# Patient Record
Sex: FEMALE | Race: WHITE | NOT HISPANIC OR LATINO | Employment: OTHER | URBAN - METROPOLITAN AREA
[De-identification: names, ages, dates, MRNs, and addresses within clinical notes are randomized per-mention and may not be internally consistent; named-entity substitution may affect disease eponyms.]

---

## 2017-01-23 ENCOUNTER — OFFICE VISIT (OUTPATIENT)
Dept: INTERNAL MEDICINE | Facility: CLINIC | Age: 82
End: 2017-01-23
Payer: MEDICARE

## 2017-01-23 VITALS
WEIGHT: 142.19 LBS | HEART RATE: 74 BPM | SYSTOLIC BLOOD PRESSURE: 104 MMHG | RESPIRATION RATE: 20 BRPM | HEIGHT: 64 IN | OXYGEN SATURATION: 98 % | DIASTOLIC BLOOD PRESSURE: 62 MMHG | BODY MASS INDEX: 24.28 KG/M2 | TEMPERATURE: 98 F

## 2017-01-23 DIAGNOSIS — R68.89 FLU-LIKE SYMPTOMS: Primary | ICD-10-CM

## 2017-01-23 LAB
CTP QC/QA: YES
FLUAV AG NPH QL: NEGATIVE
FLUBV AG NPH QL: NEGATIVE

## 2017-01-23 PROCEDURE — 87804 INFLUENZA ASSAY W/OPTIC: CPT | Mod: ,,, | Performed by: INTERNAL MEDICINE

## 2017-01-23 PROCEDURE — 99213 OFFICE O/P EST LOW 20 MIN: CPT | Mod: S$GLB,,, | Performed by: INTERNAL MEDICINE

## 2017-01-23 NOTE — PROGRESS NOTES
"Subjective:      Patient ID: Mary Ellen Miller is a 86 y.o. female.    Chief Complaint: Generalized Body Aches; Cough; and Weakness    HPI: 5 days ago developed a runny nose, mild cough. Now aches, but not to the point of not  Being able to move. No paroxysms of coughing. No HA,dizziness,CP,SOB,wheezing.  No GI/ symptoms.  Did not go on her bus trip.    Review of Systems   Constitutional: Positive for activity change, appetite change and fatigue. Negative for chills, diaphoresis, fever and unexpected weight change.   HENT: Positive for congestion and rhinorrhea. Negative for ear pain, sinus pressure, sore throat and tinnitus.    Eyes: Negative.    Respiratory: Positive for cough. Negative for choking, chest tightness, shortness of breath and wheezing.    Cardiovascular: Negative for chest pain and palpitations.   Endocrine: Negative for cold intolerance and heat intolerance.   Genitourinary: Negative.    Musculoskeletal: Positive for myalgias.   Neurological: Negative for dizziness, light-headedness and headaches.   Psychiatric/Behavioral: Negative.        Objective:     Visit Vitals    /62 (BP Location: Right arm, Patient Position: Sitting, BP Method: Manual)    Pulse 74    Temp 98 °F (36.7 °C) (Oral)    Resp 20    Ht 5' 4" (1.626 m)    Wt 64.5 kg (142 lb 3.2 oz)    SpO2 98%    BMI 24.41 kg/m2       Physical Exam   Constitutional: She appears well-developed and well-nourished.   HENT:   Head: Normocephalic and atraumatic.   Right Ear: External ear normal.   Left Ear: External ear normal.   Nose: Nose normal.   Mouth/Throat: Oropharynx is clear and moist.   Eyes: Conjunctivae are normal. Pupils are equal, round, and reactive to light.   Neck: Normal range of motion.   Cardiovascular: Normal rate, regular rhythm and normal heart sounds.    Pulmonary/Chest: Effort normal and breath sounds normal. No respiratory distress. She has no wheezes. She has no rales. She exhibits no tenderness. "   Abdominal: Soft. Bowel sounds are normal.   Musculoskeletal: Normal range of motion. She exhibits no edema or tenderness.   Lymphadenopathy:     She has no cervical adenopathy.   Neurological: She is alert.   Skin: Skin is warm and dry.   Psychiatric: She has a normal mood and affect. Her behavior is normal.   Nursing note and vitals reviewed.      Assessment:     1. Flu-like symptoms      Plan:     Flu-like symptoms  -     POCT Influenza A/B    This was negative.  Treat symptomatically:fluids,rest,OTC cough suppressants.  If fever,chills,HA,dizziness,GI symptoms come in or go to the ER.

## 2017-01-23 NOTE — MR AVS SNAPSHOT
[facility]  [address],  [address]  [place] [address]  Phone: [phone]  Fax: [phone]                  Mary Ellen Miller   2017 1:20 PM   Office Visit    Description:  Female : 1930   Provider:  Ania Barton MD   Department:  [facility]           Reason for Visit     Generalized Body Aches     Cough     Weakness                To Do List           Future Appointments        Provider Department Dept Phone    2/15/2017 10:00 AM Ania Barton MD [facility] [phone]      Goals (5 Years of Data)     None      [facility]     [facility] -  Assistance  Registered nurses in the [facility] provide clinical advisement, health education, appointment booking, and other advisory services.  Call for this free service at [phone].             Medications                Verify that the below list of medications is an accurate representation of the medications you are currently taking.  If none reported, the list may be blank. If incorrect, please contact your healthcare provider. Carry this list with you in case of emergency.           Current Medications     ascorbic acid (VITAMIN C) 500 MG tablet Take 500 mg by mouth once daily.    atenolol (TENORMIN) 25 MG tablet TAKE 1 TABLET BY ORAL ROUTE 1 TIME PER DAY    biotin 5 mg Tab Take 500 mg by mouth once daily.     calcium carbonate (OS-ZOILA) 600 mg (1,500 mg) Tab Take 600 mg by mouth 2 (two) times daily with meals.    escitalopram oxalate (LEXAPRO) 10 MG tablet TAKE 1 TABLET (10 MG) BY ORAL ROUTE ONCE DAILY    hydrALAZINE (APRESOLINE) 25 MG tablet TAKE 1 TABLET (25 MG) BY ORAL ROUTE 2 TIMES PER DAY WITH FOOD    hydrochlorothiazide (HYDRODIURIL) 25 MG tablet Take 1 tablet (25 mg total) by mouth once daily.    multivitamin (THERAGRAN) tablet Take 1 tablet by mouth once daily.    risedronate (ACTONEL) 150 MG Tab 1  "TABLET 1 TIME PER MONTH    UNABLE TO FIND Take 1 tablet by mouth 2 (two) times daily. medication name: HA Joint    vitamin D 1000 units Tab Take 2,000 mg by mouth once daily.    vitamin E 1000 UNIT capsule Take 1,000 Units by mouth once daily.           Clinical Reference Information           Vital Signs - Last Recorded  Most recent update: 1/23/2017  1:30 PM by Rosemary Gaitan LPN    BP Pulse Temp Resp Ht Wt    104/62 (BP Location: Right arm, Patient Position: Sitting, BP Method: Manual) 74 98 °F (36.7 °C) (Oral) 20 5' 4" (1.626 m) 64.5 kg (142 lb 3.2 oz)    SpO2 BMI             98% 24.41 kg/m2         Blood Pressure          Most Recent Value    BP  104/62      Allergies as of 1/23/2017     Percodan [Oxycodone Hcl-oxycodone-asa]    Penicillins    Sulfa (Sulfonamide Antibiotics)      Immunizations Administered on Date of Encounter - 1/23/2017     None      [facility] Sign-Up     Activating your [facility] account is as easy as 1-2-3!     1) Visit [email or web address], select Sign Up Now, enter this activation code and your date of birth, then select Next.  [email or web address]  Expires: 3/9/2017  2:00 PM      2) Create a username and password to use when you visit [facility] in the future and select a security question in case you lose your password and select Next.    3) Enter your e-mail address and click Sign Up!    Additional Information  If you have questions, please e-mail [email or web address] or call [phone] to talk to our [facility] staff. Remember, [facility] is NOT to be used for urgent needs. For medical emergencies, dial 911.         "

## 2017-02-15 ENCOUNTER — OFFICE VISIT (OUTPATIENT)
Dept: INTERNAL MEDICINE | Facility: CLINIC | Age: 82
End: 2017-02-15
Payer: MEDICARE

## 2017-02-15 VITALS
RESPIRATION RATE: 16 BRPM | HEART RATE: 63 BPM | SYSTOLIC BLOOD PRESSURE: 128 MMHG | BODY MASS INDEX: 24.92 KG/M2 | DIASTOLIC BLOOD PRESSURE: 68 MMHG | OXYGEN SATURATION: 97 % | TEMPERATURE: 98 F | HEIGHT: 64 IN | WEIGHT: 145.94 LBS

## 2017-02-15 DIAGNOSIS — M89.9 DISORDER OF BONE: ICD-10-CM

## 2017-02-15 DIAGNOSIS — E87.6 HYPOKALEMIA: ICD-10-CM

## 2017-02-15 DIAGNOSIS — M19.071 PRIMARY LOCALIZED OSTEOARTHROSIS, ANKLE AND FOOT, RIGHT: Primary | ICD-10-CM

## 2017-02-15 PROCEDURE — 90670 PCV13 VACCINE IM: CPT | Mod: S$GLB,,, | Performed by: INTERNAL MEDICINE

## 2017-02-15 PROCEDURE — G0009 ADMIN PNEUMOCOCCAL VACCINE: HCPCS | Mod: S$GLB,,, | Performed by: INTERNAL MEDICINE

## 2017-02-15 PROCEDURE — 99214 OFFICE O/P EST MOD 30 MIN: CPT | Mod: 25,S$GLB,, | Performed by: INTERNAL MEDICINE

## 2017-02-15 RX ORDER — POTASSIUM CHLORIDE 750 MG/1
10 TABLET, EXTENDED RELEASE ORAL DAILY
Qty: 10 TABLET | Refills: 1 | Status: SHIPPED | OUTPATIENT
Start: 2017-02-15 | End: 2017-08-08

## 2017-02-15 NOTE — PROGRESS NOTES
Subjective:      Patient ID: Mary Ellen Miller is a 87 y.o. female.    Chief Complaint: Results (pt in for lab results) and Hypertension    HPI: After her viral syndrome, it did take 2 weeks to recover.  However, she is now back to where she started.  Getting around, out and about.  Happier.  /15/17 0759 COLORU Yellow - Final result      02/15/17 0759 APPEARANCEUA Clear - Final result     02/15/17 0759 SPECGRAV 1.020 - Final result     02/15/17 0759 PHUR 7.0 - Final result     02/15/17 0759 KETONESU Negative - Final result     02/15/17 0759 OCCULTUA Negative - Final result     02/15/17 0759 PROTEINUA Negative - Final result     02/15/17 0759 NITRITE Negative - Final result     02/15/17 0759 LEUKOCYTESUR Trace Abnormal Final result     02/15/17 0759 WBC 4.64 - Final result     02/15/17 0759 RBC 4.40 - Final result     02/15/17 0759 HGB 13.4 - Final result     02/15/17 0759 HCT 40.2 - Final result     02/15/17 0759 MCH 30.5 - Final result     02/15/17 0759 RDW 13.9 - Final result     02/15/17 0759  - Final result     02/15/17 0759 MPV 10.6 - Final result     03/17/16 0851 SEGS 84.4 High Final result     02/15/17 0759 GLU 81 - Final result     02/15/17 0759 BUN 17 - Final result     02/15/17 0759 CREATININE 0.72 - Final result     02/15/17 0759 CALCIUM 9.2 - Final result     02/15/17 0759  - Final result     02/15/17 0759 K 3.3 Low Final result     02/15/17 0759  - Final result     02/15/17 0759 PROT 7.5 - Final result     02/15/17 0759 ALBUMIN 4.3 - Final result     02/15/17 0759 BILITOT 1.2 High Final result     02/15/17 0759 AST 29 - Final result     02/15/17 0759 ALKPHOS 43 - Final result     02/15/17 0759 CO2 31 High Final result     02/15/17 0759 ALT 22 - Final result     02/15/17 0759 ANIONGAP 11 - Final result     02/15/17 0759 EGFRNONAA >60.0 - Final result     02/15/17 0759 ESTGFRAFRICA >60.0 - Final result     02/15/17 0759 MCV 91 - Final        Review of Systems   Constitutional:  "Negative.    HENT: Negative.    Eyes: Negative.    Respiratory: Negative.    Cardiovascular: Negative.    Gastrointestinal: Negative.    Endocrine: Negative.    Genitourinary: Negative.    Musculoskeletal:        Gary   Skin: Negative.    Allergic/Immunologic: Negative.    Neurological: Negative.    Hematological: Negative.    Psychiatric/Behavioral: Negative.        Objective:     Visit Vitals    /68 (BP Location: Right arm, Patient Position: Sitting, BP Method: Manual)    Pulse 63    Temp 97.7 °F (36.5 °C) (Oral)    Resp 16    Ht 5' 4" (1.626 m)    Wt 66.2 kg (145 lb 15.1 oz)    SpO2 97%    BMI 25.05 kg/m2       Physical Exam   Constitutional: She is oriented to person, place, and time. She appears well-developed and well-nourished. No distress.   HENT:   Head: Normocephalic and atraumatic.   Mouth/Throat: Oropharynx is clear and moist.   Eyes: Conjunctivae are normal. Pupils are equal, round, and reactive to light.   Neck: Normal range of motion. Neck supple.   Cardiovascular: Normal rate, regular rhythm and normal heart sounds.    Pulmonary/Chest: Effort normal and breath sounds normal.   Abdominal: Soft. Bowel sounds are normal.   Musculoskeletal: Normal range of motion.   Neurological: She is alert and oriented to person, place, and time.   Skin: Skin is warm and dry. She is not diaphoretic.   Psychiatric: She has a normal mood and affect. Her behavior is normal. Judgment and thought content normal.   Nursing note and vitals reviewed.      Assessment:     1. Primary localized osteoarthrosis, ankle and foot, right    2. Hypokalemia    3. Disorder of bone      Gary, so obviously has osteoporosis.  Plan:     Primary localized osteoarthrosis, ankle and foot, right  -     DXA Bone Density Spine And Hip_Axial Skeleton; Future; Expected date: 2/15/17    Hypokalemia  -     potassium chloride (KLOR-CON) 10 MEQ TbSR; Take 1 tablet (10 mEq total) by mouth once daily.  Dispense: 10 tablet; Refill: " 1    Disorder of bone   -     DXA Bone Density Spine And Hip_Axial Skeleton; Future; Expected date: 2/15/17    Other orders  -     Pneumococcal Conjugate Vaccine (13 Valent) (IM)

## 2017-02-15 NOTE — MR AVS SNAPSHOT
[facility]  [address],  [address]  [place] [address]  Phone: [phone]  Fax: [phone]                  Mary Ellen Miller   2/15/2017 10:00 AM   Office Visit    Description:  Female : 1930   Provider:  Ania Barton MD   Department:  [facility]           Reason for Visit     Results     Hypertension           Diagnoses this Visit        Comments    Primary localized osteoarthrosis, ankle and foot, right    -  Primary     Hypokalemia         Disorder of bone                To Do List           Goals (5 Years of Data)     None       These Medications        Disp Refills Start End    potassium chloride (KLOR-CON) 10 MEQ TbSR 10 tablet 1 2/15/2017     Take 1 tablet (10 mEq total) by mouth once daily. - Oral    Pharmacy: [facility]/pharmacy #[id number] - [place], [place] - [address] AT [facility] #: [phone]         [facility]     [facility] Care Line -  Assistance  Registered nurses in the [facility] provide clinical advisement, health education, appointment booking, and other advisory services.  Call for this free service at [phone].             Medications           START taking these NEW medications        Refills    potassium chloride (KLOR-CON) 10 MEQ TbSR 1    Sig: Take 1 tablet (10 mEq total) by mouth once daily.    Class: Normal    Route: Oral           Verify that the below list of medications is an accurate representation of the medications you are currently taking.  If none reported, the list may be blank. If incorrect, please contact your healthcare provider. Carry this list with you in case of emergency.           Current Medications     ascorbic acid (VITAMIN C) 500 MG tablet Take 500 mg by mouth once daily.    atenolol (TENORMIN) 25 MG tablet TAKE 1 TABLET BY ORAL ROUTE 1 TIME PER DAY    biotin 5 mg Tab Take 500 mg by mouth once daily.     calcium carbonate  "(OS-ZOILA) 600 mg (1,500 mg) Tab Take 600 mg by mouth 2 (two) times daily with meals.    escitalopram oxalate (LEXAPRO) 10 MG tablet TAKE 1 TABLET (10 MG) BY ORAL ROUTE ONCE DAILY    hydrALAZINE (APRESOLINE) 25 MG tablet TAKE 1 TABLET (25 MG) BY ORAL ROUTE 2 TIMES PER DAY WITH FOOD    hydrochlorothiazide (HYDRODIURIL) 25 MG tablet Take 1 tablet (25 mg total) by mouth once daily.    multivitamin (THERAGRAN) tablet Take 1 tablet by mouth once daily.    risedronate (ACTONEL) 150 MG Tab 1 TABLET 1 TIME PER MONTH    vitamin D 1000 units Tab Take 2,000 mg by mouth once daily.    vitamin E 1000 UNIT capsule Take 1,000 Units by mouth once daily.    potassium chloride (KLOR-CON) 10 MEQ TbSR Take 1 tablet (10 mEq total) by mouth once daily.    UNABLE TO FIND Take 1 tablet by mouth 2 (two) times daily. medication name: HA Joint           Clinical Reference Information           Your Vitals Were     BP Pulse Temp Resp Height Weight    128/68 (BP Location: Right arm, Patient Position: Sitting, BP Method: Manual) 63 97.7 °F (36.5 °C) (Oral) 16 5' 4" (1.626 m) 66.2 kg (145 lb 15.1 oz)    SpO2 BMI             97% 25.05 kg/m2         Blood Pressure          Most Recent Value    BP  128/68      Allergies as of 2/15/2017     Percodan [Oxycodone Hcl-oxycodone-asa]    Penicillins    Sulfa (Sulfonamide Antibiotics)      Immunizations Administered on Date of Encounter - 2/15/2017     Name Date Dose VIS Date Route    Pneumococcal Conjugate - 13 Valent  Incomplete 0.5 mL 11/5/2015 Intramuscular      Orders Placed During Today's Visit      Normal Orders This Visit    Pneumococcal Conjugate Vaccine (13 Valent) (IM)     Future Labs/Procedures Expected by Expires    DXA Bone Density Spine And Hip_Axial Skeleton  2/15/2017 2/15/2018      [facility] Sign-Up     Activating your [facility] account is as easy as 1-2-3!     1) Visit [email or web address], select Sign Up Now, enter this activation code and your date of birth, then select " Next.  [email or web address]  Expires: 3/9/2017  2:00 PM      2) Create a username and password to use when you visit [facility] in the future and select a security question in case you lose your password and select Next.    3) Enter your e-mail address and click Sign Up!    Additional Information  If you have questions, please e-mail [email or web address] or call [phone] to talk to our [facility] staff. Remember, [facility] is NOT to be used for urgent needs. For medical emergencies, dial 911.         Language Assistance Services     ATTENTION: Language assistance services are available, free of charge. Please call [phone].      ATENCIÓN: Si gabriel rowley, tiene a stevens disposición servicios gratuitos de asistencia lingüística. Llame al [phone].     [facility] Ý: N?u b?n nói Ti?ng Vi?t, có các d?ch v? h? tr? ngôn ng? mi?n phí dành cho b?n. G?i s? [phone].         [facility] complies with applicable Federal civil rights laws and does not discriminate on the basis of race, color, national origin, age, disability, or sex.

## 2017-03-08 RX ORDER — HYDROCHLOROTHIAZIDE 25 MG/1
TABLET ORAL
Qty: 90 TABLET | Refills: 1 | Status: SHIPPED | OUTPATIENT
Start: 2017-03-08 | End: 2017-09-02 | Stop reason: SDUPTHER

## 2017-03-08 RX ORDER — ATENOLOL 25 MG/1
TABLET ORAL
Qty: 90 TABLET | Refills: 1 | Status: SHIPPED | OUTPATIENT
Start: 2017-03-08 | End: 2017-05-04 | Stop reason: SDUPTHER

## 2017-03-08 RX ORDER — ESCITALOPRAM OXALATE 10 MG/1
TABLET ORAL
Qty: 90 TABLET | Refills: 1 | Status: SHIPPED | OUTPATIENT
Start: 2017-03-08 | End: 2017-09-02 | Stop reason: SDUPTHER

## 2017-03-08 RX ORDER — HYDRALAZINE HYDROCHLORIDE 25 MG/1
TABLET, FILM COATED ORAL
Qty: 180 TABLET | Refills: 1 | Status: SHIPPED | OUTPATIENT
Start: 2017-03-08 | End: 2017-09-02 | Stop reason: SDUPTHER

## 2017-05-02 ENCOUNTER — TELEPHONE (OUTPATIENT)
Dept: INTERNAL MEDICINE | Facility: CLINIC | Age: 82
End: 2017-05-02

## 2017-05-02 NOTE — TELEPHONE ENCOUNTER
----- Message from Nisha Rahman sent at 5/2/2017 12:02 PM CDT -----  Contact: patient   Patient scheduled her appointment for 5/23 but would like to get labs done an check her potassium before she comes in , but no orders are out there. She will be leaving Sunday to go out of town . Please call her before then.

## 2017-05-03 DIAGNOSIS — E87.6 HYPOKALEMIA: Primary | ICD-10-CM

## 2017-05-04 ENCOUNTER — TELEPHONE (OUTPATIENT)
Dept: INTERNAL MEDICINE | Facility: CLINIC | Age: 82
End: 2017-05-04

## 2017-05-04 ENCOUNTER — OFFICE VISIT (OUTPATIENT)
Dept: INTERNAL MEDICINE | Facility: CLINIC | Age: 82
End: 2017-05-04
Payer: MEDICARE

## 2017-05-04 VITALS
TEMPERATURE: 98 F | WEIGHT: 144.19 LBS | DIASTOLIC BLOOD PRESSURE: 70 MMHG | HEIGHT: 64 IN | SYSTOLIC BLOOD PRESSURE: 120 MMHG | OXYGEN SATURATION: 97 % | HEART RATE: 72 BPM | RESPIRATION RATE: 16 BRPM | BODY MASS INDEX: 24.62 KG/M2

## 2017-05-04 DIAGNOSIS — R45.89 ANXIETY ABOUT HEALTH: ICD-10-CM

## 2017-05-04 DIAGNOSIS — I10 ESSENTIAL HYPERTENSION: Primary | ICD-10-CM

## 2017-05-04 DIAGNOSIS — R00.0 TACHYCARDIA: ICD-10-CM

## 2017-05-04 PROCEDURE — 99214 OFFICE O/P EST MOD 30 MIN: CPT | Mod: S$GLB,,, | Performed by: INTERNAL MEDICINE

## 2017-05-04 RX ORDER — NAPROXEN SODIUM 220 MG/1
81 TABLET, FILM COATED ORAL DAILY
Start: 2017-05-04 | End: 2017-11-17

## 2017-05-04 RX ORDER — ATENOLOL 25 MG/1
25 TABLET ORAL 2 TIMES DAILY
Qty: 180 TABLET | Refills: 1 | Status: SHIPPED | OUTPATIENT
Start: 2017-05-04 | End: 2017-11-12 | Stop reason: SDUPTHER

## 2017-05-04 NOTE — MR AVS SNAPSHOT
[facility]  [address],  [address]  [place] [address]  Phone: [phone]  Fax: [phone]                  Mary Ellen Miller   2017 8:00 AM   Office Visit    Description:  Female : 1930   Provider:  Ania Barton MD   Department:  [facility]           Reason for Visit     Hypertension           Diagnoses this Visit        Comments    Essential hypertension    -  Primary     Tachycardia         Anxiety about health                To Do List           Future Appointments        Provider Department Dept Phone    2017 10:00 AM Ania Barton MD [facility] [phone]    8/3/2017 2:00 PM Ania Barton MD [facility] [phone]      Goals (5 Years of Data)     None       These Medications        Disp Refills Start End    atenolol (TENORMIN) 25 MG tablet 180 tablet 1 2017     Take 1 tablet (25 mg total) by mouth 2 (two) times daily. - Oral    Pharmacy: [facility]/pharmacy #[id number] - [place] - [address] AT [facility] Ph #: [phone]       aspirin 81 MG Chew   2017     Take 1 tablet (81 mg total) by mouth once daily. - Oral    Pharmacy: [facility]/pharmacy #[id number] - [place] - [address] AT [facility] Ph #: [phone]         [facility]     [facility] On Call Nurse Care Line -  Assistance  Unless otherwise directed by your provider, please contact [facility], our nurse care line that is available for  assistance.     Registered nurses in the [facility] provide: appointment scheduling, clinical advisement, health education, and other advisory services.  Call: [phone] (toll free)               Medications           START taking these NEW medications        Refills    aspirin 81 MG Chew     Sig: Take 1 tablet (81 mg total) by mouth once daily.    Class: No Print    Route: Oral      CHANGE how you are  taking these medications     Start Taking Instead of    atenolol (TENORMIN) 25 MG tablet atenolol (TENORMIN) 25 MG tablet    Dosage:  Take 1 tablet (25 mg total) by mouth 2 (two) times daily. Dosage:  TAKE 1 TABLET BY ORAL ROUTE 1 TIME PER DAY    Reason for Change:  Reorder       STOP taking these medications     methylPREDNISolone (MEDROL DOSEPACK) 4 mg tablet Take 4 mg by mouth. use as directed           Verify that the below list of medications is an accurate representation of the medications you are currently taking.  If none reported, the list may be blank. If incorrect, please contact your healthcare provider. Carry this list with you in case of emergency.           Current Medications     ascorbic acid (VITAMIN C) 500 MG tablet Take 500 mg by mouth once daily.    atenolol (TENORMIN) 25 MG tablet Take 1 tablet (25 mg total) by mouth 2 (two) times daily.    biotin 5 mg Tab Take 500 mg by mouth once daily.     calcium carbonate (OS-ZOILA) 600 mg (1,500 mg) Tab Take 600 mg by mouth 2 (two) times daily with meals.    escitalopram oxalate (LEXAPRO) 10 MG tablet TAKE 1 TABLET (10 MG) BY ORAL ROUTE ONCE DAILY    hydrALAZINE (APRESOLINE) 25 MG tablet TAKE 1 TABLET (25 MG) BY ORAL ROUTE 2 TIMES PER DAY WITH FOOD    hydrochlorothiazide (HYDRODIURIL) 25 MG tablet TAKE 1 TABLET (25 MG TOTAL) BY MOUTH ONCE DAILY.    multivitamin (THERAGRAN) tablet Take 1 tablet by mouth once daily.    potassium chloride (KLOR-CON) 10 MEQ TbSR Take 1 tablet (10 mEq total) by mouth once daily.    risedronate (ACTONEL) 150 MG Tab 1 TABLET 1 TIME PER MONTH    vitamin D 1000 units Tab Take 2,000 mg by mouth once daily.    vitamin E 1000 UNIT capsule Take 1,000 Units by mouth once daily.    aspirin 81 MG Chew Take 1 tablet (81 mg total) by mouth once daily.    UNABLE TO FIND Take 1 tablet by mouth 2 (two) times daily. medication name: HA Joint           Clinical Reference Information           Your Vitals Were     BP Pulse Temp Resp Height Weight  "   120/70 (BP Location: Left arm, Patient Position: Sitting, BP Method: Manual) 72 98.3 °F (36.8 °C) (Oral) 16 5' 4" (1.626 m) 65.4 kg (144 lb 2.9 oz)    SpO2 BMI             97% 24.75 kg/m2         Blood Pressure          Most Recent Value    BP  120/70      Allergies as of 5/4/2017     Percodan [Oxycodone Hcl-oxycodone-asa]    Penicillins    Sulfa (Sulfonamide Antibiotics)      Immunizations Administered on Date of Encounter - 5/4/2017     None      [facility] Sign-Up     Activating your [facility] account is as easy as 1-2-3!     1) Visit [email or web address], select Sign Up Now, enter this activation code and your date of birth, then select Next.  [email or web address]  Expires: 5/15/2017  8:00 PM      2) Create a username and password to use when you visit [facility] in the future and select a security question in case you lose your password and select Next.    3) Enter your e-mail address and click Sign Up!    Additional Information  If you have questions, please e-mail [email or web address] or call [phone] to talk to our [facility] staff. Remember, [facility] is NOT to be used for urgent needs. For medical emergencies, dial 911.         Language Assistance Services     ATTENTION: Language assistance services are available, free of charge. Please call [phone].      ATENCIÓN: Si habla español, tiene a stevens disposición servicios gratuitos de asistencia lingüística. Llame al [phone].     CHÚ Ý: N?u b?n nói Ti?ng Vi?t, có các d?ch v? h? tr? ngôn ng? mi?n phí dành cho b?n. G?i s? [phone].         [facility] complies with applicable Federal civil rights laws and does not discriminate on the basis of race, color, national origin, age, disability, or sex.        "

## 2017-05-04 NOTE — TELEPHONE ENCOUNTER
----- Message from Nisha Rahman sent at 5/4/2017  8:54 AM CDT -----  Contact: Patient   Patient came in today for appt but forgot to get results on her bone density and would like a call back. Call her cell [phone]

## 2017-05-04 NOTE — PROGRESS NOTES
"Subjective:      Patient ID: Mary Ellen Miller is a 87 y.o. female.    Chief Complaint: Hypertension (pt c/o fast pulse and high reading on her blood pressure )    HPI: 87y/oWF, who lives alone, and has been going to many funerals recently, has had  Several episodes of tachycardia. Sporadic, self limiting and without any exacerbating circumstances.  Has taken her BP when this happens and its been: 147-155/.  Has not had any SOB,GARVIN,PND,CP,headaches or lightheadedness.  She has been seen by Dr. Last recently... No findings.      Review of Systems   Constitutional: Negative.    HENT: Negative.    Eyes: Negative.    Respiratory: Negative for cough, chest tightness, shortness of breath and wheezing.    Cardiovascular: Positive for palpitations. Negative for chest pain and leg swelling.   Gastrointestinal: Negative.    Endocrine: Negative.    Genitourinary: Negative.    Musculoskeletal: Negative.    Skin: Negative.    Allergic/Immunologic: Negative.    Neurological: Negative.    Hematological: Negative.    Psychiatric/Behavioral: Negative.        Objective:   /70 (BP Location: Left arm, Patient Position: Sitting, BP Method: Manual)  Pulse 72  Temp 98.3 °F (36.8 °C) (Oral)   Resp 16  Ht 5' 4" (1.626 m)  Wt 65.4 kg (144 lb 2.9 oz)  SpO2 97%  BMI 24.75 kg/m2    Physical Exam   Constitutional: She is oriented to person, place, and time. She appears well-developed and well-nourished.   HENT:   Head: Normocephalic and atraumatic.   Right Ear: External ear normal.   Left Ear: External ear normal.   Nose: Nose normal.   Mouth/Throat: Oropharynx is clear and moist.   Eyes: Conjunctivae and EOM are normal. Pupils are equal, round, and reactive to light.   Neck: Normal range of motion. Neck supple. No JVD present.   Cardiovascular: Normal rate and regular rhythm.    Murmur heard.   Systolic murmur is present with a grade of 2/6   Pulmonary/Chest: Effort normal and breath sounds normal.   Abdominal: Soft. " Bowel sounds are normal.   Musculoskeletal: Normal range of motion.   Neurological: She is alert and oriented to person, place, and time.   Skin: Skin is warm and dry.   Psychiatric: She has a normal mood and affect. Her behavior is normal.   Nursing note and vitals reviewed.      Assessment:     1. Essential hypertension    2. Tachycardia    3. Anxiety about health    Will ask for Dr. Last' notes too.  Plan:     Essential hypertension  -     atenolol (TENORMIN) 25 MG tablet; Take 1 tablet (25 mg total) by mouth 2 (two) times daily.  Dispense: 180 tablet; Refill: 1  -     aspirin 81 MG Chew; Take 1 tablet (81 mg total) by mouth once daily.    Tachycardia  -     atenolol (TENORMIN) 25 MG tablet; Take 1 tablet (25 mg total) by mouth 2 (two) times daily.  Dispense: 180 tablet; Refill: 1    Anxiety about health

## 2017-05-12 RX ORDER — RISEDRONATE SODIUM 150 MG/1
TABLET, FILM COATED ORAL
Qty: 6 TABLET | Refills: 0 | Status: SHIPPED | OUTPATIENT
Start: 2017-05-12 | End: 2017-07-05 | Stop reason: SDUPTHER

## 2017-07-05 RX ORDER — RISEDRONATE SODIUM 150 MG/1
TABLET, FILM COATED ORAL
Qty: 6 TABLET | Refills: 0 | Status: SHIPPED | OUTPATIENT
Start: 2017-07-05 | End: 2018-02-04 | Stop reason: SDUPTHER

## 2017-08-01 ENCOUNTER — OFFICE VISIT (OUTPATIENT)
Dept: FAMILY MEDICINE | Facility: CLINIC | Age: 82
End: 2017-08-01
Payer: MEDICARE

## 2017-08-01 VITALS
WEIGHT: 138.44 LBS | TEMPERATURE: 99 F | RESPIRATION RATE: 16 BRPM | DIASTOLIC BLOOD PRESSURE: 70 MMHG | HEIGHT: 63 IN | SYSTOLIC BLOOD PRESSURE: 124 MMHG | BODY MASS INDEX: 24.53 KG/M2 | HEART RATE: 81 BPM | OXYGEN SATURATION: 95 %

## 2017-08-01 DIAGNOSIS — J01.90 ACUTE RHINOSINUSITIS: ICD-10-CM

## 2017-08-01 DIAGNOSIS — R05.9 COUGH: ICD-10-CM

## 2017-08-01 DIAGNOSIS — R19.7 DIARRHEA, UNSPECIFIED TYPE: ICD-10-CM

## 2017-08-01 DIAGNOSIS — J18.9 ATYPICAL PNEUMONIA: Primary | ICD-10-CM

## 2017-08-01 DIAGNOSIS — H65.93 MIDDLE EAR EFFUSION, BILATERAL: ICD-10-CM

## 2017-08-01 PROCEDURE — 1126F AMNT PAIN NOTED NONE PRSNT: CPT | Mod: ,,, | Performed by: NURSE PRACTITIONER

## 2017-08-01 PROCEDURE — 99215 OFFICE O/P EST HI 40 MIN: CPT | Mod: PBBFAC,PO | Performed by: NURSE PRACTITIONER

## 2017-08-01 PROCEDURE — 99999 PR PBB SHADOW E&M-EST. PATIENT-LVL V: CPT | Mod: PBBFAC,,, | Performed by: NURSE PRACTITIONER

## 2017-08-01 PROCEDURE — 1159F MED LIST DOCD IN RCRD: CPT | Mod: ,,, | Performed by: NURSE PRACTITIONER

## 2017-08-01 PROCEDURE — 99214 OFFICE O/P EST MOD 30 MIN: CPT | Mod: S$PBB,,, | Performed by: NURSE PRACTITIONER

## 2017-08-01 RX ORDER — BENZONATATE 200 MG/1
200 CAPSULE ORAL 3 TIMES DAILY PRN
Qty: 30 CAPSULE | Refills: 0 | Status: SHIPPED | OUTPATIENT
Start: 2017-08-01 | End: 2017-08-11

## 2017-08-01 RX ORDER — AZITHROMYCIN 250 MG/1
TABLET, FILM COATED ORAL
Qty: 6 TABLET | Refills: 0 | Status: SHIPPED | OUTPATIENT
Start: 2017-08-01 | End: 2017-08-06

## 2017-08-01 RX ORDER — IPRATROPIUM BROMIDE 21 UG/1
2 SPRAY, METERED NASAL 2 TIMES DAILY
Qty: 30 ML | Refills: 0 | Status: SHIPPED | OUTPATIENT
Start: 2017-08-01 | End: 2017-08-08

## 2017-08-01 NOTE — PROGRESS NOTES
Subjective:       Patient ID: Mary Ellen Miller is a 87 y.o. female.    Chief Complaint: Cough; Chest Congestion; Nasal Congestion; Fatigue; and Diarrhea    Cough   This is a new problem. Episode onset: 5 days ago. The problem has been gradually improving. The problem occurs every few hours. The cough is productive of sputum (yellow). Associated symptoms include rhinorrhea. Pertinent negatives include no chest pain, ear congestion, fever, headaches, nasal congestion, postnasal drip, sore throat, shortness of breath, weight loss or wheezing. Nothing aggravates the symptoms. Treatments tried: claritin and coricidin. The treatment provided mild relief. There is no history of asthma, bronchitis, COPD, environmental allergies or pneumonia.   Diarrhea    This is a new problem. Episode onset: 5 days ago. The problem has been gradually improving. The stool consistency is described as watery. The patient states that diarrhea does not awaken her from sleep. Associated symptoms include coughing and a URI. Pertinent negatives include no abdominal pain, bloating, fever, headaches, increased  flatus, vomiting or weight loss. Nothing aggravates the symptoms. There are no known risk factors. She has tried nothing for the symptoms. There is no history of a recent abdominal surgery.     Review of Systems   Constitutional: Negative for fever and weight loss.   HENT: Positive for rhinorrhea and sinus pressure. Negative for congestion, postnasal drip, sneezing and sore throat.    Eyes: Positive for discharge.        Watery eyes   Respiratory: Positive for cough. Negative for shortness of breath and wheezing.    Cardiovascular: Negative for chest pain.   Gastrointestinal: Positive for diarrhea. Negative for abdominal pain, bloating, blood in stool, flatus, nausea and vomiting.   Endocrine: Negative.    Genitourinary: Negative.    Allergic/Immunologic: Negative for environmental allergies.   Neurological: Negative for headaches.        Objective:      Physical Exam   Constitutional: She is oriented to person, place, and time. She appears well-developed and well-nourished.   HENT:   Head: Normocephalic and atraumatic.   Right Ear: A middle ear effusion is present.   Left Ear: A middle ear effusion is present.   Nose: Mucosal edema present. Right sinus exhibits maxillary sinus tenderness. Right sinus exhibits no frontal sinus tenderness. Left sinus exhibits maxillary sinus tenderness. Left sinus exhibits no frontal sinus tenderness.   Mouth/Throat: Oropharynx is clear and moist and mucous membranes are normal.   Bilateral nasal turbinates inflamed and swollen.    Eyes: EOM are normal. Pupils are equal, round, and reactive to light.   Neck: Normal range of motion. Neck supple.   Cardiovascular: Normal rate, regular rhythm and normal heart sounds.    Pulmonary/Chest: Effort normal and breath sounds normal. No respiratory distress.   Musculoskeletal: Normal range of motion.   Lymphadenopathy:     She has no cervical adenopathy.   Neurological: She is alert and oriented to person, place, and time. Coordination normal.   Skin: Skin is warm and dry.   Good skin turgor.   Psychiatric: She has a normal mood and affect. Her behavior is normal. Judgment and thought content normal.   Vitals reviewed.      Assessment:       1. Atypical pneumonia    2. Acute rhinosinusitis    3. Cough    4. Diarrhea, unspecified type    5. Middle ear effusion, bilateral        Plan:       Atypical pneumonia  -     azithromycin (Z-THU) 250 MG tablet; Take 2 tablets by mouth on day 1; Take 1 tablet by mouth on days 2-5  Dispense: 6 tablet; Refill: 0    Acute rhinosinusitis  -     benzonatate (TESSALON) 200 MG capsule; Take 1 capsule (200 mg total) by mouth 3 (three) times daily as needed for Cough.  Dispense: 30 capsule; Refill: 0  -     ipratropium (ATROVENT) 0.03 % nasal spray; 2 sprays by Nasal route 2 (two) times daily.  Dispense: 30 mL; Refill: 0    Cough  -     benzonatate  (TESSALON) 200 MG capsule; Take 1 capsule (200 mg total) by mouth 3 (three) times daily as needed for Cough.  Dispense: 30 capsule; Refill: 0  -     X-Ray Chest PA And Lateral; Future    Diarrhea, unspecified type    Middle ear effusion, bilateral    Other orders  1. Rest + increase fluid intake.  2. Warm facial compresses and steam inhalation may be helpful.  3. May take pepto-bismol or imodium for diarrhea if needed.   4. Go to ER for shortness of breath, increased heart rate, fever, etc.       Follow-up in 1 week if symptoms worsen or fail to improve.     Shaina Thomas, NP

## 2017-08-01 NOTE — PATIENT INSTRUCTIONS
1. Rest + increase fluid intake.  2. Warm facial compresses and steam inhalation may be helpful.  3. May take pepto-bismol or imodium for diarrhea if needed.   4. Go to ER for shortness of breath, increased heart rate, fever, etc.   5. Follow-up in 1 week if symptoms worsen or fail to improve.

## 2017-08-08 ENCOUNTER — OFFICE VISIT (OUTPATIENT)
Dept: INTERNAL MEDICINE | Facility: CLINIC | Age: 82
End: 2017-08-08
Payer: MEDICARE

## 2017-08-08 VITALS
BODY MASS INDEX: 24.98 KG/M2 | HEART RATE: 82 BPM | DIASTOLIC BLOOD PRESSURE: 70 MMHG | HEIGHT: 63 IN | WEIGHT: 141 LBS | TEMPERATURE: 98 F | SYSTOLIC BLOOD PRESSURE: 128 MMHG | OXYGEN SATURATION: 97 % | RESPIRATION RATE: 16 BRPM

## 2017-08-08 DIAGNOSIS — J06.9 UPPER RESPIRATORY TRACT INFECTION, UNSPECIFIED TYPE: Primary | ICD-10-CM

## 2017-08-08 DIAGNOSIS — E87.6 HYPOKALEMIA: ICD-10-CM

## 2017-08-08 PROCEDURE — 99214 OFFICE O/P EST MOD 30 MIN: CPT | Mod: S$GLB,,, | Performed by: INTERNAL MEDICINE

## 2017-08-08 PROCEDURE — 3008F BODY MASS INDEX DOCD: CPT | Mod: S$GLB,,, | Performed by: INTERNAL MEDICINE

## 2017-08-08 PROCEDURE — 1126F AMNT PAIN NOTED NONE PRSNT: CPT | Mod: S$GLB,,, | Performed by: INTERNAL MEDICINE

## 2017-08-08 PROCEDURE — 1159F MED LIST DOCD IN RCRD: CPT | Mod: S$GLB,,, | Performed by: INTERNAL MEDICINE

## 2017-08-08 RX ORDER — POTASSIUM CHLORIDE 1.5 G/1.58G
20 POWDER, FOR SOLUTION ORAL DAILY
Qty: 30 PACKET | Refills: 3 | Status: SHIPPED | OUTPATIENT
Start: 2017-08-08 | End: 2017-11-17 | Stop reason: ALTCHOICE

## 2017-08-08 NOTE — PROGRESS NOTES
"Subjective:      Patient ID: Mary Ellen Miller is a 87 y.o. female.    Chief Complaint: Results (lab results); Medication Refill; and Nasal Congestion    HPI: 87y/oWF, had a URI in the past.  Had diarrhea, loss of appetite.  Still not back to normal.  States the potassium does not get digested.      Recent Labs  Lab Result Units 08/01/17  0940   Glucose mg/dL 90   BUN, Bld mg/dL 22*   Creatinine mg/dL 0.87   Calcium mg/dL 9.2   Sodium mmol/L 141       Recent Labs  Lab Result Units 08/01/17  0940   Potassium mmol/L 3.3*   Chloride mmol/L 103       Recent Labs  Lab Result Units 08/01/17  0940   CO2 mmol/L 25   Anion Gap mmol/L 13   eGFR if non African American mL/min/1.73 m^2 >60.0             Review of Systems   Constitutional: Negative.    HENT: Negative.    Eyes: Negative.    Respiratory: Negative.    Cardiovascular: Negative.    Gastrointestinal: Negative.    Endocrine: Negative.    Genitourinary: Negative.    Musculoskeletal: Negative.    Skin: Negative.    Allergic/Immunologic: Negative.    Neurological: Negative.    Hematological: Negative.    Psychiatric/Behavioral: Negative.        Objective:   /70 (BP Location: Right arm, Patient Position: Sitting, BP Method: Manual)   Pulse 82   Temp 98.3 °F (36.8 °C) (Oral)   Resp 16   Ht 5' 3" (1.6 m)   Wt 64 kg (140 lb 15.8 oz)   SpO2 97%   BMI 24.97 kg/m²     Physical Exam   Constitutional: She is oriented to person, place, and time. She appears well-developed and well-nourished.   HENT:   Head: Normocephalic and atraumatic.   Right Ear: External ear normal.   Left Ear: External ear normal.   Nose: Nose normal.   Mouth/Throat: Oropharynx is clear and moist.   Eyes: Conjunctivae are normal. Pupils are equal, round, and reactive to light.   Neck: Normal range of motion.   Cardiovascular: Normal rate, regular rhythm and normal heart sounds.    Pulmonary/Chest: Effort normal and breath sounds normal.   Abdominal: Soft. Bowel sounds are normal. "   Neurological: She is alert and oriented to person, place, and time.   Skin: Skin is warm and dry.   Psychiatric: She has a normal mood and affect. Her behavior is normal.   Nursing note and vitals reviewed.      Assessment:     1. Upper respiratory tract infection, unspecified type    2. Hypokalemia      Plan:     Upper respiratory tract infection, unspecified type    Hypokalemia  -     potassium chloride (KLOR-CON) 20 mEq Pack; Take 20 mEq by mouth once daily.  Dispense: 30 packet; Refill: 3

## 2017-09-05 RX ORDER — HYDROCHLOROTHIAZIDE 25 MG/1
TABLET ORAL
Qty: 90 TABLET | Refills: 1 | Status: SHIPPED | OUTPATIENT
Start: 2017-09-05 | End: 2018-03-03 | Stop reason: SDUPTHER

## 2017-09-05 RX ORDER — HYDRALAZINE HYDROCHLORIDE 25 MG/1
TABLET, FILM COATED ORAL
Qty: 180 TABLET | Refills: 1 | Status: SHIPPED | OUTPATIENT
Start: 2017-09-05 | End: 2018-03-03 | Stop reason: SDUPTHER

## 2017-09-05 RX ORDER — ESCITALOPRAM OXALATE 10 MG/1
TABLET ORAL
Qty: 90 TABLET | Refills: 1 | Status: SHIPPED | OUTPATIENT
Start: 2017-09-05 | End: 2018-03-03 | Stop reason: SDUPTHER

## 2017-09-21 ENCOUNTER — TELEPHONE (OUTPATIENT)
Dept: INTERNAL MEDICINE | Facility: CLINIC | Age: 82
End: 2017-09-21

## 2017-09-21 DIAGNOSIS — Z12.31 ENCOUNTER FOR SCREENING MAMMOGRAM FOR BREAST CANCER: Primary | ICD-10-CM

## 2017-09-21 NOTE — TELEPHONE ENCOUNTER
----- Message from Nisha Rahman sent at 9/21/2017 10:55 AM CDT -----  Contact: patient  Patient would like orders for a mammogram and a call to schedule. Call [phone]

## 2017-11-01 ENCOUNTER — TELEPHONE (OUTPATIENT)
Dept: INTERNAL MEDICINE | Facility: CLINIC | Age: 82
End: 2017-11-01

## 2017-11-01 DIAGNOSIS — M19.071 PRIMARY LOCALIZED OSTEOARTHROSIS OF RIGHT ANKLE AND FOOT: ICD-10-CM

## 2017-11-01 DIAGNOSIS — I10 ESSENTIAL HYPERTENSION: Primary | ICD-10-CM

## 2017-11-01 DIAGNOSIS — Z13.6 SCREENING FOR CARDIOVASCULAR CONDITION: ICD-10-CM

## 2017-11-01 NOTE — TELEPHONE ENCOUNTER
----- Message from Nisha Rahman sent at 11/1/2017  9:43 AM CDT -----  Contact: patient   Patient would like lab orders before her next appointment.

## 2017-11-12 DIAGNOSIS — I10 ESSENTIAL HYPERTENSION: ICD-10-CM

## 2017-11-12 DIAGNOSIS — R00.0 TACHYCARDIA: ICD-10-CM

## 2017-11-13 RX ORDER — ATENOLOL 25 MG/1
25 TABLET ORAL 2 TIMES DAILY
Qty: 180 TABLET | Refills: 1 | Status: SHIPPED | OUTPATIENT
Start: 2017-11-13 | End: 2018-05-18 | Stop reason: SDUPTHER

## 2017-11-17 ENCOUNTER — OFFICE VISIT (OUTPATIENT)
Dept: INTERNAL MEDICINE | Facility: CLINIC | Age: 82
End: 2017-11-17
Payer: MEDICARE

## 2017-11-17 VITALS
HEART RATE: 66 BPM | BODY MASS INDEX: 24.45 KG/M2 | TEMPERATURE: 98 F | DIASTOLIC BLOOD PRESSURE: 60 MMHG | HEIGHT: 63 IN | RESPIRATION RATE: 16 BRPM | SYSTOLIC BLOOD PRESSURE: 110 MMHG | WEIGHT: 138 LBS | OXYGEN SATURATION: 98 %

## 2017-11-17 DIAGNOSIS — M54.30 SCIATICA, UNSPECIFIED LATERALITY: ICD-10-CM

## 2017-11-17 DIAGNOSIS — I48.20 CHRONIC ATRIAL FIBRILLATION: ICD-10-CM

## 2017-11-17 DIAGNOSIS — E87.6 HYPOKALEMIA: Primary | ICD-10-CM

## 2017-11-17 PROCEDURE — 99213 OFFICE O/P EST LOW 20 MIN: CPT | Mod: S$GLB,,, | Performed by: INTERNAL MEDICINE

## 2017-11-17 RX ORDER — POTASSIUM CHLORIDE 750 MG/1
10 TABLET, EXTENDED RELEASE ORAL ONCE
Qty: 1 TABLET | Refills: 0
Start: 2017-11-17 | End: 2017-11-17

## 2017-11-17 RX ORDER — APIXABAN 5 MG/1
5 TABLET, FILM COATED ORAL 2 TIMES DAILY
Refills: 11 | COMMUNITY
Start: 2017-11-06 | End: 2020-01-29

## 2017-11-17 NOTE — PROGRESS NOTES
Subjective:      Patient ID: Mary Ellen Miller is a 87 y.o. female.    Chief Complaint: Follow-up (3 month follow up) and Results (lab results)    HPI: 87 y.o. White female , saw Dr. Last several weeks ago, and was diagnosed as having  Ch. At fib. Started on Eliquist. No complications.  3 weeks ago, she was pulling on a bag of mulch and developed sciatica. She went to a chiropractor  And had several sessions with him. This has resolved now.    11/16/17 0838 HDL 42 - Final result   11/16/17 0838 CHOL 165 - Final result   11/16/17 0838 TRIG 78 - Final result   11/16/17 0838 LDLCALC 107.4 - Final result   11/16/17 0838 CHOLHDL 25.5 - Final result   11/16/17 0838 NONHDLCHOL 123 - Final result   11/16/17 0838 TOTALCHOLEST 3.9 - Final result   11/16/17 0837 COLORU Yellow - Final result   11/16/17 0837 APPEARANCEUA Clear - Final result   11/16/17 0837 SPECGRAV 1.015 - Final result   11/16/17 0837 PHUR 6.0 - Final result   11/16/17 0837 KETONESU Negative - Final result   11/16/17 0837 OCCULTUA Negative - Final result   11/16/17 0837 NITRITE Negative - Final result   11/16/17 0837 UROBILINOGEN Negative - Final result   01/23/17 1642 RAPFLUA Negative - Final result   01/23/17 1642 RAPFLUB Negative - Final result   11/16/17 0837 LEUKOCYTESUR 1+ Abnormal Final result   11/16/17 0838 WBC 3.94 - Final result   11/16/17 0838 RBC 4.44 - Final result   11/16/17 0838 HGB 13.2 - Final result   11/16/17 0838 HCT 40.3 - Final result   11/16/17 0838 MCH 29.7 - Final result   11/16/17 0838 RDW 13.7 - Final result   11/16/17 0838  - Final result   11/16/17 0838 MPV 10.3 - Final result   03/17/16 0851 SEGS 84.4 High Final result   11/16/17 0838 GLU 90 - Final result   11/16/17 0838 BUN 20 High Final result   11/16/17 0838 CREATININE 0.85 - Final result   11/16/17 0838 CALCIUM 8.9 - Final result   11/16/17 0838  - Final result   11/16/17 0838 K 3.8 - Final result   11/16/17 0838  - Final result   11/16/17 0838 PROT  "7.0 - Final result   11/16/17 0838 ALBUMIN 4.2 - Final result   11/16/17 0838 BILITOT 1.7 High Final result   11/16/17 0838 AST 29 - Final result   11/16/17 0838 ALKPHOS 35 Low Final result   11/16/17 0838 CO2 27 - Final result   11/16/17 0838 ALT 30 - Final result   11/16/17 0838 ANIONGAP 9 - Final result   11/16/17 0838 EGFRNONAA >60.0 - Final result   11/16/17 0838 ESTGFRAFRICA >60.0 - Final result   11/16/17 0838             Review of Systems   Constitutional: Positive for activity change.   HENT: Negative.    Eyes: Negative.    Respiratory: Negative for cough, chest tightness, shortness of breath and wheezing.    Cardiovascular: Negative for chest pain and palpitations.   Gastrointestinal: Negative.    Endocrine: Negative.    Genitourinary: Negative.    Musculoskeletal: Positive for back pain and gait problem.   Skin: Negative.    Allergic/Immunologic: Negative.    Hematological: Negative.    Psychiatric/Behavioral: Negative.        Objective:   /60 (BP Location: Left arm, Patient Position: Sitting, BP Method: Medium (Manual))   Pulse 66   Temp 97.8 °F (36.6 °C) (Oral)   Resp 16   Ht 5' 3" (1.6 m)   Wt 62.6 kg (138 lb)   SpO2 98%   BMI 24.45 kg/m²     Physical Exam   Constitutional: She is oriented to person, place, and time. She appears well-developed and well-nourished.   HENT:   Head: Normocephalic and atraumatic.   Right Ear: External ear normal.   Left Ear: External ear normal.   Nose: Nose normal.   Mouth/Throat: Oropharynx is clear and moist.   Eyes: Conjunctivae and EOM are normal. Pupils are equal, round, and reactive to light.   Neck: Normal range of motion. Neck supple.   Cardiovascular: Normal rate, regular rhythm and normal heart sounds.    Pulmonary/Chest: Effort normal and breath sounds normal.   Abdominal: Soft. Bowel sounds are normal.   Musculoskeletal: Normal range of motion.   Neurological: She is alert and oriented to person, place, and time.   Skin: Skin is warm and dry. "   Psychiatric: She has a normal mood and affect. Her behavior is normal.   Nursing note and vitals reviewed.      Assessment:     1. Hypokalemia    2. Chronic atrial fibrillation    3. Sciatica, unspecified laterality    No mulching.  Plan:     Hypokalemia  -     potassium chloride (KLOR-CON) 10 MEQ TbSR; Take 1 tablet (10 mEq total) by mouth once.  Dispense: 1 tablet; Refill: 0    Chronic atrial fibrillation  -     Basic metabolic panel; Future; Expected date: 01/01/2018    Sciatica, unspecified laterality

## 2018-01-04 ENCOUNTER — OFFICE VISIT (OUTPATIENT)
Dept: INTERNAL MEDICINE | Facility: CLINIC | Age: 83
End: 2018-01-04
Payer: MEDICARE

## 2018-01-04 VITALS
OXYGEN SATURATION: 98 % | BODY MASS INDEX: 24.8 KG/M2 | HEART RATE: 76 BPM | DIASTOLIC BLOOD PRESSURE: 68 MMHG | SYSTOLIC BLOOD PRESSURE: 124 MMHG | WEIGHT: 140 LBS | HEIGHT: 63 IN

## 2018-01-04 DIAGNOSIS — M25.552 HIP JOINT PAINFUL ON MOVEMENT, LEFT: Primary | ICD-10-CM

## 2018-01-04 DIAGNOSIS — M94.0 COSTOCHONDRITIS, ACUTE: ICD-10-CM

## 2018-01-04 DIAGNOSIS — E87.6 HYPOKALEMIA DUE TO LOSS OF POTASSIUM: ICD-10-CM

## 2018-01-04 PROCEDURE — 99213 OFFICE O/P EST LOW 20 MIN: CPT | Mod: S$PBB,,, | Performed by: INTERNAL MEDICINE

## 2018-01-04 PROCEDURE — 99999 PR PBB SHADOW E&M-EST. PATIENT-LVL III: CPT | Mod: PBBFAC,,, | Performed by: INTERNAL MEDICINE

## 2018-01-04 PROCEDURE — 99213 OFFICE O/P EST LOW 20 MIN: CPT | Mod: PBBFAC,PN | Performed by: INTERNAL MEDICINE

## 2018-01-04 RX ORDER — POTASSIUM CHLORIDE 750 MG/1
10 TABLET, EXTENDED RELEASE ORAL DAILY
Refills: 3 | COMMUNITY
Start: 2017-12-14 | End: 2018-06-12 | Stop reason: SDUPTHER

## 2018-01-04 NOTE — PROGRESS NOTES
"Subjective:      Patient ID: Mary Ellen Miller is a 87 y.o. female.    Chief Complaint: Follow-up    HPI: 87 y.o. White female , has:  -back pain, radiating into left hip ( buttock)  -now has left 10-12 ribs that hurt, from pulling clothes out of the washing machine  -has pain in right ankle when she walks at least a block ( [employer]).  She wants to rank leaves,dig in her garden, etc...       Review of Systems   Constitutional: Positive for activity change.   HENT: Negative.    Respiratory: Negative for shortness of breath and wheezing.    Cardiovascular: Negative for chest pain and palpitations.   Gastrointestinal: Negative.    Endocrine: Negative.    Genitourinary: Negative for flank pain.   Musculoskeletal: Positive for arthralgias, back pain and gait problem.   Allergic/Immunologic: Negative.    Neurological: Negative for weakness.   Psychiatric/Behavioral: Negative for agitation.       Objective:   /68   Pulse 76   Ht 5' 3" (1.6 m)   Wt 63.5 kg (140 lb)   SpO2 98%   BMI 24.80 kg/m²     Physical Exam   Constitutional: She is oriented to person, place, and time. She appears well-developed and well-nourished.   HENT:   Head: Normocephalic and atraumatic.   Mouth/Throat: Oropharynx is clear and moist.   Eyes: Conjunctivae are normal.   Neck: Normal range of motion.   Cardiovascular: Normal rate, regular rhythm and normal heart sounds.    Pulmonary/Chest: Effort normal and breath sounds normal.   Abdominal: Soft. Bowel sounds are normal.   Musculoskeletal: She exhibits deformity. She exhibits no edema or tenderness.        Arms:  Severe kyphosis  Tender to palpation.   Neurological: She is alert and oriented to person, place, and time.   Skin: Skin is warm and dry.   Nursing note and vitals reviewed.      Assessment:     1. Hip joint painful on movement, left    2. Costochondritis, acute    3. Hypokalemia due to loss of potassium      Plan:     Hip joint painful on movement, left  -     Ambulatory " Referral to Physical/Occupational Therapy    Costochondritis, acute    Hypokalemia due to loss of potassium  -     Magnesium; Future; Expected date: 01/04/2018    Stop doing physical,jerky movements.  Needs a handicapped sticker, and a handiperson.

## 2018-01-05 ENCOUNTER — TELEPHONE (OUTPATIENT)
Dept: INTERNAL MEDICINE | Facility: CLINIC | Age: 83
End: 2018-01-05

## 2018-01-05 DIAGNOSIS — M25.552 ACUTE PAIN OF LEFT HIP: Primary | ICD-10-CM

## 2018-01-05 NOTE — TELEPHONE ENCOUNTER
----- Message from Tish Springer sent at 1/5/2018 10:19 AM CST -----  Contact: Self/ [phone]  Patient called in to speak with you about her physical therapy.    Please call and advise.

## 2018-01-05 NOTE — TELEPHONE ENCOUNTER
Patient called and stated that she wants to go to [facility]. Please review orders and sign

## 2018-01-08 ENCOUNTER — TELEPHONE (OUTPATIENT)
Dept: INTERNAL MEDICINE | Facility: CLINIC | Age: 83
End: 2018-01-08

## 2018-01-08 NOTE — TELEPHONE ENCOUNTER
Spoke with patient and no one has called her back from [facility]. I explained to patient that I will call them and see if they received the order I faxed to them.

## 2018-01-08 NOTE — TELEPHONE ENCOUNTER
----- Message from Ranjana Mcintosh sent at 1/8/2018  2:51 PM CST -----  Contact: [phone] self  Patient would like to speak with the nurse about the location for the PT referral. Please call and advise.

## 2018-01-08 NOTE — TELEPHONE ENCOUNTER
Spoke to [facility] and they did receive her PT orders. Called patient and informed her that they do have her orders now. Patient verbalized understanding

## 2018-02-05 RX ORDER — RISEDRONATE SODIUM 150 MG/1
TABLET, FILM COATED ORAL
Qty: 6 TABLET | Refills: 0 | Status: SHIPPED | OUTPATIENT
Start: 2018-02-05 | End: 2018-09-25 | Stop reason: SDUPTHER

## 2018-03-05 RX ORDER — HYDRALAZINE HYDROCHLORIDE 25 MG/1
TABLET, FILM COATED ORAL
Qty: 180 TABLET | Refills: 1 | Status: SHIPPED | OUTPATIENT
Start: 2018-03-05 | End: 2018-08-31 | Stop reason: SDUPTHER

## 2018-03-05 RX ORDER — HYDROCHLOROTHIAZIDE 25 MG/1
TABLET ORAL
Qty: 90 TABLET | Refills: 1 | Status: SHIPPED | OUTPATIENT
Start: 2018-03-05 | End: 2018-07-26 | Stop reason: ALTCHOICE

## 2018-03-05 RX ORDER — ESCITALOPRAM OXALATE 10 MG/1
TABLET ORAL
Qty: 90 TABLET | Refills: 1 | Status: SHIPPED | OUTPATIENT
Start: 2018-03-05 | End: 2018-08-31 | Stop reason: SDUPTHER

## 2018-03-06 ENCOUNTER — TELEPHONE (OUTPATIENT)
Dept: INTERNAL MEDICINE | Facility: CLINIC | Age: 83
End: 2018-03-06

## 2018-03-06 DIAGNOSIS — I10 ESSENTIAL HYPERTENSION: Primary | ICD-10-CM

## 2018-03-06 NOTE — TELEPHONE ENCOUNTER
----- Message from Bola Posey sent at 3/6/2018 10:29 AM CST -----  Contact: self/[phone]  Patient called to speak with doctor or nurse about getting an appointment.    Please call and advise.

## 2018-04-23 LAB
CHOLEST SERPL-MSCNC: 156 MG/DL (ref 0–200)
HDLC SERPL-MCNC: 43 MG/DL
LDL CHOLESTEROL DIRECT: 101 MG/DL
NON HDL CHOL. (LDL+VLDL): 113
TRIGL SERPL-MCNC: 170 MG/DL
VLDL CHOLESTEROL: 34 MG/DL

## 2018-04-24 ENCOUNTER — OFFICE VISIT (OUTPATIENT)
Dept: INTERNAL MEDICINE | Facility: CLINIC | Age: 83
End: 2018-04-24
Payer: MEDICARE

## 2018-04-24 VITALS
RESPIRATION RATE: 16 BRPM | SYSTOLIC BLOOD PRESSURE: 126 MMHG | DIASTOLIC BLOOD PRESSURE: 80 MMHG | OXYGEN SATURATION: 96 % | HEART RATE: 65 BPM | WEIGHT: 145.19 LBS | BODY MASS INDEX: 25.73 KG/M2 | HEIGHT: 63 IN

## 2018-04-24 DIAGNOSIS — M19.071 PRIMARY LOCALIZED OSTEOARTHROSIS OF RIGHT ANKLE AND FOOT: Primary | ICD-10-CM

## 2018-04-24 DIAGNOSIS — I10 ESSENTIAL HYPERTENSION: ICD-10-CM

## 2018-04-24 DIAGNOSIS — H91.93 BILATERAL HEARING LOSS, UNSPECIFIED HEARING LOSS TYPE: ICD-10-CM

## 2018-04-24 PROCEDURE — 99213 OFFICE O/P EST LOW 20 MIN: CPT | Mod: PBBFAC,PN | Performed by: INTERNAL MEDICINE

## 2018-04-24 PROCEDURE — 99999 PR PBB SHADOW E&M-EST. PATIENT-LVL III: CPT | Mod: PBBFAC,,, | Performed by: INTERNAL MEDICINE

## 2018-04-24 PROCEDURE — 99213 OFFICE O/P EST LOW 20 MIN: CPT | Mod: S$PBB,,, | Performed by: INTERNAL MEDICINE

## 2018-04-24 NOTE — PROGRESS NOTES
"Subjective:      Patient ID: Mary Ellen Miller is a 88 y.o. female.    Chief Complaint: Follow-up (3 month follow up)    HPI: 88 y.o. White female, didn't have her hearing aides in, and her alarm was ringing all night.  Neighbors, police, etc all involved.  Does her own gardening, but does want a call button.  Lives alone.  Otherwise, doing well.           Review of Systems   Constitutional: Negative.    HENT: Positive for hearing loss.    Eyes: Negative.    Respiratory: Negative.    Cardiovascular: Negative.    Gastrointestinal: Negative.    Endocrine: Negative.    Genitourinary: Positive for frequency and urgency. Negative for difficulty urinating, dysuria and flank pain.   Musculoskeletal: Positive for arthralgias, back pain and gait problem.   Skin: Negative.    Hematological: Negative.    Psychiatric/Behavioral: Negative.        Objective:   /80 (BP Location: Left arm, Patient Position: Sitting, BP Method: Large (Manual))   Pulse 65   Resp 16   Ht 5' 3" (1.6 m)   Wt 65.9 kg (145 lb 2.8 oz)   SpO2 96%   BMI 25.72 kg/m²     Physical Exam   Constitutional: She is oriented to person, place, and time. She appears well-developed and well-nourished.   HENT:   Head: Normocephalic and atraumatic.   Right Ear: External ear normal.   Left Ear: External ear normal.   Nose: Nose normal.   Mouth/Throat: Oropharynx is clear and moist.   Eyes: Conjunctivae and EOM are normal.   Neck: Normal range of motion. Neck supple.   Cardiovascular: Normal rate, regular rhythm and normal heart sounds.    Pulmonary/Chest: Effort normal and breath sounds normal.   Abdominal: Soft. Bowel sounds are normal.   Musculoskeletal: She exhibits no edema.   Mild kyphosis   Neurological: She is alert and oriented to person, place, and time.   Skin: Skin is warm and dry.   Psychiatric: She has a normal mood and affect.   Nursing note and vitals reviewed.      Assessment:     1. 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 Essential hypertension    3. 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

## 2018-04-26 ENCOUNTER — TELEPHONE (OUTPATIENT)
Dept: ADMINISTRATIVE | Facility: HOSPITAL | Age: 83
End: 2018-04-26

## 2018-05-18 DIAGNOSIS — R00.0 TACHYCARDIA: ICD-10-CM

## 2018-05-18 DIAGNOSIS — I10 ESSENTIAL HYPERTENSION: ICD-10-CM

## 2018-05-18 RX ORDER — ATENOLOL 25 MG/1
25 TABLET ORAL 2 TIMES DAILY
Qty: 180 TABLET | Refills: 1 | Status: SHIPPED | OUTPATIENT
Start: 2018-05-18 | End: 2018-11-15 | Stop reason: SDUPTHER

## 2018-06-12 RX ORDER — POTASSIUM CHLORIDE 750 MG/1
10 TABLET, EXTENDED RELEASE ORAL DAILY
Qty: 90 TABLET | Refills: 3 | Status: SHIPPED | OUTPATIENT
Start: 2018-06-12 | End: 2018-07-08 | Stop reason: SDUPTHER

## 2018-07-08 RX ORDER — POTASSIUM CHLORIDE 750 MG/1
TABLET, EXTENDED RELEASE ORAL
Qty: 30 TABLET | Refills: 0 | Status: SHIPPED | OUTPATIENT
Start: 2018-07-08 | End: 2018-07-26 | Stop reason: ALTCHOICE

## 2018-07-26 ENCOUNTER — OFFICE VISIT (OUTPATIENT)
Dept: INTERNAL MEDICINE | Facility: CLINIC | Age: 83
End: 2018-07-26
Payer: MEDICARE

## 2018-07-26 VITALS
RESPIRATION RATE: 16 BRPM | TEMPERATURE: 98 F | HEIGHT: 63 IN | WEIGHT: 143.13 LBS | SYSTOLIC BLOOD PRESSURE: 126 MMHG | DIASTOLIC BLOOD PRESSURE: 80 MMHG | BODY MASS INDEX: 25.36 KG/M2 | HEART RATE: 82 BPM | OXYGEN SATURATION: 96 %

## 2018-07-26 DIAGNOSIS — I10 ESSENTIAL HYPERTENSION: Primary | ICD-10-CM

## 2018-07-26 DIAGNOSIS — Z79.01 ANTICOAGULATED: ICD-10-CM

## 2018-07-26 DIAGNOSIS — E87.6 HYPOKALEMIA: ICD-10-CM

## 2018-07-26 DIAGNOSIS — G89.29 CHRONIC PAIN OF RIGHT ANKLE: ICD-10-CM

## 2018-07-26 DIAGNOSIS — M25.571 CHRONIC PAIN OF RIGHT ANKLE: ICD-10-CM

## 2018-07-26 DIAGNOSIS — M81.0 OSTEOPOROSIS, UNSPECIFIED OSTEOPOROSIS TYPE, UNSPECIFIED PATHOLOGICAL FRACTURE PRESENCE: ICD-10-CM

## 2018-07-26 DIAGNOSIS — I48.20 CHRONIC ATRIAL FIBRILLATION: ICD-10-CM

## 2018-07-26 PROCEDURE — 99215 OFFICE O/P EST HI 40 MIN: CPT | Mod: PBBFAC,PN | Performed by: INTERNAL MEDICINE

## 2018-07-26 PROCEDURE — 99214 OFFICE O/P EST MOD 30 MIN: CPT | Mod: S$PBB,,, | Performed by: INTERNAL MEDICINE

## 2018-07-26 PROCEDURE — 99999 PR PBB SHADOW E&M-EST. PATIENT-LVL V: CPT | Mod: PBBFAC,,, | Performed by: INTERNAL MEDICINE

## 2018-07-26 NOTE — PATIENT INSTRUCTIONS
1. Stop HCTZ today  2. Continue Potassium every other day for 1 week.  3. Then get Labs (Around August 6th)

## 2018-07-26 NOTE — PROGRESS NOTES
Subjective:      Patient ID: Mary Ellen Miller is a 88 y.o. female.    Chief Complaint: Follow-up ( 3 month follow up) and Medication Refill    HPI: 88 y.o. White female , with chronic medical issues that do not hinder her in the least.  She gardens,travels,eats well, does not have any CV,Resp,GI,,Neuro complaints at all.  However, she has a right ankle that hurts her all the time. Years ago it was injected x 3, but  The last injection gave her more pain than ever. It is now bothersome.    Review labs with pt:  0923 HDL 41 - Final result   07/23/18 0923 CHOL 149 - Final result   07/23/18 0923 TRIG 89 - Final result   07/23/18 0923 LDLCALC 90.2 - Final result   07/23/18 0923 CHOLHDL 27.5 - Final result   07/23/18 0923 NONHDLCHOL 108 - Final result   07/23/18 0923 TOTALCHOLEST 3.6 - Final result   07/23/18 0923 COLORU Yellow - Final result   07/23/18 0923 APPEARANCEUA Clear - Final result   07/23/18 0923 SPECGRAV 1.020 - Final result   07/23/18 0923 PHUR 7.0 - Final result   07/23/18 0923 KETONESU Negative - Final result   07/23/18 0923 OCCULTUA Negative - Final result   07/23/18 0923 NITRITE Negative - Final result   07/23/18 0923 UROBILINOGEN Negative - Final result   01/23/17 1642 RAPFLUA Negative - Final result   01/23/17 1642 RAPFLUB Negative - Final result   07/23/18 0923 LEUKOCYTESUR Negative - Final result   07/23/18 0923 WBC 5.48 - Final result   07/23/18 0923 RBC 4.54 - Final result   07/23/18 0923 HGB 13.6 - Final result   07/23/18 0923 HCT 41.3 - Final result   07/23/18 0923 MCH 30.0 - Final result   07/23/18 0923 RDW 13.8 - Final result   07/23/18 0923  - Final result   07/23/18 0923 MPV 10.6 - Final result   03/17/16 0851 SEGS 84.4 High Final result   07/23/18 0923 GLU 87 - Final result   07/23/18 0923 BUN 19 High Final result   07/23/18 0923 CREATININE 0.73 - Final result   07/23/18 0923 CALCIUM 9.1 - Final result   07/23/18 0923  - Final result   07/23/18 0923 K 3.7 - Final result  "  07/23/18 0923  - Final result   07/23/18 0923 PROT 7.1 - Final result   07/23/18 0923 ALBUMIN 4.2 - Final result   07/23/18 0923 BILITOT 2.0 High Final result   07/23/18 0923 AST 31 - Final result   07/23/18 0923 ALKPHOS 38 - Final result   07/23/18 0923 CO2 28 - Final result   07/23/18 0923 ALT 33 - Final result   07/23/18 0923 ANIONGAP 10 - Final result   07/23/18 0923 EGFRNONAA >60.0 - Final result   07/23/18 0923 ESTGFRAFRICA >60.0 - Final result   07/23/18 0923 MG 1.8 - Final result   07/23/18 0923             Review of Systems   Constitutional: Negative.    HENT: Negative.    Eyes: Negative.    Respiratory: Negative for cough, chest tightness, shortness of breath and wheezing.    Cardiovascular: Negative for chest pain, palpitations and leg swelling.   Gastrointestinal: Negative.    Genitourinary: Negative.    Musculoskeletal: Positive for arthralgias.   Skin: Negative.    Neurological: Negative.    Hematological: Negative.    Psychiatric/Behavioral: Negative.        Objective:   /80 (BP Location: Left arm, Patient Position: Sitting, BP Method: Medium (Manual))   Pulse 82   Temp 97.7 °F (36.5 °C) (Oral)   Resp 16   Ht 5' 3" (1.6 m)   Wt 64.9 kg (143 lb 1.6 oz)   SpO2 96%   BMI 25.35 kg/m²     Physical Exam   Constitutional: She is oriented to person, place, and time. She appears well-developed and well-nourished.   HENT:   Head: Normocephalic.   Right Ear: External ear normal.   Left Ear: External ear normal.   Nose: Nose normal.   Mouth/Throat: Oropharynx is clear and moist.   Eyes: Conjunctivae and EOM are normal. Pupils are equal, round, and reactive to light.   Neck: Normal range of motion. Neck supple.   Cardiovascular: Normal rate, regular rhythm and normal heart sounds.    Pulmonary/Chest: Effort normal and breath sounds normal.   Abdominal: Soft. Bowel sounds are normal.   Musculoskeletal: She exhibits deformity.   OA changes right ankle   Neurological: She is alert and oriented to " person, place, and time.   Skin: Skin is warm and dry.   Psychiatric: She has a normal mood and affect. Her behavior is normal.   Nursing note and vitals reviewed.      Assessment:     1. Essential hypertension    2. Anticoagulated    3. Chronic atrial fibrillation    4. Osteoporosis, unspecified osteoporosis type, unspecified pathological fracture presence    5. Hypokalemia    6. Chronic pain of right ankle    Her BP is well controlled, she does not swell, has low K, so will dc the HCTZ and give K supplements QOD for 1 week, then stop and repeat BMP.  She is very active, but in pain from ankle. Would refer to Dr. SMILEY for consideration of injection.  Everything else stable.  Plan:     Essential hypertension    Anticoagulated    Chronic atrial fibrillation    Osteoporosis, unspecified osteoporosis type, unspecified pathological fracture presence    Hypokalemia  -     Basic metabolic panel; Future; Expected date: 07/26/2018    Chronic pain of right ankle  -     X-Ray Ankle Complete Bilateral; Future; Expected date: 07/26/2018  -     Ambulatory referral to Pain Clinic

## 2018-08-07 ENCOUNTER — OFFICE VISIT (OUTPATIENT)
Dept: PAIN MEDICINE | Facility: CLINIC | Age: 83
End: 2018-08-07
Payer: MEDICARE

## 2018-08-07 VITALS
SYSTOLIC BLOOD PRESSURE: 130 MMHG | BODY MASS INDEX: 25.16 KG/M2 | RESPIRATION RATE: 18 BRPM | HEART RATE: 80 BPM | DIASTOLIC BLOOD PRESSURE: 72 MMHG | HEIGHT: 63 IN | WEIGHT: 142 LBS

## 2018-08-07 DIAGNOSIS — M25.571 CHRONIC PAIN OF RIGHT ANKLE: Primary | ICD-10-CM

## 2018-08-07 DIAGNOSIS — M19.071 OSTEOARTHRITIS OF RIGHT ANKLE AND FOOT: ICD-10-CM

## 2018-08-07 DIAGNOSIS — G89.29 CHRONIC PAIN OF RIGHT ANKLE: Primary | ICD-10-CM

## 2018-08-07 PROCEDURE — 99999 PR PBB SHADOW E&M-EST. PATIENT-LVL III: CPT | Mod: PBBFAC,,, | Performed by: ANESTHESIOLOGY

## 2018-08-07 PROCEDURE — 99204 OFFICE O/P NEW MOD 45 MIN: CPT | Mod: S$PBB,,, | Performed by: ANESTHESIOLOGY

## 2018-08-07 PROCEDURE — 99213 OFFICE O/P EST LOW 20 MIN: CPT | Mod: PBBFAC,PN | Performed by: ANESTHESIOLOGY

## 2018-08-07 NOTE — PROGRESS NOTES
Chronic Pain - New Consult    Referring Physician: Ania Barton MD      Chief Complaint   Patient presents with    Ankle Pain        SUBJECTIVE:    Mary Ellen Miller presents to the clinic for the evaluation of right ankle pain. The pain started >4 years ago and symptoms have been persistent. No trauma or inciting event. The pain is located in the medial aspect of the right ankle.  The pain is described as aching and is rated as 0/10, currently. The pain is rated with a score of  5/10 on the BEST day and a score of 10/10 on the WORST day.  Symptoms interfere with daily activity. The pain is present only when standing and walking.  The pain is mitigated by medication, injections, rest and sitting. She has tried Biofreeze and Tylenol which provides mild relief.  She has had 3 talonavicular joint injections in the past by IR (last injection in 2015).  She reports the 1st 2 injections helped significantly but the last injection gave her significant pain and brought no relief.  She is not interested in surgery.    Patient denies bowel/bladder incontinence, significant weight loss, significant motor weakness and loss of sensations.    Physical Therapy/Home Exercise: yes      Pain Disability Index Review:  Last 3 PDI Scores 8/7/2018   Pain Disability Index (PDI) 7       Pain Medications:    - Tylenol as needed     report:  Reviewed    Pain Procedures: Talonavicular injections in the past    Imaging:       XR ANKLE COMPLETE 3 VIEW BILATERAL (7/26/2018):    CLINICAL HISTORY:  Pain in right ankle and joints of right foot    TECHNIQUE:  Bilateral ankle radiographs, three views of each side    COMPARISON:  None    FINDINGS:  Right: No fracture or dislocation.  Ankle mortise is symmetric.  Talar dome is maintained.  Advanced degenerative changes are noted at the talonavicular joint.  Vascular calcifications and soft tissue swelling noted.    Left: No fracture or dislocation.  Ankle mortise is symmetric.  Talar  dome is maintained.  Achilles enthesopathy noted.  Vascular calcifications and soft tissue swelling noted.    Past Medical History:   Diagnosis Date    Anxiety     Hypertension      Past Surgical History:   Procedure Laterality Date    HYSTERECTOMY      LUNG SURGERY  1988    OOPHORECTOMY      RADICAL HYSTERECTOMY  1990     Social History     Social History    Marital status:      Spouse name: N/A    Number of children: N/A    Years of education: N/A     Occupational History    Not on file.     Social History Main Topics    Smoking status: Never Smoker    Smokeless tobacco: Never Used    Alcohol use No    Drug use: No    Sexual activity: Not on file     Other Topics Concern    Not on file     Social History Narrative    No narrative on file     History reviewed. No pertinent family history.    Review of patient's allergies indicates:   Allergen Reactions    Percodan [oxycodone hcl-oxycodone-asa] Other (See Comments)     halucinations    Penicillins Itching    Sulfa (sulfonamide antibiotics) Diarrhea and Nausea Only       Current Outpatient Prescriptions   Medication Sig    ascorbic acid (VITAMIN C) 500 MG tablet Take 500 mg by mouth once daily.    atenolol (TENORMIN) 25 MG tablet Take 1 tablet (25 mg total) by mouth 2 (two) times daily.    biotin 5 mg Tab Take 500 mg by mouth once daily.     calcium carbonate (OS-ZOILA) 600 mg (1,500 mg) Tab Take 600 mg by mouth 2 (two) times daily with meals.    ELIQUIS 5 mg Tab Take 5 mg by mouth 2 (two) times daily.    escitalopram oxalate (LEXAPRO) 10 MG tablet TAKE 1 TABLET BY MOUTH ONCE DAILY    hydrALAZINE (APRESOLINE) 25 MG tablet TAKE 1 TABLET BY MOUTH 2 TIMES A DAY WITH FOOD    multivitamin (THERAGRAN) tablet Take 1 tablet by mouth once daily.    risedronate (ACTONEL) 150 MG Tab TAKE 1 TABLET BY MOUTH MONTHLY    UNABLE TO FIND Take 1 tablet by mouth 2 (two) times daily. medication name: HA Joint    vitamin D 1000 units Tab Take 2,000 mg  "by mouth once daily.    vitamin E 1000 UNIT capsule Take 1,000 Units by mouth once daily.     No current facility-administered medications for this visit.        REVIEW OF SYSTEMS:  GENERAL: No weight loss, malaise or fevers.  HEENT: Negative for frequent or significant headaches.  NECK: Negative for lumps or significant neck swelling.  RESPIRATORY: Negative for cough, wheezing or shortness of breath.  CARDIOVASCULAR: Negative for chest pain or palpitations.  GI: No blood in stools or black stools or change in bowel habits.  : Negative for kidney stones, urinary tract infections, or incontinence.  MUSCULOSKELETAL: See HPI  SKIN: Negative for lesions, rash, and itching.  PSYCH: Negative for sleep disturbance, mood disorder and recent psychosocial stressors.    HEMATOLOGY/LYMPHOLOGY Negative for prolonged bleeding, bruising easily or swollen nodes.  NEURO:  No history of syncope, seizures or tremors.    OBJECTIVE:    /72 (BP Location: Right arm, Patient Position: Sitting, BP Method: Large (Manual))   Pulse 80   Resp 18   Ht 5' 3" (1.6 m)   Wt 64.4 kg (142 lb)   BMI 25.15 kg/m²     PHYSICAL EXAMINATION:  GENERAL: Well appearing, in no acute distress.  PSYCH:  Mood and affect is appropriate.  Awake, alert, and oriented x 3.  SKIN: Skin color, texture, turgor normal, no rashes or lesions  HEENT: Normocephalic, atraumatic.  EOM intact.  CV: Radial pulses are 2+.  RESP:  Respirations are unlabored.  GI: Abdomen soft and non-tender.  MSK:  No atrophy or tone abnormalities are noted.      Neck: No obvious deformity or signs of trauma.  Normal cervical spine range of motion.    Back: Normal range of motion without pain reproduction    Right ankle:  ROM is full and pain free. Tenderness to palpation over the medial aspect of the talus. Mild edema present. No warmth or erythema present.    Gait:  Gait is normal.    NEUR:  Strength testing is 5/5 throughout all muscle groups in the upper and lower extremities. No " loss of sensation is noted.     ASSESSMENT: 88 y.o. female with chronic right ankle pain, consistent with OA. Right ankle x-rays show advanced degenerative changes in the talonavicular joint.    1. Chronic pain of right ankle    2. Osteoarthritis of right ankle and foot          PLAN:     - Recommend Tylenol arthritis up to 3 x/day as needed for pain.  - Rx topical compound pain cream (Diclofenac 2%) to apply to painful area.  - In the future, I will consider repeat right talonavicular joint injection. The patient is not interested at this time.  - Return to clinic in 4 weeks.    The above plan and management options were discussed at length with patient. Patient is in agreement with the above and verbalized understanding. It will be communicated with the referring physician via electronic record, fax, or mail.    Wiley Phelps III  08/07/2018

## 2018-08-07 NOTE — LETTER
August 7, 2018      Ania Barton MD  [address]  [address]  [place] [address]           [facility]  [address]  [facility] [address]  Phone: [phone]  Fax: [phone]          Patient: Mary Ellen Miller   MR Number: [id number]   YOB: 1930   Date of Visit: 8/7/2018       Dear Dr. Ania Barton:    Thank you for referring Mary Ellen Miller to me for evaluation. Attached you will find relevant portions of my assessment and plan of care.    If you have questions, please do not hesitate to call me. I look forward to following Mary Ellen Miller along with you.    Sincerely,    Wiley Phelps III, MD    Enclosure  CC:  No Recipients    If you would like to receive this communication electronically, please contact [email or web address] or [phone] to request more information on [email or web address] Link access.    For providers and/or their staff who would like to refer a patient to [facility], please contact us through our one-stop-shop provider referral line, [facility], [place], at [phone].    If you feel you have received this communication in error or would no longer like to receive these types of communications, please e-mail [email or web address]

## 2018-08-09 ENCOUNTER — TELEPHONE (OUTPATIENT)
Dept: FAMILY MEDICINE | Facility: CLINIC | Age: 83
End: 2018-08-09

## 2018-08-09 NOTE — TELEPHONE ENCOUNTER
----- Message from Nadine Cowan sent at 8/9/2018  1:22 PM CDT -----  Contact: [phone]/self  Patient requesting to speak with you regarding her test results. Please advise.

## 2018-08-10 RX ORDER — POTASSIUM CHLORIDE 750 MG/1
TABLET, EXTENDED RELEASE ORAL
Qty: 30 TABLET | Refills: 1 | Status: SHIPPED | OUTPATIENT
Start: 2018-08-10 | End: 2019-02-21

## 2018-08-31 RX ORDER — ESCITALOPRAM OXALATE 10 MG/1
TABLET ORAL
Qty: 90 TABLET | Refills: 1 | Status: SHIPPED | OUTPATIENT
Start: 2018-08-31 | End: 2019-02-27 | Stop reason: SDUPTHER

## 2018-08-31 RX ORDER — HYDROCHLOROTHIAZIDE 25 MG/1
TABLET ORAL
Qty: 90 TABLET | Refills: 1 | Status: SHIPPED | OUTPATIENT
Start: 2018-08-31 | End: 2019-02-21

## 2018-08-31 RX ORDER — HYDRALAZINE HYDROCHLORIDE 25 MG/1
TABLET, FILM COATED ORAL
Qty: 180 TABLET | Refills: 1 | Status: SHIPPED | OUTPATIENT
Start: 2018-08-31 | End: 2020-01-29

## 2018-09-05 ENCOUNTER — OFFICE VISIT (OUTPATIENT)
Dept: PAIN MEDICINE | Facility: CLINIC | Age: 83
End: 2018-09-05
Payer: MEDICARE

## 2018-09-05 VITALS
OXYGEN SATURATION: 97 % | SYSTOLIC BLOOD PRESSURE: 124 MMHG | DIASTOLIC BLOOD PRESSURE: 80 MMHG | BODY MASS INDEX: 25.32 KG/M2 | WEIGHT: 142.88 LBS | RESPIRATION RATE: 18 BRPM | HEIGHT: 63 IN | HEART RATE: 68 BPM

## 2018-09-05 DIAGNOSIS — G89.29 CHRONIC PAIN OF RIGHT ANKLE: Primary | ICD-10-CM

## 2018-09-05 DIAGNOSIS — M19.071 OSTEOARTHRITIS OF RIGHT ANKLE AND FOOT: ICD-10-CM

## 2018-09-05 DIAGNOSIS — M25.571 CHRONIC PAIN OF RIGHT ANKLE: Primary | ICD-10-CM

## 2018-09-05 PROCEDURE — 99213 OFFICE O/P EST LOW 20 MIN: CPT | Mod: S$PBB,,, | Performed by: ANESTHESIOLOGY

## 2018-09-05 PROCEDURE — 99213 OFFICE O/P EST LOW 20 MIN: CPT | Mod: PBBFAC,PN | Performed by: ANESTHESIOLOGY

## 2018-09-05 PROCEDURE — 99999 PR PBB SHADOW E&M-EST. PATIENT-LVL III: CPT | Mod: PBBFAC,,, | Performed by: ANESTHESIOLOGY

## 2018-09-05 RX ORDER — CALCIPOTRIENE 50 UG/G
CREAM TOPICAL
COMMUNITY
Start: 2018-08-08

## 2018-09-05 NOTE — PROGRESS NOTES
Chronic Pain - Established    INTERVAL HISTORY (09/05/2018):    Mary Ellen Miller presents to the clinic today for a follow-up appointment for right ankle pain. Since the last visit, the ankle pain is slightly improved. The patient reports improvement of her ankle pain when using the compound pain cream. She has been using the cream 1-2 x daily. She has also been taking Tylenol arthritis which helps. Current pain intensity is 5/10.       SUBJECTIVE:    Mary Ellen Miller presents to the clinic for the evaluation of right ankle pain. The pain started >4 years ago and symptoms have been persistent. No trauma or inciting event. The pain is located in the medial aspect of the right ankle.  The pain is described as aching and is rated as 0/10, currently. The pain is rated with a score of  5/10 on the BEST day and a score of 10/10 on the WORST day.  Symptoms interfere with daily activity. The pain is present only when standing and walking.  The pain is mitigated by medication, injections, rest and sitting. She has tried Biofreeze and Tylenol which provides mild relief.  She has had 3 talonavicular joint injections in the past by IR (last injection in 2015).  She reports the 1st 2 injections helped significantly but the last injection gave her significant pain and brought no relief.  She is not interested in surgery.    Patient denies bowel/bladder incontinence, significant weight loss, significant motor weakness and loss of sensations.    Physical Therapy/Home Exercise: yes      Pain Disability Index Review:  Last 3 PDI Scores 9/5/2018 8/7/2018   Pain Disability Index (PDI) 1 7       Pain Medications:    - Tylenol arthritis 3 times/day as needed  - Compound pain cream 1-2 x/daily     report:  Reviewed    Pain Procedures: Talonavicular injections in the past    Imaging:       XR ANKLE COMPLETE 3 VIEW BILATERAL (7/26/2018):    CLINICAL HISTORY:  Pain in right ankle and joints of right foot    TECHNIQUE:  Bilateral  ankle radiographs, three views of each side    COMPARISON:  None    FINDINGS:  Right: No fracture or dislocation.  Ankle mortise is symmetric.  Talar dome is maintained.  Advanced degenerative changes are noted at the talonavicular joint.  Vascular calcifications and soft tissue swelling noted.    Left: No fracture or dislocation.  Ankle mortise is symmetric.  Talar dome is maintained.  Achilles enthesopathy noted.  Vascular calcifications and soft tissue swelling noted.    Past Medical History:   Diagnosis Date    Anxiety     Hypertension      Past Surgical History:   Procedure Laterality Date    HYSTERECTOMY      LUNG SURGERY  1988    OOPHORECTOMY      RADICAL HYSTERECTOMY  1990     Social History     Socioeconomic History    Marital status:      Spouse name: Not on file    Number of children: Not on file    Years of education: Not on file    Highest education level: Not on file   Social Needs    Financial resource strain: Not on file    Food insecurity - worry: Not on file    Food insecurity - inability: Not on file    Transportation needs - medical: Not on file    Transportation needs - non-medical: Not on file   Occupational History    Not on file   Tobacco Use    Smoking status: Never Smoker    Smokeless tobacco: Never Used   Substance and Sexual Activity    Alcohol use: No    Drug use: No    Sexual activity: Not on file   Other Topics Concern    Not on file   Social History Narrative    Not on file     History reviewed. No pertinent family history.    Review of patient's allergies indicates:   Allergen Reactions    Percodan [oxycodone hcl-oxycodone-asa] Other (See Comments)     halucinations    Penicillins Itching    Sulfa (sulfonamide antibiotics) Diarrhea and Nausea Only       Current Outpatient Medications   Medication Sig    ascorbic acid (VITAMIN C) 500 MG tablet Take 500 mg by mouth once daily.    atenolol (TENORMIN) 25 MG tablet Take 1 tablet (25 mg total) by mouth 2  "(two) times daily.    biotin 5 mg Tab Take 500 mg by mouth once daily.     calcipotriene (DOVONOX) 0.005 % cream     calcium carbonate (OS-ZOILA) 600 mg (1,500 mg) Tab Take 600 mg by mouth 2 (two) times daily with meals.    ELIQUIS 5 mg Tab Take 5 mg by mouth 2 (two) times daily.    escitalopram oxalate (LEXAPRO) 10 MG tablet TAKE 1 TABLET BY MOUTH ONCE DAILY    hydrALAZINE (APRESOLINE) 25 MG tablet TAKE 1 TABLET BY MOUTH 2 TIMES A DAY WITH FOOD    hydroCHLOROthiazide (HYDRODIURIL) 25 MG tablet TAKE 1 TABLET (25 MG TOTAL) BY MOUTH ONCE DAILY.    multivitamin (THERAGRAN) tablet Take 1 tablet by mouth once daily.    potassium chloride SA (K-DUR,KLOR-CON) 10 MEQ tablet TAKE 1 TABLET BY MOUTH EVERY DAY    risedronate (ACTONEL) 150 MG Tab TAKE 1 TABLET BY MOUTH MONTHLY    UNABLE TO FIND Take 1 tablet by mouth 2 (two) times daily. medication name: HA Joint    vitamin D 1000 units Tab Take 2,000 mg by mouth once daily.    vitamin E 1000 UNIT capsule Take 1,000 Units by mouth once daily.     No current facility-administered medications for this visit.        REVIEW OF SYSTEMS:  GENERAL: No weight loss, malaise or fevers.  HEENT: Negative for frequent or significant headaches.  RESPIRATORY: Negative for cough, wheezing or shortness of breath.  CARDIOVASCULAR: Negative for chest pain or palpitations.  GI: No blood in stools or black stools or change in bowel habits.  : Negative for kidney stones, urinary tract infections, or incontinence.  MUSCULOSKELETAL: See HPI  SKIN: Negative for lesions, rash, and itching.  PSYCH: Negative for sleep disturbance, mood disorder and recent psychosocial stressors.    HEMATOLOGY/LYMPHOLOGY Negative for prolonged bleeding, bruising easily or swollen nodes.  NEURO:  No history of seizures or tremors.    OBJECTIVE:    /80 (BP Location: Right arm, Patient Position: Sitting, BP Method: Large (Manual))   Pulse 68   Resp 18   Ht 5' 3" (1.6 m)   Wt 64.8 kg (142 lb 13.7 oz)   " SpO2 97%   BMI 25.31 kg/m²     PHYSICAL EXAMINATION:  GENERAL: Well appearing, in no acute distress.  PSYCH:  Mood and affect is appropriate.  Awake, alert, and oriented x 3.  SKIN: Skin color, texture, turgor normal, no rashes or lesions  HEENT: Normocephalic, atraumatic.  EOM intact.  CV: Radial pulses are 2+.  RESP:  Respirations are unlabored.  GI: Abdomen soft and non-tender.  MSK:  No atrophy or tone abnormalities are noted.      Neck: No obvious deformity or signs of trauma.  Normal cervical spine range of motion.    Back: Normal range of motion without pain reproduction    Right ankle:  ROM is full and pain free. Tenderness to palpation over the medial aspect of the talus. Mild edema present. No warmth or erythema present.    Gait:  Gait is normal.    NEUR:  Strength testing is 5/5 throughout all muscle groups in the upper and lower extremities. No loss of sensation is noted.     ASSESSMENT: 88 y.o. female with chronic right ankle pain, consistent with OA. Right ankle x-rays show advanced degenerative changes in the talonavicular joint.    1. Chronic pain of right ankle    2. Osteoarthritis of right ankle and foot          PLAN:     - Continue Tylenol arthritis up to 3 x/day as needed for pain.  - Recommend increasing frequency of topical compound pain cream (Diclofenac 2%) to 3-4 x/daily.  - In the future, I will consider repeat right talonavicular joint injection. The patient is not interested at this time.  - Return to clinic with any new or worsening symptoms, or if symptoms persist.      The above plan and management options were discussed at length with patient. Patient is in agreement with the above and verbalized understanding. It will be communicated with the referring physician via electronic record, fax, or mail.    Wiley Phelps III  09/05/2018

## 2018-09-25 RX ORDER — RISEDRONATE SODIUM 150 MG/1
TABLET, FILM COATED ORAL
Qty: 6 TABLET | Refills: 0 | Status: SHIPPED | OUTPATIENT
Start: 2018-09-25 | End: 2019-04-18 | Stop reason: CLARIF

## 2018-10-04 ENCOUNTER — TELEPHONE (OUTPATIENT)
Dept: INTERNAL MEDICINE | Facility: CLINIC | Age: 83
End: 2018-10-04

## 2018-10-04 DIAGNOSIS — Z12.31 BREAST CANCER SCREENING BY MAMMOGRAM: Primary | ICD-10-CM

## 2018-10-04 NOTE — TELEPHONE ENCOUNTER
----- Message from Tish Springer sent at 10/4/2018  9:13 AM CDT -----  Contact: Self/ [phone]  Patient requesting orders for a mammogram. Please call and advise.

## 2018-11-15 DIAGNOSIS — R00.0 TACHYCARDIA: ICD-10-CM

## 2018-11-15 DIAGNOSIS — I10 ESSENTIAL HYPERTENSION: ICD-10-CM

## 2018-11-15 RX ORDER — ATENOLOL 25 MG/1
TABLET ORAL
Qty: 180 TABLET | Refills: 1 | Status: SHIPPED | OUTPATIENT
Start: 2018-11-15 | End: 2019-07-23 | Stop reason: DRUGHIGH

## 2018-12-06 ENCOUNTER — TELEPHONE (OUTPATIENT)
Dept: INTERNAL MEDICINE | Facility: CLINIC | Age: 83
End: 2018-12-06

## 2018-12-06 ENCOUNTER — OFFICE VISIT (OUTPATIENT)
Dept: URGENT CARE | Facility: CLINIC | Age: 83
End: 2018-12-06
Payer: MEDICARE

## 2018-12-06 VITALS
OXYGEN SATURATION: 99 % | BODY MASS INDEX: 24.45 KG/M2 | RESPIRATION RATE: 16 BRPM | HEIGHT: 63 IN | TEMPERATURE: 97 F | DIASTOLIC BLOOD PRESSURE: 77 MMHG | WEIGHT: 138 LBS | SYSTOLIC BLOOD PRESSURE: 132 MMHG | HEART RATE: 77 BPM

## 2018-12-06 DIAGNOSIS — R05.9 COUGH: ICD-10-CM

## 2018-12-06 DIAGNOSIS — J32.9 SINUSITIS, UNSPECIFIED CHRONICITY, UNSPECIFIED LOCATION: Primary | ICD-10-CM

## 2018-12-06 PROCEDURE — 99214 OFFICE O/P EST MOD 30 MIN: CPT | Mod: S$GLB,,, | Performed by: NURSE PRACTITIONER

## 2018-12-06 RX ORDER — IPRATROPIUM BROMIDE 21 UG/1
2 SPRAY, METERED NASAL 3 TIMES DAILY PRN
Qty: 30 ML | Refills: 0 | Status: SHIPPED | OUTPATIENT
Start: 2018-12-06 | End: 2018-12-10

## 2018-12-06 RX ORDER — AZITHROMYCIN 250 MG/1
TABLET, FILM COATED ORAL
Qty: 6 TABLET | Refills: 0 | Status: SHIPPED | OUTPATIENT
Start: 2018-12-06 | End: 2019-02-13

## 2018-12-06 NOTE — PATIENT INSTRUCTIONS
"Please follow up with your Primary care provider within 2-5 days if your signs and symptoms have not resolved or worsen.  The usual course of cold symptoms are 10-14 days.     If your condition worsens or fails to improve we recommend that you receive another evaluation at the emergency room immediately or contact your primary medical clinic to discuss your concerns.     You must understand that you have received an Urgent Care treatment only and that you may be released before all of your medical problems are known or treated.   You, the patient, will arrange for follow up care as instructed.     Tylenol or Ibuprofen can also be used as directed for pain/fever unless you have an allergy to them or medical condition such as stomach ulcers, kidney or liver disease or blood thinners etc for which you should not be taking these type of medications.     Take over the counter cough medication as directed as needed for cough.  You should avoid medications with pseudoephedrine or phenylephrine (any medication with "D") if you have high blood pressure as this can cause an elevation in your blood pressure. Instead consider Corcidin HBP as needed to prevent an elevated blood pressure.     Natural remedies of symptoms (as needed) include humidification, saline nasal sprays, and/or steamy showers.  Increase fluids, warm tea with honey, cough drops as needed.  You may also use salt water gargles for sore throat.    IF you received a steroid shot today - As discussed, this can elevate your blood pressure, elevate your blood sugar, water weight gain, nervous energy, redness to the face and dimpling of the skin at the injection site.   Wait and See Antibiotic    You have been given a "wait and see" antibiotic. You should wait to see if symptoms improve within the next 48-72 hours before you start the antibiotic.      It is important to follow these instructions as antibiotic resistance is high.  Your symptoms will likely resolve " without this prescription.      If you do start the antibiotic, please complete the antibiotic as directed on the bottle.      If you are female and on BCP use additional methods to prevent pregnancy while on antibiotics and for one cycle after.     You have been given an antibiotic to treat your condition today.  Please complete the antibiotic as directed on the bottle.     As with any antibiotics, use probiotics and/or high culture yogurt about 2 hours apart from the antibiotic and about 1 week after the antibiotic to replace the gut rojelio lost with antibiotic use.      If you are female and on BCP use additional methods to prevent pregnancy while on antibiotics and for one cycle after.         Sinusitis (Antibiotic Treatment)    The sinuses are air-filled spaces within the bones of the face. They connect to the inside of the nose. Sinusitis is an inflammation of the tissue lining the sinus cavity. Sinus inflammation can occur during a cold. It can also be due to allergies to pollens and other particles in the air. Sinusitis can cause symptoms of sinus congestion and fullness. A sinus infection causes fever, headache and facial pain. There is often green or yellow drainage from the nose or into the back of the throat (post-nasal drip). You have been given antibiotics to treat this condition.  Home care:  · Take the full course of antibiotics as instructed. Do not stop taking them, even if you feel better.  · Drink plenty of water, hot tea, and other liquids. This may help thin mucus. It also may promote sinus drainage.  · Heat may help soothe painful areas of the face. Use a towel soaked in hot water. Or,  the shower and direct the hot spray onto your face. Using a vaporizer along with a menthol rub at night may also help.   · An expectorant containing guaifenesin may help thin the mucus and promote drainage from the sinuses.  · Over-the-counter decongestants may be used unless a similar medicine was  prescribed. Nasal sprays work the fastest. Use one that contains phenylephrine or oxymetazoline. First blow the nose gently. Then use the spray. Do not use these medicines more often than directed on the label or symptoms may get worse. You may also use tablets containing pseudoephedrine. Avoid products that combine ingredients, because side effects may be increased. Read labels. You can also ask the pharmacist for help. (NOTE: Persons with high blood pressure should not use decongestants. They can raise blood pressure.)  · Over-the-counter antihistamines may help if allergies contributed to your sinusitis.    · Do not use nasal rinses or irrigation during an acute sinus infection, unless told to by your health care provider. Rinsing may spread the infection to other sinuses.  · Use acetaminophen or ibuprofen to control pain, unless another pain medicine was prescribed. (If you have chronic liver or kidney disease or ever had a stomach ulcer, talk with your doctor before using these medicines. Aspirin should never be used in anyone under 18 years of age who is ill with a fever. It may cause severe liver damage.)  · Don't smoke. This can worsen symptoms.  Follow-up care  Follow up with your healthcare provider or our staff if you are not improving within the next week.  When to seek medical advice  Call your healthcare provider if any of these occur:  · Facial pain or headache becoming more severe  · Stiff neck  · Unusual drowsiness or confusion  · Swelling of the forehead or eyelids  · Vision problems, including blurred or double vision  · Fever of 100.4ºF (38ºC) or higher, or as directed by your healthcare provider  · Seizure  · Breathing problems  · Symptoms not resolving within 10 days  Date Last Reviewed: 4/13/2015 © 2000-2017 [employer]. [address] [place] [address], [place] [address]. All rights reserved. This information is not intended as a substitute for professional medical care. Always follow  your healthcare professional's instructions.

## 2018-12-06 NOTE — PROGRESS NOTES
"Subjective:       Patient ID: Mary Ellen Miller is a 88 y.o. female.    Vitals:  height is 5' 3" (1.6 m) and weight is 62.6 kg (138 lb). Her temperature is 96.5 °F (35.8 °C). Her blood pressure is 132/77 and her pulse is 77. Her respiration is 16 and oxygen saturation is 99%.     Chief Complaint: Sinus Problem    Sinus Problem   This is a new problem. Episode onset: 4 days ago. The problem is unchanged. There has been no fever. Her pain is at a severity of 5/10. The pain is mild. Associated symptoms include congestion, coughing, sinus pressure and sneezing. Pertinent negatives include no chills, diaphoresis, ear pain, headaches, hoarse voice, neck pain, shortness of breath, sore throat or swollen glands. Treatments tried: robitussin. The treatment provided mild relief.       Constitution: Negative for chills, sweating, fatigue and fever.   HENT: Positive for congestion, sinus pain and sinus pressure. Negative for ear pain, sore throat and voice change.    Neck: Negative for neck pain and painful lymph nodes.   Eyes: Negative for eye redness.   Respiratory: Positive for cough and sputum production. Negative for chest tightness, bloody sputum, COPD, shortness of breath, stridor, wheezing and asthma.    Gastrointestinal: Negative for nausea and vomiting.   Musculoskeletal: Negative for muscle ache.   Skin: Negative for rash.   Allergic/Immunologic: Positive for sneezing. Negative for seasonal allergies and asthma.   Neurological: Negative for headaches.   Hematologic/Lymphatic: Negative for swollen lymph nodes.       Objective:      Physical Exam   Constitutional: She is oriented to person, place, and time. She appears well-developed and well-nourished. She is cooperative.  Non-toxic appearance. She does not appear ill. No distress.   HENT:   Head: Normocephalic and atraumatic.   Right Ear: Hearing, tympanic membrane, external ear and ear canal normal.   Left Ear: Hearing, tympanic membrane, external ear and ear " canal normal.   Nose: Mucosal edema and rhinorrhea present. No nasal deformity. No epistaxis. Right sinus exhibits maxillary sinus tenderness and frontal sinus tenderness. Left sinus exhibits maxillary sinus tenderness and frontal sinus tenderness.   Mouth/Throat: Uvula is midline, oropharynx is clear and moist and mucous membranes are normal. No trismus in the jaw. Normal dentition. No uvula swelling. No posterior oropharyngeal erythema.   Eyes: Conjunctivae and lids are normal. No scleral icterus.   Sclera clear bilat   Neck: Trachea normal, full passive range of motion without pain and phonation normal. Neck supple.   Cardiovascular: Normal rate, regular rhythm, normal heart sounds, intact distal pulses and normal pulses.   Pulmonary/Chest: Effort normal and breath sounds normal. No respiratory distress.   Abdominal: Soft. Normal appearance and bowel sounds are normal. She exhibits no distension. There is no tenderness.   Musculoskeletal: Normal range of motion. She exhibits no edema or deformity.   Neurological: She is alert and oriented to person, place, and time. She exhibits normal muscle tone. Coordination normal.   Skin: Skin is warm, dry and intact. She is not diaphoretic. No pallor.   Psychiatric: She has a normal mood and affect. Her speech is normal and behavior is normal. Judgment and thought content normal. Cognition and memory are normal.   Nursing note and vitals reviewed.      Assessment:       1. Sinusitis, unspecified chronicity, unspecified location    2. Cough        Plan:         Sinusitis, unspecified chronicity, unspecified location  -     azithromycin (ZITHROMAX Z-THU) 250 MG tablet; Take 2 tablets (500 mg) on  Day 1,  followed by 1 tablet (250 mg) once daily on Days 2 through 5.  Dispense: 6 tablet; Refill: 0  -     ipratropium (ATROVENT) 0.03 % nasal spray; 2 sprays by Nasal route 3 (three) times daily as needed. Use no more than 4 days.  Dispense: 30 mL; Refill: 0    Cough      Patient  "Instructions   Please follow up with your Primary care provider within 2-5 days if your signs and symptoms have not resolved or worsen.  The usual course of cold symptoms are 10-14 days.     If your condition worsens or fails to improve we recommend that you receive another evaluation at the emergency room immediately or contact your primary medical clinic to discuss your concerns.     You must understand that you have received an Urgent Care treatment only and that you may be released before all of your medical problems are known or treated.   You, the patient, will arrange for follow up care as instructed.     Tylenol or Ibuprofen can also be used as directed for pain/fever unless you have an allergy to them or medical condition such as stomach ulcers, kidney or liver disease or blood thinners etc for which you should not be taking these type of medications.     Take over the counter cough medication as directed as needed for cough.  You should avoid medications with pseudoephedrine or phenylephrine (any medication with "D") if you have high blood pressure as this can cause an elevation in your blood pressure. Instead consider Corcidin HBP as needed to prevent an elevated blood pressure.     Natural remedies of symptoms (as needed) include humidification, saline nasal sprays, and/or steamy showers.  Increase fluids, warm tea with honey, cough drops as needed.  You may also use salt water gargles for sore throat.    IF you received a steroid shot today - As discussed, this can elevate your blood pressure, elevate your blood sugar, water weight gain, nervous energy, redness to the face and dimpling of the skin at the injection site.   Wait and See Antibiotic    You have been given a "wait and see" antibiotic. You should wait to see if symptoms improve within the next 48-72 hours before you start the antibiotic.      It is important to follow these instructions as antibiotic resistance is high.  Your symptoms will " likely resolve without this prescription.      If you do start the antibiotic, please complete the antibiotic as directed on the bottle.      If you are female and on BCP use additional methods to prevent pregnancy while on antibiotics and for one cycle after.     You have been given an antibiotic to treat your condition today.  Please complete the antibiotic as directed on the bottle.     As with any antibiotics, use probiotics and/or high culture yogurt about 2 hours apart from the antibiotic and about 1 week after the antibiotic to replace the gut rojelio lost with antibiotic use.      If you are female and on BCP use additional methods to prevent pregnancy while on antibiotics and for one cycle after.         Sinusitis (Antibiotic Treatment)    The sinuses are air-filled spaces within the bones of the face. They connect to the inside of the nose. Sinusitis is an inflammation of the tissue lining the sinus cavity. Sinus inflammation can occur during a cold. It can also be due to allergies to pollens and other particles in the air. Sinusitis can cause symptoms of sinus congestion and fullness. A sinus infection causes fever, headache and facial pain. There is often green or yellow drainage from the nose or into the back of the throat (post-nasal drip). You have been given antibiotics to treat this condition.  Home care:  · Take the full course of antibiotics as instructed. Do not stop taking them, even if you feel better.  · Drink plenty of water, hot tea, and other liquids. This may help thin mucus. It also may promote sinus drainage.  · Heat may help soothe painful areas of the face. Use a towel soaked in hot water. Or,  the shower and direct the hot spray onto your face. Using a vaporizer along with a menthol rub at night may also help.   · An expectorant containing guaifenesin may help thin the mucus and promote drainage from the sinuses.  · Over-the-counter decongestants may be used unless a similar  medicine was prescribed. Nasal sprays work the fastest. Use one that contains phenylephrine or oxymetazoline. First blow the nose gently. Then use the spray. Do not use these medicines more often than directed on the label or symptoms may get worse. You may also use tablets containing pseudoephedrine. Avoid products that combine ingredients, because side effects may be increased. Read labels. You can also ask the pharmacist for help. (NOTE: Persons with high blood pressure should not use decongestants. They can raise blood pressure.)  · Over-the-counter antihistamines may help if allergies contributed to your sinusitis.    · Do not use nasal rinses or irrigation during an acute sinus infection, unless told to by your health care provider. Rinsing may spread the infection to other sinuses.  · Use acetaminophen or ibuprofen to control pain, unless another pain medicine was prescribed. (If you have chronic liver or kidney disease or ever had a stomach ulcer, talk with your doctor before using these medicines. Aspirin should never be used in anyone under 18 years of age who is ill with a fever. It may cause severe liver damage.)  · Don't smoke. This can worsen symptoms.  Follow-up care  Follow up with your healthcare provider or our staff if you are not improving within the next week.  When to seek medical advice  Call your healthcare provider if any of these occur:  · Facial pain or headache becoming more severe  · Stiff neck  · Unusual drowsiness or confusion  · Swelling of the forehead or eyelids  · Vision problems, including blurred or double vision  · Fever of 100.4ºF (38ºC) or higher, or as directed by your healthcare provider  · Seizure  · Breathing problems  · Symptoms not resolving within 10 days  Date Last Reviewed: 4/13/2015  © 2812-5245 [employer]. [address] [place] [address], [place] [address]. All rights reserved. This information is not intended as a substitute for professional medical care.  Always follow your healthcare professional's instructions.

## 2018-12-06 NOTE — TELEPHONE ENCOUNTER
----- Message from Yazmin Jenkins sent at 12/6/2018 10:42 AM CST -----  Contact: Self [phone]  Patient would like to speak with you about being seen today for loss of voice and cough. Please advise.

## 2018-12-09 ENCOUNTER — TELEPHONE (OUTPATIENT)
Dept: URGENT CARE | Facility: CLINIC | Age: 83
End: 2018-12-09

## 2018-12-09 NOTE — TELEPHONE ENCOUNTER
Call back DOS: 12/06/18 - Patient stated that she is starting to feel better. Her last day to take the antibiotic prescribed to her is tomorrow. She also stated that she will make a follow up appointment with PCP.

## 2019-02-10 ENCOUNTER — OFFICE VISIT (OUTPATIENT)
Dept: URGENT CARE | Facility: CLINIC | Age: 84
End: 2019-02-10
Payer: MEDICARE

## 2019-02-10 VITALS
OXYGEN SATURATION: 97 % | SYSTOLIC BLOOD PRESSURE: 139 MMHG | TEMPERATURE: 97 F | WEIGHT: 138 LBS | HEART RATE: 69 BPM | BODY MASS INDEX: 24.45 KG/M2 | HEIGHT: 63 IN | DIASTOLIC BLOOD PRESSURE: 78 MMHG | RESPIRATION RATE: 16 BRPM

## 2019-02-10 DIAGNOSIS — J06.9 UPPER RESPIRATORY TRACT INFECTION, UNSPECIFIED TYPE: Primary | ICD-10-CM

## 2019-02-10 LAB
CTP QC/QA: YES
FLUAV AG NPH QL: NEGATIVE
FLUBV AG NPH QL: NEGATIVE

## 2019-02-10 PROCEDURE — 99214 PR OFFICE/OUTPT VISIT, EST, LEVL IV, 30-39 MIN: ICD-10-PCS | Mod: S$GLB,,, | Performed by: NURSE PRACTITIONER

## 2019-02-10 PROCEDURE — 87804 INFLUENZA ASSAY W/OPTIC: CPT | Mod: QW,S$GLB,, | Performed by: NURSE PRACTITIONER

## 2019-02-10 PROCEDURE — 87804 POCT INFLUENZA A/B: ICD-10-PCS | Mod: 59,QW,S$GLB, | Performed by: NURSE PRACTITIONER

## 2019-02-10 PROCEDURE — 99214 OFFICE O/P EST MOD 30 MIN: CPT | Mod: S$GLB,,, | Performed by: NURSE PRACTITIONER

## 2019-02-10 RX ORDER — PREDNISONE 20 MG/1
20 TABLET ORAL DAILY
Qty: 4 TABLET | Refills: 0 | Status: SHIPPED | OUTPATIENT
Start: 2019-02-10 | End: 2019-02-14

## 2019-02-10 NOTE — PROGRESS NOTES
"Subjective:       Patient ID: Mary Ellen Miller is a 88 y.o. female.    Vitals:  height is 5' 3" (1.6 m) and weight is 62.6 kg (138 lb). Her oral temperature is 97.2 °F (36.2 °C). Her blood pressure is 139/78 and her pulse is 69. Her respiration is 16 and oxygen saturation is 97%.     Chief Complaint: Cough    Cough   This is a new problem. The current episode started yesterday. The problem has been unchanged. The problem occurs constantly. The cough is productive of sputum. Associated symptoms include myalgias, nasal congestion, postnasal drip and a sore throat. Pertinent negatives include no chills, ear congestion, ear pain, eye redness, fever, headaches, heartburn, hemoptysis, rash, rhinorrhea, shortness of breath, sweats, weight loss or wheezing. Nothing aggravates the symptoms. Treatments tried: coricidin. The treatment provided no relief. There is no history of bronchiectasis, bronchitis, COPD, emphysema or pneumonia.       Constitution: Negative for chills, sweating, fatigue and fever.   HENT: Positive for postnasal drip and sore throat. Negative for ear pain, congestion, sinus pain, sinus pressure and voice change.    Neck: Negative for painful lymph nodes.   Eyes: Negative for eye redness.   Respiratory: Positive for cough. Negative for chest tightness, sputum production, bloody sputum, COPD, shortness of breath, stridor, wheezing and asthma.    Gastrointestinal: Negative for nausea, vomiting and heartburn.   Musculoskeletal: Positive for muscle ache.   Skin: Negative for rash.   Allergic/Immunologic: Negative for seasonal allergies and asthma.   Neurological: Negative for headaches.   Hematologic/Lymphatic: Negative for swollen lymph nodes.       Objective:      Physical Exam   Constitutional: She is oriented to person, place, and time. She appears well-developed and well-nourished. She is cooperative.  Non-toxic appearance. She does not appear ill. No distress.   HENT:   Head: Normocephalic and " atraumatic.   Right Ear: Hearing, tympanic membrane, external ear and ear canal normal.   Left Ear: Hearing, tympanic membrane, external ear and ear canal normal.   Nose: Mucosal edema and rhinorrhea present. No nasal deformity. No epistaxis. Right sinus exhibits maxillary sinus tenderness. Right sinus exhibits no frontal sinus tenderness. Left sinus exhibits maxillary sinus tenderness. Left sinus exhibits no frontal sinus tenderness.   Mouth/Throat: Uvula is midline, oropharynx is clear and moist and mucous membranes are normal. No trismus in the jaw. Normal dentition. No uvula swelling. No posterior oropharyngeal erythema.   Eyes: Conjunctivae and lids are normal. No scleral icterus.   Sclera clear bilat   Neck: Trachea normal, full passive range of motion without pain and phonation normal. Neck supple.   Cardiovascular: Normal rate, regular rhythm, normal heart sounds, intact distal pulses and normal pulses.   Pulmonary/Chest: Effort normal and breath sounds normal. No respiratory distress.   Abdominal: Soft. Normal appearance and bowel sounds are normal. She exhibits no distension. There is no tenderness.   Musculoskeletal: Normal range of motion. She exhibits no edema or deformity.   Neurological: She is alert and oriented to person, place, and time. She exhibits normal muscle tone. Coordination normal.   Skin: Skin is warm, dry and intact. She is not diaphoretic. No pallor.   Psychiatric: She has a normal mood and affect. Her speech is normal and behavior is normal. Judgment and thought content normal. Cognition and memory are normal.   Nursing note and vitals reviewed.      Assessment:       1. Upper respiratory tract infection, unspecified type        Plan:       Results for orders placed or performed in visit on 02/10/19   POCT Influenza A/B   Result Value Ref Range    Rapid Influenza A Ag Negative Negative    Rapid Influenza B Ag Negative Negative     Acceptable Yes        Upper respiratory  "tract infection, unspecified type  -     POCT Influenza A/B  -     predniSONE (DELTASONE) 20 MG tablet; Take 1 tablet (20 mg total) by mouth once daily. for 4 days  Dispense: 4 tablet; Refill: 0      Patient Instructions   Please follow up with your Primary care provider within 2-5 days if your signs and symptoms have not resolved or worsen.  The usual course of cold symptoms are 10-14 days.     If your condition worsens or fails to improve we recommend that you receive another evaluation at the emergency room immediately or contact your primary medical clinic to discuss your concerns.     You must understand that you have received an Urgent Care treatment only and that you may be released before all of your medical problems are known or treated.   You, the patient, will arrange for follow up care as instructed.     Tylenol or Ibuprofen can also be used as directed for pain/fever unless you have an allergy to them or medical condition such as stomach ulcers, kidney or liver disease or blood thinners etc for which you should not be taking these type of medications.     Take over the counter cough medication as directed as needed for cough.  You should avoid medications with pseudoephedrine or phenylephrine (any medication with "D") if you have high blood pressure as this can cause an elevation in your blood pressure. Instead consider Corcidin HBP as needed to prevent an elevated blood pressure.     Natural remedies of symptoms (as needed) include humidification, saline nasal sprays, and/or steamy showers.  Increase fluids, warm tea with honey, cough drops as needed.  You may also use salt water gargles for sore throat.    IF you received a steroid shot today - As discussed, this can elevate your blood pressure, elevate your blood sugar, water weight gain, nervous energy, redness to the face and dimpling of the skin at the injection site.     Viral Upper Respiratory Illness (Adult)  You have a viral upper respiratory " illness (URI), which is another term for the common cold. This illness is contagious during the first few days. It is spread through the air by coughing and sneezing. It may also be spread by direct contact (touching the sick person and then touching your own eyes, nose, or mouth). Frequent handwashing will decrease risk of spread. Most viral illnesses go away within 7 to 10 days with rest and simple home remedies. Sometimes the illness may last for several weeks. Antibiotics will not kill a virus, and they are generally not prescribed for this condition.    Home care  · If symptoms are severe, rest at home for the first 2 to 3 days. When you resume activity, don't let yourself get too tired.  · Avoid being exposed to cigarette smoke (yours or others).  · You may use acetaminophen or ibuprofen to control pain and fever, unless another medicine was prescribed. (Note: If you have chronic liver or kidney disease, have ever had a stomach ulcer or gastrointestinal bleeding, or are taking blood-thinning medicines, talk with your healthcare provider before using these medicines.) Aspirin should never be given to anyone under 18 years of age who is ill with a viral infection or fever. It may cause severe liver or brain damage.  · Your appetite may be poor, so a light diet is fine. Avoid dehydration by drinking 6 to 8 glasses of fluids per day (water, soft drinks, juices, tea, or soup). Extra fluids will help loosen secretions in the nose and lungs.  · Over-the-counter cold medicines will not shorten the length of time youre sick, but they may be helpful for the following symptoms: cough, sore throat, and nasal and sinus congestion. (Note: Do not use decongestants if you have high blood pressure.)  Follow-up care  Follow up with your healthcare provider, or as advised.  When to seek medical advice  Call your healthcare provider right away if any of these occur:  · Cough with lots of colored sputum (mucus)  · Severe  headache; face, neck, or ear pain  · Difficulty swallowing due to throat pain  · Fever of 100.4°F (38°C)  Call 911, or get immediate medical care  Call emergency services right away if any of these occur:  · Chest pain, shortness of breath, wheezing, or difficulty breathing  · Coughing up blood  · Inability to swallow due to throat pain  Date Last Reviewed: 9/13/2015  © 4810-2412 [employer]. [address] [place] [address]. All rights reserved. This information is not intended as a substitute for professional medical care. Always follow your healthcare professional's instructions.      Laryngitis    Laryngitis is a swelling of the tissues around the vocal cords. Symptoms include a hoarse (scratchy) voice. The voice may be lost completely. It may be caused by a viral illness, such as a head or chest cold. It may also be due to overuse and strain of the voice. Smoking, drinking alcohol, acid reflux, allergies, or inhaling harsh chemicals may also lead to symptoms. This condition will usually resolve in 1-2 weeks.  Home care  · Rest your voice until it recovers. Talk as little as possible. If your symptoms are severe, rest at home for a day or so.  · Breathing cool steam from a humidifier/vaporizer or in a steamy shower may be helpful.  · Drink plenty of fluids to stay well hydrated.  · Do not smoke  Follow-up care  Follow up with your healthcare provider or this facility if you have not improved after one week.  When to seek medical advice  Contact your healthcare provider for any of the following:  · Severe pain with swallowing  · Trouble opening mouth  · Neck swelling, neck pain, or trouble moving neck  · Noisy breathing or trouble breathing  · Fever of 100.4°F (38.ºC) or higher, or as directed by your healthcare provider  · Drooling  · Symptoms do not resolve in 2 weeks  Date Last Reviewed: 4/26/2015 © 2000-2017 [employer]. [address] [place] [address]. All  rights reserved. This information is not intended as a substitute for professional medical care. Always follow your healthcare professional's instructions.

## 2019-02-10 NOTE — PATIENT INSTRUCTIONS
"Please follow up with your Primary care provider within 2-5 days if your signs and symptoms have not resolved or worsen.  The usual course of cold symptoms are 10-14 days.     If your condition worsens or fails to improve we recommend that you receive another evaluation at the emergency room immediately or contact your primary medical clinic to discuss your concerns.     You must understand that you have received an Urgent Care treatment only and that you may be released before all of your medical problems are known or treated.   You, the patient, will arrange for follow up care as instructed.     Tylenol or Ibuprofen can also be used as directed for pain/fever unless you have an allergy to them or medical condition such as stomach ulcers, kidney or liver disease or blood thinners etc for which you should not be taking these type of medications.     Take over the counter cough medication as directed as needed for cough.  You should avoid medications with pseudoephedrine or phenylephrine (any medication with "D") if you have high blood pressure as this can cause an elevation in your blood pressure. Instead consider Corcidin HBP as needed to prevent an elevated blood pressure.     Natural remedies of symptoms (as needed) include humidification, saline nasal sprays, and/or steamy showers.  Increase fluids, warm tea with honey, cough drops as needed.  You may also use salt water gargles for sore throat.    IF you received a steroid shot today - As discussed, this can elevate your blood pressure, elevate your blood sugar, water weight gain, nervous energy, redness to the face and dimpling of the skin at the injection site.     Viral Upper Respiratory Illness (Adult)  You have a viral upper respiratory illness (URI), which is another term for the common cold. This illness is contagious during the first few days. It is spread through the air by coughing and sneezing. It may also be spread by direct contact (touching the " sick person and then touching your own eyes, nose, or mouth). Frequent handwashing will decrease risk of spread. Most viral illnesses go away within 7 to 10 days with rest and simple home remedies. Sometimes the illness may last for several weeks. Antibiotics will not kill a virus, and they are generally not prescribed for this condition.    Home care  · If symptoms are severe, rest at home for the first 2 to 3 days. When you resume activity, don't let yourself get too tired.  · Avoid being exposed to cigarette smoke (yours or others).  · You may use acetaminophen or ibuprofen to control pain and fever, unless another medicine was prescribed. (Note: If you have chronic liver or kidney disease, have ever had a stomach ulcer or gastrointestinal bleeding, or are taking blood-thinning medicines, talk with your healthcare provider before using these medicines.) Aspirin should never be given to anyone under 18 years of age who is ill with a viral infection or fever. It may cause severe liver or brain damage.  · Your appetite may be poor, so a light diet is fine. Avoid dehydration by drinking 6 to 8 glasses of fluids per day (water, soft drinks, juices, tea, or soup). Extra fluids will help loosen secretions in the nose and lungs.  · Over-the-counter cold medicines will not shorten the length of time youre sick, but they may be helpful for the following symptoms: cough, sore throat, and nasal and sinus congestion. (Note: Do not use decongestants if you have high blood pressure.)  Follow-up care  Follow up with your healthcare provider, or as advised.  When to seek medical advice  Call your healthcare provider right away if any of these occur:  · Cough with lots of colored sputum (mucus)  · Severe headache; face, neck, or ear pain  · Difficulty swallowing due to throat pain  · Fever of 100.4°F (38°C)  Call 911, or get immediate medical care  Call emergency services right away if any of these occur:  · Chest pain,  shortness of breath, wheezing, or difficulty breathing  · Coughing up blood  · Inability to swallow due to throat pain  Date Last Reviewed: 9/13/2015  © 4640-3910 [employer]. [address] [place] [address]. All rights reserved. This information is not intended as a substitute for professional medical care. Always follow your healthcare professional's instructions.      Laryngitis    Laryngitis is a swelling of the tissues around the vocal cords. Symptoms include a hoarse (scratchy) voice. The voice may be lost completely. It may be caused by a viral illness, such as a head or chest cold. It may also be due to overuse and strain of the voice. Smoking, drinking alcohol, acid reflux, allergies, or inhaling harsh chemicals may also lead to symptoms. This condition will usually resolve in 1-2 weeks.  Home care  · Rest your voice until it recovers. Talk as little as possible. If your symptoms are severe, rest at home for a day or so.  · Breathing cool steam from a humidifier/vaporizer or in a steamy shower may be helpful.  · Drink plenty of fluids to stay well hydrated.  · Do not smoke  Follow-up care  Follow up with your healthcare provider or this facility if you have not improved after one week.  When to seek medical advice  Contact your healthcare provider for any of the following:  · Severe pain with swallowing  · Trouble opening mouth  · Neck swelling, neck pain, or trouble moving neck  · Noisy breathing or trouble breathing  · Fever of 100.4°F (38.ºC) or higher, or as directed by your healthcare provider  · Drooling  · Symptoms do not resolve in 2 weeks  Date Last Reviewed: 4/26/2015  © 3457-9294 [employer]. [address] [place] [address]. All rights reserved. This information is not intended as a substitute for professional medical care. Always follow your healthcare professional's instructions.

## 2019-02-13 ENCOUNTER — OFFICE VISIT (OUTPATIENT)
Dept: INTERNAL MEDICINE | Facility: CLINIC | Age: 84
End: 2019-02-13
Payer: MEDICARE

## 2019-02-13 ENCOUNTER — TELEPHONE (OUTPATIENT)
Dept: URGENT CARE | Facility: CLINIC | Age: 84
End: 2019-02-13

## 2019-02-13 VITALS
DIASTOLIC BLOOD PRESSURE: 60 MMHG | BODY MASS INDEX: 24.59 KG/M2 | SYSTOLIC BLOOD PRESSURE: 120 MMHG | OXYGEN SATURATION: 94 % | RESPIRATION RATE: 18 BRPM | WEIGHT: 138.81 LBS | TEMPERATURE: 98 F | HEIGHT: 63 IN | HEART RATE: 81 BPM

## 2019-02-13 DIAGNOSIS — J04.0 LARYNGITIS, ACUTE: Primary | ICD-10-CM

## 2019-02-13 PROCEDURE — 99213 OFFICE O/P EST LOW 20 MIN: CPT | Mod: PBBFAC,PN | Performed by: INTERNAL MEDICINE

## 2019-02-13 PROCEDURE — 99213 OFFICE O/P EST LOW 20 MIN: CPT | Mod: S$PBB,,, | Performed by: INTERNAL MEDICINE

## 2019-02-13 PROCEDURE — 99999 PR PBB SHADOW E&M-EST. PATIENT-LVL III: CPT | Mod: PBBFAC,,, | Performed by: INTERNAL MEDICINE

## 2019-02-13 PROCEDURE — 99999 PR PBB SHADOW E&M-EST. PATIENT-LVL III: ICD-10-PCS | Mod: PBBFAC,,, | Performed by: INTERNAL MEDICINE

## 2019-02-13 PROCEDURE — 99213 PR OFFICE/OUTPT VISIT, EST, LEVL III, 20-29 MIN: ICD-10-PCS | Mod: S$PBB,,, | Performed by: INTERNAL MEDICINE

## 2019-02-13 NOTE — PATIENT INSTRUCTIONS
Handwashing: Tips for Patients, Family, and Friends    Germs are everywhere around us. Normally, we live with germs without getting sick. In certain cases, harmful germs cause us to get sick with an infection. Or we can spread harmful germs to others and cause them to get sick. Keeping your hands clean is the best way to prevent getting or spreading germs that cause infection. Wash your hands with soap and water or use an alcohol-based hand .  When to clean your hands: For patients  In the hospital or in your home, you can come in contact with many harmful germs. To help prevent infection, wash your hands often, especially:  · After using the bathroom  · Before and after eating  · After coughing or sneezing  · After using a tissue  · After touching or changing a dressing or bandage  · After touching any object or surface that may be contaminated  · After touching an animal during a pet therapy session (hospital)  · After touching an animal, cleaning up after a pet, or preparing food for pets (home)  If you dont have access to soap and water, use an alcohol-based hand gel containing at least 60% alcohol. These products kill most germs and are easy to use. But use soap and water (not alcohol-based hand gel) if your hands are visibly dirty.  When to clean your hands: For family and friends  When visiting or caring for a loved one, washing your hands or using an alcohol-based hand  can help stop germs from spreading. Wash your hands:  · Before entering and after leaving the patients room  · As soon as you remove gloves or other protective clothing  · After changing a dressing or bandage  · After any contact with blood or other body fluids  · After touching or changing the patients bed linen or towels  · After touching an animal during a pet therapy session (hospital)  · After touching an animal, cleaning up after a pet, or preparing food for pets (home)  Many hospitals have sinks or gel dispensers  right outside patient rooms. If not, carry a bottle of alcohol-based hand gel with you, and use it every time you visit. Use soap and water (not alcohol-based hand gel) if your hands are visibly dirty.  Tips for good handwashing  Here are some suggestions to follow:  · Use warm water and plenty of soap. Work up a good lather.  · Clean the whole hand, including under your nails, between your fingers, and up the wrists.  · Wash for at least 15 to 20 seconds. Dont just wipe. Scrub well.  · Rinse, letting the water run down your fingers, not up your wrists.  · Dry your hands well. Use a paper towel to turn off the faucet and open the door.  Time matters  The longer you wash your hands, the more germs youll remove. Most people wash their hands for 6 to 7 seconds. But at least 15 seconds are needed to remove germs. Singing Happy Birthday or the Alphabet Song are examples of how long 15 seconds would be. To protect yourself and others from infection, washing for 30 seconds is best.  How to use an alcohol-based hand   Alcohol-based hand  may kill more germs than soap and water. Use them when your hands arent visibly dirty. For best results, follow these steps:  · Choose a gel or spray that contains at least 60 percent alcohol. Products with less alcohol may not kill germs.  · Spread about a tablespoon of  in the palm of one hand.  · Rub your hands together briskly, cleaning the backs of your hands, the palms, between your fingers, and up the wrists.  · Rub until the  is gone, and your hands are completely dry.  How do antibacterial soaps and alcohol-based hand  differ?  Antibacterial soaps:  · Come in liquid or bar form and are used with water  · Are no better at removing germs than plain soap  Alcohol-based hand :  · Come in gels or sprays that dont need water  · Are as or more effective than washing with soap and water   Date Last Reviewed: 12/1/2016  © 6649-5172 [employer]. [address] [place] [address], [place] [address]. All rights reserved. This information is not intended as a substitute for professional medical care. Always follow your healthcare professional's instructions.

## 2019-02-13 NOTE — PROGRESS NOTES
"Subjective:      Patient ID: Mary Ellen Miller is a 89 y.o. female.    Chief Complaint: Cough and Generalized Body Aches    HPI: 89 y.o. White female, went to a dinner party 4 days ago and developed laryngitis. The next day went to  [facility], where she refused a cortizone injection, but got a script for Prednisone which she has taken  Daily, having 1 more to go.  At present only the laryngitis is affecting her.  No other constitutional symptoms.  No fever,chills,sweats.  No headaches,ear pain.  No sore throat.  Coughs, but it is non productive.  No SOB,dyspnea.    No CP,palpitations.  No edema.  No N,V,D,C.    Here with her Goddaughter.  Drinks hot toddies ( with relief), and uses Robitussin.    Review of Systems   All other systems reviewed and are negative.      Objective:   /60 (BP Location: Left arm, Patient Position: Sitting, BP Method: Medium (Manual))   Pulse 81   Temp 98.2 °F (36.8 °C) (Oral)   Resp 18   Ht 5' 3" (1.6 m)   Wt 63 kg (138 lb 12.8 oz)   SpO2 (!) 94%   BMI 24.59 kg/m²     Physical Exam   Constitutional: She is oriented to person, place, and time. She appears well-developed and well-nourished.   HENT:   Head: Normocephalic and atraumatic.   Right Ear: External ear normal.   Left Ear: External ear normal.   Nose: Nose normal.   Mouth/Throat: Oropharynx is clear and moist.   hoarse   Eyes: Conjunctivae are normal. Pupils are equal, round, and reactive to light.   Neck: Normal range of motion. Neck supple.   Cardiovascular: Normal rate, regular rhythm and normal heart sounds.   Pulmonary/Chest: Effort normal and breath sounds normal. No stridor. She has no wheezes. She has no rales.   Lymphadenopathy:     She has no cervical adenopathy.   Neurological: She is alert and oriented to person, place, and time.   Skin: Skin is warm and dry.   Nursing note and vitals reviewed.      Assessment:     1. Laryngitis, acute    POCT is negative.  Plan:     Laryngitis, acute  -     POCT " Influenza A/B    Symptomatic relief : hydrate, tylenol, Delsum.  Return prn ( indications given ).

## 2019-02-15 ENCOUNTER — TELEPHONE (OUTPATIENT)
Dept: FAMILY MEDICINE | Facility: CLINIC | Age: 84
End: 2019-02-15

## 2019-02-15 NOTE — TELEPHONE ENCOUNTER
Spoke with patient and she stated she was going to wait til Monday and if she wasn't better she wanted to make an appointment. Offered to make the appointment just in case and if she needed she can cancel but patient insisted she would call on Monday.

## 2019-02-15 NOTE — TELEPHONE ENCOUNTER
----- Message from Toshia Guevara sent at 2/15/2019  9:21 AM CST -----  Contact: [phone]/self  Patient requesting to speak with you because she has been sick for a week and is not feeling any better.

## 2019-02-20 ENCOUNTER — TELEPHONE (OUTPATIENT)
Dept: FAMILY MEDICINE | Facility: CLINIC | Age: 84
End: 2019-02-20

## 2019-02-20 NOTE — TELEPHONE ENCOUNTER
----- Message from Chasity Pendleton sent at 2/20/2019  4:48 PM CST -----  Contact: [phone]/self  Patient is requesting to be seen tomorrow. She has been constantly coughing for 2 weeks and can't take it any longer. Please call to schedule. Thanks

## 2019-02-21 ENCOUNTER — OFFICE VISIT (OUTPATIENT)
Dept: INTERNAL MEDICINE | Facility: CLINIC | Age: 84
End: 2019-02-21
Payer: MEDICARE

## 2019-02-21 VITALS
OXYGEN SATURATION: 96 % | HEIGHT: 63 IN | TEMPERATURE: 98 F | BODY MASS INDEX: 24.3 KG/M2 | RESPIRATION RATE: 16 BRPM | WEIGHT: 137.13 LBS | SYSTOLIC BLOOD PRESSURE: 122 MMHG | DIASTOLIC BLOOD PRESSURE: 60 MMHG | HEART RATE: 80 BPM

## 2019-02-21 DIAGNOSIS — B94.8 POST VIRAL DEBILITY: Primary | ICD-10-CM

## 2019-02-21 DIAGNOSIS — Z13.6 SCREENING FOR CARDIOVASCULAR CONDITION: ICD-10-CM

## 2019-02-21 DIAGNOSIS — R53.81 POST VIRAL DEBILITY: Primary | ICD-10-CM

## 2019-02-21 DIAGNOSIS — R06.02 SHORTNESS OF BREATH: ICD-10-CM

## 2019-02-21 DIAGNOSIS — R53.83 FATIGUE, UNSPECIFIED TYPE: ICD-10-CM

## 2019-02-21 DIAGNOSIS — E87.6 HYPOKALEMIA: Primary | ICD-10-CM

## 2019-02-21 PROCEDURE — 99999 PR PBB SHADOW E&M-EST. PATIENT-LVL III: ICD-10-PCS | Mod: PBBFAC,,, | Performed by: INTERNAL MEDICINE

## 2019-02-21 PROCEDURE — 99213 OFFICE O/P EST LOW 20 MIN: CPT | Mod: S$PBB,,, | Performed by: INTERNAL MEDICINE

## 2019-02-21 PROCEDURE — 99999 PR PBB SHADOW E&M-EST. PATIENT-LVL III: CPT | Mod: PBBFAC,,, | Performed by: INTERNAL MEDICINE

## 2019-02-21 PROCEDURE — 99213 PR OFFICE/OUTPT VISIT, EST, LEVL III, 20-29 MIN: ICD-10-PCS | Mod: S$PBB,,, | Performed by: INTERNAL MEDICINE

## 2019-02-21 PROCEDURE — 99213 OFFICE O/P EST LOW 20 MIN: CPT | Mod: PBBFAC,PN | Performed by: INTERNAL MEDICINE

## 2019-02-21 RX ORDER — POTASSIUM CHLORIDE 20 MEQ/1
20 TABLET, EXTENDED RELEASE ORAL DAILY
Qty: 10 TABLET | Refills: 0 | Status: SHIPPED | OUTPATIENT
Start: 2019-02-21 | End: 2019-03-23

## 2019-02-21 NOTE — PROGRESS NOTES
"Subjective:      Patient ID: Mary Ellen Miller is a 89 y.o. female.    Chief Complaint: Fatigue; sleepy; and Cough (three days ago)    HPI: 89 y.o. White female, has not felt well since she developed laryngitis 2 weeks ago. Extreme fatigue.  Occasional cough. Can  Fall asleep at any moment.  No other complaints, except today while walking to the office, had minor SOB.  No CP,palpitations.  + GARVIN medium exertion.  No edema.  No headache.  No N,V,D,C.  No dysuria.        Review of Systems   Constitutional: Positive for fatigue.   Respiratory: Positive for shortness of breath. Negative for cough, choking, chest tightness, wheezing and stridor.    Cardiovascular: Negative.  Negative for chest pain, palpitations and leg swelling.   Gastrointestinal: Negative.    Genitourinary: Negative.    Musculoskeletal: Negative.    Psychiatric/Behavioral: Negative.        Objective:   /60 (BP Location: Left arm, Patient Position: Sitting, BP Method: Medium (Manual))   Pulse 80   Temp 98.1 °F (36.7 °C) (Oral)   Resp 16   Ht 5' 3" (1.6 m)   Wt 62.2 kg (137 lb 1.6 oz)   SpO2 96%   BMI 24.29 kg/m²     Physical Exam   Constitutional: She is oriented to person, place, and time. She appears well-developed and well-nourished.   HENT:   Head: Normocephalic and atraumatic.   Right Ear: External ear normal.   Left Ear: External ear normal.   Nose: Nose normal.   Mouth/Throat: Oropharynx is clear and moist.   Eyes: Conjunctivae and EOM are normal.   Neck: Normal range of motion. Neck supple.   Cardiovascular: Normal rate, regular rhythm and normal heart sounds.   Pulmonary/Chest: Effort normal and breath sounds normal. No stridor. No respiratory distress. She has no wheezes. She has no rales. She exhibits no tenderness.   Abdominal: Soft. Bowel sounds are normal.   Musculoskeletal: She exhibits no edema.   Neurological: She is alert and oriented to person, place, and time.   Skin: Skin is warm and dry.   Psychiatric: She has a " normal mood and affect. Her behavior is normal.   Nursing note and vitals reviewed.      Assessment:     1. Post viral debility    2. Fatigue, unspecified type    3. Shortness of breath    4. Screening for cardiovascular condition    To r/o any other cause will check labs.  Additionally, pt is going to see her Cardiologist next week.  Plan:     Post viral debility    Fatigue, unspecified type  -     Brain natriuretic peptide; Future; Expected date: 02/21/2019  -     Magnesium; Future; Expected date: 02/21/2019  -     Lipid panel; Future; Expected date: 02/21/2019  -     TSH; Future; Expected date: 02/21/2019  -     Urinalysis; Future; Expected date: 02/21/2019    Shortness of breath  -     CBC auto differential; Future; Expected date: 02/21/2019  -     Comprehensive metabolic panel; Future; Expected date: 02/21/2019  -     Brain natriuretic peptide; Future; Expected date: 02/21/2019  -     TSH; Future; Expected date: 02/21/2019    Screening for cardiovascular condition  -     Lipid panel; Future; Expected date: 02/21/2019

## 2019-02-22 ENCOUNTER — TELEPHONE (OUTPATIENT)
Dept: FAMILY MEDICINE | Facility: CLINIC | Age: 84
End: 2019-02-22

## 2019-02-22 NOTE — TELEPHONE ENCOUNTER
Spoke with patient.Notified of low K and new orders sent to pharmacy. Also, to have blood work repeated in 1 week. Pt verbalizes understanding.

## 2019-02-22 NOTE — TELEPHONE ENCOUNTER
----- Message from [facility] CAROLINA Barton MD sent at 2/21/2019  4:35 PM CST -----  JOHNATHAN dunlap, will send in a script.  Repeat in 1 week.  TSA

## 2019-02-27 RX ORDER — ESCITALOPRAM OXALATE 10 MG/1
TABLET ORAL
Qty: 90 TABLET | Refills: 1 | Status: SHIPPED | OUTPATIENT
Start: 2019-02-27 | End: 2019-08-27 | Stop reason: SDUPTHER

## 2019-04-18 ENCOUNTER — OFFICE VISIT (OUTPATIENT)
Dept: INTERNAL MEDICINE | Facility: CLINIC | Age: 84
End: 2019-04-18
Payer: MEDICARE

## 2019-04-18 VITALS
HEIGHT: 63 IN | OXYGEN SATURATION: 96 % | BODY MASS INDEX: 25.02 KG/M2 | WEIGHT: 141.19 LBS | DIASTOLIC BLOOD PRESSURE: 60 MMHG | TEMPERATURE: 98 F | SYSTOLIC BLOOD PRESSURE: 110 MMHG | HEART RATE: 78 BPM | RESPIRATION RATE: 16 BRPM

## 2019-04-18 DIAGNOSIS — I10 ESSENTIAL HYPERTENSION: ICD-10-CM

## 2019-04-18 DIAGNOSIS — I48.20 CHRONIC ATRIAL FIBRILLATION: ICD-10-CM

## 2019-04-18 DIAGNOSIS — M81.0 AGE RELATED OSTEOPOROSIS, UNSPECIFIED PATHOLOGICAL FRACTURE PRESENCE: Primary | ICD-10-CM

## 2019-04-18 PROCEDURE — 99213 OFFICE O/P EST LOW 20 MIN: CPT | Mod: PBBFAC,PN | Performed by: INTERNAL MEDICINE

## 2019-04-18 PROCEDURE — 99999 PR PBB SHADOW E&M-EST. PATIENT-LVL III: ICD-10-PCS | Mod: PBBFAC,,, | Performed by: INTERNAL MEDICINE

## 2019-04-18 PROCEDURE — 99214 PR OFFICE/OUTPT VISIT, EST, LEVL IV, 30-39 MIN: ICD-10-PCS | Mod: S$PBB,,, | Performed by: INTERNAL MEDICINE

## 2019-04-18 PROCEDURE — 99214 OFFICE O/P EST MOD 30 MIN: CPT | Mod: S$PBB,,, | Performed by: INTERNAL MEDICINE

## 2019-04-18 PROCEDURE — 99999 PR PBB SHADOW E&M-EST. PATIENT-LVL III: CPT | Mod: PBBFAC,,, | Performed by: INTERNAL MEDICINE

## 2019-04-18 RX ORDER — FUROSEMIDE 40 MG/1
40 TABLET ORAL DAILY
Refills: 6 | COMMUNITY
Start: 2019-04-01 | End: 2023-10-25

## 2019-04-18 RX ORDER — IBANDRONATE SODIUM 150 MG/1
150 TABLET, FILM COATED ORAL
Qty: 1 TABLET | Refills: 11 | Status: SHIPPED | OUTPATIENT
Start: 2019-04-18 | End: 2019-05-18

## 2019-04-28 NOTE — PROGRESS NOTES
"Subjective:      Patient ID: Mary Ellen Miller is a 89 y.o. female.    Chief Complaint: Follow-up (6 month follow up) and Medication Refill    HPI: 89 y.o. White female, presents completely well put together.  She is , and says " don't you think I am doing well for an 89 year old?"  She goes out to lunch,gets her hair done, goes to [employer], loves shopping...  Loves getting together with the girls also.      DX: ch at [facility], well controlled on Xarelto         HTN         Osteoporosis, kyphosis    " I have no complaints".  Labs:  03/11/19 0904 HDL 44 -- Final result   03/11/19 0904 CHOL 140 -- Final result   03/11/19 0904 TRIG 84 -- Final result   03/11/19 0904 LDLCALC 79.2 -- Final result   03/11/19 0904 CHOLHDL 31.4 -- Final result   03/11/19 0904 NONHDLCHOL 96 -- Final result   03/11/19 0904 TOTALCHOLEST 3.2 -- Final result   03/11/19 0904 COLORU Yellow -- Final result   03/11/19 0904 APPEARANCEUA Clear -- Final result   03/11/19 0904 SPECGRAV 1.020 -- Final result   03/11/19 0904 PHUR 7.0 -- Final result   03/11/19 0904 KETONESU Negative -- Final result   03/11/19 0904 OCCULTUA Negative -- Final result   03/11/19 0904 NITRITE Negative -- Final result   03/11/19 0904 UROBILINOGEN Negative -- Final result   02/10/19 1236 RAPFLUA Negative -- Final result   02/10/19 1236 RAPFLUB Negative -- Final result   03/11/19 0904 LEUKOCYTESUR 1+ Abnormal Final result   03/11/19 0904 WBC 4.44 -- Final result   03/11/19 0904 RBC 4.49 -- Final result   03/11/19 0904 HGB 13.2 -- Final result   03/11/19 0904 HCT 41.8 -- Final result   03/11/19 0904 MCH 29.4 -- Final result   03/11/19 0904 RDW 14.4 -- Final result   03/11/19 0904  -- Final result   03/11/19 0904 MPV 10.5 -- Final result   03/17/16 0851 SEGS 84.4 High Final result   03/11/19 0904 GLU 90 -- Final result   03/11/19 0904 BUN 21 High Final result   03/11/19 0904 CREATININE 0.80 -- Final result   03/11/19 0904 CALCIUM 9.0 -- Final result   03/11/19 0904 NA " "142 -- Final result   03/11/19 0904 K 4.2 -- Final result   03/11/19 0904  -- Final result   03/11/19 0904 PROT 7.3 -- Final result   03/11/19 0904 ALBUMIN 4.0 -- Final result   03/11/19 0904 BILITOT 1.2 High Final result   03/11/19 0904 AST 26 -- Final result   03/11/19 0904 ALKPHOS 42 -- Final result   03/11/19 0904 CO2 29 -- Final result   03/11/19 0904 ALT 16 -- Final result   03/11/19 0904 ANIONGAP 8 -- Final result   03/11/19 0904 EGFRNONAA >60.0 -- Final result   03/11/19 0904 ESTGFRAFRICA >60.0 -- Final result   02/21/19 1554 MG 2.2 -- Final result   03/11/19 0904 MCV 93 --            Review of Systems   Constitutional: Negative.    HENT: Negative.    Eyes: Negative.    Respiratory: Negative for cough, chest tightness, shortness of breath and wheezing.    Cardiovascular: Negative for chest pain, palpitations and leg swelling.   Gastrointestinal: Negative for abdominal distention, abdominal pain, anal bleeding, blood in stool, constipation, diarrhea, nausea and vomiting.   Endocrine: Negative.    Genitourinary: Positive for hematuria and vaginal bleeding.   Musculoskeletal: Positive for arthralgias.   Skin: Negative.    Allergic/Immunologic: Negative.    Neurological: Negative.    Hematological: Negative.    Psychiatric/Behavioral: Positive for dysphoric mood. The patient is nervous/anxious.        Objective:   /60 (BP Location: Left arm, Patient Position: Sitting, BP Method: Medium (Manual))   Pulse 78   Temp 98.2 °F (36.8 °C) (Oral)   Resp 16   Ht 5' 3" (1.6 m)   Wt 64 kg (141 lb 3.2 oz)   SpO2 96%   BMI 25.01 kg/m²     Physical Exam   Constitutional: She is oriented to person, place, and time. She appears well-developed and well-nourished.   HENT:   Head: Normocephalic and atraumatic.   Right Ear: External ear normal.   Left Ear: External ear normal.   Nose: Nose normal.   Mouth/Throat: Oropharynx is clear and moist.   Eyes: Pupils are equal, round, and reactive to light. Conjunctivae " and EOM are normal.   Neck: Neck supple.   Cardiovascular: Normal rate, regular rhythm and normal heart sounds.   Pulmonary/Chest: Effort normal and breath sounds normal.   Abdominal: Soft. Bowel sounds are normal.   Musculoskeletal: She exhibits no edema or tenderness.   Kyphosis   Neurological: She is alert and oriented to person, place, and time.   Skin: Skin is warm and dry.   Psychiatric: She has a normal mood and affect. Her behavior is normal. Judgment and thought content normal.   Nursing note and vitals reviewed.      Assessment:     1. Age related osteoporosis, unspecified pathological fracture presence    2. Essential hypertension    3. Chronic atrial fibrillation    Phenomenal status...  Same therapy.  Plan:     Age related osteoporosis, unspecified pathological fracture presence  -     ibandronate (BONIVA) 150 mg tablet; Take 1 tablet (150 mg total) by mouth every 30 days.  Dispense: 1 tablet; Refill: 11    Essential hypertension    Chronic atrial fibrillation

## 2019-05-08 PROBLEM — I50.9 CHF (CONGESTIVE HEART FAILURE): Status: ACTIVE | Noted: 2019-05-08

## 2019-05-09 PROBLEM — I50.33 ACUTE ON CHRONIC DIASTOLIC (CONGESTIVE) HEART FAILURE: Status: ACTIVE | Noted: 2019-05-09

## 2019-07-23 ENCOUNTER — OFFICE VISIT (OUTPATIENT)
Dept: INTERNAL MEDICINE | Facility: CLINIC | Age: 84
End: 2019-07-23
Payer: MEDICARE

## 2019-07-23 VITALS
TEMPERATURE: 98 F | DIASTOLIC BLOOD PRESSURE: 78 MMHG | BODY MASS INDEX: 23.63 KG/M2 | WEIGHT: 133.38 LBS | SYSTOLIC BLOOD PRESSURE: 122 MMHG | HEIGHT: 63 IN | HEART RATE: 68 BPM | OXYGEN SATURATION: 93 %

## 2019-07-23 DIAGNOSIS — I10 ESSENTIAL HYPERTENSION: ICD-10-CM

## 2019-07-23 DIAGNOSIS — I50.33 ACUTE ON CHRONIC DIASTOLIC (CONGESTIVE) HEART FAILURE: ICD-10-CM

## 2019-07-23 DIAGNOSIS — I48.20 CHRONIC ATRIAL FIBRILLATION: Primary | ICD-10-CM

## 2019-07-23 PROCEDURE — 99213 OFFICE O/P EST LOW 20 MIN: CPT | Mod: S$PBB,,, | Performed by: INTERNAL MEDICINE

## 2019-07-23 PROCEDURE — 99999 PR PBB SHADOW E&M-EST. PATIENT-LVL III: ICD-10-PCS | Mod: PBBFAC,,, | Performed by: INTERNAL MEDICINE

## 2019-07-23 PROCEDURE — 99213 PR OFFICE/OUTPT VISIT, EST, LEVL III, 20-29 MIN: ICD-10-PCS | Mod: S$PBB,,, | Performed by: INTERNAL MEDICINE

## 2019-07-23 PROCEDURE — 99213 OFFICE O/P EST LOW 20 MIN: CPT | Mod: PBBFAC,PN | Performed by: INTERNAL MEDICINE

## 2019-07-23 PROCEDURE — 99999 PR PBB SHADOW E&M-EST. PATIENT-LVL III: CPT | Mod: PBBFAC,,, | Performed by: INTERNAL MEDICINE

## 2019-07-23 RX ORDER — ATENOLOL 50 MG/1
50 TABLET ORAL 2 TIMES DAILY
Refills: 10 | COMMUNITY
Start: 2019-06-01 | End: 2024-02-20 | Stop reason: ALTCHOICE

## 2019-07-23 RX ORDER — SPIRONOLACTONE 25 MG/1
25 TABLET ORAL DAILY
Refills: 6 | COMMUNITY
Start: 2019-06-04 | End: 2024-01-23

## 2019-07-30 NOTE — PROGRESS NOTES
"INTERNAL MEDICINE    Patient Active Problem List   Diagnosis    Primary localized osteoarthrosis, ankle and foot    Hypertension    Anxiety    Unilateral femoral hernia with obstruction, without gangrene    Chronic atrial fibrillation    Acute on chronic diastolic (congestive) heart failure       CC:   Chief Complaint   Patient presents with    Follow-up     3 month       SUBJECTIVE:  Mary Ellen Miller   is a 89 y.o. female  HPI   89y/oWF, went to the ER 5/8/19 due to SOB, fluttering in chest. Admitted to ,because she was found to be in new onset at [facility] with RVR., CHF.  She was diuresed aggressively, placed on atenolol , given hydralazine for HTN. And begun on Eliquis.  She did cancel her trip, because she was afraid of what could happen, since this was the first time ever.  States she now feels " great, especially for my age". Goes out, eats out and beginning to travel again. Has lost 4# since last visit.    ROS: Review of Systems   Constitutional: Positive for activity change and appetite change. Negative for fatigue.   HENT: Negative.    Eyes: Negative for visual disturbance.   Respiratory: Negative for cough, chest tightness, shortness of breath and wheezing.    Cardiovascular: Negative for chest pain, palpitations and leg swelling.   Gastrointestinal: Negative.    Genitourinary: Positive for frequency and urgency.   Musculoskeletal: Positive for arthralgias.   Neurological: Negative for dizziness, seizures, weakness and light-headedness.   Hematological: Negative.    Psychiatric/Behavioral: Negative.        Past Medical History:   Diagnosis Date    Anxiety     Hypertension        Past Surgical History:   Procedure Laterality Date    HYSTERECTOMY      INJECTION Right 2/25/2014    Performed by [facility] C[facility] LAB    INJECTION FOOT, RIGHT TALONAVICULAR JOINT Right 3/12/2015    Performed by [facility] CATH LAB    INJECTION- FOOT TALONAVICULAR " JOINT Right 9/16/2014    Performed by [facility] CATH LAB    LUNG SURGERY  1988    OOPHORECTOMY      RADICAL HYSTERECTOMY  1990       History reviewed. No pertinent family history.    Social History     Tobacco Use    Smoking status: Never Smoker    Smokeless tobacco: Never Used   Substance Use Topics    Alcohol use: No    Drug use: No       Social History     Social History Narrative    Not on file       ALLERGIES:   Review of patient's allergies indicates:   Allergen Reactions    Percodan [oxycodone hcl-oxycodone-asa] Other (See Comments)     halucinations    Penicillins Itching    Sulfa (sulfonamide antibiotics) Diarrhea and Nausea Only    Amoxicillin        MEDS:   Current Outpatient Medications:     ascorbic acid (VITAMIN C) 500 MG tablet, Take 500 mg by mouth once daily., Disp: , Rfl:     biotin 5 mg Tab, Take 500 mg by mouth once daily. , Disp: , Rfl:     calcipotriene (DOVONOX) 0.005 % cream, , Disp: , Rfl:     calcium carbonate (OS-ZOILA) 600 mg (1,500 mg) Tab, Take 600 mg by mouth 2 (two) times daily with meals., Disp: , Rfl:     ELIQUIS 5 mg Tab, Take 5 mg by mouth 2 (two) times daily., Disp: , Rfl: 11    escitalopram oxalate (LEXAPRO) 10 MG tablet, TAKE 1 TABLET BY MOUTH EVERY DAY, Disp: 90 tablet, Rfl: 1    furosemide (LASIX) 40 MG tablet, Take 40 mg by mouth once daily. , Disp: , Rfl: 6    hydrALAZINE (APRESOLINE) 25 MG tablet, TAKE 1 TABLET BY MOUTH 2 TIMES A DAY WITH FOOD, Disp: 180 tablet, Rfl: 1    multivitamin (THERAGRAN) tablet, Take 1 tablet by mouth once daily., Disp: , Rfl:     vitamin D 1000 units Tab, Take 2,000 mg by mouth once daily., Disp: , Rfl:     vitamin E 1000 UNIT capsule, Take 1,000 Units by mouth once daily., Disp: , Rfl:     atenolol (TENORMIN) 50 MG tablet, Take 50 mg by mouth 2 (two) times daily., Disp: , Rfl: 10    ibandronate (BONIVA) 150 mg tablet, Take 1 tablet (150 mg total) by mouth every 30 days., Disp: 1 tablet, Rfl: 11     "spironolactone (ALDACTONE) 25 MG tablet, Take 25 mg by mouth once daily., Disp: , Rfl: 6    OBJECTIVE:   Vitals:    07/23/19 1044 07/23/19 1103   BP: (!) 146/72 122/78   BP Location: Left arm Left arm   Patient Position: Sitting Sitting   BP Method: Medium (Manual) Medium (Manual)   Pulse: 68    Temp: 97.8 °F (36.6 °C)    TempSrc: Oral    SpO2: (!) 93%    Weight: 60.5 kg (133 lb 6.4 oz)    Height: 5' 3" (1.6 m)      Body mass index is 23.63 kg/m².    Physical Exam   Constitutional: She is oriented to person, place, and time. She appears well-developed and well-nourished.   HENT:   Head: Normocephalic and atraumatic.   Nose: Nose normal.   Mouth/Throat: Oropharynx is clear and moist.   Eyes: Conjunctivae and EOM are normal.   Neck: Neck supple. No JVD present.   Cardiovascular: Normal rate, regular rhythm and normal heart sounds.   Pulmonary/Chest: Effort normal and breath sounds normal. She has no wheezes. She has no rales.   Abdominal: Soft. Bowel sounds are normal.   Musculoskeletal: She exhibits no edema.   Neurological: She is alert and oriented to person, place, and time.   Skin: Skin is warm and dry.   Psychiatric: She has a normal mood and affect. Her behavior is normal.   Nursing note and vitals reviewed.        PERTINENT RESULTS:   CBC:  Recent Labs   Lab Result Units 05/09/19  0635   WBC K/uL 5.01   RBC M/uL 4.22   Hemoglobin g/dL 12.7   Hematocrit % 39.5   Platelets K/uL 207   Mean Corpuscular Volume fL 94   Mean Corpuscular Hemoglobin pg 30.1   Mean Corpuscular Hemoglobin Conc g/dL 32.2     CMP:  Recent Labs   Lab Result Units 05/08/19  1447  05/10/19  0622   Glucose mg/dL 79   < > 91   Calcium mg/dL 8.9   < > 8.5*   Albumin g/dL 4.3  --   --    Total Protein g/dL 7.1  --   --    Sodium mmol/L 145   < > 144   Potassium mmol/L 3.7   < > 3.0*   CO2 mmol/L 26   < > 31*   Chloride mmol/L 107   < > 103   BUN, Bld mg/dL 27*   < > 29*   Alkaline Phosphatase U/L 43  --   --    ALT U/L 25  --   --    AST U/L 34  " --   --    Total Bilirubin mg/dL 1.3*  --   --     < > = values in this interval not displayed.     URINALYSIS:  No results for input(s): COLORU, CLARITYU, SPECGRAV, PHUR, PROTEINUA, GLUCOSEU, BILIRUBINCON, BLOODU, WBCU, RBCU, BACTERIA, MUCUS, NITRITE, LEUKOCYTESUR, UROBILINOGEN, HYALINECASTS in the last 2160 hours.   LIPIDS:  No results for input(s): TSH, HDL, CHOL, TRIG, LDLCALC, CHOLHDL, NONHDLCHOL, TOTALCHOLEST in the last 2160 hours.          ASSESSMENT:  Problem List Items Addressed This Visit        Cardiac/Vascular    Hypertension    Chronic atrial fibrillation - Primary    Acute on chronic diastolic (congestive) heart failure          PLAN:      No orders of the defined types were placed in this encounter.  Maintain same meds.  Low salt diet, continue all activity.    Follow-up with me in 4months or prn..   Dr. Ania Barton  Internal Medicine

## 2019-08-27 RX ORDER — ESCITALOPRAM OXALATE 10 MG/1
TABLET ORAL
Qty: 90 TABLET | Refills: 1 | Status: SHIPPED | OUTPATIENT
Start: 2019-08-27 | End: 2020-03-04

## 2019-09-07 ENCOUNTER — OFFICE VISIT (OUTPATIENT)
Dept: URGENT CARE | Facility: CLINIC | Age: 84
End: 2019-09-07
Payer: MEDICARE

## 2019-09-07 VITALS
BODY MASS INDEX: 23.57 KG/M2 | SYSTOLIC BLOOD PRESSURE: 129 MMHG | WEIGHT: 133 LBS | HEIGHT: 63 IN | OXYGEN SATURATION: 96 % | HEART RATE: 66 BPM | DIASTOLIC BLOOD PRESSURE: 71 MMHG | TEMPERATURE: 98 F | RESPIRATION RATE: 16 BRPM

## 2019-09-07 DIAGNOSIS — R60.0 LEG EDEMA, RIGHT: ICD-10-CM

## 2019-09-07 DIAGNOSIS — L03.90 WOUND CELLULITIS: Primary | ICD-10-CM

## 2019-09-07 PROCEDURE — 99214 OFFICE O/P EST MOD 30 MIN: CPT | Mod: S$GLB,,, | Performed by: NURSE PRACTITIONER

## 2019-09-07 PROCEDURE — 99214 PR OFFICE/OUTPT VISIT, EST, LEVL IV, 30-39 MIN: ICD-10-PCS | Mod: S$GLB,,, | Performed by: NURSE PRACTITIONER

## 2019-09-07 PROCEDURE — 73590 X-RAY EXAM OF LOWER LEG: CPT | Mod: FY,RT,S$GLB, | Performed by: RADIOLOGY

## 2019-09-07 PROCEDURE — 73590 XR TIBIA FIBULA 2 VIEW RIGHT: ICD-10-PCS | Mod: FY,RT,S$GLB, | Performed by: RADIOLOGY

## 2019-09-07 RX ORDER — MUPIROCIN 20 MG/G
OINTMENT TOPICAL
Qty: 22 G | Refills: 0 | Status: SHIPPED | OUTPATIENT
Start: 2019-09-07 | End: 2020-01-28

## 2019-09-07 RX ORDER — IBANDRONATE SODIUM 150 MG/1
TABLET, FILM COATED ORAL
Refills: 11 | COMMUNITY
Start: 2019-07-19 | End: 2020-07-30

## 2019-09-07 RX ORDER — HYDRALAZINE HYDROCHLORIDE 50 MG/1
50 TABLET, FILM COATED ORAL 2 TIMES DAILY
Refills: 10 | COMMUNITY
Start: 2019-08-10 | End: 2020-07-30

## 2019-09-07 RX ORDER — DOXYCYCLINE 100 MG/1
100 CAPSULE ORAL EVERY 12 HOURS
Qty: 20 CAPSULE | Refills: 0 | Status: SHIPPED | OUTPATIENT
Start: 2019-09-07 | End: 2019-09-17

## 2019-09-07 NOTE — PROGRESS NOTES
"Subjective:       Patient ID: Mary Ellen Miller is a 89 y.o. female.    Vitals:  height is 5' 3" (1.6 m) and weight is 60.3 kg (133 lb). Her oral temperature is 97.9 °F (36.6 °C). Her blood pressure is 129/71 and her pulse is 66. Her respiration is 16 and oxygen saturation is 96%.     Chief Complaint: Edema    Patient states she was bitten by an fire ant on right lower leg.    Edema   This is a new problem. The current episode started 1 to 4 weeks ago. The problem occurs constantly. The problem has been gradually worsening. Pertinent negatives include no abdominal pain, anorexia, arthralgias, change in bowel habit, chest pain, chills, congestion, coughing, fatigue, fever, headaches, joint swelling, myalgias, nausea, neck pain, numbness, rash, sore throat, swollen glands, urinary symptoms, vertigo, visual change, vomiting or weakness. The symptoms are aggravated by walking. Treatments tried: dr. derek olivo. The treatment provided no relief.       Constitution: Negative for chills, fatigue and fever.   HENT: Negative for congestion and sore throat.    Neck: Negative for neck pain and painful lymph nodes.   Cardiovascular: Positive for leg swelling. Negative for chest pain.   Eyes: Negative for double vision and blurred vision.   Respiratory: Negative for cough and shortness of breath.    Gastrointestinal: Negative for abdominal pain, nausea, vomiting and diarrhea.   Genitourinary: Negative for dysuria, frequency, urgency and history of kidney stones.   Musculoskeletal: Negative for joint pain, joint swelling, muscle cramps and muscle ache.   Skin: Positive for erythema. Negative for color change, pale, rash and bruising.   Allergic/Immunologic: Negative for seasonal allergies.   Neurological: Negative for dizziness, history of vertigo, light-headedness, passing out, headaches and numbness.   Hematologic/Lymphatic: Negative for swollen lymph nodes.   Psychiatric/Behavioral: Negative for nervous/anxious, " sleep disturbance and depression. The patient is not nervous/anxious.        Objective:      Physical Exam   Constitutional: She is oriented to person, place, and time. She appears well-developed and well-nourished.   HENT:   Head: Normocephalic and atraumatic. Head is without abrasion, without contusion and without laceration.   Right Ear: External ear normal.   Left Ear: External ear normal.   Nose: Nose normal.   Eyes: Pupils are equal, round, and reactive to light. Conjunctivae, EOM and lids are normal.   Neck: Trachea normal, full passive range of motion without pain and phonation normal. Neck supple.   Cardiovascular: Normal rate, regular rhythm and normal heart sounds.   Pulmonary/Chest: Effort normal and breath sounds normal. No stridor. No respiratory distress.   Musculoskeletal: Normal range of motion.   Neurological: She is alert and oriented to person, place, and time.   Skin: Skin is warm and dry. Capillary refill takes less than 2 seconds. Lesion (see pic) noted. No abrasion, no bruising, no burn, no ecchymosis, no laceration and no rash noted. There is erythema.   Psychiatric: She has a normal mood and affect. Her speech is normal and behavior is normal. Judgment and thought content normal. Cognition and memory are normal.   Nursing note and vitals reviewed.            Assessment:       1. Wound cellulitis    2. Leg edema, right        Plan:     X-ray Tibia Fibula 2 View Right    Result Date: 9/7/2019  EXAMINATION: XR TIBIA FIBULA 2 VIEW RIGHT CLINICAL HISTORY: Localized edema TECHNIQUE: AP and lateral views of the right tibia and fibula were performed. COMPARISON: None. FINDINGS: Two views right tibia fibula. No acute displaced fracture or dislocation of the tibia or fibula.  No radiopaque foreign body.  Degenerative changes are noted of the foot and knee.     1. No convincing acute displaced fracture or dislocation of the tibia or fibula. Electronically signed by: Oswaldo Lubin MD  Date:    09/07/2019 Time:    13:04    Wound cellulitis  -     doxycycline (VIBRAMYCIN) 100 MG Cap; Take 1 capsule (100 mg total) by mouth every 12 (twelve) hours. for 10 days  Dispense: 20 capsule; Refill: 0  -     mupirocin (BACTROBAN) 2 % ointment; Apply to affected area 3 times daily  Dispense: 22 g; Refill: 0    Leg edema, right  -     X-Ray Tibia Fibula 2 View Right; Future; Expected date: 09/07/2019

## 2019-09-10 ENCOUNTER — TELEPHONE (OUTPATIENT)
Dept: URGENT CARE | Facility: CLINIC | Age: 84
End: 2019-09-10

## 2019-09-18 ENCOUNTER — OFFICE VISIT (OUTPATIENT)
Dept: INTERNAL MEDICINE | Facility: CLINIC | Age: 84
End: 2019-09-18
Payer: MEDICARE

## 2019-09-18 VITALS
TEMPERATURE: 98 F | HEART RATE: 86 BPM | HEIGHT: 63 IN | OXYGEN SATURATION: 96 % | DIASTOLIC BLOOD PRESSURE: 72 MMHG | RESPIRATION RATE: 16 BRPM | BODY MASS INDEX: 24.3 KG/M2 | WEIGHT: 137.13 LBS | SYSTOLIC BLOOD PRESSURE: 134 MMHG

## 2019-09-18 DIAGNOSIS — R23.4 SCAB: Primary | ICD-10-CM

## 2019-09-18 PROCEDURE — 99213 OFFICE O/P EST LOW 20 MIN: CPT | Mod: S$PBB,,, | Performed by: INTERNAL MEDICINE

## 2019-09-18 PROCEDURE — 99213 PR OFFICE/OUTPT VISIT, EST, LEVL III, 20-29 MIN: ICD-10-PCS | Mod: S$PBB,,, | Performed by: INTERNAL MEDICINE

## 2019-09-18 PROCEDURE — 99999 PR PBB SHADOW E&M-EST. PATIENT-LVL V: ICD-10-PCS | Mod: PBBFAC,,, | Performed by: INTERNAL MEDICINE

## 2019-09-18 PROCEDURE — 99999 PR PBB SHADOW E&M-EST. PATIENT-LVL V: CPT | Mod: PBBFAC,,, | Performed by: INTERNAL MEDICINE

## 2019-09-18 PROCEDURE — 99215 OFFICE O/P EST HI 40 MIN: CPT | Mod: PBBFAC,PN | Performed by: INTERNAL MEDICINE

## 2019-09-18 RX ORDER — MULTIVITAMIN
1 TABLET ORAL DAILY
COMMUNITY
End: 2020-01-29

## 2019-09-18 NOTE — PROGRESS NOTES
INTERNAL MEDICINE    Patient Active Problem List   Diagnosis    Primary localized osteoarthrosis, ankle and foot    Hypertension    Anxiety    Unilateral femoral hernia with obstruction, without gangrene    Chronic atrial fibrillation    Acute on chronic diastolic (congestive) heart failure       CC:   Chief Complaint   Patient presents with    Insect Bite     right foot swollen and is red and hot       SUBJECTIVE:  Mary Ellen Miller   is a 89 y.o. female  HPI   Here in follow up for what is thought to have an insect bite. She had an eschar, non painful. It was measured at 3/4 long, 5/8 wide.  No fever. No pain. Full ROM.      ROS: Review of Systems   Constitutional: Negative for activity change, appetite change, chills, diaphoresis, fatigue and fever.   Musculoskeletal: Negative for gait problem and myalgias.   Skin: Positive for wound. Negative for color change, pallor and rash.   Neurological: Negative for weakness.   Psychiatric/Behavioral: Negative for sleep disturbance.       Past Medical History:   Diagnosis Date    Anxiety     Atrial fibrillation     Hypertension        Past Surgical History:   Procedure Laterality Date    HYSTERECTOMY      LUNG SURGERY  1988    OOPHORECTOMY      RADICAL HYSTERECTOMY  1990       History reviewed. No pertinent family history.    Social History     Tobacco Use    Smoking status: Never Smoker    Smokeless tobacco: Never Used   Substance Use Topics    Alcohol use: No    Drug use: No       Social History     Social History Narrative    Not on file       ALLERGIES:   Review of patient's allergies indicates:   Allergen Reactions    Percodan [oxycodone hcl-oxycodone-asa] Other (See Comments)     halucinations    Penicillins Itching    Sulfa (sulfonamide antibiotics) Diarrhea and Nausea Only    Amoxicillin        MEDS:   Current Outpatient Medications:     ascorbic acid (VITAMIN C) 500 MG tablet, Take 500 mg by mouth once daily., Disp: , Rfl:      "atenolol (TENORMIN) 50 MG tablet, Take 50 mg by mouth 2 (two) times daily., Disp: , Rfl: 10    biotin 5 mg Tab, Take 500 mg by mouth once daily. , Disp: , Rfl:     calcipotriene (DOVONOX) 0.005 % cream, , Disp: , Rfl:     calcium carbonate (OS-ZOILA) 600 mg (1,500 mg) Tab, Take 600 mg by mouth 2 (two) times daily with meals., Disp: , Rfl:     ELIQUIS 5 mg Tab, Take 5 mg by mouth 2 (two) times daily., Disp: , Rfl: 11    escitalopram oxalate (LEXAPRO) 10 MG tablet, TAKE 1 TABLET BY MOUTH EVERY DAY, Disp: 90 tablet, Rfl: 1    furosemide (LASIX) 40 MG tablet, Take 40 mg by mouth once daily. , Disp: , Rfl: 6    hydrALAZINE (APRESOLINE) 50 MG tablet, Take 50 mg by mouth 2 (two) times daily., Disp: , Rfl: 10    multivitamin (ONE DAILY MULTIVITAMIN) per tablet, Take 1 tablet by mouth once daily., Disp: , Rfl:     spironolactone (ALDACTONE) 25 MG tablet, Take 25 mg by mouth once daily., Disp: , Rfl: 6    vitamin D 1000 units Tab, Take 2,000 mg by mouth once daily., Disp: , Rfl:     vitamin E 1000 UNIT capsule, Take 1,000 Units by mouth once daily., Disp: , Rfl:     apixaban (ELIQUIS ORAL), Take 1 tablet by mouth 3 (three) times daily., Disp: , Rfl:     hydrALAZINE (APRESOLINE) 25 MG tablet, TAKE 1 TABLET BY MOUTH 2 TIMES A DAY WITH FOOD, Disp: 180 tablet, Rfl: 1    ibandronate (BONIVA) 150 mg tablet, TAKE 1 TABLET (150 MG TOTAL) BY MOUTH EVERY 30 DAYS., Disp: , Rfl: 11    multivitamin (THERAGRAN) tablet, Take 1 tablet by mouth once daily., Disp: , Rfl:     mupirocin (BACTROBAN) 2 % ointment, Apply to affected area 3 times daily, Disp: 22 g, Rfl: 0    OBJECTIVE:   Vitals:    09/18/19 1503   BP: 134/72   BP Location: Left arm   Patient Position: Sitting   BP Method: X-Large (Manual)   Pulse: 86   Resp: 16   Temp: 97.7 °F (36.5 °C)   TempSrc: Oral   SpO2: 96%   Weight: 62.2 kg (137 lb 1.6 oz)   Height: 5' 3" (1.6 m)     Body mass index is 24.29 kg/m².    Physical Exam   Constitutional: She is oriented to person, " place, and time. She appears well-developed and well-nourished.   HENT:   Head: Normocephalic and atraumatic.   Eyes: Conjunctivae are normal.   Neck: Neck supple.   Cardiovascular: Normal rate, regular rhythm and normal heart sounds.   Pulmonary/Chest: Effort normal and breath sounds normal.   Musculoskeletal: She exhibits no edema.   Neurological: She is alert and oriented to person, place, and time.   Skin: Skin is warm and dry.   Eschar lower leg. No cellulitis.  No drainage. No tenderness.   Psychiatric: She has a normal mood and affect. Her behavior is normal.   Nursing note and vitals reviewed.    No lymphadenopathy in groin.    PERTINENT RESULTS:   CBC:  No results for input(s): WBC, RBC, HGB, HCT, PLT, MCV, MCH, MCHC in the last 2160 hours.  CMP:  No results for input(s): GLU, CALCIUM, ALBUMIN, PROT, NA, K, CO2, CL, BUN, ALKPHOS, ALT, AST, BILITOT in the last 2160 hours.    Invalid input(s): CREATININ  URINALYSIS:  No results for input(s): COLORU, CLARITYU, SPECGRAV, PHUR, PROTEINUA, GLUCOSEU, BILIRUBINCON, BLOODU, WBCU, RBCU, BACTERIA, MUCUS, NITRITE, LEUKOCYTESUR, UROBILINOGEN, HYALINECASTS in the last 2160 hours.   LIPIDS:  No results for input(s): TSH, HDL, CHOL, TRIG, LDLCALC, CHOLHDL, NONHDLCHOL, TOTALCHOLEST in the last 2160 hours.          ASSESSMENT:  Problem List Items Addressed This Visit     None      Visit Diagnoses     Scab    -  Primary      Keep area clean, use bandaide.    PLAN:      No orders of the defined types were placed in this encounter.  May continue to use the Bactroban if any change or systemic symptoms go to the ER.    Follow-up with me in as usual..   Dr. Ania Barton  Internal Medicine

## 2019-09-23 ENCOUNTER — OFFICE VISIT (OUTPATIENT)
Dept: INTERNAL MEDICINE | Facility: CLINIC | Age: 84
End: 2019-09-23
Payer: MEDICARE

## 2019-09-23 VITALS
WEIGHT: 138.19 LBS | OXYGEN SATURATION: 96 % | HEART RATE: 70 BPM | HEIGHT: 63 IN | DIASTOLIC BLOOD PRESSURE: 60 MMHG | SYSTOLIC BLOOD PRESSURE: 120 MMHG | BODY MASS INDEX: 24.48 KG/M2 | TEMPERATURE: 98 F | RESPIRATION RATE: 16 BRPM

## 2019-09-23 DIAGNOSIS — Z87.828: Primary | ICD-10-CM

## 2019-09-23 PROCEDURE — 99213 PR OFFICE/OUTPT VISIT, EST, LEVL III, 20-29 MIN: ICD-10-PCS | Mod: S$PBB,,, | Performed by: INTERNAL MEDICINE

## 2019-09-23 PROCEDURE — 99213 OFFICE O/P EST LOW 20 MIN: CPT | Mod: S$PBB,,, | Performed by: INTERNAL MEDICINE

## 2019-09-23 PROCEDURE — 99999 PR PBB SHADOW E&M-EST. PATIENT-LVL IV: CPT | Mod: PBBFAC,,, | Performed by: INTERNAL MEDICINE

## 2019-09-23 PROCEDURE — 99999 PR PBB SHADOW E&M-EST. PATIENT-LVL IV: ICD-10-PCS | Mod: PBBFAC,,, | Performed by: INTERNAL MEDICINE

## 2019-09-23 PROCEDURE — 99214 OFFICE O/P EST MOD 30 MIN: CPT | Mod: PBBFAC,PN | Performed by: INTERNAL MEDICINE

## 2019-09-23 NOTE — PROGRESS NOTES
INTERNAL MEDICINE    Patient Active Problem List   Diagnosis    Primary localized osteoarthrosis, ankle and foot    Hypertension    Anxiety    Unilateral femoral hernia with obstruction, without gangrene    Chronic atrial fibrillation    Acute on chronic diastolic (congestive) heart failure       CC:   Chief Complaint   Patient presents with    Lesion     follow up lesion on right leg       SUBJECTIVE:  Mary Ellen Miller   is a 89 y.o. female  HPI   89y/oWF here to have a follow up on a lesion on the anterior tibial area of her right leg.  This looked initially like an insect bite, which had developed into a scab, with a cellulitic component surrounding it.  She has had no fever,chills or other constitutional sypoms.  It is not painful.    ROS: Review of Systems   Constitutional: Negative.    Respiratory: Negative.    Cardiovascular: Negative.    Gastrointestinal: Negative.    Skin: Positive for wound.       Past Medical History:   Diagnosis Date    Anxiety     Atrial fibrillation     Hypertension        Past Surgical History:   Procedure Laterality Date    HYSTERECTOMY      INJECTION Right 2/25/2014    Performed by [facility] C[facility]    INJECTION FOOT, RIGHT TALONAVICULAR JOINT Right 3/12/2015    Performed by [facility] CATH LAB    INJECTION- FOOT TALONAVICULAR JOINT Right 9/16/2014    Performed by [facility] CATH [facility]    LUNG SURGERY  1988    OOPHORECTOMY      RADICAL HYSTERECTOMY  1990       History reviewed. No pertinent family history.    Social History     Tobacco Use    Smoking status: Never Smoker    Smokeless tobacco: Never Used   Substance Use Topics    Alcohol use: No    Drug use: No       Social History     Social History Narrative    Not on file       ALLERGIES:   Review of patient's allergies indicates:   Allergen Reactions    Percodan [oxycodone hcl-oxycodone-asa] Other (See Comments)     halucinations     Penicillins Itching    Sulfa (sulfonamide antibiotics) Diarrhea and Nausea Only    Amoxicillin        MEDS:   Current Outpatient Medications:     apixaban (ELIQUIS ORAL), Take 1 tablet by mouth 3 (three) times daily., Disp: , Rfl:     ascorbic acid (VITAMIN C) 500 MG tablet, Take 500 mg by mouth once daily., Disp: , Rfl:     atenolol (TENORMIN) 50 MG tablet, Take 50 mg by mouth 2 (two) times daily., Disp: , Rfl: 10    biotin 5 mg Tab, Take 500 mg by mouth once daily. , Disp: , Rfl:     calcipotriene (DOVONOX) 0.005 % cream, , Disp: , Rfl:     calcium carbonate (OS-ZOILA) 600 mg (1,500 mg) Tab, Take 600 mg by mouth 2 (two) times daily with meals., Disp: , Rfl:     ELIQUIS 5 mg Tab, Take 5 mg by mouth 2 (two) times daily., Disp: , Rfl: 11    escitalopram oxalate (LEXAPRO) 10 MG tablet, TAKE 1 TABLET BY MOUTH EVERY DAY, Disp: 90 tablet, Rfl: 1    furosemide (LASIX) 40 MG tablet, Take 40 mg by mouth once daily. , Disp: , Rfl: 6    hydrALAZINE (APRESOLINE) 25 MG tablet, TAKE 1 TABLET BY MOUTH 2 TIMES A DAY WITH FOOD, Disp: 180 tablet, Rfl: 1    hydrALAZINE (APRESOLINE) 50 MG tablet, Take 50 mg by mouth 2 (two) times daily., Disp: , Rfl: 10    ibandronate (BONIVA) 150 mg tablet, TAKE 1 TABLET (150 MG TOTAL) BY MOUTH EVERY 30 DAYS., Disp: , Rfl: 11    multivitamin (ONE DAILY MULTIVITAMIN) per tablet, Take 1 tablet by mouth once daily., Disp: , Rfl:     multivitamin (THERAGRAN) tablet, Take 1 tablet by mouth once daily., Disp: , Rfl:     mupirocin (BACTROBAN) 2 % ointment, Apply to affected area 3 times daily, Disp: 22 g, Rfl: 0    spironolactone (ALDACTONE) 25 MG tablet, Take 25 mg by mouth once daily., Disp: , Rfl: 6    vitamin D 1000 units Tab, Take 2,000 mg by mouth once daily., Disp: , Rfl:     vitamin E 1000 UNIT capsule, Take 1,000 Units by mouth once daily., Disp: , Rfl:     OBJECTIVE:   Vitals:    09/23/19 0837   BP: 120/60   BP Location: Left arm   Patient Position: Sitting   BP Method: X-Large  "(Manual)   Pulse: 70   Resp: 16   Temp: 97.6 °F (36.4 °C)   TempSrc: Oral   SpO2: 96%   Weight: 62.7 kg (138 lb 3.2 oz)   Height: 5' 3" (1.6 m)     Body mass index is 24.48 kg/m².    Physical Exam   Constitutional: She appears well-developed and well-nourished.   HENT:   Head: Normocephalic and atraumatic.   Cardiovascular: Normal rate, regular rhythm and normal heart sounds.   Pulmonary/Chest: Effort normal and breath sounds normal.   Neurological: She is alert.   Skin:        Dime sized scab, no cellulitis, no drainage.  Healing with secondary intention.   Psychiatric: She has a normal mood and affect. Her behavior is normal.   Nursing note and vitals reviewed.        PERTINENT RESULTS:   CBC:  No results for input(s): WBC, RBC, HGB, HCT, PLT, MCV, MCH, MCHC in the last 2160 hours.  CMP:  No results for input(s): GLU, CALCIUM, ALBUMIN, PROT, NA, K, CO2, CL, BUN, ALKPHOS, ALT, AST, BILITOT in the last 2160 hours.    Invalid input(s): CREATININ  URINALYSIS:  No results for input(s): COLORU, CLARITYU, SPECGRAV, PHUR, PROTEINUA, GLUCOSEU, BILIRUBINCON, BLOODU, WBCU, RBCU, BACTERIA, MUCUS, NITRITE, LEUKOCYTESUR, UROBILINOGEN, HYALINECASTS in the last 2160 hours.   LIPIDS:  No results for input(s): TSH, HDL, CHOL, TRIG, LDLCALC, CHOLHDL, NONHDLCHOL, TOTALCHOLEST in the last 2160 hours.          ASSESSMENT:  Problem List Items Addressed This Visit     None      Visit Diagnoses     Hx of insect bite    -  Primary      Healing.    PLAN:      No orders of the defined types were placed in this encounter.  Keep area clean.  Place Band-Aid over it when she goes into garden or outside.      Follow-up with me in prn.   Dr. Ania Barton  Internal Medicine    "

## 2019-10-01 LAB
CHOLEST SERPL-MSCNC: 157 MG/DL (ref 0–200)
HDL/CHOLESTEROL RATIO: 3.7
HDLC SERPL-MCNC: 43 MG/DL
LDL CHOLESTEROL DIRECT: 118 MG/DL
NON HDL CHOL (CALC): 114
TRIGL SERPL-MCNC: 90 MG/DL
VLDL CHOLESTEROL: 18 MG/DL

## 2019-10-07 ENCOUNTER — OFFICE VISIT (OUTPATIENT)
Dept: INTERNAL MEDICINE | Facility: CLINIC | Age: 84
End: 2019-10-07
Payer: MEDICARE

## 2019-10-07 ENCOUNTER — PATIENT OUTREACH (OUTPATIENT)
Dept: ADMINISTRATIVE | Facility: OTHER | Age: 84
End: 2019-10-07

## 2019-10-07 VITALS
DIASTOLIC BLOOD PRESSURE: 80 MMHG | WEIGHT: 138.69 LBS | OXYGEN SATURATION: 96 % | BODY MASS INDEX: 24.57 KG/M2 | RESPIRATION RATE: 16 BRPM | HEART RATE: 79 BPM | HEIGHT: 63 IN | SYSTOLIC BLOOD PRESSURE: 132 MMHG | TEMPERATURE: 98 F

## 2019-10-07 DIAGNOSIS — L97.919 CHRONIC ULCER OF LOWER EXTREMITY, RIGHT, WITH UNSPECIFIED SEVERITY: Primary | ICD-10-CM

## 2019-10-07 PROCEDURE — 99213 OFFICE O/P EST LOW 20 MIN: CPT | Mod: PBBFAC,PN | Performed by: INTERNAL MEDICINE

## 2019-10-07 PROCEDURE — 99999 PR PBB SHADOW E&M-EST. PATIENT-LVL III: ICD-10-PCS | Mod: PBBFAC,,, | Performed by: INTERNAL MEDICINE

## 2019-10-07 PROCEDURE — 99213 PR OFFICE/OUTPT VISIT, EST, LEVL III, 20-29 MIN: ICD-10-PCS | Mod: S$PBB,,, | Performed by: INTERNAL MEDICINE

## 2019-10-07 PROCEDURE — 99999 PR PBB SHADOW E&M-EST. PATIENT-LVL III: CPT | Mod: PBBFAC,,, | Performed by: INTERNAL MEDICINE

## 2019-10-07 PROCEDURE — 99213 OFFICE O/P EST LOW 20 MIN: CPT | Mod: S$PBB,,, | Performed by: INTERNAL MEDICINE

## 2019-10-07 NOTE — PROGRESS NOTES
"INTERNAL MEDICINE    Patient Active Problem List   Diagnosis    Primary localized osteoarthrosis, ankle and foot    Hypertension    Anxiety    Unilateral femoral hernia with obstruction, without gangrene    Chronic atrial fibrillation    Acute on chronic diastolic (congestive) heart failure       CC:   Chief Complaint   Patient presents with    Leg Pain     shin right leg above scab       SUBJECTIVE:  Mary Ellen Miller   is a 89 y.o. female  HPI   89y/oWF came in because to her, the scab on her right shin looks worse. She did not put any Neosporin or Bactroban on it.  It is not tender, but she feels that her " shin" is a little tender. This wound has not changed in several weeks despite antibiotics by mouth and wound care.  She denies any constitutional symptoms.    ROS: Review of Systems   Constitutional: Negative for appetite change, chills, diaphoresis, fatigue and fever.   HENT: Negative.    Respiratory: Negative for cough, shortness of breath and wheezing.    Cardiovascular: Negative for chest pain, palpitations and leg swelling.        Denies claudication   Skin: Positive for wound. Negative for color change, pallor and rash.       Past Medical History:   Diagnosis Date    Anxiety     Atrial fibrillation     Hypertension        Past Surgical History:   Procedure Laterality Date    HYSTERECTOMY      LUNG SURGERY  1988    OOPHORECTOMY      RADICAL HYSTERECTOMY  1990       History reviewed. No pertinent family history.    Social History     Tobacco Use    Smoking status: Never Smoker    Smokeless tobacco: Never Used   Substance Use Topics    Alcohol use: No    Drug use: No       Social History     Social History Narrative    Not on file       ALLERGIES:   Review of patient's allergies indicates:   Allergen Reactions    Percodan [oxycodone hcl-oxycodone-asa] Other (See Comments)     halucinations    Penicillins Itching    Sulfa (sulfonamide antibiotics) Diarrhea and Nausea Only    " Amoxicillin        MEDS:   Current Outpatient Medications:     apixaban (ELIQUIS ORAL), Take 1 tablet by mouth 3 (three) times daily., Disp: , Rfl:     ascorbic acid (VITAMIN C) 500 MG tablet, Take 500 mg by mouth once daily., Disp: , Rfl:     atenolol (TENORMIN) 50 MG tablet, Take 50 mg by mouth 2 (two) times daily., Disp: , Rfl: 10    biotin 5 mg Tab, Take 500 mg by mouth once daily. , Disp: , Rfl:     calcipotriene (DOVONOX) 0.005 % cream, , Disp: , Rfl:     calcium carbonate (OS-ZOILA) 600 mg (1,500 mg) Tab, Take 600 mg by mouth 2 (two) times daily with meals., Disp: , Rfl:     ELIQUIS 5 mg Tab, Take 5 mg by mouth 2 (two) times daily., Disp: , Rfl: 11    escitalopram oxalate (LEXAPRO) 10 MG tablet, TAKE 1 TABLET BY MOUTH EVERY DAY, Disp: 90 tablet, Rfl: 1    furosemide (LASIX) 40 MG tablet, Take 40 mg by mouth once daily. , Disp: , Rfl: 6    hydrALAZINE (APRESOLINE) 25 MG tablet, TAKE 1 TABLET BY MOUTH 2 TIMES A DAY WITH FOOD, Disp: 180 tablet, Rfl: 1    ibandronate (BONIVA) 150 mg tablet, TAKE 1 TABLET (150 MG TOTAL) BY MOUTH EVERY 30 DAYS., Disp: , Rfl: 11    multivit,calc,mins/iron/folic (ONE-A-DAY WOMENS FORMULA ORAL), Take 1 tablet by mouth once daily., Disp: , Rfl:     multivitamin (ONE DAILY MULTIVITAMIN) per tablet, Take 1 tablet by mouth once daily., Disp: , Rfl:     mupirocin (BACTROBAN) 2 % ointment, Apply to affected area 3 times daily, Disp: 22 g, Rfl: 0    spironolactone (ALDACTONE) 25 MG tablet, Take 25 mg by mouth once daily., Disp: , Rfl: 6    vitamin D 1000 units Tab, Take 2,000 mg by mouth once daily., Disp: , Rfl:     vitamin E 1000 UNIT capsule, Take 1,000 Units by mouth once daily., Disp: , Rfl:     hydrALAZINE (APRESOLINE) 50 MG tablet, Take 50 mg by mouth 2 (two) times daily., Disp: , Rfl: 10    multivitamin (THERAGRAN) tablet, Take 1 tablet by mouth once daily., Disp: , Rfl:     OBJECTIVE:   Vitals:    10/07/19 1136   BP: 132/80   BP Location: Left arm   Patient  "Position: Sitting   BP Method: X-Large (Manual)   Pulse: 79   Resp: 16   Temp: 97.7 °F (36.5 °C)   TempSrc: Oral   SpO2: 96%   Weight: 62.9 kg (138 lb 11.2 oz)   Height: 5' 3" (1.6 m)     Body mass index is 24.57 kg/m².    Physical Exam   Constitutional: She is oriented to person, place, and time. She appears well-developed and well-nourished.   HENT:   Head: Normocephalic and atraumatic.   Musculoskeletal: She exhibits no edema, tenderness or deformity.   Neurological: She is alert and oriented to person, place, and time.   Skin: Skin is warm and dry.   1 inch long scab, no surrounding cellulitis,fluctuance,drainage. No tenderness. Not warm.   Nursing note and vitals reviewed.        PERTINENT RESULTS:   CBC:  No results for input(s): WBC, RBC, HGB, HCT, PLT, MCV, MCH, MCHC in the last 2160 hours.  CMP:  No results for input(s): GLU, CALCIUM, ALBUMIN, PROT, NA, K, CO2, CL, BUN, ALKPHOS, ALT, AST, BILITOT in the last 2160 hours.    Invalid input(s): CREATININ  URINALYSIS:  No results for input(s): COLORU, CLARITYU, SPECGRAV, PHUR, PROTEINUA, GLUCOSEU, BILIRUBINCON, BLOODU, WBCU, RBCU, BACTERIA, MUCUS, NITRITE, LEUKOCYTESUR, UROBILINOGEN, HYALINECASTS in the last 2160 hours.   LIPIDS:  No results for input(s): TSH, HDL, CHOL, TRIG, LDLCALC, CHOLHDL, NONHDLCHOL, TOTALCHOLEST in the last 2160 hours.          ASSESSMENT:  Problem List Items Addressed This Visit     None      Visit Diagnoses     Chronic ulcer of lower extremity, right, with unspecified severity    -  Primary    Relevant Orders    Ambulatory referral to General Surgery      Worry : vascular, insect bite with undermining necrosis, carcinoma.    PLAN:   Orders Placed This Encounter    Ambulatory referral to General Surgery     Orders Placed This Encounter   Procedures    Ambulatory referral to General Surgery     Referral Priority:   Routine     Referral Type:   Surgical     Referral Reason:   Specialty Services Required     Requested Specialty:   General " Surgery     Number of Visits Requested:   1       Follow-up with me in prn.   Dr. Ania Barton  Internal Medicine

## 2019-10-08 ENCOUNTER — OFFICE VISIT (OUTPATIENT)
Dept: SURGERY | Facility: CLINIC | Age: 84
End: 2019-10-08
Payer: MEDICARE

## 2019-10-08 VITALS
DIASTOLIC BLOOD PRESSURE: 84 MMHG | HEART RATE: 65 BPM | TEMPERATURE: 97 F | SYSTOLIC BLOOD PRESSURE: 132 MMHG | OXYGEN SATURATION: 96 % | WEIGHT: 138.69 LBS | HEIGHT: 63 IN | BODY MASS INDEX: 24.57 KG/M2

## 2019-10-08 DIAGNOSIS — S81.801A UNSPECIFIED OPEN WOUND, RIGHT LOWER LEG, INITIAL ENCOUNTER: Primary | ICD-10-CM

## 2019-10-08 PROCEDURE — 99203 PR OFFICE/OUTPT VISIT, NEW, LEVL III, 30-44 MIN: ICD-10-PCS | Mod: S$PBB,,, | Performed by: SURGERY

## 2019-10-08 PROCEDURE — 99999 PR PBB SHADOW E&M-EST. PATIENT-LVL III: CPT | Mod: PBBFAC,,, | Performed by: SURGERY

## 2019-10-08 PROCEDURE — 99213 OFFICE O/P EST LOW 20 MIN: CPT | Mod: PBBFAC,PN | Performed by: SURGERY

## 2019-10-08 PROCEDURE — 99203 OFFICE O/P NEW LOW 30 MIN: CPT | Mod: S$PBB,,, | Performed by: SURGERY

## 2019-10-08 PROCEDURE — 99999 PR PBB SHADOW E&M-EST. PATIENT-LVL III: ICD-10-PCS | Mod: PBBFAC,,, | Performed by: SURGERY

## 2019-10-08 NOTE — H&P
[facility]  OUTPATIENT H&P    REASON FOR VISIT/CC:  Right lower leg wound    HPI: Mary Ellen Miller is a 89 y.o. female who presents for evaluation of a chronic right lower leg wound.  Patient developed a wound approximately 5 weeks ago.  She is uncertain of the etiology of the wound. She thought possibly she had been bitten by an ant then was later told she could been bitten by a spider.  The wound has been slow to heal.  She has been prescribed topical antibiotic ointment which she has used in the past but is not currently using.  She has pain surrounding the wound in feels like he does caused her skin to change some collar.  She does have chronic swelling of both legs and chronic pain in the right ankle.  She reports that the swelling in her leg is at baseline though she has recently been admitted for severe worsening in May.  She is otherwise active and travels frequently and gardens on a regular basis.  She denies fevers, chills, significant drainage from the wound.    I have reviewed the patient's chart including prior progress notes, procedures and testing. The patient has rate controlled AFib and is on Eliquis.    ROS:   Review of Systems   Constitutional: Negative for activity change, chills and fever.   HENT: Negative for congestion, nosebleeds and trouble swallowing.    Eyes: Negative for photophobia, discharge and visual disturbance.   Respiratory: Negative for apnea, chest tightness and shortness of breath.    Cardiovascular: Positive for palpitations (Occasional) and leg swelling ( chronic bilateral lower extremity edema, right greater than left). Negative for chest pain.   Gastrointestinal: Negative for abdominal distention, abdominal pain, nausea and vomiting.   Genitourinary: Negative for difficulty urinating, dysuria and hematuria.   Musculoskeletal: Positive for arthralgias ( right ankle, chronic), joint swelling and myalgias. Negative for gait problem, neck pain and neck  stiffness.   Skin: Positive for wound ( right lower leg). Negative for color change, pallor and rash.   Neurological: Negative for seizures, syncope and light-headedness.   Psychiatric/Behavioral: Negative for agitation, behavioral problems and confusion.       PROBLEM LIST:  Patient Active Problem List   Diagnosis    Primary localized osteoarthrosis, ankle and foot    Hypertension    Anxiety    Unilateral femoral hernia with obstruction, without gangrene    Chronic atrial fibrillation    Acute on chronic diastolic (congestive) heart failure         HISTORY  Past Medical History:   Diagnosis Date    Anxiety     Atrial fibrillation     Colon polyps 05/01/2012    Hypertension        Past Surgical History:   Procedure Laterality Date    HYSTERECTOMY      LUNG SURGERY  1988    OOPHORECTOMY      RADICAL HYSTERECTOMY  1990       Social History     Tobacco Use    Smoking status: Never Smoker    Smokeless tobacco: Never Used   Substance Use Topics    Alcohol use: No    Drug use: No       History reviewed. No pertinent family history.      MEDS:  Current Outpatient Medications on File Prior to Visit   Medication Sig Dispense Refill    apixaban (ELIQUIS ORAL) Take 1 tablet by mouth 3 (three) times daily.      ascorbic acid (VITAMIN C) 500 MG tablet Take 500 mg by mouth once daily.      atenolol (TENORMIN) 50 MG tablet Take 50 mg by mouth 2 (two) times daily.  10    biotin 5 mg Tab Take 500 mg by mouth once daily.       calcipotriene (DOVONOX) 0.005 % cream       calcium carbonate (OS-ZOILA) 600 mg (1,500 mg) Tab Take 600 mg by mouth 2 (two) times daily with meals.      ELIQUIS 5 mg Tab Take 5 mg by mouth 2 (two) times daily.  11    escitalopram oxalate (LEXAPRO) 10 MG tablet TAKE 1 TABLET BY MOUTH EVERY DAY 90 tablet 1    furosemide (LASIX) 40 MG tablet Take 40 mg by mouth once daily.   6    hydrALAZINE (APRESOLINE) 25 MG tablet TAKE 1 TABLET BY MOUTH 2 TIMES A DAY WITH FOOD 180 tablet 1     hydrALAZINE (APRESOLINE) 50 MG tablet Take 50 mg by mouth 2 (two) times daily.  10    ibandronate (BONIVA) 150 mg tablet TAKE 1 TABLET (150 MG TOTAL) BY MOUTH EVERY 30 DAYS.  11    multivit,calc,mins/iron/folic (ONE-A-DAY WOMENS FORMULA ORAL) Take 1 tablet by mouth once daily.      multivitamin (ONE DAILY MULTIVITAMIN) per tablet Take 1 tablet by mouth once daily.      multivitamin (THERAGRAN) tablet Take 1 tablet by mouth once daily.      mupirocin (BACTROBAN) 2 % ointment Apply to affected area 3 times daily 22 g 0    spironolactone (ALDACTONE) 25 MG tablet Take 25 mg by mouth once daily.  6    vitamin D 1000 units Tab Take 2,000 mg by mouth once daily.      vitamin E 1000 UNIT capsule Take 1,000 Units by mouth once daily.       No current facility-administered medications on file prior to visit.        ALLERGIES:  Review of patient's allergies indicates:   Allergen Reactions    Percodan [oxycodone hcl-oxycodone-asa] Other (See Comments)     halucinations    Penicillins Itching    Sulfa (sulfonamide antibiotics) Diarrhea and Nausea Only    Amoxicillin          VITALS:  Vitals:    10/08/19 1059   BP: 132/84   Pulse: 65   Temp: 97.4 °F (36.3 °C)         PHYSICAL EXAM:  Physical Exam   Constitutional: She is oriented to person, place, and time. She appears well-developed and well-nourished. No distress.   HENT:   Head: Normocephalic and atraumatic.   Nose: Nose normal.   Eyes: Conjunctivae and EOM are normal. No scleral icterus.   Neck: Normal range of motion. Neck supple. No tracheal tenderness present. No tracheal deviation present.   Cardiovascular: Normal rate and intact distal pulses.   Rate controlled AFib   Pulmonary/Chest: Effort normal and breath sounds normal. No accessory muscle usage or stridor. No respiratory distress.   Abdominal: Soft. Normal appearance. She exhibits no distension, no ascites and no mass. There is no tenderness. There is no rebound. No hernia.   Musculoskeletal: Normal  range of motion. She exhibits edema ( bilateral lower extremity, right greater than left). She exhibits no deformity.        Legs:  Neurological: She is alert and oriented to person, place, and time. She exhibits normal muscle tone.   Skin: Skin is warm and dry. No rash noted. She is not diaphoretic. No erythema.   Psychiatric: She has a normal mood and affect. Her behavior is normal. Judgment and thought content normal.   Vitals reviewed.            LABS:  Lab Results   Component Value Date    WBC 5.01 05/09/2019    RBC 4.22 05/09/2019    HGB 12.7 05/09/2019    HCT 39.5 05/09/2019     05/09/2019     Lab Results   Component Value Date    GLU 91 05/10/2019     05/10/2019    K 3.0 (L) 05/10/2019     05/10/2019    CO2 31 (H) 05/10/2019    BUN 29 (H) 05/10/2019    CREATININE 0.93 05/10/2019    CALCIUM 8.5 (L) 05/10/2019     Lab Results   Component Value Date    ALT 25 05/08/2019    AST 34 05/08/2019    ALKPHOS 43 05/08/2019    BILITOT 1.3 (H) 05/08/2019     Lab Results   Component Value Date    MG 2.2 05/08/2019    PHOS 4.1 05/08/2019       STUDIES:  Right lower leg x-ray images and reports were personally reviewed.  FINDINGS:  Two views right tibia fibula.    No acute displaced fracture or dislocation of the tibia or fibula.  No radiopaque foreign body.  Degenerative changes are noted of the foot and knee.      Impression       1. No convincing acute displaced fracture or dislocation of the tibia or fibula.           ASSESSMENT & PLAN:  89 y.o. female with chronic open wound to the right lower leg  - on exam today the right lower leg wound has a dry eschar with evidence of healing along the lateral borders, there is no evidence of infection, no evidence of underlying fluid collection, fluctuance, mass or necrosis  - at this time there is no indication for debridement or biopsy  - recommend continued conservative management with local wound care  - since the dry eschars present recs no overlying  ointment or cream, patient has been instructed to wash wound daily with soap and water and leave the eschar in place, can bandage to prevent during of the wound but otherwise can keep open, explained the patient that as he underlying skin heals eschar should fall off spontaneously  - patient expressed understanding of the above plan for wound care and will follow up in 3-4 weeks for wound check.  - patient is directed to call with any questions or concerns

## 2019-10-08 NOTE — LETTER
October 8, 2019      Ania Barton MD  [address]  [address]   [facility] [address]           [facility]  [address]  [facility] [address]  Phone: [phone]  Fax: [phone]          Patient: Mary Ellen Miller   MR Number: [id number]   YOB: 1930   Date of Visit: 10/8/2019       Dear Dr. Ania Barton:    Thank you for referring Mary Ellen Miller to me for evaluation. Attached you will find relevant portions of my assessment and plan of care.    If you have questions, please do not hesitate to call me. I look forward to following Mary Ellen Miller along with you.    Sincerely,    Hunetr March Jr., MD    Enclosure  CC:  No Recipients    If you would like to receive this communication electronically, please contact [email or web address] or [phone] to request more information on [employer] Link access.    For providers and/or their staff who would like to refer a patient to [facility], please contact us through our one-stop-shop provider referral line, [facility], at [phone].    If you feel you have received this communication in error or would no longer like to receive these types of communications, please e-mail [email or web address]

## 2019-10-14 ENCOUNTER — TELEPHONE (OUTPATIENT)
Dept: SURGERY | Facility: CLINIC | Age: 84
End: 2019-10-14

## 2019-10-14 NOTE — TELEPHONE ENCOUNTER
----- Message from Kecia Petty sent at 10/14/2019 11:37 AM CDT -----  Contact: [phone]/self  Patient is requesting to speak with you regarding her appt. She states Dr. March told her it was a three week f/u. Please advise.      10/14/19  1:27pm  Spoke to patient regarding above message. Patient requested to be seen in 3 weeks for f/u instead of 5 weeks. Patient scheduled for 10/29. Verbalized understanding.

## 2019-10-22 ENCOUNTER — OFFICE VISIT (OUTPATIENT)
Dept: INTERNAL MEDICINE | Facility: CLINIC | Age: 84
End: 2019-10-22
Payer: MEDICARE

## 2019-10-22 VITALS
WEIGHT: 139.69 LBS | HEART RATE: 65 BPM | HEIGHT: 63 IN | TEMPERATURE: 98 F | DIASTOLIC BLOOD PRESSURE: 80 MMHG | SYSTOLIC BLOOD PRESSURE: 150 MMHG | BODY MASS INDEX: 24.75 KG/M2 | OXYGEN SATURATION: 93 %

## 2019-10-22 DIAGNOSIS — Z23 NEED FOR PNEUMOCOCCAL VACCINATION: ICD-10-CM

## 2019-10-22 DIAGNOSIS — Z12.31 ENCOUNTER FOR SCREENING MAMMOGRAM FOR BREAST CANCER: ICD-10-CM

## 2019-10-22 DIAGNOSIS — R23.4 ESCHAR OF LOWER LEG: Primary | ICD-10-CM

## 2019-10-22 PROCEDURE — 99213 PR OFFICE/OUTPT VISIT, EST, LEVL III, 20-29 MIN: ICD-10-PCS | Mod: S$PBB,,, | Performed by: INTERNAL MEDICINE

## 2019-10-22 PROCEDURE — 99999 PR PBB SHADOW E&M-EST. PATIENT-LVL V: ICD-10-PCS | Mod: PBBFAC,,, | Performed by: INTERNAL MEDICINE

## 2019-10-22 PROCEDURE — G0009 ADMIN PNEUMOCOCCAL VACCINE: HCPCS | Mod: PBBFAC,PN

## 2019-10-22 PROCEDURE — 99215 OFFICE O/P EST HI 40 MIN: CPT | Mod: PBBFAC,PN,25 | Performed by: INTERNAL MEDICINE

## 2019-10-22 PROCEDURE — 99999 PR PBB SHADOW E&M-EST. PATIENT-LVL V: CPT | Mod: PBBFAC,,, | Performed by: INTERNAL MEDICINE

## 2019-10-22 PROCEDURE — 99213 OFFICE O/P EST LOW 20 MIN: CPT | Mod: S$PBB,,, | Performed by: INTERNAL MEDICINE

## 2019-10-29 ENCOUNTER — OFFICE VISIT (OUTPATIENT)
Dept: SURGERY | Facility: CLINIC | Age: 84
End: 2019-10-29
Payer: MEDICARE

## 2019-10-29 VITALS
DIASTOLIC BLOOD PRESSURE: 88 MMHG | SYSTOLIC BLOOD PRESSURE: 146 MMHG | WEIGHT: 138.69 LBS | BODY MASS INDEX: 24.57 KG/M2 | HEIGHT: 63 IN | HEART RATE: 70 BPM | OXYGEN SATURATION: 94 % | TEMPERATURE: 98 F

## 2019-10-29 DIAGNOSIS — S81.801D UNSPECIFIED OPEN WOUND, RIGHT LOWER LEG, SUBSEQUENT ENCOUNTER: Primary | ICD-10-CM

## 2019-10-29 PROCEDURE — 99999 PR PBB SHADOW E&M-EST. PATIENT-LVL III: CPT | Mod: PBBFAC,,, | Performed by: SURGERY

## 2019-10-29 PROCEDURE — 99999 PR PBB SHADOW E&M-EST. PATIENT-LVL III: ICD-10-PCS | Mod: PBBFAC,,, | Performed by: SURGERY

## 2019-10-29 PROCEDURE — 99212 PR OFFICE/OUTPT VISIT, EST, LEVL II, 10-19 MIN: ICD-10-PCS | Mod: S$PBB,,, | Performed by: SURGERY

## 2019-10-29 PROCEDURE — 99212 OFFICE O/P EST SF 10 MIN: CPT | Mod: S$PBB,,, | Performed by: SURGERY

## 2019-10-29 PROCEDURE — 99213 OFFICE O/P EST LOW 20 MIN: CPT | Mod: PBBFAC,PN | Performed by: SURGERY

## 2019-10-29 NOTE — PROGRESS NOTES
[facility]  OUTPATIENT H&P    REASON FOR VISIT/CC:  Right lower leg wound    HPI: Mary Ellen Miller is a 89 y.o. female  with a chronic right lower leg wound.  Patient developed a wound approximately 7 weeks ago.  She is uncertain of the origin of the wound. Denies trauma but is on anticoagulant.  Last seen in clinic at which time she had a dry eschar overlying the wound.  Recommended watchful waiting patient is here for follow-up.    Patient has seen very little change in the wound and the scab is still present over the area.  Still with swelling of the leg.  Describes shooting pain from her knee down to her foot.  No drainage or spreading redness from the wound.        ROS:   Review of Systems   Constitutional: Negative for activity change, chills and fever.   HENT: Negative for congestion, nosebleeds and trouble swallowing.    Eyes: Negative for photophobia, discharge and visual disturbance.   Respiratory: Negative for apnea, chest tightness and shortness of breath.    Cardiovascular: Positive for palpitations (Occasional) and leg swelling ( chronic bilateral lower extremity edema, right greater than left). Negative for chest pain.   Gastrointestinal: Negative for abdominal distention, abdominal pain, nausea and vomiting.   Genitourinary: Negative for difficulty urinating, dysuria and hematuria.   Musculoskeletal: Positive for arthralgias ( right ankle, chronic), joint swelling and myalgias. Negative for gait problem, neck pain and neck stiffness.   Skin: Positive for wound ( right lower leg). Negative for color change, pallor and rash.   Neurological: Negative for seizures, syncope and light-headedness.   Psychiatric/Behavioral: Negative for agitation, behavioral problems and confusion.           VITALS:  Vitals:    10/29/19 1009   BP: (!) 146/88   Pulse: 70   Temp: 97.7 °F (36.5 °C)         PHYSICAL EXAM:  Physical Exam   Constitutional: She is oriented to person, place, and time. She appears  well-developed and well-nourished. No distress.   HENT:   Head: Normocephalic and atraumatic.   Nose: Nose normal.   Eyes: Conjunctivae and EOM are normal. No scleral icterus.   Neck: Normal range of motion. Neck supple. No tracheal tenderness present. No tracheal deviation present.   Cardiovascular: Normal rate and intact distal pulses.   Rate controlled AFib   Pulmonary/Chest: Effort normal and breath sounds normal. No accessory muscle usage or stridor. No respiratory distress.   Abdominal: Soft. Normal appearance. She exhibits no distension, no ascites and no mass. There is no tenderness. There is no rebound. No hernia.   Musculoskeletal: Normal range of motion. She exhibits edema ( bilateral lower extremity, right greater than left). She exhibits no deformity.        Legs:  Edema of the right lower extremity   Neurological: She is alert and oriented to person, place, and time. She exhibits normal muscle tone.   Skin: Skin is warm and dry. No rash noted. She is not diaphoretic. No erythema.   Psychiatric: She has a normal mood and affect. Her behavior is normal. Judgment and thought content normal.   Vitals reviewed.              ASSESSMENT & PLAN:  89 y.o. female with chronic wound to the right lower leg  - lower extremely wound appears to be healing well, eschar is the same size however the edges are starting to separate from the underlying tissue indicating the epidermis is healing  - continue expectant management, patient instructed wash air with soap and water then otherwise keep clean and dry, allow eschar to fall off on its own, do not pick  - return to clinic in 4 weeks  - patient is directed to call with any questions or concerns

## 2019-10-29 NOTE — LETTER
October 29, 2019      Ania Barton MD  [address]  [address]   [facility] [address]           [facility]  [address]  [facility] [address]  Phone: [phone]  Fax: [phone]          Patient: Mary Ellen Miller   MR Number: [id number]   YOB: 1930   Date of Visit: 10/29/2019       Dear Dr. Ania Barton:    Thank you for referring Mary Ellen Miller to me for evaluation. Attached you will find relevant portions of my assessment and plan of care.    If you have questions, please do not hesitate to call me. I look forward to following Mary Ellen Miller along with you.    Sincerely,    Hunter March Jr., MD    Enclosure  CC:  No Recipients    If you would like to receive this communication electronically, please contact [email or web address] or [phone] to request more information on [employer] Link access.    For providers and/or their staff who would like to refer a patient to [facility], please contact us through our one-stop-shop provider referral line, [facility], at [phone].    If you feel you have received this communication in error or would no longer like to receive these types of communications, please e-mail [email or web address]

## 2019-11-04 NOTE — PROGRESS NOTES
INTERNAL MEDICINE    Patient Active Problem List   Diagnosis    Primary localized osteoarthrosis, ankle and foot    Hypertension    Anxiety    Unilateral femoral hernia with obstruction, without gangrene    Chronic atrial fibrillation    Acute on chronic diastolic (congestive) heart failure       CC:   Chief Complaint   Patient presents with    Follow-up       SUBJECTIVE:  Mary Ellen Miller   is a 89 y.o. female  HPI   89y/oWF returns because now her RLL wound/eschar is giving her pain, enough to awaken her from sleep.  She is worried this could be a blood clot.    She has a visit to see surgery in 1 week.  No fever,chills.  No constitutional symptoms.      ROS: Review of Systems   Constitutional: Negative.    HENT: Negative.    Respiratory: Negative for chest tightness, shortness of breath and wheezing.    Cardiovascular: Negative for chest pain, palpitations and leg swelling.   Skin: Positive for wound.   Neurological: Negative.    Hematological: Negative.    Psychiatric/Behavioral: Negative.        Past Medical History:   Diagnosis Date    Anxiety     Atrial fibrillation     Colon polyps 05/01/2012    Hypertension        Past Surgical History:   Procedure Laterality Date    HYSTERECTOMY      LUNG SURGERY  1988    OOPHORECTOMY      RADICAL HYSTERECTOMY  1990       History reviewed. No pertinent family history.    Social History     Tobacco Use    Smoking status: Never Smoker    Smokeless tobacco: Never Used   Substance Use Topics    Alcohol use: No    Drug use: No       Social History     Social History Narrative    Not on file       ALLERGIES:   Review of patient's allergies indicates:   Allergen Reactions    Percodan [oxycodone hcl-oxycodone-asa] Other (See Comments)     halucinations    Penicillins Itching    Sulfa (sulfonamide antibiotics) Diarrhea and Nausea Only    Amoxicillin        MEDS:   Current Outpatient Medications:     ascorbic acid (VITAMIN C) 500 MG tablet, Take 500 mg  by mouth once daily., Disp: , Rfl:     atenolol (TENORMIN) 50 MG tablet, Take 50 mg by mouth 2 (two) times daily., Disp: , Rfl: 10    biotin 5 mg Tab, Take 500 mg by mouth once daily. , Disp: , Rfl:     calcium carbonate (OS-ZOILA) 600 mg (1,500 mg) Tab, Take 600 mg by mouth 2 (two) times daily with meals., Disp: , Rfl:     ELIQUIS 5 mg Tab, Take 5 mg by mouth 2 (two) times daily., Disp: , Rfl: 11    escitalopram oxalate (LEXAPRO) 10 MG tablet, TAKE 1 TABLET BY MOUTH EVERY DAY, Disp: 90 tablet, Rfl: 1    furosemide (LASIX) 40 MG tablet, Take 40 mg by mouth once daily. , Disp: , Rfl: 6    hydrALAZINE (APRESOLINE) 50 MG tablet, Take 50 mg by mouth 2 (two) times daily., Disp: , Rfl: 10    ibandronate (BONIVA) 150 mg tablet, TAKE 1 TABLET (150 MG TOTAL) BY MOUTH EVERY 30 DAYS., Disp: , Rfl: 11    multivit,calc,mins/iron/folic (ONE-A-DAY WOMENS FORMULA ORAL), Take 1 tablet by mouth once daily., Disp: , Rfl:     mupirocin (BACTROBAN) 2 % ointment, Apply to affected area 3 times daily, Disp: 22 g, Rfl: 0    spironolactone (ALDACTONE) 25 MG tablet, Take 25 mg by mouth once daily., Disp: , Rfl: 6    vitamin D 1000 units Tab, Take 2,000 mg by mouth once daily., Disp: , Rfl:     vitamin E 1000 UNIT capsule, Take 1,000 Units by mouth once daily., Disp: , Rfl:     apixaban (ELIQUIS ORAL), Take 1 tablet by mouth 3 (three) times daily., Disp: , Rfl:     calcipotriene (DOVONOX) 0.005 % cream, , Disp: , Rfl:     FLUZONE HIGH-DOSE 2019-20, PF, 180 mcg/0.5 mL Syrg, TO BE ADMINISTERED BY PHARMACIST FOR IMMUNIZATION, Disp: , Rfl: 0    hydrALAZINE (APRESOLINE) 25 MG tablet, TAKE 1 TABLET BY MOUTH 2 TIMES A DAY WITH FOOD, Disp: 180 tablet, Rfl: 1    multivitamin (ONE DAILY MULTIVITAMIN) per tablet, Take 1 tablet by mouth once daily., Disp: , Rfl:     multivitamin (THERAGRAN) tablet, Take 1 tablet by mouth once daily., Disp: , Rfl:     OBJECTIVE:   Vitals:    10/22/19 1445   BP: (!) 150/80   BP Location: Left arm   Patient  "Position: Sitting   BP Method: Medium (Manual)   Pulse: 65   Temp: 97.8 °F (36.6 °C)   TempSrc: Oral   SpO2: (!) 93%   Weight: 63.4 kg (139 lb 11.2 oz)   Height: 5' 3" (1.6 m)     Body mass index is 24.75 kg/m².    Physical Exam   Constitutional: She appears well-developed and well-nourished.   HENT:   Head: Normocephalic and atraumatic.   Eyes: Conjunctivae and EOM are normal.   Neck: Neck supple.   Cardiovascular: Normal rate, regular rhythm and normal heart sounds.   Pulmonary/Chest: Effort normal and breath sounds normal.   Abdominal: Soft. Bowel sounds are normal.   Musculoskeletal: She exhibits no edema, tenderness or deformity.   No swelling, no Homans', no tenderness,cellulitis or drainage.   Neurological: She is alert.   Skin: Skin is warm and dry.   Psychiatric: She has a normal mood and affect. Her behavior is normal.   Nursing note and vitals reviewed.        PERTINENT RESULTS:   CBC:  No results for input(s): WBC, RBC, HGB, HCT, PLT, MCV, MCH, MCHC in the last 2160 hours.  CMP:  No results for input(s): GLU, CALCIUM, ALBUMIN, PROT, NA, K, CO2, CL, BUN, ALKPHOS, ALT, AST, BILITOT in the last 2160 hours.    Invalid input(s): CREATININ  URINALYSIS:  No results for input(s): COLORU, CLARITYU, SPECGRAV, PHUR, PROTEINUA, GLUCOSEU, BILIRUBINCON, BLOODU, WBCU, RBCU, BACTERIA, MUCUS, NITRITE, LEUKOCYTESUR, UROBILINOGEN, HYALINECASTS in the last 2160 hours.   LIPIDS:  No results for input(s): TSH, HDL, CHOL, TRIG, LDLCALC, CHOLHDL, NONHDLCHOL, TOTALCHOLEST in the last 2160 hours.          ASSESSMENT:  Problem List Items Addressed This Visit     None      Visit Diagnoses     Eschar of lower leg    -  Primary    Need for pneumococcal vaccination        Relevant Orders    Pneumococcal Polysaccharide Vaccine (23 Valent) (SQ/IM) (Completed)    Encounter for screening mammogram for breast cancer        Relevant Orders    Mammo Digital Screening Bilat w/ Trey      Reassure.  No DVT or superficial " thrombophlebitis.    PLAN:   Orders Placed This Encounter    Mammo Digital Screening Bilat w/ Trey    Pneumococcal Polysaccharide Vaccine (23 Valent) (SQ/IM)     Orders Placed This Encounter   Procedures    Mammo Digital Screening Bilat w/ Trey     Standing Status:   Future     Standing Expiration Date:   12/22/2020     Order Specific Question:   May the Radiologist modify the order per protocol to meet the clinical needs of the patient?     Answer:   Yes    Pneumococcal Polysaccharide Vaccine (23 Valent) (SQ/IM)       Follow-up with me in prn.   Dr. Ania Barton  Internal Medicine

## 2019-11-18 ENCOUNTER — OFFICE VISIT (OUTPATIENT)
Dept: INTERNAL MEDICINE | Facility: CLINIC | Age: 84
End: 2019-11-18
Payer: MEDICARE

## 2019-11-18 VITALS
HEART RATE: 66 BPM | DIASTOLIC BLOOD PRESSURE: 70 MMHG | TEMPERATURE: 98 F | WEIGHT: 139.19 LBS | OXYGEN SATURATION: 96 % | RESPIRATION RATE: 16 BRPM | BODY MASS INDEX: 24.66 KG/M2 | SYSTOLIC BLOOD PRESSURE: 120 MMHG | HEIGHT: 63 IN

## 2019-11-18 DIAGNOSIS — L91.0 HYPERTROPHIC CICATRIX: Primary | ICD-10-CM

## 2019-11-18 PROCEDURE — 99213 PR OFFICE/OUTPT VISIT, EST, LEVL III, 20-29 MIN: ICD-10-PCS | Mod: S$PBB,,, | Performed by: INTERNAL MEDICINE

## 2019-11-18 PROCEDURE — 99999 PR PBB SHADOW E&M-EST. PATIENT-LVL IV: CPT | Mod: PBBFAC,,, | Performed by: INTERNAL MEDICINE

## 2019-11-18 PROCEDURE — 99214 OFFICE O/P EST MOD 30 MIN: CPT | Mod: PBBFAC,PN | Performed by: INTERNAL MEDICINE

## 2019-11-18 PROCEDURE — 99999 PR PBB SHADOW E&M-EST. PATIENT-LVL IV: ICD-10-PCS | Mod: PBBFAC,,, | Performed by: INTERNAL MEDICINE

## 2019-11-18 PROCEDURE — 99213 OFFICE O/P EST LOW 20 MIN: CPT | Mod: S$PBB,,, | Performed by: INTERNAL MEDICINE

## 2019-11-18 NOTE — PROGRESS NOTES
"INTERNAL MEDICINE    Patient Active Problem List   Diagnosis    Primary localized osteoarthrosis, ankle and foot    Hypertension    Anxiety    Unilateral femoral hernia with obstruction, without gangrene    Chronic atrial fibrillation    Acute on chronic diastolic (congestive) heart failure       CC:   Chief Complaint   Patient presents with    Leg Pain     right leg pain(Ulcer of ri leg Lower leg)       SUBJECTIVE:  Mary Ellen Miller   is a 89 y.o. female  HPI   89y/oWF returns to have her lesion on her right anterior tibial area rechecked. Again she went to Surgery, who still thinks she should just " let it heal on its own", no, unroofing.    ROS: Review of Systems   Constitutional: Negative.    HENT: Negative.    Eyes: Negative.    Respiratory: Negative for cough, chest tightness, shortness of breath and wheezing.    Cardiovascular: Negative for chest pain and palpitations.   Endocrine: Negative.    Genitourinary: Negative.    Musculoskeletal: Negative.    Skin: Positive for wound.   Neurological: Negative.    Psychiatric/Behavioral: Negative.        Past Medical History:   Diagnosis Date    Anxiety     Atrial fibrillation     Colon polyps 05/01/2012    Hypertension        Past Surgical History:   Procedure Laterality Date    HYSTERECTOMY      LUNG SURGERY  1988    OOPHORECTOMY      RADICAL HYSTERECTOMY  1990       History reviewed. No pertinent family history.    Social History     Tobacco Use    Smoking status: Never Smoker    Smokeless tobacco: Never Used   Substance Use Topics    Alcohol use: No    Drug use: No       Social History     Social History Narrative    Not on file       ALLERGIES:   Review of patient's allergies indicates:   Allergen Reactions    Percodan [oxycodone hcl-oxycodone-asa] Other (See Comments)     halucinations    Penicillins Itching    Sulfa (sulfonamide antibiotics) Diarrhea and Nausea Only    Amoxicillin        MEDS:   Current Outpatient Medications:     " apixaban (ELIQUIS ORAL), Take 1 tablet by mouth 3 (three) times daily., Disp: , Rfl:     ascorbic acid (VITAMIN C) 500 MG tablet, Take 500 mg by mouth once daily., Disp: , Rfl:     atenolol (TENORMIN) 50 MG tablet, Take 50 mg by mouth 2 (two) times daily., Disp: , Rfl: 10    biotin 5 mg Tab, Take 500 mg by mouth once daily. , Disp: , Rfl:     calcium carbonate (OS-ZOILA) 600 mg (1,500 mg) Tab, Take 600 mg by mouth 2 (two) times daily with meals., Disp: , Rfl:     ELIQUIS 5 mg Tab, Take 5 mg by mouth 2 (two) times daily., Disp: , Rfl: 11    escitalopram oxalate (LEXAPRO) 10 MG tablet, TAKE 1 TABLET BY MOUTH EVERY DAY, Disp: 90 tablet, Rfl: 1    FLUZONE HIGH-DOSE 2019-20, PF, 180 mcg/0.5 mL Syrg, TO BE ADMINISTERED BY PHARMACIST FOR IMMUNIZATION, Disp: , Rfl: 0    furosemide (LASIX) 40 MG tablet, Take 40 mg by mouth once daily. , Disp: , Rfl: 6    hydrALAZINE (APRESOLINE) 50 MG tablet, Take 50 mg by mouth 2 (two) times daily., Disp: , Rfl: 10    ibandronate (BONIVA) 150 mg tablet, TAKE 1 TABLET (150 MG TOTAL) BY MOUTH EVERY 30 DAYS., Disp: , Rfl: 11    multivit,calc,mins/iron/folic (ONE-A-DAY WOMENS FORMULA ORAL), Take 1 tablet by mouth once daily., Disp: , Rfl:     multivitamin (ONE DAILY MULTIVITAMIN) per tablet, Take 1 tablet by mouth once daily., Disp: , Rfl:     multivitamin (THERAGRAN) tablet, Take 1 tablet by mouth once daily., Disp: , Rfl:     spironolactone (ALDACTONE) 25 MG tablet, Take 25 mg by mouth once daily., Disp: , Rfl: 6    vitamin D 1000 units Tab, Take 2,000 mg by mouth once daily., Disp: , Rfl:     vitamin E 1000 UNIT capsule, Take 1,000 Units by mouth once daily., Disp: , Rfl:     calcipotriene (DOVONOX) 0.005 % cream, , Disp: , Rfl:     hydrALAZINE (APRESOLINE) 25 MG tablet, TAKE 1 TABLET BY MOUTH 2 TIMES A DAY WITH FOOD (Patient not taking: Reported on 11/18/2019), Disp: 180 tablet, Rfl: 1    mupirocin (BACTROBAN) 2 % ointment, Apply to affected area 3 times daily (Patient not  "taking: Reported on 11/18/2019), Disp: 22 g, Rfl: 0    OBJECTIVE:   Vitals:    11/18/19 1029   BP: 120/70   BP Location: Left arm   Patient Position: Sitting   BP Method: X-Large (Manual)   Pulse: 66   Resp: 16   Temp: 97.7 °F (36.5 °C)   TempSrc: Oral   SpO2: 96%   Weight: 63.1 kg (139 lb 3.2 oz)   Height: 5' 3" (1.6 m)     Body mass index is 24.66 kg/m².    Physical Exam   Constitutional: She appears well-developed and well-nourished.   HENT:   Head: Normocephalic and atraumatic.   Eyes: Conjunctivae and EOM are normal.   Neck: Neck supple.   Pulmonary/Chest: Breath sounds normal.   Musculoskeletal: Normal range of motion.   Skin: Skin is warm and dry.   Oval shaped, heaped up lesion on anterior tibial plateau.   Nursing note and vitals reviewed.        PERTINENT RESULTS:   CBC:  No results for input(s): WBC, RBC, HGB, HCT, PLT, MCV, MCH, MCHC in the last 2160 hours.  CMP:  No results for input(s): GLU, CALCIUM, ALBUMIN, PROT, NA, K, CO2, CL, BUN, ALKPHOS, ALT, AST, BILITOT in the last 2160 hours.    Invalid input(s): CREATININ  URINALYSIS:  No results for input(s): COLORU, CLARITYU, SPECGRAV, PHUR, PROTEINUA, GLUCOSEU, BILIRUBINCON, BLOODU, WBCU, RBCU, BACTERIA, MUCUS, NITRITE, LEUKOCYTESUR, UROBILINOGEN, HYALINECASTS in the last 2160 hours.   LIPIDS:  No results for input(s): TSH, HDL, CHOL, TRIG, LDLCALC, CHOLHDL, NONHDLCHOL, TOTALCHOLEST in the last 2160 hours.          ASSESSMENT:  Problem List Items Addressed This Visit     None      Visit Diagnoses     Hypertrophic cicatrix    -  Primary    Relevant Orders    Ambulatory referral to Wound Clinic          PLAN:   Orders Placed This Encounter    Ambulatory referral to Wound Clinic     Orders Placed This Encounter   Procedures    Ambulatory referral to Wound Clinic     Referral Priority:   Routine     Referral Type:   Consultation     Referral Reason:   Specialty Services Required     Requested Specialty:   Wound Care     Number of Visits Requested:   1 "       Follow-up with wound care ASAP, and  Me, she may need a biopsy of this.  Dr. Ania Barton  Internal Medicine

## 2019-11-20 PROBLEM — S81.801A LEG WOUND, RIGHT: Status: ACTIVE | Noted: 2019-11-20

## 2020-01-15 ENCOUNTER — PATIENT OUTREACH (OUTPATIENT)
Dept: ADMINISTRATIVE | Facility: HOSPITAL | Age: 85
End: 2020-01-15

## 2020-01-28 PROBLEM — N18.30 STAGE 3 CHRONIC KIDNEY DISEASE: Status: ACTIVE | Noted: 2020-01-28

## 2020-01-28 PROBLEM — I50.32 CHRONIC DIASTOLIC HEART FAILURE: Status: ACTIVE | Noted: 2019-05-09

## 2020-01-28 PROBLEM — M85.852 OSTEOPENIA OF LEFT HIP: Status: ACTIVE | Noted: 2020-01-28

## 2020-01-29 ENCOUNTER — OFFICE VISIT (OUTPATIENT)
Dept: FAMILY MEDICINE | Facility: CLINIC | Age: 85
End: 2020-01-29
Payer: MEDICARE

## 2020-01-29 ENCOUNTER — TELEPHONE (OUTPATIENT)
Dept: FAMILY MEDICINE | Facility: CLINIC | Age: 85
End: 2020-01-29

## 2020-01-29 VITALS
BODY MASS INDEX: 24.56 KG/M2 | HEIGHT: 63 IN | OXYGEN SATURATION: 98 % | HEART RATE: 63 BPM | TEMPERATURE: 98 F | DIASTOLIC BLOOD PRESSURE: 80 MMHG | RESPIRATION RATE: 18 BRPM | SYSTOLIC BLOOD PRESSURE: 120 MMHG | WEIGHT: 138.63 LBS

## 2020-01-29 DIAGNOSIS — I48.20 CHRONIC ATRIAL FIBRILLATION: Primary | ICD-10-CM

## 2020-01-29 DIAGNOSIS — I50.32 CHRONIC DIASTOLIC HEART FAILURE: ICD-10-CM

## 2020-01-29 DIAGNOSIS — Z71.89 ADVANCE CARE PLANNING: ICD-10-CM

## 2020-01-29 DIAGNOSIS — L97.911 LEG ULCER, RIGHT, LIMITED TO BREAKDOWN OF SKIN: ICD-10-CM

## 2020-01-29 DIAGNOSIS — M85.852 OSTEOPENIA OF LEFT HIP: ICD-10-CM

## 2020-01-29 DIAGNOSIS — N18.30 STAGE 3 CHRONIC KIDNEY DISEASE: ICD-10-CM

## 2020-01-29 DIAGNOSIS — I10 ESSENTIAL HYPERTENSION: ICD-10-CM

## 2020-01-29 DIAGNOSIS — F33.42 RECURRENT MAJOR DEPRESSIVE DISORDER, IN FULL REMISSION: ICD-10-CM

## 2020-01-29 PROCEDURE — 99215 OFFICE O/P EST HI 40 MIN: CPT | Mod: PBBFAC,PN | Performed by: FAMILY MEDICINE

## 2020-01-29 PROCEDURE — 1125F PR PAIN SEVERITY QUANTIFIED, PAIN PRESENT: ICD-10-PCS | Mod: ,,, | Performed by: FAMILY MEDICINE

## 2020-01-29 PROCEDURE — 1125F AMNT PAIN NOTED PAIN PRSNT: CPT | Mod: ,,, | Performed by: FAMILY MEDICINE

## 2020-01-29 PROCEDURE — 99999 PR PBB SHADOW E&M-EST. PATIENT-LVL V: ICD-10-PCS | Mod: PBBFAC,,, | Performed by: FAMILY MEDICINE

## 2020-01-29 PROCEDURE — 1159F PR MEDICATION LIST DOCUMENTED IN MEDICAL RECORD: ICD-10-PCS | Mod: ,,, | Performed by: FAMILY MEDICINE

## 2020-01-29 PROCEDURE — 99214 OFFICE O/P EST MOD 30 MIN: CPT | Mod: S$PBB,,, | Performed by: FAMILY MEDICINE

## 2020-01-29 PROCEDURE — 99999 PR PBB SHADOW E&M-EST. PATIENT-LVL V: CPT | Mod: PBBFAC,,, | Performed by: FAMILY MEDICINE

## 2020-01-29 PROCEDURE — 99214 PR OFFICE/OUTPT VISIT, EST, LEVL IV, 30-39 MIN: ICD-10-PCS | Mod: S$PBB,,, | Performed by: FAMILY MEDICINE

## 2020-01-29 PROCEDURE — 1159F MED LIST DOCD IN RCRD: CPT | Mod: ,,, | Performed by: FAMILY MEDICINE

## 2020-01-29 RX ORDER — DOXYCYCLINE 100 MG/1
1 CAPSULE ORAL 2 TIMES DAILY
COMMUNITY
Start: 2020-01-15 | End: 2020-07-30

## 2020-01-29 NOTE — ASSESSMENT & PLAN NOTE
- appears to be in sinus today  - followed by Dr. Last  - rate controlled with Atentolol  - OAC Elius

## 2020-01-29 NOTE — TELEPHONE ENCOUNTER
----- Message from Melody Sterling DO sent at 1/29/2020  2:30 PM CST -----  CARMINA signed and request labs from Dr. Shala Thomas

## 2020-01-29 NOTE — ASSESSMENT & PLAN NOTE
- recommend repeat bone density  - has been on treatment > 5 years and if bone density improved or stable will recommend stopping Boniva

## 2020-01-29 NOTE — PROGRESS NOTES
FAMILY MEDICINE    Patient Active Problem List   Diagnosis    Primary localized osteoarthrosis, ankle and foot    Essential hypertension    Recurrent major depressive disorder, in full remission    Chronic atrial fibrillation    Chronic diastolic heart failure    Leg ulcer, right, limited to breakdown of skin    Osteopenia of left hip    Stage 3 chronic kidney disease    Advance care planning       CC:   Chief Complaint   Patient presents with    Establish Care       HPI: Mary Ellen Miller is a 89 y.o. female  - with hypertension, chronic atrial fibrillation on OAC Eliquis (2018), pEFHF, anxiety, osteoarthritis, osteopenia and chronic right lower leg ulcer presents to establish care. Last PCP Dr. Oralia VALIENTE: youngest son Valentino    Cardiology: Dr. Last  Dermatology Dr. Adame    Today she reports she is doing fine. She is following with Dr. Adame for her right lower leg ulcer.     1.Hypertension  - with pEFHF and Afib    Current medication treatment:   atenolol (TENORMIN) 50 MG tablet, Take 50 mg by mouth 2 (two) times daily., Disp: , Rfl: 10  furosemide (LASIX) 40 MG tablet, Take 40 mg by mouth once daily. , Disp: , Rfl: 6  hydrALAZINE (APRESOLINE) 50 MG tablet, Take 50 mg by mouth 2 (two) times daily., Disp: , Rfl: 10  ibandronate (BONIVA) 150 mg tablet, TAKE 1 TABLET (150 MG TOTAL) BY MOUTH EVERY 30 DAYS., Disp: ,   spironolactone (ALDACTONE) 25 MG tablet, Take 25 mg by mouth once daily., Disp: , Rfl: 6    Medication side effects: denies  Exercise regimen: walks and stays active. Enjoys gardening  Dietary treatment: none specific    Home BP cuff: none  How often does patient monitoring BP? NA  Last home BP reading: NA     Last 14 day average of home BP reading: NA        HEALTH MAINTENANCE:   Health Maintenance   Topic Date Due    Colonoscopy  05/01/2022    Lipid Panel  03/11/2024    TETANUS VACCINE  07/25/2026    Pneumococcal Vaccine (65+ High/Highest Risk)  Completed       ROS: Review  of Systems   Constitutional: Negative.    HENT: Negative.    Eyes: Negative.    Respiratory: Negative.    Cardiovascular: Negative.    Gastrointestinal: Negative.    Endocrine: Negative.    Genitourinary: Negative.    Musculoskeletal: Positive for arthralgias.        Otherwise negative   Skin: Positive for wound.        Otherwise negative   Allergic/Immunologic: Negative.    Neurological: Negative.    Hematological: Negative.    Psychiatric/Behavioral: Negative.        ALLERGIES:   Review of patient's allergies indicates:   Allergen Reactions    Percodan [oxycodone hcl-oxycodone-asa] Other (See Comments)     halucinations    Penicillins Itching    Sulfa (sulfonamide antibiotics) Diarrhea and Nausea Only    Amoxicillin        MEDS:     Current Outpatient Medications:     apixaban (ELIQUIS ORAL), Take 1 tablet by mouth 3 (three) times daily., Disp: , Rfl:     ascorbic acid (VITAMIN C) 500 MG tablet, Take 500 mg by mouth once daily., Disp: , Rfl:     atenolol (TENORMIN) 50 MG tablet, Take 50 mg by mouth 2 (two) times daily., Disp: , Rfl: 10    biotin 5 mg Tab, Take 500 mg by mouth once daily. , Disp: , Rfl:     calcipotriene (DOVONOX) 0.005 % cream, , Disp: , Rfl:     calcium carbonate (OS-ZOILA) 600 mg (1,500 mg) Tab, Take 600 mg by mouth 2 (two) times daily with meals., Disp: , Rfl:     escitalopram oxalate (LEXAPRO) 10 MG tablet, TAKE 1 TABLET BY MOUTH EVERY DAY, Disp: 90 tablet, Rfl: 1    furosemide (LASIX) 40 MG tablet, Take 40 mg by mouth once daily. , Disp: , Rfl: 6    hydrALAZINE (APRESOLINE) 25 MG tablet, TAKE 1 TABLET BY MOUTH 2 TIMES A DAY WITH FOOD, Disp: 180 tablet, Rfl: 1    hydrALAZINE (APRESOLINE) 50 MG tablet, Take 50 mg by mouth 2 (two) times daily., Disp: , Rfl: 10    ibandronate (BONIVA) 150 mg tablet, TAKE 1 TABLET (150 MG TOTAL) BY MOUTH EVERY 30 DAYS., Disp: , Rfl: 11    multivit,calc,mins/iron/folic (ONE-A-DAY WOMENS FORMULA ORAL), Take 1 tablet by mouth once daily., Disp: , Rfl:  "    spironolactone (ALDACTONE) 25 MG tablet, Take 25 mg by mouth once daily., Disp: , Rfl: 6    vitamin D 1000 units Tab, Take 2,000 mg by mouth once daily., Disp: , Rfl:     vitamin E 1000 UNIT capsule, Take 1,000 Units by mouth once daily., Disp: , Rfl:     varicella-zoster gE-AS01B, PF, (SHINGRIX, PF,) 50 mcg/0.5 mL injection, Inject 0.5mL IM at 0 and 2-6 months, Disp: 0.5 mL, Rfl: 1    Past Medical History:   Diagnosis Date    Anxiety     Atrial fibrillation     Colon polyps 2012    Hypertension     Unilateral femoral hernia with obstruction, without gangrene 10/26/2015       Past Surgical History:   Procedure Laterality Date    HYSTERECTOMY      LUNG SURGERY      OOPHORECTOMY      RADICAL HYSTERECTOMY         History reviewed. No pertinent family history.    Social History     Tobacco Use    Smoking status: Never Smoker    Smokeless tobacco: Never Used   Substance Use Topics    Alcohol use: No    Drug use: No       Social History     Social History Narrative     since 34 yo when   tragically. Single mother of 2 now adult son. Valentino Carter. Drives and very active. 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    Physical Exam   Constitutional: No distress.   Neck: Neck supple. No JVD present. Carotid bruit is not present. No thyroid mass and no thyromegaly present.   Cardiovascular: Normal rate, regular rhythm, normal heart sounds and intact distal pulses. Exam reveals no gallop and no friction rub.   No murmur heard.  Pulmonary/Chest: Effort normal and breath sounds normal. She has no decreased breath sounds. She has no wheezes. She has no rhonchi. She has no " rales.   Abdominal: Soft. Normal appearance and bowel sounds are normal. There is no tenderness.   Musculoskeletal: She exhibits edema (trace pitting edema right ankle).   Neurological: She is alert.   Skin: Skin is warm. Capillary refill takes less than 2 seconds.              Depression Patient Health Questionnaire 1/29/2020 11/18/2019 10/22/2019 10/7/2019 9/23/2019 9/18/2019 7/23/2019   Over the last two weeks how often have you been bothered by little interest or pleasure in doing things 0 0 0 0 0 0 0   Over the last two weeks how often have you been bothered by feeling down, depressed or hopeless 0 0 0 0 0 0 0   PHQ-2 Total Score 0 0 0 0 0 0 0       PERTINENT RESULTS:   Lab Results   Component Value Date    WBC 5.01 05/09/2019    HGB 12.7 05/09/2019    HCT 39.5 05/09/2019    MCV 94 05/09/2019     05/09/2019       BMP  Lab Results   Component Value Date     05/10/2019    K 3.0 (L) 05/10/2019     05/10/2019    CO2 31 (H) 05/10/2019    BUN 29 (H) 05/10/2019    CREATININE 0.93 05/10/2019    CALCIUM 8.5 (L) 05/10/2019    ANIONGAP 10 05/10/2019    ESTGFRAFRICA >60.0 05/10/2019    EGFRNONAA 54.7 (A) 05/10/2019     Lab Results   Component Value Date    ALT 25 05/08/2019    AST 34 05/08/2019    ALKPHOS 43 05/08/2019    BILITOT 1.3 (H) 05/08/2019     Lab Results   Component Value Date    CHOL 140 03/11/2019    CHOL 129 02/21/2019    CHOL 149 07/23/2018     Lab Results   Component Value Date    HDL 44 03/11/2019    HDL 31 (L) 02/21/2019    HDL 41 07/23/2018     Lab Results   Component Value Date    LDLCALC 79.2 03/11/2019    LDLCALC 76.0 02/21/2019    LDLCALC 90.2 07/23/2018     Lab Results   Component Value Date    TRIG 84 03/11/2019    TRIG 110 02/21/2019    TRIG 89 07/23/2018     Lab Results   Component Value Date    CHOLHDL 31.4 03/11/2019    CHOLHDL 24.0 02/21/2019    CHOLHDL 27.5 07/23/2018 4/20/2017       Narrative     Clinical indication: Screening for osteoporosis    Technique: A Bone  density measurement was performed to evaluate for the presence of bone mineral loss in the axial and appendicular skeleton.. Dual energy X-ray absorptiometry was used to determine the bone density of the lumbar spine (L1-L4) and both hips.    Findings:   The bone mineral density of the lumbar spine measured at the L1-L4 region was 1.361 g/cm2. This corresponds to a Z-score of 3.5 and a T-score of 1.5 which places the patient in the normal bone densitycategory of bone mineral density as classified by the WHO criteria.    The bone mineral density of the left hip measured at the neck was 0.88 g/cm2. This corresponds to a Z-score of 1.4 and a T-score of -1.1 which places the patient in the osteopenia category of bone mineral density as classified by WHO criteria.    The bone mineral density of the right hip measured at the neck was 0.893g/cm2. This corresponds to a Z-score of 1.5 and a T-score of -1.0 which places the patient in the normal bone density category of bone density as classified by the WHO criteria.      Impression       1. Lumbar spine (L1-L4): Normal bone density  2. Left hip: Osteopenia  3. Right hip: Normal bone density.       ASSESSMENT/PLAN:  Problem List Items Addressed This Visit        Psychiatric    Recurrent major depressive disorder, in full remission    Current Assessment & Plan     - well controlled  - continue current medications            Derm    Leg ulcer, right, limited to breakdown of skin    Current Assessment & Plan     - currently followed by Dr. Adame            Cardiac/Vascular    Essential hypertension    Current Assessment & Plan     - well controlled  - continue current medications         Chronic atrial fibrillation - Primary    Current Assessment & Plan     - RRR today  - request records from Dr. Last  - baseline heart rate low so not on rate control  - OAC Eliqius         Chronic diastolic heart failure    Current Assessment & Plan     - no signs of acute decompensation  -  medications managed by Dr. Last            Renal/    Stage 3 chronic kidney disease    Current Assessment & Plan     - renal function monitored by Dr. Last  - CARMINA signed and copy of recent labs requested            Orthopedic    Osteopenia of left hip    Overview     - 4/20/17 Dexa: Left hip osteopenia  - fall prevention  - weight bearing exercises like walking, squats, stair and jogging if able  - over the counter vitamin D3 5596-7966 units daily  - dietary calcium 1200 mg per day with diet or supplements   - repeat bone density scan in 2-3 years         Current Assessment & Plan     - recommend repeat bone density  - has been on treatment > 5 years and if bone density improved or stable will recommend stopping Boniva          Relevant Orders    DXA Bone Density Spine And Hip       Palliative Care    Advance care planning    Overview     - pt reports that she has living will and mPOA  - mPOA is her son Valentino (youngest)  - she does not want invasive or aggressive measures to maintain life  - she does not want CPR or intubation               ORDERS:   Orders Placed This Encounter    DXA Bone Density Spine And Hip    varicella-zoster gE-AS01B, PF, (SHINGRIX, PF,) 50 mcg/0.5 mL injection     Orders Placed This Encounter   Procedures    DXA Bone Density Spine And Hip       Vaccines recommended: shingles    Follow-up in 6 months.     Dr. Melody Sterling D.O.   Family Medicine

## 2020-02-07 ENCOUNTER — PATIENT OUTREACH (OUTPATIENT)
Dept: ADMINISTRATIVE | Facility: HOSPITAL | Age: 85
End: 2020-02-07

## 2020-02-07 NOTE — LETTER
AUTHORIZATION FOR RELEASE OF   CONFIDENTIAL INFORMATION    Dear Dr. Last,    We are seeing Mary Ellen Miller, date of birth 1930, in the clinic at [facility]. Melody Sterling DO is the patient's PCP. Mary Ellen Miller has an outstanding lab/procedure at the time we reviewed her chart. In order to help keep her health information updated, she has authorized us to request the following medical record(s):             ( X )  OUTSIDE LAB RESULTS       Please fax records to us at [phone].     Attention: Susu Cardenas        Patient Name: Mary Ellen Miller  : 1930  Patient Phone #: [phone]

## 2020-02-10 ENCOUNTER — PATIENT OUTREACH (OUTPATIENT)
Dept: ADMINISTRATIVE | Facility: HOSPITAL | Age: 85
End: 2020-02-10

## 2020-02-20 ENCOUNTER — OFFICE VISIT (OUTPATIENT)
Dept: URGENT CARE | Facility: CLINIC | Age: 85
End: 2020-02-20
Payer: MEDICARE

## 2020-02-20 VITALS
BODY MASS INDEX: 23.92 KG/M2 | OXYGEN SATURATION: 98 % | RESPIRATION RATE: 16 BRPM | TEMPERATURE: 97 F | SYSTOLIC BLOOD PRESSURE: 144 MMHG | HEIGHT: 63 IN | HEART RATE: 72 BPM | DIASTOLIC BLOOD PRESSURE: 66 MMHG | WEIGHT: 135 LBS

## 2020-02-20 DIAGNOSIS — B34.9 VIRAL SYNDROME: Primary | ICD-10-CM

## 2020-02-20 DIAGNOSIS — R09.81 NASAL CONGESTION: ICD-10-CM

## 2020-02-20 DIAGNOSIS — R03.0 ELEVATED BLOOD PRESSURE READING: ICD-10-CM

## 2020-02-20 DIAGNOSIS — R05.9 COUGH: ICD-10-CM

## 2020-02-20 LAB
CTP QC/QA: YES
FLUAV AG NPH QL: NEGATIVE
FLUBV AG NPH QL: NEGATIVE

## 2020-02-20 PROCEDURE — 87804 INFLUENZA ASSAY W/OPTIC: CPT | Mod: QW,S$GLB,, | Performed by: NURSE PRACTITIONER

## 2020-02-20 PROCEDURE — 99214 OFFICE O/P EST MOD 30 MIN: CPT | Mod: 25,S$GLB,, | Performed by: NURSE PRACTITIONER

## 2020-02-20 PROCEDURE — 99214 PR OFFICE/OUTPT VISIT, EST, LEVL IV, 30-39 MIN: ICD-10-PCS | Mod: 25,S$GLB,, | Performed by: NURSE PRACTITIONER

## 2020-02-20 PROCEDURE — 87804 POCT INFLUENZA A/B: ICD-10-PCS | Mod: 59,QW,S$GLB, | Performed by: NURSE PRACTITIONER

## 2020-02-20 RX ORDER — AZELASTINE 1 MG/ML
1 SPRAY, METERED NASAL 2 TIMES DAILY
Qty: 30 ML | Refills: 0 | Status: SHIPPED | OUTPATIENT
Start: 2020-02-20 | End: 2020-07-30

## 2020-02-20 RX ORDER — BENZONATATE 100 MG/1
100 CAPSULE ORAL 3 TIMES DAILY PRN
Qty: 21 CAPSULE | Refills: 0 | Status: SHIPPED | OUTPATIENT
Start: 2020-02-20 | End: 2020-02-27

## 2020-02-20 NOTE — PROGRESS NOTES
"Subjective:       Patient ID: Mary Ellen Miller is a 89 y.o. female.    Vitals:  height is 5' 3" (1.6 m) and weight is 61.2 kg (135 lb). Her oral temperature is 96.8 °F (36 °C). Her blood pressure is 144/66 (abnormal) and her pulse is 72. Her respiration is 16 and oxygen saturation is 98%.     Chief Complaint: Sinus Problem    Sinus Problem   This is a new problem. The current episode started yesterday. The problem has been gradually worsening since onset. Maximum temperature: unmeasured. She is experiencing no pain. Associated symptoms include congestion and coughing. Pertinent negatives include no chills, diaphoresis, ear pain, shortness of breath, sinus pressure or sore throat. Treatments tried: cough drop.       Constitution: Positive for fatigue. Negative for chills, sweating and fever.   HENT: Positive for congestion, postnasal drip and voice change. Negative for ear pain, sinus pain, sinus pressure and sore throat.    Neck: Negative for painful lymph nodes.   Eyes: Negative for eye redness.   Respiratory: Positive for cough and sputum production. Negative for chest tightness, bloody sputum, COPD, shortness of breath, stridor, wheezing and asthma.    Gastrointestinal: Negative for nausea and vomiting.   Musculoskeletal: Positive for muscle ache.   Skin: Negative for rash.   Allergic/Immunologic: Negative for seasonal allergies and asthma.   Hematologic/Lymphatic: Negative for swollen lymph nodes.       Objective:      Physical Exam   Constitutional: She is oriented to person, place, and time. She appears well-developed and well-nourished. She is cooperative.  Non-toxic appearance. She does not have a sickly appearance. She does not appear ill. No distress.   HENT:   Head: Normocephalic and atraumatic.   Right Ear: Hearing, external ear and ear canal normal. A middle ear effusion is present.   Left Ear: Hearing, external ear and ear canal normal. A middle ear effusion is present.   Nose: Mucosal edema " present. No rhinorrhea or nasal deformity. No epistaxis. Right sinus exhibits maxillary sinus tenderness. Right sinus exhibits no frontal sinus tenderness. Left sinus exhibits maxillary sinus tenderness. Left sinus exhibits no frontal sinus tenderness.   Mouth/Throat: Uvula is midline and mucous membranes are normal. No trismus in the jaw. Normal dentition. No uvula swelling. Posterior oropharyngeal erythema present. No oropharyngeal exudate or posterior oropharyngeal edema.   Eyes: Conjunctivae and lids are normal. No scleral icterus.   Neck: Trachea normal, full passive range of motion without pain and phonation normal. Neck supple. No neck rigidity. No edema and no erythema present.   Cardiovascular: Normal rate, regular rhythm, normal heart sounds, intact distal pulses and normal pulses.   Pulmonary/Chest: Effort normal and breath sounds normal. No respiratory distress. She has no decreased breath sounds. She has no rhonchi.   Abdominal: Normal appearance.   Musculoskeletal: Normal range of motion. She exhibits no edema or deformity.   Lymphadenopathy:     She has no cervical adenopathy.        Right cervical: No superficial cervical adenopathy present.       Left cervical: No superficial cervical adenopathy present.   Neurological: She is alert and oriented to person, place, and time. She exhibits normal muscle tone. Coordination normal.   Skin: Skin is warm, dry, intact, not diaphoretic and not pale.   Psychiatric: She has a normal mood and affect. Her speech is normal and behavior is normal. Judgment and thought content normal. Cognition and memory are normal.   Nursing note and vitals reviewed.        Assessment:       1. Viral syndrome    2. Cough    3. Elevated blood pressure reading    4. Nasal congestion        Plan:         Viral syndrome  -     azelastine (ASTELIN) 137 mcg (0.1 %) nasal spray; 1 spray (137 mcg total) by Nasal route 2 (two) times daily. for 14 days  Dispense: 30 mL; Refill:  "0    Cough  -     POCT Influenza A/B  -     benzonatate (TESSALON) 100 MG capsule; Take 1 capsule (100 mg total) by mouth 3 (three) times daily as needed for Cough.  Dispense: 21 capsule; Refill: 0    Elevated blood pressure reading    Nasal congestion       .thisv  Results for orders placed or performed in visit on 02/20/20   POCT Influenza A/B   Result Value Ref Range    Rapid Influenza A Ag Negative Negative    Rapid Influenza B Ag Negative Negative     Acceptable Yes      Patient Instructions     Always follow your healthcare professional's instructions.    You have received urgent care diagnosis and treatment and you may be released before all of your medical problems are known or treated. Unless you have been given a referral, you (the patient), will arrange for follow-up care as instructed.     If you have been given a referral,  will contact you (their phone number is [phone]). If they do NOT call you by the end of the business day, please call them the following day.    You have been diagnosed with Viral Syndrome. TREATMENT: YOUR TREATMENT IS AIMED AT SYMPTOM MANAGEMENT.       A viral illness may cause a number of symptoms. The symptoms depend on the part of the body that the virus affects. If it settles in your nose, throat, and lungs, it may cause cough, sore throat, congestion, and sometimes headache. If it settles in your stomach and intestinal tract, it may cause vomiting and diarrhea. Sometimes it causes vague symptoms like "aching all over," feeling tired, loss of appetite, or fever. A viral illness usually lasts 1 to 2 weeks, but sometimes it lasts longer. In some cases, a more serious infection can look like a viral syndrome in the first few days of the illness; therefore, you may need another exam and additional tests to know the difference.          Home care  Follow these guidelines for taking care of yourself at home:  · If symptoms are severe, rest at home for the " first 2 to 3 days.  · Stay away from cigarette smoke - both your smoke and the smoke from others.  · You may use over-the-counter acetaminophen or ibuprofen for fever, muscle aching, and headache, unless another medicine was prescribed for this. If you have chronic liver or kidney disease or ever had a stomach ulcer or GI bleeding, talk with your doctor before using these medicines. No one who is younger than 18 and ill with a fever should take aspirin. It may cause severe disease or death.  · Your appetite may be poor, so a light diet is fine. Avoid dehydration by drinking 8 to 12 8-ounce glasses of fluids each day. This may include water; orange juice; lemonade; apple, grape, and cranberry juice; clear fruit drinks; electrolyte replacement and sports drinks; and decaffeinated teas and coffee. If you have been diagnosed with a kidney disease, ask your doctor how much and what types of fluids you should drink to prevent dehydration. If you have kidney disease, drinking too much fluid can cause it build up in the your body and be dangerous to your health.  · Over-the-counter remedies won't shorten the length of the illness but may be helpful for cough, sore throat; and nasal and sinus congestion. Don't use decongestants if you have high blood pressure.    Follow-up care  Follow up with your healthcare provider if your symptoms continue for more than 7 days.    Fever Cough Body Aches Nausea and Vomiting Diarrhea Congestion or Runny Nose    OTC Tylenol   OTC NSAIDs  Tessalon Pearls   Phenergan DM   OTC cough medications  Mobic   OTC Tylenol   OTC NSAIDs  Zofran   Phenergan   OTC Emetrol  DO NOT take ant-diarrheal medications  OTC Claritin, Zyrtec, Allegra, OR Xyzal   OTC Flonase   OTC Benadryl      Cough Allergy Symptoms Asthma Headache or Body Aches   Tessalon Perles are a non-narcotic cough medicine. It works by numbing the throat and lungs, making the cough reflex less active, it is used to relieve  "coughing during the day. If you have been given Phenergan DM cough syrup, you do NOT need to take both medications at the same time.    Phenergan DM is a combination medication that is used to treat cough. Phenergan DM works like an antihistamine and cough suppressant. This medication will make you sleepy like Benadryl, have a drying effect, and act on a part of the brain (cough center) to reduce the need to cough. DO NOT take Benadryl and Phenergan together. You may take over-the-counter claritin, zyrtec, allegra, or xyzal as directed, these are antihistamines that will work to dry up secretions/mucus. Antihistamines work to block the effects of a certain natural substance (histamine), which causes allergy symptoms.    OR    Use over-the-counter Flonase (a nasal steroid) or prescribed Azelastine (a nasal antihistamine) to treat runny nose, sneezing, itchy nose, nasal congestion, and postnasal drip.    Proper administration is to "look down at your toes and aim for your nose". The goal is to aim for your nasolabial folds, the creases in your nose. If you feel the medication drip down your throat, you have NOT administered it correctly. If you can "smell the roses" (floral scent), then you have administered it correctly. If you have a history of asthma, expressed a concern about wheezing or have been told you were wheezing during your exam today, you are being given Albuterol. Albuterol is a bronchodilator that relaxes muscles in the airways and increases air flow to the lungs; it is used to treat or prevent bronchospasm, or narrowing of the airways in the lungs.     If you have a history of asthma, expressed a concern about wheezing or have been told you were wheezing during your exam today and are NOT being prescribed Albuterol that is because you have insured me that you have an adequate supply of the drug at home.    Mobic is a nonsteroidal anti-inflammatory drug (NSAID), it is used to treat inflammation. It " reduces pain, swelling, and stiffness of the joints. Please do NOT take any other NSAID medications while on this medication, you can take Tylenol if you feel the need. If you were not prescribed an anti-inflammatory medication, and if you do not have any history of stomach/intestinal ulcers, or kidney disease, or are not taking a blood thinner such as Coumadin, Plavix, Pradaxa, Eloquis, or Xaralta for example, it is OK to take over the counter Ibuprofen or Advil or Motrin or Aleve as directed.  Do not take any of these medications on an empty stomach.         Dehydration (Adult)  Dehydration occurs when your body loses too much fluid. This may be the result of prolonged vomiting or diarrhea, excessive sweating, or a high fever. It may also happen if you don't drink enough fluid when you're sick or out in the heat. Misuse of diuretics (water pills) can also be a cause.  Symptoms include thirst and decreased urine output. You may also feel dizzy, weak, fatigued, or very drowsy. The diet described below is usually enough to treat dehydration. In some cases, you may need medicine.    Home care  · Drink at least 12 8-ounce glasses of fluid every day to resolve the dehydration. Fluid may include water; orange juice; lemonade; apple, grape, or cranberry juice; clear fruit drinks; electrolyte replacement and sports drinks; and teas and coffee without caffeine. If you have been diagnosed with a kidney disease, ask your doctor how much and what types of fluids you should drink to prevent dehydration. If you have kidney disease, fluid can build up in the body. This can be dangerous to your health.  · If you have a fever, muscle aches, or a headache as a result of a cold or flu, you may take acetaminophen or ibuprofen, unless another medicine was prescribed. If you have chronic liver or kidney disease, or have ever had a stomach ulcer or gastrointestinal bleeding, talk with your health care provider before using these  medicines. Don't take aspirin if you are younger than 18 and have a fever. Aspirin raises the chance for severe liver injury.    When to seek medical advice  DEHYDRATION:  · Continued vomiting  · Frequent diarrhea (more than 5 times a day); blood (red or black color) or mucus in diarrhea  · Blood in vomit or stool  · Swollen abdomen or increasing abdominal pain  · Weakness, dizziness, or fainting  · Unusual drowsiness or confusion  · Reduced urine output or extreme thirst    FEVER:  Call your healthcare provider right away if any of these occur:  · Your child is 3 months old or younger and has a fever of 100.4°F (38°C) or higher. Get medical care right away. Fever in a young baby can be a sign of a dangerous infection.  · Your child is of any age and has repeated fevers above 104°F (40°C).  · Your child is younger than 2 years of age and a fever of 100.4°F (38°C) continues for more than 1 day.  · A fever of 100.4°F (38°C) for more than 3 days in anyone older than 2 years of age.       Why didn't I get a steroid today?    The benefits are small and, unlike topical glucocorticoids, systemic glucocorticoids possess a significant side effect profile. Major side effects include:    · Effects immunity predisposing you to getting a more severe infection and increases your white blood cell count. You have the flu, you do not need anything to weaken your immune system right now.  · Young children -- Growth impairment   · Skin consequences: Skin thinning and ecchymoses, Cushingoid appearance (rounded face), acne, weight gain, mild hirsutism, facial erythema, and striae.  · Eye consequences: Cataracts, increased intraocular pressure, exophthalmos.   · Cardiovascular consequences: Fluid retention, premature atherosclerotic disease, and arrhythmias.   · GI consequences: Increased risk for adverse gastrointestinal effects, such as gastritis, ulcer formation, and gastrointestinal bleeding  · Bone and muscle consequences:  "Osteoporosis, osteonecrosis, and myopathy.  · Neuropsychiatric effects: Mood disorders, psychosis, memory impairment, and   · Metabolic and Endocrine consequences: Suppress the hypothalamic-pituitary-adrenal (HPA) axis and increase blood sugar.     Can I just have a shot instead of pills?  No. If you are sick, you need a course of treatment (not just a one time dose).     Why didn't I get an antibiotic today?  You have been diagnosed with a virus an antibiotic will not help you. Antibiotics work by destroying the cell walls of bacteria, viruses do not have cell walls.     When to seek medical advice:    DEHYDRATION:  · Continued vomiting  · Frequent diarrhea (more than 5 times a day); blood (red or black color) or mucus in diarrhea  · Blood in vomit or stool  · Swollen abdomen or increasing abdominal pain  · Reduced urine output or extreme thirst  · Repeated vomiting after the first 4 hours on fluids  · Occasional vomiting for more than 48 hours  · Frequent diarrhea (more than 5 times a day), blood in diarrhea (red or black color), or mucus in diarrhea  · Blood in vomit or stool  · Swollen abdomen or signs of abdominal pain  · No urine for 8 hours, no tears when crying, "sunken" eyes, or dry mouth  · Unusual behavior changes, fussiness, drowsiness, confusion, or seizure  · Fever (see Fever and children, below)  FEVER:  Call your healthcare provider right away if any of these occur:  · Your child is 3 months old or younger and has a fever of 100.4°F (38°C) or higher. Get medical care right away. Fever in a young baby can be a sign of a dangerous infection.  · Your child is of any age and has repeated fevers above 104°F (40°C).  · Your child is younger than 2 years of age and a fever of 100.4°F (38°C) continues for more than 1 day.  · A fever of 100.4°F (38°C) for more than 3 days in anyone older than 2 years of age.  Call 911  Call 911 or your local emergency services number if the child shows any of these symptoms " or signs:  · Trouble breathing  · Confusion  · Is very drowsy or difficult to awaken  · Fainting or loss of consciousness  · Rapid heart rate  · Seizure  · Stiff neck     Your provider discussed your plan of care with you during your physical exam. It was reviewed once more by the provider when giving you an after visit summary. If the patient is a minor, the discharge instructions were discussed with an adult and that adult acknowledge their understanding of the provider's teaching.

## 2020-02-20 NOTE — PATIENT INSTRUCTIONS
"Always follow your healthcare professional's instructions.    You have received urgent care diagnosis and treatment and you may be released before all of your medical problems are known or treated. Unless you have been given a referral, you (the patient), will arrange for follow-up care as instructed.     If you have been given a referral, [facility] will contact you (their phone number is [phone]). If they do NOT call you by the end of the business day, please call them the following day.    You have been diagnosed with Viral Syndrome. TREATMENT: YOUR TREATMENT IS AIMED AT SYMPTOM MANAGEMENT.       A viral illness may cause a number of symptoms. The symptoms depend on the part of the body that the virus affects. If it settles in your nose, throat, and lungs, it may cause cough, sore throat, congestion, and sometimes headache. If it settles in your stomach and intestinal tract, it may cause vomiting and diarrhea. Sometimes it causes vague symptoms like "aching all over," feeling tired, loss of appetite, or fever. A viral illness usually lasts 1 to 2 weeks, but sometimes it lasts longer. In some cases, a more serious infection can look like a viral syndrome in the first few days of the illness; therefore, you may need another exam and additional tests to know the difference.          Home care  Follow these guidelines for taking care of yourself at home:  · If symptoms are severe, rest at home for the first 2 to 3 days.  · Stay away from cigarette smoke - both your smoke and the smoke from others.  · You may use over-the-counter acetaminophen or ibuprofen for fever, muscle aching, and headache, unless another medicine was prescribed for this. If you have chronic liver or kidney disease or ever had a stomach ulcer or GI bleeding, talk with your doctor before using these medicines. No one who is younger than 18 and ill with a fever should take aspirin. It may cause severe disease or death.  · Your appetite may be " poor, so a light diet is fine. Avoid dehydration by drinking 8 to 12 8-ounce glasses of fluids each day. This may include water; orange juice; lemonade; apple, grape, and cranberry juice; clear fruit drinks; electrolyte replacement and sports drinks; and decaffeinated teas and coffee. If you have been diagnosed with a kidney disease, ask your doctor how much and what types of fluids you should drink to prevent dehydration. If you have kidney disease, drinking too much fluid can cause it build up in the your body and be dangerous to your health.  · Over-the-counter remedies won't shorten the length of the illness but may be helpful for cough, sore throat; and nasal and sinus congestion. Don't use decongestants if you have high blood pressure.    Follow-up care  Follow up with your healthcare provider if your symptoms continue for more than 7 days.    Fever Cough Body Aches Nausea and Vomiting Diarrhea Congestion or Runny Nose    OTC Tylenol   OTC NSAIDs  Tessalon Pearls   Phenergan DM   OTC cough medications  Mobic   OTC Tylenol   OTC NSAIDs  Zofran   Phenergan   OTC Emetrol  DO NOT take ant-diarrheal medications  OTC Claritin, Zyrtec, Allegra, OR Xyzal   OTC Flonase   OTC Benadryl      Cough Allergy Symptoms Asthma Headache or Body Aches   Tessalon Perles are a non-narcotic cough medicine. It works by numbing the throat and lungs, making the cough reflex less active, it is used to relieve coughing during the day. If you have been given Phenergan DM cough syrup, you do NOT need to take both medications at the same time.    Phenergan DM is a combination medication that is used to treat cough. Phenergan DM works like an antihistamine and cough suppressant. This medication will make you sleepy like Benadryl, have a drying effect, and act on a part of the brain (cough center) to reduce the need to cough. DO NOT take Benadryl and Phenergan together. You may take over-the-counter claritin, zyrtec, allegra, or  "xyzal as directed, these are antihistamines that will work to dry up secretions/mucus. Antihistamines work to block the effects of a certain natural substance (histamine), which causes allergy symptoms.    OR    Use over-the-counter Flonase (a nasal steroid) or prescribed Azelastine (a nasal antihistamine) to treat runny nose, sneezing, itchy nose, nasal congestion, and postnasal drip.    Proper administration is to "look down at your toes and aim for your nose". The goal is to aim for your nasolabial folds, the creases in your nose. If you feel the medication drip down your throat, you have NOT administered it correctly. If you can "smell the roses" (floral scent), then you have administered it correctly. If you have a history of asthma, expressed a concern about wheezing or have been told you were wheezing during your exam today, you are being given Albuterol. Albuterol is a bronchodilator that relaxes muscles in the airways and increases air flow to the lungs; it is used to treat or prevent bronchospasm, or narrowing of the airways in the lungs.     If you have a history of asthma, expressed a concern about wheezing or have been told you were wheezing during your exam today and are NOT being prescribed Albuterol that is because you have insured me that you have an adequate supply of the drug at home.    Mobic is a nonsteroidal anti-inflammatory drug (NSAID), it is used to treat inflammation. It reduces pain, swelling, and stiffness of the joints. Please do NOT take any other NSAID medications while on this medication, you can take Tylenol if you feel the need. If you were not prescribed an anti-inflammatory medication, and if you do not have any history of stomach/intestinal ulcers, or kidney disease, or are not taking a blood thinner such as Coumadin, Plavix, Pradaxa, Eloquis, or Xaralta for example, it is OK to take over the counter Ibuprofen or Advil or Motrin or Aleve as directed.  Do not take any of these " medications on an empty stomach.         Dehydration (Adult)  Dehydration occurs when your body loses too much fluid. This may be the result of prolonged vomiting or diarrhea, excessive sweating, or a high fever. It may also happen if you don't drink enough fluid when you're sick or out in the heat. Misuse of diuretics (water pills) can also be a cause.  Symptoms include thirst and decreased urine output. You may also feel dizzy, weak, fatigued, or very drowsy. The diet described below is usually enough to treat dehydration. In some cases, you may need medicine.    Home care  · Drink at least 12 8-ounce glasses of fluid every day to resolve the dehydration. Fluid may include water; orange juice; lemonade; apple, grape, or cranberry juice; clear fruit drinks; electrolyte replacement and sports drinks; and teas and coffee without caffeine. If you have been diagnosed with a kidney disease, ask your doctor how much and what types of fluids you should drink to prevent dehydration. If you have kidney disease, fluid can build up in the body. This can be dangerous to your health.  · If you have a fever, muscle aches, or a headache as a result of a cold or flu, you may take acetaminophen or ibuprofen, unless another medicine was prescribed. If you have chronic liver or kidney disease, or have ever had a stomach ulcer or gastrointestinal bleeding, talk with your health care provider before using these medicines. Don't take aspirin if you are younger than 18 and have a fever. Aspirin raises the chance for severe liver injury.    When to seek medical advice  DEHYDRATION:  · Continued vomiting  · Frequent diarrhea (more than 5 times a day); blood (red or black color) or mucus in diarrhea  · Blood in vomit or stool  · Swollen abdomen or increasing abdominal pain  · Weakness, dizziness, or fainting  · Unusual drowsiness or confusion  · Reduced urine output or extreme thirst    FEVER:  Call your healthcare provider right away if  any of these occur:  · Your child is 3 months old or younger and has a fever of 100.4°F (38°C) or higher. Get medical care right away. Fever in a young baby can be a sign of a dangerous infection.  · Your child is of any age and has repeated fevers above 104°F (40°C).  · Your child is younger than 2 years of age and a fever of 100.4°F (38°C) continues for more than 1 day.  · A fever of 100.4°F (38°C) for more than 3 days in anyone older than 2 years of age.       Why didn't I get a steroid today?    The benefits are small and, unlike topical glucocorticoids, systemic glucocorticoids possess a significant side effect profile. Major side effects include:    · Effects immunity predisposing you to getting a more severe infection and increases your white blood cell count. You have the flu, you do not need anything to weaken your immune system right now.  · Young children -- Growth impairment   · Skin consequences: Skin thinning and ecchymoses, Cushingoid appearance (rounded face), acne, weight gain, mild hirsutism, facial erythema, and striae.  · Eye consequences: Cataracts, increased intraocular pressure, exophthalmos.   · Cardiovascular consequences: Fluid retention, premature atherosclerotic disease, and arrhythmias.   · GI consequences: Increased risk for adverse gastrointestinal effects, such as gastritis, ulcer formation, and gastrointestinal bleeding  · Bone and muscle consequences: Osteoporosis, osteonecrosis, and myopathy.  · Neuropsychiatric effects: Mood disorders, psychosis, memory impairment, and   · Metabolic and Endocrine consequences: Suppress the hypothalamic-pituitary-adrenal (HPA) axis and increase blood sugar.     Can I just have a shot instead of pills?  No. If you are sick, you need a course of treatment (not just a one time dose).     Why didn't I get an antibiotic today?  You have been diagnosed with a virus an antibiotic will not help you. Antibiotics work by destroying the cell walls of  "bacteria, viruses do not have cell walls.     When to seek medical advice:    DEHYDRATION:  · Continued vomiting  · Frequent diarrhea (more than 5 times a day); blood (red or black color) or mucus in diarrhea  · Blood in vomit or stool  · Swollen abdomen or increasing abdominal pain  · Reduced urine output or extreme thirst  · Repeated vomiting after the first 4 hours on fluids  · Occasional vomiting for more than 48 hours  · Frequent diarrhea (more than 5 times a day), blood in diarrhea (red or black color), or mucus in diarrhea  · Blood in vomit or stool  · Swollen abdomen or signs of abdominal pain  · No urine for 8 hours, no tears when crying, "sunken" eyes, or dry mouth  · Unusual behavior changes, fussiness, drowsiness, confusion, or seizure  · Fever (see Fever and children, below)  FEVER:  Call your healthcare provider right away if any of these occur:  · Your child is 3 months old or younger and has a fever of 100.4°F (38°C) or higher. Get medical care right away. Fever in a young baby can be a sign of a dangerous infection.  · Your child is of any age and has repeated fevers above 104°F (40°C).  · Your child is younger than 2 years of age and a fever of 100.4°F (38°C) continues for more than 1 day.  · A fever of 100.4°F (38°C) for more than 3 days in anyone older than 2 years of age.  Call 911  Call 911 or your local emergency services number if the child shows any of these symptoms or signs:  · Trouble breathing  · Confusion  · Is very drowsy or difficult to awaken  · Fainting or loss of consciousness  · Rapid heart rate  · Seizure  · Stiff neck     Your provider discussed your plan of care with you during your physical exam. It was reviewed once more by the provider when giving you an after visit summary. If the patient is a minor, the discharge instructions were discussed with an adult and that adult acknowledge their understanding of the provider's teaching.      "

## 2020-02-23 ENCOUNTER — TELEPHONE (OUTPATIENT)
Dept: URGENT CARE | Facility: CLINIC | Age: 85
End: 2020-02-23

## 2020-03-04 RX ORDER — ESCITALOPRAM OXALATE 10 MG/1
TABLET ORAL
Qty: 90 TABLET | Refills: 1 | Status: SHIPPED | OUTPATIENT
Start: 2020-03-04 | End: 2020-07-30 | Stop reason: SDUPTHER

## 2020-07-27 ENCOUNTER — TELEPHONE (OUTPATIENT)
Dept: FAMILY MEDICINE | Facility: CLINIC | Age: 85
End: 2020-07-27

## 2020-07-27 NOTE — TELEPHONE ENCOUNTER
----- Message from Melody Sterling DO sent at 7/24/2020  6:39 PM CDT -----  Please call patient bone density normal. No need to repeat

## 2020-07-28 NOTE — PROGRESS NOTES
FAMILY MEDICINE    Patient Active Problem List   Diagnosis    Primary localized osteoarthrosis, ankle and foot    Essential hypertension    Recurrent major depressive disorder, in full remission    Chronic atrial fibrillation    Chronic diastolic heart failure    Osteopenia of left hip    Stage 3 chronic kidney disease    Advance care planning    Long term (current) use of anticoagulants       CC:   Chief Complaint   Patient presents with    Follow-up       HPI: Mary Ellen Miller is a 89 y.o. female  - with hypertension, chronic atrial fibrillation on OAC Eliquis (2018), pEFHF, anxiety, osteoarthritis, and history of osteopenia presents for follow-up depression and HTN    MPOA: youngest son Valentino    Cardiology: Dr. Last  Dermatology Dr. Adame     1.Hypertension  - with pEFHF and Afib    Current medication treatment:   atenolol (TENORMIN) 50 MG tablet, Take 50 mg by mouth 2 (two) times daily., Disp: , Rfl: 10  spironolactone (ALDACTONE) 25 MG tablet, Take 25 mg by mouth once daily., Disp: , Rfl: 6    Medication side effects: denies  Exercise regimen: walks and stays active. Enjoys gardening  Dietary treatment: none specific    Home BP cuff: yes    2. Depression    Age diagnosed: 29 yo   - after  passed suddenly in work accident at [employer]     Psychiatrist: none  Psychologist: none  Counselor : none    Prior treatments: denies  Side effects of prior treatment: NA    Current treatment:   escitalopram oxalate (LEXAPRO) 10 MG tablet, TAKE 1 TABLET BY MOUTH EVERY DAY, Disp: 90 tablet, Rfl: 1    Side effects of current treatment: denies    Symptoms related: well controlled  Panic attacks: denies    Hopelessness: denies  Sleep: denies issues  Suicidal thoughts: denies  Thoughts of self harm:denies  Thoughts of harm to others: denies  History of suicide attempts: denies  Family history of suicide:denies    Support system: family and talks with her sister daily very active        HEALTH MAINTENANCE:    Health Maintenance   Topic Date Due    Colonoscopy  05/01/2022    Lipid Panel  10/01/2024    TETANUS VACCINE  07/25/2026    Pneumococcal Vaccine (65+ Low/Medium Risk)  Completed       ROS: Review of Systems   Constitutional: Negative.    HENT: Negative.    Eyes: Negative.    Respiratory: Negative.    Cardiovascular: Negative.    Gastrointestinal: Negative.    Endocrine: Negative.    Genitourinary: Negative.    Musculoskeletal: Positive for arthralgias.        Otherwise negative   Skin: Negative.    Allergic/Immunologic: Negative.    Neurological: Negative.    Hematological: Negative.    Psychiatric/Behavioral: Negative.        ALLERGIES:   Review of patient's allergies indicates:   Allergen Reactions    Percodan [oxycodone hcl-oxycodone-asa] Other (See Comments)     halucinations    Penicillins Itching    Sulfa (sulfonamide antibiotics) Diarrhea and Nausea Only    Amoxicillin        MEDS:     Current Outpatient Medications:     apixaban (ELIQUIS ORAL), Take 1 tablet by mouth 3 (three) times daily., Disp: , Rfl:     ascorbic acid (VITAMIN C) 500 MG tablet, Take 500 mg by mouth once daily., Disp: , Rfl:     atenolol (TENORMIN) 50 MG tablet, Take 50 mg by mouth 2 (two) times daily., Disp: , Rfl: 10    biotin 5 mg Tab, Take 500 mg by mouth once daily. , Disp: , Rfl:     calcipotriene (DOVONOX) 0.005 % cream, , Disp: , Rfl:     calcium carbonate (OS-ZOILA) 600 mg (1,500 mg) Tab, Take 600 mg by mouth 2 (two) times daily with meals., Disp: , Rfl:     escitalopram oxalate (LEXAPRO) 10 MG tablet, Take 1 tablet (10 mg total) by mouth once daily., Disp: 90 tablet, Rfl: 3    furosemide (LASIX) 40 MG tablet, Take 40 mg by mouth once daily. , Disp: , Rfl: 6    multivit,calc,mins/iron/folic (ONE-A-DAY WOMENS FORMULA ORAL), Take 1 tablet by mouth once daily., Disp: , Rfl:     spironolactone (ALDACTONE) 25 MG tablet, Take 25 mg by mouth once daily., Disp: , Rfl: 6    vitamin D 1000 units Tab, Take 2,000 mg by  "mouth once daily., Disp: , Rfl:     vitamin E 1000 UNIT capsule, Take 1,000 Units by mouth once daily., Disp: , Rfl:     Past Medical History:   Diagnosis Date    Anxiety     Atrial fibrillation     Colon polyps 2012    Hypertension     Leg ulcer, right, limited to breakdown of skin 2019    Unilateral femoral hernia with obstruction, without gangrene 10/26/2015       Past Surgical History:   Procedure Laterality Date    HYSTERECTOMY      LUNG SURGERY      OOPHORECTOMY      RADICAL HYSTERECTOMY         History reviewed. No pertinent family history.    Social History     Tobacco Use    Smoking status: Never Smoker    Smokeless tobacco: Never Used   Substance Use Topics    Alcohol use: No    Drug use: No       Social History     Social History Narrative     since 34 yo when   tragically. Single mother of 2 now adult son. Valentino Carter. Drives and very active. 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    Physical Exam  Constitutional:       General: She is not in acute distress.     Appearance: Normal appearance.   HENT:      Right Ear: Tympanic membrane and ear canal normal.      Left Ear: Tympanic membrane and ear canal normal.   Neck:      Musculoskeletal: Neck supple.      Thyroid: No thyroid mass or thyromegaly.      Vascular: No carotid bruit or JVD.   Cardiovascular:      Rate and Rhythm: Normal rate. Rhythm irregular.      Pulses: Normal pulses.      Heart sounds: Normal heart sounds. No murmur. No friction rub. No gallop.    Pulmonary:      Effort: Pulmonary effort is normal.      Breath sounds: Normal breath sounds. No decreased breath sounds, wheezing, " rhonchi or rales.   Abdominal:      General: Bowel sounds are normal.      Palpations: Abdomen is soft.      Tenderness: There is no abdominal tenderness.   Musculoskeletal:      Right lower leg: No edema.      Left lower leg: No edema.   Skin:     General: Skin is warm.      Capillary Refill: Capillary refill takes less than 2 seconds.   Neurological:      Mental Status: She is alert.           Depression Patient Health Questionnaire 7/30/2020 1/29/2020 11/18/2019 10/22/2019 10/7/2019 9/23/2019 9/18/2019   Over the last two weeks how often have you been bothered by little interest or pleasure in doing things 0 0 0 0 0 0 0   Over the last two weeks how often have you been bothered by feeling down, depressed or hopeless 0 0 0 0 0 0 0   PHQ-2 Total Score 0 0 0 0 0 0 0       PERTINENT RESULTS:         7/24/2020 Routine      Narrative & Impression     EXAMINATION:  DEXA BONE DENSITY SPINE HIP     CLINICAL HISTORY:  Other specified disorders of bone density and structure, left thigh     TECHNIQUE:  DXA scanning was performed over the left hip and lumbar spine.  Review of the images confirms satisfactory positioning and technique.     FINDINGS:  The L1 to L4 vertebral bone mineral density is equal to 1.448 g/cm squared with a T score of 2.2.     The left femoral neck bone mineral density is equal to 0.908 g/cm squared with a T score of -0.9.     Impression:     Lumbar spine normal bone density.     Left femoral neck normal bone density.            4/20/2017       Narrative     Clinical indication: Screening for osteoporosis    Technique: A Bone density measurement was performed to evaluate for the presence of bone mineral loss in the axial and appendicular skeleton.. Dual energy X-ray absorptiometry was used to determine the bone density of the lumbar spine (L1-L4) and both hips.    Findings:   The bone mineral density of the lumbar spine measured at the L1-L4 region was 1.361 g/cm2. This corresponds to a Z-score of 3.5  and a T-score of 1.5 which places the patient in the normal bone densitycategory of bone mineral density as classified by the WHO criteria.    The bone mineral density of the left hip measured at the neck was 0.88 g/cm2. This corresponds to a Z-score of 1.4 and a T-score of -1.1 which places the patient in the osteopenia category of bone mineral density as classified by WHO criteria.    The bone mineral density of the right hip measured at the neck was 0.893g/cm2. This corresponds to a Z-score of 1.5 and a T-score of -1.0 which places the patient in the normal bone density category of bone density as classified by the WHO criteria.      Impression       1. Lumbar spine (L1-L4): Normal bone density  2. Left hip: Osteopenia  3. Right hip: Normal bone density.       ASSESSMENT/PLAN:  Problem List Items Addressed This Visit        Psychiatric    Recurrent major depressive disorder, in full remission    Current Assessment & Plan     - discussed weaning off medication and stopping medication  - has been stable for several years   - pt would like to continue medication         Relevant Medications    escitalopram oxalate (LEXAPRO) 10 MG tablet       Cardiac/Vascular    Chronic atrial fibrillation    Overview     - followed by Dr. Last  - OAC Eliquis without signs of bleeding  - rate controlled         Chronic diastolic heart failure - Primary    Current Assessment & Plan     - no signs of acute decompensation  - medications managed by Dr. Last         Essential hypertension    Current Assessment & Plan     - well controlled  - continue current medications            Renal/    Stage 3 chronic kidney disease    Overview     - stage 2-3  - last creatinine 0.99 and GFR 59            Hematology    Long term (current) use of anticoagulants    Overview     - followed by Dr. Last  - no signs of bleeding            Orthopedic    Osteopenia of left hip    Overview     - 4/20/17 Dexa: Left hip osteopenia  - 7/20/2020 dexa:  normal  - was on Boniva several years and recommend stop7/30/2020  - fall prevention  - weight bearing exercises like walking, squats, stair and jogging if able  - over the counter vitamin D3 2403-4633 units daily  - dietary calcium 1200 mg per day with diet or supplements                  ORDERS:   Orders Placed This Encounter    escitalopram oxalate (LEXAPRO) 10 MG tablet     Last per Dr. Last and pending 8/2020.     Vaccines recommended: up to date    Follow-up in 6 months or sooner with any concerns    Dr. Melody Sterling D.O.   Family Medicine

## 2020-07-30 ENCOUNTER — OFFICE VISIT (OUTPATIENT)
Dept: FAMILY MEDICINE | Facility: CLINIC | Age: 85
End: 2020-07-30
Payer: MEDICARE

## 2020-07-30 VITALS
DIASTOLIC BLOOD PRESSURE: 70 MMHG | TEMPERATURE: 98 F | WEIGHT: 140 LBS | RESPIRATION RATE: 18 BRPM | BODY MASS INDEX: 24.8 KG/M2 | OXYGEN SATURATION: 95 % | HEART RATE: 73 BPM | HEIGHT: 63 IN | SYSTOLIC BLOOD PRESSURE: 130 MMHG

## 2020-07-30 DIAGNOSIS — I48.20 CHRONIC ATRIAL FIBRILLATION: ICD-10-CM

## 2020-07-30 DIAGNOSIS — I10 ESSENTIAL HYPERTENSION: ICD-10-CM

## 2020-07-30 DIAGNOSIS — N18.30 STAGE 3 CHRONIC KIDNEY DISEASE: ICD-10-CM

## 2020-07-30 DIAGNOSIS — I50.32 CHRONIC DIASTOLIC HEART FAILURE: Primary | ICD-10-CM

## 2020-07-30 DIAGNOSIS — M85.852 OSTEOPENIA OF LEFT HIP: ICD-10-CM

## 2020-07-30 DIAGNOSIS — F33.42 RECURRENT MAJOR DEPRESSIVE DISORDER, IN FULL REMISSION: ICD-10-CM

## 2020-07-30 DIAGNOSIS — Z79.01 LONG TERM (CURRENT) USE OF ANTICOAGULANTS: ICD-10-CM

## 2020-07-30 PROBLEM — L97.911 LEG ULCER, RIGHT, LIMITED TO BREAKDOWN OF SKIN: Status: RESOLVED | Noted: 2019-11-20 | Resolved: 2020-07-30

## 2020-07-30 PROCEDURE — 99999 PR PBB SHADOW E&M-EST. PATIENT-LVL III: ICD-10-PCS | Mod: PBBFAC,,, | Performed by: FAMILY MEDICINE

## 2020-07-30 PROCEDURE — 99214 OFFICE O/P EST MOD 30 MIN: CPT | Mod: S$PBB,,, | Performed by: FAMILY MEDICINE

## 2020-07-30 PROCEDURE — 99999 PR PBB SHADOW E&M-EST. PATIENT-LVL III: CPT | Mod: PBBFAC,,, | Performed by: FAMILY MEDICINE

## 2020-07-30 PROCEDURE — 99213 OFFICE O/P EST LOW 20 MIN: CPT | Mod: PBBFAC,PN | Performed by: FAMILY MEDICINE

## 2020-07-30 PROCEDURE — 99214 PR OFFICE/OUTPT VISIT, EST, LEVL IV, 30-39 MIN: ICD-10-PCS | Mod: S$PBB,,, | Performed by: FAMILY MEDICINE

## 2020-07-30 RX ORDER — ESCITALOPRAM OXALATE 10 MG/1
10 TABLET ORAL DAILY
Qty: 90 TABLET | Refills: 3 | Status: SHIPPED | OUTPATIENT
Start: 2020-07-30 | End: 2021-06-21

## 2020-07-30 NOTE — ASSESSMENT & PLAN NOTE
- discussed weaning off medication and stopping medication  - has been stable for several years   - pt would like to continue medication

## 2020-11-03 ENCOUNTER — TELEPHONE (OUTPATIENT)
Dept: FAMILY MEDICINE | Facility: CLINIC | Age: 85
End: 2020-11-03

## 2020-11-03 DIAGNOSIS — Z12.31 ENCOUNTER FOR SCREENING MAMMOGRAM FOR BREAST CANCER: Primary | ICD-10-CM

## 2020-11-03 NOTE — TELEPHONE ENCOUNTER
----- Message from Bola Posey sent at 11/3/2020  1:23 PM CST -----  Regarding: Mammogram  Type:  Mammogram    Caller is requesting to schedule their annual mammogram appointment.  Order is not listed in EPIC.  Please enter order and contact patient to schedule.  Name of Caller: patient  Where would they like the mammogram performed? [facility]  Would the patient rather a call back or a response via [facility]?  Call back  Best Call Back Number:  [phone]  Additional Information:  please call today

## 2020-11-03 NOTE — TELEPHONE ENCOUNTER
Orders Placed This Encounter    Mammo Digital Screening Bilat w/ Trey Sterling D.O.   [facility]

## 2020-11-05 LAB
CHOLEST SERPL-MSCNC: 187 MG/DL (ref 0–200)
HDL/CHOLESTEROL RATIO: 4.3 %
HDLC SERPL-MCNC: 43 MG/DL
LDLC SERPL CALC-MCNC: 134 MG/DL
NON HDL CHOL (CALC): 144
TRIGL SERPL-MCNC: 123 MG/DL
VLDL CHOLESTEROL: 25 MG/DL

## 2020-11-20 ENCOUNTER — TELEPHONE (OUTPATIENT)
Dept: FAMILY MEDICINE | Facility: CLINIC | Age: 85
End: 2020-11-20

## 2020-11-20 NOTE — TELEPHONE ENCOUNTER
----- Message from Melody Sterling DO sent at 11/20/2020 10:14 AM CST -----  Please call patient: Mammogram negative

## 2021-01-09 ENCOUNTER — IMMUNIZATION (OUTPATIENT)
Dept: FAMILY MEDICINE | Facility: CLINIC | Age: 86
End: 2021-01-09
Payer: MEDICARE

## 2021-01-09 DIAGNOSIS — Z23 NEED FOR VACCINATION: ICD-10-CM

## 2021-01-09 PROCEDURE — 91300 COVID-19, MRNA, LNP-S, PF, 30 MCG/0.3 ML DOSE VACCINE: CPT | Mod: PBBFAC | Performed by: FAMILY MEDICINE

## 2021-01-27 RX ORDER — APIXABAN 5 MG/1
5 TABLET, FILM COATED ORAL 2 TIMES DAILY
COMMUNITY
Start: 2020-12-31 | End: 2024-04-02

## 2021-01-27 RX ORDER — DICLOFENAC SODIUM 10 MG/G
GEL TOPICAL
COMMUNITY
Start: 2020-12-10 | End: 2023-05-10 | Stop reason: SDUPTHER

## 2021-01-28 ENCOUNTER — TELEPHONE (OUTPATIENT)
Dept: FAMILY MEDICINE | Facility: CLINIC | Age: 86
End: 2021-01-28

## 2021-01-30 ENCOUNTER — IMMUNIZATION (OUTPATIENT)
Dept: FAMILY MEDICINE | Facility: CLINIC | Age: 86
End: 2021-01-30
Payer: MEDICARE

## 2021-01-30 DIAGNOSIS — Z23 NEED FOR VACCINATION: Primary | ICD-10-CM

## 2021-01-30 PROCEDURE — 0002A COVID-19, MRNA, LNP-S, PF, 30 MCG/0.3 ML DOSE VACCINE: CPT | Mod: PBBFAC | Performed by: FAMILY MEDICINE

## 2021-01-30 PROCEDURE — 91300 COVID-19, MRNA, LNP-S, PF, 30 MCG/0.3 ML DOSE VACCINE: CPT | Mod: PBBFAC | Performed by: FAMILY MEDICINE

## 2021-02-01 ENCOUNTER — OFFICE VISIT (OUTPATIENT)
Dept: FAMILY MEDICINE | Facility: CLINIC | Age: 86
End: 2021-02-01
Payer: MEDICARE

## 2021-02-01 ENCOUNTER — TELEPHONE (OUTPATIENT)
Dept: FAMILY MEDICINE | Facility: CLINIC | Age: 86
End: 2021-02-01

## 2021-02-01 VITALS
SYSTOLIC BLOOD PRESSURE: 124 MMHG | TEMPERATURE: 98 F | RESPIRATION RATE: 18 BRPM | HEIGHT: 63 IN | HEART RATE: 81 BPM | OXYGEN SATURATION: 95 % | BODY MASS INDEX: 24.8 KG/M2 | WEIGHT: 140 LBS | DIASTOLIC BLOOD PRESSURE: 80 MMHG

## 2021-02-01 DIAGNOSIS — I10 ESSENTIAL HYPERTENSION: ICD-10-CM

## 2021-02-01 DIAGNOSIS — Z71.89 ADVANCE CARE PLANNING: ICD-10-CM

## 2021-02-01 DIAGNOSIS — I48.20 CHRONIC ATRIAL FIBRILLATION: ICD-10-CM

## 2021-02-01 DIAGNOSIS — Z79.01 LONG TERM (CURRENT) USE OF ANTICOAGULANTS: ICD-10-CM

## 2021-02-01 DIAGNOSIS — M85.852 OSTEOPENIA OF LEFT HIP: ICD-10-CM

## 2021-02-01 DIAGNOSIS — N18.31 STAGE 3A CHRONIC KIDNEY DISEASE: ICD-10-CM

## 2021-02-01 DIAGNOSIS — F33.42 RECURRENT MAJOR DEPRESSIVE DISORDER, IN FULL REMISSION: Primary | ICD-10-CM

## 2021-02-01 DIAGNOSIS — I50.32 CHRONIC DIASTOLIC HEART FAILURE: ICD-10-CM

## 2021-02-01 DIAGNOSIS — L03.115 CELLULITIS OF RIGHT LOWER EXTREMITY: ICD-10-CM

## 2021-02-01 LAB
ALT: 14
AST: 21
BUN BLD-MCNC: 24 MG/DL (ref 4–21)
CALCIUM SERPL-MCNC: 9.5 MG/DL
CHOLEST/HDLC SERPL: 187 {RATIO}
CHOLEST/HDLC SERPL: 4.3 {RATIO}
CREAT SERPL-MCNC: 1 MG/DL
FREE THYROXINE INDEX: 2.74
GFR ESTIMATION: 52.1
GLUCOSE: 76
HCT VFR BLD AUTO: 45 % (ref 36–46)
HDLC SERPL-MCNC: 43 MG/DL
HGB BLD-MCNC: 14.6 G/DL (ref 12–16)
LDLC SERPL CALC-MCNC: 134 MG/DL
PLATELETS: 193
SODIUM: 141 MMOL/L
T4: 8.6
TRIGL SERPL-MCNC: 123 MG/DL
TSH: 1.51
WBC: 6.4

## 2021-02-01 PROCEDURE — 99999 PR PBB SHADOW E&M-EST. PATIENT-LVL IV: CPT | Mod: PBBFAC,,, | Performed by: FAMILY MEDICINE

## 2021-02-01 PROCEDURE — 99214 PR OFFICE/OUTPT VISIT, EST, LEVL IV, 30-39 MIN: ICD-10-PCS | Mod: S$PBB,,, | Performed by: FAMILY MEDICINE

## 2021-02-01 PROCEDURE — 99214 OFFICE O/P EST MOD 30 MIN: CPT | Mod: S$PBB,,, | Performed by: FAMILY MEDICINE

## 2021-02-01 PROCEDURE — 99999 PR PBB SHADOW E&M-EST. PATIENT-LVL IV: ICD-10-PCS | Mod: PBBFAC,,, | Performed by: FAMILY MEDICINE

## 2021-02-01 PROCEDURE — 99214 OFFICE O/P EST MOD 30 MIN: CPT | Mod: PBBFAC,PN | Performed by: FAMILY MEDICINE

## 2021-02-01 RX ORDER — CEPHALEXIN 500 MG/1
1 CAPSULE ORAL EVERY 6 HOURS
COMMUNITY
Start: 2021-01-28 | End: 2021-08-03

## 2021-02-01 RX ORDER — MUPIROCIN 20 MG/G
1 OINTMENT TOPICAL 3 TIMES DAILY
COMMUNITY
End: 2023-10-25 | Stop reason: ALTCHOICE

## 2021-02-02 ENCOUNTER — PATIENT OUTREACH (OUTPATIENT)
Dept: ADMINISTRATIVE | Facility: HOSPITAL | Age: 86
End: 2021-02-02

## 2021-05-10 ENCOUNTER — OFFICE VISIT (OUTPATIENT)
Dept: ORTHOPEDICS | Facility: CLINIC | Age: 86
End: 2021-05-10
Payer: MEDICARE

## 2021-05-10 VITALS
HEIGHT: 63 IN | DIASTOLIC BLOOD PRESSURE: 82 MMHG | WEIGHT: 137.88 LBS | SYSTOLIC BLOOD PRESSURE: 150 MMHG | OXYGEN SATURATION: 96 % | HEART RATE: 83 BPM | BODY MASS INDEX: 24.43 KG/M2

## 2021-05-10 DIAGNOSIS — S52.501A CLOSED FRACTURE OF DISTAL END OF RIGHT RADIUS, UNSPECIFIED FRACTURE MORPHOLOGY, INITIAL ENCOUNTER: ICD-10-CM
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
## 2021-05-10 PROCEDURE — 99215 OFFICE O/P EST HI 40 MIN: CPT | Mod: PBBFAC,PN | Performed by: PHYSICIAN ASSISTANT

## 2021-05-10 PROCEDURE — 99203 PR OFFICE/OUTPT VISIT, NEW, LEVL III, 30-44 MIN: ICD-10-PCS | Mod: S$PBB,,, | Performed by: PHYSICIAN ASSISTANT

## 2021-05-10 PROCEDURE — 99203 OFFICE O/P NEW LOW 30 MIN: CPT | Mod: S$PBB,,, | Performed by: PHYSICIAN ASSISTANT

## 2021-05-10 PROCEDURE — 99999 PR PBB SHADOW E&M-EST. PATIENT-LVL V: CPT | Mod: PBBFAC,,, | Performed by: PHYSICIAN ASSISTANT

## 2021-05-10 PROCEDURE — 99999 PR PBB SHADOW E&M-EST. PATIENT-LVL V: ICD-10-PCS | Mod: PBBFAC,,, | Performed by: PHYSICIAN ASSISTANT

## 2021-05-10 RX ORDER — TRAMADOL HYDROCHLORIDE 50 MG/1
50 TABLET ORAL EVERY 8 HOURS PRN
Qty: 21 TABLET | Refills: 0 | Status: SHIPPED | OUTPATIENT
Start: 2021-05-10 | End: 2021-05-17

## 2021-05-31 ENCOUNTER — OFFICE VISIT (OUTPATIENT)
Dept: ORTHOPEDICS | Facility: CLINIC | Age: 86
End: 2021-05-31
Payer: MEDICARE

## 2021-05-31 ENCOUNTER — TELEPHONE (OUTPATIENT)
Dept: FAMILY MEDICINE | Facility: CLINIC | Age: 86
End: 2021-05-31

## 2021-05-31 VITALS
DIASTOLIC BLOOD PRESSURE: 62 MMHG | SYSTOLIC BLOOD PRESSURE: 120 MMHG | WEIGHT: 138 LBS | HEIGHT: 63 IN | BODY MASS INDEX: 24.45 KG/M2

## 2021-05-31 DIAGNOSIS — S52.501D CLOSED FRACTURE OF DISTAL END OF RIGHT RADIUS WITH ROUTINE HEALING, UNSPECIFIED FRACTURE MORPHOLOGY, SUBSEQUENT ENCOUNTER: Primary | ICD-10-CM

## 2021-05-31 PROCEDURE — 99999 PR PBB SHADOW E&M-EST. PATIENT-LVL IV: CPT | Mod: PBBFAC,,, | Performed by: PHYSICIAN ASSISTANT

## 2021-05-31 PROCEDURE — 99213 OFFICE O/P EST LOW 20 MIN: CPT | Mod: S$PBB,,, | Performed by: PHYSICIAN ASSISTANT

## 2021-05-31 PROCEDURE — 99999 PR PBB SHADOW E&M-EST. PATIENT-LVL IV: ICD-10-PCS | Mod: PBBFAC,,, | Performed by: PHYSICIAN ASSISTANT

## 2021-05-31 PROCEDURE — 99213 PR OFFICE/OUTPT VISIT, EST, LEVL III, 20-29 MIN: ICD-10-PCS | Mod: S$PBB,,, | Performed by: PHYSICIAN ASSISTANT

## 2021-05-31 PROCEDURE — 99214 OFFICE O/P EST MOD 30 MIN: CPT | Mod: PBBFAC,PN | Performed by: PHYSICIAN ASSISTANT

## 2021-06-01 ENCOUNTER — TELEPHONE (OUTPATIENT)
Dept: ORTHOPEDICS | Facility: CLINIC | Age: 86
End: 2021-06-01

## 2021-06-01 ENCOUNTER — PATIENT MESSAGE (OUTPATIENT)
Dept: ORTHOPEDICS | Facility: CLINIC | Age: 86
End: 2021-06-01

## 2021-06-23 ENCOUNTER — OFFICE VISIT (OUTPATIENT)
Dept: ORTHOPEDICS | Facility: CLINIC | Age: 86
End: 2021-06-23
Payer: MEDICARE

## 2021-06-23 VITALS
BODY MASS INDEX: 24.09 KG/M2 | SYSTOLIC BLOOD PRESSURE: 130 MMHG | DIASTOLIC BLOOD PRESSURE: 62 MMHG | TEMPERATURE: 78 F | WEIGHT: 136 LBS | RESPIRATION RATE: 20 BRPM

## 2021-06-23 DIAGNOSIS — S52.501D CLOSED FRACTURE OF DISTAL END OF RIGHT RADIUS WITH ROUTINE HEALING, UNSPECIFIED FRACTURE MORPHOLOGY, SUBSEQUENT ENCOUNTER: Primary | ICD-10-CM

## 2021-06-23 PROCEDURE — 99999 PR PBB SHADOW E&M-EST. PATIENT-LVL IV: CPT | Mod: PBBFAC,,, | Performed by: PHYSICIAN ASSISTANT

## 2021-06-23 PROCEDURE — 99213 PR OFFICE/OUTPT VISIT, EST, LEVL III, 20-29 MIN: ICD-10-PCS | Mod: S$PBB,,, | Performed by: PHYSICIAN ASSISTANT

## 2021-06-23 PROCEDURE — 99999 PR PBB SHADOW E&M-EST. PATIENT-LVL IV: ICD-10-PCS | Mod: PBBFAC,,, | Performed by: PHYSICIAN ASSISTANT

## 2021-06-23 PROCEDURE — 99214 OFFICE O/P EST MOD 30 MIN: CPT | Mod: PBBFAC,PN | Performed by: PHYSICIAN ASSISTANT

## 2021-06-23 PROCEDURE — 99213 OFFICE O/P EST LOW 20 MIN: CPT | Mod: S$PBB,,, | Performed by: PHYSICIAN ASSISTANT

## 2021-08-03 ENCOUNTER — TELEPHONE (OUTPATIENT)
Dept: FAMILY MEDICINE | Facility: CLINIC | Age: 86
End: 2021-08-03

## 2021-08-03 ENCOUNTER — OFFICE VISIT (OUTPATIENT)
Dept: FAMILY MEDICINE | Facility: CLINIC | Age: 86
End: 2021-08-03
Payer: MEDICARE

## 2021-08-03 VITALS
DIASTOLIC BLOOD PRESSURE: 70 MMHG | BODY MASS INDEX: 24.57 KG/M2 | SYSTOLIC BLOOD PRESSURE: 130 MMHG | HEART RATE: 83 BPM | OXYGEN SATURATION: 98 % | RESPIRATION RATE: 18 BRPM | HEIGHT: 63 IN | WEIGHT: 138.69 LBS

## 2021-08-03 DIAGNOSIS — I50.32 CHRONIC DIASTOLIC HEART FAILURE: Primary | ICD-10-CM

## 2021-08-03 DIAGNOSIS — I48.20 CHRONIC ATRIAL FIBRILLATION: ICD-10-CM

## 2021-08-03 DIAGNOSIS — N18.31 STAGE 3A CHRONIC KIDNEY DISEASE: ICD-10-CM

## 2021-08-03 DIAGNOSIS — I10 ESSENTIAL HYPERTENSION: ICD-10-CM

## 2021-08-03 DIAGNOSIS — F33.42 RECURRENT MAJOR DEPRESSIVE DISORDER, IN FULL REMISSION: ICD-10-CM

## 2021-08-03 DIAGNOSIS — Z79.01 LONG TERM (CURRENT) USE OF ANTICOAGULANTS: ICD-10-CM

## 2021-08-03 PROBLEM — L03.115 CELLULITIS OF RIGHT LOWER EXTREMITY: Status: RESOLVED | Noted: 2021-02-01 | Resolved: 2021-08-03

## 2021-08-03 PROCEDURE — 99999 PR PBB SHADOW E&M-EST. PATIENT-LVL IV: ICD-10-PCS | Mod: PBBFAC,,, | Performed by: FAMILY MEDICINE

## 2021-08-03 PROCEDURE — 99999 PR PBB SHADOW E&M-EST. PATIENT-LVL IV: CPT | Mod: PBBFAC,,, | Performed by: FAMILY MEDICINE

## 2021-08-03 PROCEDURE — 99214 OFFICE O/P EST MOD 30 MIN: CPT | Mod: PBBFAC,PN | Performed by: FAMILY MEDICINE

## 2021-08-03 PROCEDURE — 99214 OFFICE O/P EST MOD 30 MIN: CPT | Mod: S$PBB,,, | Performed by: FAMILY MEDICINE

## 2021-08-03 PROCEDURE — 99214 PR OFFICE/OUTPT VISIT, EST, LEVL IV, 30-39 MIN: ICD-10-PCS | Mod: S$PBB,,, | Performed by: FAMILY MEDICINE

## 2021-10-12 ENCOUNTER — CLINICAL SUPPORT (OUTPATIENT)
Dept: FAMILY MEDICINE | Facility: CLINIC | Age: 86
End: 2021-10-12
Payer: MEDICARE

## 2021-10-12 PROCEDURE — 90694 VACC AIIV4 NO PRSRV 0.5ML IM: CPT | Mod: PBBFAC,PN

## 2021-10-12 PROCEDURE — G0008 ADMIN INFLUENZA VIRUS VAC: HCPCS | Mod: PBBFAC,PN

## 2021-10-15 ENCOUNTER — IMMUNIZATION (OUTPATIENT)
Dept: FAMILY MEDICINE | Facility: CLINIC | Age: 86
End: 2021-10-15
Payer: MEDICARE

## 2021-10-15 DIAGNOSIS — Z23 NEED FOR VACCINATION: Primary | ICD-10-CM

## 2021-10-15 PROCEDURE — 91300 COVID-19, MRNA, LNP-S, PF, 30 MCG/0.3 ML DOSE VACCINE: CPT | Mod: PBBFAC,PN

## 2021-10-15 PROCEDURE — 0003A COVID-19, MRNA, LNP-S, PF, 30 MCG/0.3 ML DOSE VACCINE: CPT | Mod: PBBFAC,PN

## 2021-12-16 DIAGNOSIS — F33.42 RECURRENT MAJOR DEPRESSIVE DISORDER, IN FULL REMISSION: ICD-10-CM

## 2021-12-16 RX ORDER — ESCITALOPRAM OXALATE 10 MG/1
10 TABLET ORAL DAILY
Qty: 90 TABLET | Refills: 3 | Status: SHIPPED | OUTPATIENT
Start: 2021-12-16 | End: 2022-12-12 | Stop reason: SDUPTHER

## 2021-12-20 ENCOUNTER — TELEPHONE (OUTPATIENT)
Dept: FAMILY MEDICINE | Facility: CLINIC | Age: 86
End: 2021-12-20
Payer: MEDICARE

## 2021-12-21 ENCOUNTER — TELEPHONE (OUTPATIENT)
Dept: FAMILY MEDICINE | Facility: CLINIC | Age: 86
End: 2021-12-21
Payer: MEDICARE

## 2022-01-28 ENCOUNTER — PATIENT MESSAGE (OUTPATIENT)
Dept: FAMILY MEDICINE | Facility: CLINIC | Age: 87
End: 2022-01-28
Payer: MEDICARE

## 2022-01-28 RX ORDER — BENZONATATE 100 MG/1
100 CAPSULE ORAL 3 TIMES DAILY PRN
Qty: 60 CAPSULE | Refills: 0 | Status: SHIPPED | OUTPATIENT
Start: 2022-01-28 | End: 2022-02-07

## 2022-02-02 ENCOUNTER — TELEPHONE (OUTPATIENT)
Dept: FAMILY MEDICINE | Facility: CLINIC | Age: 87
End: 2022-02-02
Payer: MEDICARE

## 2022-02-02 NOTE — TELEPHONE ENCOUNTER
----- Message from Melody Sterling DO sent at 2/2/2022  8:17 AM CST -----  Regarding: Request copy of labs from Dr. Last  Request copy of labs from Dr. Last

## 2022-02-09 ENCOUNTER — TELEPHONE (OUTPATIENT)
Dept: ADMINISTRATIVE | Facility: HOSPITAL | Age: 87
End: 2022-02-09
Payer: MEDICARE

## 2022-02-16 ENCOUNTER — TELEPHONE (OUTPATIENT)
Dept: FAMILY MEDICINE | Facility: CLINIC | Age: 87
End: 2022-02-16
Payer: MEDICARE

## 2022-02-16 NOTE — TELEPHONE ENCOUNTER
----- Message from Dora Parker sent at 2/16/2022  9:58 AM CST -----  Contact: [phone]/self  Who Called: PT    Regarding: pt looking to rebook     Would the patient rather a call back or a response via [facility]? Call back    Best Call Back Number: [phone]    Additional Information: pt had appt scheduled for 2/3/22 and had to cancel , no appointment shown

## 2022-04-01 ENCOUNTER — TELEPHONE (OUTPATIENT)
Dept: FAMILY MEDICINE | Facility: CLINIC | Age: 87
End: 2022-04-01
Payer: MEDICARE

## 2022-04-01 NOTE — TELEPHONE ENCOUNTER
----- Message from Melody Sterling DO sent at 4/1/2022  5:32 AM CDT -----  Regarding: Request copy of recent labs Dr. Last  Request copy of recent labs Dr. Last

## 2022-04-28 LAB
CHOLEST SERPL-MSCNC: 148 MG/DL (ref 0–200)
HDLC SERPL-MCNC: 43 MG/DL (ref 35–70)
LDLC SERPL CALC-MCNC: 103 MG/DL (ref 0–160)
TRIGL SERPL-MCNC: 80 MG/DL (ref 40–160)

## 2022-05-11 ENCOUNTER — TELEPHONE (OUTPATIENT)
Dept: FAMILY MEDICINE | Facility: CLINIC | Age: 87
End: 2022-05-11
Payer: MEDICARE

## 2022-05-11 NOTE — TELEPHONE ENCOUNTER
----- Message from Kecia Petty sent at 5/11/2022 10:19 AM CDT -----  Contact: [phone]/ Self  Type:  Sooner Appointment Request     Caller is requesting a sooner appointment.  Caller declined first available appointment listed below.  Caller will not accept being placed on the waitlist and is requesting a message be sent to doctor.  Name of Caller: pt  When is the first available appointment? 08/31/2022  Symptoms or Reason: 6 month f/u   Would the patient rather a call back or a response via [facility]? Call back  Best Call Back Number: [phone]  Additional Information: n/a

## 2022-06-01 ENCOUNTER — OFFICE VISIT (OUTPATIENT)
Dept: FAMILY MEDICINE | Facility: CLINIC | Age: 87
End: 2022-06-01
Payer: MEDICARE

## 2022-06-01 VITALS
BODY MASS INDEX: 22.18 KG/M2 | HEIGHT: 63 IN | SYSTOLIC BLOOD PRESSURE: 138 MMHG | WEIGHT: 125.19 LBS | OXYGEN SATURATION: 97 % | HEART RATE: 69 BPM | DIASTOLIC BLOOD PRESSURE: 80 MMHG

## 2022-06-01 DIAGNOSIS — I48.20 CHRONIC ATRIAL FIBRILLATION: ICD-10-CM

## 2022-06-01 DIAGNOSIS — N18.31 STAGE 3A CHRONIC KIDNEY DISEASE: ICD-10-CM

## 2022-06-01 DIAGNOSIS — I50.32 CHRONIC DIASTOLIC HEART FAILURE: ICD-10-CM

## 2022-06-01 DIAGNOSIS — Z79.01 LONG TERM (CURRENT) USE OF ANTICOAGULANTS: ICD-10-CM

## 2022-06-01 DIAGNOSIS — F33.42 RECURRENT MAJOR DEPRESSIVE DISORDER, IN FULL REMISSION: ICD-10-CM

## 2022-06-01 DIAGNOSIS — I10 ESSENTIAL HYPERTENSION: Primary | ICD-10-CM

## 2022-06-01 PROCEDURE — 99214 PR OFFICE/OUTPT VISIT, EST, LEVL IV, 30-39 MIN: ICD-10-PCS | Mod: S$PBB,,, | Performed by: FAMILY MEDICINE

## 2022-06-01 PROCEDURE — 99214 OFFICE O/P EST MOD 30 MIN: CPT | Mod: S$PBB,,, | Performed by: FAMILY MEDICINE

## 2022-06-01 PROCEDURE — 99999 PR PBB SHADOW E&M-EST. PATIENT-LVL V: ICD-10-PCS | Mod: PBBFAC,,, | Performed by: FAMILY MEDICINE

## 2022-06-01 PROCEDURE — 99999 PR PBB SHADOW E&M-EST. PATIENT-LVL V: CPT | Mod: PBBFAC,,, | Performed by: FAMILY MEDICINE

## 2022-06-01 PROCEDURE — 99215 OFFICE O/P EST HI 40 MIN: CPT | Mod: PBBFAC,PN | Performed by: FAMILY MEDICINE

## 2022-06-01 NOTE — PATIENT INSTRUCTIONS
I do recommend the Covid-19 vaccination and all boosters.  Covid-19 vaccination scheduling  You can schedule on your MyChart  Go to Visits  Schedule visit  Scroll to the bottom and select Covid-19 vaccine/booster  Answer questions  Select location   You can also call [phone]  The Moderna vaccine is available at any [facility] for walk in  Local pharmacies also have the booster available
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
## 2022-06-08 ENCOUNTER — PATIENT OUTREACH (OUTPATIENT)
Dept: ADMINISTRATIVE | Facility: HOSPITAL | Age: 87
End: 2022-06-08
Payer: MEDICARE

## 2022-09-19 ENCOUNTER — PES CALL (OUTPATIENT)
Dept: ADMINISTRATIVE | Facility: OTHER | Age: 87
End: 2022-09-19
Payer: MEDICARE

## 2022-12-05 ENCOUNTER — OFFICE VISIT (OUTPATIENT)
Dept: FAMILY MEDICINE | Facility: CLINIC | Age: 87
End: 2022-12-05
Payer: MEDICARE

## 2022-12-05 VITALS
BODY MASS INDEX: 22.66 KG/M2 | WEIGHT: 127.88 LBS | DIASTOLIC BLOOD PRESSURE: 70 MMHG | HEIGHT: 63 IN | HEART RATE: 62 BPM | SYSTOLIC BLOOD PRESSURE: 130 MMHG | OXYGEN SATURATION: 96 %

## 2022-12-05 DIAGNOSIS — Z79.01 LONG TERM (CURRENT) USE OF ANTICOAGULANTS: ICD-10-CM

## 2022-12-05 DIAGNOSIS — F33.42 RECURRENT MAJOR DEPRESSIVE DISORDER, IN FULL REMISSION: ICD-10-CM

## 2022-12-05 DIAGNOSIS — I48.20 CHRONIC ATRIAL FIBRILLATION: ICD-10-CM

## 2022-12-05 DIAGNOSIS — I10 ESSENTIAL HYPERTENSION: Primary | ICD-10-CM

## 2022-12-05 DIAGNOSIS — I50.32 CHRONIC DIASTOLIC HEART FAILURE: ICD-10-CM

## 2022-12-05 DIAGNOSIS — N18.31 STAGE 3A CHRONIC KIDNEY DISEASE: ICD-10-CM

## 2022-12-05 PROCEDURE — 99214 OFFICE O/P EST MOD 30 MIN: CPT | Mod: S$PBB,,, | Performed by: FAMILY MEDICINE

## 2022-12-05 PROCEDURE — 99999 PR PBB SHADOW E&M-EST. PATIENT-LVL IV: ICD-10-PCS | Mod: PBBFAC,,, | Performed by: FAMILY MEDICINE

## 2022-12-05 PROCEDURE — 99499 UNLISTED E&M SERVICE: CPT | Mod: S$PBB,,, | Performed by: FAMILY MEDICINE

## 2022-12-05 PROCEDURE — 99214 OFFICE O/P EST MOD 30 MIN: CPT | Mod: PBBFAC,PN | Performed by: FAMILY MEDICINE

## 2022-12-05 PROCEDURE — 99214 PR OFFICE/OUTPT VISIT, EST, LEVL IV, 30-39 MIN: ICD-10-PCS | Mod: S$PBB,,, | Performed by: FAMILY MEDICINE

## 2022-12-05 PROCEDURE — 99999 PR PBB SHADOW E&M-EST. PATIENT-LVL IV: CPT | Mod: PBBFAC,,, | Performed by: FAMILY MEDICINE

## 2022-12-05 PROCEDURE — 99499 RISK ADDL DX/OHS AUDIT: ICD-10-PCS | Mod: S$PBB,,, | Performed by: FAMILY MEDICINE
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
## 2022-12-12 DIAGNOSIS — F33.42 RECURRENT MAJOR DEPRESSIVE DISORDER, IN FULL REMISSION: ICD-10-CM

## 2022-12-12 RX ORDER — ESCITALOPRAM OXALATE 10 MG/1
10 TABLET ORAL DAILY
Qty: 90 TABLET | Refills: 3 | Status: SHIPPED | OUTPATIENT
Start: 2022-12-12 | End: 2024-02-20 | Stop reason: SDUPTHER

## 2022-12-12 NOTE — TELEPHONE ENCOUNTER
----- Message from Roman Mccann sent at 12/12/2022 10:57 AM CST -----  Type:  Needs Medical Advice    Who Called: self  Reason:refill on EScitalopram oxalate (LEXAPRO) 10 MG tablet  Would the patient rather a call back or a response via [facility]? cll  Best Call Back Number: [id number] - [place], [place] - [address]   Phone:  [phone]  Fax:  [phone]        Additional Information: nonr

## 2022-12-12 NOTE — TELEPHONE ENCOUNTER
No new care gaps identified.  [facility] Embedded Care Gaps. Reference number: [id number]. 12/12/2022   11:10:55 AM CST

## 2023-03-06 ENCOUNTER — OFFICE VISIT (OUTPATIENT)
Dept: FAMILY MEDICINE | Facility: CLINIC | Age: 88
End: 2023-03-06
Payer: MEDICARE

## 2023-03-06 VITALS
SYSTOLIC BLOOD PRESSURE: 138 MMHG | HEART RATE: 75 BPM | HEIGHT: 63 IN | OXYGEN SATURATION: 95 % | WEIGHT: 127.19 LBS | DIASTOLIC BLOOD PRESSURE: 88 MMHG | BODY MASS INDEX: 22.54 KG/M2

## 2023-03-06 DIAGNOSIS — I10 ESSENTIAL HYPERTENSION: ICD-10-CM

## 2023-03-06 DIAGNOSIS — Z79.01 LONG TERM (CURRENT) USE OF ANTICOAGULANTS: ICD-10-CM

## 2023-03-06 DIAGNOSIS — I48.20 CHRONIC ATRIAL FIBRILLATION: ICD-10-CM

## 2023-03-06 DIAGNOSIS — I50.32 CHRONIC DIASTOLIC HEART FAILURE: ICD-10-CM

## 2023-03-06 DIAGNOSIS — J30.89 SEASONAL ALLERGIC RHINITIS DUE TO OTHER ALLERGIC TRIGGER: Primary | ICD-10-CM

## 2023-03-06 PROBLEM — J30.2 SEASONAL ALLERGIC RHINITIS: Status: ACTIVE | Noted: 2023-03-06

## 2023-03-06 PROCEDURE — 99214 OFFICE O/P EST MOD 30 MIN: CPT | Mod: PBBFAC,PN | Performed by: STUDENT IN AN ORGANIZED HEALTH CARE EDUCATION/TRAINING PROGRAM

## 2023-03-06 PROCEDURE — 99205 OFFICE O/P NEW HI 60 MIN: CPT | Mod: S$PBB,,, | Performed by: STUDENT IN AN ORGANIZED HEALTH CARE EDUCATION/TRAINING PROGRAM

## 2023-03-06 PROCEDURE — 99205 PR OFFICE/OUTPT VISIT, NEW, LEVL V, 60-74 MIN: ICD-10-PCS | Mod: S$PBB,,, | Performed by: STUDENT IN AN ORGANIZED HEALTH CARE EDUCATION/TRAINING PROGRAM

## 2023-03-06 PROCEDURE — 99999 PR PBB SHADOW E&M-EST. PATIENT-LVL IV: CPT | Mod: PBBFAC,,, | Performed by: STUDENT IN AN ORGANIZED HEALTH CARE EDUCATION/TRAINING PROGRAM

## 2023-03-06 PROCEDURE — 99999 PR PBB SHADOW E&M-EST. PATIENT-LVL IV: ICD-10-PCS | Mod: PBBFAC,,, | Performed by: STUDENT IN AN ORGANIZED HEALTH CARE EDUCATION/TRAINING PROGRAM

## 2023-03-06 RX ORDER — LEVOCETIRIZINE DIHYDROCHLORIDE 5 MG/1
5 TABLET, FILM COATED ORAL NIGHTLY
Qty: 90 TABLET | Refills: 0 | Status: SHIPPED | OUTPATIENT
Start: 2023-03-06 | End: 2023-06-01

## 2023-03-06 RX ORDER — FLUTICASONE PROPIONATE 50 MCG
1 SPRAY, SUSPENSION (ML) NASAL DAILY
Qty: 18.2 ML | Refills: 1 | Status: SHIPPED | OUTPATIENT
Start: 2023-03-06 | End: 2023-06-06

## 2023-03-06 NOTE — PROGRESS NOTES
[facility] Note    Chief Complaint      Chief Complaint   Patient presents with    Establish Care     History of Present Illness      HPI    Mary Ellen Miller is a 89 y.o. female independent of ADLs and iADLs with sHTN, chronic Afib on DOAC, HFpEF, KLARISSA, OA and osteopenia who presents for establishment of care. She endorses clear running nose and nasal congestion in the past few days , she enjoys yard work and gardening. She denies any fever, chills, cough, CP, orthopne, PND. She however had some leg swelling about a month ago which responded to increase in her 'water pill. She described her appetite, BM and mood to be great.    Problem List Addressed This Visit:  1. Seasonal allergic rhinitis due to other allergic trigger  -     levocetirizine (XYZAL) 5 MG tablet; Take 1 tablet (5 mg total) by mouth every evening.  Dispense: 90 tablet; Refill: 0  -     fluticasone propionate (FLONASE) 50 mcg/actuation nasal spray; 1 spray (50 mcg total) by Each Nostril route once daily.  Dispense: 18.2 mL; Refill: 1    2. Essential hypertension  Overview:  - well controlled  - continue current medications      3. Chronic atrial fibrillation  Overview:  - followed by Dr. Last  - OAC Eliquis without signs of bleeding  - rate controlled    4. Chronic diastolic heart failure  Overview:  - no signs of acute decompensation  - followed by cardiology    5. Long term (current) use of anticoagulants  Overview:  - followed by cardiology  - no signs of bleeding    Health Maintenance   Topic Date Due    TETANUS VACCINE  07/25/2026    Lipid Panel  12/12/2027       Past Medical History:   Diagnosis Date    Anxiety     Atrial fibrillation     Colon polyps 05/01/2012    Hypertension     Leg ulcer, right, limited to breakdown of skin 11/20/2019    Unilateral femoral hernia with obstruction, without gangrene 10/26/2015       Past Surgical History:   Procedure Laterality Date    HYSTERECTOMY      LUNG SURGERY  1988     OOPHORECTOMY      RADICAL HYSTERECTOMY  1990       family history is not on file.    Social History     Tobacco Use    Smoking status: Never    Smokeless tobacco: Never   Substance Use Topics    Alcohol use: No    Drug use: No       Review of Systems   Constitutional:  Negative for fatigue and fever.   Respiratory:  Negative for cough and chest tightness.    Gastrointestinal:  Negative for abdominal pain, diarrhea and vomiting.   Endocrine: Negative for polydipsia and polyphagia.   Genitourinary:  Negative for difficulty urinating, dysuria and frequency.   Musculoskeletal:  Negative for arthralgias, back pain, gait problem and joint swelling.   Skin:  Negative for rash.   Neurological:  Negative for seizures, weakness, numbness and headaches.   Psychiatric/Behavioral:  Negative for sleep disturbance.      Outpatient Encounter Medications as of 3/6/2023   Medication Sig Dispense Refill    ascorbic acid, vitamin C, (VITAMIN C) 500 MG tablet Take 500 mg by mouth once daily.      atenolol (TENORMIN) 50 MG tablet Take 50 mg by mouth 2 (two) times daily.  10    biotin 5 mg Tab Take 500 mg by mouth once daily.       calcipotriene (DOVONOX) 0.005 % cream       calcium carbonate (OS-ZOILA) 600 mg (1,500 mg) Tab Take 600 mg by mouth 2 (two) times daily with meals.      diclofenac sodium (VOLTAREN) 1 % Gel APPLY 2 GRAMS TOPICALLY TO AFFECTED AREAS EVERY 6 TO 8 HOURS AS NEEDED      diclofenac sodium (VOLTAREN) 1 % Gel Apply 2 g topically 4 (four) times daily. 100 g 0    ELIQUIS 5 mg Tab Take 5 mg by mouth 2 (two) times daily.      EScitalopram oxalate (LEXAPRO) 10 MG tablet Take 1 tablet (10 mg total) by mouth once daily. 90 tablet 3    furosemide (LASIX) 40 MG tablet Take 40 mg by mouth once daily.   6    multivit,calc,mins/iron/folic (ONE-A-DAY WOMENS FORMULA ORAL) Take 1 tablet by mouth once daily.      mupirocin (BACTROBAN) 2 % ointment Apply 1 g topically 3 (three) times daily.      spironolactone (ALDACTONE) 25 MG tablet  "Take 25 mg by mouth once daily.  6    vitamin D 1000 units Tab Take 2,000 mg by mouth once daily.      vitamin E 1000 UNIT capsule Take 1,000 Units by mouth once daily.      fluticasone propionate (FLONASE) 50 mcg/actuation nasal spray 1 spray (50 mcg total) by Each Nostril route once daily. 18.2 mL 1    levocetirizine (XYZAL) 5 MG tablet Take 1 tablet (5 mg total) by mouth every evening. 90 tablet 0     No facility-administered encounter medications on file as of 3/6/2023.        Review of patient's allergies indicates:   Allergen Reactions    Percodan [oxycodone hcl-oxycodone-asa] Other (See Comments)     halucinations    Penicillins Itching    Sulfa (sulfonamide antibiotics) Diarrhea and Nausea Only    Amoxicillin        Physical Exam      Vital Signs  Pulse: 75  SpO2: 95 %  BP: (!) 155/84  BP Location: Left arm  Patient Position: Sitting  Pain Score: 0-No pain  Height and Weight  Height: 5' 3" (160 cm)  Weight: 57.7 kg (127 lb 3.2 oz)  BSA (Calculated - sq m): 1.6 sq meters  BMI (Calculated): 22.5  Weight in (lb) to have BMI = 25: 140.8]    Physical Exam  Constitutional:       Appearance: Normal appearance.   HENT:      Head: Normocephalic and atraumatic.      Right Ear: Tympanic membrane normal.      Left Ear: Tympanic membrane normal.      Mouth/Throat:      Mouth: Mucous membranes are moist.      Pharynx: Oropharynx is clear.   Eyes:      Extraocular Movements: Extraocular movements intact.      Conjunctiva/sclera: Conjunctivae normal.      Pupils: Pupils are equal, round, and reactive to light.   Cardiovascular:      Rate and Rhythm: Normal rate and regular rhythm.      Pulses: Normal pulses.      Heart sounds: Normal heart sounds.   Pulmonary:      Effort: Pulmonary effort is normal.      Breath sounds: Normal breath sounds.   Abdominal:      General: Abdomen is flat. Bowel sounds are normal.      Palpations: Abdomen is soft.   Musculoskeletal:         General: Deformity: Varicose veins bilaterally.      " Cervical back: Normal range of motion.      Right lower leg: No edema.      Left lower leg: No edema.   Skin:     General: Skin is warm and dry.   Neurological:      General: No focal deficit present.      Mental Status: She is alert and oriented to person, place, and time.      Sensory: No sensory deficit.   Psychiatric:         Mood and Affect: Mood normal.         Behavior: Behavior normal.        Laboratory:  CBC:  No results for input(s): WBC, RBC, HGB, HCT, PLT, MCV, MCH, MCHC in the last 2160 hours.  CMP:  No results for input(s): GLU, CALCIUM, ALBUMIN, PROT, NA, K, CO2, CL, BUN, ALKPHOS, ALT, AST, BILITOT in the last 2160 hours.    Invalid input(s): CREATININ  URINALYSIS:  No results for input(s): COLORU, CLARITYU, SPECGRAV, PHUR, PROTEINUA, GLUCOSEU, BILIRUBINCON, BLOODU, WBCU, RBCU, BACTERIA, MUCUS, NITRITE, LEUKOCYTESUR, UROBILINOGEN, HYALINECASTS in the last 2160 hours.   LIPIDS:  No results for input(s): TSH, HDL, CHOL, TRIG, LDLCALC, CHOLHDL, NONHDLCHOL, TOTALCHOLEST in the last 2160 hours.  TSH:  No results for input(s): TSH in the last 2160 hours.  A1C:  No results for input(s): HGBA1C in the last 2160 hours.    Radiology:      Assessment/Plan     Mary Ellen Miller is a 89 y.o.female with:    1. Seasonal allergic rhinitis due to other allergic trigger  -     levocetirizine (XYZAL) 5 MG tablet; Take 1 tablet (5 mg total) by mouth every evening.  Dispense: 90 tablet; Refill: 0  -     fluticasone propionate (FLONASE) 50 mcg/actuation nasal spray; 1 spray (50 mcg total) by Each Nostril route once daily.  Dispense: 18.2 mL; Refill: 1    2. Essential hypertension  Overview:  - well controlled  - continue current medications      3. Chronic atrial fibrillation  Overview:  - followed by Dr. Shala Ortiz without signs of bleeding  - rate controlled    4. Chronic diastolic heart failure  Overview:  - no signs of acute decompensation  - followed by cardiology    5. Long term (current) use of  anticoagulants  Overview:  - followed by cardiology  - no signs of bleeding    -Continue current medications and maintain follow up with specialists.      Patient verbalizes understanding and agrees with current treatment plan.      Dr Dionne Jeter MD  [facility] [place]

## 2023-05-10 ENCOUNTER — OFFICE VISIT (OUTPATIENT)
Dept: URGENT CARE | Facility: CLINIC | Age: 88
End: 2023-05-10
Payer: MEDICARE

## 2023-05-10 VITALS
OXYGEN SATURATION: 96 % | BODY MASS INDEX: 22.5 KG/M2 | SYSTOLIC BLOOD PRESSURE: 175 MMHG | DIASTOLIC BLOOD PRESSURE: 90 MMHG | TEMPERATURE: 98 F | HEIGHT: 63 IN | RESPIRATION RATE: 16 BRPM | WEIGHT: 127 LBS | HEART RATE: 64 BPM

## 2023-05-10 DIAGNOSIS — M54.2 CERVICAL PAIN: Primary | ICD-10-CM

## 2023-05-10 PROCEDURE — 72040 X-RAY EXAM NECK SPINE 2-3 VW: CPT | Mod: S$GLB,,, | Performed by: RADIOLOGY

## 2023-05-10 PROCEDURE — 99213 OFFICE O/P EST LOW 20 MIN: CPT | Mod: S$GLB,,, | Performed by: NURSE PRACTITIONER

## 2023-05-10 PROCEDURE — 99213 PR OFFICE/OUTPT VISIT, EST, LEVL III, 20-29 MIN: ICD-10-PCS | Mod: S$GLB,,, | Performed by: NURSE PRACTITIONER

## 2023-05-10 PROCEDURE — 72040 XR CERVICAL SPINE 2 OR 3 VIEWS: ICD-10-PCS | Mod: S$GLB,,, | Performed by: RADIOLOGY

## 2023-05-10 RX ORDER — DICLOFENAC SODIUM 10 MG/G
GEL TOPICAL
Qty: 2 G | Refills: 0 | Status: SHIPPED | OUTPATIENT
Start: 2023-05-10 | End: 2023-10-25 | Stop reason: ALTCHOICE

## 2023-05-10 NOTE — PATIENT INSTRUCTIONS
Tylenol Arthritis OTC as needed for neck pain.  After complete evaluation, including thorough history and physical exam, the patient's symptoms are most consistent with cervical sprain/strain/contusion. Patient instructed to follow-up with PCP or the physician provided in 2-3 days. Given strict return precautions including new/worsening symptoms, pain, headache, nausea/vomiting, focal weakness/numbness, or any other concerns. Questions answers and patient expressed understanding.    Diagnosis and plan discussed with the patient, as well as the expected course and duration of his symptoms.  All questions and concerns were addressed prior to discharge. She was advised to follow up with her PCP within 1 week if symptoms do not improve.  Emergency department precautions were given.  Patient verbalized understanding and was happy with the plan of care.  Note dictated with voice recognition software, please excuse any grammatical areas.       You must understand that you've received an Urgent Care treatment only and that you may be released before all your medical problems are known or treated. You, the patient, will arrange for follow up care as instructed.  Follow up with your PCP or specialty clinic as directed in the next 1-2 weeks if not improved or as needed.  You can call [phone] to schedule an appointment with the appropriate provider.  If your condition worsens we recommend that you receive another evaluation at the emergency room immediately or contact your primary medical clinics after hours call service to discuss your concerns.  Please return here or go to the Emergency Department for any concerns or worsening of condition.    If you were prescribed a narcotic or controlled medication, do not drive or operate heavy equipment or machinery while taking these medications.    Thank you for choosing [facility]!    Our goal in the Urgent Care is to always provide outstanding medical care. You may  receive a survey by mail or e-mail in the next week regarding your experience today. We would greatly appreciate you completing and returning the survey. Your feedback provides us with a way to recognize our staff who provide very good care, and it helps us learn how to improve when your experience was below our aspiration of excellence.      We appreciate you trusting us with your medical care. We hope you feel better soon. We will be happy to take care of you for all of your future medical needs.    Sincerely,    Heath Andrew DNP, PARRISP

## 2023-05-10 NOTE — PROGRESS NOTES
"Subjective:      Patient ID: Mary Ellen Miller is a 89 y.o. female.    Vitals:  height is 5' 3" (1.6 m) and weight is 57.6 kg (127 lb). Her oral temperature is 98.1 °F (36.7 °C). Her blood pressure is 175/90 (abnormal) and her pulse is 64. Her respiration is 16 and oxygen saturation is 96%.     Chief Complaint: Neck Pain    89-year-old female presents today with complaint of bilateral cervical pain.  Reports onset 4 days ago.  She denies traumatic injury, difficulty swallowing, change in voice, sore throat, fever, chills, shortness of breath, chest pain or palpitations.  CT of 2021 showed degenerative change throughout the spine.  Discussed x-ray findings with the patient, and recommendations; she verbalized understanding    Neck Pain   This is a new problem. Episode onset: Sunday. The problem occurs constantly. The problem has been gradually worsening. The pain is associated with an unknown factor. The pain is present in the midline, left side and right side. The quality of the pain is described as aching. The pain is at a severity of 8/10. The pain is moderate. The symptoms are aggravated by twisting. The pain is Worse during the day. Stiffness is present All day. Pertinent negatives include no chest pain, fever, headaches, leg pain, numbness, pain with swallowing, paresis, photophobia, syncope, tingling, trouble swallowing, visual change, weakness or weight loss. Treatments tried: Motrin. The treatment provided mild relief.     Constitution: Negative for fever.   HENT:  Negative for trouble swallowing.    Neck: Positive for neck pain.   Cardiovascular:  Negative for chest pain and passing out.   Eyes:  Negative for photophobia.   Neurological:  Negative for headaches and numbness.    Objective:     Physical Exam   Constitutional: She is oriented to person, place, and time.  Non-toxic appearance. She does not appear ill. No distress.   HENT:   Head: Normocephalic and atraumatic.   Ears:   Right Ear: Tympanic " membrane, external ear and ear canal normal.   Left Ear: Tympanic membrane, external ear and ear canal normal.   Nose: Nose normal.   Mouth/Throat: Mucous membranes are moist.   Eyes: Pupils are equal, round, and reactive to light.   Neck: Trachea normal. Neck supple. No JVD present. No crepitus. There are no signs of injury. No Brudzinski's sign and no Kernig's sign noted. No thyroid tenderness present. No edema present. No erythema present. No neck rigidity present. decreased range of motion present. pain with movement present. No spinous process tenderness present. muscular tenderness present.   Cardiovascular: Normal rate, regular rhythm, normal heart sounds and normal pulses.   Pulmonary/Chest: Effort normal and breath sounds normal.   Abdominal: Normal appearance and bowel sounds are normal. Soft. flat abdomen   Lymphadenopathy:     She has no cervical adenopathy.   Neurological: She is alert and oriented to person, place, and time. Coordination normal.   Skin: Skin is warm and dry. Capillary refill takes less than 2 seconds.   Psychiatric: Her behavior is normal.   Nursing note and vitals reviewed.    Assessment:     1. Cervical pain      Reading Physician Reading Date Result Priority   Oswaldo Lubin MD  [phone] 5/10/2023 STAT     Narrative & Impression  EXAMINATION:  XR CERVICAL SPINE 2 OR 3 VIEWS     CLINICAL HISTORY:  Cervicalgia     TECHNIQUE:  AP, lateral and open mouth views of the cervical spine were performed.     COMPARISON:  None.     FINDINGS:  Five views cervical spine.     Please note, positioning is suboptimal, particularly involving the lateral images.  C7 is obscured from view on lateral images.     Allowing for the above, lateral imaging demonstrates grade 1 anterolisthesis of C4 on C5 and minimally C5 on C6.  There is anterior wedging involving C6, likely on the basis of degenerative change.  There is disc space height loss primarily involving C5-C6 and C6-C7.  There is multilevel  facet arthropathy.  AP spinal alignment is grossly unremarkable.  There is coarse interstitial attenuation throughout the visualized pulmonary parenchyma.  The odontoid is intact.  There is suboptimal visualization lateral masses of C1 however appear to be in appropriate orientation in relationship to C2 allowing for patient's head tilted.  The paraspinous and hypopharyngeal soft tissues are grossly unremarkable.  The airway is patent.     Impression:     1. Overall limited evaluation of the cervical spine given patient positioning.  No definite acute displaced fracture or dislocation however correlation is advised.  Further evaluation as warranted.        Electronically signed by: Oswaldo Lubin MD  Date:                                            05/10/2023  Time:                                           12:16    Plan:       Cervical pain  -     X-Ray Cervical Spine 2 or 3 Views; Future; Expected date: 05/10/2023  -     diclofenac sodium (VOLTAREN) 1 % Gel; APPLY 2 GRAMS TOPICALLY TO AFFECTED AREAS EVERY 6 TO 8 HOURS AS NEEDED  Dispense: 2 g; Refill: 0      Tylenol Arthritis OTC as needed for neck pain, advised patient to avoid NSAIDs secondary to acute renal failure; patient verbalized understanding.  Warm compresses as needed.  Discussed x-ray findings with recommendations.  Encouraged patient to follow with her PCP if symptoms persist.  After complete evaluation, including thorough history and physical exam, the patient's symptoms are most consistent with cervical sprain/strain/contusion. Patient instructed to follow-up with PCP or the physician provided in 2-3 days. Given strict return precautions including new/worsening symptoms, pain, headache, nausea/vomiting, focal weakness/numbness, or any other concerns. Questions answers and patient expressed understanding

## 2023-05-13 ENCOUNTER — TELEPHONE (OUTPATIENT)
Dept: URGENT CARE | Facility: CLINIC | Age: 88
End: 2023-05-13
Payer: MEDICARE

## 2023-06-01 DIAGNOSIS — J30.89 SEASONAL ALLERGIC RHINITIS DUE TO OTHER ALLERGIC TRIGGER: ICD-10-CM

## 2023-06-01 RX ORDER — LEVOCETIRIZINE DIHYDROCHLORIDE 5 MG/1
TABLET, FILM COATED ORAL
Qty: 90 TABLET | Refills: 3 | Status: SHIPPED | OUTPATIENT
Start: 2023-06-01 | End: 2023-06-06

## 2023-06-06 ENCOUNTER — OFFICE VISIT (OUTPATIENT)
Dept: FAMILY MEDICINE | Facility: CLINIC | Age: 88
End: 2023-06-06
Payer: MEDICARE

## 2023-06-06 VITALS
SYSTOLIC BLOOD PRESSURE: 148 MMHG | RESPIRATION RATE: 92 BRPM | DIASTOLIC BLOOD PRESSURE: 86 MMHG | HEIGHT: 63 IN | WEIGHT: 126 LBS | BODY MASS INDEX: 22.32 KG/M2 | HEART RATE: 64 BPM

## 2023-06-06 DIAGNOSIS — I10 ESSENTIAL HYPERTENSION: ICD-10-CM

## 2023-06-06 DIAGNOSIS — N18.31 STAGE 3A CHRONIC KIDNEY DISEASE: ICD-10-CM

## 2023-06-06 DIAGNOSIS — Z79.01 LONG TERM (CURRENT) USE OF ANTICOAGULANTS: ICD-10-CM

## 2023-06-06 DIAGNOSIS — I50.32 CHRONIC DIASTOLIC HEART FAILURE: ICD-10-CM

## 2023-06-06 DIAGNOSIS — E87.6 HYPOKALEMIA: ICD-10-CM

## 2023-06-06 DIAGNOSIS — Z13.220 SCREENING FOR HYPERLIPIDEMIA: ICD-10-CM

## 2023-06-06 DIAGNOSIS — I48.20 CHRONIC ATRIAL FIBRILLATION: ICD-10-CM

## 2023-06-06 PROCEDURE — 99214 OFFICE O/P EST MOD 30 MIN: CPT | Mod: S$PBB,,, | Performed by: STUDENT IN AN ORGANIZED HEALTH CARE EDUCATION/TRAINING PROGRAM

## 2023-06-06 PROCEDURE — 99999 PR PBB SHADOW E&M-EST. PATIENT-LVL III: ICD-10-PCS | Mod: PBBFAC,,, | Performed by: STUDENT IN AN ORGANIZED HEALTH CARE EDUCATION/TRAINING PROGRAM

## 2023-06-06 PROCEDURE — 99999 PR PBB SHADOW E&M-EST. PATIENT-LVL III: CPT | Mod: PBBFAC,,, | Performed by: STUDENT IN AN ORGANIZED HEALTH CARE EDUCATION/TRAINING PROGRAM

## 2023-06-06 PROCEDURE — 99499 RISK ADDL DX/OHS AUDIT: ICD-10-PCS | Mod: S$PBB,,, | Performed by: STUDENT IN AN ORGANIZED HEALTH CARE EDUCATION/TRAINING PROGRAM

## 2023-06-06 PROCEDURE — 99213 OFFICE O/P EST LOW 20 MIN: CPT | Mod: PBBFAC,PN | Performed by: STUDENT IN AN ORGANIZED HEALTH CARE EDUCATION/TRAINING PROGRAM

## 2023-06-06 PROCEDURE — 99499 UNLISTED E&M SERVICE: CPT | Mod: S$PBB,,, | Performed by: STUDENT IN AN ORGANIZED HEALTH CARE EDUCATION/TRAINING PROGRAM

## 2023-06-06 PROCEDURE — 99214 PR OFFICE/OUTPT VISIT, EST, LEVL IV, 30-39 MIN: ICD-10-PCS | Mod: S$PBB,,, | Performed by: STUDENT IN AN ORGANIZED HEALTH CARE EDUCATION/TRAINING PROGRAM

## 2023-06-06 NOTE — PROGRESS NOTES
[facility] Note    Chief Complaint      Chief Complaint   Patient presents with    Follow-up     History of Present Illness      Follow-up  Pertinent negatives include no abdominal pain, arthralgias, coughing, fatigue, fever, headaches, joint swelling, numbness, rash, vomiting or weakness.     Mary Ellen Miller is a 89 y.o. female independent of ADLs and iADLs with sHTN, chronic Afib on DOAC, HFpEF, KLARISSA, OA and osteopenia for follow up on BP mgt. She endorses no complaints today, saw her cardiologist about 2 months ago. She enjoys yard work and gardening. She denies any fever, chills, cough, CP, orthopnea, PND.  She described her appetite, BM and mood to be great.    Problem List Addressed This Visit:    1. Essential hypertension  Overview:  - well controlled for her age  - continue current medications    2.  Chronic atrial fibrillation  Overview:  - followed by Dr. Last  - OAC Eliquis without signs of bleeding  - rate controlled    3. Chronic diastolic heart failure  Overview:  - no signs of acute decompensation  - followed by cardiology    4. Long term (current) use of anticoagulants  Overview:  - followed by cardiology  - no signs of bleeding    Health Maintenance   Topic Date Due    TETANUS VACCINE  07/25/2026    Lipid Panel  12/12/2027       Past Medical History:   Diagnosis Date    Anxiety     Atrial fibrillation     Colon polyps 05/01/2012    Hypertension     Leg ulcer, right, limited to breakdown of skin 11/20/2019    Unilateral femoral hernia with obstruction, without gangrene 10/26/2015       Past Surgical History:   Procedure Laterality Date    HYSTERECTOMY      LUNG SURGERY  1988    OOPHORECTOMY      RADICAL HYSTERECTOMY  1990       family history is not on file.    Social History     Tobacco Use    Smoking status: Never    Smokeless tobacco: Never   Substance Use Topics    Alcohol use: No    Drug use: No       Review of Systems   Constitutional:  Negative for fatigue and  fever.   Respiratory:  Negative for cough and chest tightness.    Gastrointestinal:  Negative for abdominal pain, diarrhea and vomiting.   Endocrine: Negative for polydipsia and polyphagia.   Genitourinary:  Negative for difficulty urinating, dysuria and frequency.   Musculoskeletal:  Negative for arthralgias, back pain, gait problem and joint swelling.   Skin:  Negative for rash.   Neurological:  Negative for seizures, weakness, numbness and headaches.   Psychiatric/Behavioral:  Negative for sleep disturbance.      Outpatient Encounter Medications as of 6/6/2023   Medication Sig Dispense Refill    ascorbic acid, vitamin C, (VITAMIN C) 500 MG tablet Take 500 mg by mouth once daily.      atenolol (TENORMIN) 50 MG tablet Take 50 mg by mouth 2 (two) times daily.  10    biotin 5 mg Tab Take 500 mg by mouth once daily.       calcipotriene (DOVONOX) 0.005 % cream       calcium carbonate (OS-ZOILA) 600 mg (1,500 mg) Tab Take 600 mg by mouth 2 (two) times daily with meals.      diclofenac sodium (VOLTAREN) 1 % Gel APPLY 2 GRAMS TOPICALLY TO AFFECTED AREAS EVERY 6 TO 8 HOURS AS NEEDED 2 g 0    ELIQUIS 5 mg Tab Take 5 mg by mouth 2 (two) times daily.      EScitalopram oxalate (LEXAPRO) 10 MG tablet Take 1 tablet (10 mg total) by mouth once daily. 90 tablet 3    furosemide (LASIX) 40 MG tablet Take 40 mg by mouth once daily.   6    levocetirizine (XYZAL) 5 MG tablet TAKE 1 TABLET BY MOUTH ONCE EVERY EVENING 90 tablet 3    multivit,calc,mins/iron/folic (ONE-A-DAY WOMENS FORMULA ORAL) Take 1 tablet by mouth once daily.      mupirocin (BACTROBAN) 2 % ointment Apply 1 g topically 3 (three) times daily.      spironolactone (ALDACTONE) 25 MG tablet Take 25 mg by mouth once daily.  6    vitamin D 1000 units Tab Take 2,000 mg by mouth once daily.      vitamin E 1000 UNIT capsule Take 1,000 Units by mouth once daily.      fluticasone propionate (FLONASE) 50 mcg/actuation nasal spray 1 spray (50 mcg total) by Each Nostril route once  "daily. (Patient not taking: Reported on 6/6/2023) 18.2 mL 1    [DISCONTINUED] diclofenac sodium (VOLTAREN) 1 % Gel APPLY 2 GRAMS TOPICALLY TO AFFECTED AREAS EVERY 6 TO 8 HOURS AS NEEDED      [DISCONTINUED] levocetirizine (XYZAL) 5 MG tablet Take 1 tablet (5 mg total) by mouth every evening. 90 tablet 0     No facility-administered encounter medications on file as of 6/6/2023.        Review of patient's allergies indicates:   Allergen Reactions    Percodan [oxycodone hcl-oxycodone-asa] Other (See Comments)     halucinations    Penicillins Itching    Sulfa (sulfonamide antibiotics) Diarrhea and Nausea Only    Amoxicillin        Physical Exam      Vital Signs  Pulse: 64  Resp: (!) 92  BP: (!) 148/86  Pain Score: 0-No pain  Height and Weight  Height: 5' 3" (160 cm)  Weight: 57.2 kg (126 lb)  BSA (Calculated - sq m): 1.59 sq meters  BMI (Calculated): 22.3  Weight in (lb) to have BMI = 25: 140.8]    Physical Exam  Constitutional:       Appearance: Normal appearance.   HENT:      Head: Normocephalic and atraumatic.      Right Ear: Tympanic membrane normal.      Left Ear: Tympanic membrane normal.      Mouth/Throat:      Mouth: Mucous membranes are moist.      Pharynx: Oropharynx is clear.   Eyes:      Extraocular Movements: Extraocular movements intact.      Conjunctiva/sclera: Conjunctivae normal.      Pupils: Pupils are equal, round, and reactive to light.   Cardiovascular:      Rate and Rhythm: Normal rate and regular rhythm.      Pulses: Normal pulses.      Heart sounds: Normal heart sounds.   Pulmonary:      Effort: Pulmonary effort is normal.      Breath sounds: Normal breath sounds.   Abdominal:      General: Abdomen is flat. Bowel sounds are normal.      Palpations: Abdomen is soft.   Musculoskeletal:         General: Deformity: Varicose veins bilaterally.      Cervical back: Normal range of motion.      Right lower leg: Edema (Trace) present.      Left lower leg: Edema ((1+)) present.   Skin:     General: Skin is " warm and dry.   Neurological:      General: No focal deficit present.      Mental Status: She is alert and oriented to person, place, and time.      Sensory: No sensory deficit.   Psychiatric:         Mood and Affect: Mood normal.         Behavior: Behavior normal.        Laboratory:  CBC:  No results for input(s): WBC, RBC, HGB, HCT, PLT, MCV, MCH, MCHC in the last 2160 hours.  CMP:  No results for input(s): GLU, CALCIUM, ALBUMIN, PROT, NA, K, CO2, CL, BUN, ALKPHOS, ALT, AST, BILITOT in the last 2160 hours.    Invalid input(s): CREATININ  URINALYSIS:  No results for input(s): COLORU, CLARITYU, SPECGRAV, PHUR, PROTEINUA, GLUCOSEU, BILIRUBINCON, BLOODU, WBCU, RBCU, BACTERIA, MUCUS, NITRITE, LEUKOCYTESUR, UROBILINOGEN, HYALINECASTS in the last 2160 hours.   LIPIDS:  No results for input(s): TSH, HDL, CHOL, TRIG, LDLCALC, CHOLHDL, NONHDLCHOL, TOTALCHOLEST in the last 2160 hours.  TSH:  No results for input(s): TSH in the last 2160 hours.  A1C:  No results for input(s): HGBA1C in the last 2160 hours.    Radiology:      Assessment/Plan     Mary Ellen Miller is a 89 y.o.female with:    1. Essential hypertension  Overview:  - well controlled for her age  - continue current medications    2.  Chronic atrial fibrillation  Overview:  - followed by Dr. Last  - OAC Eliquis without signs of bleeding  - rate controlled    3. Chronic diastolic heart failure  Overview:  - no signs of acute decompensation  - followed by cardiology    4. Long term (current) use of anticoagulants  Overview:  - followed by cardiology  - no signs of bleeding    5. Stage 3a CKD  -stable renal function  -repeat CMP today  -patient counseled against nephrotoxins    -Continue current medications and maintain follow up with specialists.      Patient verbalizes understanding and agrees with current treatment plan.      Dr Dionne Jeter MD  [facility]

## 2023-06-07 ENCOUNTER — PATIENT MESSAGE (OUTPATIENT)
Dept: FAMILY MEDICINE | Facility: CLINIC | Age: 88
End: 2023-06-07
Payer: MEDICARE

## 2023-06-16 ENCOUNTER — PATIENT MESSAGE (OUTPATIENT)
Dept: PODIATRY | Facility: CLINIC | Age: 88
End: 2023-06-16
Payer: MEDICARE

## 2023-09-13 ENCOUNTER — PATIENT MESSAGE (OUTPATIENT)
Dept: FAMILY MEDICINE | Facility: CLINIC | Age: 88
End: 2023-09-13
Payer: MEDICARE

## 2023-09-27 ENCOUNTER — TELEPHONE (OUTPATIENT)
Dept: FAMILY MEDICINE | Facility: CLINIC | Age: 88
End: 2023-09-27
Payer: MEDICARE

## 2023-09-27 NOTE — TELEPHONE ENCOUNTER
----- Message from Candy Parks sent at 9/27/2023  3:13 PM CDT -----  Regarding: Call Back  Contact: [phone]  Type:  Sooner Apoointment Request    Caller is requesting a sooner appointment.  Caller declined first available appointment listed below.  Caller will not accept being placed on the waitlist and is requesting a message be sent to doctor.  Name of Caller: EP   When is the first available appointment? January   Symptoms: 1 year follow up   Would the patient rather a call back or a response via [facility]? Call Back   Best Call Back Number: [phone]  Additional Information:

## 2023-10-25 ENCOUNTER — OFFICE VISIT (OUTPATIENT)
Dept: FAMILY MEDICINE | Facility: CLINIC | Age: 88
End: 2023-10-25
Payer: MEDICARE

## 2023-10-25 VITALS
OXYGEN SATURATION: 87 % | BODY MASS INDEX: 21.99 KG/M2 | HEART RATE: 66 BPM | HEIGHT: 63 IN | SYSTOLIC BLOOD PRESSURE: 136 MMHG | DIASTOLIC BLOOD PRESSURE: 76 MMHG | WEIGHT: 124.13 LBS

## 2023-10-25 DIAGNOSIS — Z00.00 ANNUAL PHYSICAL EXAM: ICD-10-CM

## 2023-10-25 DIAGNOSIS — F33.42 RECURRENT MAJOR DEPRESSIVE DISORDER, IN FULL REMISSION: ICD-10-CM

## 2023-10-25 DIAGNOSIS — I48.20 CHRONIC ATRIAL FIBRILLATION: ICD-10-CM

## 2023-10-25 DIAGNOSIS — I50.32 CHRONIC DIASTOLIC HEART FAILURE: ICD-10-CM

## 2023-10-25 DIAGNOSIS — I10 ESSENTIAL HYPERTENSION: ICD-10-CM

## 2023-10-25 DIAGNOSIS — I70.0 AORTIC ATHEROSCLEROSIS: ICD-10-CM

## 2023-10-25 PROCEDURE — 99397 PER PM REEVAL EST PAT 65+ YR: CPT | Mod: GZ,S$PBB,, | Performed by: STUDENT IN AN ORGANIZED HEALTH CARE EDUCATION/TRAINING PROGRAM

## 2023-10-25 PROCEDURE — 99214 OFFICE O/P EST MOD 30 MIN: CPT | Mod: PBBFAC,PN | Performed by: STUDENT IN AN ORGANIZED HEALTH CARE EDUCATION/TRAINING PROGRAM

## 2023-10-25 PROCEDURE — 99999 PR PBB SHADOW E&M-EST. PATIENT-LVL IV: ICD-10-PCS | Mod: PBBFAC,,, | Performed by: STUDENT IN AN ORGANIZED HEALTH CARE EDUCATION/TRAINING PROGRAM

## 2023-10-25 PROCEDURE — 99397 PR PREVENTIVE VISIT,EST,65 & OVER: ICD-10-PCS | Mod: GZ,S$PBB,, | Performed by: STUDENT IN AN ORGANIZED HEALTH CARE EDUCATION/TRAINING PROGRAM

## 2023-10-25 PROCEDURE — 99999 PR PBB SHADOW E&M-EST. PATIENT-LVL IV: CPT | Mod: PBBFAC,,, | Performed by: STUDENT IN AN ORGANIZED HEALTH CARE EDUCATION/TRAINING PROGRAM

## 2023-10-25 RX ORDER — FUROSEMIDE 20 MG/1
20 TABLET ORAL DAILY
Qty: 90 TABLET | Refills: 3 | Status: SHIPPED | OUTPATIENT
Start: 2023-10-25 | End: 2024-04-02

## 2023-10-25 NOTE — PROGRESS NOTES
[facility] Note    Chief Complaint      Chief Complaint   Patient presents with    Annual Exam     History of Present Illness      Mary Ellen Miller is a 89 y.o. female independent of ADLs and iADLs with sHTN, chronic Afib on DOAC, HFpEF, KLARISSA, OA and osteopenia for annual physicals. She endorses no complaints today. She enjoys yard work and gardening. She denies any fever, chills, cough, CP, orthopnea, PND.  She described her appetite, BM and mood to be great.    Problem List Addressed This Visit:    As listed below    Health Maintenance   Topic Date Due    TETANUS VACCINE  07/25/2026    Lipid Panel  07/31/2028    Shingles Vaccine  Completed       Past Medical History:   Diagnosis Date    Anxiety     Atrial fibrillation     Colon polyps 05/01/2012    Hypertension     Leg ulcer, right, limited to breakdown of skin 11/20/2019    Unilateral femoral hernia with obstruction, without gangrene 10/26/2015       Past Surgical History:   Procedure Laterality Date    HYSTERECTOMY      LUNG SURGERY  1988    OOPHORECTOMY      RADICAL HYSTERECTOMY  1990       family history is not on file.    Social History     Tobacco Use    Smoking status: Never    Smokeless tobacco: Never   Substance Use Topics    Alcohol use: No    Drug use: No       Review of Systems   Constitutional:  Negative for fatigue and fever.   Respiratory:  Negative for cough and chest tightness.    Gastrointestinal:  Negative for abdominal pain, diarrhea and vomiting.   Endocrine: Negative for polydipsia and polyphagia.   Genitourinary:  Negative for difficulty urinating, dysuria and frequency.   Musculoskeletal:  Negative for arthralgias, back pain, gait problem and joint swelling.   Skin:  Negative for rash.   Neurological:  Negative for seizures, weakness, numbness and headaches.   Psychiatric/Behavioral:  Negative for sleep disturbance.        Outpatient Encounter Medications as of 10/25/2023   Medication Sig Dispense Refill     "atenolol (TENORMIN) 50 MG tablet Take 50 mg by mouth 2 (two) times daily.  10    biotin 5 mg Tab Take 500 mg by mouth once daily.       calcipotriene (DOVONOX) 0.005 % cream       calcium carbonate (OS-ZOILA) 600 mg (1,500 mg) Tab Take 600 mg by mouth 2 (two) times daily with meals.      ELIQUIS 5 mg Tab Take 5 mg by mouth 2 (two) times daily.      EScitalopram oxalate (LEXAPRO) 10 MG tablet Take 1 tablet (10 mg total) by mouth once daily. 90 tablet 3    fluticasone propionate (FLONASE) 50 mcg/actuation nasal spray INSTILL 1 SPRAY IN EACH NOSTRIL ONCE DAILY 16 g 0    multivit,calc,mins/iron/folic (ONE-A-DAY WOMENS FORMULA ORAL) Take 1 tablet by mouth once daily.      vitamin D 1000 units Tab Take 2,000 mg by mouth once daily.      furosemide (LASIX) 40 MG tablet Take 40 mg by mouth once daily.   6    spironolactone (ALDACTONE) 25 MG tablet Take 25 mg by mouth once daily.  6    [DISCONTINUED] diclofenac sodium (VOLTAREN) 1 % Gel APPLY 2 GRAMS TOPICALLY TO AFFECTED AREAS EVERY 6 TO 8 HOURS AS NEEDED (Patient not taking: Reported on 10/25/2023) 2 g 0    [DISCONTINUED] mupirocin (BACTROBAN) 2 % ointment Apply 1 g topically 3 (three) times daily.       No facility-administered encounter medications on file as of 10/25/2023.        Review of patient's allergies indicates:   Allergen Reactions    Percodan [oxycodone hcl-oxycodone-asa] Other (See Comments)     halucinations    Penicillins Itching    Sulfa (sulfonamide antibiotics) Diarrhea and Nausea Only    Amoxicillin        Physical Exam      Vital Signs  Pulse: 66  SpO2: (!) 87 %  BP: (!) 152/82  Pain Score: 0-No pain  Height and Weight  Height: 5' 3" (160 cm)  Weight: 56.3 kg (124 lb 1.9 oz)  BSA (Calculated - sq m): 1.58 sq meters  BMI (Calculated): 22  Weight in (lb) to have BMI = 25: 140.8]    Physical Exam  Vitals reviewed.   Constitutional:       Appearance: Normal appearance.   HENT:      Head: Normocephalic and atraumatic.      Right Ear: Tympanic membrane normal. " "     Left Ear: Tympanic membrane normal.      Mouth/Throat:      Mouth: Mucous membranes are moist.      Pharynx: Oropharynx is clear.   Eyes:      Extraocular Movements: Extraocular movements intact.      Conjunctiva/sclera: Conjunctivae normal.      Pupils: Pupils are equal, round, and reactive to light.   Cardiovascular:      Rate and Rhythm: Normal rate and regular rhythm.      Pulses: Normal pulses.      Heart sounds: Normal heart sounds.   Pulmonary:      Effort: Pulmonary effort is normal.      Breath sounds: Normal breath sounds.   Abdominal:      General: Abdomen is flat. Bowel sounds are normal.      Palpations: Abdomen is soft.   Musculoskeletal:         General: Deformity: Varicose veins bilaterally.      Cervical back: Normal range of motion.      Right lower leg: Edema (Trace) present.      Left lower leg: Edema (trace) present.   Skin:     General: Skin is warm and dry.   Neurological:      General: No focal deficit present.      Mental Status: She is alert and oriented to person, place, and time.      Sensory: No sensory deficit.   Psychiatric:         Mood and Affect: Mood normal.         Behavior: Behavior normal.          Laboratory:  CBC:  No results for input(s): "WBC", "RBC", "HGB", "HCT", "PLT", "MCV", "MCH", "MCHC" in the last 2160 hours.  CMP:  No results for input(s): "GLU", "CALCIUM", "ALBUMIN", "PROT", "NA", "K", "CO2", "CL", "BUN", "ALKPHOS", "ALT", "AST", "BILITOT" in the last 2160 hours.    Invalid input(s): "CREATININ"  URINALYSIS:  No results for input(s): "COLORU", "CLARITYU", "SPECGRAV", "PHUR", "PROTEINUA", "GLUCOSEU", "BILIRUBINCON", "BLOODU", "WBCU", "RBCU", "BACTERIA", "MUCUS", "NITRITE", "LEUKOCYTESUR", "UROBILINOGEN", "HYALINECASTS" in the last 2160 hours.   LIPIDS:  No results for input(s): "TSH", "HDL", "CHOL", "TRIG", "LDLCALC", "CHOLHDL", "NONHDLCHOL", "TOTALCHOLEST" in the last 2160 hours.  TSH:  No results for input(s): "TSH" in the last 2160 hours.  A1C:  No results " "for input(s): "HGBA1C" in the last 2160 hours.    Radiology:      Assessment/Plan     Mary Ellen Vicente Miller is a 89 y.o.female with:    1. Annual physicals  -preventive counseling provided  -UTD on all labs  -UTD on aal vaccines and other preventive measures    2. Essential hypertension  Overview:  - well controlled for her age  - continue current medications    3.  Chronic atrial fibrillation  Overview:  - followed by Dr. Last  - OAC Eliquis without signs of bleeding  - rate controlled    4. Chronic diastolic heart failure  Overview:  - no signs of acute decompensation  - followed by cardiology    5. Long term (current) use of anticoagulants  Overview:  - followed by cardiology  - no signs of bleeding    6. H/o MDD  -well controlled on current regimen    7. H/o aortic atherosclerosis  -c/w ASA  -c/t monitor    -Continue current medications and maintain follow up with specialists.      Patient verbalizes understanding and agrees with current treatment plan.      Dr Dionne Jeter MD  [facility]"

## 2023-11-06 ENCOUNTER — OFFICE VISIT (OUTPATIENT)
Dept: URGENT CARE | Facility: CLINIC | Age: 88
End: 2023-11-06
Payer: MEDICARE

## 2023-11-06 VITALS
SYSTOLIC BLOOD PRESSURE: 163 MMHG | DIASTOLIC BLOOD PRESSURE: 71 MMHG | HEART RATE: 71 BPM | OXYGEN SATURATION: 96 % | TEMPERATURE: 98 F | HEIGHT: 63 IN | BODY MASS INDEX: 21.97 KG/M2 | RESPIRATION RATE: 18 BRPM | WEIGHT: 124 LBS

## 2023-11-06 DIAGNOSIS — R05.9 COUGH IN ADULT: ICD-10-CM

## 2023-11-06 DIAGNOSIS — J01.00 ACUTE MAXILLARY SINUSITIS, RECURRENCE NOT SPECIFIED: Primary | ICD-10-CM

## 2023-11-06 PROCEDURE — 99213 PR OFFICE/OUTPT VISIT, EST, LEVL III, 20-29 MIN: ICD-10-PCS | Mod: S$GLB,,, | Performed by: NURSE PRACTITIONER

## 2023-11-06 PROCEDURE — 99213 OFFICE O/P EST LOW 20 MIN: CPT | Mod: S$GLB,,, | Performed by: NURSE PRACTITIONER

## 2023-11-06 RX ORDER — FLUTICASONE PROPIONATE 50 MCG
2 SPRAY, SUSPENSION (ML) NASAL DAILY
Qty: 9.9 ML | Refills: 1 | Status: SHIPPED | OUTPATIENT
Start: 2023-11-06 | End: 2023-11-20

## 2023-11-06 RX ORDER — LORATADINE 10 MG/1
10 TABLET ORAL DAILY
Qty: 30 TABLET | Refills: 0 | Status: SHIPPED | OUTPATIENT
Start: 2023-11-06 | End: 2024-01-23

## 2023-11-06 RX ORDER — BENZONATATE 100 MG/1
100 CAPSULE ORAL 3 TIMES DAILY PRN
Qty: 30 CAPSULE | Refills: 0 | Status: SHIPPED | OUTPATIENT
Start: 2023-11-06 | End: 2023-11-16

## 2023-11-06 NOTE — PROGRESS NOTES
"Subjective:      Patient ID: Mary Ellen Miller is a 89 y.o. female.    Vitals:  height is 5' 3" (1.6 m) and weight is 56.2 kg (124 lb). Her oral temperature is 98.1 °F (36.7 °C). Her blood pressure is 163/71 (abnormal) and her pulse is 71. Her respiration is 18 and oxygen saturation is 96%.     Chief Complaint: Cough    Pt complains of cough that started 2 days ago. She said her cough is worse at night and is keeping her up. Pt denies fever or SOB.    Cough  This is a new problem. Episode onset: 2 days ago. The problem has been waxing and waning. The cough is Productive of sputum. Pertinent negatives include no fever, sore throat, shortness of breath or wheezing. The symptoms are aggravated by lying down. Treatments tried: vicks vapor rub, cough drops. The treatment provided mild relief. There is no history of asthma, bronchitis or pneumonia.       Constitution: Negative for fever.   HENT:  Positive for congestion. Negative for sore throat.    Respiratory:  Positive for cough and sputum production. Negative for shortness of breath and wheezing.       Objective:     Physical Exam   Constitutional: She is oriented to person, place, and time. She appears well-developed. She is cooperative.  Non-toxic appearance. She does not appear ill. No distress.   HENT:   Head: Normocephalic and atraumatic.   Ears:   Right Ear: Hearing, tympanic membrane, external ear and ear canal normal.   Left Ear: Hearing, tympanic membrane, external ear and ear canal normal.   Nose: Mucosal edema, rhinorrhea and sinus tenderness present. No purulent discharge or nasal deformity. No epistaxis. Right sinus exhibits no maxillary sinus tenderness and no frontal sinus tenderness. Left sinus exhibits no maxillary sinus tenderness and no frontal sinus tenderness.   Mouth/Throat: Uvula is midline and mucous membranes are normal. No trismus in the jaw. Normal dentition. No uvula swelling. Posterior oropharyngeal edema present. No oropharyngeal " exudate or posterior oropharyngeal erythema.   Eyes: Conjunctivae and lids are normal. No scleral icterus.   Neck: Trachea normal and phonation normal. Neck supple. No edema present. No erythema present. No neck rigidity present.   Cardiovascular: Normal rate, regular rhythm, normal heart sounds and normal pulses.   Pulmonary/Chest: Effort normal and breath sounds normal. No respiratory distress. She has no decreased breath sounds. She has no rhonchi.   Abdominal: Normal appearance.   Musculoskeletal: Normal range of motion.         General: No deformity. Normal range of motion.   Neurological: She is alert and oriented to person, place, and time. She exhibits normal muscle tone. Coordination normal.   Skin: Skin is warm, dry, intact, not diaphoretic and not pale.   Psychiatric: Her speech is normal and behavior is normal. Judgment and thought content normal.   Nursing note and vitals reviewed.      Assessment:     1. Acute maxillary sinusitis, recurrence not specified    2. Cough in adult        Plan:       Acute maxillary sinusitis, recurrence not specified    Cough in adult    Other orders  -     benzonatate (TESSALON) 100 MG capsule; Take 1 capsule (100 mg total) by mouth 3 (three) times daily as needed for Cough (Do not take more than 6 capsules in a 24 hour period.).  Dispense: 30 capsule; Refill: 0  -     fluticasone propionate (FLONASE) 50 mcg/actuation nasal spray; 2 sprays (100 mcg total) by Each Nostril route once daily. for 14 days  Dispense: 9.9 mL; Refill: 1  -     loratadine (CLARITIN) 10 mg tablet; Take 1 tablet (10 mg total) by mouth once daily.  Dispense: 30 tablet; Refill: 0        Increase fluids.  Get plenty of rest.   Normal saline nasal wash to irrigate sinuses and for congestion/runny nose.  Cool mist humidifier/vaporizer.  Practice good handwashing.  Mucinex for cough and chest congestion.  Tylenol or Ibuprofen for fever, headache and body aches.  Warm salt water gargles for throat  comfort.  Chloraseptic spray or lozenges for throat comfort.  See PCP or go to ER if symptoms worsen or fail to improve with treatment.

## 2024-01-08 ENCOUNTER — TELEPHONE (OUTPATIENT)
Dept: FAMILY MEDICINE | Facility: CLINIC | Age: 89
End: 2024-01-08
Payer: MEDICARE

## 2024-01-08 NOTE — TELEPHONE ENCOUNTER
Spoke to pt daughter in law Yesenia, she states that she is will be going into assisted living at [facility]  toward the end of this month. [facility] is needing is needing an assessment done before she can move into her room. I provided the Yesenia the pt daughter in law our Fax number to give to them to fax over the paper work. Pt daughter in law  has concerns about her memory issues that she would like to address. History of dementia, is there meds to take to help? Apt scheduled on 1/23/24 at 4pm. Areli

## 2024-01-08 NOTE — TELEPHONE ENCOUNTER
----- Message from Bridgett Berry sent at 1/8/2024 10:25 AM CST -----  Type: [facility]     Who Called:Pt's daughter in law who was POA  Does the patient know what this is regarding?:pt needs to relocate to [facility]  Would the patient rather a call back or a response via [facility]? Call   Best Call Back Number: (Yesenia BROCK)  Additional Information: Please be advised, caller stated she called and other family members called and no one has respond   Pt needs a form signed    Her daughter in law thinks she's forgetting a lot and would like the proper diagnosis before she leaves

## 2024-01-11 DIAGNOSIS — Z00.00 ENCOUNTER FOR MEDICARE ANNUAL WELLNESS EXAM: ICD-10-CM

## 2024-01-23 ENCOUNTER — OFFICE VISIT (OUTPATIENT)
Dept: FAMILY MEDICINE | Facility: CLINIC | Age: 89
End: 2024-01-23
Payer: MEDICARE

## 2024-01-23 VITALS
TEMPERATURE: 98 F | BODY MASS INDEX: 21.89 KG/M2 | DIASTOLIC BLOOD PRESSURE: 90 MMHG | WEIGHT: 123.56 LBS | OXYGEN SATURATION: 95 % | HEIGHT: 63 IN | SYSTOLIC BLOOD PRESSURE: 160 MMHG

## 2024-01-23 DIAGNOSIS — F02.A0 MILD LATE ONSET ALZHEIMER'S DEMENTIA WITHOUT BEHAVIORAL DISTURBANCE, PSYCHOTIC DISTURBANCE, MOOD DISTURBANCE, OR ANXIETY: Primary | ICD-10-CM

## 2024-01-23 DIAGNOSIS — I10 ESSENTIAL HYPERTENSION: ICD-10-CM

## 2024-01-23 DIAGNOSIS — I70.0 AORTIC ATHEROSCLEROSIS: ICD-10-CM

## 2024-01-23 DIAGNOSIS — G30.1 MILD LATE ONSET ALZHEIMER'S DEMENTIA WITHOUT BEHAVIORAL DISTURBANCE, PSYCHOTIC DISTURBANCE, MOOD DISTURBANCE, OR ANXIETY: Primary | ICD-10-CM

## 2024-01-23 DIAGNOSIS — I48.20 CHRONIC ATRIAL FIBRILLATION: ICD-10-CM

## 2024-01-23 DIAGNOSIS — F33.42 RECURRENT MAJOR DEPRESSIVE DISORDER, IN FULL REMISSION: ICD-10-CM

## 2024-01-23 DIAGNOSIS — I50.32 CHRONIC DIASTOLIC HEART FAILURE: ICD-10-CM

## 2024-01-23 DIAGNOSIS — Z02.2 ENCOUNTER FOR EXAMINATION FOR ADMISSION TO NURSING HOME: ICD-10-CM

## 2024-01-23 PROBLEM — N18.31 STAGE 3A CHRONIC KIDNEY DISEASE: Status: RESOLVED | Noted: 2020-01-28 | Resolved: 2024-01-23

## 2024-01-23 PROBLEM — N18.31 STAGE 3A CHRONIC KIDNEY DISEASE: Status: ACTIVE | Noted: 2020-01-28

## 2024-01-23 PROCEDURE — 99999 PR PBB SHADOW E&M-EST. PATIENT-LVL IV: CPT | Mod: PBBFAC,,, | Performed by: STUDENT IN AN ORGANIZED HEALTH CARE EDUCATION/TRAINING PROGRAM

## 2024-01-23 PROCEDURE — 99214 OFFICE O/P EST MOD 30 MIN: CPT | Mod: PBBFAC,PN | Performed by: STUDENT IN AN ORGANIZED HEALTH CARE EDUCATION/TRAINING PROGRAM

## 2024-01-23 PROCEDURE — 99215 OFFICE O/P EST HI 40 MIN: CPT | Mod: S$PBB,,, | Performed by: STUDENT IN AN ORGANIZED HEALTH CARE EDUCATION/TRAINING PROGRAM

## 2024-01-23 RX ORDER — SPIRONOLACTONE 50 MG/1
50 TABLET, FILM COATED ORAL DAILY
Qty: 90 TABLET | Refills: 3 | Status: ON HOLD | OUTPATIENT
Start: 2024-01-23 | End: 2024-05-09

## 2024-01-23 RX ORDER — MEMANTINE HYDROCHLORIDE 5 MG/1
5 TABLET ORAL 2 TIMES DAILY
Qty: 60 TABLET | Refills: 11 | Status: SHIPPED | OUTPATIENT
Start: 2024-01-23 | End: 2024-02-20 | Stop reason: SDUPTHER

## 2024-01-23 NOTE — PROGRESS NOTES
[facility] Note    Chief Complaint      Chief Complaint   Patient presents with    Follow-up and forms for assisted living facility     History of Present Illness      Mary Ellen Miller is a 89 y.o. female with sHTN, chronic Afib on DOAC, HFpEF, KLARISSA, OA and osteopenia for evaluation for need for assisted living facility. She was accompanied in office today by her DIL, states worsening memory ( forgets to use her medications, finances managed by her son now, does not cook or drive around anymore ), but she grooms herself, eats unassisted, toilet unassisted also. She enjoys yard work and gardening. She denies any fever, chills, cough, CP, orthopnea, PND.  She described her appetite, BM and mood to be great.    Problem List Addressed This Visit:    As listed below    Health Maintenance   Topic Date Due    TETANUS VACCINE  07/25/2026    Lipid Panel  07/31/2028    Shingles Vaccine  Completed       Past Medical History:   Diagnosis Date    Anxiety     Atrial fibrillation     Colon polyps 05/01/2012    Hypertension     Leg ulcer, right, limited to breakdown of skin 11/20/2019    Unilateral femoral hernia with obstruction, without gangrene 10/26/2015       Past Surgical History:   Procedure Laterality Date    HYSTERECTOMY      LUNG SURGERY  1988    OOPHORECTOMY      RADICAL HYSTERECTOMY  1990       family history includes No Known Problems in her father and mother.    Social History     Tobacco Use    Smoking status: Never     Passive exposure: Never    Smokeless tobacco: Never   Substance Use Topics    Alcohol use: No    Drug use: No       Review of Systems   Constitutional:  Negative for fatigue and fever.   Respiratory:  Negative for cough and chest tightness.    Gastrointestinal:  Negative for abdominal pain, diarrhea and vomiting.   Endocrine: Negative for polydipsia and polyphagia.   Genitourinary:  Negative for difficulty urinating, dysuria and frequency.   Musculoskeletal:  Negative for  "arthralgias, back pain, gait problem and joint swelling.   Skin:  Negative for rash.   Neurological:  Negative for seizures, weakness, numbness and headaches.   Psychiatric/Behavioral:  Negative for sleep disturbance.        Outpatient Encounter Medications as of 1/23/2024   Medication Sig Dispense Refill    atenolol (TENORMIN) 50 MG tablet Take 50 mg by mouth 2 (two) times daily.  10    biotin 5 mg Tab Take 500 mg by mouth once daily.       calcipotriene (DOVONOX) 0.005 % cream       calcium carbonate (OS-ZOILA) 600 mg (1,500 mg) Tab Take 600 mg by mouth 2 (two) times daily with meals.      ELIQUIS 5 mg Tab Take 5 mg by mouth 2 (two) times daily.      EScitalopram oxalate (LEXAPRO) 10 MG tablet Take 1 tablet (10 mg total) by mouth once daily. 90 tablet 3    furosemide (LASIX) 20 MG tablet Take 1 tablet (20 mg total) by mouth once daily. 90 tablet 3    multivit,calc,mins/iron/folic (ONE-A-DAY WOMENS FORMULA ORAL) Take 1 tablet by mouth once daily.      vitamin D 1000 units Tab Take 2,000 mg by mouth once daily.      [DISCONTINUED] spironolactone (ALDACTONE) 25 MG tablet Take 25 mg by mouth once daily.  6    spironolactone (ALDACTONE) 50 MG tablet Take 1 tablet (50 mg total) by mouth once daily. 90 tablet 3    [DISCONTINUED] loratadine (CLARITIN) 10 mg tablet Take 1 tablet (10 mg total) by mouth once daily. 30 tablet 0     No facility-administered encounter medications on file as of 1/23/2024.        Review of patient's allergies indicates:   Allergen Reactions    Percodan [oxycodone hcl-oxycodone-asa] Other (See Comments)     halucinations    Penicillins Itching    Sulfa (sulfonamide antibiotics) Diarrhea and Nausea Only    Amoxicillin        Physical Exam      Vital Signs  Temp: 97.5 °F (36.4 °C)  Temp Source: Oral  SpO2: 95 %  BP: (!) 160/90  BP Location: Right arm  Patient Position: Sitting  Pain Score: 0-No pain  Height and Weight  Height: 5' 3" (160 cm)  Weight: 56.1 kg (123 lb 9.1 oz)  BSA (Calculated - sq m): " "1.58 sq meters  BMI (Calculated): 21.9  Weight in (lb) to have BMI = 25: 140.8]    Physical Exam  Vitals reviewed.   Constitutional:       Appearance: Normal appearance.   HENT:      Head: Normocephalic and atraumatic.      Right Ear: Tympanic membrane normal.      Left Ear: Tympanic membrane normal.      Mouth/Throat:      Mouth: Mucous membranes are moist.      Pharynx: Oropharynx is clear.   Eyes:      Extraocular Movements: Extraocular movements intact.      Conjunctiva/sclera: Conjunctivae normal.      Pupils: Pupils are equal, round, and reactive to light.   Cardiovascular:      Rate and Rhythm: Normal rate and regular rhythm.      Pulses: Normal pulses.      Heart sounds: Normal heart sounds.   Pulmonary:      Effort: Pulmonary effort is normal.      Breath sounds: Normal breath sounds.   Abdominal:      General: Abdomen is flat. Bowel sounds are normal.      Palpations: Abdomen is soft.   Musculoskeletal:         General: Deformity: Varicose veins bilaterally.      Cervical back: Normal range of motion.      Right lower leg: Edema (Trace) present.      Left lower leg: Edema (trace) present.   Skin:     General: Skin is warm and dry.   Neurological:      General: No focal deficit present.      Mental Status: She is alert and oriented to person, place, and time.      Sensory: No sensory deficit.   Psychiatric:         Mood and Affect: Mood normal.         Behavior: Behavior normal.          Laboratory:  CBC:  No results for input(s): "WBC", "RBC", "HGB", "HCT", "PLT", "MCV", "MCH", "MCHC" in the last 2160 hours.  CMP:  No results for input(s): "GLU", "CALCIUM", "ALBUMIN", "PROT", "NA", "K", "CO2", "CL", "BUN", "ALKPHOS", "ALT", "AST", "BILITOT" in the last 2160 hours.    Invalid input(s): "CREATININ"  URINALYSIS:  No results for input(s): "COLORU", "CLARITYU", "SPECGRAV", "PHUR", "PROTEINUA", "GLUCOSEU", "BILIRUBINCON", "BLOODU", "WBCU", "RBCU", "BACTERIA", "MUCUS", "NITRITE", "LEUKOCYTESUR", "UROBILINOGEN", " ""HYALINECASTS" in the last 2160 hours.   LIPIDS:  No results for input(s): "TSH", "HDL", "CHOL", "TRIG", "LDLCALC", "CHOLHDL", "NONHDLCHOL", "TOTALCHOLEST" in the last 2160 hours.  TSH:  No results for input(s): "TSH" in the last 2160 hours.  A1C:  No results for input(s): "HGBA1C" in the last 2160 hours.    Radiology:      Assessment/Plan     Mary Ellen Miller is a 89 y.o.female with:    1. 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 Essential hypertension  Overview:  - uncontrolled, possibly due to med non-compliance Vs suboptimal dose  -increase aldactone to 50mg daily, c/w atenolol 50mg BID  -monitor and keep BP log     3.  Chronic atrial fibrillation  Overview:  - followed by Dr. Last  - OAC Eliquis without signs of bleeding  - rate controlled    4. Chronic diastolic heart failure  Overview:  - no signs of acute decompensation  - followed by cardiology    5. Long term (current) use of anticoagulants  Overview:  - followed by cardiology  - no signs of bleeding    6. H/o MDD  -well controlled on current regimen    7. H/o aortic atherosclerosis  -c/w ASA  -c/t monitor    -Continue current medications and maintain follow up with specialists.      Patient verbalizes understanding and agrees with current treatment plan.      Dr Dionne Jeter MD  [facility] [place]"

## 2024-02-19 ENCOUNTER — TELEPHONE (OUTPATIENT)
Dept: FAMILY MEDICINE | Facility: CLINIC | Age: 89
End: 2024-02-19
Payer: MEDICARE

## 2024-02-19 NOTE — TELEPHONE ENCOUNTER
----- Message from Ashanti Arrieta sent at 2/19/2024  4:59 PM CST -----  Type:  Needs Medical Advice    Who Called:  Pt Daughter in law (arlette)  Symptoms (please be specific): anxiety, shaking, back pain through legs   How long has patient had these symptoms:  one week   Pharmacy name and phone #:[id number] - [place] - [address] [facility]  [address] [place] [address]  Phone: [phone] Fax: [phone]    Would the patient rather a call back or a response via [facility]? Callback  Best Call Back Number: [phone] daughter in law  Additional Information:  Caller is requesting a callback in regards to getting an appt with this provider

## 2024-02-19 NOTE — TELEPHONE ENCOUNTER
----- Message from Libby Andrew sent at 2/19/2024 12:35 PM CST -----  Contact: Yesenia  Type:  Same Day Appointment Request    Caller is requesting a same day appointment.  Caller declined first available appointment listed below.    Name of Caller:pt's daughter in law Yesenia CESAR.  When is the first available appointment?March   Symptoms:establish care/ back and leg pain. [facility] Call Back Number:[phone]  Additional Information:

## 2024-02-19 NOTE — TELEPHONE ENCOUNTER
Spoke with patient daughter in law in regards to message, states patient has appt to establish with Dr Adamson in 03/12 until then patient need to see Dr Jeter about her symptoms she has going on- I schedule patient tomorrow to come in office.

## 2024-02-20 ENCOUNTER — OFFICE VISIT (OUTPATIENT)
Dept: FAMILY MEDICINE | Facility: CLINIC | Age: 89
End: 2024-02-20
Payer: MEDICARE

## 2024-02-20 VITALS
DIASTOLIC BLOOD PRESSURE: 70 MMHG | OXYGEN SATURATION: 95 % | TEMPERATURE: 98 F | HEIGHT: 63 IN | HEART RATE: 92 BPM | WEIGHT: 121.94 LBS | SYSTOLIC BLOOD PRESSURE: 128 MMHG | BODY MASS INDEX: 21.61 KG/M2

## 2024-02-20 DIAGNOSIS — I48.20 CHRONIC ATRIAL FIBRILLATION: ICD-10-CM

## 2024-02-20 DIAGNOSIS — F41.1 GENERALIZED ANXIETY DISORDER: Primary | ICD-10-CM

## 2024-02-20 DIAGNOSIS — F02.A18 MILD LATE ONSET ALZHEIMER'S DEMENTIA WITH OTHER BEHAVIORAL DISTURBANCE: ICD-10-CM

## 2024-02-20 DIAGNOSIS — G30.1 MILD LATE ONSET ALZHEIMER'S DEMENTIA WITH OTHER BEHAVIORAL DISTURBANCE: ICD-10-CM

## 2024-02-20 DIAGNOSIS — I10 ESSENTIAL HYPERTENSION: ICD-10-CM

## 2024-02-20 DIAGNOSIS — I50.32 CHRONIC DIASTOLIC HEART FAILURE: ICD-10-CM

## 2024-02-20 DIAGNOSIS — F33.42 RECURRENT MAJOR DEPRESSIVE DISORDER, IN FULL REMISSION: ICD-10-CM

## 2024-02-20 PROCEDURE — 99215 OFFICE O/P EST HI 40 MIN: CPT | Mod: S$PBB,,, | Performed by: STUDENT IN AN ORGANIZED HEALTH CARE EDUCATION/TRAINING PROGRAM

## 2024-02-20 PROCEDURE — 99999 PR PBB SHADOW E&M-EST. PATIENT-LVL IV: CPT | Mod: PBBFAC,,, | Performed by: STUDENT IN AN ORGANIZED HEALTH CARE EDUCATION/TRAINING PROGRAM

## 2024-02-20 PROCEDURE — 99214 OFFICE O/P EST MOD 30 MIN: CPT | Mod: PBBFAC,PN | Performed by: STUDENT IN AN ORGANIZED HEALTH CARE EDUCATION/TRAINING PROGRAM

## 2024-02-20 RX ORDER — MEMANTINE HYDROCHLORIDE 5 MG/1
5 TABLET ORAL 2 TIMES DAILY
Qty: 60 TABLET | Refills: 11 | Status: SHIPPED | OUTPATIENT
Start: 2024-02-20 | End: 2024-02-29 | Stop reason: SDUPTHER

## 2024-02-20 RX ORDER — AMLODIPINE BESYLATE 10 MG/1
10 TABLET ORAL
COMMUNITY
Start: 2024-02-07 | End: 2024-02-29 | Stop reason: SDUPTHER

## 2024-02-20 RX ORDER — ESCITALOPRAM OXALATE 10 MG/1
10 TABLET ORAL DAILY
Qty: 90 TABLET | Refills: 3 | Status: ON HOLD | OUTPATIENT
Start: 2024-02-20 | End: 2024-05-21

## 2024-02-20 NOTE — PROGRESS NOTES
[facility]    Chief Complaint      Chief Complaint   Patient presents with    ER f/u visit     History of Present Illness      Mary Ellen Miller is a 89 y.o. female with sHTN, chronic Afib on DOAC, HFpEF, KLARISSA, OA and osteopenia presents s/p ER visit 02/19/2024 for evaluation of left sided LBP and sudden onset anxiety , was started on naproxen, robaxin and discharged home same day. She has not been taking her lexapro for few months now and yet to start the namenda prescribed for her memory. She will be moving to the assisted living facility this week and she is quite anxious about it. Her finances managed by her son now, does not cook or drive around anymore ), but she grooms herself, eats unassisted, toilet unassisted also. She enjoys yard work and gardening. She denies any fever, chills, cough, CP, orthopnea, PND.  She described her appetite, BM and mood to be great.    Problem List Addressed This Visit:    As listed below    Health Maintenance   Topic Date Due    TETANUS VACCINE  07/25/2026    Lipid Panel  07/31/2028    Shingles Vaccine  Completed       Past Medical History:   Diagnosis Date    Anxiety     Atrial fibrillation     Colon polyps 05/01/2012    Hypertension     Leg ulcer, right, limited to breakdown of skin 11/20/2019    Unilateral femoral hernia with obstruction, without gangrene 10/26/2015       Past Surgical History:   Procedure Laterality Date    HYSTERECTOMY      LUNG SURGERY  1988    OOPHORECTOMY      RADICAL HYSTERECTOMY  1990       family history includes No Known Problems in her father and mother.    Social History     Tobacco Use    Smoking status: Never     Passive exposure: Never    Smokeless tobacco: Never   Substance Use Topics    Alcohol use: No    Drug use: No       Review of Systems   Constitutional:  Negative for fatigue and fever.   Respiratory:  Negative for cough and chest tightness.    Gastrointestinal:  Negative for abdominal pain, diarrhea and  vomiting.   Endocrine: Negative for polydipsia and polyphagia.   Genitourinary:  Negative for difficulty urinating, dysuria and frequency.   Musculoskeletal:  Positive for arthralgias and back pain. Negative for gait problem and joint swelling.   Skin:  Negative for rash.   Neurological:  Negative for seizures, weakness, numbness and headaches.   Psychiatric/Behavioral:  Negative for sleep disturbance. The patient is nervous/anxious.        Outpatient Encounter Medications as of 2/20/2024   Medication Sig Dispense Refill    amLODIPine (NORVASC) 10 MG tablet Take 10 mg by mouth.      biotin 5 mg Tab Take 500 mg by mouth once daily.       calcipotriene (DOVONOX) 0.005 % cream       calcium carbonate (OS-ZOILA) 600 mg (1,500 mg) Tab Take 600 mg by mouth 2 (two) times daily with meals.      ELIQUIS 5 mg Tab Take 5 mg by mouth 2 (two) times daily.      furosemide (LASIX) 20 MG tablet Take 1 tablet (20 mg total) by mouth once daily. 90 tablet 3    LIDOcaine (LIDODERM) 5 % Place 1 patch onto the skin once daily. Remove & Discard patch within 12 hours or as directed by MD 7 patch 0    multivit,calc,mins/iron/folic (ONE-A-DAY WOMENS FORMULA ORAL) Take 1 tablet by mouth once daily.      spironolactone (ALDACTONE) 50 MG tablet Take 1 tablet (50 mg total) by mouth once daily. 90 tablet 3    vitamin D 1000 units Tab Take 2,000 mg by mouth once daily.      [DISCONTINUED] atenolol (TENORMIN) 50 MG tablet Take 50 mg by mouth 2 (two) times daily.  10    [DISCONTINUED] EScitalopram oxalate (LEXAPRO) 10 MG tablet Take 1 tablet (10 mg total) by mouth once daily. 90 tablet 3    [DISCONTINUED] memantine (NAMENDA) 5 MG Tab Take 1 tablet (5 mg total) by mouth 2 (two) times daily. 60 tablet 11    [DISCONTINUED] methocarbamoL (ROBAXIN) 500 MG Tab Take 2 tablets (1,000 mg total) by mouth 3 (three) times daily. for 5 days 30 tablet 0    [DISCONTINUED] naproxen (NAPROSYN) 500 MG tablet Take 1 tablet (500 mg total) by mouth 2 (two) times daily as  "needed (back pain). Take with food 20 tablet 0    EScitalopram oxalate (LEXAPRO) 10 MG tablet Take 1 tablet (10 mg total) by mouth once daily. 90 tablet 3    memantine (NAMENDA) 5 MG Tab Take 1 tablet (5 mg total) by mouth 2 (two) times daily. 60 tablet 11     Facility-Administered Encounter Medications as of 2/20/2024   Medication Dose Route Frequency Provider Last Rate Last Admin    [COMPLETED] fentaNYL 50 mcg/mL injection 50 mcg  50 mcg Intravenous ED 1 Time Aguilar Vazquez, DO   50 mcg at 02/19/24 1945    [COMPLETED] ketorolac injection 15 mg  15 mg Intravenous ED 1 Time Aguilar Vazquez, DO   15 mg at 02/19/24 1845    [COMPLETED] methocarbamoL tablet 1,000 mg  1,000 mg Oral ED 1 Time Aguilar Vazquez, DO   1,000 mg at 02/19/24 1845    [COMPLETED] sodium chloride 0.9% bolus 500 mL 500 mL  500 mL Intravenous ED 1 Time Aguilar Vazquez, DO   Stopped at 02/19/24 2123        Review of patient's allergies indicates:   Allergen Reactions    Percodan [oxycodone hcl-oxycodone-asa] Other (See Comments)     halucinations    Penicillins Itching    Sulfa (sulfonamide antibiotics) Diarrhea and Nausea Only    Amoxicillin        Physical Exam      Vital Signs  Temp: 97.7 °F (36.5 °C)  Temp Source: Temporal  Pulse: 92  SpO2: 95 %  BP: 132/70  BP Location: Right arm  Patient Position: Sitting  Pain Score: 0-No pain  Height and Weight  Height: 5' 3" (160 cm)  Weight: 55.3 kg (121 lb 14.6 oz)  BSA (Calculated - sq m): 1.57 sq meters  BMI (Calculated): 21.6  Weight in (lb) to have BMI = 25: 140.8]    Physical Exam  Vitals reviewed.   Constitutional:       Appearance: Normal appearance.   HENT:      Head: Normocephalic and atraumatic.      Right Ear: Tympanic membrane normal.      Left Ear: Tympanic membrane normal.      Mouth/Throat:      Mouth: Mucous membranes are moist.      Pharynx: Oropharynx is clear.   Eyes:      Extraocular Movements: Extraocular movements intact.      Conjunctiva/sclera: Conjunctivae " "normal.      Pupils: Pupils are equal, round, and reactive to light.   Cardiovascular:      Rate and Rhythm: Normal rate and regular rhythm.      Pulses: Normal pulses.      Heart sounds: Normal heart sounds.   Pulmonary:      Effort: Pulmonary effort is normal.      Breath sounds: Normal breath sounds.   Abdominal:      General: Abdomen is flat. Bowel sounds are normal.      Palpations: Abdomen is soft.   Musculoskeletal:         General: Deformity: Varicose veins bilaterally.      Cervical back: Normal range of motion.      Right lower leg: Edema (Trace) present.      Left lower leg: Edema (trace) present.   Skin:     General: Skin is warm and dry.   Neurological:      General: No focal deficit present.      Mental Status: She is alert and oriented to person, place, and time.      Sensory: No sensory deficit.   Psychiatric:         Mood and Affect: Mood normal.         Behavior: Behavior normal.          Laboratory:  CBC:  Recent Labs   Lab Result Units 02/19/24  1834   WBC K/uL 6.05   RBC M/uL 4.20   Hemoglobin g/dL 12.9   Hematocrit % 37.7   Platelets K/uL 212   MCV fL 90   MCH pg 30.7   MCHC g/dL 34.2       CMP:  Recent Labs   Lab Result Units 02/19/24  1834   Glucose mg/dL 101   Calcium mg/dL 9.4   Albumin g/dL 4.5   Total Protein g/dL 8.1   Sodium mmol/L 141   Potassium mmol/L 3.9   CO2 mmol/L 25   Chloride mmol/L 109   BUN mg/dL 32*   Alkaline Phosphatase U/L 44   ALT U/L 23   AST U/L 37   Total Bilirubin mg/dL 1.3*       URINALYSIS:  Recent Labs   Lab Result Units 02/19/24  1954   Color, UA  Yellow   Specific Gravity, UA  1.020   pH, UA  7.0   Protein, UA  Negative   Bacteria /hpf Few*   Nitrite, UA  Negative   Leukocytes, UA  Trace*   Urobilinogen, UA EU/dL Negative        LIPIDS:  No results for input(s): "TSH", "HDL", "CHOL", "TRIG", "LDLCALC", "CHOLHDL", "NONHDLCHOL", "TOTALCHOLEST" in the last 2160 hours.  TSH:  No results for input(s): "TSH" in the last 2160 hours.  A1C:  No results for input(s): " ""HGBA1C" in the last 2160 hours.    Radiology:      Assessment/Plan     Mary Ellen Vicente Miller is a 89 y.o.female with:    1. Left sided low back pain  -likely muscle sprain  -c/w lidocaine patch+heating pad  -patient counseled against use of NSAID give kidney function and currently on DOAC  -c/w tylenol arthritis when in pain    2. 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 Essential hypertension  Overview:  - now @ goal  -followed by cardiology ( Dr Last)    3.  Chronic atrial fibrillation  Overview:  - followed by Dr. Last  - OAC Eliquis without signs of bleeding  - rate controlled    4. Chronic diastolic heart failure  Overview:  - no signs of acute decompensation  - followed by cardiology    5. Long term (current) use of anticoagulants  Overview:  - followed by cardiology  - no signs of bleeding    6. H/o MDD  -well controlled on current regimen    7. H/o aortic atherosclerosis  -c/w ASA  -c/t monitor    9. MDD/KLARISSA  -restart on lexapro 10mg daily    -Continue current medications and maintain follow up with specialists.      Patient verbalizes understanding and agrees with current treatment plan.      Dr Dionne Jeter MD  [facility] [place]    "

## 2024-02-29 DIAGNOSIS — G30.1 MILD LATE ONSET ALZHEIMER'S DEMENTIA WITH OTHER BEHAVIORAL DISTURBANCE: ICD-10-CM

## 2024-02-29 DIAGNOSIS — F02.A18 MILD LATE ONSET ALZHEIMER'S DEMENTIA WITH OTHER BEHAVIORAL DISTURBANCE: ICD-10-CM

## 2024-02-29 RX ORDER — AMLODIPINE BESYLATE 10 MG/1
10 TABLET ORAL DAILY
Qty: 90 TABLET | Refills: 3 | Status: ON HOLD | OUTPATIENT
Start: 2024-02-29 | End: 2024-05-02 | Stop reason: HOSPADM

## 2024-02-29 RX ORDER — MEMANTINE HYDROCHLORIDE 5 MG/1
5 TABLET ORAL 2 TIMES DAILY
Qty: 60 TABLET | Refills: 11 | Status: SHIPPED | OUTPATIENT
Start: 2024-02-29 | End: 2025-02-28

## 2024-03-05 RX ORDER — I-VITE, TAB 1000-60-2MG (60/BT) 300MCG-200
1 TAB ORAL
Qty: 30 TABLET | Refills: 0 | Status: SHIPPED | OUTPATIENT
Start: 2024-03-05 | End: 2024-04-02

## 2024-03-07 ENCOUNTER — TELEPHONE (OUTPATIENT)
Dept: FAMILY MEDICINE | Facility: CLINIC | Age: 89
End: 2024-03-07
Payer: MEDICARE

## 2024-03-07 NOTE — TELEPHONE ENCOUNTER
Spoke with Apple at Pharmacy and they needPrevagen reg strength sent as a refill request so pt can get medication.

## 2024-03-07 NOTE — TELEPHONE ENCOUNTER
----- Message from Ashanti Arrieta sent at 3/7/2024 12:19 PM CST -----  Type:  Needs Medical Advice    Who Called:  Caller Apple ([facility])  Would the patient rather a call back or a response via [facility]? Callback   Best Call Back Number:  [phone]  Additional Information:  Caller is requesting a callback from this provider office in regards to medications concerns and questions

## 2024-04-02 DIAGNOSIS — I50.32 CHRONIC DIASTOLIC HEART FAILURE: ICD-10-CM

## 2024-04-02 RX ORDER — FUROSEMIDE 20 MG/1
20 TABLET ORAL
Qty: 30 TABLET | Refills: 0 | Status: ON HOLD | OUTPATIENT
Start: 2024-04-02 | End: 2024-05-09

## 2024-04-02 RX ORDER — APIXABAN 5 MG/1
5 TABLET, FILM COATED ORAL 2 TIMES DAILY
Qty: 60 TABLET | Refills: 0 | Status: ON HOLD | OUTPATIENT
Start: 2024-04-02 | End: 2024-04-29

## 2024-04-02 RX ORDER — BIOTIN 1 MG
TABLET ORAL
Qty: 15 TABLET | Refills: 0 | Status: SHIPPED | OUTPATIENT
Start: 2024-04-02 | End: 2024-04-25

## 2024-04-02 RX ORDER — I-VITE, TAB 1000-60-2MG (60/BT) 300MCG-200
1 TAB ORAL
Qty: 30 TABLET | Refills: 0 | Status: ON HOLD | OUTPATIENT
Start: 2024-04-02 | End: 2024-04-29

## 2024-04-05 ENCOUNTER — TELEPHONE (OUTPATIENT)
Dept: FAMILY MEDICINE | Facility: CLINIC | Age: 89
End: 2024-04-05
Payer: MEDICARE

## 2024-04-05 NOTE — TELEPHONE ENCOUNTER
----- Message from Rosibel Barnes sent at 4/5/2024 10:19 AM CDT -----  Type:  Pharmacy Calling to Clarify an RX    Name of Caller:Apple   Pharmacy Name: [facility]  Prescription Name:Prevagen regular strength (not in chart)  What do they need to clarify?: a order for it   Best Call Back Number:[phone]  Additional Information:   .

## 2024-04-05 NOTE — TELEPHONE ENCOUNTER
Spoke with Apple at [facility]; She stated pt is in an assisted living center and is taking Prevagen regular strength over the counter and is seeking a prescription for it from pt pcp with refills

## 2024-04-25 ENCOUNTER — TELEPHONE (OUTPATIENT)
Dept: FAMILY MEDICINE | Facility: CLINIC | Age: 89
End: 2024-04-25
Payer: MEDICARE

## 2024-04-25 RX ORDER — MULTIVIT-MIN/FOLIC ACID/BIOTIN 66.7 MCG
1 TABLET ORAL
Qty: 30 TABLET | Refills: 0 | Status: ON HOLD | OUTPATIENT
Start: 2024-04-25 | End: 2024-05-02 | Stop reason: HOSPADM

## 2024-04-25 NOTE — TELEPHONE ENCOUNTER
----- Message from Kerry Staples sent at 4/25/2024 10:33 AM CDT -----  Type:  RX Refill Request    Who Called: jyothi with [facility]   Refill or New Rx:refill  RX Name and Strength: hair skin and nails vitamin (not on list)  How is the patient currently taking it? (ex. 1XDay):1  Is this a 30 day or 90 day RX:N/A  Preferred Pharmacy with phone number:[facility] [address] [facility]   Phone: [phone]  Fax: [phone]        Local or Mail Order:Local   Ordering Provider:Dionne  Would the patient rather a call back or a response via [facility]? Call   Best Call Back Number:[phone]  Additional Information:        Jyothi stated they will be  delivering pt prescriptions on tomorrow and needs this by today please

## 2024-04-25 NOTE — TELEPHONE ENCOUNTER
Spoke with Jyothi from [facility]; She has been advise that dr Lehman does not prescribe hair skin and nail vit; She was advise to get those over the counter

## 2024-04-25 NOTE — TELEPHONE ENCOUNTER
Called pharmacy and spoke with Jyothi; She was advised to send in written orders for approval from Dr. Lehman; She stated she will be faxing over the orders; Office fax number was verbally given to her

## 2024-04-25 NOTE — TELEPHONE ENCOUNTER
Spoke with Jyothi from [facility] pharmacy; She states that a written order was sent to Dr. Lehman on 2/29 from a nurse at [facility]; She says Dr. Lehman signed the order for the pt to have those vitamins; She is requesting another approval for pt

## 2024-04-27 ENCOUNTER — ANESTHESIA EVENT (OUTPATIENT)
Dept: SURGERY | Facility: HOSPITAL | Age: 89
DRG: 956 | End: 2024-04-27
Payer: MEDICARE

## 2024-04-27 ENCOUNTER — ANESTHESIA (OUTPATIENT)
Dept: SURGERY | Facility: HOSPITAL | Age: 89
DRG: 956 | End: 2024-04-27
Payer: MEDICARE

## 2024-04-27 ENCOUNTER — HOSPITAL ENCOUNTER (INPATIENT)
Facility: HOSPITAL | Age: 89
LOS: 5 days | Discharge: NURSING, REHAB OR LONG-TERM CARE | DRG: 956 | End: 2024-05-02
Attending: STUDENT IN AN ORGANIZED HEALTH CARE EDUCATION/TRAINING PROGRAM | Admitting: HOSPITALIST
Payer: MEDICARE

## 2024-04-27 DIAGNOSIS — R07.9 CHEST PAIN: ICD-10-CM

## 2024-04-27 DIAGNOSIS — S72.009A HIP FRACTURE: ICD-10-CM
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
## 2024-04-27 DIAGNOSIS — S72.142A CLOSED DISPLACED INTERTROCHANTERIC FRACTURE OF LEFT FEMUR, INITIAL ENCOUNTER: Primary | ICD-10-CM

## 2024-04-27 DIAGNOSIS — E87.70 VOLUME OVERLOAD: ICD-10-CM

## 2024-04-27 PROBLEM — S72.142D CLOSED DISPLACED INTERTROCHANTERIC FRACTURE OF LEFT FEMUR WITH ROUTINE HEALING: Status: ACTIVE | Noted: 2024-04-27

## 2024-04-27 LAB
25(OH)D3+25(OH)D2 SERPL-MCNC: 77 NG/ML (ref 30–96)
ABO + RH BLD: NORMAL
ALBUMIN SERPL BCP-MCNC: 3.6 G/DL (ref 3.5–5.2)
ALP SERPL-CCNC: 47 U/L (ref 55–135)
ALT SERPL W/O P-5'-P-CCNC: 21 U/L (ref 10–44)
ANION GAP SERPL CALC-SCNC: 10 MMOL/L (ref 8–16)
ASCENDING AORTA: 3.43 CM
AST SERPL-CCNC: 26 U/L (ref 10–40)
AV INDEX (PROSTH): 1.02
AV MEAN GRADIENT: 3 MMHG
AV PEAK GRADIENT: 6 MMHG
AV VALVE AREA BY VELOCITY RATIO: 2.24 CM²
AV VALVE AREA: 2.87 CM²
AV VELOCITY RATIO: 0.8
BACTERIA #/AREA URNS AUTO: ABNORMAL /HPF
BASOPHILS # BLD AUTO: 0.05 K/UL (ref 0–0.2)
BASOPHILS NFR BLD: 0.3 % (ref 0–1.9)
BILIRUB SERPL-MCNC: 1.2 MG/DL (ref 0.1–1)
BILIRUB UR QL STRIP: NEGATIVE
BLD GP AB SCN CELLS X3 SERPL QL: NORMAL
BNP SERPL-MCNC: 532 PG/ML (ref 0–99)
BSA FOR ECHO PROCEDURE: 1.56 M2
BUN SERPL-MCNC: 30 MG/DL (ref 10–30)
CALCIUM SERPL-MCNC: 9.7 MG/DL (ref 8.7–10.5)
CHLORIDE SERPL-SCNC: 108 MMOL/L (ref 95–110)
CLARITY UR REFRACT.AUTO: CLEAR
CO2 SERPL-SCNC: 20 MMOL/L (ref 23–29)
COLOR UR AUTO: YELLOW
CREAT SERPL-MCNC: 1.1 MG/DL (ref 0.5–1.4)
CV ECHO LV RWT: 0.4 CM
DIFFERENTIAL METHOD BLD: ABNORMAL
DOP CALC AO PEAK VEL: 1.2 M/S
DOP CALC AO VTI: 18.22 CM
DOP CALC LVOT AREA: 2.8 CM2
DOP CALC LVOT DIAMETER: 1.89 CM
DOP CALC LVOT PEAK VEL: 0.96 M/S
DOP CALC LVOT STROKE VOLUME: 52.27 CM3
DOP CALCLVOT PEAK VEL VTI: 18.64 CM
E/E' RATIO: 5.08 M/S
ECHO LV POSTERIOR WALL: 0.81 CM (ref 0.6–1.1)
EOSINOPHIL # BLD AUTO: 0 K/UL (ref 0–0.5)
EOSINOPHIL NFR BLD: 0.1 % (ref 0–8)
ERYTHROCYTE [DISTWIDTH] IN BLOOD BY AUTOMATED COUNT: 13.8 % (ref 11.5–14.5)
EST. GFR  (NO RACE VARIABLE): 46.6 ML/MIN/1.73 M^2
ESTIMATED AVG GLUCOSE: 105 MG/DL (ref 68–131)
FRACTIONAL SHORTENING: 26 % (ref 28–44)
GLUCOSE SERPL-MCNC: 160 MG/DL (ref 70–110)
GLUCOSE UR QL STRIP: NEGATIVE
HBA1C MFR BLD: 5.3 % (ref 4–5.6)
HCT VFR BLD AUTO: 39.8 % (ref 37–48.5)
HGB BLD-MCNC: 12.8 G/DL (ref 12–16)
HGB UR QL STRIP: ABNORMAL
IMM GRANULOCYTES # BLD AUTO: 0.14 K/UL (ref 0–0.04)
IMM GRANULOCYTES NFR BLD AUTO: 0.9 % (ref 0–0.5)
INR PPP: 1.1 (ref 0.8–1.2)
INTERVENTRICULAR SEPTUM: 0.83 CM (ref 0.6–1.1)
IVRT: 111.32 MSEC
KETONES UR QL STRIP: NEGATIVE
LA MAJOR: 8.51 CM
LA MINOR: 8.5 CM
LA WIDTH: 5.01 CM
LEFT ATRIUM SIZE: 4.7 CM
LEFT ATRIUM VOLUME INDEX MOD: 74.4 ML/M2
LEFT ATRIUM VOLUME INDEX: 109.1 ML/M2
LEFT ATRIUM VOLUME MOD: 116.07 CM3
LEFT ATRIUM VOLUME: 170.23 CM3
LEFT INTERNAL DIMENSION IN SYSTOLE: 3.02 CM (ref 2.1–4)
LEFT VENTRICLE DIASTOLIC VOLUME INDEX: 46.77 ML/M2
LEFT VENTRICLE DIASTOLIC VOLUME: 72.96 ML
LEFT VENTRICLE MASS INDEX: 64 G/M2
LEFT VENTRICLE SYSTOLIC VOLUME INDEX: 22.7 ML/M2
LEFT VENTRICLE SYSTOLIC VOLUME: 35.46 ML
LEFT VENTRICULAR INTERNAL DIMENSION IN DIASTOLE: 4.07 CM (ref 3.5–6)
LEFT VENTRICULAR MASS: 99.4 G
LEUKOCYTE ESTERASE UR QL STRIP: NEGATIVE
LV LATERAL E/E' RATIO: 4.4 M/S
LV SEPTAL E/E' RATIO: 6 M/S
LYMPHOCYTES # BLD AUTO: 0.7 K/UL (ref 1–4.8)
LYMPHOCYTES NFR BLD: 4.3 % (ref 18–48)
MAGNESIUM SERPL-MCNC: 1.7 MG/DL (ref 1.6–2.6)
MCH RBC QN AUTO: 30.5 PG (ref 27–31)
MCHC RBC AUTO-ENTMCNC: 32.2 G/DL (ref 32–36)
MCV RBC AUTO: 95 FL (ref 82–98)
MICROSCOPIC COMMENT: ABNORMAL
MONOCYTES # BLD AUTO: 0.9 K/UL (ref 0.3–1)
MONOCYTES NFR BLD: 5.5 % (ref 4–15)
MV PEAK E VEL: 0.66 M/S
NEUTROPHILS # BLD AUTO: 14.3 K/UL (ref 1.8–7.7)
NEUTROPHILS NFR BLD: 88.9 % (ref 38–73)
NITRITE UR QL STRIP: NEGATIVE
NRBC BLD-RTO: 0 /100 WBC
OHS CV RV/LV RATIO: 0.87 CM
OHS QRS DURATION: 72 MS
OHS QRS DURATION: 74 MS
OHS QTC CALCULATION: 441 MS
OHS QTC CALCULATION: 459 MS
PH UR STRIP: 7 [PH] (ref 5–8)
PHOSPHATE SERPL-MCNC: 3.7 MG/DL (ref 2.7–4.5)
PISA TR MAX VEL: 3.5 M/S
PLATELET # BLD AUTO: 191 K/UL (ref 150–450)
PMV BLD AUTO: 10.4 FL (ref 9.2–12.9)
POTASSIUM SERPL-SCNC: 3.8 MMOL/L (ref 3.5–5.1)
PREALB SERPL-MCNC: 16 MG/DL (ref 20–43)
PROCALCITONIN SERPL IA-MCNC: 0.08 NG/ML
PROT SERPL-MCNC: 6.8 G/DL (ref 6–8.4)
PROT UR QL STRIP: NEGATIVE
PROTHROMBIN TIME: 11.7 SEC (ref 9–12.5)
RA MAJOR: 7.49 CM
RA PRESSURE ESTIMATED: 8 MMHG
RA WIDTH: 5.35 CM
RBC # BLD AUTO: 4.19 M/UL (ref 4–5.4)
RBC #/AREA URNS AUTO: 76 /HPF (ref 0–4)
RIGHT ATRIAL AREA: 38 CM2
RIGHT VENTRICULAR END-DIASTOLIC DIMENSION: 3.56 CM
RV TB RVSP: 12 MMHG
SINUS: 3.08 CM
SODIUM SERPL-SCNC: 138 MMOL/L (ref 136–145)
SP GR UR STRIP: 1.01 (ref 1–1.03)
SPECIMEN OUTDATE: NORMAL
SQUAMOUS #/AREA URNS AUTO: 0 /HPF
STJ: 2.68 CM
TDI LATERAL: 0.15 M/S
TDI SEPTAL: 0.11 M/S
TDI: 0.13 M/S
TR MAX PG: 49 MMHG
TRANSFERRIN SERPL-MCNC: 239 MG/DL (ref 200–375)
TRICUSPID ANNULAR PLANE SYSTOLIC EXCURSION: 0.98 CM
TROPONIN I SERPL DL<=0.01 NG/ML-MCNC: 0.01 NG/ML (ref 0–0.03)
TV REST PULMONARY ARTERY PRESSURE: 57 MMHG
URN SPEC COLLECT METH UR: ABNORMAL
WBC # BLD AUTO: 16.11 K/UL (ref 3.9–12.7)
Z-SCORE OF LEFT VENTRICULAR DIMENSION IN END DIASTOLE: -1
Z-SCORE OF LEFT VENTRICULAR DIMENSION IN END SYSTOLE: 0.62

## 2024-04-27 PROCEDURE — 99223 1ST HOSP IP/OBS HIGH 75: CPT | Mod: 57,GC,, | Performed by: ORTHOPAEDIC SURGERY

## 2024-04-27 PROCEDURE — 86901 BLOOD TYPING SEROLOGIC RH(D): CPT | Performed by: STUDENT IN AN ORGANIZED HEALTH CARE EDUCATION/TRAINING PROGRAM

## 2024-04-27 PROCEDURE — 84100 ASSAY OF PHOSPHORUS: CPT | Performed by: STUDENT IN AN ORGANIZED HEALTH CARE EDUCATION/TRAINING PROGRAM

## 2024-04-27 PROCEDURE — 85025 COMPLETE CBC W/AUTO DIFF WBC: CPT | Performed by: STUDENT IN AN ORGANIZED HEALTH CARE EDUCATION/TRAINING PROGRAM

## 2024-04-27 PROCEDURE — 93005 ELECTROCARDIOGRAM TRACING: CPT

## 2024-04-27 PROCEDURE — 63600175 PHARM REV CODE 636 W HCPCS: Performed by: STUDENT IN AN ORGANIZED HEALTH CARE EDUCATION/TRAINING PROGRAM

## 2024-04-27 PROCEDURE — 85610 PROTHROMBIN TIME: CPT | Performed by: STUDENT IN AN ORGANIZED HEALTH CARE EDUCATION/TRAINING PROGRAM

## 2024-04-27 PROCEDURE — 83036 HEMOGLOBIN GLYCOSYLATED A1C: CPT | Performed by: STUDENT IN AN ORGANIZED HEALTH CARE EDUCATION/TRAINING PROGRAM

## 2024-04-27 PROCEDURE — 84134 ASSAY OF PREALBUMIN: CPT | Performed by: STUDENT IN AN ORGANIZED HEALTH CARE EDUCATION/TRAINING PROGRAM

## 2024-04-27 PROCEDURE — 63600175 PHARM REV CODE 636 W HCPCS: Performed by: HOSPITALIST

## 2024-04-27 PROCEDURE — 25000003 PHARM REV CODE 250: Performed by: HOSPITALIST

## 2024-04-27 PROCEDURE — 84466 ASSAY OF TRANSFERRIN: CPT | Performed by: STUDENT IN AN ORGANIZED HEALTH CARE EDUCATION/TRAINING PROGRAM

## 2024-04-27 PROCEDURE — 81001 URINALYSIS AUTO W/SCOPE: CPT | Performed by: STUDENT IN AN ORGANIZED HEALTH CARE EDUCATION/TRAINING PROGRAM

## 2024-04-27 PROCEDURE — 83880 ASSAY OF NATRIURETIC PEPTIDE: CPT | Performed by: STUDENT IN AN ORGANIZED HEALTH CARE EDUCATION/TRAINING PROGRAM

## 2024-04-27 PROCEDURE — 80053 COMPREHEN METABOLIC PANEL: CPT | Performed by: STUDENT IN AN ORGANIZED HEALTH CARE EDUCATION/TRAINING PROGRAM

## 2024-04-27 PROCEDURE — 25000003 PHARM REV CODE 250

## 2024-04-27 PROCEDURE — 84484 ASSAY OF TROPONIN QUANT: CPT | Performed by: STUDENT IN AN ORGANIZED HEALTH CARE EDUCATION/TRAINING PROGRAM

## 2024-04-27 PROCEDURE — 99285 EMERGENCY DEPT VISIT HI MDM: CPT | Mod: 25

## 2024-04-27 PROCEDURE — 93010 ELECTROCARDIOGRAM REPORT: CPT | Mod: ,,, | Performed by: INTERNAL MEDICINE

## 2024-04-27 PROCEDURE — 21400001 HC TELEMETRY ROOM

## 2024-04-27 PROCEDURE — 83735 ASSAY OF MAGNESIUM: CPT | Performed by: STUDENT IN AN ORGANIZED HEALTH CARE EDUCATION/TRAINING PROGRAM

## 2024-04-27 PROCEDURE — 99900035 HC TECH TIME PER 15 MIN (STAT)

## 2024-04-27 PROCEDURE — 63600175 PHARM REV CODE 636 W HCPCS

## 2024-04-27 PROCEDURE — 94761 N-INVAS EAR/PLS OXIMETRY MLT: CPT

## 2024-04-27 PROCEDURE — 27000221 HC OXYGEN, UP TO 24 HOURS

## 2024-04-27 PROCEDURE — 82306 VITAMIN D 25 HYDROXY: CPT | Performed by: STUDENT IN AN ORGANIZED HEALTH CARE EDUCATION/TRAINING PROGRAM

## 2024-04-27 PROCEDURE — 84145 PROCALCITONIN (PCT): CPT | Performed by: STUDENT IN AN ORGANIZED HEALTH CARE EDUCATION/TRAINING PROGRAM

## 2024-04-27 RX ORDER — TALC
6 POWDER (GRAM) TOPICAL NIGHTLY PRN
Status: DISCONTINUED | OUTPATIENT
Start: 2024-04-27 | End: 2024-05-02 | Stop reason: HOSPADM

## 2024-04-27 RX ORDER — MORPHINE SULFATE 2 MG/ML
2 INJECTION, SOLUTION INTRAMUSCULAR; INTRAVENOUS
Status: DISCONTINUED | OUTPATIENT
Start: 2024-04-27 | End: 2024-04-29

## 2024-04-27 RX ORDER — HYDROMORPHONE HYDROCHLORIDE 1 MG/ML
0.5 INJECTION, SOLUTION INTRAMUSCULAR; INTRAVENOUS; SUBCUTANEOUS
Status: COMPLETED | OUTPATIENT
Start: 2024-04-27 | End: 2024-04-27

## 2024-04-27 RX ORDER — IPRATROPIUM BROMIDE AND ALBUTEROL SULFATE 2.5; .5 MG/3ML; MG/3ML
3 SOLUTION RESPIRATORY (INHALATION) EVERY 4 HOURS PRN
Status: DISCONTINUED | OUTPATIENT
Start: 2024-04-27 | End: 2024-05-02 | Stop reason: HOSPADM

## 2024-04-27 RX ORDER — GLUCAGON 1 MG
1 KIT INJECTION
Status: DISCONTINUED | OUTPATIENT
Start: 2024-04-27 | End: 2024-05-02 | Stop reason: HOSPADM

## 2024-04-27 RX ORDER — POLYETHYLENE GLYCOL 3350 17 G/17G
17 POWDER, FOR SOLUTION ORAL DAILY
Status: DISCONTINUED | OUTPATIENT
Start: 2024-04-27 | End: 2024-04-28 | Stop reason: SDUPTHER

## 2024-04-27 RX ORDER — MEMANTINE HYDROCHLORIDE 5 MG/1
5 TABLET ORAL 2 TIMES DAILY
Status: DISCONTINUED | OUTPATIENT
Start: 2024-04-27 | End: 2024-05-02 | Stop reason: HOSPADM

## 2024-04-27 RX ORDER — SIMETHICONE 80 MG
1 TABLET,CHEWABLE ORAL 4 TIMES DAILY PRN
Status: DISCONTINUED | OUTPATIENT
Start: 2024-04-27 | End: 2024-05-02 | Stop reason: HOSPADM

## 2024-04-27 RX ORDER — POLYETHYLENE GLYCOL 3350 17 G/17G
17 POWDER, FOR SOLUTION ORAL 2 TIMES DAILY PRN
Status: DISCONTINUED | OUTPATIENT
Start: 2024-04-27 | End: 2024-04-28

## 2024-04-27 RX ORDER — ONDANSETRON 8 MG/1
8 TABLET, ORALLY DISINTEGRATING ORAL EVERY 8 HOURS PRN
Status: DISCONTINUED | OUTPATIENT
Start: 2024-04-27 | End: 2024-05-02 | Stop reason: HOSPADM

## 2024-04-27 RX ORDER — AMLODIPINE BESYLATE 10 MG/1
10 TABLET ORAL DAILY
Status: DISCONTINUED | OUTPATIENT
Start: 2024-04-27 | End: 2024-04-27

## 2024-04-27 RX ORDER — NALOXONE HCL 0.4 MG/ML
0.02 VIAL (ML) INJECTION
Status: DISCONTINUED | OUTPATIENT
Start: 2024-04-27 | End: 2024-05-02 | Stop reason: HOSPADM

## 2024-04-27 RX ORDER — SPIRONOLACTONE 25 MG/1
50 TABLET ORAL DAILY
Status: DISCONTINUED | OUTPATIENT
Start: 2024-04-27 | End: 2024-04-28

## 2024-04-27 RX ORDER — PROCHLORPERAZINE EDISYLATE 5 MG/ML
5 INJECTION INTRAMUSCULAR; INTRAVENOUS EVERY 6 HOURS PRN
Status: DISCONTINUED | OUTPATIENT
Start: 2024-04-27 | End: 2024-05-02 | Stop reason: HOSPADM

## 2024-04-27 RX ORDER — FUROSEMIDE 10 MG/ML
80 INJECTION INTRAMUSCULAR; INTRAVENOUS
Status: COMPLETED | OUTPATIENT
Start: 2024-04-27 | End: 2024-04-27

## 2024-04-27 RX ORDER — ACETAMINOPHEN 500 MG
1000 TABLET ORAL EVERY 8 HOURS
Status: DISPENSED | OUTPATIENT
Start: 2024-04-27 | End: 2024-04-28

## 2024-04-27 RX ORDER — IBUPROFEN 200 MG
24 TABLET ORAL
Status: DISCONTINUED | OUTPATIENT
Start: 2024-04-27 | End: 2024-05-02 | Stop reason: HOSPADM

## 2024-04-27 RX ORDER — ESCITALOPRAM OXALATE 10 MG/1
10 TABLET ORAL DAILY
Status: DISCONTINUED | OUTPATIENT
Start: 2024-04-27 | End: 2024-05-02 | Stop reason: HOSPADM

## 2024-04-27 RX ORDER — ATENOLOL 25 MG/1
25 TABLET ORAL DAILY
Status: DISCONTINUED | OUTPATIENT
Start: 2024-04-27 | End: 2024-05-02 | Stop reason: HOSPADM

## 2024-04-27 RX ORDER — MUPIROCIN 20 MG/G
OINTMENT TOPICAL 2 TIMES DAILY
Status: DISCONTINUED | OUTPATIENT
Start: 2024-04-27 | End: 2024-04-28 | Stop reason: SDUPTHER

## 2024-04-27 RX ORDER — FUROSEMIDE 10 MG/ML
40 INJECTION INTRAMUSCULAR; INTRAVENOUS 2 TIMES DAILY
Status: DISCONTINUED | OUTPATIENT
Start: 2024-04-27 | End: 2024-04-28

## 2024-04-27 RX ORDER — IBUPROFEN 200 MG
16 TABLET ORAL
Status: DISCONTINUED | OUTPATIENT
Start: 2024-04-27 | End: 2024-05-02 | Stop reason: HOSPADM

## 2024-04-27 RX ORDER — ONDANSETRON HYDROCHLORIDE 2 MG/ML
4 INJECTION, SOLUTION INTRAVENOUS EVERY 12 HOURS PRN
Status: DISCONTINUED | OUTPATIENT
Start: 2024-04-27 | End: 2024-04-27

## 2024-04-27 RX ORDER — BISACODYL 10 MG/1
10 SUPPOSITORY RECTAL DAILY PRN
Status: DISCONTINUED | OUTPATIENT
Start: 2024-04-27 | End: 2024-05-02 | Stop reason: HOSPADM

## 2024-04-27 RX ORDER — METHOCARBAMOL 500 MG/1
500 TABLET, FILM COATED ORAL EVERY 6 HOURS PRN
Status: DISCONTINUED | OUTPATIENT
Start: 2024-04-27 | End: 2024-05-02 | Stop reason: HOSPADM

## 2024-04-27 RX ORDER — CHOLECALCIFEROL (VITAMIN D3) 25 MCG
1000 TABLET ORAL DAILY
Status: DISCONTINUED | OUTPATIENT
Start: 2024-04-27 | End: 2024-04-27

## 2024-04-27 RX ORDER — SODIUM CHLORIDE 0.9 % (FLUSH) 0.9 %
10 SYRINGE (ML) INJECTION
Status: DISCONTINUED | OUTPATIENT
Start: 2024-04-27 | End: 2024-05-02 | Stop reason: HOSPADM

## 2024-04-27 RX ORDER — SODIUM CHLORIDE 0.9 % (FLUSH) 0.9 %
10 SYRINGE (ML) INJECTION EVERY 12 HOURS PRN
Status: DISCONTINUED | OUTPATIENT
Start: 2024-04-27 | End: 2024-04-29 | Stop reason: SDUPTHER

## 2024-04-27 RX ORDER — SODIUM CHLORIDE 9 MG/ML
INJECTION, SOLUTION INTRAVENOUS CONTINUOUS
Status: DISCONTINUED | OUTPATIENT
Start: 2024-04-27 | End: 2024-04-27

## 2024-04-27 RX ORDER — ONDANSETRON HYDROCHLORIDE 2 MG/ML
4 INJECTION, SOLUTION INTRAVENOUS
Status: COMPLETED | OUTPATIENT
Start: 2024-04-27 | End: 2024-04-27

## 2024-04-27 RX ORDER — ALUMINUM HYDROXIDE, MAGNESIUM HYDROXIDE, AND SIMETHICONE 1200; 120; 1200 MG/30ML; MG/30ML; MG/30ML
30 SUSPENSION ORAL 4 TIMES DAILY PRN
Status: DISCONTINUED | OUTPATIENT
Start: 2024-04-27 | End: 2024-05-02 | Stop reason: HOSPADM

## 2024-04-27 RX ADMIN — ONDANSETRON 4 MG: 2 INJECTION INTRAMUSCULAR; INTRAVENOUS at 07:04

## 2024-04-27 RX ADMIN — FUROSEMIDE 80 MG: 10 INJECTION, SOLUTION INTRAVENOUS at 07:04

## 2024-04-27 RX ADMIN — MEMANTINE 5 MG: 5 TABLET ORAL at 09:04

## 2024-04-27 RX ADMIN — MUPIROCIN: 20 OINTMENT TOPICAL at 09:04

## 2024-04-27 RX ADMIN — SPIRONOLACTONE 50 MG: 25 TABLET ORAL at 09:04

## 2024-04-27 RX ADMIN — ACETAMINOPHEN 1000 MG: 500 TABLET ORAL at 09:04

## 2024-04-27 RX ADMIN — HYDROMORPHONE HYDROCHLORIDE 0.5 MG: 1 INJECTION, SOLUTION INTRAMUSCULAR; INTRAVENOUS; SUBCUTANEOUS at 07:04

## 2024-04-27 RX ADMIN — FUROSEMIDE 40 MG: 10 INJECTION, SOLUTION INTRAMUSCULAR; INTRAVENOUS at 05:04

## 2024-04-27 RX ADMIN — SODIUM CHLORIDE: 9 INJECTION, SOLUTION INTRAVENOUS at 09:04

## 2024-04-27 RX ADMIN — ATENOLOL 25 MG: 25 TABLET ORAL at 09:04

## 2024-04-27 RX ADMIN — ACETAMINOPHEN 1000 MG: 500 TABLET ORAL at 03:04

## 2024-04-27 RX ADMIN — MORPHINE SULFATE 2 MG: 2 INJECTION, SOLUTION INTRAMUSCULAR; INTRAVENOUS at 10:04

## 2024-04-27 RX ADMIN — ESCITALOPRAM OXALATE 10 MG: 5 TABLET, FILM COATED ORAL at 09:04

## 2024-04-27 NOTE — ED NOTES
Patient arrived to the ED via EMS complaining of hip pain after she fell. Patient denies hitting her head but states that she does take blood thinners (Eloquis) . Patient received fentanyl on route before she arrived to the ED. Patient has been connected to pulse ox and cycling blood pressure cuff. Daughter at bedside. Care on going.

## 2024-04-27 NOTE — PLAN OF CARE
Full H and P to be completed by Elicia Barnes PA-C.  Came to see patient in ER with family at bedside.  On high flow oxygen with signs of overload on admit with CXR showing volume, . Echo ordered. All of outside cards records are not in epic unfortunately but has chronic diastolic heart failure. On lasix at home. Usually has edema in LE her daughter says at al times but right now better than baseline with wrinkling in ankles.   No missed doses of meds. Has taken in excess tea lately but has only sweet and low in it. No inc pillow use at night or dyspnea. Does exercise class every day, had some back pain the last 2 days so skipped yesterday. Does not use oxygen at home.   Known afib with HR 110s on exam, home atenolol restarted, possible element of flash edema with pain causing elevated rates with RVR, pain may have also been from edema as very thin body habitus at baseline, unclear if assocaited prior to admit. Diuresis staretd now with strict I/O. 2L , cardiac diet ordered.  Hold OR until tomorrow to optimize further with diuresis. HR improving now it appears on vital review.  Labs stable wbc 16 likely reactive, hg 12. UA negative. Vit d high at 77 and advised her need to hold now as approaching toxicity levels and was taking at home so hold now. Was placed on lasix/aldactone for home meds and aldcatone continued. Was recently taken off norvasc by PCP so stopped now. Was placed on namenda and lexapro by PCP and continued now.  Plan for IM nail tomorrow, and resume home eliquis post op.  NPO MN  Patient has RCRI of 1 with 6% 30 day risk of death, MI, cardiac arrest, and benefit of surgery will outweigh risk, recommend holding surgery today for diuresis further, and pending improvements will likely be optimized further for tomorrow where benefit will outweigh risk once on lower flow oxygen with diuresis.

## 2024-04-27 NOTE — H&P
[facility] - Emergency Dept  Hospital Medicine  History & Physical    Patient Name: Mary Ellen Miller  MRN: [id number]  Patient Class: IP- Inpatient  Admission Date: 4/27/2024  Attending Physician: Hannah Andre MD   Primary Care Provider: Dionne Jeter MD         Patient information was obtained from patient, relative(s), past medical records, and ER records.     Subjective:     Principal Problem:Closed displaced intertrochanteric fracture of left femur    Chief Complaint:   Chief Complaint   Patient presents with    Hip Pain     L hip pain with shortening and rotation after slip & fall. Given 100mcg Fentanyl total        HPI: Mary Ellen Miller is a 89 y.o. F with PMH of HTN, HLD, CHF, and afib on eliquis presenting to the ED with significant left hip pain after a fall last night. Patient was ambulating to the restroom to urinate last night and slipped and fell onto her left hip. She had immediate pain and significant difficulty bearing weight. She denies head injury or LOC and is on eliquis for paroxysmal A fib. She denies numbness, tingling, or paresthesias to the LLE. 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 Prior to the incident, she reports intermittent worsening of chronic lower extremity edema and SOB. Her PCP recently discontinued amlodipine in favor of lasix and continuing aldactone, and she has not missed any doses of her home medications. She has been drinking excess tea lately but has completely cut salt and has decreased her overall fluid intake. She denies GARVIN or orthopnea and does not use oxygen at home. Pt endorses intermittent confusion and abdominal distention but otherwise denies fever, chills, chest pain, palpitations, abdominal pain, N/V/D, dysuria, HA, or syncope.       In the ED: Hypertensive and hypoxic, sating 93% on 3L NC, o/w VSSAF. CBC with  leukocytosis to 16, likely reactive. CMP grossly unremarkable. . Troponin 0.009. EKG c/w known rate controlled A fib. CXR c/w pulmonary edema & R pleural effusion. XR pelvis with mildly displaced L intertrochanteric femur fracture with impaction, minimally displaced comminuted L superior pubic ramus fracture, and nondisplaced fracture of the L inferior pubic ramus. Pt was given 80 mg IV lasix, zofran, and dilaudid and was admitted to hospital medicine for further management.     Past Medical History:   Diagnosis Date    Anxiety     Atrial fibrillation     Colon polyps 05/01/2012    Hypertension     Leg ulcer, right, limited to breakdown of skin 11/20/2019    Unilateral femoral hernia with obstruction, without gangrene 10/26/2015       Past Surgical History:   Procedure Laterality Date    HYSTERECTOMY      LUNG SURGERY  1988    OOPHORECTOMY      RADICAL HYSTERECTOMY  1990       Review of patient's allergies indicates:   Allergen Reactions    Percodan [oxycodone hcl-oxycodone-asa] Other (See Comments)     halucinations    Penicillins Itching    Sulfa (sulfonamide antibiotics) Diarrhea and Nausea Only    Amoxicillin        Current Facility-Administered Medications   Medication Dose Route Frequency Provider Last Rate Last Admin    0.9%  NaCl infusion   Intravenous Continuous Hannah Andre  mL/hr at 04/27/24 0956 New Bag at 04/27/24 0956    acetaminophen tablet 1,000 mg  1,000 mg Oral Q8H Hannah Andre MD        albuterol-ipratropium 2.5 mg-0.5 mg/3 mL nebulizer solution 3 mL  3 mL Nebulization Q4H PRN Elicia Barnes PA-C        aluminum-magnesium hydroxide-simethicone 200-200-20 mg/5 mL suspension 30 mL  30 mL Oral QID PRN Elicia Barnes PA-C        atenoloL tablet 25 mg  25 mg Oral Daily Hannah Andre MD   25 mg at 04/27/24 0953    bisacodyL suppository 10 mg  10 mg Rectal Daily PRN Hnanah Andre MD        dextrose 10% bolus 125 mL 125 mL  12.5 g Intravenous PRN Elicia Barnes  JASPREET ADKINS        dextrose 10% bolus 250 mL 250 mL  25 g Intravenous PRN lEicia Barnes PA-C        EScitalopram oxalate tablet 10 mg  10 mg Oral Daily Elicia Barnes PA-C   10 mg at 04/27/24 0952    furosemide injection 40 mg  40 mg Intravenous BID Elicia Barnes PA-C        glucagon (human recombinant) injection 1 mg  1 mg Intramuscular PRN Elicia Barnes PA-C        glucose chewable tablet 16 g  16 g Oral PRN Elicia Barnes PA-C        glucose chewable tablet 24 g  24 g Oral PRN Elicia Barnes PA-C        melatonin tablet 6 mg  6 mg Oral Nightly PRN Hannah Andre MD        memantine tablet 5 mg  5 mg Oral BID Elicia Barnes PA-C   5 mg at 04/27/24 0952    methocarbamoL tablet 500 mg  500 mg Oral Q6H PRN Hannah Andre MD        morphine injection 2 mg  2 mg Intravenous Q3H PRN Hannah Andre MD   2 mg at 04/27/24 1030    multivit-min-fa-coenzyme q10 100-5 mcg-mg (AQUADEKS) chew  1 tablet Oral Daily Elicia Barnes PA-C        mupirocin 2 % ointment   Nasal BID Hannah Andre MD   Given at 04/27/24 0953    naloxone 0.4 mg/mL injection 0.02 mg  0.02 mg Intravenous PRN Elicia Barnes PA-C        ondansetron disintegrating tablet 8 mg  8 mg Oral Q8H PRN Elicia Barnes PA-C        polyethylene glycol packet 17 g  17 g Oral Daily Hannah Andre MD        polyethylene glycol packet 17 g  17 g Oral BID PRN Elicia Barnes PA-C        prochlorperazine injection Soln 5 mg  5 mg Intravenous Q6H PRN Elicia Barnes PA-C        simethicone chewable tablet 80 mg  1 tablet Oral QID PRN Elicia Barnes PA-C        sodium chloride 0.9% flush 10 mL  10 mL Intravenous PRN Hannah Andre MD        sodium chloride 0.9% flush 10 mL  10 mL Intravenous Q12H PRN Elicia Barnes PA-C        spironolactone tablet 50 mg  50 mg Oral Daily Elicia Barnes PA-C   50 mg at 04/27/24 8700     Current Outpatient Medications   Medication Sig Dispense Refill    amLODIPine (NORVASC) 10 MG  tablet Take 1 tablet (10 mg total) by mouth once daily. 90 tablet 3    calcipotriene (DOVONOX) 0.005 % cream       calcium carbonate (OS-ZOILA) 600 mg (1,500 mg) Tab Take 600 mg by mouth 2 (two) times daily with meals.      ELIQUIS 5 mg Tab TAKE 1 TABLET BY MOUTH TWICE A DAY. 60 tablet 0    EScitalopram oxalate (LEXAPRO) 10 MG tablet Take 1 tablet (10 mg total) by mouth once daily. 90 tablet 3    furosemide (LASIX) 20 MG tablet TAKE 1 TABLET BY MOUTH ONCE DAILY. 30 tablet 0    HAIR,SKIN AND NAILS,FA-BIOTIN, 66.7-1,666.7 mcg Tab TAKE 1 TABLET BY MOUTH ONCE DAILY. 30 tablet 0    I-BLAKE Tab TAKE 1 TABLET BY MOUTH ONCE DAILY. 30 tablet 0    LIDOcaine (LIDODERM) 5 % Place 1 patch onto the skin once daily. Remove & Discard patch within 12 hours or as directed by MD 7 patch 0    memantine (NAMENDA) 5 MG Tab Take 1 tablet (5 mg total) by mouth 2 (two) times daily. 60 tablet 11    multivit,calc,mins/iron/folic (ONE-A-DAY WOMENS FORMULA ORAL) Take 1 tablet by mouth once daily.      spironolactone (ALDACTONE) 50 MG tablet Take 1 tablet (50 mg total) by mouth once daily. 90 tablet 3    vitamin D 1000 units Tab Take 2,000 mg by mouth once daily.       Family History       Problem Relation (Age of Onset)    No Known Problems Mother, Father          Tobacco Use    Smoking status: Never     Passive exposure: Never    Smokeless tobacco: Never   Substance and Sexual Activity    Alcohol use: No    Drug use: No    Sexual activity: Not Currently     Review of Systems   Constitutional:  Positive for activity change. Negative for chills and fever.   HENT:  Negative for trouble swallowing.    Eyes:  Negative for photophobia and visual disturbance.   Respiratory:  Positive for cough and shortness of breath. Negative for chest tightness.    Cardiovascular:  Positive for leg swelling. Negative for chest pain and palpitations.   Gastrointestinal:  Negative for abdominal pain, constipation, diarrhea, nausea and vomiting.   Genitourinary:   Negative for dysuria, frequency and hematuria.   Musculoskeletal:  Positive for arthralgias, back pain and gait problem. Negative for neck pain.   Skin:  Negative for rash and wound.   Neurological:  Positive for weakness. Negative for dizziness, syncope, speech difficulty and light-headedness.   Psychiatric/Behavioral:  Positive for confusion (intermittent). Negative for agitation. The patient is not nervous/anxious.      Objective:     Vital Signs (Most Recent):  Temp: 98.1 °F (36.7 °C) (04/27/24 0945)  Pulse: 94 (04/27/24 1015)  Resp: (!) 22 (04/27/24 1030)  BP: (!) 141/72 (04/27/24 1015)  SpO2: 98 % (04/27/24 1015) Vital Signs (24h Range):  Temp:  [97.7 °F (36.5 °C)-98.1 °F (36.7 °C)] 98.1 °F (36.7 °C)  Pulse:  [] 94  Resp:  [15-22] 22  SpO2:  [93 %-98 %] 98 %  BP: (116-153)/(71-82) 141/72     Weight: 55.3 kg (121 lb 14.6 oz)  Body mass index is 21.6 kg/m².     Physical Exam  Vitals and nursing note reviewed.   Constitutional:       General: She is not in acute distress.     Appearance: She is well-developed. She is not toxic-appearing.   HENT:      Head: Normocephalic.      Mouth/Throat:      Mouth: Mucous membranes are moist.   Eyes:      Extraocular Movements: Extraocular movements intact.      Conjunctiva/sclera: Conjunctivae normal.      Pupils: Pupils are equal, round, and reactive to light.   Cardiovascular:      Rate and Rhythm: Tachycardia present. Rhythm irregular.      Heart sounds: Normal heart sounds.      Comments: No JVD  Pulmonary:      Effort: Pulmonary effort is normal. No respiratory distress.      Breath sounds: Rales present. No wheezing.   Abdominal:      General: Bowel sounds are normal. There is distension.      Palpations: Abdomen is soft.      Tenderness: There is no abdominal tenderness.   Musculoskeletal:         General: Tenderness and deformity present. Normal range of motion.      Cervical back: Normal range of motion and neck supple.      Right lower leg: Edema present.       Left lower leg: Edema present.      Comments: Left leg is shortened and externally rotated  Tenderness to the the L hip with lateral and anterior-posterior compression  Neurovascularly intact distally with good DP pulses in the left foot   Skin:     General: Skin is warm and dry.      Capillary Refill: Capillary refill takes less than 2 seconds.      Findings: Bruising present. No rash.   Neurological:      Mental Status: She is alert and oriented to person, place, and time.      Cranial Nerves: No cranial nerve deficit.      Sensory: No sensory deficit.      Coordination: Coordination normal.   Psychiatric:         Behavior: Behavior normal.         Thought Content: Thought content normal.         Judgment: Judgment normal.              CRANIAL NERVES     CN III, IV, VI   Pupils are equal, round, and reactive to light.       Significant Labs: All pertinent labs within the past 24 hours have been reviewed.  CBC:   Recent Labs   Lab 04/27/24  0517   WBC 16.11*   HGB 12.8   HCT 39.8        CMP:   Recent Labs   Lab 04/27/24  0517      K 3.8      CO2 20*   *   BUN 30   CREATININE 1.1   CALCIUM 9.7   PROT 6.8   ALBUMIN 3.6   BILITOT 1.2*   ALKPHOS 47*   AST 26   ALT 21   ANIONGAP 10     Cardiac Markers:   Recent Labs   Lab 04/27/24  0702   *     Troponin:   Recent Labs   Lab 04/27/24  0702   TROPONINI 0.009     Urine Studies:   Recent Labs   Lab 04/27/24  0557   COLORU Yellow   APPEARANCEUA Clear   PHUR 7.0   SPECGRAV 1.015   PROTEINUA Negative   GLUCUA Negative   KETONESU Negative   BILIRUBINUA Negative   OCCULTUA 2+*   NITRITE Negative   LEUKOCYTESUR Negative   RBCUA 76*   BACTERIA Rare   SQUAMEPITHEL 0       Significant Imaging: I have reviewed all pertinent imaging results/findings within the past 24 hours.  CT Pelvis Without Contrast  Narrative: EXAMINATION:  CT PELVIS WITHOUT CONTRAST    CLINICAL HISTORY:  Pelvic trauma;    TECHNIQUE:  Noncontrast CT of the bony  pelvis.    COMPARISON:  X-rays same day    FINDINGS:  Comminuted fractures of the left-sided pubic symphysis extending into and involving the left superior and inferior pubic ramus with displaced shard of cortical bone redemonstrated.  The inferior pubic ramus fracture is only mildly displaced.  The pubic symphysis remains congruent with chondrocalcinosis noted.    There are nondisplaced fractures of the anterior inferior aspect of bilateral sacral ala extending into the margin of the SI joints without articular incongruity.    There is in apex varus angulated inter trochanteric fracture of the left femur with the lesser trochanter involved as an isolated fragment.    There is superimposed moderate left and moderate right-sided hip osteoarthritis.    There is a background of generalized bone demineralization.    There is a left pelvic hematoma posterior to this pubic symphysis that measuring a is approximately 3 x 3 x 5 cm.    There is a bowel and fat containing left inguinal hernia, and the urinary bladder contains a Rawls catheter although it remains distended.  Prominent sigmoid colon diverticulosis is present.  The uterus is surgically absent.  Impression: Multiple pelvic fractures and left femur fracture with associated left anterior pelvic hematoma    Left inguinal hernia containing small bowel loops    Distended bladder despite presence of Rawls catheter.  Is    Electronically signed by: Clarke Cabezas Jr  Date:    04/27/2024  Time:    11:21  X-Ray Femur Ap/Lat Left  Narrative: EXAMINATION:  XR PELVIS ROUTINE AP; XR FEMUR 2 VIEW LEFT    CLINICAL HISTORY:  trauma;  Unspecified fall, initial encounter    TECHNIQUE:  AP view of the pelvis was performed.  AP and lateral views of the left femur were performed.    COMPARISON:  None    FINDINGS:  Mildly displaced left intertrochanteric femur fracture with impaction of the femoral neck into the inter trochanteric region.  Left femoral head normally positioned in  the acetabulum.    Minimally displaced comminuted fracture of the left superior pubic ramus with displaced fracture fragments although bowel gas limits assessment.  Nondisplaced fracture of the left inferior pubic ramus.    Generalized osteopenia.    Hip cartilage spaces are maintained.    Degenerative change of the left knee with joint space narrowing osteophyte formation.  No suprapatellar joint effusion.    Vascular calcifications.  Impression: Mildly displaced left intertrochanteric femur fracture with impaction.    Minimally displaced comminuted left superior pubic ramus fracture.  Nondisplaced fracture left inferior pubic ramus.    This report was flagged in Epic as abnormal.    Electronically signed by resident: Zeeshan Vincent  Date:    04/27/2024  Time:    05:20    Electronically signed by: Alex Petersen MD  Date:    04/27/2024  Time:    05:35  X-Ray Pelvis Routine AP  Narrative: EXAMINATION:  XR PELVIS ROUTINE AP; XR FEMUR 2 VIEW LEFT    CLINICAL HISTORY:  trauma;  Unspecified fall, initial encounter    TECHNIQUE:  AP view of the pelvis was performed.  AP and lateral views of the left femur were performed.    COMPARISON:  None    FINDINGS:  Mildly displaced left intertrochanteric femur fracture with impaction of the femoral neck into the inter trochanteric region.  Left femoral head normally positioned in the acetabulum.    Minimally displaced comminuted fracture of the left superior pubic ramus with displaced fracture fragments although bowel gas limits assessment.  Nondisplaced fracture of the left inferior pubic ramus.    Generalized osteopenia.    Hip cartilage spaces are maintained.    Degenerative change of the left knee with joint space narrowing osteophyte formation.  No suprapatellar joint effusion.    Vascular calcifications.  Impression: Mildly displaced left intertrochanteric femur fracture with impaction.    Minimally displaced comminuted left superior pubic ramus fracture.  Nondisplaced  fracture left inferior pubic ramus.    This report was flagged in Epic as abnormal.    Electronically signed by resident: Zeeshan Vincent  Date:    04/27/2024  Time:    05:20    Electronically signed by: Alex Petersen MD  Date:    04/27/2024  Time:    05:35  X-Ray Chest AP Portable  Narrative: EXAMINATION:  XR CHEST AP PORTABLE    CLINICAL HISTORY:  Unspecified fall, initial encounter    TECHNIQUE:  Single frontal view of the chest was performed.    COMPARISON:  CT chest 05/09/2021    Chest radiograph 08/01/2024    FINDINGS:  Mediastinal structures midline.  Cardiac silhouette enlarged but stable.  Aortic arch calcification.    Diffuse interstitial opacities throughout both lungs increased from priors.  Surgical material in the right lower lung zone related to prior lung resection.  Suspected new small right pleural fluid.  No significant left pleural fluid.  No pneumothorax.    Scoliosis.    Linear metallic density overlies the mid chest, presumably external to the patient, correlate clinically.  Additional metallic density likely related to zipper/clothing.  Impression: Stable cardiomegaly.  Diffuse interstitial opacities throughout both lungs increased from prior, probable edema.  New right pleural fluid.    Linear metallic density overlies the mid chest, presumably external to the patient, correlate clinically. Additional metallic density likely related to zipper/clothing.    Electronically signed by resident: Zeeshan Vincent  Date:    04/27/2024  Time:    05:15    Electronically signed by: Alex Petersen MD  Date:    04/27/2024  Time:    05:24      Assessment/Plan:     * Closed displaced intertrochanteric fracture of left femur  89 y.o. who presents after a fall with a closed displaced L intertrochanteric fracture.   - Afebrile with mild leukocytosis, likely reactive no pain, no skin breakdown noted  - Orthopedic surgery consulted   - NPO at midnight for likely IM nail placement in the AM (after medical  optimization)   - Start multimodal pain regimen with tylenol, robaxin, and PRN narcotics   - DVT PPx: pre-op with TEDs/SCDs - restart home eliquis postoperatively   - empiric antibiotics ordered: cefazolin   - vitamin D level elevated, hold for now   - Preoperative assessment: Routine labs, CXR and EKG reviewed. Patient has RCRI of 1 with 6% 30 day risk of death, MI, cardiac arrest, and benefit of surgery will outweigh risk, recommend holding surgery today for further diuresis, and pending improvements will likely be optimized for procedure tomorrow where benefit will outweigh risk once on lower flow oxygen with diuresis.  - echo pending as below  - monitor H/H, electrolytes daily    - PT/OT consulted for post-op gait training, strengthening, and restoration of ADLs.   - fall precautions     Acute on chronic diastolic heart failure  Patient is identified as having Diastolic (HFpEF) heart failure that is Acute on Chronic. CHF is currently uncontrolled due to Dyspnea not returned to baseline after 1 doses of IV diuretic, Rales/crackles on pulmonary exam, and Pulmonary edema/pleural effusion on CXR. Pt is on CHF pathway.   - Patient decompensated on admit (4/27/2024). (+)Subjective SOB, (--)JVD, (--)orthnopnea.   - Last BNP reviewed- and noted below   Recent Labs   Lab 04/27/24  0702   *   - Troponin 0.009, continue to trend   - CXR with cardiomegaly and pulmonary edema with R pleural effusion   - EKG without acute ST changes   - Echo pending  - Continue diuresis with IV lasix  - Continue Beta Blocker Furosemide Aldactone and monitor clinical status closely.   - Monitor on telemetry.  - Patient is on CHF pathway.   - Monitor strict Is&Os and daily STANDING weights. Fluid restriction to 1.5L per day and cardiac diet with salt restriction.  No intake or output data in the 24 hours ending 04/27/24 1310   - Supplemental O2 to keep Spo2 >90%  - Continue to stress to patient importance of self efficacy and  on  diet for CHF.    Chronic atrial fibrillation  Long term use of anticoagulants   Patient with Persistent (7 days or more) atrial fibrillation which is controlled currently with Beta Blocker. Patient is currently in atrial fibrillation.[id number] Score: 3. Anticoagulation indicated. Anticoagulation done with eliquis .  - Hold eliquis until after procedure to reduce bleeding risk     Recurrent major depressive disorder, in full remission  Patient has recurrent depression which is mild and is currently controlled. Will Continue anti-depressant medications. We will not consult psychiatry at this time. Patient does not display psychosis at this time. Continue to monitor closely and adjust plan of care as needed.  - Continue home lexapro & namenda     Essential hypertension  Chronic, controlled. Latest blood pressure and vitals reviewed-     Temp:  [97.7 °F (36.5 °C)-98.1 °F (36.7 °C)]   Pulse:  []   Resp:  [15-22]   BP: (116-153)/(71-82)   SpO2:  [93 %-98 %] .   Home meds for hypertension were reviewed and noted below.   Hypertension Medications                    furosemide (LASIX) 20 MG tablet TAKE 1 TABLET BY MOUTH ONCE DAILY.    spironolactone (ALDACTONE) 50 MG tablet Take 1 tablet (50 mg total) by mouth once daily.     While in the hospital, will manage blood pressure as follows; Adjust home antihypertensive regimen as follows- hold home lasix in favor of IV diuresis, continue spironolactone    Will utilize p.r.n. blood pressure medication only if patient's blood pressure greater than 180/110 and she develops symptoms such as worsening chest pain or shortness of breath.      VTE Risk Mitigation (From admission, onward)           Ordered     IP VTE HIGH RISK PATIENT  Once         04/27/24 0914     Place sequential compression device  Until discontinued         04/27/24 0914                                    Elicia Barnes PA-C  Department of Hospital Medicine  [facility] - Emergency Dept

## 2024-04-27 NOTE — ASSESSMENT & PLAN NOTE
Patient is identified as having Diastolic (HFpEF) heart failure that is Acute on Chronic. CHF is currently uncontrolled due to Dyspnea not returned to baseline after 1 doses of IV diuretic, Rales/crackles on pulmonary exam, and Pulmonary edema/pleural effusion on CXR. Pt is on CHF pathway.   - Patient decompensated on admit (4/27/2024). (+)Subjective SOB, (--)JVD, (--)orthnopnea.   - Last BNP reviewed- and noted below   Recent Labs   Lab 04/27/24  0702   *   - Troponin 0.009, continue to trend   - CXR with cardiomegaly and pulmonary edema with R pleural effusion   - EKG without acute ST changes   - Echo pending  - Continue diuresis with IV lasix  - Continue Beta Blocker Furosemide Aldactone and monitor clinical status closely.   - Monitor on telemetry.  - Patient is on CHF pathway.   - Monitor strict Is&Os and daily STANDING weights. Fluid restriction to 1.5L per day and cardiac diet with salt restriction.  No intake or output data in the 24 hours ending 04/27/24 1310   - Supplemental O2 to keep Spo2 >90%  - Continue to stress to patient importance of self efficacy and  on diet for CHF.

## 2024-04-27 NOTE — SUBJECTIVE & OBJECTIVE
Past Medical History:   Diagnosis Date    Anxiety     Atrial fibrillation     Colon polyps 05/01/2012    Hypertension     Leg ulcer, right, limited to breakdown of skin 11/20/2019    Unilateral femoral hernia with obstruction, without gangrene 10/26/2015       Past Surgical History:   Procedure Laterality Date    HYSTERECTOMY      LUNG SURGERY  1988    OOPHORECTOMY      RADICAL HYSTERECTOMY  1990       Review of patient's allergies indicates:   Allergen Reactions    Percodan [oxycodone hcl-oxycodone-asa] Other (See Comments)     halucinations    Penicillins Itching    Sulfa (sulfonamide antibiotics) Diarrhea and Nausea Only    Amoxicillin        Current Facility-Administered Medications   Medication Dose Route Frequency Provider Last Rate Last Admin    0.9%  NaCl infusion   Intravenous Continuous Hannah Andre MD        acetaminophen tablet 1,000 mg  1,000 mg Oral Q8H Hannah Andre MD        albuterol-ipratropium 2.5 mg-0.5 mg/3 mL nebulizer solution 3 mL  3 mL Nebulization Q4H PRN Elicia Barnes PA-C        aluminum-magnesium hydroxide-simethicone 200-200-20 mg/5 mL suspension 30 mL  30 mL Oral QID PRN Elicia Barnes PA-C        amLODIPine tablet 10 mg  10 mg Oral Daily Elicia Barnes PA-C        atenoloL tablet 25 mg  25 mg Oral Daily Hannah Andre MD        bisacodyL suppository 10 mg  10 mg Rectal Daily PRN Hannah Andre MD        dextrose 10% bolus 125 mL 125 mL  12.5 g Intravenous PRN Elicia Barnes PA-C        dextrose 10% bolus 250 mL 250 mL  25 g Intravenous PRN Elicia Barnes PA-C        EScitalopram oxalate tablet 10 mg  10 mg Oral Daily Elicia Barnes PA-C        furosemide injection 40 mg  40 mg Intravenous BID Elicia Barnes PA-C        glucagon (human recombinant) injection 1 mg  1 mg Intramuscular PRN Elicia Barnes PA-C        glucose chewable tablet 16 g  16 g Oral PRN Elicia Barnes PA-C        glucose chewable tablet 24 g  24 g Oral PRN Elicia Barnes  JASPREET ADKINS        melatonin tablet 6 mg  6 mg Oral Nightly PRN Hannah Andre MD        memantine tablet 5 mg  5 mg Oral BID Elicia Barnes PA-C        methocarbamoL tablet 500 mg  500 mg Oral Q6H PRN Hannah Andre MD        morphine injection 2 mg  2 mg Intravenous Q3H PRN Hannah Andre MD        multivit-min-fa-coenzyme q10 100-5 mcg-mg (AQUADEKS) chew  1 tablet Oral Daily Elicia Barnes PA-C        mupirocin 2 % ointment   Nasal BID Hannah Andre MD        naloxone 0.4 mg/mL injection 0.02 mg  0.02 mg Intravenous PRN Elicia Barnes PA-C        ondansetron disintegrating tablet 8 mg  8 mg Oral Q8H PRN Elicia Barnes PA-C        polyethylene glycol packet 17 g  17 g Oral Daily Hannah Andre MD        polyethylene glycol packet 17 g  17 g Oral BID PRN Elicia Barnes PA-C        prochlorperazine injection Soln 5 mg  5 mg Intravenous Q6H PRN Elicia Barnes PA-C        simethicone chewable tablet 80 mg  1 tablet Oral QID PRN Elicia Barnes PA-C        sodium chloride 0.9% flush 10 mL  10 mL Intravenous PRN Hannah Andre MD        sodium chloride 0.9% flush 10 mL  10 mL Intravenous Q12H PRN Elicia Barnes PA-C        spironolactone tablet 50 mg  50 mg Oral Daily Elicia Barnes PA-C        vitamin D 1000 units tablet 1,000 Units  1,000 Units Oral Daily Elicia Barnes PA-C         Current Outpatient Medications   Medication Sig Dispense Refill    amLODIPine (NORVASC) 10 MG tablet Take 1 tablet (10 mg total) by mouth once daily. 90 tablet 3    calcipotriene (DOVONOX) 0.005 % cream       calcium carbonate (OS-ZOILA) 600 mg (1,500 mg) Tab Take 600 mg by mouth 2 (two) times daily with meals.      ELIQUIS 5 mg Tab TAKE 1 TABLET BY MOUTH TWICE A DAY. 60 tablet 0    EScitalopram oxalate (LEXAPRO) 10 MG tablet Take 1 tablet (10 mg total) by mouth once daily. 90 tablet 3    furosemide (LASIX) 20 MG tablet TAKE 1 TABLET BY MOUTH ONCE DAILY. 30 tablet 0    HAIR,SKIN AND  NAILS,FA-BIOTIN, 66.7-1,666.7 mcg Tab TAKE 1 TABLET BY MOUTH ONCE DAILY. 30 tablet 0    I-BLAKE Tab TAKE 1 TABLET BY MOUTH ONCE DAILY. 30 tablet 0    LIDOcaine (LIDODERM) 5 % Place 1 patch onto the skin once daily. Remove & Discard patch within 12 hours or as directed by MD Cruz patch 0    memantine (NAMENDA) 5 MG Tab Take 1 tablet (5 mg total) by mouth 2 (two) times daily. 60 tablet 11    multivit,calc,mins/iron/folic (ONE-A-DAY WOMENS FORMULA ORAL) Take 1 tablet by mouth once daily.      spironolactone (ALDACTONE) 50 MG tablet Take 1 tablet (50 mg total) by mouth once daily. 90 tablet 3    vitamin D 1000 units Tab Take 2,000 mg by mouth once daily.       Family History       Problem Relation (Age of Onset)    No Known Problems Mother, Father          Tobacco Use    Smoking status: Never     Passive exposure: Never    Smokeless tobacco: Never   Substance and Sexual Activity    Alcohol use: No    Drug use: No    Sexual activity: Not Currently     ROS  Constitutional: negative for fevers  Eyes: negative visual changes  ENT: negative for hearing loss  Respiratory: negative for dyspnea  Cardiovascular: negative for chest pain  Gastrointestinal: negative for abdominal pain  Genitourinary: negative for dysuria  Neurological: negative for headaches  Behavioral/Psych: negative for hallucinations  Endocrine: negative for temperature intolerance    Objective:     Vital Signs (Most Recent):  Temp: 98 °F (36.7 °C) (04/27/24 0730)  Pulse: 103 (04/27/24 0800)  Resp: 20 (04/27/24 0800)  BP: 116/71 (04/27/24 0800)  SpO2: 96 % (04/27/24 0800) Vital Signs (24h Range):  Temp:  [97.7 °F (36.5 °C)-98 °F (36.7 °C)] 98 °F (36.7 °C)  Pulse:  [] 103  Resp:  [15-20] 20  SpO2:  [93 %-96 %] 96 %  BP: (116-153)/(71-82) 116/71     Weight: 55.3 kg (121 lb 14.6 oz)     Body mass index is 21.6 kg/m².    No intake or output data in the 24 hours ending 04/27/24 0922     Ortho/SPM Exam  General:  no acute distress, appears stated age   Neuro: alert  and oriented x3  Psych: normal mood  Head: normocephalic, atraumatic.  Eyes: no scleral icterus  Mouth: moist mucous membranes  Cardiovascular: extremities warm and well perfused  Lungs: breathing comfortably, equal chest rise bilat  Skin: clean, dry, intact (any exceptions noted in below musculoskeletal exam)    MSK:  RUE:  - Skin intact throughout, no open wounds  - No swelling  - No ecchymosis, erythema, or signs of cellulitis  - NonTTP throughout  - AROM and PROM of the shoulder, elbow, wrist, and hand intact without pain  - Axillary/AIN/PIN/Radial/Median/Ulnar Nerves assessed in isolation without deficit  - SILT throughout  - Compartments soft  - Radial artery palpated   - Capillary Refill <3s    LUE:  - Skin intact throughout, no open wounds  - No swelling  - No ecchymosis, erythema, or signs of cellulitis  - NonTTP throughout  - AROM and PROM of the shoulder, elbow, wrist, and hand intact without pain  - Axillary/AIN/PIN/Radial/Median/Ulnar Nerves assessed in isolation without deficit  - SILT throughout  - Compartments soft  - Radial artery palpated   - Capillary Refill <3s    RLE:  - Skin intact throughout, no open wounds  - No swelling  - No ecchymosis, erythema, or signs of cellulitis  - NonTTP throughout  - AROM and PROM of the hip, knee, ankle, and foot intact without pain  - TA/EHL/Gastroc/FHL assessed in isolation without deficit  - SILT throughout  - Compartments soft  - DP and PT palpated  - Capillary Refill <3s  - Negative Log roll  - Negative [facility]    LLE:  - Skin intact throughout, no scars present  - No swelling  - No ecchymosis, erythema, or signs of cellulitis  - TTP at hip/proximal femur  - No tenderness to palpation of middle or distal femur  - No tenderness to palpation of proximal, middle, or distal aspects of tibia or fibula  - No tenderness to palpation of foot  - Pain with any ROM at hip  - Full painless ROM of knee and ankle  - Compartments soft  - SILT Sa/Watts/DP/SP/T  - Motor  intact EHL/FHL/TA/Gastroc  - 2+ DP  - Brisk capillary refill       Spine/pelvis/axial body:  No tenderness to palpation of cervical, thoracic, or lumbar spine  No pain with compression of pelvis  No chest wall or abdominal tenderness  No decubitus ulcers       Significant Labs: All pertinent labs within the past 24 hours have been reviewed.    Significant Imaging: I have reviewed and interpreted all pertinent imaging results/findings.  Xr pelvis: L intertroch femur fracture

## 2024-04-27 NOTE — ASSESSMENT & PLAN NOTE
Chronic, controlled. Latest blood pressure and vitals reviewed-     Temp:  [97.7 °F (36.5 °C)-98.1 °F (36.7 °C)]   Pulse:  []   Resp:  [15-22]   BP: (116-153)/(71-82)   SpO2:  [93 %-98 %] .   Home meds for hypertension were reviewed and noted below.   Hypertension Medications                    furosemide (LASIX) 20 MG tablet TAKE 1 TABLET BY MOUTH ONCE DAILY.    spironolactone (ALDACTONE) 50 MG tablet Take 1 tablet (50 mg total) by mouth once daily.     While in the hospital, will manage blood pressure as follows; Adjust home antihypertensive regimen as follows- hold home lasix in favor of IV diuresis, continue spironolactone    Will utilize p.r.n. blood pressure medication only if patient's blood pressure greater than 180/110 and she develops symptoms such as worsening chest pain or shortness of breath.

## 2024-04-27 NOTE — ASSESSMENT & PLAN NOTE
Long term use of anticoagulants   Patient with Persistent (7 days or more) atrial fibrillation which is controlled currently with Beta Blocker. Patient is currently in atrial fibrillation.[id number] Score: 3. Anticoagulation indicated. Anticoagulation done with eliquis .  - Hold eliquis until after procedure to reduce bleeding risk

## 2024-04-27 NOTE — ACP (ADVANCE CARE PLANNING)
Advance Care Planning     Date: 04/27/2024    Today a voluntary meeting took place: bedside    Patient Participation: Patient is able to participate     Attendees (Name and  Relationship to patient):  Daughter in Law, Yesenia Miller    Staff attendees (Name and  Role): Elicia Barnes PA-C    ACP Conversation (General): Understanding of current condition      Code Status: DNR; status confirmed/order placed in chart     ACP Documents: None    Goals of care: The patient and family endorses that what is most important right now is to focus on remaining as independent as possible, symptom/pain control, and improvement in condition but with limits to invasive therapies    Accordingly, we have decided that the best plan to meet the patient's goals includes continuing with treatment      Recommendations/  Follow-up tasks: None      Length of ACP   conversation in minutes: 16 minutes

## 2024-04-27 NOTE — CONSULTS
[facility] - Emergency Dept  Orthopedics  Consult Note    Patient Name: Mary Ellen Miller  MRN: [id number]  Admission Date: 4/27/2024  Hospital Length of Stay: 0 days  Attending Provider: Hannah Andre MD  Primary Care Provider: Dionne Jeter MD      Inpatient consult to Orthopedic Surgery  Consult performed by: JAZMYN Parmar MD  Consult ordered by: Brett Marcelo MD        Subjective:     Principal Problem:Hip fracture    Chief Complaint:   Chief Complaint   Patient presents with    Hip Pain     L hip pain with shortening and rotation after slip & fall. Given 100mcg Fentanyl total        HPI: Mary Ellen Miller is a 89 y.o. female with PMH of HTN, CHF, afib on eliquis 5BID presenting with left hip pain. Patient was ambulating to the restroom last night and fell onto left hip. Immediate pain and difficulty bearing weight. Denies head injury or LOC. On eliquis for blood thinners. Denies other MSK pains. Denies numbness, tingling, or paresthesias to the LLE. Lives alone at assistive living. Uses no assistive device for ambulation.      Past Medical History:   Diagnosis Date    Anxiety     Atrial fibrillation     Colon polyps 05/01/2012    Hypertension     Leg ulcer, right, limited to breakdown of skin 11/20/2019    Unilateral femoral hernia with obstruction, without gangrene 10/26/2015       Past Surgical History:   Procedure Laterality Date    HYSTERECTOMY      LUNG SURGERY  1988    OOPHORECTOMY      RADICAL HYSTERECTOMY  1990       Review of patient's allergies indicates:   Allergen Reactions    Percodan [oxycodone hcl-oxycodone-asa] Other (See Comments)     halucinations    Penicillins Itching    Sulfa (sulfonamide antibiotics) Diarrhea and Nausea Only    Amoxicillin        Current Facility-Administered Medications   Medication Dose Route Frequency Provider Last Rate Last Admin    0.9%  NaCl infusion   Intravenous Continuous Hannah Andre MD        acetaminophen tablet 1,000 mg   1,000 mg Oral Q8H Hannah Andre MD        albuterol-ipratropium 2.5 mg-0.5 mg/3 mL nebulizer solution 3 mL  3 mL Nebulization Q4H PRN Elicia Barnes PA-C        aluminum-magnesium hydroxide-simethicone 200-200-20 mg/5 mL suspension 30 mL  30 mL Oral QID PRN Elicia Barnes PA-C        amLODIPine tablet 10 mg  10 mg Oral Daily Elicia Barnes PA-C        atenoloL tablet 25 mg  25 mg Oral Daily Hannah Andre MD        bisacodyL suppository 10 mg  10 mg Rectal Daily PRN Hannah Andre MD        dextrose 10% bolus 125 mL 125 mL  12.5 g Intravenous PRN Elicia Barnes PA-C        dextrose 10% bolus 250 mL 250 mL  25 g Intravenous PRN Elicia Barnes PA-C        EScitalopram oxalate tablet 10 mg  10 mg Oral Daily Elicia Barnes PA-C        furosemide injection 40 mg  40 mg Intravenous BID Elicia Barnes PA-C        glucagon (human recombinant) injection 1 mg  1 mg Intramuscular PRN Elicia Barnes PA-C        glucose chewable tablet 16 g  16 g Oral PRN Elicia Barnes PA-C        glucose chewable tablet 24 g  24 g Oral PRN Elicia Barnes PA-C        melatonin tablet 6 mg  6 mg Oral Nightly PRN Hannha Andre MD        memantine tablet 5 mg  5 mg Oral BID Elicia Barnes PA-C        methocarbamoL tablet 500 mg  500 mg Oral Q6H PRN Hannah Andre MD        morphine injection 2 mg  2 mg Intravenous Q3H PRN Hannah Andre MD        multivit-min-fa-coenzyme q10 100-5 mcg-mg (AQUADEKS) chew  1 tablet Oral Daily Elicia Barnes PA-C        mupirocin 2 % ointment   Nasal BID Hannah Andre MD        naloxone 0.4 mg/mL injection 0.02 mg  0.02 mg Intravenous PRN Elicia Barnes PA-C        ondansetron disintegrating tablet 8 mg  8 mg Oral Q8H PRN Elicia Barnes PA-C        polyethylene glycol packet 17 g  17 g Oral Daily Hannah Andre MD        polyethylene glycol packet 17 g  17 g Oral BID PRN Elicia Barnes PA-C        prochlorperazine injection Soln 5 mg  5  mg Intravenous Q6H PRN Elicia Barnes PA-C        simethicone chewable tablet 80 mg  1 tablet Oral QID PRN Elicia Barnes PA-C        sodium chloride 0.9% flush 10 mL  10 mL Intravenous PRN Hannah Andre MD        sodium chloride 0.9% flush 10 mL  10 mL Intravenous Q12H PRN Elicia Barnes PA-C        spironolactone tablet 50 mg  50 mg Oral Daily Elicia Barnes PA-C        vitamin D 1000 units tablet 1,000 Units  1,000 Units Oral Daily Elicia Barnes PA-C         Current Outpatient Medications   Medication Sig Dispense Refill    amLODIPine (NORVASC) 10 MG tablet Take 1 tablet (10 mg total) by mouth once daily. 90 tablet 3    calcipotriene (DOVONOX) 0.005 % cream       calcium carbonate (OS-ZOILA) 600 mg (1,500 mg) Tab Take 600 mg by mouth 2 (two) times daily with meals.      ELIQUIS 5 mg Tab TAKE 1 TABLET BY MOUTH TWICE A DAY. 60 tablet 0    EScitalopram oxalate (LEXAPRO) 10 MG tablet Take 1 tablet (10 mg total) by mouth once daily. 90 tablet 3    furosemide (LASIX) 20 MG tablet TAKE 1 TABLET BY MOUTH ONCE DAILY. 30 tablet 0    HAIR,SKIN AND NAILS,FA-BIOTIN, 66.7-1,666.7 mcg Tab TAKE 1 TABLET BY MOUTH ONCE DAILY. 30 tablet 0    I-BLAKE Tab TAKE 1 TABLET BY MOUTH ONCE DAILY. 30 tablet 0    LIDOcaine (LIDODERM) 5 % Place 1 patch onto the skin once daily. Remove & Discard patch within 12 hours or as directed by MD 7 patch 0    memantine (NAMENDA) 5 MG Tab Take 1 tablet (5 mg total) by mouth 2 (two) times daily. 60 tablet 11    multivit,calc,mins/iron/folic (ONE-A-DAY WOMENS FORMULA ORAL) Take 1 tablet by mouth once daily.      spironolactone (ALDACTONE) 50 MG tablet Take 1 tablet (50 mg total) by mouth once daily. 90 tablet 3    vitamin D 1000 units Tab Take 2,000 mg by mouth once daily.       Family History       Problem Relation (Age of Onset)    No Known Problems Mother, Father          Tobacco Use    Smoking status: Never     Passive exposure: Never    Smokeless tobacco: Never   Substance and Sexual  Activity    Alcohol use: No    Drug use: No    Sexual activity: Not Currently     ROS  Constitutional: negative for fevers  Eyes: negative visual changes  ENT: negative for hearing loss  Respiratory: negative for dyspnea  Cardiovascular: negative for chest pain  Gastrointestinal: negative for abdominal pain  Genitourinary: negative for dysuria  Neurological: negative for headaches  Behavioral/Psych: negative for hallucinations  Endocrine: negative for temperature intolerance    Objective:     Vital Signs (Most Recent):  Temp: 98 °F (36.7 °C) (04/27/24 0730)  Pulse: 103 (04/27/24 0800)  Resp: 20 (04/27/24 0800)  BP: 116/71 (04/27/24 0800)  SpO2: 96 % (04/27/24 0800) Vital Signs (24h Range):  Temp:  [97.7 °F (36.5 °C)-98 °F (36.7 °C)] 98 °F (36.7 °C)  Pulse:  [] 103  Resp:  [15-20] 20  SpO2:  [93 %-96 %] 96 %  BP: (116-153)/(71-82) 116/71     Weight: 55.3 kg (121 lb 14.6 oz)     Body mass index is 21.6 kg/m².    No intake or output data in the 24 hours ending 04/27/24 0922     Ortho/SPM Exam  General:  no acute distress, appears stated age   Neuro: alert and oriented x3  Psych: normal mood  Head: normocephalic, atraumatic.  Eyes: no scleral icterus  Mouth: moist mucous membranes  Cardiovascular: extremities warm and well perfused  Lungs: breathing comfortably, equal chest rise bilat  Skin: clean, dry, intact (any exceptions noted in below musculoskeletal exam)    MSK:  RUE:  - Skin intact throughout, no open wounds  - No swelling  - No ecchymosis, erythema, or signs of cellulitis  - NonTTP throughout  - AROM and PROM of the shoulder, elbow, wrist, and hand intact without pain  - Axillary/AIN/PIN/Radial/Median/Ulnar Nerves assessed in isolation without deficit  - SILT throughout  - Compartments soft  - Radial artery palpated   - Capillary Refill <3s    LUE:  - Skin intact throughout, no open wounds  - No swelling  - No ecchymosis, erythema, or signs of cellulitis  - NonTTP throughout  - AROM and PROM of the  shoulder, elbow, wrist, and hand intact without pain  - Axillary/AIN/PIN/Radial/Median/Ulnar Nerves assessed in isolation without deficit  - SILT throughout  - Compartments soft  - Radial artery palpated   - Capillary Refill <3s    RLE:  - Skin intact throughout, no open wounds  - No swelling  - No ecchymosis, erythema, or signs of cellulitis  - NonTTP throughout  - AROM and PROM of the hip, knee, ankle, and foot intact without pain  - TA/EHL/Gastroc/FHL assessed in isolation without deficit  - SILT throughout  - Compartments soft  - DP and PT palpated  - Capillary Refill <3s  - Negative Log roll  - Negative [facility]    LLE:  - Skin intact throughout, no scars present  - No swelling  - No ecchymosis, erythema, or signs of cellulitis  - TTP at hip/proximal femur  - No tenderness to palpation of middle or distal femur  - No tenderness to palpation of proximal, middle, or distal aspects of tibia or fibula  - No tenderness to palpation of foot  - Pain with any ROM at hip  - Full painless ROM of knee and ankle  - Compartments soft  - SILT Sa/Watts/DP/SP/T  - Motor intact EHL/FHL/TA/Gastroc  - 2+ DP  - Brisk capillary refill       Spine/pelvis/axial body:  No tenderness to palpation of cervical, thoracic, or lumbar spine  No pain with compression of pelvis  No chest wall or abdominal tenderness  No decubitus ulcers       Significant Labs: All pertinent labs within the past 24 hours have been reviewed.    Significant Imaging: I have reviewed and interpreted all pertinent imaging results/findings.  Xr pelvis: L intertroch femur fracture  Assessment/Plan:     Closed displaced intertrochanteric fracture of left femur with routine healing  Mary Ellen Vicente Miller is a 89 y.o. female with left intertrochanteric femur fracture, closed, NVI. They take eliquis 5 BID anticoagulation at home. They required no ambulatory assistive devices prior to this injury. She is currently on 6L of oxygen currently, not on oxygen at home. Will  likely be optimized for surgery tomorrow per medicine. Pending echo today.     -Admitted to medicine hip fracture service for pre-operative clearance and medical evaluation  -To OR tomorrow for operative fixation of left intertrochanteric femur fracture  -Pt marked, booked, and consented for surgery  -DVT PPx: Hold anticoagulation  -Abx: Preop abx ordered  -Labs: Prealbumin 16, UA clean, Glucose 160, Albumin 3.6, Hemoglobin 12.8, Platelets 191, White blood cell count 16, A1c 5.3, INR 1.1. Other lab results pending.  -Bed rest, johnson, NPO at midnight  -Iv: ordered for contralateral arm    Patient was explained in detail the severity of the injury that was suffered. Patient was explained the risks/benefits/and alternatives to operative management in detail including infection, bleeding, pain, nerve and vascular damage, heterotopic ossification, leg length discrepancies, rotational deformities and they express full understanding.  We discussed the 30% national first year mortality rate with hip fractures, the need for early mobilization, and the expected rehab course.  She expresses full understanding of the condition and expresses that they want to proceed with surgery. The patient is admitted to the medicine hip fracture service for optimization of medical comorbidities. Will plan for OR tomorrow. No guarantees were made, informed consent was obtained. All questions were answered to patient's and family's satisfaction.           JAZMYN Parmar MD  Orthopedics  [facility] - Emergency Dept        I agree with the resident's note as stated above.     89 yo F with history of CHF, Afib on elloquis who sustained a slip and fall when ambulating to the restroom yesterday evening.  Has pain and inability to ambulate.  Pain on IR/ER of hip.  Shortened, IR hip.  Xrays show Left IT hip fracture.  Admitted to medicine service for optimization.  Risks and benefits discussed.  To OR when optimized for IMN left hip.      Kurt MAZARIEGOS  MD Al

## 2024-04-27 NOTE — ANESTHESIA PREPROCEDURE EVALUATION
[facility]  Anesthesia Pre-Operative Evaluation         Patient Name: Mary Ellen Miller  YOB: 1930  MRN: [id number]    SUBJECTIVE:     Pre-operative evaluation for Procedure(s) (LRB):  INSERTION, INTRAMEDULLARY LENO, FEMUR: Left (Left)    PAPER CONSENT OBTAINED      04/27/2024    Mary Ellen Miller is a 89 y.o. female w/ a significant PMHx of HTN, chronic A fib on Eliquis, HFpEF, KLARISSA, OA who presents with L hip fracture.    Patient now presents for the above procedure(s).  4/27/24    Left Ventricle: The left ventricle is normal in size. Ventricular mass is normal. Normal wall thickness. Normal wall motion. There is normal systolic function with a visually estimated ejection fraction of 60 - 65%. There is indeterminate diastolic function.Normal left ventricular filling pressure.    Right Ventricle: Normal right ventricular cavity size. Wall thickness is normal. Systolic function is normal.    Left Atrium: Left atrium is severely dilated.    Right Atrium: Right atrium is severely dilated.    Aortic Valve: The aortic valve is a trileaflet valve. There is mild aortic valve sclerosis. There is mild annular calcification present.    Mitral Valve: There is moderate anterior leaflet sclerosis.    Tricuspid Valve: There is moderate regurgitation with a centrally directed jet.    Aorta: Aortic root is normal in size measuring 3.08 cm. Ascending aorta is normal measuring 3.43 cm.    Pulmonary Artery: The estimated pulmonary artery systolic pressure is 57 mmHg.    IVC/SVC: Intermediate venous pressure at 8 mmHg.       LDA:        Peripheral IV - Single Lumen 04/27/24 0326 20 G Left Antecubital (Active)   Site Assessment Clean;Dry;Intact 04/27/24 0326   Extremity Assessment Distal to IV No abnormal discoloration;No redness;No swelling 04/27/24 0326   Dressing Status Clean;Dry;Intact 04/27/24 0326   Dressing Intervention Integrity maintained 04/27/24 0326   Number of days: 0             Urethral Catheter 04/27/24 0556 Straight-tip;Silicone 16 Fr. (Active)   Number of days: 0       Prev airway: None documented.    Drips: None documented.      Patient Active Problem List   Diagnosis    Primary localized osteoarthrosis, ankle and foot    Essential hypertension    Recurrent major depressive disorder, in full remission    Chronic atrial fibrillation    Chronic diastolic heart failure    Osteopenia of left hip    Advance care planning    Long term (current) use of anticoagulants    Seasonal allergic rhinitis    Aortic atherosclerosis       Review of patient's allergies indicates:   Allergen Reactions    Percodan [oxycodone hcl-oxycodone-asa] Other (See Comments)     halucinations    Penicillins Itching    Sulfa (sulfonamide antibiotics) Diarrhea and Nausea Only    Amoxicillin        Current Inpatient Medications:      No current facility-administered medications on file prior to encounter.     Current Outpatient Medications on File Prior to Encounter   Medication Sig Dispense Refill    amLODIPine (NORVASC) 10 MG tablet Take 1 tablet (10 mg total) by mouth once daily. 90 tablet 3    calcipotriene (DOVONOX) 0.005 % cream       calcium carbonate (OS-ZOILA) 600 mg (1,500 mg) Tab Take 600 mg by mouth 2 (two) times daily with meals.      ELIQUIS 5 mg Tab TAKE 1 TABLET BY MOUTH TWICE A DAY. 60 tablet 0    EScitalopram oxalate (LEXAPRO) 10 MG tablet Take 1 tablet (10 mg total) by mouth once daily. 90 tablet 3    furosemide (LASIX) 20 MG tablet TAKE 1 TABLET BY MOUTH ONCE DAILY. 30 tablet 0    HAIR,SKIN AND NAILS,FA-BIOTIN, 66.7-1,666.7 mcg Tab TAKE 1 TABLET BY MOUTH ONCE DAILY. 30 tablet 0    I-BLAKE Tab TAKE 1 TABLET BY MOUTH ONCE DAILY. 30 tablet 0    LIDOcaine (LIDODERM) 5 % Place 1 patch onto the skin once daily. Remove & Discard patch within 12 hours or as directed by MD 7 patch 0    memantine (NAMENDA) 5 MG Tab Take 1 tablet (5 mg total) by mouth 2 (two) times daily. 60 tablet 11     multivit,calc,mins/iron/folic (ONE-A-DAY WOMENS FORMULA ORAL) Take 1 tablet by mouth once daily.      spironolactone (ALDACTONE) 50 MG tablet Take 1 tablet (50 mg total) by mouth once daily. 90 tablet 3    vitamin D 1000 units Tab Take 2,000 mg by mouth once daily.         Past Surgical History:   Procedure Laterality Date    HYSTERECTOMY      LUNG SURGERY      OOPHORECTOMY      RADICAL HYSTERECTOMY         Social History     Socioeconomic History    Marital status:    Tobacco Use    Smoking status: Never     Passive exposure: Never    Smokeless tobacco: Never   Substance and Sexual Activity    Alcohol use: No    Drug use: No    Sexual activity: Not Currently   Social History Narrative     since 34 yo when   tragically. Single mother of 2 now adult son. Valentino Carter. Drives and very active. 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    EKG:   Results for orders placed or performed during the hospital encounter of 24   EKG 12-lead    Collection Time: 24  6:52 PM   Result Value Ref Range    QRS Duration 80 ms    [facility] QTC Calculation 460 ms    Narrative    Test Reason : I48.91,    Vent. Rate : 094 BPM     Atrial Rate : 081 BPM     P-R Int : 000 ms          QRS Dur : 080 ms      QT Int  : 368 ms       P-R-T Axes : 000 065 020 degrees     QTc Int : 460 ms    Baseline Artifact  ? a.fib   Low voltage QRS  Abnormal ECG    Confirmed by Jesi KASPER, Alejo STODDARD ([id number]) on 2/20/2024 4:35:30 PM    Referred By: PREETI BOONE           Confirmed By:Alejo Dennison MD       2D ECHO:  TTE:  No results found for this or any previous visit.    MARIELOS:  No results found for this or any previous visit.    ASSESSMENT/PLAN:         Pre-op Assessment    I have reviewed the Patient Summary Reports.     I have reviewed the Nursing Notes. I have reviewed the NPO Status.   I have reviewed the Medications.     Review of Systems  Anesthesia Hx:             Denies Family Hx of Anesthesia complications.    Denies Personal Hx of Anesthesia complications.                    Hematology/Oncology:  Hematology Normal   Oncology Normal                                   EENT/Dental:  EENT/Dental Normal           Cardiovascular:     Hypertension       CHF                           Hypertension     Atrial Fibrillation     Pulmonary:  Pulmonary Normal                       Renal/:  Renal/ Normal                 Hepatic/GI:  Hepatic/GI Normal                 Musculoskeletal:  Arthritis        Arthritis          Neurological:  Neurology Normal              Arthritis                           Endocrine:  Endocrine Normal            Psych:   anxiety                 Physical Exam  General: Cooperative, Alert and Oriented    Airway:  Mallampati: III / II  Mouth Opening: Normal  TM Distance: Normal  Tongue: Normal  Neck ROM: Normal ROM    Dental:  Periodontal disease        Anesthesia Plan  Type of Anesthesia, risks & benefits discussed:    Anesthesia Type: Gen ETT, Regional  Intra-op Monitoring Plan: Standard ASA Monitors  Post Op Pain Control Plan: multimodal analgesia, IV/PO Opioids PRN and peripheral nerve block  Induction:  IV  Airway Plan: Direct, Post-Induction  Informed Consent: Informed consent signed with the Patient and all parties  understand the risks and agree with anesthesia plan.  All questions answered.   ASA Score: 3  Day of Surgery Review of History & Physical: H&P Update referred to the surgeon/provider.    Ready For Surgery From Anesthesia Perspective.     .

## 2024-04-27 NOTE — ED PROVIDER NOTES
Encounter Date: 4/27/2024       History     Chief Complaint   Patient presents with    Hip Pain     L hip pain with shortening and rotation after slip & fall. Given 100mcg Fentanyl total     [age] old female presents after a fall from home landing on her left hip.  No loss of consciousness or preceding symptoms set of fall.      Review of patient's allergies indicates:   Allergen Reactions    Percodan [oxycodone hcl-oxycodone-asa] Other (See Comments)     halucinations    Penicillins Itching    Sulfa (sulfonamide antibiotics) Diarrhea and Nausea Only    Amoxicillin      Past Medical History:   Diagnosis Date    Anxiety     Atrial fibrillation     Colon polyps 05/01/2012    Hypertension     Leg ulcer, right, limited to breakdown of skin 11/20/2019    Unilateral femoral hernia with obstruction, without gangrene 10/26/2015     Past Surgical History:   Procedure Laterality Date    HYSTERECTOMY      LUNG SURGERY  1988    OOPHORECTOMY      RADICAL HYSTERECTOMY  1990     Family History   Problem Relation Name Age of Onset    No Known Problems Mother      No Known Problems Father       Social History     Tobacco Use    Smoking status: Never     Passive exposure: Never    Smokeless tobacco: Never   Substance Use Topics    Alcohol use: No    Drug use: No     Review of Systems    Physical Exam     Initial Vitals [04/27/24 0325]   BP Pulse Resp Temp SpO2   (!) 153/82 85 18 97.7 °F (36.5 °C) 96 %      MAP       --         Physical Exam    Nursing note and vitals reviewed.  Constitutional: She appears well-developed and well-nourished.   HENT:   Head: Normocephalic and atraumatic.   Eyes: EOM are normal. Pupils are equal, round, and reactive to light.   Neck: Neck supple. No JVD present.   Normal range of motion.  Cardiovascular:  Normal rate and regular rhythm.           Pulmonary/Chest: Breath sounds normal. No stridor. No respiratory distress.   Abdominal: Abdomen is soft. There is no abdominal tenderness.    Musculoskeletal:         General: No edema.      Cervical back: Normal range of motion and neck supple.      Comments: Left leg is shortened and externally rotated.  Tenderness to the the hip with lateral and anterior-posterior compression.  Neurovascularly intact distally with good DP pulses in the left foot     Neurological: She is alert and oriented to person, place, and time. GCS score is 15. GCS eye subscore is 4. GCS verbal subscore is 5. GCS motor subscore is 6.   Skin: Skin is warm and dry. Capillary refill takes less than 2 seconds.   Psychiatric: She has a normal mood and affect. Thought content normal.         ED Course   Procedures  Labs Reviewed   CBC W/ AUTO DIFFERENTIAL - Abnormal; Notable for the following components:       Result Value    WBC 16.11 (*)     Immature Granulocytes 0.9 (*)     Gran # (ANC) 14.3 (*)     Immature Grans (Abs) 0.14 (*)     Lymph # 0.7 (*)     Gran % 88.9 (*)     Lymph % 4.3 (*)     All other components within normal limits   COMPREHENSIVE METABOLIC PANEL - Abnormal; Notable for the following components:    CO2 20 (*)     Glucose 160 (*)     Total Bilirubin 1.2 (*)     Alkaline Phosphatase 47 (*)     eGFR 46.6 (*)     All other components within normal limits   PREALBUMIN - Abnormal; Notable for the following components:    Prealbumin 16 (*)     All other components within normal limits   PROTIME-INR   TRANSFERRIN   MAGNESIUM   PHOSPHORUS   HEMOGLOBIN A1C   PROCALCITONIN   URINALYSIS, REFLEX TO URINE CULTURE   VITAMIN D   URINALYSIS MICROSCOPIC   TROPONIN I   B-TYPE NATRIURETIC PEPTIDE   TYPE & SCREEN          Imaging Results               X-Ray Femur Ap/Lat Left (Final result)  Result time 04/27/24 05:35:48      Final result by Alex Petersen MD (04/27/24 05:35:48)                   Impression:      Mildly displaced left intertrochanteric femur fracture with impaction.    Minimally displaced comminuted left superior pubic ramus fracture.  Nondisplaced fracture left  inferior pubic ramus.    This report was flagged in Epic as abnormal.    Electronically signed by resident: Zeeshan Vincent  Date:    04/27/2024  Time:    05:20    Electronically signed by: Alex Petersen MD  Date:    04/27/2024  Time:    05:35               Narrative:    EXAMINATION:  XR PELVIS ROUTINE AP; XR FEMUR 2 VIEW LEFT    CLINICAL HISTORY:  trauma;  Unspecified fall, initial encounter    TECHNIQUE:  AP view of the pelvis was performed.  AP and lateral views of the left femur were performed.    COMPARISON:  None    FINDINGS:  Mildly displaced left intertrochanteric femur fracture with impaction of the femoral neck into the inter trochanteric region.  Left femoral head normally positioned in the acetabulum.    Minimally displaced comminuted fracture of the left superior pubic ramus with displaced fracture fragments although bowel gas limits assessment.  Nondisplaced fracture of the left inferior pubic ramus.    Generalized osteopenia.    Hip cartilage spaces are maintained.    Degenerative change of the left knee with joint space narrowing osteophyte formation.  No suprapatellar joint effusion.    Vascular calcifications.                                        X-Ray Pelvis Routine AP (Final result)  Result time 04/27/24 05:35:48      Final result by Alex Petersen MD (04/27/24 05:35:48)                   Impression:      Mildly displaced left intertrochanteric femur fracture with impaction.    Minimally displaced comminuted left superior pubic ramus fracture.  Nondisplaced fracture left inferior pubic ramus.    This report was flagged in Epic as abnormal.    Electronically signed by resident: Zeeshan Vincent  Date:    04/27/2024  Time:    05:20    Electronically signed by: Alex Petersen MD  Date:    04/27/2024  Time:    05:35               Narrative:    EXAMINATION:  XR PELVIS ROUTINE AP; XR FEMUR 2 VIEW LEFT    CLINICAL HISTORY:  trauma;  Unspecified fall, initial encounter    TECHNIQUE:  AP view of the  pelvis was performed.  AP and lateral views of the left femur were performed.    COMPARISON:  None    FINDINGS:  Mildly displaced left intertrochanteric femur fracture with impaction of the femoral neck into the inter trochanteric region.  Left femoral head normally positioned in the acetabulum.    Minimally displaced comminuted fracture of the left superior pubic ramus with displaced fracture fragments although bowel gas limits assessment.  Nondisplaced fracture of the left inferior pubic ramus.    Generalized osteopenia.    Hip cartilage spaces are maintained.    Degenerative change of the left knee with joint space narrowing osteophyte formation.  No suprapatellar joint effusion.    Vascular calcifications.                                       X-Ray Chest AP Portable (Final result)  Result time 04/27/24 05:24:29      Final result by Alex Petersen MD (04/27/24 05:24:29)                   Impression:      Stable cardiomegaly.  Diffuse interstitial opacities throughout both lungs increased from prior, probable edema.  New right pleural fluid.    Linear metallic density overlies the mid chest, presumably external to the patient, correlate clinically. Additional metallic density likely related to zipper/clothing.    Electronically signed by resident: Zeeshan Vincent  Date:    04/27/2024  Time:    05:15    Electronically signed by: Alex Petersen MD  Date:    04/27/2024  Time:    05:24               Narrative:    EXAMINATION:  XR CHEST AP PORTABLE    CLINICAL HISTORY:  Unspecified fall, initial encounter    TECHNIQUE:  Single frontal view of the chest was performed.    COMPARISON:  CT chest 05/09/2021    Chest radiograph 08/01/2024    FINDINGS:  Mediastinal structures midline.  Cardiac silhouette enlarged but stable.  Aortic arch calcification.    Diffuse interstitial opacities throughout both lungs increased from priors.  Surgical material in the right lower lung zone related to prior lung resection.  Suspected  new small right pleural fluid.  No significant left pleural fluid.  No pneumothorax.    Scoliosis.    Linear metallic density overlies the mid chest, presumably external to the patient, correlate clinically.  Additional metallic density likely related to zipper/clothing.                                       Medications   furosemide injection 80 mg (has no administration in time range)   HYDROmorphone injection 0.5 mg (has no administration in time range)   ondansetron injection 4 mg (has no administration in time range)     Medical Decision Making  Hemodynamically stable. Afebrile. Phonating and protecting the airway spontaneously. No clinical evidence for cardiovascular instability or impending airway compromise. Examination as above. Additional historians include EMS. Prior medical records reviewed. PMD notes reviewed. Current co-morbidities considered that will impact clinical decision making include as above.    Plan:  X-rays, preop labs, orthopedic consultation.      Amount and/or Complexity of Data Reviewed  Labs: ordered.  Radiology: ordered. Decision-making details documented in ED Course.    Risk  Prescription drug management.  Decision regarding hospitalization.               ED Course as of 04/27/24 0639   Sat Apr 27, 2024   0555 X-Ray Pelvis Routine AP(!) [BG]   0555 Spoke with orthopedics, will admit to medicine.  [BG]      ED Course User Index  [BG] Brett Marcelo MD                Critical care time spent on the evaluation and treatment of severe organ dysfunction, review of pertinent labs and imaging studies, discussions with consulting providers and discussions with patient/family: 60 minutes.             Clinical Impression:  Final diagnoses:  [W19.XXXA] Fall  [S72.009A] Hip fracture          ED Disposition Condition    Admit                 Brett Marcelo MD  04/27/24 0698

## 2024-04-27 NOTE — PROVIDER PROGRESS NOTES - EMERGENCY DEPT.
Encounter Date: 4/27/2024    ED Physician Progress Notes          : EKG with evidence of atrial fibrillation at 107 beats per minute, no ischemic ST/T-wave changes.  Previous EKG from 2/19/2024 with similar rhythm

## 2024-04-27 NOTE — ASSESSMENT & PLAN NOTE
89 y.o. who presents after a fall with a closed displaced L intertrochanteric fracture.   - Afebrile with mild leukocytosis, likely reactive no pain, no skin breakdown noted  - Orthopedic surgery consulted   - NPO at midnight for likely IM nail placement in the AM (after medical optimization)   - Start multimodal pain regimen with tylenol, robaxin, and PRN narcotics   - DVT PPx: pre-op with TEDs/SCDs - restart home eliquis postoperatively   - empiric antibiotics ordered: cefazolin   - vitamin D level elevated, hold for now   - Preoperative assessment: Routine labs, CXR and EKG reviewed. Patient has RCRI of 1 with 6% 30 day risk of death, MI, cardiac arrest, and benefit of surgery will outweigh risk, recommend holding surgery today for further diuresis, and pending improvements will likely be optimized for procedure tomorrow where benefit will outweigh risk once on lower flow oxygen with diuresis.  - echo pending as below  - monitor H/H, electrolytes daily    - PT/OT consulted for post-op gait training, strengthening, and restoration of ADLs.   - fall precautions

## 2024-04-27 NOTE — PROGRESS NOTES
Nurses Note -- 4 Eyes      4/27/2024   2:37 PM      Skin assessed during: Admit      [x] No Altered Skin Integrity Present    []Prevention Measures Documented      [] Yes- Altered Skin Integrity Present or Discovered   [] LDA Added if Not in Epic (Describe Wound)   [] New Altered Skin Integrity was Present on Admit and Documented in LDA   [] Wound Image Taken    Wound Care Consulted? No    Attending Nurse:  Adebayo Baez RN/Staff Member:   CLEMENTINA Rodríguez

## 2024-04-27 NOTE — HPI
Mary Ellen Miller is a 89 y.o. F with PMH of HTN, HLD, CHF, and afib on eliquis presenting to the ED with significant left hip pain after a fall last night. Patient was ambulating to the restroom to urinate last night and slipped and fell onto her left hip. She had immediate pain and significant difficulty bearing weight. She denies head injury or LOC and is on eliquis for paroxysmal A fib. She denies numbness, tingling, or paresthesias to the LLE. 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 Prior to the incident, she reports intermittent worsening of chronic lower extremity edema and SOB. Her PCP recently discontinued amlodipine in favor of lasix and continuing aldactone, and she has not missed any doses of her home medications. She has been drinking excess tea lately but has completely cut salt and has decreased her overall fluid intake. She denies GARVIN or orthopnea and does not use oxygen at home. Pt endorses intermittent confusion and abdominal distention but otherwise denies fever, chills, chest pain, palpitations, abdominal pain, N/V/D, dysuria, HA, or syncope.       In the ED: Hypertensive and hypoxic, sating 93% on 3L NC, o/w VSSAF. CBC with leukocytosis to 16, likely reactive. CMP grossly unremarkable. . Troponin 0.009. EKG c/w known rate controlled A fib. CXR c/w pulmonary edema & R pleural effusion. XR pelvis with mildly displaced L intertrochanteric femur fracture with impaction, minimally displaced comminuted L superior pubic ramus fracture, and nondisplaced fracture of the L inferior pubic ramus. Pt was given 80 mg IV lasix, zofran, and dilaudid and was admitted to hospital medicine for further management.

## 2024-04-27 NOTE — SUBJECTIVE & OBJECTIVE
Past Medical History:   Diagnosis Date    Anxiety     Atrial fibrillation     Colon polyps 05/01/2012    Hypertension     Leg ulcer, right, limited to breakdown of skin 11/20/2019    Unilateral femoral hernia with obstruction, without gangrene 10/26/2015       Past Surgical History:   Procedure Laterality Date    HYSTERECTOMY      LUNG SURGERY  1988    OOPHORECTOMY      RADICAL HYSTERECTOMY  1990       Review of patient's allergies indicates:   Allergen Reactions    Percodan [oxycodone hcl-oxycodone-asa] Other (See Comments)     halucinations    Penicillins Itching    Sulfa (sulfonamide antibiotics) Diarrhea and Nausea Only    Amoxicillin        Current Facility-Administered Medications   Medication Dose Route Frequency Provider Last Rate Last Admin    0.9%  NaCl infusion   Intravenous Continuous Hannah Andre  mL/hr at 04/27/24 0956 New Bag at 04/27/24 0956    acetaminophen tablet 1,000 mg  1,000 mg Oral Q8H Hannah Andre MD        albuterol-ipratropium 2.5 mg-0.5 mg/3 mL nebulizer solution 3 mL  3 mL Nebulization Q4H PRN Elicia Barnes PA-C        aluminum-magnesium hydroxide-simethicone 200-200-20 mg/5 mL suspension 30 mL  30 mL Oral QID PRN Elicia Barnes PA-C        atenoloL tablet 25 mg  25 mg Oral Daily Hannah Andre MD   25 mg at 04/27/24 0953    bisacodyL suppository 10 mg  10 mg Rectal Daily PRN Hannah Andre MD        dextrose 10% bolus 125 mL 125 mL  12.5 g Intravenous PRN Elicia Barnes PA-C        dextrose 10% bolus 250 mL 250 mL  25 g Intravenous PRN Elicia Barnes PA-C        EScitalopram oxalate tablet 10 mg  10 mg Oral Daily Elicia Barnes PA-C   10 mg at 04/27/24 0952    furosemide injection 40 mg  40 mg Intravenous BID Elicia Barnes PA-C        glucagon (human recombinant) injection 1 mg  1 mg Intramuscular PRN Elicia Barnes PA-C        glucose chewable tablet 16 g  16 g Oral PRN Elicia Barnes PA-C        glucose chewable tablet 24 g  24 g Oral  PRN Elicia Barnes PA-C        melatonin tablet 6 mg  6 mg Oral Nightly PRN Hannah Andre MD        memantine tablet 5 mg  5 mg Oral BID Elicia Barnes PA-C   5 mg at 04/27/24 0952    methocarbamoL tablet 500 mg  500 mg Oral Q6H PRN Hannah Andre MD        morphine injection 2 mg  2 mg Intravenous Q3H PRN Hannah Andre MD   2 mg at 04/27/24 1030    multivit-min-fa-coenzyme q10 100-5 mcg-mg (AQUADEKS) chew  1 tablet Oral Daily Elicai Barnes PA-C        mupirocin 2 % ointment   Nasal BID Hannah Andre MD   Given at 04/27/24 0953    naloxone 0.4 mg/mL injection 0.02 mg  0.02 mg Intravenous PRN Elicia Barnes PA-C        ondansetron disintegrating tablet 8 mg  8 mg Oral Q8H PRN Elicia Barnes PA-C        polyethylene glycol packet 17 g  17 g Oral Daily Hannah Andre MD        polyethylene glycol packet 17 g  17 g Oral BID PRN Elicia Barnes PA-C        prochlorperazine injection Soln 5 mg  5 mg Intravenous Q6H PRN Elicia Barnes PA-C        simethicone chewable tablet 80 mg  1 tablet Oral QID PRN Elicia Barnes PA-C        sodium chloride 0.9% flush 10 mL  10 mL Intravenous PRN Hannah Andre MD        sodium chloride 0.9% flush 10 mL  10 mL Intravenous Q12H PRN Elicia Barnes PA-C        spironolactone tablet 50 mg  50 mg Oral Daily Elicia Barnes PA-C   50 mg at 04/27/24 0952     Current Outpatient Medications   Medication Sig Dispense Refill    amLODIPine (NORVASC) 10 MG tablet Take 1 tablet (10 mg total) by mouth once daily. 90 tablet 3    calcipotriene (DOVONOX) 0.005 % cream       calcium carbonate (OS-ZOILA) 600 mg (1,500 mg) Tab Take 600 mg by mouth 2 (two) times daily with meals.      ELIQUIS 5 mg Tab TAKE 1 TABLET BY MOUTH TWICE A DAY. 60 tablet 0    EScitalopram oxalate (LEXAPRO) 10 MG tablet Take 1 tablet (10 mg total) by mouth once daily. 90 tablet 3    furosemide (LASIX) 20 MG tablet TAKE 1 TABLET BY MOUTH ONCE DAILY. 30 tablet 0    HAIR,SKIN AND  NAILS,FA-BIOTIN, 66.7-1,666.7 mcg Tab TAKE 1 TABLET BY MOUTH ONCE DAILY. 30 tablet 0    I-BLAKE Tab TAKE 1 TABLET BY MOUTH ONCE DAILY. 30 tablet 0    LIDOcaine (LIDODERM) 5 % Place 1 patch onto the skin once daily. Remove & Discard patch within 12 hours or as directed by MD Cruz patch 0    memantine (NAMENDA) 5 MG Tab Take 1 tablet (5 mg total) by mouth 2 (two) times daily. 60 tablet 11    multivit,calc,mins/iron/folic (ONE-A-DAY WOMENS FORMULA ORAL) Take 1 tablet by mouth once daily.      spironolactone (ALDACTONE) 50 MG tablet Take 1 tablet (50 mg total) by mouth once daily. 90 tablet 3    vitamin D 1000 units Tab Take 2,000 mg by mouth once daily.       Family History       Problem Relation (Age of Onset)    No Known Problems Mother, Father          Tobacco Use    Smoking status: Never     Passive exposure: Never    Smokeless tobacco: Never   Substance and Sexual Activity    Alcohol use: No    Drug use: No    Sexual activity: Not Currently     Review of Systems   Constitutional:  Positive for activity change. Negative for chills and fever.   HENT:  Negative for trouble swallowing.    Eyes:  Negative for photophobia and visual disturbance.   Respiratory:  Positive for cough and shortness of breath. Negative for chest tightness.    Cardiovascular:  Positive for leg swelling. Negative for chest pain and palpitations.   Gastrointestinal:  Negative for abdominal pain, constipation, diarrhea, nausea and vomiting.   Genitourinary:  Negative for dysuria, frequency and hematuria.   Musculoskeletal:  Positive for arthralgias, back pain and gait problem. Negative for neck pain.   Skin:  Negative for rash and wound.   Neurological:  Positive for weakness. Negative for dizziness, syncope, speech difficulty and light-headedness.   Psychiatric/Behavioral:  Positive for confusion (intermittent). Negative for agitation. The patient is not nervous/anxious.      Objective:     Vital Signs (Most Recent):  Temp: 98.1 °F (36.7 °C)  (04/27/24 0945)  Pulse: 94 (04/27/24 1015)  Resp: (!) 22 (04/27/24 1030)  BP: (!) 141/72 (04/27/24 1015)  SpO2: 98 % (04/27/24 1015) Vital Signs (24h Range):  Temp:  [97.7 °F (36.5 °C)-98.1 °F (36.7 °C)] 98.1 °F (36.7 °C)  Pulse:  [] 94  Resp:  [15-22] 22  SpO2:  [93 %-98 %] 98 %  BP: (116-153)/(71-82) 141/72     Weight: 55.3 kg (121 lb 14.6 oz)  Body mass index is 21.6 kg/m².     Physical Exam  Vitals and nursing note reviewed.   Constitutional:       General: She is not in acute distress.     Appearance: She is well-developed. She is not toxic-appearing.   HENT:      Head: Normocephalic.      Mouth/Throat:      Mouth: Mucous membranes are moist.   Eyes:      Extraocular Movements: Extraocular movements intact.      Conjunctiva/sclera: Conjunctivae normal.      Pupils: Pupils are equal, round, and reactive to light.   Cardiovascular:      Rate and Rhythm: Tachycardia present. Rhythm irregular.      Heart sounds: Normal heart sounds.      Comments: No JVD  Pulmonary:      Effort: Pulmonary effort is normal. No respiratory distress.      Breath sounds: Rales present. No wheezing.   Abdominal:      General: Bowel sounds are normal. There is distension.      Palpations: Abdomen is soft.      Tenderness: There is no abdominal tenderness.   Musculoskeletal:         General: Tenderness and deformity present. Normal range of motion.      Cervical back: Normal range of motion and neck supple.      Right lower leg: Edema present.      Left lower leg: Edema present.      Comments: Left leg is shortened and externally rotated  Tenderness to the the L hip with lateral and anterior-posterior compression  Neurovascularly intact distally with good DP pulses in the left foot   Skin:     General: Skin is warm and dry.      Capillary Refill: Capillary refill takes less than 2 seconds.      Findings: Bruising present. No rash.   Neurological:      Mental Status: She is alert and oriented to person, place, and time.      Cranial  Nerves: No cranial nerve deficit.      Sensory: No sensory deficit.      Coordination: Coordination normal.   Psychiatric:         Behavior: Behavior normal.         Thought Content: Thought content normal.         Judgment: Judgment normal.              CRANIAL NERVES     CN III, IV, VI   Pupils are equal, round, and reactive to light.       Significant Labs: All pertinent labs within the past 24 hours have been reviewed.  CBC:   Recent Labs   Lab 04/27/24  0517   WBC 16.11*   HGB 12.8   HCT 39.8        CMP:   Recent Labs   Lab 04/27/24  0517      K 3.8      CO2 20*   *   BUN 30   CREATININE 1.1   CALCIUM 9.7   PROT 6.8   ALBUMIN 3.6   BILITOT 1.2*   ALKPHOS 47*   AST 26   ALT 21   ANIONGAP 10     Cardiac Markers:   Recent Labs   Lab 04/27/24  0702   *     Troponin:   Recent Labs   Lab 04/27/24  0702   TROPONINI 0.009     Urine Studies:   Recent Labs   Lab 04/27/24  0557   COLORU Yellow   APPEARANCEUA Clear   PHUR 7.0   SPECGRAV 1.015   PROTEINUA Negative   GLUCUA Negative   KETONESU Negative   BILIRUBINUA Negative   OCCULTUA 2+*   NITRITE Negative   LEUKOCYTESUR Negative   RBCUA 76*   BACTERIA Rare   SQUAMEPITHEL 0       Significant Imaging: I have reviewed all pertinent imaging results/findings within the past 24 hours.  CT Pelvis Without Contrast  Narrative: EXAMINATION:  CT PELVIS WITHOUT CONTRAST    CLINICAL HISTORY:  Pelvic trauma;    TECHNIQUE:  Noncontrast CT of the bony pelvis.    COMPARISON:  X-rays same day    FINDINGS:  Comminuted fractures of the left-sided pubic symphysis extending into and involving the left superior and inferior pubic ramus with displaced shard of cortical bone redemonstrated.  The inferior pubic ramus fracture is only mildly displaced.  The pubic symphysis remains congruent with chondrocalcinosis noted.    There are nondisplaced fractures of the anterior inferior aspect of bilateral sacral ala extending into the margin of the SI joints without  articular incongruity.    There is in apex varus angulated inter trochanteric fracture of the left femur with the lesser trochanter involved as an isolated fragment.    There is superimposed moderate left and moderate right-sided hip osteoarthritis.    There is a background of generalized bone demineralization.    There is a left pelvic hematoma posterior to this pubic symphysis that measuring a is approximately 3 x 3 x 5 cm.    There is a bowel and fat containing left inguinal hernia, and the urinary bladder contains a Rawls catheter although it remains distended.  Prominent sigmoid colon diverticulosis is present.  The uterus is surgically absent.  Impression: Multiple pelvic fractures and left femur fracture with associated left anterior pelvic hematoma    Left inguinal hernia containing small bowel loops    Distended bladder despite presence of Rawls catheter.  Is    Electronically signed by: Clarke Cabezas Jr  Date:    04/27/2024  Time:    11:21  X-Ray Femur Ap/Lat Left  Narrative: EXAMINATION:  XR PELVIS ROUTINE AP; XR FEMUR 2 VIEW LEFT    CLINICAL HISTORY:  trauma;  Unspecified fall, initial encounter    TECHNIQUE:  AP view of the pelvis was performed.  AP and lateral views of the left femur were performed.    COMPARISON:  None    FINDINGS:  Mildly displaced left intertrochanteric femur fracture with impaction of the femoral neck into the inter trochanteric region.  Left femoral head normally positioned in the acetabulum.    Minimally displaced comminuted fracture of the left superior pubic ramus with displaced fracture fragments although bowel gas limits assessment.  Nondisplaced fracture of the left inferior pubic ramus.    Generalized osteopenia.    Hip cartilage spaces are maintained.    Degenerative change of the left knee with joint space narrowing osteophyte formation.  No suprapatellar joint effusion.    Vascular calcifications.  Impression: Mildly displaced left intertrochanteric femur fracture  with impaction.    Minimally displaced comminuted left superior pubic ramus fracture.  Nondisplaced fracture left inferior pubic ramus.    This report was flagged in Epic as abnormal.    Electronically signed by resident: Zeeshan Vincent  Date:    04/27/2024  Time:    05:20    Electronically signed by: Alex Petersen MD  Date:    04/27/2024  Time:    05:35  X-Ray Pelvis Routine AP  Narrative: EXAMINATION:  XR PELVIS ROUTINE AP; XR FEMUR 2 VIEW LEFT    CLINICAL HISTORY:  trauma;  Unspecified fall, initial encounter    TECHNIQUE:  AP view of the pelvis was performed.  AP and lateral views of the left femur were performed.    COMPARISON:  None    FINDINGS:  Mildly displaced left intertrochanteric femur fracture with impaction of the femoral neck into the inter trochanteric region.  Left femoral head normally positioned in the acetabulum.    Minimally displaced comminuted fracture of the left superior pubic ramus with displaced fracture fragments although bowel gas limits assessment.  Nondisplaced fracture of the left inferior pubic ramus.    Generalized osteopenia.    Hip cartilage spaces are maintained.    Degenerative change of the left knee with joint space narrowing osteophyte formation.  No suprapatellar joint effusion.    Vascular calcifications.  Impression: Mildly displaced left intertrochanteric femur fracture with impaction.    Minimally displaced comminuted left superior pubic ramus fracture.  Nondisplaced fracture left inferior pubic ramus.    This report was flagged in Epic as abnormal.    Electronically signed by resident: Zeeshan Vincent  Date:    04/27/2024  Time:    05:20    Electronically signed by: Alex Petersen MD  Date:    04/27/2024  Time:    05:35  X-Ray Chest AP Portable  Narrative: EXAMINATION:  XR CHEST AP PORTABLE    CLINICAL HISTORY:  Unspecified fall, initial encounter    TECHNIQUE:  Single frontal view of the chest was performed.    COMPARISON:  CT chest 05/09/2021    Chest radiograph  08/01/2024    FINDINGS:  Mediastinal structures midline.  Cardiac silhouette enlarged but stable.  Aortic arch calcification.    Diffuse interstitial opacities throughout both lungs increased from priors.  Surgical material in the right lower lung zone related to prior lung resection.  Suspected new small right pleural fluid.  No significant left pleural fluid.  No pneumothorax.    Scoliosis.    Linear metallic density overlies the mid chest, presumably external to the patient, correlate clinically.  Additional metallic density likely related to zipper/clothing.  Impression: Stable cardiomegaly.  Diffuse interstitial opacities throughout both lungs increased from prior, probable edema.  New right pleural fluid.    Linear metallic density overlies the mid chest, presumably external to the patient, correlate clinically. Additional metallic density likely related to zipper/clothing.    Electronically signed by resident: Zeeshan Vincent  Date:    04/27/2024  Time:    05:15    Electronically signed by: Alex Petersen MD  Date:    04/27/2024  Time:    05:24

## 2024-04-27 NOTE — HPI
Mary Ellen Miller is a 89 y.o. female with PMH of HTN, CHF, afib on eliquis 5BID presenting with left hip pain. Patient was ambulating to the restroom last night and fell onto left hip. Immediate pain and difficulty bearing weight. Denies head injury or LOC. On eliquis for blood thinners. Denies other MSK pains. Denies numbness, tingling, or paresthesias to the LLE. Lives alone at assistive living. Uses no assistive device for ambulation.

## 2024-04-27 NOTE — ASSESSMENT & PLAN NOTE
Patient has recurrent depression which is mild and is currently controlled. Will Continue anti-depressant medications. We will not consult psychiatry at this time. Patient does not display psychosis at this time. Continue to monitor closely and adjust plan of care as needed.  - Continue home lexapro & namenda

## 2024-04-27 NOTE — ASSESSMENT & PLAN NOTE
Mary Ellen Miller is a 89 y.o. female with left intertrochanteric femur fracture, closed, NVI. They take eliquis 5 BID anticoagulation at home. They required no ambulatory assistive devices prior to this injury. She is currently on 6L of oxygen currently. Will likely be optimized for surgery tomorrow. Pending echo today.     -Admitted to medicine hip fracture service for pre-operative clearance and medical evaluation  -To OR tomorrow for operative fixation of left intertrochanteric femur fracture  -Pt marked, booked, and consented for surgery  -DVT PPx: Hold anticoagulation  -Abx: Preop abx ordered  -Labs: Prealbumin 16, UA clean, Glucose 160, Albumin 3.6, Hemoglobin 12.8, Platelets 191, White blood cell count 16, A1c 5.3, INR 1.1. Other lab results pending.  -Bed rest, johnson, NPO at midnight  -Iv: ordered for contralateral arm    Patient was explained in detail the severity of the injury that was suffered. Patient was explained the risks/benefits/and alternatives to operative management in detail including infection, bleeding, pain, nerve and vascular damage, heterotopic ossification, leg length discrepancies, rotational deformities and they express full understanding.  We discussed the 30% national first year mortality rate with hip fractures, the need for early mobilization, and the expected rehab course.  She expresses full understanding of the condition and expresses that they want to proceed with surgery. The patient is admitted to the medicine hip fracture service for optimization of medical comorbidities. Will plan for OR tomorrow. No guarantees were made, informed consent was obtained. All questions were answered to patient's and family's satisfaction.

## 2024-04-27 NOTE — NURSING
She is admitted to room 509 from ED.  She is aaox4 and pleasant.   No sign of distress noted at this itme.   Family is at the bedside.   Vitals are measured and recorded.   4 eyes skin check completed.   Basic room orientation and safety teaching is initiated.

## 2024-04-28 PROBLEM — N17.9 AKI (ACUTE KIDNEY INJURY): Status: ACTIVE | Noted: 2024-04-28

## 2024-04-28 PROBLEM — D62 ACUTE BLOOD LOSS ANEMIA: Status: ACTIVE | Noted: 2024-04-28

## 2024-04-28 PROBLEM — I95.9 HYPOTENSION: Status: ACTIVE | Noted: 2024-04-28

## 2024-04-28 LAB
ANION GAP SERPL CALC-SCNC: 11 MMOL/L (ref 8–16)
BASOPHILS # BLD AUTO: 0.04 K/UL (ref 0–0.2)
BASOPHILS # BLD AUTO: 0.05 K/UL (ref 0–0.2)
BASOPHILS NFR BLD: 0.4 % (ref 0–1.9)
BASOPHILS NFR BLD: 0.4 % (ref 0–1.9)
BUN SERPL-MCNC: 51 MG/DL (ref 10–30)
CALCIUM SERPL-MCNC: 8.6 MG/DL (ref 8.7–10.5)
CHLORIDE SERPL-SCNC: 105 MMOL/L (ref 95–110)
CO2 SERPL-SCNC: 22 MMOL/L (ref 23–29)
CREAT SERPL-MCNC: 2 MG/DL (ref 0.5–1.4)
DIFFERENTIAL METHOD BLD: ABNORMAL
DIFFERENTIAL METHOD BLD: ABNORMAL
EOSINOPHIL # BLD AUTO: 0 K/UL (ref 0–0.5)
EOSINOPHIL # BLD AUTO: 0.1 K/UL (ref 0–0.5)
EOSINOPHIL NFR BLD: 0.4 % (ref 0–8)
EOSINOPHIL NFR BLD: 0.4 % (ref 0–8)
ERYTHROCYTE [DISTWIDTH] IN BLOOD BY AUTOMATED COUNT: 14.1 % (ref 11.5–14.5)
ERYTHROCYTE [DISTWIDTH] IN BLOOD BY AUTOMATED COUNT: 14.3 % (ref 11.5–14.5)
EST. GFR  (NO RACE VARIABLE): 22.7 ML/MIN/1.73 M^2
GLUCOSE SERPL-MCNC: 112 MG/DL (ref 70–110)
HCT VFR BLD AUTO: 29.8 % (ref 37–48.5)
HCT VFR BLD AUTO: 32.6 % (ref 37–48.5)
HGB BLD-MCNC: 10.4 G/DL (ref 12–16)
HGB BLD-MCNC: 9.4 G/DL (ref 12–16)
IMM GRANULOCYTES # BLD AUTO: 0.07 K/UL (ref 0–0.04)
IMM GRANULOCYTES # BLD AUTO: 0.07 K/UL (ref 0–0.04)
IMM GRANULOCYTES NFR BLD AUTO: 0.6 % (ref 0–0.5)
IMM GRANULOCYTES NFR BLD AUTO: 0.6 % (ref 0–0.5)
LYMPHOCYTES # BLD AUTO: 1.1 K/UL (ref 1–4.8)
LYMPHOCYTES # BLD AUTO: 1.2 K/UL (ref 1–4.8)
LYMPHOCYTES NFR BLD: 10.7 % (ref 18–48)
LYMPHOCYTES NFR BLD: 9.1 % (ref 18–48)
MAGNESIUM SERPL-MCNC: 1.9 MG/DL (ref 1.6–2.6)
MAGNESIUM SERPL-MCNC: 1.9 MG/DL (ref 1.6–2.6)
MCH RBC QN AUTO: 30.1 PG (ref 27–31)
MCH RBC QN AUTO: 30.7 PG (ref 27–31)
MCHC RBC AUTO-ENTMCNC: 31.5 G/DL (ref 32–36)
MCHC RBC AUTO-ENTMCNC: 31.9 G/DL (ref 32–36)
MCV RBC AUTO: 96 FL (ref 82–98)
MCV RBC AUTO: 96 FL (ref 82–98)
MONOCYTES # BLD AUTO: 0.6 K/UL (ref 0.3–1)
MONOCYTES # BLD AUTO: 0.7 K/UL (ref 0.3–1)
MONOCYTES NFR BLD: 4.8 % (ref 4–15)
MONOCYTES NFR BLD: 6.5 % (ref 4–15)
NEUTROPHILS # BLD AUTO: 10.5 K/UL (ref 1.8–7.7)
NEUTROPHILS # BLD AUTO: 9.2 K/UL (ref 1.8–7.7)
NEUTROPHILS NFR BLD: 81.4 % (ref 38–73)
NEUTROPHILS NFR BLD: 84.7 % (ref 38–73)
NRBC BLD-RTO: 0 /100 WBC
NRBC BLD-RTO: 0 /100 WBC
PHOSPHATE SERPL-MCNC: 5.3 MG/DL (ref 2.7–4.5)
PHOSPHATE SERPL-MCNC: 5.3 MG/DL (ref 2.7–4.5)
PLATELET # BLD AUTO: 132 K/UL (ref 150–450)
PLATELET # BLD AUTO: 155 K/UL (ref 150–450)
PMV BLD AUTO: 10.8 FL (ref 9.2–12.9)
PMV BLD AUTO: 11.3 FL (ref 9.2–12.9)
POTASSIUM SERPL-SCNC: 4.8 MMOL/L (ref 3.5–5.1)
RBC # BLD AUTO: 3.12 M/UL (ref 4–5.4)
RBC # BLD AUTO: 3.39 M/UL (ref 4–5.4)
SODIUM SERPL-SCNC: 138 MMOL/L (ref 136–145)
WBC # BLD AUTO: 11.23 K/UL (ref 3.9–12.7)
WBC # BLD AUTO: 12.42 K/UL (ref 3.9–12.7)

## 2024-04-28 PROCEDURE — 85025 COMPLETE CBC W/AUTO DIFF WBC: CPT | Performed by: HOSPITALIST

## 2024-04-28 PROCEDURE — C1713 ANCHOR/SCREW BN/BN,TIS/BN: HCPCS | Performed by: ORTHOPAEDIC SURGERY

## 2024-04-28 PROCEDURE — 25000003 PHARM REV CODE 250

## 2024-04-28 PROCEDURE — 76942 ECHO GUIDE FOR BIOPSY: CPT | Mod: 26,,, | Performed by: ANESTHESIOLOGY

## 2024-04-28 PROCEDURE — 63600175 PHARM REV CODE 636 W HCPCS: Performed by: STUDENT IN AN ORGANIZED HEALTH CARE EDUCATION/TRAINING PROGRAM

## 2024-04-28 PROCEDURE — 80048 BASIC METABOLIC PNL TOTAL CA: CPT | Performed by: HOSPITALIST

## 2024-04-28 PROCEDURE — 25000003 PHARM REV CODE 250: Performed by: STUDENT IN AN ORGANIZED HEALTH CARE EDUCATION/TRAINING PROGRAM

## 2024-04-28 PROCEDURE — 36415 COLL VENOUS BLD VENIPUNCTURE: CPT

## 2024-04-28 PROCEDURE — 0QS736Z REPOSITION LEFT UPPER FEMUR WITH INTRAMEDULLARY INTERNAL FIXATION DEVICE, PERCUTANEOUS APPROACH: ICD-10-PCS | Performed by: ORTHOPAEDIC SURGERY

## 2024-04-28 PROCEDURE — 36000711: Performed by: ORTHOPAEDIC SURGERY

## 2024-04-28 PROCEDURE — 84100 ASSAY OF PHOSPHORUS: CPT

## 2024-04-28 PROCEDURE — D9220A PRA ANESTHESIA: Mod: ANES,,, | Performed by: INTERNAL MEDICINE

## 2024-04-28 PROCEDURE — 37000008 HC ANESTHESIA 1ST 15 MINUTES: Performed by: ORTHOPAEDIC SURGERY

## 2024-04-28 PROCEDURE — 83735 ASSAY OF MAGNESIUM: CPT

## 2024-04-28 PROCEDURE — 25000003 PHARM REV CODE 250: Performed by: HOSPITALIST

## 2024-04-28 PROCEDURE — 27201423 OPTIME MED/SURG SUP & DEVICES STERILE SUPPLY: Performed by: ORTHOPAEDIC SURGERY

## 2024-04-28 PROCEDURE — 71000015 HC POSTOP RECOV 1ST HR: Performed by: ORTHOPAEDIC SURGERY

## 2024-04-28 PROCEDURE — C1769 GUIDE WIRE: HCPCS | Performed by: ORTHOPAEDIC SURGERY

## 2024-04-28 PROCEDURE — 71000033 HC RECOVERY, INTIAL HOUR: Performed by: ORTHOPAEDIC SURGERY

## 2024-04-28 PROCEDURE — 27245 TREAT THIGH FRACTURE: CPT | Mod: LT,GC,, | Performed by: ORTHOPAEDIC SURGERY

## 2024-04-28 PROCEDURE — 36000710: Performed by: ORTHOPAEDIC SURGERY

## 2024-04-28 PROCEDURE — 63600175 PHARM REV CODE 636 W HCPCS: Performed by: HOSPITALIST

## 2024-04-28 PROCEDURE — 37000009 HC ANESTHESIA EA ADD 15 MINS: Performed by: ORTHOPAEDIC SURGERY

## 2024-04-28 PROCEDURE — 84100 ASSAY OF PHOSPHORUS: CPT | Mod: 91 | Performed by: HOSPITALIST

## 2024-04-28 PROCEDURE — 64999 UNLISTED PX NERVOUS SYSTEM: CPT | Mod: ,,, | Performed by: ANESTHESIOLOGY

## 2024-04-28 PROCEDURE — 83735 ASSAY OF MAGNESIUM: CPT | Mod: 91 | Performed by: HOSPITALIST

## 2024-04-28 PROCEDURE — 21400001 HC TELEMETRY ROOM

## 2024-04-28 PROCEDURE — D9220A PRA ANESTHESIA: Mod: CRNA,,, | Performed by: STUDENT IN AN ORGANIZED HEALTH CARE EDUCATION/TRAINING PROGRAM

## 2024-04-28 DEVICE — IMPLANTABLE DEVICE: Type: IMPLANTABLE DEVICE | Site: FEMUR | Status: FUNCTIONAL

## 2024-04-28 DEVICE — SCREW LOCKING BONE T2 5X35MM: Type: IMPLANTABLE DEVICE | Site: FEMUR | Status: FUNCTIONAL

## 2024-04-28 RX ORDER — DROPERIDOL 2.5 MG/ML
0.62 INJECTION, SOLUTION INTRAMUSCULAR; INTRAVENOUS ONCE AS NEEDED
Status: DISCONTINUED | OUTPATIENT
Start: 2024-04-28 | End: 2024-05-02 | Stop reason: HOSPADM

## 2024-04-28 RX ORDER — SODIUM CHLORIDE 0.9 % (FLUSH) 0.9 %
10 SYRINGE (ML) INJECTION
Status: DISCONTINUED | OUTPATIENT
Start: 2024-04-28 | End: 2024-05-02 | Stop reason: HOSPADM

## 2024-04-28 RX ORDER — LIDOCAINE HYDROCHLORIDE 10 MG/ML
1 INJECTION, SOLUTION EPIDURAL; INFILTRATION; INTRACAUDAL; PERINEURAL
Status: DISCONTINUED | OUTPATIENT
Start: 2024-04-28 | End: 2024-04-29 | Stop reason: SDUPTHER

## 2024-04-28 RX ORDER — AMOXICILLIN 250 MG
1 CAPSULE ORAL 2 TIMES DAILY
Status: DISCONTINUED | OUTPATIENT
Start: 2024-04-28 | End: 2024-05-02 | Stop reason: HOSPADM

## 2024-04-28 RX ORDER — CLINDAMYCIN PHOSPHATE 900 MG/50ML
900 INJECTION, SOLUTION INTRAVENOUS
Status: DISCONTINUED | OUTPATIENT
Start: 2024-04-28 | End: 2024-05-01

## 2024-04-28 RX ORDER — FENTANYL CITRATE 50 UG/ML
25 INJECTION, SOLUTION INTRAMUSCULAR; INTRAVENOUS EVERY 5 MIN PRN
Status: DISCONTINUED | OUTPATIENT
Start: 2024-04-28 | End: 2024-04-29

## 2024-04-28 RX ORDER — LIDOCAINE HYDROCHLORIDE 20 MG/ML
INJECTION INTRAVENOUS
Status: DISCONTINUED | OUTPATIENT
Start: 2024-04-28 | End: 2024-04-28

## 2024-04-28 RX ORDER — ONDANSETRON HYDROCHLORIDE 2 MG/ML
4 INJECTION, SOLUTION INTRAVENOUS ONCE AS NEEDED
Status: DISCONTINUED | OUTPATIENT
Start: 2024-04-28 | End: 2024-05-02 | Stop reason: HOSPADM

## 2024-04-28 RX ORDER — PHENYLEPHRINE HYDROCHLORIDE 10 MG/ML
INJECTION INTRAVENOUS
Status: DISCONTINUED | OUTPATIENT
Start: 2024-04-28 | End: 2024-04-28

## 2024-04-28 RX ORDER — METHOCARBAMOL 500 MG/1
500 TABLET, FILM COATED ORAL EVERY 6 HOURS PRN
Status: DISCONTINUED | OUTPATIENT
Start: 2024-04-28 | End: 2024-04-29 | Stop reason: SDUPTHER

## 2024-04-28 RX ORDER — ACETAMINOPHEN 10 MG/ML
INJECTION, SOLUTION INTRAVENOUS
Status: DISCONTINUED | OUTPATIENT
Start: 2024-04-28 | End: 2024-04-28

## 2024-04-28 RX ORDER — HYDROMORPHONE HYDROCHLORIDE 1 MG/ML
0.2 INJECTION, SOLUTION INTRAMUSCULAR; INTRAVENOUS; SUBCUTANEOUS EVERY 5 MIN PRN
Status: DISCONTINUED | OUTPATIENT
Start: 2024-04-28 | End: 2024-04-29

## 2024-04-28 RX ORDER — ROCURONIUM BROMIDE 10 MG/ML
INJECTION, SOLUTION INTRAVENOUS
Status: DISCONTINUED | OUTPATIENT
Start: 2024-04-28 | End: 2024-04-28

## 2024-04-28 RX ORDER — ONDANSETRON HYDROCHLORIDE 2 MG/ML
4 INJECTION, SOLUTION INTRAVENOUS EVERY 12 HOURS PRN
Status: DISCONTINUED | OUTPATIENT
Start: 2024-04-28 | End: 2024-05-02 | Stop reason: HOSPADM

## 2024-04-28 RX ORDER — MUPIROCIN 20 MG/G
1 OINTMENT TOPICAL
Status: DISCONTINUED | OUTPATIENT
Start: 2024-04-28 | End: 2024-04-28 | Stop reason: SDUPTHER

## 2024-04-28 RX ORDER — SODIUM CHLORIDE 9 MG/ML
INJECTION, SOLUTION INTRAVENOUS CONTINUOUS
Status: ACTIVE | OUTPATIENT
Start: 2024-04-28 | End: 2024-04-28

## 2024-04-28 RX ORDER — POLYETHYLENE GLYCOL 3350 17 G/17G
17 POWDER, FOR SOLUTION ORAL DAILY
Status: DISCONTINUED | OUTPATIENT
Start: 2024-04-28 | End: 2024-05-02 | Stop reason: HOSPADM

## 2024-04-28 RX ORDER — ACETAMINOPHEN 500 MG
1000 TABLET ORAL EVERY 6 HOURS
Status: COMPLETED | OUTPATIENT
Start: 2024-04-28 | End: 2024-04-30

## 2024-04-28 RX ORDER — ONDANSETRON HYDROCHLORIDE 2 MG/ML
INJECTION, SOLUTION INTRAVENOUS
Status: DISCONTINUED | OUTPATIENT
Start: 2024-04-28 | End: 2024-04-28

## 2024-04-28 RX ORDER — PROPOFOL 10 MG/ML
VIAL (ML) INTRAVENOUS
Status: DISCONTINUED | OUTPATIENT
Start: 2024-04-28 | End: 2024-04-28

## 2024-04-28 RX ORDER — MUPIROCIN 20 MG/G
OINTMENT TOPICAL
Status: DISCONTINUED | OUTPATIENT
Start: 2024-04-28 | End: 2024-05-02 | Stop reason: HOSPADM

## 2024-04-28 RX ORDER — FENTANYL CITRATE 50 UG/ML
INJECTION, SOLUTION INTRAMUSCULAR; INTRAVENOUS
Status: DISCONTINUED | OUTPATIENT
Start: 2024-04-28 | End: 2024-04-28

## 2024-04-28 RX ORDER — ROPIVACAINE HYDROCHLORIDE 2 MG/ML
0.1 INJECTION, SOLUTION EPIDURAL; INFILTRATION; PERINEURAL CONTINUOUS
Status: DISCONTINUED | OUTPATIENT
Start: 2024-04-28 | End: 2024-05-01

## 2024-04-28 RX ORDER — DEXAMETHASONE SODIUM PHOSPHATE 4 MG/ML
INJECTION, SOLUTION INTRA-ARTICULAR; INTRALESIONAL; INTRAMUSCULAR; INTRAVENOUS; SOFT TISSUE
Status: DISCONTINUED | OUTPATIENT
Start: 2024-04-28 | End: 2024-04-28

## 2024-04-28 RX ORDER — METOCLOPRAMIDE HYDROCHLORIDE 5 MG/ML
5 INJECTION INTRAMUSCULAR; INTRAVENOUS EVERY 6 HOURS PRN
Status: DISCONTINUED | OUTPATIENT
Start: 2024-04-28 | End: 2024-05-02 | Stop reason: HOSPADM

## 2024-04-28 RX ORDER — MUPIROCIN 20 MG/G
1 OINTMENT TOPICAL 2 TIMES DAILY
Status: DISCONTINUED | OUTPATIENT
Start: 2024-04-28 | End: 2024-05-02 | Stop reason: HOSPADM

## 2024-04-28 RX ORDER — VASOPRESSIN 20 [USP'U]/ML
INJECTION, SOLUTION INTRAMUSCULAR; SUBCUTANEOUS
Status: DISCONTINUED | OUTPATIENT
Start: 2024-04-28 | End: 2024-04-28

## 2024-04-28 RX ADMIN — MEMANTINE 5 MG: 5 TABLET ORAL at 12:04

## 2024-04-28 RX ADMIN — FENTANYL CITRATE 25 MCG: 50 INJECTION, SOLUTION INTRAMUSCULAR; INTRAVENOUS at 08:04

## 2024-04-28 RX ADMIN — MEMANTINE 5 MG: 5 TABLET ORAL at 09:04

## 2024-04-28 RX ADMIN — ACETAMINOPHEN 1000 MG: 325 TABLET ORAL at 11:04

## 2024-04-28 RX ADMIN — DOCUSATE SODIUM AND SENNOSIDES 1 TABLET: 8.6; 5 TABLET, FILM COATED ORAL at 09:04

## 2024-04-28 RX ADMIN — LIDOCAINE HYDROCHLORIDE 100 MG: 20 INJECTION INTRAVENOUS at 07:04

## 2024-04-28 RX ADMIN — PHENYLEPHRINE HYDROCHLORIDE 200 MCG: 10 INJECTION INTRAVENOUS at 07:04

## 2024-04-28 RX ADMIN — PROPOFOL 50 MG: 10 INJECTION, EMULSION INTRAVENOUS at 07:04

## 2024-04-28 RX ADMIN — SODIUM CHLORIDE: 9 INJECTION, SOLUTION INTRAVENOUS at 09:04

## 2024-04-28 RX ADMIN — THERA TABS 1 TABLET: TAB at 05:04

## 2024-04-28 RX ADMIN — FENTANYL CITRATE 50 MCG: 50 INJECTION, SOLUTION INTRAMUSCULAR; INTRAVENOUS at 07:04

## 2024-04-28 RX ADMIN — SODIUM CHLORIDE: 0.9 INJECTION, SOLUTION INTRAVENOUS at 07:04

## 2024-04-28 RX ADMIN — ESCITALOPRAM OXALATE 10 MG: 5 TABLET, FILM COATED ORAL at 12:04

## 2024-04-28 RX ADMIN — VANCOMYCIN HYDROCHLORIDE 1000 MG: 1 INJECTION, POWDER, LYOPHILIZED, FOR SOLUTION INTRAVENOUS at 07:04

## 2024-04-28 RX ADMIN — MUPIROCIN 1 G: 20 OINTMENT TOPICAL at 03:04

## 2024-04-28 RX ADMIN — SODIUM CHLORIDE 250 ML: 9 INJECTION, SOLUTION INTRAVENOUS at 07:04

## 2024-04-28 RX ADMIN — METHOCARBAMOL 500 MG: 500 TABLET ORAL at 11:04

## 2024-04-28 RX ADMIN — SUGAMMADEX 200 MG: 100 INJECTION, SOLUTION INTRAVENOUS at 09:04

## 2024-04-28 RX ADMIN — ACETAMINOPHEN 1000 MG: 325 TABLET ORAL at 12:04

## 2024-04-28 RX ADMIN — TRANEXAMIC ACID 1000 MG: 100 INJECTION INTRAVENOUS at 07:04

## 2024-04-28 RX ADMIN — APIXABAN 2.5 MG: 2.5 TABLET, FILM COATED ORAL at 09:04

## 2024-04-28 RX ADMIN — ACETAMINOPHEN 1000 MG: 325 TABLET ORAL at 05:04

## 2024-04-28 RX ADMIN — MUPIROCIN 1 G: 20 OINTMENT TOPICAL at 09:04

## 2024-04-28 RX ADMIN — CEFAZOLIN 2 G: 2 INJECTION, POWDER, FOR SOLUTION INTRAMUSCULAR; INTRAVENOUS at 07:04

## 2024-04-28 RX ADMIN — ROCURONIUM BROMIDE 50 MG: 10 INJECTION, SOLUTION INTRAVENOUS at 07:04

## 2024-04-28 RX ADMIN — VASOPRESSIN 1 UNITS: 20 INJECTION INTRAVENOUS at 08:04

## 2024-04-28 RX ADMIN — ONDANSETRON 4 MG: 2 INJECTION INTRAMUSCULAR; INTRAVENOUS at 08:04

## 2024-04-28 RX ADMIN — SODIUM CHLORIDE 250 ML: 9 INJECTION, SOLUTION INTRAVENOUS at 04:04

## 2024-04-28 RX ADMIN — SODIUM CHLORIDE: 9 INJECTION, SOLUTION INTRAVENOUS at 02:04

## 2024-04-28 RX ADMIN — DEXAMETHASONE SODIUM PHOSPHATE 4 MG: 4 INJECTION, SOLUTION INTRAMUSCULAR; INTRAVENOUS at 07:04

## 2024-04-28 RX ADMIN — APIXABAN 2.5 MG: 2.5 TABLET, FILM COATED ORAL at 12:04

## 2024-04-28 RX ADMIN — ROPIVACAINE HYDROCHLORIDE 0.1 ML/HR: 2 INJECTION, SOLUTION EPIDURAL; INFILTRATION at 09:04

## 2024-04-28 RX ADMIN — ACETAMINOPHEN 1000 MG: 10 INJECTION, SOLUTION INTRAVENOUS at 08:04

## 2024-04-28 NOTE — NURSING TRANSFER
Nursing Transfer Note      4/28/2024   10:04 AM    Nurse giving handoff:brett Medina  Nurse receiving handoff:BRETT Valentino    Reason patient is being transferred: back to room    Transfer To: 509    Transfer via bed    Transfer with cardiac monitoring, 3L O2    Transported by TRANSPORT    Telemetry: Rhythm AFIB  Order for Tele Monitor? Yes    Additional Lines: Oxygen and Rawls Catheter    4eyes on Skin: yes    Medicines sent: ROPIVICANE    Any special needs or follow-up needed: pain,     Patient belongings transferred with patient:  in room    Chart send with patient: Yes    Notified: son    Dr. Garrett to bedside to see pt after surgery. Aware of bp.

## 2024-04-28 NOTE — ANESTHESIA PROCEDURE NOTES
Intubation    Date/Time: 4/28/2024 7:54 AM    Performed by: Abdiaziz Shelby CRNA  Authorized by: Apple Thomas MD    Intubation:     Induction:  Intravenous    Intubated:  Postinduction    Mask Ventilation:  Easy with oral airway    Attempts:  1    Attempted By:  CRNA    Method of Intubation:  Video laryngoscopy    Blade:  [place] 3    Laryngeal View Grade: Grade I - full view of cords      Difficult Airway Encountered?: No      Complications:  None    Airway Device:  Oral endotracheal tube    Airway Device Size:  7.0    Style/Cuff Inflation:  Cuffed (inflated to minimal occlusive pressure)    Tube secured:  21    Secured at:  The lips    Placement Verified By:  Capnometry    Complicating Factors:  None    Findings Post-Intubation:  BS equal bilateral and atraumatic/condition of teeth unchanged

## 2024-04-28 NOTE — ASSESSMENT & PLAN NOTE
Chronic, controlled. Latest blood pressure and vitals reviewed-     Temp:  [97 °F (36.1 °C)-98.2 °F (36.8 °C)]   Pulse:  []   Resp:  [14-25]   BP: ()/(50-88)   SpO2:  [90 %-97 %] .   Home meds for hypertension were reviewed and noted below.   Hypertension Medications                    furosemide (LASIX) 20 MG tablet TAKE 1 TABLET BY MOUTH ONCE DAILY.    spironolactone (ALDACTONE) 50 MG tablet Take 1 tablet (50 mg total) by mouth once daily.     Not taking norvasc anymore. Atenolol started 4/427 but held 4/28 for lower BPs. Hold aldactone and lasix for JOHAN 4/28    Will utilize p.r.n. blood pressure medication only if patient's blood pressure greater than 180/110 and she develops symptoms such as worsening chest pain or shortness of breath.

## 2024-04-28 NOTE — ASSESSMENT & PLAN NOTE
Patient's anemia is currently uncontrolled. Has not received any PRBCs to date. Etiology likely d/t acute blood loss which was from surgical repair  Current CBC reviewed-   Lab Results   Component Value Date    HGB 9.4 (L) 04/28/2024    HCT 29.8 (L) 04/28/2024     Monitor serial CBC and transfuse if patient becomes hemodynamically unstable, symptomatic or H/H drops below 7/21.

## 2024-04-28 NOTE — ASSESSMENT & PLAN NOTE
89 y.o. who presents after a fall with a closed displaced L intertrochanteric fracture after a fall. Very active at baseline, does daily exercise classes  -ortho consulted, s/p IM nail 4/28 with dr gill. Resume home eliquis for anticoag, pharm reports meets criteria to adjsut to 2.5 mg daily for age/indication and adjusetd to this  -slight hg downtick expected post op from 12--9.4 and cont to trend  -vit D very high at 77 and rec to hold vit d to avoid toxicity levels  -UA wnl on admit. Diuresed before OR but likely a little overdiuresed now, see JOHAN  -multimodal pain control with ropivicaine and anesthesia managing  -bowel regimen  -eliquis for PPX for DVt already on for afib and resumed  -bandages to remove in clinic in 2 weeks  -PT/OT on POD 1, suspect would be a good IP rehab candidate given how active she is at baseline for higher intensity therapies post op  -remove johnson tomorrow

## 2024-04-28 NOTE — OP NOTE
OPERATIVE NOTE     DATE OF PROCEDURE:  4/28/2024     PREOPERATIVE DIAGNOSIS:           Left intertrochanteric hip fracture, closed, displaced, initial encounter  Fall from standing     POSTOPERATIVE DIAGNOSIS:         Left intertrochanteric hip fracture, closed, displaced, initial encounter  Fall from standing     PROCEDURE:              Open reduction internal fixation Left intertrochanteric hip fracture with intramedullary nail     Surgeons and Role:     * Kurt Melendez MD - Primary     * Kiet Veras MD - Resident - Assisting     * Damián Ramos MD - Resident - Assisting     * JAZMYN Parmar MD - Resident - Assisting          ANESTHESIA:              General     EBL:                  50 mL     COMPLICATIONS:  none     IMPLANTS:         Implant Name Type Inv. Item Serial No.  Lot No. LRB No. Used Action   [id number] TROCHANTERNIC NAIL 170MM 10 X 170 MM X  125    DAVID [id number] Left 1 Implanted   WIRE ADIS T2 [id number] SS - [id number]  WIRE ADIS T2 [id number]  DAVID [employer] LEOBARDO. [id number] Left 1 Implanted and Explanted   Gamma4 lag screw 10.5x85mm   NA DAVID [id number] Left 1 Implanted   SCREW LOCKING BONE T2 5X35MM - [id number]  SCREW LOCKING BONE T2 5X35MM  DAVID [employer] LEOBARDO. [id number] Left 1 Implanted         SPECIMENS:    None     INDICATIONS FOR PROCEDURE:  89yo female   Mechanical fall from standing  Immediate left hip pain.  Inability to bear weight.  Brought to the emergency room.  Evaluated by orthopedic resident on-call team, hospitalist team.  Physical exam x-rays indicated an intertrochanteric hip fracture.  Admitted to the hospital for further care     At the time of my evaluation, patient complained of isolated pain in left hip, 7/10, sharp, stabbing, worse with motion, improved with rest.  No numbness and tingling.     Lives alone at [facility]     Discussed diagnosis of intertrochanteric hip fracture with both patient and family members.   Discussed both non operative and operative management options.  Non operative management would consist of bed rest, protected weight bearing and would likely result in a malunion/nonunion, prolonged pain, debility, and the medical comorbidities associated with prolonged bed rest/immobility.  Discussed operative intervention the form of open reduction internal fixation with intramedullary nail. Hopefully this would improve pain, alignment, mobility, healing potential, overall outcome.     The risks, benefits, and alternatives to surgery were discussed with the patient and/or family.  Specific risks discussed included, but were not limited to:  Limb length discrepancy, malrotation, head perforation, avascular necrosis, hip arthritis, abductor pain, limp, gait difficulty, failure of hardware, damage to nearby structures, including neurovascular structures leading to loss of function or loss of limb, bleeding, need for blood transfusion, pain, stiffness, scarring, numbness, tingling, weakness, compartment syndrome, malunion/nonunion, hardware failure, hardware prominence, infection, need for multiple staged procedures, prolonged antibiotics, iatrogenic fracture, heterotopic ossification, arthritis, a variety of medical complications including but not limited to heart attack, stroke, deep venous thrombosis, pulmonary embolism, prolonged hospitalization, prolonged intubation, and death. Patient and/or family expressed an understanding and desires to proceed with surgery.   All questions were answered.  No guarantees were implied or stated.  Informed consent was obtained.        OPERATIVE PROCEDURE:  Patient met in the preop hold area, the correct site and side of surgery being the left hip were marked and verified.  Regional block placed by Anesthesia.  Patient brought back to the operative suite.     Fracture boots were placed.  Patient was transferred over to operative table and placed in supine position.  Peroneal post  was placed. Operative side arm was draped across the chest and well padded. All bony prominences were appropriately padded.  We performed traction and reduction maneuvers, and were able to obtain a closed reduction of the fracture.  left lower extremity was prepped and draped in normal sterile fashion. Preoperative antibiotics of Ancef 2g and Vancomycin were given. 1g IV TXA was given to aid in hemostasis     Time-out was performed verifying the correct patient, site/side of surgery, surgical consent, radiographs as applicable, preop antibiotics, necessary equipment, anticipated blood loss, length of procedure, postoperative disposition.     We began the case by marking anatomic landmarks on the patient. Incision was made proximal to the greater trochanter.  Fascia was incised. Guide pin was entered in a center center position confirmed on both AP and lateral fluoroscopic imaging.  It was advanced to the level of the lesser trochanter. Opening Reamer with appropriate soft tissue guide was utilized to open the canal.      Planned for short intramedullary nail    Appropriate size/length nail was placed on the insertion jig and inserted into the intramedullary canal, confirmed on multiplanar fluoroscopic imaging.  This was inserted to the appropriate depth. As the nail was placed to the appropriate depth a fracture line was noticed through the greater trochanter. This was very minimally displaced and reduced somewhat with placement of the compression screw.        We then used the outrigger guide and triple sleeve inserted through a lateral incision on the thigh to place a guide pin in appropriate center center position through the femoral neck and head.  We used fluoroscopic imaging to confirm our  pin placement.  This was appropriate tip apex distance.  We confirmed that the tip of the wire did not perforate femoral head.  We then used the lateral opening Reamer.  The appropriate size compression screw was then  </= 50% for >/= 5 days placed with the appropriate tip apex distance on AP and lateral views. Traction removed.  Fracture was compressed through the guide.  Set screw tightened.  Set screw loosened quarter turn to allow compression.    We turned our attention to placement of distal interlocking screws.  We chose to place 1 distal interlocking screw to provide stability in axial loading and rotation.  1 screw was placed from lateral to medial using the outrigger guide, cannula, percutaneous stab incision. We confirmed appropriate placement of the screws on both AP and lateral fluoroscopic imaging.      Final fluoroscopic imaging of the hip were taken in AP and lateral views confirming appropriate hardware placement, fracture reduction.     Wounds were thoroughly irrigated with saline. Fascia was closed with 0 Vicryl.  Subcutaneous tissue closed with 3 O Vicryl. Skin closed with 3 Monocryl. Dermabond applied.  Aquacel dressing applied.     Prior to final closure all counts were confirmed to be correct.  Patient tolerated procedure well, was extubated, transferred to PACU for further recovery.     At the conclusion of the procedure the patient had soft and compressible compartments, brisk cap refill, palpable DP and PT pulse in the operative extremity.     POSTOPERATIVE PLAN:    S/p -Left Femur IM nail for a left hip IT fracture     Antibiotics x 24 hr  Resume home [facility] comanagement  Multimodal pain management  Calcium, vitamin-D, boost  PT     Fragility fracture Clinic referral     WBAT Left lower extremity  Left lower extremity, range of motion as tolerated          I was present for all critical portions of the case and available throughout and agree with the resident's note as stated above.     IMN left IT hip fracture.  Min displaced GT fracture visualized at time of IMN insertion.  Fixation was deemed sufficient to stabilize the fracture pattern and GT was observed / treated with the same implant.  Discussed with  family.  Continue WBAT and ROM as tolerated as per post op protocol.    Kurt Melendez MD       Sudden poor intake just prior to or during admission

## 2024-04-28 NOTE — PLAN OF CARE
Problem: Infection  Goal: Absence of Infection Signs and Symptoms  Outcome: Progressing     Problem: Adult Inpatient Plan of Care  Goal: Plan of Care Review  Outcome: Progressing  Goal: Patient-Specific Goal (Individualized)  Outcome: Progressing  Goal: Absence of Hospital-Acquired Illness or Injury  Outcome: Progressing  Goal: Optimal Comfort and Wellbeing  Outcome: Progressing  Goal: Readiness for Transition of Care  Outcome: Progressing     Problem: Hip Fracture Medical Management  Goal: Optimal Coping with Change in Health Status  Outcome: Progressing  Goal: Absence of Bleeding  Outcome: Progressing  Goal: Effective Bowel Elimination  Outcome: Progressing  Goal: Baseline Cognitive Function Maintained  Outcome: Progressing  Goal: Absence of Embolism  Outcome: Progressing  Goal: Fracture Stability  Outcome: Progressing  Goal: Optimal Functional Performance  Outcome: Progressing  Goal: Pain Control and Function  Outcome: Progressing  Goal: Effective Urinary Elimination  Outcome: Progressing     Problem: Surgery Nonspecified  Goal: Absence of Bleeding  Outcome: Progressing  Goal: Effective Bowel Elimination  Outcome: Progressing  Goal: Fluid and Electrolyte Balance  Outcome: Progressing  Goal: Blood Glucose Level Within Targeted Range  Outcome: Progressing  Goal: Absence of Infection Signs and Symptoms  Outcome: Progressing  Goal: Anesthesia/Sedation Recovery  Outcome: Progressing  Goal: Optimal Pain Control and Function  Outcome: Progressing  Goal: Nausea and Vomiting Relief  Outcome: Progressing  Goal: Effective Urinary Elimination  Outcome: Progressing  Goal: Effective Oxygenation and Ventilation  Outcome: Progressing     Problem: Anesthesia/Analgesia, Neuraxial  Goal: Safe, Effective Infusion Delivery  Outcome: Progressing  Goal: Absence of Infection Signs and Symptoms  Outcome: Progressing  Goal: Nausea and Vomiting Relief  Outcome: Progressing  Goal: Effective Pain Control  Outcome: Progressing  Goal:  Effective Oxygenation and Ventilation  Outcome: Progressing  Goal: Baseline Motor Function  Outcome: Progressing  Goal: Effective Urinary Elimination  Outcome: Progressing     Problem: Fall Injury Risk  Goal: Absence of Fall and Fall-Related Injury  Outcome: Progressing     Problem: Skin Injury Risk Increased  Goal: Skin Health and Integrity  Outcome: Progressing   She had a restful day.   No sign of distress noted and safety precautions remain in place.

## 2024-04-28 NOTE — ASSESSMENT & PLAN NOTE
This patient has long term use on an anticoagulant with Select Anticoagulant(s): Direct oral anticoagulant: Apixaban (Eliquis). Their long term anticoagulation will be Held or Continued: continued. They are on long term anticoagulation due to Reason for Anticoagulation: Atrial fibrillation.

## 2024-04-28 NOTE — PROGRESS NOTES
Nurses Note -- 4 Eyes      4/28/2024   7:01 AM      Skin assessed during: Q Shift Change      [x] No Altered Skin Integrity Present    []Prevention Measures Documented      [] Yes- Altered Skin Integrity Present or Discovered   [] LDA Added if Not in Epic (Describe Wound)   [] New Altered Skin Integrity was Present on Admit and Documented in LDA   [] Wound Image Taken    Wound Care Consulted? No    Attending Nurse:  Adebayo Baez RN/Staff Member:   Hima

## 2024-04-28 NOTE — PLAN OF CARE
Came to room on flooor to check on her, still sleeping after surgery and comfortable. HR 60s on telemetry.

## 2024-04-28 NOTE — PT/OT/SLP PROGRESS
Physical Therapy      Patient Name:  Mary Ellen Miller   MRN:  [id number]    Patient not seen today secondary to pt underwent surgery. Bailee Amaro PT 4/28/24

## 2024-04-28 NOTE — ASSESSMENT & PLAN NOTE
Patient is identified as having Diastolic (HFpEF) heart failure that is Acute on Chronic. CHF is currently uncontrolled due to Dyspnea not returned to baseline after 1 doses of IV diuretic, Rales/crackles on pulmonary exam, and Pulmonary edema/pleural effusion on CXR. Pt is on CHF pathway.   - Patient decompensated on admit (4/27/2024). (+)Subjective SOB, (--)JVD, (--)orthnopnea.   - Last BNP reviewed- and noted below   Recent Labs   Lab 04/27/24  0702   *     - Troponin 0.009, continue to trend   - CXR with cardiomegaly and pulmonary edema with R pleural effusion   - EKG without acute ST changes   - Echo with Echo    Result Date: 4/27/2024    Left Ventricle: The left ventricle is normal in size. Ventricular mass   is normal. Normal wall thickness. Normal wall motion. There is normal   systolic function with a visually estimated ejection fraction of 60 - 65%.   There is indeterminate diastolic function.Normal left ventricular filling   pressure.    Right Ventricle: Normal right ventricular cavity size. Wall thickness   is normal. Systolic function is normal.    Left Atrium: Left atrium is severely dilated.    Right Atrium: Right atrium is severely dilated.    Aortic Valve: The aortic valve is a trileaflet valve. There is mild   aortic valve sclerosis. There is mild annular calcification present.    Mitral Valve: There is moderate anterior leaflet sclerosis.    Tricuspid Valve: There is moderate regurgitation with a centrally   directed jet.    Aorta: Aortic root is normal in size measuring 3.08 cm. Ascending aorta   is normal measuring 3.43 cm.    Pulmonary Artery: The estimated pulmonary artery systolic pressure is   57 mmHg.    IVC/SVC: Intermediate venous pressure at 8 mmHg.          Intake/Output Summary (Last 24 hours) at 4/28/2024 1300  Last data filed at 4/28/2024 0925  Gross per 24 hour   Intake 970 ml   Output 1460 ml   Net -490 ml      - Supplemental O2 to keep Spo2 >90%  - Continue to stress to  patient importance of self efficacy and  on diet for CHF.  Suspect overdiuresis as Aster on 4/28 after lasix 40 BID + home aldactone on admit + hypotension. Hold for now, very light IVF for hypotension now, CXr similar to before, on similar oxygen and reassess volume status this PM. Liberate diet for now while equliibirating volume status.

## 2024-04-28 NOTE — BRIEF OP NOTE
[facility]  Brief Operative Note    SUMMARY     Surgery Date: 4/28/2024     Surgeons and Role:     * Kurt Melendez MD - Primary     * Kiet Veras MD - Resident - Assisting     * Damián Ramos MD - Resident - Assisting     * JAZMYN Parmar MD - Resident - Assisting        Pre-op Diagnosis:  Closed displaced intertrochanteric fracture of left femur, initial encounter [S72.142A]    Post-op Diagnosis:  Post-Op Diagnosis Codes:     * Closed displaced intertrochanteric fracture of left femur, initial encounter [S72.142A]    Procedure(s) (LRB):  INSERTION, INTRAMEDULLARY LENO, FEMUR: Left (Left)    Anesthesia: Choice    Implants:  Implant Name Type Inv. Item Serial No.  Lot No. LRB No. Used Action   [id number] TROCHANTERNIC NAIL 170MM 10 X 170 MM X  125    DAVID [id number] Left 1 Implanted   Gamma4 lag screw 10.5x85mm   NA DAVID [id number] Left 1 Implanted   SCREW LOCKING BONE T2 5X35MM - [id number]  SCREW LOCKING BONE T2 5X35MM  [employer]. [id number] Left 1 Implanted       Operative Findings: IMN of the left femur     Estimated Blood Loss: 50 mL    Estimated Blood Loss has been documented. 50cc         Specimens:   Specimen (24h ago, onward)      None            [id number]

## 2024-04-28 NOTE — SUBJECTIVE & OBJECTIVE
Principal Problem:Closed displaced intertrochanteric fracture of left femur    Principal Orthopedic Problem: same as above    Interval History: NAEO. VSS. Plan for OR today. NPO since midnight.     Review of patient's allergies indicates:   Allergen Reactions    Percodan [oxycodone hcl-oxycodone-asa] Other (See Comments)     halucinations    Penicillins Itching    Sulfa (sulfonamide antibiotics) Diarrhea and Nausea Only    Amoxicillin        Current Facility-Administered Medications   Medication Dose Route Frequency Provider Last Rate Last Admin    acetaminophen tablet 1,000 mg  1,000 mg Oral Q8H Hannah Andre MD   1,000 mg at 04/27/24 2121    albuterol-ipratropium 2.5 mg-0.5 mg/3 mL nebulizer solution 3 mL  3 mL Nebulization Q4H PRN Elicia Barnes PA-C        aluminum-magnesium hydroxide-simethicone 200-200-20 mg/5 mL suspension 30 mL  30 mL Oral QID PRN Elicia Barnes PA-C        atenoloL tablet 25 mg  25 mg Oral Daily Hannah Andre MD   25 mg at 04/27/24 0953    bisacodyL suppository 10 mg  10 mg Rectal Daily PRN Hannah Andre MD        dextrose 10% bolus 125 mL 125 mL  12.5 g Intravenous PRN Elicia Barnes PA-C        dextrose 10% bolus 250 mL 250 mL  25 g Intravenous PRN Elicia Barnes PA-C        EScitalopram oxalate tablet 10 mg  10 mg Oral Daily Elicia Barnes PA-C   10 mg at 04/27/24 0952    glucagon (human recombinant) injection 1 mg  1 mg Intramuscular PRN Elicia Barnes PA-C        glucose chewable tablet 16 g  16 g Oral PRN Elicia Barnes PA-C        glucose chewable tablet 24 g  24 g Oral PRN Elicia Barnes PA-C        melatonin tablet 6 mg  6 mg Oral Nightly PRN Hannah Andre MD        memantine tablet 5 mg  5 mg Oral BID Elicia Barnes PA-C   5 mg at 04/27/24 2120    methocarbamoL tablet 500 mg  500 mg Oral Q6H PRN Hannah Andre MD        morphine injection 2 mg  2 mg Intravenous Q3H PRN Hannah Andre, MD   2 mg at 04/27/24 1036     "multivit-min-fa-coenzyme q10 100-5 mcg-mg (AQUADEKS) chew  1 tablet Oral Daily Elicia Barnes PA-C        mupirocin 2 % ointment   Nasal BID Hannah Andre MD   Given at 04/27/24 2120    naloxone 0.4 mg/mL injection 0.02 mg  0.02 mg Intravenous PRN Elicia Barnes PA-C        ondansetron disintegrating tablet 8 mg  8 mg Oral Q8H PRN Elicia Barnes PA-C        polyethylene glycol packet 17 g  17 g Oral Daily Hannah Andre MD        prochlorperazine injection Soln 5 mg  5 mg Intravenous Q6H PRN Elicia Barnes PA-C        simethicone chewable tablet 80 mg  1 tablet Oral QID PRN Elicia Barnes PA-C        sodium chloride 0.9% bolus 250 mL 250 mL  250 mL Intravenous Once Hannah Andre MD        sodium chloride 0.9% flush 10 mL  10 mL Intravenous PRN Hannah Andre MD        sodium chloride 0.9% flush 10 mL  10 mL Intravenous Q12H PRN Elicia Barnes PA-C         Objective:     Vital Signs (Most Recent):  Temp: 98.1 °F (36.7 °C) (04/28/24 0353)  Pulse: 73 (04/28/24 0353)  Resp: 16 (04/28/24 0353)  BP: (!) 93/57 (04/28/24 0353)  SpO2: (!) 91 % (04/28/24 0353) Vital Signs (24h Range):  Temp:  [97.5 °F (36.4 °C)-98.1 °F (36.7 °C)] 98.1 °F (36.7 °C)  Pulse:  [] 73  Resp:  [15-22] 16  SpO2:  [91 %-98 %] 91 %  BP: ()/(55-88) 93/57     Weight: 54.9 kg (121 lb)  Height: 5' 3" (160 cm)  Body mass index is 21.43 kg/m².      Intake/Output Summary (Last 24 hours) at 4/28/2024 0706  Last data filed at 4/27/2024 1436  Gross per 24 hour   Intake --   Output 1075 ml   Net -1075 ml        Ortho/SPM Exam  LLE:  - Skin intact throughout, no scars present  - No swelling  - No ecchymosis, erythema, or signs of cellulitis  - TTP at hip/proximal femur  - No tenderness to palpation of middle or distal femur  - No tenderness to palpation of proximal, middle, or distal aspects of tibia or fibula  - No tenderness to palpation of foot  - Pain with any ROM at hip  - Full painless ROM of knee and " ankle  - Compartments soft  - SILT Sa/Watts/DP/SP/T  - Motor intact EHL/FHL/TA/Gastroc  - 2+ DP  - Brisk capillary refill      Significant Labs: All pertinent labs within the past 24 hours have been reviewed.    Significant Imaging: I have reviewed and interpreted all pertinent imaging results/findings.

## 2024-04-28 NOTE — PT/OT/SLP PROGRESS
Occupational Therapy      Patient Name:  Mary Ellen Miller   MRN:  [id number]    Patient not seen today secondary to Off the floor for procedure/surgery. Will follow-up when appropriate.    4/28/2024

## 2024-04-28 NOTE — PLAN OF CARE
Problem: Infection  Goal: Absence of Infection Signs and Symptoms  Outcome: Progressing     Problem: Adult Inpatient Plan of Care  Goal: Plan of Care Review  Outcome: Progressing  Goal: Patient-Specific Goal (Individualized)  Outcome: Progressing  Goal: Absence of Hospital-Acquired Illness or Injury  Outcome: Progressing  Goal: Optimal Comfort and Wellbeing  Outcome: Progressing  Goal: Readiness for Transition of Care  Outcome: Progressing     Problem: Hip Fracture Medical Management  Goal: Optimal Coping with Change in Health Status  Outcome: Progressing  Goal: Absence of Bleeding  Outcome: Progressing  Goal: Effective Bowel Elimination  Outcome: Progressing  Goal: Baseline Cognitive Function Maintained  Outcome: Progressing  Goal: Absence of Embolism  Outcome: Progressing  Goal: Fracture Stability  Outcome: Progressing  Goal: Optimal Functional Performance  Outcome: Progressing  Goal: Pain Control and Function  Outcome: Progressing  Goal: Effective Urinary Elimination  Outcome: Progressing     Problem: Surgery Nonspecified  Goal: Absence of Bleeding  Outcome: Progressing  Goal: Effective Bowel Elimination  Outcome: Progressing  Goal: Fluid and Electrolyte Balance  Outcome: Progressing  Goal: Blood Glucose Level Within Targeted Range  Outcome: Progressing  Goal: Absence of Infection Signs and Symptoms  Outcome: Progressing  Goal: Anesthesia/Sedation Recovery  Outcome: Progressing  Goal: Optimal Pain Control and Function  Outcome: Progressing  Goal: Nausea and Vomiting Relief  Outcome: Progressing  Goal: Effective Urinary Elimination  Outcome: Progressing  Goal: Effective Oxygenation and Ventilation  Outcome: Progressing     Problem: Anesthesia/Analgesia, Neuraxial  Goal: Safe, Effective Infusion Delivery  Outcome: Progressing  Goal: Absence of Infection Signs and Symptoms  Outcome: Progressing  Goal: Nausea and Vomiting Relief  Outcome: Progressing  Goal: Effective Pain Control  Outcome: Progressing  Goal:  Effective Oxygenation and Ventilation  Outcome: Progressing  Goal: Baseline Motor Function  Outcome: Progressing  Goal: Effective Urinary Elimination  Outcome: Progressing     Problem: Fall Injury Risk  Goal: Absence of Fall and Fall-Related Injury  Outcome: Progressing     Problem: Skin Injury Risk Increased  Goal: Skin Health and Integrity  Outcome: Progressing     Problem: Acute Kidney Injury/Impairment  Goal: Fluid and Electrolyte Balance  Outcome: Progressing  Goal: Improved Oral Intake  Outcome: Progressing  Goal: Effective Renal Function  Outcome: Progressing     Problem: Wound  Goal: Optimal Coping  Outcome: Progressing  Goal: Optimal Functional Ability  Outcome: Progressing  Goal: Absence of Infection Signs and Symptoms  Outcome: Progressing  Goal: Improved Oral Intake  Outcome: Progressing  Goal: Optimal Pain Control and Function  Outcome: Progressing  Goal: Skin Health and Integrity  Outcome: Progressing  Goal: Optimal Wound Healing  Outcome: Progressing   She had a good day.    She tolerated her surgery well today.   Her blood pressure was slightly low and I gave a 250 ml bolus saline .   No sign of distress noted at this time.   Safety precautions remain in place.   Family is at the bedside.

## 2024-04-28 NOTE — PROGRESS NOTES
Nurses Note -- 4 Eyes      4/27/2024   9:02 PM      Skin assessed during: Q Shift Change      [x] No Altered Skin Integrity Present    []Prevention Measures Documented      [] Yes- Altered Skin Integrity Present or Discovered   [] LDA Added if Not in Epic (Describe Wound)   [] New Altered Skin Integrity was Present on Admit and Documented in LDA   [] Wound Image Taken    Wound Care Consulted? No    Attending Nurse:  Hima Baez RN/Staff Member:  CHAGO Valdez

## 2024-04-28 NOTE — PROGRESS NOTES
[facility] Medicine  Progress Note    Patient Name: Mary Ellen Miller  MRN: [id number]  Patient Class: IP- Inpatient   Admission Date: 4/27/2024  Length of Stay: 1 days  Attending Physician: Hannah Andre MD  Primary Care Provider: Dionne Jeter MD        Subjective:     Principal Problem:Closed displaced intertrochanteric fracture of left femur        HPI:  Mary Ellen Miller is a 89 y.o. F with PMH of HTN, HLD, CHF, and afib on eliquis presenting to the ED with significant left hip pain after a fall last night. Patient was ambulating to the restroom to urinate last night and slipped and fell onto her left hip. She had immediate pain and significant difficulty bearing weight. She denies head injury or LOC and is on eliquis for paroxysmal A fib. She denies numbness, tingling, or paresthesias to the LLE. 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 Prior to the incident, she reports intermittent worsening of chronic lower extremity edema and SOB. Her PCP recently discontinued amlodipine in favor of lasix and continuing aldactone, and she has not missed any doses of her home medications. She has been drinking excess tea lately but has completely cut salt and has decreased her overall fluid intake. She denies GARVIN or orthopnea and does not use oxygen at home. Pt endorses intermittent confusion and abdominal distention but otherwise denies fever, chills, chest pain, palpitations, abdominal pain, N/V/D, dysuria, HA, or syncope.       In the ED: Hypertensive and hypoxic, sating 93% on 3L NC, o/w VSSAF. CBC with leukocytosis to 16, likely reactive. CMP grossly unremarkable. . Troponin 0.009. EKG c/w known rate controlled A fib. CXR c/w pulmonary edema & R pleural effusion. XR pelvis with mildly displaced L intertrochanteric femur fracture with impaction,  minimally displaced comminuted L superior pubic ramus fracture, and nondisplaced fracture of the L inferior pubic ramus. Pt was given 80 mg IV lasix, zofran, and dilaudid and was admitted to hospital medicine for further management.     Overview/Hospital Course:  No notes on file    Interval history- seen in pacu and still fairly sleepy with nursing at bedside. Down to 3L of oxygen on transfer to floor yesterday and this AM, on exam lungs sound fairly clean, CXR similar to prior but likely lags behind clinical exam. Cr uptick to 2.0 with JOHAN today and lower BPS with hypotension this Am and post op to 90s systolic. Suspect overdiuresis, and small 250 bolus given this Am and aldactone and lasix stopped this AM. Given hypotension still in pacu slow IVF given at 75 cc/hr given overload on admission, CXR without worsening and still on stable oxygen so will do light IVF for 9 hours or so and will reeval on floor this afternoon to see volume status further. Hg stable at 10.4->9.4 post op.  IM nail done today with dr gill with wbat with PT/OT tomorrow. Pharmacy reported that she meets criteria to be on 2.5 mg of eliquis for atrial fibrillation so adjusted dosing for 2.5 post op for her for this + DVT ppx s/p hip surgery to resume tonight. Pain control with multimodals and ropivicaine now post op. Atenolol held in pacu for hypotension, HR with AFIb in 60s-70s on tele.      Review of patient's allergies indicates:   Allergen Reactions    Percodan [oxycodone hcl-oxycodone-asa] Other (See Comments)     halucinations    Penicillins Itching    Sulfa (sulfonamide antibiotics) Diarrhea and Nausea Only    Amoxicillin        Current Facility-Administered Medications   Medication Dose Route Frequency Provider Last Rate Last Admin    0.9%  NaCl infusion   Intravenous Continuous Hannah Andre MD 75 mL/hr at 04/28/24 0948 New Bag at 04/28/24 0948    acetaminophen tablet 1,000 mg  1,000 mg Oral Q8H Hannah Andre MD    1,000 mg at 04/27/24 2121    acetaminophen tablet 1,000 mg  1,000 mg Oral Q6H Hannah Andre MD   1,000 mg at 04/28/24 1239    albuterol-ipratropium 2.5 mg-0.5 mg/3 mL nebulizer solution 3 mL  3 mL Nebulization Q4H PRN Elicia Barnes PA-C        aluminum-magnesium hydroxide-simethicone 200-200-20 mg/5 mL suspension 30 mL  30 mL Oral QID PRN Elicia Barnes PA-C        apixaban tablet 2.5 mg  2.5 mg Oral BID Hannah Andre MD   2.5 mg at 04/28/24 1239    atenoloL tablet 25 mg  25 mg Oral Daily Hannah Andre MD   25 mg at 04/27/24 0953    bisacodyL suppository 10 mg  10 mg Rectal Daily PRN Hannah Andre MD        clindamycin in D5W 900 mg/50 mL IVPB 900 mg  900 mg Intravenous On Call Procedure Damián Ramos MD        dextrose 10% bolus 125 mL 125 mL  12.5 g Intravenous PRN Elicia Barnes PA-C        dextrose 10% bolus 250 mL 250 mL  25 g Intravenous PRN Elicia Barnes PA-C        droPERidol injection 0.625 mg  0.625 mg Intravenous Once PRN Clarence Chacon MD        EScitalopram oxalate tablet 10 mg  10 mg Oral Daily Elicia Barnes PA-C   10 mg at 04/28/24 1236    fentaNYL 50 mcg/mL injection 25 mcg  25 mcg Intravenous Q5 Min PRN Hannah Andre MD        glucagon (human recombinant) injection 1 mg  1 mg Intramuscular PRN Elicia Barnes PA-C        glucose chewable tablet 16 g  16 g Oral PRN Elicia Barnes PA-C        glucose chewable tablet 24 g  24 g Oral PRN Elicia Barnes PA-C        HYDROmorphone injection 0.2 mg  0.2 mg Intravenous Q5 Min PRN Clarence Chacon MD        HYDROmorphone injection 0.2 mg  0.2 mg Intravenous Q5 Min PRN Clarence Chacon MD        LIDOcaine (PF) 10 mg/ml (1%) injection 10 mg  1 mL Intradermal On Call Procedure Hannah Andre MD        melatonin tablet 6 mg  6 mg Oral Nightly PRN Hannah Andre MD        memantine tablet 5 mg  5 mg Oral BID Elicia Barnes PA-C   5 mg at 04/28/24 1236    methocarbamoL tablet  500 mg  500 mg Oral Q6H PRN Hannah Andre MD        methocarbamoL tablet 500 mg  500 mg Oral Q6H PRN Hannah Andre MD        metoclopramide injection 5 mg  5 mg Intravenous Q6H PRN Hannah Andre MD        morphine injection 2 mg  2 mg Intravenous Q3H PRN Hannah Andre MD   2 mg at 04/27/24 1030    multivit-min-fa-coenzyme q10 100-5 mcg-mg (AQUADEKS) chew  1 tablet Oral Daily Elicia Barnes PA-C        mupirocin 2 % ointment 1 g  1 g Nasal BID Hannah Andre MD        mupirocin 2 % ointment   Nasal On Call Procedure Damián Ramos MD        naloxone 0.4 mg/mL injection 0.02 mg  0.02 mg Intravenous PRN Elicia Barnes PA-C        ondansetron disintegrating tablet 8 mg  8 mg Oral Q8H PRN Elicia Barnes PA-C        ondansetron injection 4 mg  4 mg Intravenous Once PRN Clarence Chacon MD        ondansetron injection 4 mg  4 mg Intravenous Q12H PRN Hannah Andre MD        polyethylene glycol packet 17 g  17 g Oral Daily Hannah Andre MD        prochlorperazine injection Soln 5 mg  5 mg Intravenous Q6H PRN Elicia Barnes PA-C        ROPIvacaine (PF) 2 mg/ml (0.2%) solution  0.1 mL/hr Perineural Continuous Hannah Andre MD 0.1 mL/hr at 04/28/24 0935 0.1 mL/hr at 04/28/24 0935    senna-docusate 8.6-50 mg per tablet 1 tablet  1 tablet Oral BID Hannah Andre MD        simethicone chewable tablet 80 mg  1 tablet Oral QID PRN Elicia Barnes PA-C        sodium chloride 0.9% flush 10 mL  10 mL Intravenous PRN Damián Ramos MD        sodium chloride 0.9% flush 10 mL  10 mL Intravenous PRN Hannah Andre MD        sodium chloride 0.9% flush 10 mL  10 mL Intravenous Q12H PRN Elicia Barnes PA-C        tranexamic acid (CYKLOKAPRON) 3,000 mg in sodium chloride 0.9% SolP 100 mL  3,000 mg Irrigation On Call Procedure Hannah Andre MD        vancomycin (VANCOCIN) 1,000 mg in dextrose 5 % (D5W) 250 mL IVPB (Vial-Mate)  1,000 mg Intravenous  Q12 Hannah Andre MD         Family History       Problem Relation (Age of Onset)    No Known Problems Mother, Father          Tobacco Use    Smoking status: Never     Passive exposure: Never    Smokeless tobacco: Never   Substance and Sexual Activity    Alcohol use: No    Drug use: No    Sexual activity: Not Currently     Review of Systems   Constitutional:  Positive for activity change. Negative for chills and fever.   HENT:  Negative for trouble swallowing.    Eyes:  Negative for photophobia and visual disturbance.   Respiratory:  Positive for cough and shortness of breath. Negative for chest tightness.    Cardiovascular:  Positive for leg swelling. Negative for chest pain and palpitations.   Gastrointestinal:  Negative for abdominal pain, constipation, diarrhea, nausea and vomiting.   Genitourinary:  Negative for dysuria, frequency and hematuria.   Musculoskeletal:  Positive for arthralgias, back pain and gait problem. Negative for neck pain.   Skin:  Negative for rash and wound.   Neurological:  Positive for weakness. Negative for dizziness, syncope, speech difficulty and light-headedness.   Psychiatric/Behavioral:  Positive for confusion (intermittent). Negative for agitation. The patient is not nervous/anxious.      Objective:     Vital Signs (Most Recent):  Temp: 97 °F (36.1 °C) (04/28/24 1239)  Pulse: 68 (04/28/24 1220)  Resp: 18 (04/28/24 1102)  BP: (!) 109/56 (04/28/24 1102)  SpO2: (!) 94 % (04/28/24 1102) Vital Signs (24h Range):  Temp:  [97 °F (36.1 °C)-98.2 °F (36.8 °C)] 97 °F (36.1 °C)  Pulse:  [] 68  Resp:  [14-25] 18  SpO2:  [90 %-97 %] 94 %  BP: ()/(50-88) 109/56     Weight: 54.9 kg (121 lb)  Body mass index is 21.43 kg/m².     Physical Exam  Vitals and nursing note reviewed.   Constitutional:       General: She is not in acute distress.     Appearance: She is well-developed. She is not toxic-appearing.   HENT:      Head: Normocephalic.      Mouth/Throat:      Mouth: Mucous  membranes are moist.   Eyes:      Extraocular Movements: Extraocular movements intact.      Conjunctiva/sclera: Conjunctivae normal.      Pupils: Pupils are equal, round, and reactive to light.   Cardiovascular:      Rate and Rhythm: Tachycardia present. Rhythm irregular.      Heart sounds: Normal heart sounds.      Comments: No JVD. HR 60s-70s with afib  Pulmonary:      Effort: Pulmonary effort is normal. No respiratory distress.      Breath sounds: Rales present. No wheezing.   Abdominal:      General: Bowel sounds are normal. There is distension.      Palpations: Abdomen is soft.      Tenderness: There is no abdominal tenderness.   Musculoskeletal:         General: Normal range of motion.      Cervical back: Normal range of motion and neck supple.      Comments: LLE with bandages in place. Ropivicaine in place.   Skin:     General: Skin is warm and dry.      Capillary Refill: Capillary refill takes less than 2 seconds.      Findings: Bruising present. No rash.   Neurological:      Mental Status: She is alert and oriented to person, place, and time.      Cranial Nerves: No cranial nerve deficit.      Sensory: No sensory deficit.      Coordination: Coordination normal.   Psychiatric:         Behavior: Behavior normal.         Thought Content: Thought content normal.         Judgment: Judgment normal.              CRANIAL NERVES     CN III, IV, VI   Pupils are equal, round, and reactive to light.       Significant Labs: All pertinent labs within the past 24 hours have been reviewed.  CBC:   Recent Labs   Lab 04/27/24  0517 04/28/24  0457 04/28/24  0936   WBC 16.11* 11.23 12.42   HGB 12.8 10.4* 9.4*   HCT 39.8 32.6* 29.8*    155 132*     CMP:   Recent Labs   Lab 04/27/24  0517 04/28/24  0457    138   K 3.8 4.8    105   CO2 20* 22*   * 112*   BUN 30 51*   CREATININE 1.1 2.0*   CALCIUM 9.7 8.6*   PROT 6.8  --    ALBUMIN 3.6  --    BILITOT 1.2*  --    ALKPHOS 47*  --    AST 26  --    ALT 21  --     ANIONGAP 10 11     Cardiac Markers:   Recent Labs   Lab 04/27/24  0702   *     Troponin:   Recent Labs   Lab 04/27/24  0702   TROPONINI 0.009     Urine Studies:   Recent Labs   Lab 04/27/24  0557   COLORU Yellow   APPEARANCEUA Clear   PHUR 7.0   SPECGRAV 1.015   PROTEINUA Negative   GLUCUA Negative   KETONESU Negative   BILIRUBINUA Negative   OCCULTUA 2+*   NITRITE Negative   LEUKOCYTESUR Negative   RBCUA 76*   BACTERIA Rare   SQUAMEPITHEL 0       Significant Imaging: I have reviewed all pertinent imaging results/findings within the past 24 hours.  X-Ray Chest AP Portable  Narrative: EXAMINATION:  XR CHEST AP PORTABLE    CLINICAL HISTORY:  reassess volume status;    TECHNIQUE:  Single frontal view of the chest was performed.    COMPARISON:  April 27, 2024    FINDINGS:  Monitoring leads in place.  Heart remains enlarged.  Accentuation interstitial markings though improved.  May be some pleural fluid on the right.  Patchy bibasilar parenchymal opacities noted.  No pneumothorax.  Probable chronic rotator cuff tear right shoulder.  Impression: Abnormal study though slightly improved.    Electronically signed by: Damaso Sorensen MD  Date:    04/28/2024  Time:    10:34  X-Ray Femur 2 View Left - All Other Fractures (Not Femoral Neck)  Narrative: EXAMINATION:  XR FEMUR 2 VIEW LEFT    CLINICAL HISTORY:  IT fx post op;    TECHNIQUE:  AP and lateral views of the left femur were performed.    COMPARISON:  April 27, 2024    FINDINGS:  Postop ORIF comminuted left intertrochanteric fracture.  Greater trochanter is fractured.  Otherwise alignment appears satisfactory.  Vascular calcifications noted.  Surgical drain in place.  Impression: Postop ORIF right intertrochanteric fracture.  There is a fracture of the greater trochanter.    This report was flagged in Epic as abnormal.    Electronically signed by: Damaso Sorensen MD  Date:    04/28/2024  Time:    10:28        Assessment/Plan:      * Closed displaced intertrochanteric  fracture of left femur  89 y.o. who presents after a fall with a closed displaced L intertrochanteric fracture after a fall. Very active at baseline, does daily exercise classes  -ortho consulted, s/p IM nail 4/28 with dr gill. Resume home eliquis for anticoag, pharm reports meets criteria to adjsut to 2.5 mg daily for age/indication and adjusetd to this  -slight hg downtick expected post op from 12--9.4 and cont to trend  -vit D very high at 77 and rec to hold vit d to avoid toxicity levels  -UA wnl on admit. Diuresed before OR but likely a little overdiuresed now, see JOHAN  -multimodal pain control with ropivicaine and anesthesia managing  -bowel regimen  -eliquis for PPX for DVt already on for afib and resumed  -bandages to remove in clinic in 2 weeks  -PT/OT on POD 1, suspect would be a good IP rehab candidate given how active she is at baseline for higher intensity therapies post op  -remove johnson tomorrow    Hypotension  Holding further diuresis, improving with light IVF, watch volume status closely given overload on admit      Acute blood loss anemia  Patient's anemia is currently uncontrolled. Has not received any PRBCs to date. Etiology likely d/t acute blood loss which was from surgical repair  Current CBC reviewed-   Lab Results   Component Value Date    HGB 9.4 (L) 04/28/2024    HCT 29.8 (L) 04/28/2024     Monitor serial CBC and transfuse if patient becomes hemodynamically unstable, symptomatic or H/H drops below 7/21.    JOHAN (acute kidney injury)  Patient with acute kidney injury/acute renal failure likely due to diuersis with lasix JOHAN is currently  present 4/28 at Cr 2.0 . Baseline creatinine  1.1  - Labs reviewed- Renal function/electrolytes with Estimated Creatinine Clearance: 14.2 mL/min (A) (based on SCr of 2 mg/dL (H)). according to latest data. Monitor urine output and serial BMP and adjust therapy as needed. Avoid nephrotoxins and renally dose meds for GFR listed above.  Hold  lasix/aldactone and very light IVF considering overload on admit to allow volume equilibration    Long term (current) use of anticoagulants  This patient has long term use on an anticoagulant with Select Anticoagulant(s): Direct oral anticoagulant: Apixaban (Eliquis). Their long term anticoagulation will be Held or Continued: continued. They are on long term anticoagulation due to Reason for Anticoagulation: Atrial fibrillation.     Acute on chronic diastolic heart failure  Patient is identified as having Diastolic (HFpEF) heart failure that is Acute on Chronic. CHF is currently uncontrolled due to Dyspnea not returned to baseline after 1 doses of IV diuretic, Rales/crackles on pulmonary exam, and Pulmonary edema/pleural effusion on CXR. Pt is on CHF pathway.   - Patient decompensated on admit (4/27/2024). (+)Subjective SOB, (--)JVD, (--)orthnopnea.   - Last BNP reviewed- and noted below   Recent Labs   Lab 04/27/24  0702   *     - Troponin 0.009, continue to trend   - CXR with cardiomegaly and pulmonary edema with R pleural effusion   - EKG without acute ST changes   - Echo with Echo    Result Date: 4/27/2024    Left Ventricle: The left ventricle is normal in size. Ventricular mass   is normal. Normal wall thickness. Normal wall motion. There is normal   systolic function with a visually estimated ejection fraction of 60 - 65%.   There is indeterminate diastolic function.Normal left ventricular filling   pressure.    Right Ventricle: Normal right ventricular cavity size. Wall thickness   is normal. Systolic function is normal.    Left Atrium: Left atrium is severely dilated.    Right Atrium: Right atrium is severely dilated.    Aortic Valve: The aortic valve is a trileaflet valve. There is mild   aortic valve sclerosis. There is mild annular calcification present.    Mitral Valve: There is moderate anterior leaflet sclerosis.    Tricuspid Valve: There is moderate regurgitation with a centrally   directed  jet.    Aorta: Aortic root is normal in size measuring 3.08 cm. Ascending aorta   is normal measuring 3.43 cm.    Pulmonary Artery: The estimated pulmonary artery systolic pressure is   57 mmHg.    IVC/SVC: Intermediate venous pressure at 8 mmHg.          Intake/Output Summary (Last 24 hours) at 4/28/2024 1300  Last data filed at 4/28/2024 0925  Gross per 24 hour   Intake 970 ml   Output 1460 ml   Net -490 ml      - Supplemental O2 to keep Spo2 >90%  - Continue to stress to patient importance of self efficacy and  on diet for CHF.  Suspect overdiuresis as Aster on 4/28 after lasix 40 BID + home aldactone on admit + hypotension. Hold for now, very light IVF for hypotension now, CXr similar to before, on similar oxygen and reassess volume status this PM. Liberate diet for now while equliibirating volume status.    Chronic atrial fibrillation  Long term use of anticoagulants   Patient with Persistent (7 days or more) atrial fibrillation which is controlled currently with Beta Blocker. Patient is currently in atrial fibrillation.[id number] Score: 3. Anticoagulation indicated. Anticoagulation done with eliquis .  - hold atenolol 4/28 for hypotension, HR 60s-70s in pacu  -eliquis resumed post op, per pharm meeds criteria based on age and indications to start 2.5 dosing instead of 5 mg dosing    Recurrent major depressive disorder, in full remission  Patient has recurrent depression which is mild and is currently controlled. Will Continue anti-depressant medications. We will not consult psychiatry at this time. Patient does not display psychosis at this time. Continue to monitor closely and adjust plan of care as needed.  - Continue home lexapro & namenda     Essential hypertension  Chronic, controlled. Latest blood pressure and vitals reviewed-     Temp:  [97 °F (36.1 °C)-98.2 °F (36.8 °C)]   Pulse:  []   Resp:  [14-25]   BP: ()/(50-88)   SpO2:  [90 %-97 %] .   Home meds for hypertension were reviewed and  noted below.   Hypertension Medications                    furosemide (LASIX) 20 MG tablet TAKE 1 TABLET BY MOUTH ONCE DAILY.    spironolactone (ALDACTONE) 50 MG tablet Take 1 tablet (50 mg total) by mouth once daily.     Not taking norvasc anymore. Atenolol started 4/427 but held 4/28 for lower BPs. Hold aldactone and lasix for JOHAN 4/28    Will utilize p.r.n. blood pressure medication only if patient's blood pressure greater than 180/110 and she develops symptoms such as worsening chest pain or shortness of breath.      VTE Risk Mitigation (From admission, onward)           Ordered     apixaban tablet 2.5 mg  2 times daily         04/28/24 0943     IP VTE HIGH RISK PATIENT  Once         04/28/24 0707     Place sequential compression device  Until discontinued         04/28/24 0707                    Discharge Planning   HEAVEN: 5/1/2024     Code Status: DNR   Is the patient medically ready for discharge?:     Reason for patient still in hospital (select all that apply): Patient trending condition                     Hannah Andre MD  Department of Hospital Medicine   [facility]

## 2024-04-28 NOTE — PROGRESS NOTES
Notified CAROLINA Velazquez NP of BP 90/55. Informed that patient denies any dizziness or lightheadness at this time. No new orders at this time. Care ongoing.

## 2024-04-28 NOTE — PROGRESS NOTES
[facility] - [facility]  Orthopedics  Progress Note    Patient Name: Mary Ellen Miller  MRN: [id number]  Admission Date: 4/27/2024  Hospital Length of Stay: 1 days  Attending Provider: Hannah Andre MD  Primary Care Provider: Dionne Jeter MD  Follow-up For: Procedure(s) (LRB):  INSERTION, INTRAMEDULLARY LENO, FEMUR: Left (Left)    Post-Operative Day: Day of Surgery  Subjective:     Principal Problem:Closed displaced intertrochanteric fracture of left femur    Principal Orthopedic Problem: same as above    Interval History: NAEO. VSS. Plan for OR today. NPO since midnight.     Review of patient's allergies indicates:   Allergen Reactions    Percodan [oxycodone hcl-oxycodone-asa] Other (See Comments)     halucinations    Penicillins Itching    Sulfa (sulfonamide antibiotics) Diarrhea and Nausea Only    Amoxicillin        Current Facility-Administered Medications   Medication Dose Route Frequency Provider Last Rate Last Admin    acetaminophen tablet 1,000 mg  1,000 mg Oral Q8H Hannah Andre MD   1,000 mg at 04/27/24 2121    albuterol-ipratropium 2.5 mg-0.5 mg/3 mL nebulizer solution 3 mL  3 mL Nebulization Q4H PRN Elicia Barnes PA-C        aluminum-magnesium hydroxide-simethicone 200-200-20 mg/5 mL suspension 30 mL  30 mL Oral QID PRN Elicia Barnes PA-C        atenoloL tablet 25 mg  25 mg Oral Daily Hannah Andre MD   25 mg at 04/27/24 0953    bisacodyL suppository 10 mg  10 mg Rectal Daily PRN Hannah Andre MD        dextrose 10% bolus 125 mL 125 mL  12.5 g Intravenous PRN Elicia Barnes PA-C        dextrose 10% bolus 250 mL 250 mL  25 g Intravenous PRN Elicia Barnes PA-C        EScitalopram oxalate tablet 10 mg  10 mg Oral Daily Elicia Barnes PA-C   10 mg at 04/27/24 0952    glucagon (human recombinant) injection 1 mg  1 mg Intramuscular PRN Elicia Barnes PA-C        glucose chewable tablet 16 g  16 g Oral PRN Elicia Barnes PA-C        glucose chewable tablet 24  "g  24 g Oral PRN Elicia Barnes PA-C        melatonin tablet 6 mg  6 mg Oral Nightly PRN Hannah Andre MD        memantine tablet 5 mg  5 mg Oral BID Elicia Barnes PA-C   5 mg at 04/27/24 2120    methocarbamoL tablet 500 mg  500 mg Oral Q6H PRN Hannah Andre MD        morphine injection 2 mg  2 mg Intravenous Q3H PRN Hannah Andre MD   2 mg at 04/27/24 1030    multivit-min-fa-coenzyme q10 100-5 mcg-mg (AQUADEKS) chew  1 tablet Oral Daily Elicia Barnes PA-C        mupirocin 2 % ointment   Nasal BID Hannah Andre MD   Given at 04/27/24 2120    naloxone 0.4 mg/mL injection 0.02 mg  0.02 mg Intravenous PRN Elicia Barnes PA-C        ondansetron disintegrating tablet 8 mg  8 mg Oral Q8H PRN Elicia Barnes PA-C        polyethylene glycol packet 17 g  17 g Oral Daily Hannah Andre MD        prochlorperazine injection Soln 5 mg  5 mg Intravenous Q6H PRN Elicia Barnes PA-C        simethicone chewable tablet 80 mg  1 tablet Oral QID PRN Elicia Barnes PA-C        sodium chloride 0.9% bolus 250 mL 250 mL  250 mL Intravenous Once Hannah Andre MD        sodium chloride 0.9% flush 10 mL  10 mL Intravenous PRN Hannah Andre MD        sodium chloride 0.9% flush 10 mL  10 mL Intravenous Q12H PRN Elicia Barnes PA-C         Objective:     Vital Signs (Most Recent):  Temp: 98.1 °F (36.7 °C) (04/28/24 0353)  Pulse: 73 (04/28/24 0353)  Resp: 16 (04/28/24 0353)  BP: (!) 93/57 (04/28/24 0353)  SpO2: (!) 91 % (04/28/24 0353) Vital Signs (24h Range):  Temp:  [97.5 °F (36.4 °C)-98.1 °F (36.7 °C)] 98.1 °F (36.7 °C)  Pulse:  [] 73  Resp:  [15-22] 16  SpO2:  [91 %-98 %] 91 %  BP: ()/(55-88) 93/57     Weight: 54.9 kg (121 lb)  Height: 5' 3" (160 cm)  Body mass index is 21.43 kg/m².      Intake/Output Summary (Last 24 hours) at 4/28/2024 0706  Last data filed at 4/27/2024 1436  Gross per 24 hour   Intake --   Output 1075 ml   Net -1075 ml        Ortho/SPM " Exam  LLE:  - Skin intact throughout, no scars present  - No swelling  - No ecchymosis, erythema, or signs of cellulitis  - TTP at hip/proximal femur  - No tenderness to palpation of middle or distal femur  - No tenderness to palpation of proximal, middle, or distal aspects of tibia or fibula  - No tenderness to palpation of foot  - Pain with any ROM at hip  - Full painless ROM of knee and ankle  - Compartments soft  - SILT Sa/Watts/DP/SP/T  - Motor intact EHL/FHL/TA/Gastroc  - 2+ DP  - Brisk capillary refill      Significant Labs: All pertinent labs within the past 24 hours have been reviewed.    Significant Imaging: I have reviewed and interpreted all pertinent imaging results/findings.  Assessment/Plan:     * Closed displaced intertrochanteric fracture of left femur  Mary Ellen Miller is a 89 y.o. female with left intertrochanteric femur fracture, closed, NVI. They take eliquis 5 BID anticoagulation at home. They required no ambulatory assistive devices prior to this injury. She is currently on 6L of oxygen currently. Will likely be optimized for surgery tomorrow. Pending echo today.     -Admitted to medicine hip fracture service for pre-operative clearance and medical evaluation  -To OR tomorrow for operative fixation of left intertrochanteric femur fracture  -Pt marked, booked, and consented for surgery  -DVT PPx: Hold anticoagulation  -Abx: Preop abx ordered  -Labs: Prealbumin 16, UA clean, Glucose 160, Albumin 3.6, Hemoglobin 12.8, Platelets 191, White blood cell count 16, A1c 5.3, INR 1.1. Other lab results pending.  -Bed rest, johnson, NPO at midnight  -Iv: ordered for contralateral arm    Patient was explained in detail the severity of the injury that was suffered. Patient was explained the risks/benefits/and alternatives to operative management in detail including infection, bleeding, pain, nerve and vascular damage, heterotopic ossification, leg length discrepancies, rotational deformities and they  express full understanding.  We discussed the 30% national first year mortality rate with hip fractures, the need for early mobilization, and the expected rehab course.  She expresses full understanding of the condition and expresses that they want to proceed with surgery. The patient is admitted to the medicine hip fracture service for optimization of medical comorbidities. Will plan for OR tomorrow. No guarantees were made, informed consent was obtained. All questions were answered to patient's and family's satisfaction.             JAZMYN Parmar MD  Orthopedics  [facility] - Surgery

## 2024-04-28 NOTE — ASSESSMENT & PLAN NOTE
Holding further diuresis, improving with light IVF, watch volume status closely given overload on admit

## 2024-04-28 NOTE — ANESTHESIA PROCEDURE NOTES
Left SIFI Catheter    Patient location during procedure: OR   Block not for primary anesthetic.  Reason for block: at surgeon's request and post-op pain management   Post-op Pain Location: Left leg pain   Start time: 4/28/2024 9:21 AM  Timeout: 4/28/2024 9:20 AM   End time: 4/28/2024 9:27 AM    Staffing  Authorizing Provider: Apple Thomas MD  Performing Provider: Rg Arvizu MD    Staffing  Performed by: Rg Arvizu MD  Authorized by: Apple Thomas MD    Preanesthetic Checklist  Completed: patient identified, IV checked, site marked, risks and benefits discussed, surgical consent, monitors and equipment checked, pre-op evaluation and timeout performed  Peripheral Block  Patient position: supine  Prep: ChloraPrep and site prepped and draped  Patient monitoring: heart rate, cardiac monitor, continuous pulse ox, continuous capnometry and frequent blood pressure checks  Block type: fascia iliaca  Laterality: left  Injection technique: continuous  Needle  Needle type: Tuohy   Needle gauge: 17 G  Needle length: 3.5 in  Needle localization: anatomical landmarks and ultrasound guidance  Catheter type: spring wound  Catheter size: 19 G  Test dose: lidocaine 1.5% with Epi 1-to-200,000 and negative   -ultrasound image captured on disc.  Assessment  Injection assessment: negative aspiration, negative parasthesia and local visualized surrounding nerve  Paresthesia pain: none  Heart rate change: no  Slow fractionated injection: yes        Additional Notes  VSS.  [facility] RN monitoring vitals throughout procedure.  Patient tolerated procedure well.    40 mL ropivacaine 0.25% w/ epi 1:200k.  Performed with patient under general anesthesia prior to emrgence.

## 2024-04-28 NOTE — TRANSFER OF CARE
"Anesthesia Transfer of Care Note    Patient: Mary Ellen Miller    Procedure(s) Performed: Procedure(s) (LRB):  INSERTION, INTRAMEDULLARY LENO, FEMUR: Left (Left)    Patient location: PACU    Anesthesia Type: general    Transport from OR: Transported from OR on 6-10 L/min O2 by face mask with adequate spontaneous ventilation    Post pain: adequate analgesia    Post assessment: no apparent anesthetic complications and tolerated procedure well    Post vital signs: stable    Level of consciousness: lethargic    Nausea/Vomiting: no nausea/vomiting    Complications: none    Transfer of care protocol was followed      Last vitals: Visit Vitals  BP (!) 92/53   Pulse 69   Temp 36.7 °C (98.1 °F)   Resp 14   Ht 5' 3" (1.6 m)   Wt 54.9 kg (121 lb)   SpO2 (!) 91%   BMI 21.43 kg/m²     "

## 2024-04-28 NOTE — ASSESSMENT & PLAN NOTE
Patient with acute kidney injury/acute renal failure likely due to diuersis with lasix JOHAN is currently  present 4/28 at Cr 2.0 . Baseline creatinine  1.1  - Labs reviewed- Renal function/electrolytes with Estimated Creatinine Clearance: 14.2 mL/min (A) (based on SCr of 2 mg/dL (H)). according to latest data. Monitor urine output and serial BMP and adjust therapy as needed. Avoid nephrotoxins and renally dose meds for GFR listed above.  Hold lasix/aldactone and very light IVF considering overload on admit to allow volume equilibration

## 2024-04-28 NOTE — ANESTHESIA POSTPROCEDURE EVALUATION
Anesthesia Post Evaluation    Patient: Mary Ellen Miller    Procedure(s) Performed: Procedure(s) (LRB):  INSERTION, INTRAMEDULLARY LENO, FEMUR: Left (Left)    Final Anesthesia Type: general      Patient location during evaluation: PACU  Patient participation: Yes- Able to Participate  Level of consciousness: awake and alert  Post-procedure vital signs: reviewed and stable  Pain management: adequate  Airway patency: patent  ABEL mitigation strategies: Multimodal analgesia, Extubation while patient is awake and Verification of full reversal of neuromuscular block  PONV status at discharge: No PONV  Anesthetic complications: no      Cardiovascular status: blood pressure returned to baseline  Respiratory status: unassisted  Hydration status: euvolemic  Follow-up not needed.              Vitals Value Taken Time   /56 04/28/24 1102   Temp 36.1 °C (97 °F) 04/28/24 1239   Pulse 68 04/28/24 1220   Resp 18 04/28/24 1102   SpO2 94 % 04/28/24 1102         Event Time   Out of Recovery 04/28/2024 10:00:00         Pain/Ev Score: Pain Rating Prior to Med Admin: 1 (4/28/2024 12:39 PM)  Pain Rating Post Med Admin: 0 (4/28/2024  9:26 AM)  Ev Score: 9 (4/28/2024 10:30 AM)

## 2024-04-28 NOTE — PROGRESS NOTES
Called and spoke with son Salvador and devaughn in law Yesenia ([phone]), infromed that patient is heading to surgery.

## 2024-04-28 NOTE — ASSESSMENT & PLAN NOTE
Long term use of anticoagulants   Patient with Persistent (7 days or more) atrial fibrillation which is controlled currently with Beta Blocker. Patient is currently in atrial fibrillation.[id number] Score: 3. Anticoagulation indicated. Anticoagulation done with eliquis .  - hold atenolol 4/28 for hypotension, HR 60s-70s in pacu  -eliquis resumed post op, per pharm meeds criteria based on age and indications to start 2.5 dosing instead of 5 mg dosing

## 2024-04-28 NOTE — SUBJECTIVE & OBJECTIVE
Interval history- seen in pacu and still fairly sleepy with nursing at bedside. Down to 3L of oxygen on transfer to floor yesterday and this AM, on exam lungs sound fairly clean, CXR similar to prior but likely lags behind clinical exam. Cr uptick to 2.0 with JOHAN today and lower BPS with hypotension this Am and post op to 90s systolic. Suspect overdiuresis, and small 250 bolus given this Am and aldactone and lasix stopped this AM. Given hypotension still in pacu slow IVF given at 75 cc/hr given overload on admission, CXR without worsening and still on stable oxygen so will do light IVF for 9 hours or so and will reeval on floor this afternoon to see volume status further. Hg stable at 10.4->9.4 post op.  IM nail done today with dr gill with wbat with PT/OT tomorrow. Pharmacy reported that she meets criteria to be on 2.5 mg of eliquis for atrial fibrillation so adjusted dosing for 2.5 post op for her for this + DVT ppx s/p hip surgery to resume tonight. Pain control with multimodals and ropivicaine now post op. Atenolol held in pacu for hypotension, HR with AFIb in 60s-70s on tele.      Review of patient's allergies indicates:   Allergen Reactions    Percodan [oxycodone hcl-oxycodone-asa] Other (See Comments)     halucinations    Penicillins Itching    Sulfa (sulfonamide antibiotics) Diarrhea and Nausea Only    Amoxicillin        Current Facility-Administered Medications   Medication Dose Route Frequency Provider Last Rate Last Admin    0.9%  NaCl infusion   Intravenous Continuous Hannah Andre MD 75 mL/hr at 04/28/24 0948 New Bag at 04/28/24 0948    acetaminophen tablet 1,000 mg  1,000 mg Oral Q8H Hannah Andre MD   1,000 mg at 04/27/24 2121    acetaminophen tablet 1,000 mg  1,000 mg Oral Q6H Hannah Andre MD   1,000 mg at 04/28/24 1239    albuterol-ipratropium 2.5 mg-0.5 mg/3 mL nebulizer solution 3 mL  3 mL Nebulization Q4H PRN Elicia Barnes, JASPREET        aluminum-magnesium  hydroxide-simethicone 200-200-20 mg/5 mL suspension 30 mL  30 mL Oral QID PRN Elicia Barnes PA-C        apixaban tablet 2.5 mg  2.5 mg Oral BID Hannah Andre MD   2.5 mg at 04/28/24 1239    atenoloL tablet 25 mg  25 mg Oral Daily Hannah Andre MD   25 mg at 04/27/24 0953    bisacodyL suppository 10 mg  10 mg Rectal Daily PRN Hannah Andre MD        clindamycin in D5W 900 mg/50 mL IVPB 900 mg  900 mg Intravenous On Call Procedure Damián Ramos MD        dextrose 10% bolus 125 mL 125 mL  12.5 g Intravenous PRN Elicia Barnes PA-C        dextrose 10% bolus 250 mL 250 mL  25 g Intravenous PRN Elicia Barnes PA-C        droPERidol injection 0.625 mg  0.625 mg Intravenous Once PRN Clarence Chacon MD        EScitalopram oxalate tablet 10 mg  10 mg Oral Daily Elicia Barnes PA-C   10 mg at 04/28/24 1236    fentaNYL 50 mcg/mL injection 25 mcg  25 mcg Intravenous Q5 Min PRN Hannah Andre MD        glucagon (human recombinant) injection 1 mg  1 mg Intramuscular PRN Elicia Barnes PA-C        glucose chewable tablet 16 g  16 g Oral PRN Elicia Barnes PA-C        glucose chewable tablet 24 g  24 g Oral PRN Elicia Barnes PA-C        HYDROmorphone injection 0.2 mg  0.2 mg Intravenous Q5 Min PRN Clarence Chacon MD        HYDROmorphone injection 0.2 mg  0.2 mg Intravenous Q5 Min PRN Clarence Chacon MD        LIDOcaine (PF) 10 mg/ml (1%) injection 10 mg  1 mL Intradermal On Call Procedure Hannah Andre MD        melatonin tablet 6 mg  6 mg Oral Nightly PRN Hannah Andre MD        memantine tablet 5 mg  5 mg Oral BID Elicia Barnes PA-C   5 mg at 04/28/24 1236    methocarbamoL tablet 500 mg  500 mg Oral Q6H PRN Hannah Andre MD        methocarbamoL tablet 500 mg  500 mg Oral Q6H PRN Hannah Andre MD        metoclopramide injection 5 mg  5 mg Intravenous Q6H PRN Hannah Andre MD        morphine injection 2 mg  2 mg Intravenous Q3H  PRN Hannah Andre MD   2 mg at 04/27/24 1030    multivit-min-fa-coenzyme q10 100-5 mcg-mg (AQUADEKS) chew  1 tablet Oral Daily Elicia Barnes PA-C        mupirocin 2 % ointment 1 g  1 g Nasal BID Hannah Andre MD        mupirocin 2 % ointment   Nasal On Call Procedure Damián Ramos MD        naloxone 0.4 mg/mL injection 0.02 mg  0.02 mg Intravenous PRN Elicia Barnes PA-C        ondansetron disintegrating tablet 8 mg  8 mg Oral Q8H PRN Elicia Barnes PA-C        ondansetron injection 4 mg  4 mg Intravenous Once PRN Clarence Chacon MD        ondansetron injection 4 mg  4 mg Intravenous Q12H PRN Hannah Andre MD        polyethylene glycol packet 17 g  17 g Oral Daily Hannah Andre MD        prochlorperazine injection Soln 5 mg  5 mg Intravenous Q6H PRN Elicia Barnes PA-C        ROPIvacaine (PF) 2 mg/ml (0.2%) solution  0.1 mL/hr Perineural Continuous Hannah Andre MD 0.1 mL/hr at 04/28/24 0935 0.1 mL/hr at 04/28/24 0935    senna-docusate 8.6-50 mg per tablet 1 tablet  1 tablet Oral BID Hannah Andre MD        simethicone chewable tablet 80 mg  1 tablet Oral QID PRN Elicia Barnes PA-C        sodium chloride 0.9% flush 10 mL  10 mL Intravenous PRN Damián Ramos MD        sodium chloride 0.9% flush 10 mL  10 mL Intravenous PRN Hannah Andre MD        sodium chloride 0.9% flush 10 mL  10 mL Intravenous Q12H PRN Elicia Barnes PA-C        tranexamic acid (CYKLOKAPRON) 3,000 mg in sodium chloride 0.9% SolP 100 mL  3,000 mg Irrigation On Call Procedure Hannah Andre MD        vancomycin (VANCOCIN) 1,000 mg in dextrose 5 % (D5W) 250 mL IVPB (Vial-Mate)  1,000 mg Intravenous Q12H Hannah Andre MD         Family History       Problem Relation (Age of Onset)    No Known Problems Mother, Father          Tobacco Use    Smoking status: Never     Passive exposure: Never    Smokeless tobacco: Never   Substance and Sexual Activity    Alcohol  use: No    Drug use: No    Sexual activity: Not Currently     Review of Systems   Constitutional:  Positive for activity change. Negative for chills and fever.   HENT:  Negative for trouble swallowing.    Eyes:  Negative for photophobia and visual disturbance.   Respiratory:  Positive for cough and shortness of breath. Negative for chest tightness.    Cardiovascular:  Positive for leg swelling. Negative for chest pain and palpitations.   Gastrointestinal:  Negative for abdominal pain, constipation, diarrhea, nausea and vomiting.   Genitourinary:  Negative for dysuria, frequency and hematuria.   Musculoskeletal:  Positive for arthralgias, back pain and gait problem. Negative for neck pain.   Skin:  Negative for rash and wound.   Neurological:  Positive for weakness. Negative for dizziness, syncope, speech difficulty and light-headedness.   Psychiatric/Behavioral:  Positive for confusion (intermittent). Negative for agitation. The patient is not nervous/anxious.      Objective:     Vital Signs (Most Recent):  Temp: 97 °F (36.1 °C) (04/28/24 1239)  Pulse: 68 (04/28/24 1220)  Resp: 18 (04/28/24 1102)  BP: (!) 109/56 (04/28/24 1102)  SpO2: (!) 94 % (04/28/24 1102) Vital Signs (24h Range):  Temp:  [97 °F (36.1 °C)-98.2 °F (36.8 °C)] 97 °F (36.1 °C)  Pulse:  [] 68  Resp:  [14-25] 18  SpO2:  [90 %-97 %] 94 %  BP: ()/(50-88) 109/56     Weight: 54.9 kg (121 lb)  Body mass index is 21.43 kg/m².     Physical Exam  Vitals and nursing note reviewed.   Constitutional:       General: She is not in acute distress.     Appearance: She is well-developed. She is not toxic-appearing.   HENT:      Head: Normocephalic.      Mouth/Throat:      Mouth: Mucous membranes are moist.   Eyes:      Extraocular Movements: Extraocular movements intact.      Conjunctiva/sclera: Conjunctivae normal.      Pupils: Pupils are equal, round, and reactive to light.   Cardiovascular:      Rate and Rhythm: Tachycardia present. Rhythm irregular.       Heart sounds: Normal heart sounds.      Comments: No JVD. HR 60s-70s with afib  Pulmonary:      Effort: Pulmonary effort is normal. No respiratory distress.      Breath sounds: Rales present. No wheezing.   Abdominal:      General: Bowel sounds are normal. There is distension.      Palpations: Abdomen is soft.      Tenderness: There is no abdominal tenderness.   Musculoskeletal:         General: Normal range of motion.      Cervical back: Normal range of motion and neck supple.      Comments: LLE with bandages in place. Ropivicaine in place.   Skin:     General: Skin is warm and dry.      Capillary Refill: Capillary refill takes less than 2 seconds.      Findings: Bruising present. No rash.   Neurological:      Mental Status: She is alert and oriented to person, place, and time.      Cranial Nerves: No cranial nerve deficit.      Sensory: No sensory deficit.      Coordination: Coordination normal.   Psychiatric:         Behavior: Behavior normal.         Thought Content: Thought content normal.         Judgment: Judgment normal.              CRANIAL NERVES     CN III, IV, VI   Pupils are equal, round, and reactive to light.       Significant Labs: All pertinent labs within the past 24 hours have been reviewed.  CBC:   Recent Labs   Lab 04/27/24  0517 04/28/24  0457 04/28/24  0936   WBC 16.11* 11.23 12.42   HGB 12.8 10.4* 9.4*   HCT 39.8 32.6* 29.8*    155 132*     CMP:   Recent Labs   Lab 04/27/24  0517 04/28/24  0457    138   K 3.8 4.8    105   CO2 20* 22*   * 112*   BUN 30 51*   CREATININE 1.1 2.0*   CALCIUM 9.7 8.6*   PROT 6.8  --    ALBUMIN 3.6  --    BILITOT 1.2*  --    ALKPHOS 47*  --    AST 26  --    ALT 21  --    ANIONGAP 10 11     Cardiac Markers:   Recent Labs   Lab 04/27/24  0702   *     Troponin:   Recent Labs   Lab 04/27/24  0702   TROPONINI 0.009     Urine Studies:   Recent Labs   Lab 04/27/24  0557   COLORU Yellow   APPEARANCEUA Clear   PHUR 7.0   SPECGRAV 1.015    PROTEINUA Negative   GLUCUA Negative   KETONESU Negative   BILIRUBINUA Negative   OCCULTUA 2+*   NITRITE Negative   LEUKOCYTESUR Negative   RBCUA 76*   BACTERIA Rare   SQUAMEPITHEL 0       Significant Imaging: I have reviewed all pertinent imaging results/findings within the past 24 hours.  X-Ray Chest AP Portable  Narrative: EXAMINATION:  XR CHEST AP PORTABLE    CLINICAL HISTORY:  reassess volume status;    TECHNIQUE:  Single frontal view of the chest was performed.    COMPARISON:  April 27, 2024    FINDINGS:  Monitoring leads in place.  Heart remains enlarged.  Accentuation interstitial markings though improved.  May be some pleural fluid on the right.  Patchy bibasilar parenchymal opacities noted.  No pneumothorax.  Probable chronic rotator cuff tear right shoulder.  Impression: Abnormal study though slightly improved.    Electronically signed by: Damaso Sorensen MD  Date:    04/28/2024  Time:    10:34  X-Ray Femur 2 View Left - All Other Fractures (Not Femoral Neck)  Narrative: EXAMINATION:  XR FEMUR 2 VIEW LEFT    CLINICAL HISTORY:  IT fx post op;    TECHNIQUE:  AP and lateral views of the left femur were performed.    COMPARISON:  April 27, 2024    FINDINGS:  Postop ORIF comminuted left intertrochanteric fracture.  Greater trochanter is fractured.  Otherwise alignment appears satisfactory.  Vascular calcifications noted.  Surgical drain in place.  Impression: Postop ORIF right intertrochanteric fracture.  There is a fracture of the greater trochanter.    This report was flagged in Epic as abnormal.    Electronically signed by: Damaso Sorensen MD  Date:    04/28/2024  Time:    10:28

## 2024-04-29 DIAGNOSIS — I48.20 CHRONIC ATRIAL FIBRILLATION: Primary | ICD-10-CM

## 2024-04-29 DIAGNOSIS — I50.32 CHRONIC DIASTOLIC HEART FAILURE: ICD-10-CM

## 2024-04-29 PROBLEM — D69.6 THROMBOCYTOPENIA: Status: ACTIVE | Noted: 2024-04-29

## 2024-04-29 LAB
ANION GAP SERPL CALC-SCNC: 10 MMOL/L (ref 8–16)
BASOPHILS # BLD AUTO: 0.02 K/UL (ref 0–0.2)
BASOPHILS NFR BLD: 0.2 % (ref 0–1.9)
BUN SERPL-MCNC: 60 MG/DL (ref 10–30)
CALCIUM SERPL-MCNC: 7.4 MG/DL (ref 8.7–10.5)
CHLORIDE SERPL-SCNC: 110 MMOL/L (ref 95–110)
CO2 SERPL-SCNC: 18 MMOL/L (ref 23–29)
CREAT SERPL-MCNC: 1.6 MG/DL (ref 0.5–1.4)
DIFFERENTIAL METHOD BLD: ABNORMAL
EOSINOPHIL # BLD AUTO: 0 K/UL (ref 0–0.5)
EOSINOPHIL NFR BLD: 0 % (ref 0–8)
ERYTHROCYTE [DISTWIDTH] IN BLOOD BY AUTOMATED COUNT: 14.2 % (ref 11.5–14.5)
EST. GFR  (NO RACE VARIABLE): 29.7 ML/MIN/1.73 M^2
GLUCOSE SERPL-MCNC: 104 MG/DL (ref 70–110)
HCT VFR BLD AUTO: 26.5 % (ref 37–48.5)
HGB BLD-MCNC: 8.3 G/DL (ref 12–16)
IMM GRANULOCYTES # BLD AUTO: 0.1 K/UL (ref 0–0.04)
IMM GRANULOCYTES NFR BLD AUTO: 0.9 % (ref 0–0.5)
LYMPHOCYTES # BLD AUTO: 0.9 K/UL (ref 1–4.8)
LYMPHOCYTES NFR BLD: 7.7 % (ref 18–48)
MAGNESIUM SERPL-MCNC: 2 MG/DL (ref 1.6–2.6)
MCH RBC QN AUTO: 30.6 PG (ref 27–31)
MCHC RBC AUTO-ENTMCNC: 31.3 G/DL (ref 32–36)
MCV RBC AUTO: 98 FL (ref 82–98)
MONOCYTES # BLD AUTO: 0.8 K/UL (ref 0.3–1)
MONOCYTES NFR BLD: 7 % (ref 4–15)
NEUTROPHILS # BLD AUTO: 9.9 K/UL (ref 1.8–7.7)
NEUTROPHILS NFR BLD: 84.2 % (ref 38–73)
NRBC BLD-RTO: 0 /100 WBC
PHOSPHATE SERPL-MCNC: 4.3 MG/DL (ref 2.7–4.5)
PLATELET # BLD AUTO: 120 K/UL (ref 150–450)
PMV BLD AUTO: 10.9 FL (ref 9.2–12.9)
POCT GLUCOSE: 109 MG/DL (ref 70–110)
POTASSIUM SERPL-SCNC: 4.3 MMOL/L (ref 3.5–5.1)
RBC # BLD AUTO: 2.71 M/UL (ref 4–5.4)
SODIUM SERPL-SCNC: 138 MMOL/L (ref 136–145)
WBC # BLD AUTO: 11.71 K/UL (ref 3.9–12.7)

## 2024-04-29 PROCEDURE — 85025 COMPLETE CBC W/AUTO DIFF WBC: CPT | Performed by: HOSPITALIST

## 2024-04-29 PROCEDURE — 94761 N-INVAS EAR/PLS OXIMETRY MLT: CPT

## 2024-04-29 PROCEDURE — 25000003 PHARM REV CODE 250

## 2024-04-29 PROCEDURE — 80048 BASIC METABOLIC PNL TOTAL CA: CPT | Performed by: HOSPITALIST

## 2024-04-29 PROCEDURE — 94640 AIRWAY INHALATION TREATMENT: CPT

## 2024-04-29 PROCEDURE — 21400001 HC TELEMETRY ROOM

## 2024-04-29 PROCEDURE — 99231 SBSQ HOSP IP/OBS SF/LOW 25: CPT | Mod: ,,, | Performed by: ANESTHESIOLOGY

## 2024-04-29 PROCEDURE — 27000221 HC OXYGEN, UP TO 24 HOURS

## 2024-04-29 PROCEDURE — 97161 PT EVAL LOW COMPLEX 20 MIN: CPT

## 2024-04-29 PROCEDURE — 25000242 PHARM REV CODE 250 ALT 637 W/ HCPCS: Performed by: HOSPITALIST

## 2024-04-29 PROCEDURE — 99900035 HC TECH TIME PER 15 MIN (STAT)

## 2024-04-29 PROCEDURE — 36415 COLL VENOUS BLD VENIPUNCTURE: CPT | Performed by: HOSPITALIST

## 2024-04-29 PROCEDURE — 97535 SELF CARE MNGMENT TRAINING: CPT

## 2024-04-29 PROCEDURE — 97165 OT EVAL LOW COMPLEX 30 MIN: CPT

## 2024-04-29 PROCEDURE — 97112 NEUROMUSCULAR REEDUCATION: CPT

## 2024-04-29 PROCEDURE — 97530 THERAPEUTIC ACTIVITIES: CPT

## 2024-04-29 PROCEDURE — 84100 ASSAY OF PHOSPHORUS: CPT | Performed by: HOSPITALIST

## 2024-04-29 PROCEDURE — 25000003 PHARM REV CODE 250: Performed by: HOSPITALIST

## 2024-04-29 PROCEDURE — 30200315 PPD INTRADERMAL TEST REV CODE 302: Performed by: HOSPITALIST

## 2024-04-29 PROCEDURE — 83735 ASSAY OF MAGNESIUM: CPT | Performed by: HOSPITALIST

## 2024-04-29 PROCEDURE — 86580 TB INTRADERMAL TEST: CPT | Performed by: HOSPITALIST

## 2024-04-29 PROCEDURE — 94799 UNLISTED PULMONARY SVC/PX: CPT | Mod: XB

## 2024-04-29 RX ORDER — BIOTIN 1 MG
TABLET ORAL
Qty: 90 TABLET | Refills: 3 | OUTPATIENT
Start: 2024-04-29

## 2024-04-29 RX ORDER — APIXABAN 5 MG/1
5 TABLET, FILM COATED ORAL 2 TIMES DAILY
Qty: 60 TABLET | Refills: 0 | OUTPATIENT
Start: 2024-04-29

## 2024-04-29 RX ORDER — IPRATROPIUM BROMIDE AND ALBUTEROL SULFATE 2.5; .5 MG/3ML; MG/3ML
3 SOLUTION RESPIRATORY (INHALATION)
Status: DISCONTINUED | OUTPATIENT
Start: 2024-04-29 | End: 2024-05-02 | Stop reason: HOSPADM

## 2024-04-29 RX ORDER — FUROSEMIDE 20 MG/1
20 TABLET ORAL
Qty: 30 TABLET | Refills: 0 | OUTPATIENT
Start: 2024-04-29

## 2024-04-29 RX ORDER — CALCIUM CARBONATE 600 MG
1 TABLET ORAL DAILY
Qty: 90 TABLET | Refills: 3 | Status: SHIPPED | OUTPATIENT
Start: 2024-04-29

## 2024-04-29 RX ORDER — I-VITE, TAB 1000-60-2MG (60/BT) 300MCG-200
1 TAB ORAL
Qty: 90 TABLET | Refills: 3 | Status: SHIPPED | OUTPATIENT
Start: 2024-04-29

## 2024-04-29 RX ADMIN — IPRATROPIUM BROMIDE AND ALBUTEROL SULFATE 3 ML: 2.5; .5 SOLUTION RESPIRATORY (INHALATION) at 04:04

## 2024-04-29 RX ADMIN — ACETAMINOPHEN 1000 MG: 325 TABLET ORAL at 06:04

## 2024-04-29 RX ADMIN — DOCUSATE SODIUM AND SENNOSIDES 1 TABLET: 8.6; 5 TABLET, FILM COATED ORAL at 09:04

## 2024-04-29 RX ADMIN — POLYETHYLENE GLYCOL 3350 17 G: 17 POWDER, FOR SOLUTION ORAL at 09:04

## 2024-04-29 RX ADMIN — MUPIROCIN 1 G: 20 OINTMENT TOPICAL at 09:04

## 2024-04-29 RX ADMIN — APIXABAN 2.5 MG: 2.5 TABLET, FILM COATED ORAL at 09:04

## 2024-04-29 RX ADMIN — THERA TABS 1 TABLET: TAB at 09:04

## 2024-04-29 RX ADMIN — TUBERCULIN PURIFIED PROTEIN DERIVATIVE 5 UNITS: 5 INJECTION, SOLUTION INTRADERMAL at 05:04

## 2024-04-29 RX ADMIN — ACETAMINOPHEN 1000 MG: 325 TABLET ORAL at 05:04

## 2024-04-29 RX ADMIN — Medication 6 MG: at 06:04

## 2024-04-29 RX ADMIN — MEMANTINE 5 MG: 5 TABLET ORAL at 09:04

## 2024-04-29 RX ADMIN — IPRATROPIUM BROMIDE AND ALBUTEROL SULFATE 3 ML: 2.5; .5 SOLUTION RESPIRATORY (INHALATION) at 08:04

## 2024-04-29 RX ADMIN — METHOCARBAMOL 500 MG: 500 TABLET ORAL at 02:04

## 2024-04-29 RX ADMIN — ACETAMINOPHEN 1000 MG: 325 TABLET ORAL at 01:04

## 2024-04-29 RX ADMIN — ESCITALOPRAM OXALATE 10 MG: 5 TABLET, FILM COATED ORAL at 09:04

## 2024-04-29 RX ADMIN — METHOCARBAMOL 500 MG: 500 TABLET ORAL at 09:04

## 2024-04-29 NOTE — PROGRESS NOTES
Nurses Note -- 4 Eyes      4/29/2024   9:02 AM      Skin assessed during: Q Shift Change      [x] No Altered Skin Integrity Present    []Prevention Measures Documented      [] Yes- Altered Skin Integrity Present or Discovered   [] LDA Added if Not in Epic (Describe Wound)   [] New Altered Skin Integrity was Present on Admit and Documented in LDA   [] Wound Image Taken    Wound Care Consulted? No    Attending Nurse:  Adebayo Baez RN/Staff Member:   Anupama

## 2024-04-29 NOTE — PLAN OF CARE
Problem: Occupational Therapy  Goal: Occupational Therapy Goal  Description: Goals to be met by: 5/29/24     Patient will increase functional independence with ADLs by performing:    LE Dressing with Stand-by Assistance.  Grooming while standing at sink with Supervision.  Toileting from bedside commode with Stand-by Assistance for hygiene and clothing management.   Supine to sit with Supervision  Toilet transfer to bedside commode with Stand-by Assistance.    Outcome: Progressing

## 2024-04-29 NOTE — ASSESSMENT & PLAN NOTE
Holding further diuresis, improving with light IVF, watch volume status closely given overload on admit  Improved 4/29 after IVF yesterday

## 2024-04-29 NOTE — PROGRESS NOTES
[facility] Medicine  Progress Note    Patient Name: Mary Ellen Miller  MRN: [id number]  Patient Class: IP- Inpatient   Admission Date: 4/27/2024  Length of Stay: 2 days  Attending Physician: Hannah Andre MD  Primary Care Provider: Dionne Jeter MD        Subjective:     Principal Problem:Closed displaced intertrochanteric fracture of left femur        HPI:  Mary Ellen Miller is a 89 y.o. F with PMH of HTN, HLD, CHF, and afib on eliquis presenting to the ED with significant left hip pain after a fall last night. Patient was ambulating to the restroom to urinate last night and slipped and fell onto her left hip. She had immediate pain and significant difficulty bearing weight. She denies head injury or LOC and is on eliquis for paroxysmal A fib. She denies numbness, tingling, or paresthesias to the LLE. 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 Prior to the incident, she reports intermittent worsening of chronic lower extremity edema and SOB. Her PCP recently discontinued amlodipine in favor of lasix and continuing aldactone, and she has not missed any doses of her home medications. She has been drinking excess tea lately but has completely cut salt and has decreased her overall fluid intake. She denies GARVIN or orthopnea and does not use oxygen at home. Pt endorses intermittent confusion and abdominal distention but otherwise denies fever, chills, chest pain, palpitations, abdominal pain, N/V/D, dysuria, HA, or syncope.       In the ED: Hypertensive and hypoxic, sating 93% on 3L NC, o/w VSSAF. CBC with leukocytosis to 16, likely reactive. CMP grossly unremarkable. . Troponin 0.009. EKG c/w known rate controlled A fib. CXR c/w pulmonary edema & R pleural effusion. XR pelvis with mildly displaced L intertrochanteric femur fracture with impaction,  "minimally displaced comminuted L superior pubic ramus fracture, and nondisplaced fracture of the L inferior pubic ramus. Pt was given 80 mg IV lasix, zofran, and dilaudid and was admitted to hospital medicine for further management.     Overview/Hospital Course:  No notes on file    Interval history- she was in good spirits and very pleasant on exam with daughter at bedside. Cr improving to 1.6 with IVF yesterday and Bp improved as well with volume yesterday. On 1L NC down from 3L so will hold home lasix as well as IVF today to let equilibrate further as appears close to euvolemic on exam without any stopping between sentences for dyspnea and orally hydrating well heself now. Hg with expected downtick post op at 8.3 now and continue to trend. She reports she thinks she may have even been on too much lasix at home as she was urinating up to 3 times a night waking her up for this, so may plan to go to a lower dose vs holding aldactone as another option once euvolemic when restarting and Cr back at baseline with likely some overdiuresis initially on admission. She is very active at baseline and does exercise class every day and has her entire life. She said that "everyone knew not to call me at 7 pm cause I was doing my exercise class'. Her sister lives across the [place] from her at her assisted living facility at baseline. PT/OT rec post acute placement and CM to send referrals for her.        Review of patient's allergies indicates:   Allergen Reactions    Percodan [oxycodone hcl-oxycodone-asa] Other (See Comments)     halucinations    Penicillins Itching    Sulfa (sulfonamide antibiotics) Diarrhea and Nausea Only    Amoxicillin        Current Facility-Administered Medications   Medication Dose Route Frequency Provider Last Rate Last Admin    acetaminophen tablet 1,000 mg  1,000 mg Oral Q6H Hannah Andre MD   1,000 mg at 04/29/24 0566    albuterol-ipratropium 2.5 mg-0.5 mg/3 mL nebulizer solution 3 mL  3 mL " Nebulization Q4H PRN Elicia Barnes PA-C        aluminum-magnesium hydroxide-simethicone 200-200-20 mg/5 mL suspension 30 mL  30 mL Oral QID PRN Elicia Barnes PA-C        apixaban tablet 2.5 mg  2.5 mg Oral BID Hannah Andre MD   2.5 mg at 04/29/24 0934    atenoloL tablet 25 mg  25 mg Oral Daily Hannah Andre MD   25 mg at 04/27/24 0953    bisacodyL suppository 10 mg  10 mg Rectal Daily PRN Hannah Andre MD        clindamycin in D5W 900 mg/50 mL IVPB 900 mg  900 mg Intravenous On Call Procedure Damián Ramos MD        dextrose 10% bolus 125 mL 125 mL  12.5 g Intravenous PRN Elicia Barnes PA-C        dextrose 10% bolus 250 mL 250 mL  25 g Intravenous PRN Elicia Barnes PA-C        droPERidol injection 0.625 mg  0.625 mg Intravenous Once PRN Clarence Chacon MD        EScitalopram oxalate tablet 10 mg  10 mg Oral Daily Elicia Barnes PA-C   10 mg at 04/29/24 0934    glucagon (human recombinant) injection 1 mg  1 mg Intramuscular PRN Elicia Barnes PA-C        glucose chewable tablet 16 g  16 g Oral PRN Elicia Barnes PA-C        glucose chewable tablet 24 g  24 g Oral PRN Elicia Barnes PA-C        melatonin tablet 6 mg  6 mg Oral Nightly PRN Hannah Andre MD        memantine tablet 5 mg  5 mg Oral BID Elicia Barnes PA-C   5 mg at 04/29/24 0934    methocarbamoL tablet 500 mg  500 mg Oral Q6H PRN Hannah Andre MD        metoclopramide injection 5 mg  5 mg Intravenous Q6H PRN Hannah Andre MD        multivitamin tablet  1 tablet Oral Daily Hannah Andre MD   1 tablet at 04/29/24 0934    mupirocin 2 % ointment 1 g  1 g Nasal BID Hannah Andre MD   1 g at 04/29/24 0934    mupirocin 2 % ointment   Nasal On Call Procedure Damián Ramos MD        naloxone 0.4 mg/mL injection 0.02 mg  0.02 mg Intravenous PRN Elicia Barnes PA-C        ondansetron disintegrating tablet 8 mg  8 mg Oral Q8H PRN Elicia Barnes, JASPREET rojasetron  injection 4 mg  4 mg Intravenous Once PRN Clarence Chacon MD        ondansetron injection 4 mg  4 mg Intravenous Q12H PRN Hannah Andre MD        polyethylene glycol packet 17 g  17 g Oral Daily Hannah Andre MD   17 g at 04/29/24 0934    prochlorperazine injection Soln 5 mg  5 mg Intravenous Q6H PRN Elicia Barnes PA-C        ROPIvacaine (PF) 2 mg/ml (0.2%) solution  0.1 mL/hr Perineural Continuous Hannah Andre MD 0.1 mL/hr at 04/28/24 0935 0.1 mL/hr at 04/28/24 0935    senna-docusate 8.6-50 mg per tablet 1 tablet  1 tablet Oral BID Hannah Andre MD   1 tablet at 04/29/24 0934    simethicone chewable tablet 80 mg  1 tablet Oral QID PRN Elicia Barnes PA-C        sodium chloride 0.9% flush 10 mL  10 mL Intravenous PRN Damián Ramos MD        sodium chloride 0.9% flush 10 mL  10 mL Intravenous PRN Hannah Andre MD        tranexamic acid (CYKLOKAPRON) 3,000 mg in sodium chloride 0.9% SolP 100 mL  3,000 mg Irrigation On Call Procedure Hannah Andre MD         Family History       Problem Relation (Age of Onset)    No Known Problems Mother, Father          Tobacco Use    Smoking status: Never     Passive exposure: Never    Smokeless tobacco: Never   Substance and Sexual Activity    Alcohol use: No    Drug use: No    Sexual activity: Not Currently     Review of Systems   Constitutional:  Positive for activity change. Negative for chills and fever.   HENT:  Negative for trouble swallowing.    Eyes:  Negative for photophobia and visual disturbance.   Respiratory:  Positive for cough and shortness of breath. Negative for chest tightness.    Cardiovascular:  Positive for leg swelling. Negative for chest pain and palpitations.   Gastrointestinal:  Negative for abdominal pain, constipation, diarrhea, nausea and vomiting.   Genitourinary:  Negative for dysuria, frequency and hematuria.   Musculoskeletal:  Positive for arthralgias, back pain and gait problem. Negative for  neck pain.   Skin:  Negative for rash and wound.   Neurological:  Positive for weakness. Negative for dizziness, syncope, speech difficulty and light-headedness.   Psychiatric/Behavioral:  Positive for confusion (intermittent). Negative for agitation. The patient is not nervous/anxious.      Objective:     Vital Signs (Most Recent):  Temp: 98.4 °F (36.9 °C) (04/29/24 1211)  Pulse: 83 (04/29/24 1211)  Resp: 18 (04/29/24 1211)  BP: 128/63 (04/29/24 1211)  SpO2: (!) 94 % (04/29/24 1211) Vital Signs (24h Range):  Temp:  [97 °F (36.1 °C)-98.4 °F (36.9 °C)] 98.4 °F (36.9 °C)  Pulse:  [65-83] 83  Resp:  [16-18] 18  SpO2:  [90 %-96 %] 94 %  BP: ()/(51-65) 128/63     Weight: 54.9 kg (121 lb)  Body mass index is 21.43 kg/m².     Physical Exam  Vitals and nursing note reviewed.   Constitutional:       General: She is not in acute distress.     Appearance: She is well-developed. She is not toxic-appearing.   HENT:      Head: Normocephalic.      Mouth/Throat:      Mouth: Mucous membranes are moist.   Eyes:      Extraocular Movements: Extraocular movements intact.      Conjunctiva/sclera: Conjunctivae normal.      Pupils: Pupils are equal, round, and reactive to light.   Cardiovascular:      Rate and Rhythm: Tachycardia present. Rhythm irregular.      Heart sounds: Normal heart sounds.      Comments: No JVD. HR 70 with afib  Pulmonary:      Effort: Pulmonary effort is normal. No respiratory distress.      Breath sounds: Rales present. No wheezing.      Comments: On low santos oxygen, speaking in full sentences.  Abdominal:      General: Bowel sounds are normal. There is distension.      Palpations: Abdomen is soft.      Tenderness: There is no abdominal tenderness.   Musculoskeletal:         General: Normal range of motion.      Cervical back: Normal range of motion and neck supple.      Comments: LLE with bandages in place. Ropivicaine in place.   Skin:     General: Skin is warm and dry.      Capillary Refill: Capillary  "refill takes less than 2 seconds.      Findings: Bruising present. No rash.   Neurological:      Mental Status: She is alert and oriented to person, place, and time.      Cranial Nerves: No cranial nerve deficit.      Sensory: No sensory deficit.      Coordination: Coordination normal.   Psychiatric:         Behavior: Behavior normal.         Thought Content: Thought content normal.         Judgment: Judgment normal.              CRANIAL NERVES     CN III, IV, VI   Pupils are equal, round, and reactive to light.       Significant Labs: All pertinent labs within the past 24 hours have been reviewed.  CBC:   Recent Labs   Lab 04/28/24 0457 04/28/24  0936 04/29/24  0538   WBC 11.23 12.42 11.71   HGB 10.4* 9.4* 8.3*   HCT 32.6* 29.8* 26.5*    132* 120*     CMP:   Recent Labs   Lab 04/28/24 0457 04/29/24  0538    138   K 4.8 4.3    110   CO2 22* 18*   * 104   BUN 51* 60*   CREATININE 2.0* 1.6*   CALCIUM 8.6* 7.4*   ANIONGAP 11 10     Cardiac Markers:   No results for input(s): "CKMB", "MYOGLOBIN", "BNP", "TROPISTAT" in the last 48 hours.    Troponin:   No results for input(s): "TROPONINI", "TROPONINIHS" in the last 48 hours.    Urine Studies:   No results for input(s): "COLORU", "APPEARANCEUA", "PHUR", "SPECGRAV", "PROTEINUA", "GLUCUA", "KETONESU", "BILIRUBINUA", "OCCULTUA", "NITRITE", "UROBILINOGEN", "LEUKOCYTESUR", "RBCUA", "WBCUA", "BACTERIA", "SQUAMEPITHEL", "HYALINECASTS" in the last 48 hours.    Invalid input(s): "WRIGHTSUR"      Significant Imaging: I have reviewed all pertinent imaging results/findings within the past 24 hours.  SURG FL Surgery Fluoro Usage  See OP Notes for results.     IMPRESSION: See OP Notes for results.     This procedure was auto-finalized by: Virtual Radiologist        Assessment/Plan:      * Closed displaced intertrochanteric fracture of left femur  89 y.o. who presents after a fall with a closed displaced L intertrochanteric fracture after a fall. Very active " at baseline, does daily exercise classes  -ortho consulted, s/p IM nail 4/28 with dr gill. Resume home eliquis for anticoag, pharm reports meets criteria to adjsut to 2.5 mg daily for age/indication and adjusetd to this  -slight hg downtick expected post op from 12--9.4--8.3 and cont to trend  -vit D very high at 77 and rec to hold vit d to avoid toxicity levels  -UA wnl on admit. Diuresed before OR but likely a little overdiuresed now, see JOHAN  -multimodal pain control with ropivicaine and anesthesia managing  -bowel regimen  -eliquis for PPX for DVt already on for afib and resumed  -bandages to remove in clinic in 2 weeks  -PT/OT on POD 1 with recs for post acute placement and Cm to send ref  -remove johnson today    Hypotension  Holding further diuresis, improving with light IVF, watch volume status closely given overload on admit  Improved 4/29 after IVF yesterday      Acute blood loss anemia  Patient's anemia is currently uncontrolled. Has not received any PRBCs to date. Etiology likely d/t acute blood loss which was from surgical repair  Current CBC reviewed-   Lab Results   Component Value Date    HGB 8.3 (L) 04/29/2024    HCT 26.5 (L) 04/29/2024     Monitor serial CBC and transfuse if patient becomes hemodynamically unstable, symptomatic or H/H drops below 7/21.    JOHAN (acute kidney injury)  Patient with acute kidney injury/acute renal failure likely due to diuersis with lasix JOHAN is currently  present 4/28 at Cr 2.0 . Baseline creatinine  1.1  - Labs reviewed- Renal function/electrolytes with Estimated Creatinine Clearance: 17.8 mL/min (A) (based on SCr of 1.6 mg/dL (H)). according to latest data. Monitor urine output and serial BMP and adjust therapy as needed. Avoid nephrotoxins and renally dose meds for GFR listed above.  Hold lasix/aldactone and very light IVF considering overload on admit to allow volume equilibration  Improving to 1.6 now    Long term (current) use of anticoagulants  This patient  has long term use on an anticoagulant with Select Anticoagulant(s): Direct oral anticoagulant: Apixaban (Eliquis). Their long term anticoagulation will be Held or Continued: continued. They are on long term anticoagulation due to Reason for Anticoagulation: Atrial fibrillation.     Acute on chronic diastolic heart failure  Patient is identified as having Diastolic (HFpEF) heart failure that is Acute on Chronic. CHF is currently uncontrolled due to Dyspnea not returned to baseline after 1 doses of IV diuretic, Rales/crackles on pulmonary exam, and Pulmonary edema/pleural effusion on CXR. Pt is on CHF pathway.   - Patient decompensated on admit (4/27/2024). (+)Subjective SOB, (--)JVD, (--)orthnopnea.   - Last BNP reviewed- and noted below   Recent Labs   Lab 04/27/24  0702   *     - Troponin 0.009, continue to trend   - CXR with cardiomegaly and pulmonary edema with R pleural effusion   - EKG without acute ST changes   - Echo with Echo    Result Date: 4/27/2024    Left Ventricle: The left ventricle is normal in size. Ventricular mass   is normal. Normal wall thickness. Normal wall motion. There is normal   systolic function with a visually estimated ejection fraction of 60 - 65%.   There is indeterminate diastolic function.Normal left ventricular filling   pressure.    Right Ventricle: Normal right ventricular cavity size. Wall thickness   is normal. Systolic function is normal.    Left Atrium: Left atrium is severely dilated.    Right Atrium: Right atrium is severely dilated.    Aortic Valve: The aortic valve is a trileaflet valve. There is mild   aortic valve sclerosis. There is mild annular calcification present.    Mitral Valve: There is moderate anterior leaflet sclerosis.    Tricuspid Valve: There is moderate regurgitation with a centrally   directed jet.    Aorta: Aortic root is normal in size measuring 3.08 cm. Ascending aorta   is normal measuring 3.43 cm.    Pulmonary Artery: The estimated pulmonary  artery systolic pressure is   57 mmHg.    IVC/SVC: Intermediate venous pressure at 8 mmHg.          Intake/Output Summary (Last 24 hours) at 4/29/2024 1312  Last data filed at 4/29/2024 0642  Gross per 24 hour   Intake 400 ml   Output 1220 ml   Net -820 ml        - Supplemental O2 to keep Spo2 >90%  - Continue to stress to patient importance of self efficacy and  on diet for CHF.  Suspect overdiuresis as Aster on 4/28 after lasix 40 BID + home aldactone on admit + hypotension. Hold for now, very light IVF for hypotension now, CXr similar to before, on similar oxygen and reassess volume status this PM. Liberate diet for now while equliibirating volume status.  -appears approaching euvolemic now on 4/29 with IVF yesterday, hold lasix and IVF today to allow to further self equilbrate with oral intake. Plan to resume likely either lower dose of lasix vs holding aldcatone once euvolemic and Cr stabilzed as she reports urinating 3 times a night at home and felt she was on maybe too much long term    Chronic atrial fibrillation  Long term use of anticoagulants   Patient with Persistent (7 days or more) atrial fibrillation which is controlled currently with Beta Blocker. Patient is currently in atrial fibrillation.[id number] Score: 3. Anticoagulation indicated. Anticoagulation done with eliquis .  - hold atenolol 4/28 for hypotension, HR 60s-70s in pacu  -eliquis resumed post op, per pharm meeds criteria based on age and indications to start 2.5 dosing instead of 5 mg dosing    Recurrent major depressive disorder, in full remission  Patient has recurrent depression which is mild and is currently controlled. Will Continue anti-depressant medications. We will not consult psychiatry at this time. Patient does not display psychosis at this time. Continue to monitor closely and adjust plan of care as needed.  - Continue home lexapro & namenda     Essential hypertension  Chronic, controlled. Latest blood pressure and vitals  reviewed-     Temp:  [97 °F (36.1 °C)-98.4 °F (36.9 °C)]   Pulse:  [65-83]   Resp:  [16-18]   BP: ()/(51-65)   SpO2:  [90 %-96 %] .   Home meds for hypertension were reviewed and noted below.   Hypertension Medications                    furosemide (LASIX) 20 MG tablet TAKE 1 TABLET BY MOUTH ONCE DAILY.    spironolactone (ALDACTONE) 50 MG tablet Take 1 tablet (50 mg total) by mouth once daily.     Not taking norvasc anymore. Atenolol started 4/427 but held 4/28 for lower BPs. Hold aldactone and lasix for JOHAN 4/28    Will utilize p.r.n. blood pressure medication only if patient's blood pressure greater than 180/110 and she develops symptoms such as worsening chest pain or shortness of breath.      VTE Risk Mitigation (From admission, onward)           Ordered     apixaban tablet 2.5 mg  2 times daily         04/28/24 0943     IP VTE HIGH RISK PATIENT  Once         04/28/24 0707     Place sequential compression device  Until discontinued         04/28/24 0707                    Discharge Planning   HEAVEN: 5/1/2024     Code Status: DNR   Is the patient medically ready for discharge?:     Reason for patient still in hospital (select all that apply): Patient trending condition                     Hannah Andre MD  Department of Hospital Medicine   [facility] - Surgery

## 2024-04-29 NOTE — TELEPHONE ENCOUNTER
No care due was identified.  [facility] Embedded Care Due Messages. Reference number: [id number].   4/29/2024 4:12:16 PM CDT

## 2024-04-29 NOTE — PLAN OF CARE
Problem: Infection  Goal: Absence of Infection Signs and Symptoms  Outcome: Progressing     Problem: Adult Inpatient Plan of Care  Goal: Plan of Care Review  Outcome: Progressing

## 2024-04-29 NOTE — PLAN OF CARE
[facility] - Surgery  Initial Discharge Assessment       Primary Care Provider: Dionne Jeter MD    Admission Diagnosis: Hip fracture [S72.009A]  Fall [W19.XXXA]  Volume overload [E87.70]  Chest pain [R07.9]  Closed displaced intertrochanteric fracture of left femur, initial encounter [S72.142A]    Admission Date: 4/27/2024  Expected Discharge Date: 5/1/2024         Payor: MEDICARE / Plan: MEDICARE PART A & B / Product Type: Government /     Extended Emergency Contact Information  Primary Emergency Contact: Valentino Miller   [place]  Mobile Phone: [phone]  Relation: Son  Secondary Emergency Contact: Mercedes Burt   [facility]  Home Phone: [phone]  Relation: Sister    Discharge Plan A: Skilled Nursing Facility         [id number] - [place] - [address]  [address]  [place] [address]  Phone: [phone] Fax: [phone]    [id number] - [place] - [address] [facility]  [address]  [address]  [place] [address]  Phone: [phone] Fax: [phone]    [place] - [address]  [address]  [facility] [address]  Phone: [phone] Fax: [phone]      Initial Assessment (most recent)       Adult Discharge Assessment - 04/29/24 1503          Discharge Assessment    Assessment Type Discharge Planning Assessment     Confirmed/corrected address, phone number and insurance Yes     Confirmed Demographics Correct on Facesheet     Source of Information patient     Does patient/caregiver understand observation status Yes     Communicated HEAVEN with patient/caregiver Yes     People in Home facility resident     Facility Arrived From: [facility]     Do you expect to return to your current living situation? No     Do you have help at home or someone to help you manage your care at home? Yes     Who are your caregiver(s) and their phone number(s)? Valentino Miller (Son)  [phone]     Prior to hospitilization cognitive status: Alert/Oriented     Current cognitive status: Alert/Oriented     Equipment Currently Used at Home none     Readmission within 30 days? No     Patient currently being followed by outpatient case management? No     Do you currently have service(s) that help you manage your care at home? No     Do you take prescription medications? Yes     Do you have prescription coverage? Yes     Do you have any problems affording any of your prescribed medications? Yes     Is the patient taking medications as prescribed? yes     Who is going to help you get home at discharge? SNF anticipated     How do you get to doctors appointments? family or friend will provide     Are you on dialysis? No     Do you take coumadin? No     Discharge Plan A Skilled Nursing Facility        Physical Activity    On average, how many days per week do you engage in moderate to strenuous exercise (like a brisk walk)? 0 days     On average, how many minutes do you engage in exercise at this level? 0 min        Financial Resource Strain    How hard is it for you to pay for the very basics like food, housing, medical care, and heating? Not hard at all        Housing Stability    In the last 12 months, was there a time when you were not able to pay the mortgage or rent on time? No     In the past 12 months, how many times have you moved where you were living? 2     At any time in the past 12 months, were you homeless or living in a shelter (including now)? No        Transportation Needs    In the past 12 months, has lack of transportation kept you from medical appointments or from getting medications? No     In the past 12 months, has lack of transportation kept you from meetings, work, or from getting things needed for daily living? No        Food Insecurity    Within the past 12 months, you worried that your food would run out before you got the money to buy more. Never true     Within the past 12  months, the food you bought just didn't last and you didn't have money to get more. Never true        Stress    Do you feel stress - tense, restless, nervous, or anxious, or unable to sleep at night because your mind is troubled all the time - these days? Not at all        Social Connections    In a typical week, how many times do you talk on the phone with family, friends, or neighbors? More than three times a week     How often do you get together with friends or relatives? More than three times a week     How often do you attend Tenriism or Yazdanism services? More than 4 times per year     Do you belong to any clubs or organizations such as Tenriism groups, unions, fraternal or athletic groups, or school groups? No     How often do you attend meetings of the clubs or organizations you belong to? Never     Are you , , , , never , or living with a partner?         Alcohol Use    Q1: How often do you have a drink containing alcohol? Never     Q2: How many drinks containing alcohol do you have on a typical day when you are drinking? Patient does not drink     Q3: How often do you have six or more drinks on one occasion? Never                   Spoke with patient, son and daughter-in-law at bedside to complete d/c planning assessment. Patient lives at [facility] since feb of this year. She was independent prior to fall without DME. Verified PCP, Pharmacy and health insurance. PT/OT eval completed and recommended Moderate intensity for d/c needs. Team to proceed with Skilled nursing care. Met with Patient and family to review discharge recommendation of Skilled Nursing and is agreeable to plan    Patient/family provided list of facilities in-network with patient's payor plan. Providers that are owned, operated, or affiliated with [facility] are included on the list.     Notified that referral sent to below listed facilities from in-network list based on  proximity to home/family support:   1.[facility]  2.[facility]  3.[facility]  4.[facility]  5.[facility]  6.[facility]  7.[facility]    Patient/family instructed to identify preference.    Preferred Facility: (if more than 1, listed in order of descending preference)  1.[facility]    If an additional preferred facility not listed above is identified, additional referral to be sent. If above facilities unable to accept, will send additional referrals to in-network providers. HEAVEN 5/1/24. Discharge Plan A and Plan B have been determined by review of patient's clinical status, future medical and therapeutic needs, and coverage/benefits for post-acute care in coordination with multidisciplinary team members.      Cat Loja RNCM  Case Management  [facility]  [phone]

## 2024-04-29 NOTE — MEDICAL/APP STUDENT
[facility] Medicine Student   Progress Note  [facility] HOSP MED H    Admit Date: 4/27/2024  Hospital Day: 2  04/29/2024  12:36 PM    SUBJECTIVE:   Ms. Mary Ellen Miller is a 89 y.o. female with PMHx HTN, HLD, CHF, and A-FIB on eliquis who is being followed up for Closed displaced intertrochanteric fracture of left femur s/p IM Chao on 4/28/24.       Interval history: 4/29/2024  Patient reports no pain or discomfort this morning. Patient reports no post-op pain associated with Ortho Proc, except when she is rolled over. She is on ropivacaine nerve block.  Patient reports no SOB or chest pain. Previous CXR c/w pulmonary edema with R pleural effusion. Patient put on Lasix and aldactone then stopped due to volume loss. /63 today with spO2 94 on 1 L/min O2.  Adding back volume today.  Patient reports no BM. Patient reports active lifestyle and is eager to begin SNF/Rehab to regain independence. The patient denies and fever or chills.     Review of Systems   Constitutional: Negative.    HENT: Negative.     Eyes: Negative.    Respiratory: Negative.     Cardiovascular: Negative.    Gastrointestinal: Negative.    Genitourinary: Negative.    Musculoskeletal:         LE consistent with Ortho Proc 4/28/24   Skin: Negative.        Please refer to the H&P for past medical, family, and social history.    OBJECTIVE:     Vital Signs Recent:  Temp: 98.4 °F (36.9 °C) (04/29/24 1211)  Pulse: 83 (04/29/24 1211)  Resp: 18 (04/29/24 1211)  BP: 128/63 (04/29/24 1211)  SpO2: (!) 94 % (04/29/24 1211)  Oxygen Documentation:    Flow (L/min) (Oxygen Therapy): 1           Device (Oxygen Therapy): nasal cannula  $ Is the patient on Low Flow Oxygen?: Yes      Vital Signs Range (Last 24H):  Temp:  [97 °F (36.1 °C)-98.4 °F (36.9 °C)]   Pulse:  [65-83]   Resp:  [16-18]   BP: ()/(51-65)   SpO2:  [90 %-96 %]        I & O (Last 24H):  Intake/Output Summary (Last 24 hours) at 4/29/2024 1236  Last data filed at 4/29/2024 0642  Gross per 24 hour    Intake 400 ml   Output 1220 ml   Net -820 ml        Physical Exam:  On PE the patient is comfortable and not in distress. The patient is alert and orientated. GCS 15. The patient appears calm and happy. Examination of the L LE is consistent with post-operative period following Ortho Proc 4/28/24. Surrounding skin is dry and warm with no erythema. Patient able to move both feet with no sensory loss. Pedal pulses are strong bilaterally. Lungs are mostly clear to ausculation consistent with recovery period from R pleural effusion and pulmonary edema. Heart sounds are normal.     Labs:   Recent Labs   Lab 04/27/24  0517 04/27/24  0520 04/28/24  0457 04/29/24  0538     --  138 138   K 3.8  --  4.8 4.3     --  105 110   CO2 20*  --  22* 18*   BUN 30  --  51* 60*   CREATININE 1.1  --  2.0* 1.6*   *  --  112* 104   CALCIUM 9.7  --  8.6* 7.4*   MG  --  1.7 1.9  1.9 2.0   PHOS  --  3.7 5.3*  5.3* 4.3     Recent Labs   Lab 04/27/24  0517   ALKPHOS 47*   ALT 21   AST 26   ALBUMIN 3.6   PROT 6.8   BILITOT 1.2*   INR 1.1     Recent Labs   Lab 04/28/24  0457 04/28/24  0936 04/29/24  0538   WBC 11.23 12.42 11.71   HGB 10.4* 9.4* 8.3*   HCT 32.6* 29.8* 26.5*    132* 120*       Diagnostic Results:  N/A    Scheduled Meds:  Current Facility-Administered Medications   Medication Dose Route Frequency    acetaminophen  1,000 mg Oral Q6H    apixaban  2.5 mg Oral BID    atenoloL  25 mg Oral Daily    EScitalopram oxalate  10 mg Oral Daily    memantine  5 mg Oral BID    multivitamin  1 tablet Oral Daily    mupirocin  1 g Nasal BID    polyethylene glycol  17 g Oral Daily    senna-docusate 8.6-50 mg  1 tablet Oral BID     Continuous Infusions:  Current Facility-Administered Medications   Medication Dose Route Frequency Last Rate Last Admin    ROPIvacaine (PF) 2 mg/ml (0.2%)  0.1 mL/hr Perineural Continuous 0.1 mL/hr at 04/28/24 0935 0.1 mL/hr at 04/28/24 0935     PRN Meds:  Current Facility-Administered  Medications:     albuterol-ipratropium, 3 mL, Nebulization, Q4H PRN    aluminum-magnesium hydroxide-simethicone, 30 mL, Oral, QID PRN    bisacodyL, 10 mg, Rectal, Daily PRN    clindamycin IV (PEDS and ADULTS), 900 mg, Intravenous, On Call Procedure    dextrose 10%, 12.5 g, Intravenous, PRN    dextrose 10%, 25 g, Intravenous, PRN    droPERidol, 0.625 mg, Intravenous, Once PRN    glucagon (human recombinant), 1 mg, Intramuscular, PRN    glucose, 16 g, Oral, PRN    glucose, 24 g, Oral, PRN    melatonin, 6 mg, Oral, Nightly PRN    methocarbamoL, 500 mg, Oral, Q6H PRN    metoclopramide, 5 mg, Intravenous, Q6H PRN    mupirocin, , Nasal, On Call Procedure    naloxone, 0.02 mg, Intravenous, PRN    ondansetron, 8 mg, Oral, Q8H PRN    ondansetron, 4 mg, Intravenous, Once PRN    ondansetron, 4 mg, Intravenous, Q12H PRN    prochlorperazine, 5 mg, Intravenous, Q6H PRN    simethicone, 1 tablet, Oral, QID PRN    sodium chloride 0.9%, 10 mL, Intravenous, PRN    sodium chloride 0.9%, 10 mL, Intravenous, PRN    tranexamic acid (CYKLOKAPRON) 3,000 mg in sodium chloride 0.9% SolP 100 mL, 3,000 mg, Irrigation, On Call Procedure    ASSESSMENT/PLAN:   Ms. Mary Ellen Miller is a 89 y.o. female with PMHx HTN, HLD, CHF, and A-FIB on eliquis who is being followed up for Closed displaced intertrochanteric fracture of left femur s/p IM Chao on 4/28/24.     Active Hospital Problems    Diagnosis  POA    *Closed displaced intertrochanteric fracture of left femur [S72.142A]  Yes    Thrombocytopenia [D69.6]  No    JOHAN (acute kidney injury) [N17.9]  No    Acute blood loss anemia [D62]  No    Hypotension [I95.9]  Yes    Long term (current) use of anticoagulants [Z79.01]  Not Applicable     - followed by cardiology  - no signs of bleeding      Advance care planning [Z71.89]  Not Applicable     - pt reports that she has living will and mPOA  - mPOA is her son Valentino (youngest)  - she does not want invasive or aggressive measures to maintain life  -  she does not want CPR or intubation      Acute on chronic diastolic heart failure [I50.33]  Yes     - no signs of acute decompensation  - followed by cardiology      Chronic atrial fibrillation [I48.20]  Yes     - followed by Dr. Last  - OAC Eliquis without signs of bleeding  - rate controlled      Essential hypertension [I10]  Yes     - well controlled  - continue current medications      Recurrent major depressive disorder, in full remission [F33.42]  Yes     - well controlled  - continue current medication        Resolved Hospital Problems    Diagnosis Date Resolved POA    Hip fracture [S72.009A] 04/27/2024 Yes         Closed displaced intertrochanteric fracture of left femur s/p IM Choa on 4/28/24.   ASSESSMENT:  L LE is consistent with normal healing following left femur repair 4/28/24. Patient reports no pain on Ropivicaine nerve block. Patient still no BM. Consider Mag Citirate if persistence.     PLAN: Continue with OT/PT, pain control, BM control with plans for transfer to SNF/Rehab.     2. Anemia   ASSESSMENT: Patient's Hb, HCT, and Platelets have been consistently down trending over the past few days:   Recent Labs   Lab 04/28/24  0457 04/28/24  0936 04/29/24  0538   WBC 11.23 12.42 11.71   HGB 10.4* 9.4* 8.3*   HCT 32.6* 29.8* 26.5*    132* 120*     PLAN: Consider giving one unit of blood.     3. Renal Function  ASSESSMENT: Patient's renal function (BUN and Creatitne) have been elevating consistently over the past few days.     PLAN: Consider possible offending agents and necessitating their removal. Continue IV drip in hopes of improving function.     Discharge planning:   Dishcharge pending pain control, Kidney function, OT/PT and SNF/\Rehab allocation.      finger to nose not properly coordinated. feet knocking into other foot, able to walk.

## 2024-04-29 NOTE — ASSESSMENT & PLAN NOTE
Mary Ellen Miller is a 89 y.o. female with a closed left intertrochanteric femur fracture now s/p CMN on 4/28/24. Doing well this morning.    Plan:  Pain control: multimodal  PT/OT: WBAT LLE  DVT PPx: Eliquis 2.5 mg BID, SCDs at all times when not ambulating  Labs: hemoglobin 8.3 from 9.4 yesterday  Rawls: removed 4/29    Dispo: f/u PT/OT recommendations

## 2024-04-29 NOTE — ASSESSMENT & PLAN NOTE
Long term use of anticoagulants   Patient with Persistent (7 days or more) atrial fibrillation which is controlled currently with Beta Blocker. Patient is currently in atrial fibrillation.[id number] Score: 3. Anticoagulation indicated. Anticoagulation done with eliquis .  - hold atenolol 4/28 for hypotension, HR 60s-70s in pacu  -eliquis resumed post op, per pharm meeds criteria based on age and indications to start 2.5 dosing instead of 5 mg dosing

## 2024-04-29 NOTE — SUBJECTIVE & OBJECTIVE
Principal Problem:Closed displaced intertrochanteric fracture of left femur    Principal Orthopedic Problem: same, s/p left femur CMN on 4/28/24    Interval History: No acute events overnight.  Vitals with some lower oxygen saturations yesterday, but normal this morning. Other vitals within normal limits this morning.  Pain well controlled at rest.  Anticipate PT/OT today.    Review of patient's allergies indicates:   Allergen Reactions    Percodan [oxycodone hcl-oxycodone-asa] Other (See Comments)     halucinations    Penicillins Itching    Sulfa (sulfonamide antibiotics) Diarrhea and Nausea Only    Amoxicillin        Current Facility-Administered Medications   Medication Dose Route Frequency Provider Last Rate Last Admin    acetaminophen tablet 1,000 mg  1,000 mg Oral Q6H Hannah Andre MD   1,000 mg at 04/29/24 0548    albuterol-ipratropium 2.5 mg-0.5 mg/3 mL nebulizer solution 3 mL  3 mL Nebulization Q4H PRN Elicia Barnes PA-C        aluminum-magnesium hydroxide-simethicone 200-200-20 mg/5 mL suspension 30 mL  30 mL Oral QID PRN Elicia Barnes PA-C        apixaban tablet 2.5 mg  2.5 mg Oral BID Hannah Andre MD   2.5 mg at 04/28/24 2159    atenoloL tablet 25 mg  25 mg Oral Daily Hannah Andre MD   25 mg at 04/27/24 0953    bisacodyL suppository 10 mg  10 mg Rectal Daily PRN Hannah Andre MD        clindamycin in D5W 900 mg/50 mL IVPB 900 mg  900 mg Intravenous On Call Procedure Damián Ramos MD        dextrose 10% bolus 125 mL 125 mL  12.5 g Intravenous PRN Elicia Barnes PA-C        dextrose 10% bolus 250 mL 250 mL  25 g Intravenous PRN Elicia Barnes PA-C        droPERidol injection 0.625 mg  0.625 mg Intravenous Once PRN Clarence Chacon MD        EScitalopram oxalate tablet 10 mg  10 mg Oral Daily Elicia Barnes PA-C   10 mg at 04/28/24 1236    fentaNYL 50 mcg/mL injection 25 mcg  25 mcg Intravenous Q5 Min PRN Hannah Andre MD        glucagon (human  recombinant) injection 1 mg  1 mg Intramuscular PRN Elicia Barnes PA-C        glucose chewable tablet 16 g  16 g Oral PRN Elicia Barnes PA-C        glucose chewable tablet 24 g  24 g Oral PRN Elicia Barnes PA-C        HYDROmorphone injection 0.2 mg  0.2 mg Intravenous Q5 Min PRN Clarence Chacon MD        HYDROmorphone injection 0.2 mg  0.2 mg Intravenous Q5 Min PRN Clarence Chacon MD        melatonin tablet 6 mg  6 mg Oral Nightly PRN Hannah Andre MD        memantine tablet 5 mg  5 mg Oral BID Elicia Barnes PA-C   5 mg at 04/28/24 2159    methocarbamoL tablet 500 mg  500 mg Oral Q6H PRN Hannah Andre MD        metoclopramide injection 5 mg  5 mg Intravenous Q6H PRN Hannah Andre MD        morphine injection 2 mg  2 mg Intravenous Q3H PRN Hannah Andre MD   2 mg at 04/27/24 1030    multivitamin tablet  1 tablet Oral Daily Hannah Andre MD   1 tablet at 04/28/24 1728    mupirocin 2 % ointment 1 g  1 g Nasal BID Hannah Andre MD   1 g at 04/28/24 2159    mupirocin 2 % ointment   Nasal On Call Procedure Damián Ramos MD        naloxone 0.4 mg/mL injection 0.02 mg  0.02 mg Intravenous PRN Elicia Barnes PA-C        ondansetron disintegrating tablet 8 mg  8 mg Oral Q8H PRN Elicia Barnes PA-C        ondansetron injection 4 mg  4 mg Intravenous Once PRN Clarence Chacon MD        ondansetron injection 4 mg  4 mg Intravenous Q12H PRN Hannah Adnre MD        polyethylene glycol packet 17 g  17 g Oral Daily Hannah Andre MD        prochlorperazine injection Soln 5 mg  5 mg Intravenous Q6H PRN Elicia Barnes PA-C        ROPIvacaine (PF) 2 mg/ml (0.2%) solution  0.1 mL/hr Perineural Continuous Hannah Andre MD 0.1 mL/hr at 04/28/24 0935 0.1 mL/hr at 04/28/24 0935    senna-docusate 8.6-50 mg per tablet 1 tablet  1 tablet Oral BID Hannah Andre MD   1 tablet at 04/28/24 3054    simethicone chewable tablet 80 mg  1  "tablet Oral QID PRN Elicia Barnes PA-C        sodium chloride 0.9% flush 10 mL  10 mL Intravenous PRN Damián Ramos MD        sodium chloride 0.9% flush 10 mL  10 mL Intravenous PRN Hannah Andre MD        tranexamic acid (CYKLOKAPRON) 3,000 mg in sodium chloride 0.9% SolP 100 mL  3,000 mg Irrigation On Call Procedure Hannah Andre MD         Objective:     Vital Signs (Most Recent):  Temp: 97.4 °F (36.3 °C) (04/29/24 0358)  Pulse: 71 (04/29/24 0632)  Resp: 16 (04/29/24 0358)  BP: 109/64 (04/29/24 0358)  SpO2: 96 % (04/29/24 0358) Vital Signs (24h Range):  Temp:  [97 °F (36.1 °C)-98.2 °F (36.8 °C)] 97.4 °F (36.3 °C)  Pulse:  [65-78] 71  Resp:  [14-25] 16  SpO2:  [90 %-97 %] 96 %  BP: ()/(50-65) 109/64     Weight: 54.9 kg (121 lb)  Height: 5' 3" (160 cm)  Body mass index is 21.43 kg/m².      Intake/Output Summary (Last 24 hours) at 4/29/2024 0802  Last data filed at 4/29/2024 0642  Gross per 24 hour   Intake 1180 ml   Output 1605 ml   Net -425 ml        Ortho/SPM Exam  Gen: NAD, sitting comfortably in bed  CV: regular rate  Resp: non-labored respirations    LLE:  Dressings clean, dry, and intact  No significant hematoma over operative sites  Appropriate postoperative TTP  SILT Sa/Watts/DP/SP/T  Motor intact EHL/FHL/TA/Gastroc  2+ DP, 2+ PT       Significant Labs: All pertinent labs within the past 24 hours have been reviewed.    Significant Imaging: I have reviewed and interpreted all pertinent imaging results/findings.  "

## 2024-04-29 NOTE — ASSESSMENT & PLAN NOTE
Patient is identified as having Diastolic (HFpEF) heart failure that is Acute on Chronic. CHF is currently uncontrolled due to Dyspnea not returned to baseline after 1 doses of IV diuretic, Rales/crackles on pulmonary exam, and Pulmonary edema/pleural effusion on CXR. Pt is on CHF pathway.   - Patient decompensated on admit (4/27/2024). (+)Subjective SOB, (--)JVD, (--)orthnopnea.   - Last BNP reviewed- and noted below   Recent Labs   Lab 04/27/24  0702   *     - Troponin 0.009, continue to trend   - CXR with cardiomegaly and pulmonary edema with R pleural effusion   - EKG without acute ST changes   - Echo with Echo    Result Date: 4/27/2024    Left Ventricle: The left ventricle is normal in size. Ventricular mass   is normal. Normal wall thickness. Normal wall motion. There is normal   systolic function with a visually estimated ejection fraction of 60 - 65%.   There is indeterminate diastolic function.Normal left ventricular filling   pressure.    Right Ventricle: Normal right ventricular cavity size. Wall thickness   is normal. Systolic function is normal.    Left Atrium: Left atrium is severely dilated.    Right Atrium: Right atrium is severely dilated.    Aortic Valve: The aortic valve is a trileaflet valve. There is mild   aortic valve sclerosis. There is mild annular calcification present.    Mitral Valve: There is moderate anterior leaflet sclerosis.    Tricuspid Valve: There is moderate regurgitation with a centrally   directed jet.    Aorta: Aortic root is normal in size measuring 3.08 cm. Ascending aorta   is normal measuring 3.43 cm.    Pulmonary Artery: The estimated pulmonary artery systolic pressure is   57 mmHg.    IVC/SVC: Intermediate venous pressure at 8 mmHg.          Intake/Output Summary (Last 24 hours) at 4/29/2024 1312  Last data filed at 4/29/2024 0642  Gross per 24 hour   Intake 400 ml   Output 1220 ml   Net -820 ml        - Supplemental O2 to keep Spo2 >90%  - Continue to stress to  patient importance of self efficacy and  on diet for CHF.  Suspect overdiuresis as Aster on 4/28 after lasix 40 BID + home aldactone on admit + hypotension. Hold for now, very light IVF for hypotension now, CXr similar to before, on similar oxygen and reassess volume status this PM. Liberate diet for now while equliibirating volume status.  -appears approaching euvolemic now on 4/29 with IVF yesterday, hold lasix and IVF today to allow to further self equilbrate with oral intake. Plan to resume likely either lower dose of lasix vs holding aldcatone once euvolemic and Cr stabilzed as she reports urinating 3 times a night at home and felt she was on maybe too much long term

## 2024-04-29 NOTE — SUBJECTIVE & OBJECTIVE
"Interval history- she was in good spirits and very pleasant on exam with daughter at bedside. Cr improving to 1.6 with IVF yesterday and Bp improved as well with volume yesterday. On 1L NC down from 3L so will hold home lasix as well as IVF today to let equilibrate further as appears close to euvolemic on exam without any stopping between sentences for dyspnea and orally hydrating well heself now. Hg with expected downtick post op at 8.3 now and continue to trend. She reports she thinks she may have even been on too much lasix at home as she was urinating up to 3 times a night waking her up for this, so may plan to go to a lower dose vs holding aldactone as another option once euvolemic when restarting and Cr back at baseline with likely some overdiuresis initially on admission. She is very active at baseline and does exercise class every day and has her entire life. She said that "everyone knew not to call me at 7 pm cause I was doing my exercise class'. Her sister lives across the [place] from her at her assisted living facility at baseline. PT/OT rec post acute placement and CM to send referrals for her.        Review of patient's allergies indicates:   Allergen Reactions    Percodan [oxycodone hcl-oxycodone-asa] Other (See Comments)     halucinations    Penicillins Itching    Sulfa (sulfonamide antibiotics) Diarrhea and Nausea Only    Amoxicillin        Current Facility-Administered Medications   Medication Dose Route Frequency Provider Last Rate Last Admin    acetaminophen tablet 1,000 mg  1,000 mg Oral Q6H Hannah Andre MD   1,000 mg at 04/29/24 0548    albuterol-ipratropium 2.5 mg-0.5 mg/3 mL nebulizer solution 3 mL  3 mL Nebulization Q4H PRN Elicia Barnes PA-C        aluminum-magnesium hydroxide-simethicone 200-200-20 mg/5 mL suspension 30 mL  30 mL Oral QID PRN Elicia Barnes PA-C        apixaban tablet 2.5 mg  2.5 mg Oral BID Hannah Andre MD   2.5 mg at 04/29/24 0934    atenoloL tablet 25 " mg  25 mg Oral Daily Hannah Andre MD   25 mg at 04/27/24 0953    bisacodyL suppository 10 mg  10 mg Rectal Daily PRN Hannah Andre MD        clindamycin in D5W 900 mg/50 mL IVPB 900 mg  900 mg Intravenous On Call Procedure Damián Ramos MD        dextrose 10% bolus 125 mL 125 mL  12.5 g Intravenous PRN Elicia Barnes PA-C        dextrose 10% bolus 250 mL 250 mL  25 g Intravenous PRN Elicia Barnes PA-C        droPERidol injection 0.625 mg  0.625 mg Intravenous Once PRN Clarence Chacon MD        EScitalopram oxalate tablet 10 mg  10 mg Oral Daily Elicia Barnes PA-C   10 mg at 04/29/24 0934    glucagon (human recombinant) injection 1 mg  1 mg Intramuscular PRN Elicia Barnes PA-C        glucose chewable tablet 16 g  16 g Oral PRN Elicia Barnes PA-C        glucose chewable tablet 24 g  24 g Oral PRN Elicia Barnes PA-C        melatonin tablet 6 mg  6 mg Oral Nightly PRN Hannah Andre MD        memantine tablet 5 mg  5 mg Oral BID Elicia Barnes PA-C   5 mg at 04/29/24 0934    methocarbamoL tablet 500 mg  500 mg Oral Q6H PRN Hannah Andre MD        metoclopramide injection 5 mg  5 mg Intravenous Q6H PRN Hannah Andre MD        multivitamin tablet  1 tablet Oral Daily Hannah Andre MD   1 tablet at 04/29/24 0934    mupirocin 2 % ointment 1 g  1 g Nasal BID Hannah Andre MD   1 g at 04/29/24 0934    mupirocin 2 % ointment   Nasal On Call Procedure Damián Ramos MD        naloxone 0.4 mg/mL injection 0.02 mg  0.02 mg Intravenous PRN Elicia Barnes PA-C        ondansetron disintegrating tablet 8 mg  8 mg Oral Q8H PRN Elicia Barnes PA-C        ondansetron injection 4 mg  4 mg Intravenous Once PRN Clarence Chacon MD        ondansetron injection 4 mg  4 mg Intravenous Q12H PRN Hannah Andre MD        polyethylene glycol packet 17 g  17 g Oral Daily Hannah Andre MD   17 g at 04/29/24 0934    prochlorperazine injection  Soln 5 mg  5 mg Intravenous Q6H PRN Elicia Barnes PA-C        ROPIvacaine (PF) 2 mg/ml (0.2%) solution  0.1 mL/hr Perineural Continuous Hannah Anrde MD 0.1 mL/hr at 04/28/24 0935 0.1 mL/hr at 04/28/24 0935    senna-docusate 8.6-50 mg per tablet 1 tablet  1 tablet Oral BID Hannah Andre MD   1 tablet at 04/29/24 0934    simethicone chewable tablet 80 mg  1 tablet Oral QID PRN Elicia Barnes PA-C        sodium chloride 0.9% flush 10 mL  10 mL Intravenous PRN Damián Ramos MD        sodium chloride 0.9% flush 10 mL  10 mL Intravenous PRN Hannah Andre MD        tranexamic acid (CYKLOKAPRON) 3,000 mg in sodium chloride 0.9% SolP 100 mL  3,000 mg Irrigation On Call Procedure Hannah Andre MD         Family History       Problem Relation (Age of Onset)    No Known Problems Mother, Father          Tobacco Use    Smoking status: Never     Passive exposure: Never    Smokeless tobacco: Never   Substance and Sexual Activity    Alcohol use: No    Drug use: No    Sexual activity: Not Currently     Review of Systems   Constitutional:  Positive for activity change. Negative for chills and fever.   HENT:  Negative for trouble swallowing.    Eyes:  Negative for photophobia and visual disturbance.   Respiratory:  Positive for cough and shortness of breath. Negative for chest tightness.    Cardiovascular:  Positive for leg swelling. Negative for chest pain and palpitations.   Gastrointestinal:  Negative for abdominal pain, constipation, diarrhea, nausea and vomiting.   Genitourinary:  Negative for dysuria, frequency and hematuria.   Musculoskeletal:  Positive for arthralgias, back pain and gait problem. Negative for neck pain.   Skin:  Negative for rash and wound.   Neurological:  Positive for weakness. Negative for dizziness, syncope, speech difficulty and light-headedness.   Psychiatric/Behavioral:  Positive for confusion (intermittent). Negative for agitation. The patient is not  nervous/anxious.      Objective:     Vital Signs (Most Recent):  Temp: 98.4 °F (36.9 °C) (04/29/24 1211)  Pulse: 83 (04/29/24 1211)  Resp: 18 (04/29/24 1211)  BP: 128/63 (04/29/24 1211)  SpO2: (!) 94 % (04/29/24 1211) Vital Signs (24h Range):  Temp:  [97 °F (36.1 °C)-98.4 °F (36.9 °C)] 98.4 °F (36.9 °C)  Pulse:  [65-83] 83  Resp:  [16-18] 18  SpO2:  [90 %-96 %] 94 %  BP: ()/(51-65) 128/63     Weight: 54.9 kg (121 lb)  Body mass index is 21.43 kg/m².     Physical Exam  Vitals and nursing note reviewed.   Constitutional:       General: She is not in acute distress.     Appearance: She is well-developed. She is not toxic-appearing.   HENT:      Head: Normocephalic.      Mouth/Throat:      Mouth: Mucous membranes are moist.   Eyes:      Extraocular Movements: Extraocular movements intact.      Conjunctiva/sclera: Conjunctivae normal.      Pupils: Pupils are equal, round, and reactive to light.   Cardiovascular:      Rate and Rhythm: Tachycardia present. Rhythm irregular.      Heart sounds: Normal heart sounds.      Comments: No JVD. HR 70 with afib  Pulmonary:      Effort: Pulmonary effort is normal. No respiratory distress.      Breath sounds: Rales present. No wheezing.      Comments: On low santos oxygen, speaking in full sentences.  Abdominal:      General: Bowel sounds are normal. There is distension.      Palpations: Abdomen is soft.      Tenderness: There is no abdominal tenderness.   Musculoskeletal:         General: Normal range of motion.      Cervical back: Normal range of motion and neck supple.      Comments: LLE with bandages in place. Ropivicaine in place.   Skin:     General: Skin is warm and dry.      Capillary Refill: Capillary refill takes less than 2 seconds.      Findings: Bruising present. No rash.   Neurological:      Mental Status: She is alert and oriented to person, place, and time.      Cranial Nerves: No cranial nerve deficit.      Sensory: No sensory deficit.      Coordination:  "Coordination normal.   Psychiatric:         Behavior: Behavior normal.         Thought Content: Thought content normal.         Judgment: Judgment normal.              CRANIAL NERVES     CN III, IV, VI   Pupils are equal, round, and reactive to light.       Significant Labs: All pertinent labs within the past 24 hours have been reviewed.  CBC:   Recent Labs   Lab 04/28/24  0457 04/28/24  0936 04/29/24  0538   WBC 11.23 12.42 11.71   HGB 10.4* 9.4* 8.3*   HCT 32.6* 29.8* 26.5*    132* 120*     CMP:   Recent Labs   Lab 04/28/24  0457 04/29/24  0538    138   K 4.8 4.3    110   CO2 22* 18*   * 104   BUN 51* 60*   CREATININE 2.0* 1.6*   CALCIUM 8.6* 7.4*   ANIONGAP 11 10     Cardiac Markers:   No results for input(s): "CKMB", "MYOGLOBIN", "BNP", "TROPISTAT" in the last 48 hours.    Troponin:   No results for input(s): "TROPONINI", "TROPONINIHS" in the last 48 hours.    Urine Studies:   No results for input(s): "COLORU", "APPEARANCEUA", "PHUR", "SPECGRAV", "PROTEINUA", "GLUCUA", "KETONESU", "BILIRUBINUA", "OCCULTUA", "NITRITE", "UROBILINOGEN", "LEUKOCYTESUR", "RBCUA", "WBCUA", "BACTERIA", "SQUAMEPITHEL", "HYALINECASTS" in the last 48 hours.    Invalid input(s): "WRIGHTSUR"      Significant Imaging: I have reviewed all pertinent imaging results/findings within the past 24 hours.  SURG [facility] Surgery Fluoro Usage  See OP Notes for results.     IMPRESSION: See OP Notes for results.     This procedure was auto-finalized by: Virtual Radiologist      "

## 2024-04-29 NOTE — ASSESSMENT & PLAN NOTE
Patient with acute kidney injury/acute renal failure likely due to diuersis with lasix JOHAN is currently  present 4/28 at Cr 2.0 . Baseline creatinine  1.1  - Labs reviewed- Renal function/electrolytes with Estimated Creatinine Clearance: 17.8 mL/min (A) (based on SCr of 1.6 mg/dL (H)). according to latest data. Monitor urine output and serial BMP and adjust therapy as needed. Avoid nephrotoxins and renally dose meds for GFR listed above.  Hold lasix/aldactone and very light IVF considering overload on admit to allow volume equilibration  Improving to 1.6 now

## 2024-04-29 NOTE — PT/OT/SLP EVAL
Physical Therapy Co-Evaluation and Treatment    OT present for coeval due to pt's multiple medical comorbidities and functional/cognition deficits requiring two skilled therapists to appropriately progress pt's musculoskeletal strength, neuromuscular control, and endurance while taking into consideration medical acuity and pt safety.      Patient Name: Mary Ellen Miller   MRN: [id number]  Recent Surgery: Procedure(s) (LRB):  INSERTION, INTRAMEDULLARY LENO, FEMUR: Left (Left) 1 Day Post-Op    Recommendations:     Discharge Recommendations: Moderate Intensity Therapy   Discharge Equipment Recommendations: walker, rolling, bedside commode   Barriers to discharge: Increased level of assist    DME justification:   Patient demonstrates a mobility limitation that significantly impairs their ability to participate in one or more mobility related activities of daily living. Patient's mobility limitation cannot be sufficiently resolved with the use of a cane, but can be sufficiently resolved with the use of a rolling walker.The use of a rolling walker will considerably improve their ability to participate in MRADLs. Patient will use the walker on a regular basis at home.       Assessment:     Mary Ellen Miller is a 89 y.o. female admitted with a medical diagnosis of Closed displaced intertrochanteric fracture of left femur. She presents with the following impairments/functional limitations: weakness, impaired endurance, impaired self care skills, impaired functional mobility, gait instability, impaired balance, decreased lower extremity function, decreased safety awareness, pain, orthopedic precautions, decreased coordination.     Pt receptive and tolerated PT co-eval with OT fairly well. Pt educated on WBAT: left lower extremity precautions prior to start of treatment with pt verbalizing understanding. Pt needed max A of 2 people with RW to perform a stand pivot to bedside commode this session. Pt unable to weight  shift to take true steps in standing due to pain. Due to increased fall risk and increased need of assistance to perform functional mobility, pt not safe to return home at this time. PT/OT returned for second visit to assist pt back to bed from bedside chair. Patient currently demonstrates a need for moderate intensity therapy on a daily basis post acute secondary to a decline in functional status due to surgical procedure      Rehab Prognosis: Good; patient would benefit from acute PT services to address these deficits and reach maximum level of function.    Plan:     During this hospitalization, patient to be seen  (Hip pathway 4/29, 4/30, 5/1, then 4x/week after pathway complete.) to address the above listed problems via gait training, therapeutic activities, therapeutic exercises, neuromuscular re-education    Plan of Care Expires: 05/29/24    Subjective     Chief Complaint: L hip pain with mobility  Patient Comments/Goals: to use the bathroom  Pain/Comfort:  Pain Rating 1: 5/10  Location - Side 1: Left  Location - Orientation 1: generalized  Location 1: hip  Pain Addressed 1: Reposition, Distraction  Pain Rating Post-Intervention 1: 4/10    Patient History:     Living Environment: Pt lives at [facility]  Prior Level of Function: IND  DME owned: none  Caregiver Assistance: staff at [facility]      Objective:     Communicated with RN prior to session. Patient found HOB elevated with telemetry, perineural catheter, SCD, oxygen upon PT entry to room.    General Precautions: Standard, fall   Orthopedic Precautions: LLE weight bearing as tolerated   Braces: N/A    Respiratory Status: Nasal cannula, flow 2 L/min    Exams:  RLE ROM: WFL  RLE Strength: WFL  LLE ROM: WFL  LLE Strength:  grossly 3-/5  Cognitive: Patient is oriented to Person, Place, Time, Situation  Sensation:    -       Intact    Functional Mobility:  Gait belt applied - Yes  Bed Mobility  Scooting: moderate assistance and of 2 persons  Supine  to Sit: maximal assistance and of 2 persons for LE management and trunk management    Transfers  Sit to Stand: moderate assistance and of 2 persons  from EOB with rolling walker and with cues for hand placement  Sit <> Stand from commode: 2 Trials   1st Trial: mod A x2 persons with RW  2nd Trial: min A x2 persons with RW  Bed to Chair: maximal assistance and of 2 persons with rolling walker and with cues for hand placement using Stand Pivot  Commode placed to R of pt  Visit 2: chair to bed  Max A of 2 persons using HHA and stand pivot transfer    Gait  Pt unable to take steps this session    Balance  Sitting: FAIR+: Maintains balance through MINIMAL excursions of active trunk motion  Standin: N/A  Pt needed max A to stand with RW during pericare      Therapeutic Activities and Exercises:  Patient educated on role of acute care PT and PT POC, safety while in hospital including calling nurse for mobility, and call light usage.  Educated about weightbearing precautions and provided cuing for adherence as appropriate during session.  Educated about importance of OOB mobility and remaining up in chair most of the day.  Edcuated about pursed lip breathing technique and cued for use with mobility.  All questions answered within the scope of PT.  White board updated accordingly.      AM-PAC 6 CLICK MOBILITY  Total Score:11    Patient left up in chair with all lines intact, call button in reach, and RN notified.    GOALS:   Multidisciplinary Problems       Physical Therapy Goals          Problem: Physical Therapy    Goal Priority Disciplines Outcome Goal Variances Interventions   Physical Therapy Goal     PT, PT/OT Progressing     Description: Goals to be met by: 24     Patient will increase functional independence with mobility by performin. Supine to sit with Contact Guard Assistance  2. Sit to stand transfer with Contact Guard Assistance  3. Bed to chair transfer with Contact Guard Assistance using Rolling  Walker  4. Gait  x 50 feet with Minimal Assistance using Rolling Walker.   5. Stand for 5 minutes with Contact Guard Assistance using Rolling Walker  6. Lower extremity exercise program x30 reps per handout, with supervision                         History:     Past Medical History:   Diagnosis Date    Anxiety     Atrial fibrillation     Colon polyps 05/01/2012    Hypertension     Leg ulcer, right, limited to breakdown of skin 11/20/2019    Unilateral femoral hernia with obstruction, without gangrene 10/26/2015       Past Surgical History:   Procedure Laterality Date    HYSTERECTOMY      INTRAMEDULLARY RODDING OF FEMUR Left 4/28/2024    Procedure: INSERTION, INTRAMEDULLARY LENO, FEMUR: Left;  Surgeon: Kurt Melendez MD;  Location: [facility] OR [address] [place] [address];  Service: Orthopedics;  Laterality: Left;    LUNG SURGERY  1988    OOPHORECTOMY      RADICAL HYSTERECTOMY  1990       Time Tracking:     PT Received On: 04/29/24  PT Start Time: 0853 Visit 2: 1142  PT Stop Time: 0930 Visit 2: 1155  PT Total Time (min): 50 min     Billable Minutes: Evaluation 12, Therapeutic Activity 15, and Neuromuscular Re-education 23    4/29/2024

## 2024-04-29 NOTE — ASSESSMENT & PLAN NOTE
Patient's anemia is currently uncontrolled. Has not received any PRBCs to date. Etiology likely d/t acute blood loss which was from surgical repair  Current CBC reviewed-   Lab Results   Component Value Date    HGB 8.3 (L) 04/29/2024    HCT 26.5 (L) 04/29/2024     Monitor serial CBC and transfuse if patient becomes hemodynamically unstable, symptomatic or H/H drops below 7/21.

## 2024-04-29 NOTE — PLAN OF CARE
PT eval completed- see note for details, goals and POC established.     Problem: Physical Therapy  Goal: Physical Therapy Goal  Description: Goals to be met by: 24     Patient will increase functional independence with mobility by performin. Supine to sit with Contact Guard Assistance  2. Sit to stand transfer with Contact Guard Assistance  3. Bed to chair transfer with Contact Guard Assistance using Rolling Walker  4. Gait  x 50 feet with Minimal Assistance using Rolling Walker.   5. Stand for 5 minutes with Contact Guard Assistance using Rolling Walker  6. Lower extremity exercise program x30 reps per handout, with supervision    Outcome: Progressing   2024

## 2024-04-29 NOTE — ASSESSMENT & PLAN NOTE
Chronic, controlled. Latest blood pressure and vitals reviewed-     Temp:  [97 °F (36.1 °C)-98.4 °F (36.9 °C)]   Pulse:  [65-83]   Resp:  [16-18]   BP: ()/(51-65)   SpO2:  [90 %-96 %] .   Home meds for hypertension were reviewed and noted below.   Hypertension Medications                    furosemide (LASIX) 20 MG tablet TAKE 1 TABLET BY MOUTH ONCE DAILY.    spironolactone (ALDACTONE) 50 MG tablet Take 1 tablet (50 mg total) by mouth once daily.     Not taking norvasc anymore. Atenolol started 4/427 but held 4/28 for lower BPs. Hold aldactone and lasix for JOHAN 4/28    Will utilize p.r.n. blood pressure medication only if patient's blood pressure greater than 180/110 and she develops symptoms such as worsening chest pain or shortness of breath.

## 2024-04-29 NOTE — CARE UPDATE
I have reviewed the chart of Mary Ellen Miller and collaborated with Hannah Andre MD in the care of the patient who is hospitalized for the following:    Active Hospital Problems    Diagnosis    *Closed displaced intertrochanteric fracture of left femur    Thrombocytopenia    JOHAN (acute kidney injury)    Acute blood loss anemia    Hypotension    Long term (current) use of anticoagulants     - followed by cardiology  - no signs of bleeding      Advance care planning     - pt reports that she has living will and mPOA  - mPOA is her son Valentino (youngest)  - she does not want invasive or aggressive measures to maintain life  - she does not want CPR or intubation      Acute on chronic diastolic heart failure     - no signs of acute decompensation  - followed by cardiology      Chronic atrial fibrillation     - followed by Dr. Last  - OAC Eliquis without signs of bleeding  - rate controlled      Essential hypertension     - well controlled  - continue current medications      Recurrent major depressive disorder, in full remission     - well controlled  - continue current medication            I have reviewed Mary Ellen Miller with the multidisciplinary team during discharge huddle.       Aurora Beckford PA-C  [facility]    
Patient currently on 6L oxygen and cardiac history. Discussed with medicine, and she will likely need diuresis and pre-surgical optimization.     Further work up pending. Likely plan for surgery tomorrow.   --  Aguilar Zheng MD  
4

## 2024-04-29 NOTE — ANESTHESIA POST-OP PAIN MANAGEMENT
Acute Pain Service Progress Note    Mary Ellen Miller is a 89 y.o., female, [id number].    Surgery:  INSERTION, INTRAMEDULLARY LENO, FEMUR: Left (Left: Leg)     Post Op Day #: 1    Catheter type: perineural  SIFI    Infusion type: Ropivacaine 0.2%  15 cc/3hr basal    Problem List:    Active Hospital Problems    Diagnosis  POA    *Closed displaced intertrochanteric fracture of left femur [S72.142A]  Yes    Thrombocytopenia [D69.6]  No    JOHAN (acute kidney injury) [N17.9]  No    Acute blood loss anemia [D62]  No    Hypotension [I95.9]  Yes    Long term (current) use of anticoagulants [Z79.01]  Not Applicable     - followed by cardiology  - no signs of bleeding      Advance care planning [Z71.89]  Not Applicable     - pt reports that she has living will and mPOA  - mPOA is her son Valentino (youngest)  - she does not want invasive or aggressive measures to maintain life  - she does not want CPR or intubation      Acute on chronic diastolic heart failure [I50.33]  Yes     - no signs of acute decompensation  - followed by cardiology      Chronic atrial fibrillation [I48.20]  Yes     - followed by Dr. Last  - OAC Eliquis without signs of bleeding  - rate controlled      Essential hypertension [I10]  Yes     - well controlled  - continue current medications      Recurrent major depressive disorder, in full remission [F33.42]  Yes     - well controlled  - continue current medication        Resolved Hospital Problems    Diagnosis Date Resolved POA    Hip fracture [S72.009A] 04/27/2024 Yes       Subjective:     General appearance of alert, oriented, no complaints   Pain with rest: 1    Faces   Pain with movement: 6    Faces   Side Effects    1. Pruritis No    2. Nausea No    3. Motor Blockade No, 0=Ability to raise lower extremities off bed    4. Sedation No, 1=awake and alert    Objective:     Catheter site clean, dry, intact      Vitals   Vitals:    04/29/24 0830   BP: 117/60   Pulse: 78   Resp: 18   Temp: 36.8 °C (98.3 °F)         Labs    No results displayed because visit has over 200 results.           Meds   Current Facility-Administered Medications   Medication Dose Route Frequency Provider Last Rate Last Admin    acetaminophen tablet 1,000 mg  1,000 mg Oral Q6H Hannah Andre MD   1,000 mg at 04/29/24 0548    albuterol-ipratropium 2.5 mg-0.5 mg/3 mL nebulizer solution 3 mL  3 mL Nebulization Q4H PRN Elicia Barnes PA-C        aluminum-magnesium hydroxide-simethicone 200-200-20 mg/5 mL suspension 30 mL  30 mL Oral QID PRN Elicia Barnes PA-C        apixaban tablet 2.5 mg  2.5 mg Oral BID Hannah Andre MD   2.5 mg at 04/28/24 2159    atenoloL tablet 25 mg  25 mg Oral Daily Hannah Andre MD   25 mg at 04/27/24 0953    bisacodyL suppository 10 mg  10 mg Rectal Daily PRN Hannah Andre MD        clindamycin in D5W 900 mg/50 mL IVPB 900 mg  900 mg Intravenous On Call Procedure Damián Ramos MD        dextrose 10% bolus 125 mL 125 mL  12.5 g Intravenous PRN Elicia Barnes PA-C        dextrose 10% bolus 250 mL 250 mL  25 g Intravenous PRN Elicia Barnes PA-C        droPERidol injection 0.625 mg  0.625 mg Intravenous Once PRN Clarence Chacon MD        EScitalopram oxalate tablet 10 mg  10 mg Oral Daily Elicia Barnes PA-C   10 mg at 04/28/24 1236    glucagon (human recombinant) injection 1 mg  1 mg Intramuscular PRN Elicia Barnes PA-C        glucose chewable tablet 16 g  16 g Oral PRN Elicia Barnes PA-C        glucose chewable tablet 24 g  24 g Oral PRN Elicia Barnes PA-C        melatonin tablet 6 mg  6 mg Oral Nightly PRN Hannah Andre MD        memantine tablet 5 mg  5 mg Oral BID Elicia Barnes PA-C   5 mg at 04/28/24 2159    methocarbamoL tablet 500 mg  500 mg Oral Q6H PRN Hannah Andre MD        metoclopramide injection 5 mg  5 mg Intravenous Q6H PRN Hannah Andre MD        multivitamin tablet  1 tablet Oral Daily Hannah Andre MD   1 tablet at  04/28/24 1728    mupirocin 2 % ointment 1 g  1 g Nasal BID Hannah Andre MD   1 g at 04/28/24 2159    mupirocin 2 % ointment   Nasal On Call Procedure Damián Ramos MD        naloxone 0.4 mg/mL injection 0.02 mg  0.02 mg Intravenous PRN Elicia Barnes PA-C        ondansetron disintegrating tablet 8 mg  8 mg Oral Q8H PRN Elicia Barnes PA-C        ondansetron injection 4 mg  4 mg Intravenous Once PRN Clarence Chacon MD        ondansetron injection 4 mg  4 mg Intravenous Q12H PRN Hannah Andre MD        polyethylene glycol packet 17 g  17 g Oral Daily Hannah Andre MD        prochlorperazine injection Soln 5 mg  5 mg Intravenous Q6H PRN Elicia Barnes PA-C        ROPIvacaine (PF) 2 mg/ml (0.2%) solution  0.1 mL/hr Perineural Continuous Hannah Andre MD 0.1 mL/hr at 04/28/24 0935 0.1 mL/hr at 04/28/24 0935    senna-docusate 8.6-50 mg per tablet 1 tablet  1 tablet Oral BID Hannah Andre MD   1 tablet at 04/28/24 2159    simethicone chewable tablet 80 mg  1 tablet Oral QID PRN Elicia Barnes PA-C        sodium chloride 0.9% flush 10 mL  10 mL Intravenous PRN Damián Ramos MD        sodium chloride 0.9% flush 10 mL  10 mL Intravenous PRN Hannah Andre MD        tranexamic acid (CYKLOKAPRON) 3,000 mg in sodium chloride 0.9% SolP 100 mL  3,000 mg Irrigation On Call Procedure Hannah Andre MD            Anticoagulant dose apixaban 2.5 BID     Assessment:     Pain control adequate    Plan:     Patient doing well, continue present treatment.  1) Continue SIFI PNC at current infusion rate  2) continue acetaminophen 1G every 6 hours for 8 doses total  3) Continue prn robaxin    Case discussed with staff, Dr. Finn; final recommendations per attestation above.     Thank you for your consult and allowing us to participate in the care of this patient. We will continue to follow along. Please call the Acute Pain Service at [phone] or Anesthesia at [phone] if you  have any questions or concerns.      Ellis Pitts DO  [facility] Anesthesiology Resident PGY3

## 2024-04-29 NOTE — PT/OT/SLP EVAL
Occupational Therapy   Evaluation    Name: Mary Ellen Miller  MRN: [id number]  Admitting Diagnosis: Closed displaced intertrochanteric fracture of left femur  Recent Surgery: Procedure(s) (LRB):  INSERTION, INTRAMEDULLARY LENO, FEMUR: Left (Left) 1 Day Post-Op    Recommendations:     Discharge Recommendations: Moderate Intensity Therapy  Discharge Equipment Recommendations:  walker, rolling, bedside commode  Patient has a mobility limitation that significantly impairs their ability to participate in one or more mobility related activities of daily living, including toileting. This deficit can be resolved by using a bedside commode. Patient demonstrates mobility limitations that will cause them to be confined to one room at home without bathroom access for up to 30 days. Using a bedside commode will greatly improve the patient's ability to participate in MRADLs.  DME justification:   Patient demonstrates a mobility limitation that significantly impairs their ability to participate in one or more mobility related activities of daily living. Patient's mobility limitation cannot be sufficiently resolved with the use of a cane, but can be sufficiently resolved with the use of a rolling walker.The use of a rolling walker will considerably improve their ability to participate in MRADLs. Patient will use the walker on a regular basis at home.     Barriers to discharge:  Other (Comment) (Increase (A) for Fxl mob and ADLs)    Assessment:     Mary Ellen Miller is a 89 y.o. female with a medical diagnosis of Closed displaced intertrochanteric fracture of left femur. Performance deficits affecting function: weakness, impaired endurance, impaired self care skills, impaired functional mobility, gait instability, impaired balance, decreased safety awareness, decreased lower extremity function, pain, orthopedic precautions, decreased ROM, impaired coordination, impaired skin.      Rehab Prognosis: Good; patient would benefit  from acute skilled OT services to address these deficits and reach maximum level of function.       Plan:     Patient to be seen  (Hip path (4/29-5/1) then 4x/wk) to address the above listed problems via self-care/home management, therapeutic exercises, neuromuscular re-education, therapeutic activities  Plan of Care Expires: 05/29/24  Plan of Care Reviewed with: patient    Subjective     Chief Complaint: Pain at LLE  Patient/Family Comments/goals: Pt.reports she want to ry herself and maybe walk    Occupational Profile:  Living Environment:  Pt lives at [facility]   Previous level of function: Ind in all ADLs and fxl mob   Roles and Routines: Enjoys group actives at the [facility], and outing with family   Equipment Used at Home: none  Assistance upon Discharge: [facility] staff    Pain/Comfort:  Pain Rating 1: 5/10  Location - Side 1: Left  Location - Orientation 1: generalized  Location 1: hip  Pain Addressed 1: Reposition, Distraction  Pain Rating Post-Intervention 1: 4/10    Patients cultural, spiritual, Evangelical conflicts given the current situation: no    Objective:     Communicated with: Nurse prior to session.  Patient found HOB elevated with telemetry, perineural catheter, SCD, oxygen upon PT entry to room.     General Precautions: Standard, fall   Orthopedic Precautions: LLE weight bearing as tolerated   Braces: N/A    Respiratory Status: Nasal cannula, flow 2 L/min,SPO2 94-95%    Occupational Performance:    Bed Mobility:    Patient completed Scooting/Bridging with moderate assistance and 2 persons  Patient completed Supine to Sit with maximal assistance and 2 persons  Returned to get back to bed  Patient completed Sit to Supine with maximal assistance and 2 persons    Functional Mobility/Transfers:  Patient completed Sit <> Stand Transfer with moderate assistance and of 2 persons  with  rolling walker, 1 trial from EOB and 1 from C  Patient completed Sit > Stand Transfer with moderate assistance  and of 2 persons  with  rolling walker 3rd trial at [facility]  Patient completed Bed >BSC> Chair Transfer using Stand pivot Transfer technique with maximal assistance and of 2 persons with rolling walker  Patient completed BSC Transfer Stand Pivot technique with maximal assistance and of 2 persons with  rolling walker  Returned to get pt.back to bed  Patient completed Sit <> Stand Transfer with moderate assistance and of 2 persons  with  hand-held assist from chair  Patient completed > Chair > Bed Transfer using Step Transfer technique with maximal assistance and of 2 persons with hand-held assist    Activities of Daily Living:  Toileting: total assistance pericare standing at [facility] and clothing mgmt  UBD, don gown posteriorly seated at EOB    Cognitive/Visual Perceptual:  Cognitive/Psychosocial Skills:     -       Oriented to: Person, Place, and Situation   -       Follows Commands/attention:Follows multistep  commands  -       Communication: clear/fluent  -       Memory: No Deficits noted  -       Safety awareness/insight to disability: impaired   -       Mood/Affect/Coping skills/emotional control: Appropriate to situation    Physical Exam:  Balance:  Static Sitting: CGA  Static Standing: Max a  Dynamic Standing: Max   Upper Extremity Range of Motion:     -       Right Upper Extremity: WFL  -       Left Upper Extremity: WFL  Upper Extremity Strength:    -       Right Upper Extremity: WFL  -       Left Upper Extremity: WFL   Strength:    -       Right Upper Extremity: WFL  -       Left Upper Extremity: WFL    AMPAC 6 Click ADL:  AMPAC Total Score: 14    Treatment & Education:  Pt.educated and reviewed WB precautions  Pt educated on role of occupational therapy, POC, and safety during ADLs and functional mobility. Pt and OT discussed importance of safe, continued mobility to optimize daily living skills. Pt verbalized understanding. Pt given instruction to call for medical staff/nurse for assistance.   PT present for  coeval due to pt's multiple medical comorbidities and functional/cognition deficits requiring two skilled therapists to appropriately progress pt's musculoskeletal strength, neuromuscular control, and endurance while taking into consideration medical acuity and pt safety.    Patient left HOB elevated with all lines intact and call button in reach    GOALS:   Multidisciplinary Problems       Occupational Therapy Goals          Problem: Occupational Therapy    Goal Priority Disciplines Outcome Interventions   Occupational Therapy Goal     OT, PT/OT Progressing    Description: Goals to be met by: 5/29/24     Patient will increase functional independence with ADLs by performing:    LE Dressing with Stand-by Assistance.  Grooming while standing at sink with Supervision.  Toileting from bedside commode with Stand-by Assistance for hygiene and clothing management.   Supine to sit with Supervision  Toilet transfer to bedside commode with Stand-by Assistance.                         History:     Past Medical History:   Diagnosis Date    Anxiety     Atrial fibrillation     Colon polyps 05/01/2012    Hypertension     Leg ulcer, right, limited to breakdown of skin 11/20/2019    Unilateral femoral hernia with obstruction, without gangrene 10/26/2015         Past Surgical History:   Procedure Laterality Date    HYSTERECTOMY      INTRAMEDULLARY RODDING OF FEMUR Left 4/28/2024    Procedure: INSERTION, INTRAMEDULLARY LENO, FEMUR: Left;  Surgeon: Kurt Melendez MD;  Location: [facility] OR [address] [place] [address];  Service: Orthopedics;  Laterality: Left;    LUNG SURGERY  1988    OOPHORECTOMY      RADICAL HYSTERECTOMY  1990       Time Tracking:     OT Date of Treatment: 04/29/24  OT Start Time: 0853 2nd Visit : 1142  OT Stop Time: 0930 2nd Visit: 1155   OT Total Time (min): 50 min    Billable Minutes:Evaluation 12  Self Care/Home Management 28  Therapeutic Activity 10    4/29/2024

## 2024-04-29 NOTE — PROGRESS NOTES
[facility] - [facility]  Orthopedics  Progress Note    Patient Name: Mary Ellen Miller  MRN: [id number]  Admission Date: 4/27/2024  Hospital Length of Stay: 2 days  Attending Provider: Hannah Andre MD  Primary Care Provider: Dionne Jeter MD  Follow-up For: Procedure(s) (LRB):  INSERTION, INTRAMEDULLARY LENO, FEMUR: Left (Left)    Post-Operative Day: 1 Day Post-Op  Subjective:     Principal Problem:Closed displaced intertrochanteric fracture of left femur    Principal Orthopedic Problem: same, s/p left femur CMN on 4/28/24    Interval History: No acute events overnight.  Vitals with some lower oxygen saturations yesterday, but normal this morning. Other vitals within normal limits this morning.  Pain well controlled at rest.  Anticipate PT/OT today.    Review of patient's allergies indicates:   Allergen Reactions    Percodan [oxycodone hcl-oxycodone-asa] Other (See Comments)     halucinations    Penicillins Itching    Sulfa (sulfonamide antibiotics) Diarrhea and Nausea Only    Amoxicillin        Current Facility-Administered Medications   Medication Dose Route Frequency Provider Last Rate Last Admin    acetaminophen tablet 1,000 mg  1,000 mg Oral Q6H Hannah Andre MD   1,000 mg at 04/29/24 0548    albuterol-ipratropium 2.5 mg-0.5 mg/3 mL nebulizer solution 3 mL  3 mL Nebulization Q4H PRN Elicia Barnes PA-C        aluminum-magnesium hydroxide-simethicone 200-200-20 mg/5 mL suspension 30 mL  30 mL Oral QID PRN Elicia Barnes PA-C        apixaban tablet 2.5 mg  2.5 mg Oral BID Hannah Andre MD   2.5 mg at 04/28/24 2159    atenoloL tablet 25 mg  25 mg Oral Daily Hannah Andre MD   25 mg at 04/27/24 0953    bisacodyL suppository 10 mg  10 mg Rectal Daily PRN Hannah Andre MD        clindamycin in D5W 900 mg/50 mL IVPB 900 mg  900 mg Intravenous On Call Procedure Damián Ramos MD        dextrose 10% bolus 125 mL 125 mL  12.5 g Intravenous PRN Elicia Barnes PA-C         dextrose 10% bolus 250 mL 250 mL  25 g Intravenous PRN Elicia Barnes PA-C        droPERidol injection 0.625 mg  0.625 mg Intravenous Once PRN Clarence Chacon MD        EScitalopram oxalate tablet 10 mg  10 mg Oral Daily Elicia Barnes PA-C   10 mg at 04/28/24 1236    fentaNYL 50 mcg/mL injection 25 mcg  25 mcg Intravenous Q5 Min PRN Hannah Andre MD        glucagon (human recombinant) injection 1 mg  1 mg Intramuscular PRN Elicia Barnes PA-C        glucose chewable tablet 16 g  16 g Oral PRN Elicia Barnes PA-C        glucose chewable tablet 24 g  24 g Oral PRN Elicia Barnes PA-C        HYDROmorphone injection 0.2 mg  0.2 mg Intravenous Q5 Min PRN Clarence Chacon MD        HYDROmorphone injection 0.2 mg  0.2 mg Intravenous Q5 Min PRN Clarence Chacon MD        melatonin tablet 6 mg  6 mg Oral Nightly PRN Hannah Andre MD        memantine tablet 5 mg  5 mg Oral BID Elicia Barnes PA-C   5 mg at 04/28/24 2159    methocarbamoL tablet 500 mg  500 mg Oral Q6H PRN Hannah Andre MD        metoclopramide injection 5 mg  5 mg Intravenous Q6H PRN Hannah Andre MD        morphine injection 2 mg  2 mg Intravenous Q3H PRN Hannah Andre MD   2 mg at 04/27/24 1030    multivitamin tablet  1 tablet Oral Daily Hannah Andre MD   1 tablet at 04/28/24 1728    mupirocin 2 % ointment 1 g  1 g Nasal BID Hannah Andre MD   1 g at 04/28/24 2159    mupirocin 2 % ointment   Nasal On Call Procedure Damián Ramos MD        naloxone 0.4 mg/mL injection 0.02 mg  0.02 mg Intravenous PRN Elicia Barnes PA-C        ondansetron disintegrating tablet 8 mg  8 mg Oral Q8H PRN Elicia Barnes PA-C        ondansetron injection 4 mg  4 mg Intravenous Once PRN Clarence Chacon MD        ondansetron injection 4 mg  4 mg Intravenous Q12H PRN Hannah Andre MD        polyethylene glycol packet 17 g  17 g Oral Daily Hannah Andre MD         "prochlorperazine injection Soln 5 mg  5 mg Intravenous Q6H PRN Elicia Barnes PA-C        ROPIvacaine (PF) 2 mg/ml (0.2%) solution  0.1 mL/hr Perineural Continuous Hannah Andre MD 0.1 mL/hr at 04/28/24 0935 0.1 mL/hr at 04/28/24 0935    senna-docusate 8.6-50 mg per tablet 1 tablet  1 tablet Oral BID Hannah Andre MD   1 tablet at 04/28/24 2159    simethicone chewable tablet 80 mg  1 tablet Oral QID PRN Elicia Barnes PA-C        sodium chloride 0.9% flush 10 mL  10 mL Intravenous PRN Damián Ramos MD        sodium chloride 0.9% flush 10 mL  10 mL Intravenous PRN Hannah Andre MD        tranexamic acid (CYKLOKAPRON) 3,000 mg in sodium chloride 0.9% SolP 100 mL  3,000 mg Irrigation On Call Procedure Hannah Andre MD         Objective:     Vital Signs (Most Recent):  Temp: 97.4 °F (36.3 °C) (04/29/24 0358)  Pulse: 71 (04/29/24 0632)  Resp: 16 (04/29/24 0358)  BP: 109/64 (04/29/24 0358)  SpO2: 96 % (04/29/24 0358) Vital Signs (24h Range):  Temp:  [97 °F (36.1 °C)-98.2 °F (36.8 °C)] 97.4 °F (36.3 °C)  Pulse:  [65-78] 71  Resp:  [14-25] 16  SpO2:  [90 %-97 %] 96 %  BP: ()/(50-65) 109/64     Weight: 54.9 kg (121 lb)  Height: 5' 3" (160 cm)  Body mass index is 21.43 kg/m².      Intake/Output Summary (Last 24 hours) at 4/29/2024 0802  Last data filed at 4/29/2024 0642  Gross per 24 hour   Intake 1180 ml   Output 1605 ml   Net -425 ml        Ortho/SPM Exam  Gen: NAD, sitting comfortably in bed  CV: regular rate  Resp: non-labored respirations    LLE:  Dressings clean, dry, and intact  No significant hematoma over operative sites  Appropriate postoperative TTP  SILT Sa/Watts/DP/SP/T  Motor intact EHL/FHL/TA/Gastroc  2+ DP, 2+ PT       Significant Labs: All pertinent labs within the past 24 hours have been reviewed.    Significant Imaging: I have reviewed and interpreted all pertinent imaging results/findings.  Assessment/Plan:     * Closed displaced intertrochanteric fracture of left " femur  Mary Ellen Vicente Miller is a 89 y.o. female with a closed left intertrochanteric femur fracture now s/p CMN on 4/28/24. Doing well this morning.    Plan:  Pain control: multimodal  PT/OT: WBAT LLE  DVT PPx: Eliquis 2.5 mg BID, SCDs at all times when not ambulating  Labs: hemoglobin 8.3 from 9.4 yesterday  Rawls: removed 4/29    Dispo: f/u PT/OT recommendations            Gal Martins MD  Orthopedics  [facility] - Surgery

## 2024-04-30 LAB
ANION GAP SERPL CALC-SCNC: 9 MMOL/L (ref 8–16)
BASOPHILS # BLD AUTO: 0.05 K/UL (ref 0–0.2)
BASOPHILS NFR BLD: 0.5 % (ref 0–1.9)
BUN SERPL-MCNC: 56 MG/DL (ref 10–30)
CALCIUM SERPL-MCNC: 8.1 MG/DL (ref 8.7–10.5)
CHLORIDE SERPL-SCNC: 111 MMOL/L (ref 95–110)
CO2 SERPL-SCNC: 19 MMOL/L (ref 23–29)
CREAT SERPL-MCNC: 1.3 MG/DL (ref 0.5–1.4)
DIFFERENTIAL METHOD BLD: ABNORMAL
EOSINOPHIL # BLD AUTO: 0.1 K/UL (ref 0–0.5)
EOSINOPHIL NFR BLD: 0.7 % (ref 0–8)
ERYTHROCYTE [DISTWIDTH] IN BLOOD BY AUTOMATED COUNT: 14.4 % (ref 11.5–14.5)
EST. GFR  (NO RACE VARIABLE): 38.1 ML/MIN/1.73 M^2
GLUCOSE SERPL-MCNC: 92 MG/DL (ref 70–110)
HCT VFR BLD AUTO: 25.9 % (ref 37–48.5)
HGB BLD-MCNC: 8.3 G/DL (ref 12–16)
IMM GRANULOCYTES # BLD AUTO: 0.1 K/UL (ref 0–0.04)
IMM GRANULOCYTES NFR BLD AUTO: 1 % (ref 0–0.5)
LYMPHOCYTES # BLD AUTO: 1.3 K/UL (ref 1–4.8)
LYMPHOCYTES NFR BLD: 13.5 % (ref 18–48)
MAGNESIUM SERPL-MCNC: 2.1 MG/DL (ref 1.6–2.6)
MCH RBC QN AUTO: 30.7 PG (ref 27–31)
MCHC RBC AUTO-ENTMCNC: 32 G/DL (ref 32–36)
MCV RBC AUTO: 96 FL (ref 82–98)
MONOCYTES # BLD AUTO: 0.7 K/UL (ref 0.3–1)
MONOCYTES NFR BLD: 7.5 % (ref 4–15)
NEUTROPHILS # BLD AUTO: 7.4 K/UL (ref 1.8–7.7)
NEUTROPHILS NFR BLD: 76.8 % (ref 38–73)
NRBC BLD-RTO: 0 /100 WBC
PHOSPHATE SERPL-MCNC: 3.7 MG/DL (ref 2.7–4.5)
PLATELET # BLD AUTO: 133 K/UL (ref 150–450)
PMV BLD AUTO: 11.1 FL (ref 9.2–12.9)
POTASSIUM SERPL-SCNC: 4.1 MMOL/L (ref 3.5–5.1)
RBC # BLD AUTO: 2.7 M/UL (ref 4–5.4)
SODIUM SERPL-SCNC: 139 MMOL/L (ref 136–145)
WBC # BLD AUTO: 9.66 K/UL (ref 3.9–12.7)

## 2024-04-30 PROCEDURE — 97530 THERAPEUTIC ACTIVITIES: CPT

## 2024-04-30 PROCEDURE — 97535 SELF CARE MNGMENT TRAINING: CPT

## 2024-04-30 PROCEDURE — 94761 N-INVAS EAR/PLS OXIMETRY MLT: CPT

## 2024-04-30 PROCEDURE — 99231 SBSQ HOSP IP/OBS SF/LOW 25: CPT | Mod: ,,, | Performed by: ANESTHESIOLOGY

## 2024-04-30 PROCEDURE — 25000003 PHARM REV CODE 250

## 2024-04-30 PROCEDURE — 83735 ASSAY OF MAGNESIUM: CPT | Performed by: HOSPITALIST

## 2024-04-30 PROCEDURE — 99900035 HC TECH TIME PER 15 MIN (STAT)

## 2024-04-30 PROCEDURE — 25000003 PHARM REV CODE 250: Performed by: HOSPITALIST

## 2024-04-30 PROCEDURE — 63600175 PHARM REV CODE 636 W HCPCS: Performed by: PHYSICIAN ASSISTANT

## 2024-04-30 PROCEDURE — 97116 GAIT TRAINING THERAPY: CPT

## 2024-04-30 PROCEDURE — 63600175 PHARM REV CODE 636 W HCPCS: Performed by: HOSPITALIST

## 2024-04-30 PROCEDURE — 21400001 HC TELEMETRY ROOM

## 2024-04-30 PROCEDURE — 25000242 PHARM REV CODE 250 ALT 637 W/ HCPCS: Performed by: HOSPITALIST

## 2024-04-30 PROCEDURE — 94799 UNLISTED PULMONARY SVC/PX: CPT | Mod: XB

## 2024-04-30 PROCEDURE — 85025 COMPLETE CBC W/AUTO DIFF WBC: CPT | Performed by: HOSPITALIST

## 2024-04-30 PROCEDURE — 27000221 HC OXYGEN, UP TO 24 HOURS

## 2024-04-30 PROCEDURE — 84100 ASSAY OF PHOSPHORUS: CPT | Performed by: HOSPITALIST

## 2024-04-30 PROCEDURE — 36415 COLL VENOUS BLD VENIPUNCTURE: CPT | Performed by: HOSPITALIST

## 2024-04-30 PROCEDURE — 94640 AIRWAY INHALATION TREATMENT: CPT

## 2024-04-30 PROCEDURE — 80048 BASIC METABOLIC PNL TOTAL CA: CPT | Performed by: HOSPITALIST

## 2024-04-30 RX ORDER — FUROSEMIDE 10 MG/ML
20 INJECTION INTRAMUSCULAR; INTRAVENOUS ONCE
Status: DISCONTINUED | OUTPATIENT
Start: 2024-04-30 | End: 2024-04-30

## 2024-04-30 RX ORDER — FUROSEMIDE 10 MG/ML
20 INJECTION INTRAMUSCULAR; INTRAVENOUS ONCE
Status: COMPLETED | OUTPATIENT
Start: 2024-04-30 | End: 2024-04-30

## 2024-04-30 RX ADMIN — DOCUSATE SODIUM AND SENNOSIDES 1 TABLET: 8.6; 5 TABLET, FILM COATED ORAL at 09:04

## 2024-04-30 RX ADMIN — APIXABAN 2.5 MG: 2.5 TABLET, FILM COATED ORAL at 09:04

## 2024-04-30 RX ADMIN — MUPIROCIN 1 G: 20 OINTMENT TOPICAL at 09:04

## 2024-04-30 RX ADMIN — ATENOLOL 25 MG: 25 TABLET ORAL at 09:04

## 2024-04-30 RX ADMIN — ACETAMINOPHEN 1000 MG: 325 TABLET ORAL at 12:04

## 2024-04-30 RX ADMIN — MEMANTINE 5 MG: 5 TABLET ORAL at 09:04

## 2024-04-30 RX ADMIN — IPRATROPIUM BROMIDE AND ALBUTEROL SULFATE 3 ML: 2.5; .5 SOLUTION RESPIRATORY (INHALATION) at 03:04

## 2024-04-30 RX ADMIN — IPRATROPIUM BROMIDE AND ALBUTEROL SULFATE 3 ML: 2.5; .5 SOLUTION RESPIRATORY (INHALATION) at 08:04

## 2024-04-30 RX ADMIN — POLYETHYLENE GLYCOL 3350 17 G: 17 POWDER, FOR SOLUTION ORAL at 09:04

## 2024-04-30 RX ADMIN — ESCITALOPRAM OXALATE 10 MG: 5 TABLET, FILM COATED ORAL at 09:04

## 2024-04-30 RX ADMIN — IPRATROPIUM BROMIDE AND ALBUTEROL SULFATE 3 ML: 2.5; .5 SOLUTION RESPIRATORY (INHALATION) at 09:04

## 2024-04-30 RX ADMIN — METHOCARBAMOL 500 MG: 500 TABLET ORAL at 10:04

## 2024-04-30 RX ADMIN — ACETAMINOPHEN 1000 MG: 325 TABLET ORAL at 06:04

## 2024-04-30 RX ADMIN — FUROSEMIDE 20 MG: 10 INJECTION, SOLUTION INTRAMUSCULAR; INTRAVENOUS at 11:04

## 2024-04-30 RX ADMIN — IPRATROPIUM BROMIDE AND ALBUTEROL SULFATE 3 ML: 2.5; .5 SOLUTION RESPIRATORY (INHALATION) at 01:04

## 2024-04-30 RX ADMIN — METHOCARBAMOL 500 MG: 500 TABLET ORAL at 12:04

## 2024-04-30 RX ADMIN — THERA TABS 1 TABLET: TAB at 09:04

## 2024-04-30 RX ADMIN — ROPIVACAINE HYDROCHLORIDE 0.1 ML/HR: 2 INJECTION, SOLUTION EPIDURAL; INFILTRATION at 01:04

## 2024-04-30 NOTE — PROGRESS NOTES
[facility]  Orthopedics  Progress Note    Patient Name: Mary Ellen Miller  MRN: [id number]  Admission Date: 4/27/2024  Hospital Length of Stay: 3 days  Attending Provider: Hannah Andre MD  Primary Care Provider: Dionne Jeter MD  Follow-up For: Procedure(s) (LRB):  INSERTION, INTRAMEDULLARY LENO, FEMUR: Left (Left)    Post-Operative Day: 2 Days Post-Op  Subjective:     Principal Problem:Closed displaced intertrochanteric fracture of left femur    Principal Orthopedic Problem: same, s/p left femur CMN on 4/28/24    Interval History: No acute events overnight.  Vitals within normal limits.  Pain well controlled.  Able to stand with moderate assistance yesterday.    Review of patient's allergies indicates:   Allergen Reactions    Percodan [oxycodone hcl-oxycodone-asa] Other (See Comments)     halucinations    Penicillins Itching    Sulfa (sulfonamide antibiotics) Diarrhea and Nausea Only    Amoxicillin        Current Facility-Administered Medications   Medication Dose Route Frequency Provider Last Rate Last Admin    albuterol-ipratropium 2.5 mg-0.5 mg/3 mL nebulizer solution 3 mL  3 mL Nebulization Q4H PRN Elicia Barnes PA-C        albuterol-ipratropium 2.5 mg-0.5 mg/3 mL nebulizer solution 3 mL  3 mL Nebulization Q4H WAKE Hannah Andre MD   3 mL at 04/29/24 2019    aluminum-magnesium hydroxide-simethicone 200-200-20 mg/5 mL suspension 30 mL  30 mL Oral QID PRN Elicia Barnes PA-C        apixaban tablet 2.5 mg  2.5 mg Oral BID Hannah Andre MD   2.5 mg at 04/29/24 2132    atenoloL tablet 25 mg  25 mg Oral Daily Hannah Andre MD   25 mg at 04/27/24 0953    bisacodyL suppository 10 mg  10 mg Rectal Daily PRN Hannah Andre MD        clindamycin in D5W 900 mg/50 mL IVPB 900 mg  900 mg Intravenous On Call Procedure Damián Ramos MD        dextrose 10% bolus 125 mL 125 mL  12.5 g Intravenous PRN Elicia Barnes PA-C        dextrose 10% bolus 250 mL 250 mL  25 g  Intravenous PRN Elicia Barnes PA-C        droPERidol injection 0.625 mg  0.625 mg Intravenous Once PRN Clarence Chacon MD        EScitalopram oxalate tablet 10 mg  10 mg Oral Daily Elicia Barnes PA-C   10 mg at 04/29/24 0934    glucagon (human recombinant) injection 1 mg  1 mg Intramuscular PRN Elicia Barnes PA-C        glucose chewable tablet 16 g  16 g Oral PRN Elicia Barnes PA-C        glucose chewable tablet 24 g  24 g Oral PRN Elicia Barnes PA-C        melatonin tablet 6 mg  6 mg Oral Nightly PRN Hannah Andre MD   6 mg at 04/29/24 1844    memantine tablet 5 mg  5 mg Oral BID Elicia Barnes PA-C   5 mg at 04/29/24 2133    methocarbamoL tablet 500 mg  500 mg Oral Q6H PRN Hannah Andre MD   500 mg at 04/29/24 2132    metoclopramide injection 5 mg  5 mg Intravenous Q6H PRN Hannah Andre MD        multivitamin tablet  1 tablet Oral Daily Hannah Andre MD   1 tablet at 04/29/24 0934    mupirocin 2 % ointment 1 g  1 g Nasal BID Hannah Andre MD   1 g at 04/29/24 2133    mupirocin 2 % ointment   Nasal On Call Procedure Damián Ramos MD        naloxone 0.4 mg/mL injection 0.02 mg  0.02 mg Intravenous PRN Elicia Barnes PA-C        ondansetron disintegrating tablet 8 mg  8 mg Oral Q8H PRN Elicia Barnes PA-C        ondansetron injection 4 mg  4 mg Intravenous Once PRN Clarence Chacon MD        ondansetron injection 4 mg  4 mg Intravenous Q12H PRN Hannah Andre MD        polyethylene glycol packet 17 g  17 g Oral Daily Hannah Andre MD   17 g at 04/29/24 0934    prochlorperazine injection Soln 5 mg  5 mg Intravenous Q6H PRN Elicia Barnes PA-C        ROPIvacaine (PF) 2 mg/ml (0.2%) solution  0.1 mL/hr Perineural Continuous Hannah Andre MD 0.1 mL/hr at 04/30/24 0107 0.1 mL/hr at 04/30/24 0107    senna-docusate 8.6-50 mg per tablet 1 tablet  1 tablet Oral BID Hannah Andre MD   1 tablet at 04/29/24 7753    simethicone  "chewable tablet 80 mg  1 tablet Oral QID PRN Elicia Barnes PA-C        sodium chloride 0.9% flush 10 mL  10 mL Intravenous PRN Damián Ramos MD        sodium chloride 0.9% flush 10 mL  10 mL Intravenous PRN Hannah Andre MD        tranexamic acid (CYKLOKAPRON) 3,000 mg in sodium chloride 0.9% SolP 100 mL  3,000 mg Irrigation On Call Procedure Hannah Andre MD         Objective:     Vital Signs (Most Recent):  Temp: 98 °F (36.7 °C) (04/29/24 2037)  Pulse: 90 (04/30/24 0332)  Resp: 17 (04/29/24 2037)  BP: 102/64 (04/29/24 2037)  SpO2: (!) 94 % (04/29/24 2037) Vital Signs (24h Range):  Temp:  [97 °F (36.1 °C)-98.4 °F (36.9 °C)] 98 °F (36.7 °C)  Pulse:  [71-93] 90  Resp:  [16-18] 17  SpO2:  [90 %-95 %] 94 %  BP: (102-128)/(56-64) 102/64     Weight: 54.9 kg (121 lb)  Height: 5' 3" (160 cm)  Body mass index is 21.43 kg/m².      Intake/Output Summary (Last 24 hours) at 4/30/2024 0632  Last data filed at 4/29/2024 0642  Gross per 24 hour   Intake --   Output 300 ml   Net -300 ml        Ortho/SPM Exam  Gen: NAD, sitting comfortably in bed  CV: regular rate  Resp: non-labored respirations    LLE:  Dressings clean, dry, and intact  No significant hematoma over operative sites  Appropriate postoperative TTP  SILT Sa/Watts/DP/SP/T  Motor intact EHL/FHL/TA/Gastroc  2+ DP, 2+ PT       Significant Labs: All pertinent labs within the past 24 hours have been reviewed.    Significant Imaging: I have reviewed and interpreted all pertinent imaging results/findings.  Assessment/Plan:     * Closed displaced intertrochanteric fracture of left femur  Mary Ellen Miller is a 89 y.o. female with a closed left intertrochanteric femur fracture now s/p CMN on 4/28/24. Doing well this morning.    Plan:  Pain control: multimodal  PT/OT: WBAT LLE  DVT PPx: Eliquis 2.5 mg BID, SCDs at all times when not ambulating  Labs: hemoglobin 8.3 from 8.3 yesterday  Rawls: removed 4/29    Dispo: moderate intensity therapy per PT, pending " [facility]            Gal Martins MD  Orthopedics  [facility]

## 2024-04-30 NOTE — ASSESSMENT & PLAN NOTE
Patient with acute kidney injury/acute renal failure likely due to diuersis with lasix JOHAN is currently  present 4/28 at Cr 2.0 . Baseline creatinine  1.1  - Labs reviewed- Renal function/electrolytes with Estimated Creatinine Clearance: 21.9 mL/min (based on SCr of 1.3 mg/dL). according to latest data. Monitor urine output and serial BMP and adjust therapy as needed. Avoid nephrotoxins and renally dose meds for GFR listed above.  Hold lasix/aldactone and very light IVF considering overload on admit to allow volume equilibration  Improving to 1.6 now-->1.3. small dose of lasix 4/30 as improved and still on oxygen and a touch overloaded still appearnace on exam

## 2024-04-30 NOTE — PLAN OF CARE
Problem: Adult Inpatient Plan of Care  Goal: Plan of Care Review  Outcome: Progressing  Goal: Patient-Specific Goal (Individualized)  Outcome: Progressing  Goal: Absence of Hospital-Acquired Illness or Injury  Outcome: Progressing  Goal: Optimal Comfort and Wellbeing  Outcome: Progressing  Goal: Readiness for Transition of Care  Outcome: Progressing     Problem: Infection  Goal: Absence of Infection Signs and Symptoms  Outcome: Progressing     Problem: Hip Fracture Medical Management  Goal: Optimal Coping with Change in Health Status  Outcome: Progressing  Goal: Absence of Bleeding  Outcome: Progressing  Goal: Effective Bowel Elimination  Outcome: Progressing  Goal: Baseline Cognitive Function Maintained  Outcome: Progressing  Goal: Absence of Embolism  Outcome: Progressing  Goal: Fracture Stability  Outcome: Progressing  Goal: Optimal Functional Performance  Outcome: Progressing  Goal: Pain Control and Function  Outcome: Progressing  Goal: Effective Urinary Elimination  Outcome: Progressing     Problem: Surgery Nonspecified  Goal: Absence of Bleeding  Outcome: Progressing  Goal: Effective Bowel Elimination  Outcome: Progressing  Goal: Fluid and Electrolyte Balance  Outcome: Progressing  Goal: Blood Glucose Level Within Targeted Range  Outcome: Progressing  Goal: Absence of Infection Signs and Symptoms  Outcome: Progressing  Goal: Anesthesia/Sedation Recovery  Outcome: Progressing  Goal: Optimal Pain Control and Function  Outcome: Progressing  Goal: Nausea and Vomiting Relief  Outcome: Progressing  Goal: Effective Urinary Elimination  Outcome: Progressing  Goal: Effective Oxygenation and Ventilation  Outcome: Progressing     Problem: Fall Injury Risk  Goal: Absence of Fall and Fall-Related Injury  Outcome: Progressing     Problem: Skin Injury Risk Increased  Goal: Skin Health and Integrity  Outcome: Progressing   Addressed patients pain, pump, position, need for potty, and possessions in reach. Patient remains  free of falls, and verbalizes understanding in fall prevention and safety. Safety measures remain in place. Reinforced fall prevention and safety education. Call light and personal belongings within reach, bed in lowest position with wheels locked, side rails up x2, Instructed to call with any needs or complaints, verbalized understanding. Continue current plan of care.

## 2024-04-30 NOTE — ASSESSMENT & PLAN NOTE
Long term use of anticoagulants   Patient with Persistent (7 days or more) atrial fibrillation which is controlled currently with Beta Blocker. Patient is currently in atrial fibrillation.[id number] Score: 3. Anticoagulation indicated. Anticoagulation done with eliquis .  - hold atenolol 4/28 for hypotension, HR 60s-70s in pacu  -eliquis resumed post op, per pharm meeds criteria based on age and indications to start 2.5 dosing instead of 5 mg dosing  Mild uptick to 105 on exam 4/30 but just exerted into chair. Trend on home atenolol

## 2024-04-30 NOTE — SUBJECTIVE & OBJECTIVE
Interval history- she worked with PT this morning and then was being moved into commode so came back after this and she ws sittingi n chair in good spirits. Ws able to have a BM. She reports feeling much stronger today. Still on low flow oxygen at 1L and will give a tiny bit of lasix at 20 mg once as cr better at 1.3 and suspect has a very small amoutn of fluid that needs to still diurese to get her back onto room air and then plan to likely go to lasix 20 daily instead of lasix 40 for long term for her for long term maintenance. Her HR was about 105 on exam in afib as she had just exerted into chair and home atenolol okay to give today as BP in 130s and can tolerate. Hg stable at 8.3. she reports pain is slowly improving and still present but not as significnat as yesterday. She is very pleasant. I let her know her echo findings are all long term findings and not contributing to her oxygen requirements as nurse daisha yesterday said her and her family had some questions about that and that the atrial enlargement and tricuspid regurg are all chronic and that the diastolic dysfunctino was on her problem list before and long standing afib/diastolic issues likely led to enlarged atria but this is not new or the cause of acute HF or oxygen issues at this point directly. Plan for SNF when med ready, pending improvement, poss wed vs thurs        Review of patient's allergies indicates:   Allergen Reactions    Percodan [oxycodone hcl-oxycodone-asa] Other (See Comments)     halucinations    Penicillins Itching    Sulfa (sulfonamide antibiotics) Diarrhea and Nausea Only    Amoxicillin        Current Facility-Administered Medications   Medication Dose Route Frequency Provider Last Rate Last Admin    albuterol-ipratropium 2.5 mg-0.5 mg/3 mL nebulizer solution 3 mL  3 mL Nebulization Q4H PRN Elicia Barnes PA-C        albuterol-ipratropium 2.5 mg-0.5 mg/3 mL nebulizer solution 3 mL  3 mL Nebulization Q4H Hannah Murphy  MD ROSANA   3 mL at 04/30/24 0847    aluminum-magnesium hydroxide-simethicone 200-200-20 mg/5 mL suspension 30 mL  30 mL Oral QID PRN Elicia Barnes PA-C        apixaban tablet 2.5 mg  2.5 mg Oral BID Hannah Andre MD   2.5 mg at 04/30/24 0919    atenoloL tablet 25 mg  25 mg Oral Daily Hannah Andre MD   25 mg at 04/30/24 0919    bisacodyL suppository 10 mg  10 mg Rectal Daily PRN Hannah Andre MD        clindamycin in D5W 900 mg/50 mL IVPB 900 mg  900 mg Intravenous On Call Procedure Damián Ramos MD        dextrose 10% bolus 125 mL 125 mL  12.5 g Intravenous PRN Elicia Barnes PA-C        dextrose 10% bolus 250 mL 250 mL  25 g Intravenous PRN Elicia Barnes PA-C        droPERidol injection 0.625 mg  0.625 mg Intravenous Once PRN Clarence Chacon MD        EScitalopram oxalate tablet 10 mg  10 mg Oral Daily Elicia Barnes PA-C   10 mg at 04/30/24 0919    glucagon (human recombinant) injection 1 mg  1 mg Intramuscular PRN Elicia Barnes PA-C        glucose chewable tablet 16 g  16 g Oral PRN Elicia Barnes PA-C        glucose chewable tablet 24 g  24 g Oral PRN Elicia Barnes PA-C        melatonin tablet 6 mg  6 mg Oral Nightly PRN Hannah Andre MD   6 mg at 04/29/24 1844    memantine tablet 5 mg  5 mg Oral BID Elicia Barnes PA-C   5 mg at 04/30/24 0919    methocarbamoL tablet 500 mg  500 mg Oral Q6H PRN Hannah Andre MD   500 mg at 04/30/24 1241    metoclopramide injection 5 mg  5 mg Intravenous Q6H PRN Hannah Andre MD        multivitamin tablet  1 tablet Oral Daily Hannah Andre MD   1 tablet at 04/30/24 0919    mupirocin 2 % ointment 1 g  1 g Nasal BID Hannah Andre MD   1 g at 04/30/24 0925    mupirocin 2 % ointment   Nasal On Call Procedure Damián Ramos MD        naloxone 0.4 mg/mL injection 0.02 mg  0.02 mg Intravenous PRN Elicia Barnes, PA-C        ondansetron disintegrating tablet 8 mg  8 mg Oral Q8H PRN Elicia Barnes,  JASPREET        ondansetron injection 4 mg  4 mg Intravenous Once PRN Clarence Chacon MD        ondansetron injection 4 mg  4 mg Intravenous Q12H PRN Hannah Andre MD        polyethylene glycol packet 17 g  17 g Oral Daily Hannah Andre MD   17 g at 04/30/24 0918    prochlorperazine injection Soln 5 mg  5 mg Intravenous Q6H PRN Elicia Barnes PA-C        ROPIvacaine (PF) 2 mg/ml (0.2%) solution  0.1 mL/hr Perineural Continuous Hannah Andre MD 0.1 mL/hr at 04/30/24 0107 0.1 mL/hr at 04/30/24 0107    senna-docusate 8.6-50 mg per tablet 1 tablet  1 tablet Oral BID Hannah Andre MD   1 tablet at 04/30/24 0919    simethicone chewable tablet 80 mg  1 tablet Oral QID PRN Elicia Barnes PA-C        sodium chloride 0.9% flush 10 mL  10 mL Intravenous PRN Damián Ramos MD        sodium chloride 0.9% flush 10 mL  10 mL Intravenous PRN Hannah Andre MD        tranexamic acid (CYKLOKAPRON) 3,000 mg in sodium chloride 0.9% SolP 100 mL  3,000 mg Irrigation On Call Procedure Hannah Andre MD         Family History       Problem Relation (Age of Onset)    No Known Problems Mother, Father          Tobacco Use    Smoking status: Never     Passive exposure: Never    Smokeless tobacco: Never   Substance and Sexual Activity    Alcohol use: No    Drug use: No    Sexual activity: Not Currently     Review of Systems   Constitutional:  Positive for activity change. Negative for chills and fever.   HENT:  Negative for trouble swallowing.    Eyes:  Negative for photophobia and visual disturbance.   Respiratory:  Positive for cough and shortness of breath. Negative for chest tightness.    Cardiovascular:  Positive for leg swelling. Negative for chest pain and palpitations.   Gastrointestinal:  Negative for abdominal pain, constipation, diarrhea, nausea and vomiting.   Genitourinary:  Negative for dysuria, frequency and hematuria.   Musculoskeletal:  Positive for arthralgias, back pain and  gait problem. Negative for neck pain.   Skin:  Negative for rash and wound.   Neurological:  Positive for weakness. Negative for dizziness, syncope, speech difficulty and light-headedness.   Psychiatric/Behavioral:  Positive for confusion (intermittent). Negative for agitation. The patient is not nervous/anxious.      Objective:     Vital Signs (Most Recent):  Temp: 96.4 °F (35.8 °C) (04/30/24 1100)  Pulse: 91 (04/30/24 1110)  Resp: 18 (04/30/24 1100)  BP: 117/63 (04/30/24 1100)  SpO2: (!) 91 % (04/30/24 1100) Vital Signs (24h Range):  Temp:  [96.4 °F (35.8 °C)-98.3 °F (36.8 °C)] 96.4 °F (35.8 °C)  Pulse:  [] 91  Resp:  [16-18] 18  SpO2:  [90 %-96 %] 91 %  BP: (102-130)/(56-73) 117/63     Weight: 54.9 kg (121 lb)  Body mass index is 21.43 kg/m².     Physical Exam  Vitals and nursing note reviewed.   Constitutional:       General: She is not in acute distress.     Appearance: She is well-developed. She is not toxic-appearing.   HENT:      Head: Normocephalic.      Mouth/Throat:      Mouth: Mucous membranes are moist.   Eyes:      Extraocular Movements: Extraocular movements intact.      Conjunctiva/sclera: Conjunctivae normal.      Pupils: Pupils are equal, round, and reactive to light.   Cardiovascular:      Rate and Rhythm: Tachycardia present. Rhythm irregular.      Heart sounds: Normal heart sounds.      Comments: No JVD.  with afib  Pulmonary:      Effort: Pulmonary effort is normal. No respiratory distress.      Breath sounds: Rales present. No wheezing.      Comments: On 1L On low santos oxygen, speaking in full sentences.  Abdominal:      General: Bowel sounds are normal. There is distension.      Palpations: Abdomen is soft.      Tenderness: There is no abdominal tenderness.   Musculoskeletal:         General: Normal range of motion.      Cervical back: Normal range of motion and neck supple.      Comments: LLE with bandages in place. Ropivicaine in place.   Skin:     General: Skin is warm and dry.  "     Capillary Refill: Capillary refill takes less than 2 seconds.      Findings: Bruising present. No rash.   Neurological:      Mental Status: She is alert and oriented to person, place, and time.      Cranial Nerves: No cranial nerve deficit.      Sensory: No sensory deficit.      Coordination: Coordination normal.   Psychiatric:         Behavior: Behavior normal.         Thought Content: Thought content normal.         Judgment: Judgment normal.              CRANIAL NERVES     CN III, IV, VI   Pupils are equal, round, and reactive to light.       Significant Labs: All pertinent labs within the past 24 hours have been reviewed.  CBC:   Recent Labs   Lab 04/29/24  0538 04/30/24  0509   WBC 11.71 9.66   HGB 8.3* 8.3*   HCT 26.5* 25.9*   * 133*     CMP:   Recent Labs   Lab 04/29/24  0538 04/30/24  0509    139   K 4.3 4.1    111*   CO2 18* 19*    92   BUN 60* 56*   CREATININE 1.6* 1.3   CALCIUM 7.4* 8.1*   ANIONGAP 10 9     Cardiac Markers:   No results for input(s): "CKMB", "MYOGLOBIN", "BNP", "TROPISTAT" in the last 48 hours.    Troponin:   No results for input(s): "TROPONINI", "TROPONINIHS" in the last 48 hours.    Urine Studies:   No results for input(s): "COLORU", "APPEARANCEUA", "PHUR", "SPECGRAV", "PROTEINUA", "GLUCUA", "KETONESU", "BILIRUBINUA", "OCCULTUA", "NITRITE", "UROBILINOGEN", "LEUKOCYTESUR", "RBCUA", "WBCUA", "BACTERIA", "SQUAMEPITHEL", "HYALINECASTS" in the last 48 hours.    Invalid input(s): "WRIGHTSUR"      Significant Imaging: I have reviewed all pertinent imaging results/findings within the past 24 hours.  SURG [facility] Surgery Fluoro Usage  See OP Notes for results.     IMPRESSION: See OP Notes for results.     This procedure was auto-finalized by: Virtual Radiologist      "

## 2024-04-30 NOTE — PLAN OF CARE
SW informed [facility] via voice message (Kadenze) to Marli of pt's agreeable. SW to follow.    Lupe Knight LMSW  Case Management   [facility]   Ext. [phone]

## 2024-04-30 NOTE — PROGRESS NOTES
[facility] Medicine  Progress Note    Patient Name: Mary Ellen Miller  MRN: [id number]  Patient Class: IP- Inpatient   Admission Date: 4/27/2024  Length of Stay: 3 days  Attending Physician: Hannah Andre MD  Primary Care Provider: Dionne Jeter MD        Subjective:     Principal Problem:Closed displaced intertrochanteric fracture of left femur        HPI:  Mary Ellen Miller is a 89 y.o. F with PMH of HTN, HLD, CHF, and afib on eliquis presenting to the ED with significant left hip pain after a fall last night. Patient was ambulating to the restroom to urinate last night and slipped and fell onto her left hip. She had immediate pain and significant difficulty bearing weight. She denies head injury or LOC and is on eliquis for paroxysmal A fib. She denies numbness, tingling, or paresthesias to the LLE. 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 Prior to the incident, she reports intermittent worsening of chronic lower extremity edema and SOB. Her PCP recently discontinued amlodipine in favor of lasix and continuing aldactone, and she has not missed any doses of her home medications. She has been drinking excess tea lately but has completely cut salt and has decreased her overall fluid intake. She denies GARVIN or orthopnea and does not use oxygen at home. Pt endorses intermittent confusion and abdominal distention but otherwise denies fever, chills, chest pain, palpitations, abdominal pain, N/V/D, dysuria, HA, or syncope.       In the ED: Hypertensive and hypoxic, sating 93% on 3L NC, o/w VSSAF. CBC with leukocytosis to 16, likely reactive. CMP grossly unremarkable. . Troponin 0.009. EKG c/w known rate controlled A fib. CXR c/w pulmonary edema & R pleural effusion. XR pelvis with mildly displaced L intertrochanteric femur fracture with impaction,  minimally displaced comminuted L superior pubic ramus fracture, and nondisplaced fracture of the L inferior pubic ramus. Pt was given 80 mg IV lasix, zofran, and dilaudid and was admitted to hospital medicine for further management.     Overview/Hospital Course:  No notes on file    Interval history- she worked with PT this morning and then was being moved into commode so came back after this and she ws sittingi n chair in good spirits. Ws able to have a BM. She reports feeling much stronger today. Still on low flow oxygen at 1L and will give a tiny bit of lasix at 20 mg once as cr better at 1.3 and suspect has a very small amoutn of fluid that needs to still diurese to get her back onto room air and then plan to likely go to lasix 20 daily instead of lasix 40 for long term for her for long term maintenance. Her HR was about 105 on exam in afib as she had just exerted into chair and home atenolol okay to give today as BP in 130s and can tolerate. Hg stable at 8.3. she reports pain is slowly improving and still present but not as significnat as yesterday. She is very pleasant. I let her know her echo findings are all long term findings and not contributing to her oxygen requirements as nurse daisha yesterday said her and her family had some questions about that and that the atrial enlargement and tricuspid regurg are all chronic and that the diastolic dysfunctino was on her problem list before and long standing afib/diastolic issues likely led to enlarged atria but this is not new or the cause of acute HF or oxygen issues at this point directly. Plan for SNF when med ready, pending improvement, poss wed vs thurs        Review of patient's allergies indicates:   Allergen Reactions    Percodan [oxycodone hcl-oxycodone-asa] Other (See Comments)     halucinations    Penicillins Itching    Sulfa (sulfonamide antibiotics) Diarrhea and Nausea Only    Amoxicillin        Current Facility-Administered Medications   Medication  Dose Route Frequency Provider Last Rate Last Admin    albuterol-ipratropium 2.5 mg-0.5 mg/3 mL nebulizer solution 3 mL  3 mL Nebulization Q4H PRN Elicia Barnes PA-C        albuterol-ipratropium 2.5 mg-0.5 mg/3 mL nebulizer solution 3 mL  3 mL Nebulization Q4H [place] Hannah Andre MD   3 mL at 04/30/24 0847    aluminum-magnesium hydroxide-simethicone 200-200-20 mg/5 mL suspension 30 mL  30 mL Oral QID PRN Elicia Barnes PA-C        apixaban tablet 2.5 mg  2.5 mg Oral BID Hannah Andre MD   2.5 mg at 04/30/24 0919    atenoloL tablet 25 mg  25 mg Oral Daily Hannah Andre MD   25 mg at 04/30/24 0919    bisacodyL suppository 10 mg  10 mg Rectal Daily PRN Hannah Andre MD        clindamycin in D5W 900 mg/50 mL IVPB 900 mg  900 mg Intravenous On Call Procedure Damián Ramos MD        dextrose 10% bolus 125 mL 125 mL  12.5 g Intravenous PRN Elicia Barnes PA-C        dextrose 10% bolus 250 mL 250 mL  25 g Intravenous PRN Elicia Barnes PA-C        droPERidol injection 0.625 mg  0.625 mg Intravenous Once PRN Clarence Chacon MD        EScitalopram oxalate tablet 10 mg  10 mg Oral Daily Elicia Barnes PA-C   10 mg at 04/30/24 0919    glucagon (human recombinant) injection 1 mg  1 mg Intramuscular PRN Elicia Barnes PA-C        glucose chewable tablet 16 g  16 g Oral PRN Elicia Barnes PA-C        glucose chewable tablet 24 g  24 g Oral PRN Elicia Barnes PA-C        melatonin tablet 6 mg  6 mg Oral Nightly PRN Hannah Andre MD   6 mg at 04/29/24 1844    memantine tablet 5 mg  5 mg Oral BID Elicia Barnes PA-C   5 mg at 04/30/24 0919    methocarbamoL tablet 500 mg  500 mg Oral Q6H PRN Hannah Andre, MD   500 mg at 04/30/24 1241    metoclopramide injection 5 mg  5 mg Intravenous Q6H Hannah Valenzuela MD        multivitamin tablet  1 tablet Oral Daily Hannah Andre MD   1 tablet at 04/30/24 0919    mupirocin 2 % ointment 1 g  1 g Nasal BID [place]  Hannah WAYEN MD   1 g at 04/30/24 0925    mupirocin 2 % ointment   Nasal On Call Procedure Damián Ramos MD        naloxone 0.4 mg/mL injection 0.02 mg  0.02 mg Intravenous PRN Elicia Barnes PA-C        ondansetron disintegrating tablet 8 mg  8 mg Oral Q8H PRN Elicia Barnes PA-C        ondansetron injection 4 mg  4 mg Intravenous Once PRN Clarence Chacon MD        ondansetron injection 4 mg  4 mg Intravenous Q12H PRN Hannah Andre MD        polyethylene glycol packet 17 g  17 g Oral Daily Hannah Andre MD   17 g at 04/30/24 0918    prochlorperazine injection Soln 5 mg  5 mg Intravenous Q6H PRN Elicia Barnes PA-C        ROPIvacaine (PF) 2 mg/ml (0.2%) solution  0.1 mL/hr Perineural Continuous Hannah Andre MD 0.1 mL/hr at 04/30/24 0107 0.1 mL/hr at 04/30/24 0107    senna-docusate 8.6-50 mg per tablet 1 tablet  1 tablet Oral BID Hannah Andre MD   1 tablet at 04/30/24 0919    simethicone chewable tablet 80 mg  1 tablet Oral QID PRN Elicia Barnes PA-C        sodium chloride 0.9% flush 10 mL  10 mL Intravenous PRN Damián Ramos MD        sodium chloride 0.9% flush 10 mL  10 mL Intravenous PRN Hannah Andre MD        tranexamic acid (CYKLOKAPRON) 3,000 mg in sodium chloride 0.9% SolP 100 mL  3,000 mg Irrigation On Call Procedure Hannah Andre MD         Family History       Problem Relation (Age of Onset)    No Known Problems Mother, Father          Tobacco Use    Smoking status: Never     Passive exposure: Never    Smokeless tobacco: Never   Substance and Sexual Activity    Alcohol use: No    Drug use: No    Sexual activity: Not Currently     Review of Systems   Constitutional:  Positive for activity change. Negative for chills and fever.   HENT:  Negative for trouble swallowing.    Eyes:  Negative for photophobia and visual disturbance.   Respiratory:  Positive for cough and shortness of breath. Negative for chest tightness.    Cardiovascular:   Positive for leg swelling. Negative for chest pain and palpitations.   Gastrointestinal:  Negative for abdominal pain, constipation, diarrhea, nausea and vomiting.   Genitourinary:  Negative for dysuria, frequency and hematuria.   Musculoskeletal:  Positive for arthralgias, back pain and gait problem. Negative for neck pain.   Skin:  Negative for rash and wound.   Neurological:  Positive for weakness. Negative for dizziness, syncope, speech difficulty and light-headedness.   Psychiatric/Behavioral:  Positive for confusion (intermittent). Negative for agitation. The patient is not nervous/anxious.      Objective:     Vital Signs (Most Recent):  Temp: 96.4 °F (35.8 °C) (04/30/24 1100)  Pulse: 91 (04/30/24 1110)  Resp: 18 (04/30/24 1100)  BP: 117/63 (04/30/24 1100)  SpO2: (!) 91 % (04/30/24 1100) Vital Signs (24h Range):  Temp:  [96.4 °F (35.8 °C)-98.3 °F (36.8 °C)] 96.4 °F (35.8 °C)  Pulse:  [] 91  Resp:  [16-18] 18  SpO2:  [90 %-96 %] 91 %  BP: (102-130)/(56-73) 117/63     Weight: 54.9 kg (121 lb)  Body mass index is 21.43 kg/m².     Physical Exam  Vitals and nursing note reviewed.   Constitutional:       General: She is not in acute distress.     Appearance: She is well-developed. She is not toxic-appearing.   HENT:      Head: Normocephalic.      Mouth/Throat:      Mouth: Mucous membranes are moist.   Eyes:      Extraocular Movements: Extraocular movements intact.      Conjunctiva/sclera: Conjunctivae normal.      Pupils: Pupils are equal, round, and reactive to light.   Cardiovascular:      Rate and Rhythm: Tachycardia present. Rhythm irregular.      Heart sounds: Normal heart sounds.      Comments: No JVD.  with afib  Pulmonary:      Effort: Pulmonary effort is normal. No respiratory distress.      Breath sounds: Rales present. No wheezing.      Comments: On 1L On low santos oxygen, speaking in full sentences.  Abdominal:      General: Bowel sounds are normal. There is distension.      Palpations:  "Abdomen is soft.      Tenderness: There is no abdominal tenderness.   Musculoskeletal:         General: Normal range of motion.      Cervical back: Normal range of motion and neck supple.      Comments: LLE with bandages in place. Ropivicaine in place.   Skin:     General: Skin is warm and dry.      Capillary Refill: Capillary refill takes less than 2 seconds.      Findings: Bruising present. No rash.   Neurological:      Mental Status: She is alert and oriented to person, place, and time.      Cranial Nerves: No cranial nerve deficit.      Sensory: No sensory deficit.      Coordination: Coordination normal.   Psychiatric:         Behavior: Behavior normal.         Thought Content: Thought content normal.         Judgment: Judgment normal.              CRANIAL NERVES     CN III, IV, VI   Pupils are equal, round, and reactive to light.       Significant Labs: All pertinent labs within the past 24 hours have been reviewed.  CBC:   Recent Labs   Lab 04/29/24  0538 04/30/24  0509   WBC 11.71 9.66   HGB 8.3* 8.3*   HCT 26.5* 25.9*   * 133*     CMP:   Recent Labs   Lab 04/29/24  0538 04/30/24  0509    139   K 4.3 4.1    111*   CO2 18* 19*    92   BUN 60* 56*   CREATININE 1.6* 1.3   CALCIUM 7.4* 8.1*   ANIONGAP 10 9     Cardiac Markers:   No results for input(s): "CKMB", "MYOGLOBIN", "BNP", "TROPISTAT" in the last 48 hours.    Troponin:   No results for input(s): "TROPONINI", "TROPONINIHS" in the last 48 hours.    Urine Studies:   No results for input(s): "COLORU", "APPEARANCEUA", "PHUR", "SPECGRAV", "PROTEINUA", "GLUCUA", "KETONESU", "BILIRUBINUA", "OCCULTUA", "NITRITE", "UROBILINOGEN", "LEUKOCYTESUR", "RBCUA", "WBCUA", "BACTERIA", "SQUAMEPITHEL", "HYALINECASTS" in the last 48 hours.    Invalid input(s): "WRIGHTSUR"      Significant Imaging: I have reviewed all pertinent imaging results/findings within the past 24 hours.  SURG [facility] Surgery Fluoro Usage  See OP Notes for results.     IMPRESSION: See " OP Notes for results.     This procedure was auto-finalized by: Virtual Radiologist        Assessment/Plan:      * Closed displaced intertrochanteric fracture of left femur  89 y.o. who presents after a fall with a closed displaced L intertrochanteric fracture after a fall. Very active at baseline, does daily exercise classes  -ortho consulted, s/p IM nail 4/28 with dr gill. Resume home eliquis for anticoag, pharm reports meets criteria to adjsut to 2.5 mg daily for age/indication and adjusetd to this  -slight hg downtick expected post op from 12--9.4--8.3 and cont to trend  -vit D very high at 77 and rec to hold vit d to avoid toxicity levels  -UA wnl on admit.   -multimodal pain control with ropivicaine and anesthesia managing  -bowel regimen with BM 4/30  -eliquis for PPX for DVt already on for afib and resumed  -bandages to remove in clinic in 2 weeks  -PT/OT on POD 1 with recs for post acute placement and Cm to send ref      Hypotension  Holding further diuresis, improving with light IVF, watch volume status closely given overload on admit  Improved 4/29 after IVF yesterday and stable now in 130s      Acute blood loss anemia  Patient's anemia is currently uncontrolled. Has not received any PRBCs to date. Etiology likely d/t acute blood loss which was from surgical repair  Current CBC reviewed-   Lab Results   Component Value Date    HGB 8.3 (L) 04/29/2024    HCT 26.5 (L) 04/29/2024     Monitor serial CBC and transfuse if patient becomes hemodynamically unstable, symptomatic or H/H drops below 7/21.    JOHAN (acute kidney injury)  Patient with acute kidney injury/acute renal failure likely due to diuersis with lasix JOHAN is currently  present 4/28 at Cr 2.0 . Baseline creatinine  1.1  - Labs reviewed- Renal function/electrolytes with Estimated Creatinine Clearance: 21.9 mL/min (based on SCr of 1.3 mg/dL). according to latest data. Monitor urine output and serial BMP and adjust therapy as needed. Avoid  nephrotoxins and renally dose meds for GFR listed above.  Hold lasix/aldactone and very light IVF considering overload on admit to allow volume equilibration  Improving to 1.6 now-->1.3. small dose of lasix 4/30 as improved and still on oxygen and a touch overloaded still appearnace on exam    Long term (current) use of anticoagulants  This patient has long term use on an anticoagulant with Select Anticoagulant(s): Direct oral anticoagulant: Apixaban (Eliquis). Their long term anticoagulation will be Held or Continued: continued. They are on long term anticoagulation due to Reason for Anticoagulation: Atrial fibrillation.     Acute on chronic diastolic heart failure  Patient is identified as having Diastolic (HFpEF) heart failure that is Acute on Chronic. CHF is currently uncontrolled due to Dyspnea not returned to baseline after 1 doses of IV diuretic, Rales/crackles on pulmonary exam, and Pulmonary edema/pleural effusion on CXR. Pt is on CHF pathway.   - Patient decompensated on admit (4/27/2024). (+)Subjective SOB, (--)JVD, (--)orthnopnea.   - Last BNP reviewed- and noted below   Recent Labs   Lab 04/27/24  0702   *     - Troponin 0.009, continue to trend   - CXR with cardiomegaly and pulmonary edema with R pleural effusion   - EKG without acute ST changes   - Echo with Echo    Result Date: 4/27/2024    Left Ventricle: The left ventricle is normal in size. Ventricular mass   is normal. Normal wall thickness. Normal wall motion. There is normal   systolic function with a visually estimated ejection fraction of 60 - 65%.   There is indeterminate diastolic function.Normal left ventricular filling   pressure.    Right Ventricle: Normal right ventricular cavity size. Wall thickness   is normal. Systolic function is normal.    Left Atrium: Left atrium is severely dilated.    Right Atrium: Right atrium is severely dilated.    Aortic Valve: The aortic valve is a trileaflet valve. There is mild   aortic valve  sclerosis. There is mild annular calcification present.    Mitral Valve: There is moderate anterior leaflet sclerosis.    Tricuspid Valve: There is moderate regurgitation with a centrally   directed jet.    Aorta: Aortic root is normal in size measuring 3.08 cm. Ascending aorta   is normal measuring 3.43 cm.    Pulmonary Artery: The estimated pulmonary artery systolic pressure is   57 mmHg.    IVC/SVC: Intermediate venous pressure at 8 mmHg.          Intake/Output Summary (Last 24 hours) at 4/30/2024 1248  Last data filed at 4/30/2024 0909  Gross per 24 hour   Intake 120 ml   Output --   Net 120 ml        - Supplemental O2 to keep Spo2 >90%  - Continue to stress to patient importance of self efficacy and  on diet for CHF.  Suspect overdiuresis as Aster on 4/28 after lasix 40 BID + home aldactone on admit + hypotension. Hold for now, very light IVF for hypotension now, CXr similar to before, on similar oxygen and reassess volume status this PM. Liberate diet for now while equliibirating volume status.  -appears approaching euvolemic now on 4/29 with IVF yesterday, hold lasix and IVF today to allow to further self equilbrate with oral intake. Plan to resume likely either lower dose of lasix vs holding aldcatone once euvolemic and Cr stabilzed as she reports urinating 3 times a night at home and felt she was on maybe too much long term  About 1.5 net negative on 4/30 and Bp stable, Cr 1.3, so will give a lasix 20 once (got lasix 120 mg total prior to surgery) as still on 1L and has probably a small bit of fluid to still remove    Chronic atrial fibrillation  Long term use of anticoagulants   Patient with Persistent (7 days or more) atrial fibrillation which is controlled currently with Beta Blocker. Patient is currently in atrial fibrillation.[id number] Score: 3. Anticoagulation indicated. Anticoagulation done with eliquis .  - hold atenolol 4/28 for hypotension, HR 60s-70s in pacu  -eliquis resumed post op, per  pharm meeds criteria based on age and indications to start 2.5 dosing instead of 5 mg dosing  Mild uptick to 105 on exam 4/30 but just exerted into chair. Trend on home atenolol    Recurrent major depressive disorder, in full remission  Patient has recurrent depression which is mild and is currently controlled. Will Continue anti-depressant medications. We will not consult psychiatry at this time. Patient does not display psychosis at this time. Continue to monitor closely and adjust plan of care as needed.  - Continue home lexapro & namenda     Essential hypertension  Chronic, controlled. Latest blood pressure and vitals reviewed-     Temp:  [97 °F (36.1 °C)-98.4 °F (36.9 °C)]   Pulse:  [65-83]   Resp:  [16-18]   BP: ()/(51-65)   SpO2:  [90 %-96 %] .   Home meds for hypertension were reviewed and noted below.   Hypertension Medications                    furosemide (LASIX) 20 MG tablet TAKE 1 TABLET BY MOUTH ONCE DAILY.    spironolactone (ALDACTONE) 50 MG tablet Take 1 tablet (50 mg total) by mouth once daily.     Not taking norvasc anymore. Atenolol started 4/427 but held 4/28 for lower BPs. Hold aldactone and lasix for JOHAN 4/28    Will utilize p.r.n. blood pressure medication only if patient's blood pressure greater than 180/110 and she develops symptoms such as worsening chest pain or shortness of breath.      VTE Risk Mitigation (From admission, onward)           Ordered     apixaban tablet 2.5 mg  2 times daily         04/28/24 0943     IP VTE HIGH RISK PATIENT  Once         04/28/24 0707     Place sequential compression device  Until discontinued         04/28/24 0707                    Discharge Planning   HEAVEN: 5/2/2024     Code Status: DNR   Is the patient medically ready for discharge?:     Reason for patient still in hospital (select all that apply): Patient trending condition  Discharge Plan A: Skilled Nursing Facility                  Hannah Andre MD  Department of Hospital Medicine   [facility]  - Surgery

## 2024-04-30 NOTE — MEDICAL/APP STUDENT
[facility] Medicine Student   Progress Note  [facility]OS[facility] MED H    Admit Date: 4/27/2024  Hospital Day: 3  04/30/2024  1:04 PM    SUBJECTIVE:   Ms. Mary Ellen Miller is a 89 y.o. female with a relevant medical history of PMH of HTN, HLD, CHF, and afib on eliquis presenting who is being followed up for Closed displaced intertrochanteric fracture of left femur s/p IM Chao 4/28 full weight bearing.     Interval history: 4/30/24  Patient sleeping upon entering room early this morning. As she awoke she reported mild confusion but quickly regained mental status, GCS 15. Patient denied any pain at rest this morning, but pain with movement of surgical site LE. Patient denies BM in early AM, but had one late AM. Patient on 2L oxygen but not connected to nasal canula in early AM.   Fixed oxygen nasal canula and attached to patient. Oxygen lowered to 1L late AM. On Auscultation of the lungs there were crackles bilaterally at the base consistent with volume overload. Auscultation of the heart revealed splitting of S1/S2 likely due to chronic tricuspid regurgitation. Patient Hb stable at 8.3 with improving renal function over past 24 hours with BUN of 56 (down trending) and Cr 1.3 (down trending). Starting lasix 20 today to improve volume status, and recommend maintenance 20 daily for long term maintenance.        Please refer to the H&P for past medical, family, and social history.    Review of Systems   Constitutional: Negative.  Negative for chills, diaphoresis, fever, malaise/fatigue and weight loss.   HENT: Negative.  Negative for congestion, ear discharge, ear pain, hearing loss, nosebleeds, sinus pain, sore throat and tinnitus.    Eyes: Negative.  Negative for blurred vision, double vision, photophobia, pain, discharge and redness.   Respiratory:  Negative for cough, hemoptysis, sputum production, shortness of breath, wheezing and stridor.    Cardiovascular:  Positive for leg swelling. Negative for chest pain, palpitations,  orthopnea and claudication.        LE swelling consistent with Orth Proc 8/28   Gastrointestinal:  Positive for constipation. Negative for abdominal pain, blood in stool, diarrhea, heartburn, melena, nausea and vomiting.   Genitourinary:  Negative for dysuria, flank pain, frequency, hematuria and urgency.   Musculoskeletal:  Positive for myalgias. Negative for back pain, falls, joint pain and neck pain.        Leg pain with movement consistent with Ortho Proc 8/28   Skin: Negative.  Negative for itching and rash.        OBJECTIVE:     Vital Signs Recent:  Temp: 96.4 °F (35.8 °C) (04/30/24 1100)  Pulse: 91 (04/30/24 1110)  Resp: 18 (04/30/24 1100)  BP: 117/63 (04/30/24 1100)  SpO2: (!) 91 % (04/30/24 1100)  Oxygen Documentation:    Flow (L/min) (Oxygen Therapy): 1           Device (Oxygen Therapy): nasal cannula  $ Is the patient on Low Flow Oxygen?: Yes      Vital Signs Range (Last 24H):  Temp:  [96.4 °F (35.8 °C)-98.3 °F (36.8 °C)]   Pulse:  []   Resp:  [16-18]   BP: (102-130)/(56-73)   SpO2:  [90 %-96 %]        I & O (Last 24H):  Intake/Output Summary (Last 24 hours) at 4/30/2024 1304  Last data filed at 4/30/2024 0909  Gross per 24 hour   Intake 120 ml   Output --   Net 120 ml            Physical Exam  Constitutional:       Appearance: Normal appearance. She is normal weight.   HENT:      Head: Normocephalic and atraumatic.      Mouth/Throat:      Pharynx: Oropharynx is clear.   Cardiovascular:      Pulses: Normal pulses.      Heart sounds:      Gallop present.   Pulmonary:      Effort: Pulmonary effort is normal.      Comments: Inspiratory crackles at base of lungs bilaterally.  Abdominal:      General: Bowel sounds are normal. There is distension.      Palpations: Abdomen is soft.      Comments: Abdomen soft and distended due to constipation.   Musculoskeletal:         General: Swelling and tenderness present.      Cervical back: Normal range of motion and neck supple.      Left lower leg: Edema present.    Skin:     General: Skin is warm and dry.      Capillary Refill: Capillary refill takes less than 2 seconds.   Neurological:      General: No focal deficit present.      Mental Status: She is alert. Mental status is at baseline.   Psychiatric:         Mood and Affect: Mood normal.         Behavior: Behavior normal.         Thought Content: Thought content normal.         Judgment: Judgment normal.       Labs:   Recent Labs   Lab 04/28/24  0457 04/29/24  0538 04/30/24  0509    138 139   K 4.8 4.3 4.1    110 111*   CO2 22* 18* 19*   BUN 51* 60* 56*   CREATININE 2.0* 1.6* 1.3   * 104 92   CALCIUM 8.6* 7.4* 8.1*   MG 1.9  1.9 2.0 2.1   PHOS 5.3*  5.3* 4.3 3.7     Recent Labs   Lab 04/27/24  0517   ALKPHOS 47*   ALT 21   AST 26   ALBUMIN 3.6   PROT 6.8   BILITOT 1.2*   INR 1.1     Recent Labs   Lab 04/28/24  0936 04/29/24  0538 04/30/24  0509   WBC 12.42 11.71 9.66   HGB 9.4* 8.3* 8.3*   HCT 29.8* 26.5* 25.9*   * 120* 133*       Diagnostic Results:  N/A    Scheduled Meds:  Current Facility-Administered Medications   Medication Dose Route Frequency    albuterol-ipratropium  3 mL Nebulization Q4H WAKE    apixaban  2.5 mg Oral BID    atenoloL  25 mg Oral Daily    EScitalopram oxalate  10 mg Oral Daily    memantine  5 mg Oral BID    multivitamin  1 tablet Oral Daily    mupirocin  1 g Nasal BID    polyethylene glycol  17 g Oral Daily    senna-docusate 8.6-50 mg  1 tablet Oral BID     Continuous Infusions:  Current Facility-Administered Medications   Medication Dose Route Frequency Last Rate Last Admin    ROPIvacaine (PF) 2 mg/ml (0.2%)  0.1 mL/hr Perineural Continuous 0.1 mL/hr at 04/30/24 0107 0.1 mL/hr at 04/30/24 0107     PRN Meds:  Current Facility-Administered Medications:     albuterol-ipratropium, 3 mL, Nebulization, Q4H PRN    aluminum-magnesium hydroxide-simethicone, 30 mL, Oral, QID PRN    bisacodyL, 10 mg, Rectal, Daily PRN    clindamycin IV (PEDS and ADULTS), 900 mg, Intravenous, On  Call Procedure    dextrose 10%, 12.5 g, Intravenous, PRN    dextrose 10%, 25 g, Intravenous, PRN    droPERidol, 0.625 mg, Intravenous, Once PRN    glucagon (human recombinant), 1 mg, Intramuscular, PRN    glucose, 16 g, Oral, PRN    glucose, 24 g, Oral, PRN    melatonin, 6 mg, Oral, Nightly PRN    methocarbamoL, 500 mg, Oral, Q6H PRN    metoclopramide, 5 mg, Intravenous, Q6H PRN    mupirocin, , Nasal, On Call Procedure    naloxone, 0.02 mg, Intravenous, PRN    ondansetron, 8 mg, Oral, Q8H PRN    ondansetron, 4 mg, Intravenous, Once PRN    ondansetron, 4 mg, Intravenous, Q12H PRN    prochlorperazine, 5 mg, Intravenous, Q6H PRN    simethicone, 1 tablet, Oral, QID PRN    sodium chloride 0.9%, 10 mL, Intravenous, PRN    sodium chloride 0.9%, 10 mL, Intravenous, PRN    tranexamic acid (CYKLOKAPRON) 3,000 mg in sodium chloride 0.9% SolP 100 mL, 3,000 mg, Irrigation, On Call Procedure    ASSESSMENT/PLAN:   Ms. Mar yEllen Miller is a 89 y.o. female with a relevant medical history of PMH of HTN, HLD, CHF, and afib on eliquis presenting who is being followed up for Closed displaced intertrochanteric fracture of left femur s/p IM Chao 4/28 full weight bearing.     Active Hospital Problems    Diagnosis  POA    *Closed displaced intertrochanteric fracture of left femur [S72.142A]  Yes    Thrombocytopenia [D69.6]  No    JOHAN (acute kidney injury) [N17.9]  No    Acute blood loss anemia [D62]  No    Hypotension [I95.9]  Yes    Long term (current) use of anticoagulants [Z79.01]  Not Applicable     - followed by cardiology  - no signs of bleeding      Advance care planning [Z71.89]  Not Applicable     - pt reports that she has living will and mPOA  - mPOA is her son Valentino (youngest)  - she does not want invasive or aggressive measures to maintain life  - she does not want CPR or intubation      Acute on chronic diastolic heart failure [I50.33]  Yes     - no signs of acute decompensation  - followed by cardiology      Chronic  atrial fibrillation [I48.20]  Yes     - followed by Dr. Last  - OAC Eliquis without signs of bleeding  - rate controlled      Essential hypertension [I10]  Yes     - well controlled  - continue current medications      Recurrent major depressive disorder, in full remission [F33.42]  Yes     - well controlled  - continue current medication        Resolved Hospital Problems    Diagnosis Date Resolved POA    Hip fracture [S72.009A] 04/27/2024 Yes       1.Closed displaced intertrochanteric fracture of left femur.  ASSESSMENT: Closed displaced intertrochanteric fracture of left femur s/p IM Chao 4/28. Patient is full weight bearing post-op. L LE consistent with normal healing. Skin is warm and dry.   PLAN:  - Continue with PT/OT today to improve mobility.   - Pain control PRN.   - Plan for SNF/Rehab later this week.       2. Post-op DVT Prophylaxis  ASSESSMENT: Patient on Eliquis for A-fib 5mg.   PLAN: Maintain home Eliquis with dosing change to 2.5mg.       3. Anemia  ASSESSMENT: CBC reviewed. WBC is down trending to 9.66 today. Patient with multi day down trending Hb, HCT and Platelet. Lab values leveled off and holding steady at HB 8.3, HCT 25.9 and Platelet 133 4/29/24.   PLAN:   -Monitor Serial CBC   -Transfuse if patient becomes hemodynamically unstable or asymptomatic.     4. CHF + HTN  ASSESSMENT: Chronic HFpEF with Essential HTN s/p atenolol, lasix and aldactone. Patient slightly volume overloaded today per ascultation with crackles/rales. Holding atenolol, furosemide and aldactone due to JOHAN concerns post-op.    PLAN:   - Administer 20mg lasix and monitor BUN/CR for JOHAN status    5. JOHAN (acute kidney injury)  ASSESSMENT: Patient with JOHAN likely due to diuresis at admission. Patient's BUN/CR have slowly improved to 56/1.3 4/30/24 from 60/1.6 4/29/24. Giving 20mg Lasix today due to concerns for volume overload.   PLAN:   -Monitor patient's BUN/CR with 20mg Lasix for volume overload.       Discharge planning:    Discharge pending PT/OT, SNF, Pain Control, JOHAN and correction of volume status.

## 2024-04-30 NOTE — SUBJECTIVE & OBJECTIVE
Principal Problem:Closed displaced intertrochanteric fracture of left femur    Principal Orthopedic Problem: same, s/p left femur CMN on 4/28/24    Interval History: No acute events overnight.  Vitals within normal limits.  Pain well controlled.  Able to stand with moderate assistance yesterday.    Review of patient's allergies indicates:   Allergen Reactions    Percodan [oxycodone hcl-oxycodone-asa] Other (See Comments)     halucinations    Penicillins Itching    Sulfa (sulfonamide antibiotics) Diarrhea and Nausea Only    Amoxicillin        Current Facility-Administered Medications   Medication Dose Route Frequency Provider Last Rate Last Admin    albuterol-ipratropium 2.5 mg-0.5 mg/3 mL nebulizer solution 3 mL  3 mL Nebulization Q4H PRN Elicia Barnes PA-C        albuterol-ipratropium 2.5 mg-0.5 mg/3 mL nebulizer solution 3 mL  3 mL Nebulization Q4H WAKE Hannah Andre MD   3 mL at 04/29/24 2019    aluminum-magnesium hydroxide-simethicone 200-200-20 mg/5 mL suspension 30 mL  30 mL Oral QID PRN Elicia Barnes PA-C        apixaban tablet 2.5 mg  2.5 mg Oral BID Hannah Andre MD   2.5 mg at 04/29/24 2132    atenoloL tablet 25 mg  25 mg Oral Daily Hannah Andre MD   25 mg at 04/27/24 0953    bisacodyL suppository 10 mg  10 mg Rectal Daily PRN Hannah Andre MD        clindamycin in D5W 900 mg/50 mL IVPB 900 mg  900 mg Intravenous On Call Procedure Damián Ramos MD        dextrose 10% bolus 125 mL 125 mL  12.5 g Intravenous PRN Elicia Barnes PA-C        dextrose 10% bolus 250 mL 250 mL  25 g Intravenous PRN Elicia Barnes PA-C        droPERidol injection 0.625 mg  0.625 mg Intravenous Once PRN Clarence Chacon MD        EScitalopram oxalate tablet 10 mg  10 mg Oral Daily Elicia Barnes PA-C   10 mg at 04/29/24 0934    glucagon (human recombinant) injection 1 mg  1 mg Intramuscular PRN Elicia Barnes PA-C        glucose chewable tablet 16 g  16 g Oral PRN Elicia Barnes,  JASPREET        glucose chewable tablet 24 g  24 g Oral PRN Elicia Barnes PA-C        melatonin tablet 6 mg  6 mg Oral Nightly PRN Hannah Andre MD   6 mg at 04/29/24 1844    memantine tablet 5 mg  5 mg Oral BID Elicia Barnes PA-C   5 mg at 04/29/24 2133    methocarbamoL tablet 500 mg  500 mg Oral Q6H PRN Hannah Andre MD   500 mg at 04/29/24 2132    metoclopramide injection 5 mg  5 mg Intravenous Q6H PRN Hannah Andre MD        multivitamin tablet  1 tablet Oral Daily Hannah Andre MD   1 tablet at 04/29/24 0934    mupirocin 2 % ointment 1 g  1 g Nasal BID Hannah Andre MD   1 g at 04/29/24 2133    mupirocin 2 % ointment   Nasal On Call Procedure Damián Ramos MD        naloxone 0.4 mg/mL injection 0.02 mg  0.02 mg Intravenous PRN Elicia Barnes PA-C        ondansetron disintegrating tablet 8 mg  8 mg Oral Q8H PRN Elicia Barnes PA-C        ondansetron injection 4 mg  4 mg Intravenous Once PRN Clarence Chacon MD        ondansetron injection 4 mg  4 mg Intravenous Q12H PRN Hannah Andre MD        polyethylene glycol packet 17 g  17 g Oral Daily Hannah Andre MD   17 g at 04/29/24 0934    prochlorperazine injection Soln 5 mg  5 mg Intravenous Q6H PRN Elicia Barnes PA-C        ROPIvacaine (PF) 2 mg/ml (0.2%) solution  0.1 mL/hr Perineural Continuous Hannah Andre MD 0.1 mL/hr at 04/30/24 0107 0.1 mL/hr at 04/30/24 0107    senna-docusate 8.6-50 mg per tablet 1 tablet  1 tablet Oral BID Hannah Andre MD   1 tablet at 04/29/24 2132    simethicone chewable tablet 80 mg  1 tablet Oral QID PRN Elicia Barnes PA-C        sodium chloride 0.9% flush 10 mL  10 mL Intravenous PRN Damián Ramos MD        sodium chloride 0.9% flush 10 mL  10 mL Intravenous PRN Hannah Andre MD        tranexamic acid (CYKLOKAPRON) 3,000 mg in sodium chloride 0.9% SolP 100 mL  3,000 mg Irrigation On Call Procedure Hannah Andre MD      "    Objective:     Vital Signs (Most Recent):  Temp: 98 °F (36.7 °C) (04/29/24 2037)  Pulse: 90 (04/30/24 0332)  Resp: 17 (04/29/24 2037)  BP: 102/64 (04/29/24 2037)  SpO2: (!) 94 % (04/29/24 2037) Vital Signs (24h Range):  Temp:  [97 °F (36.1 °C)-98.4 °F (36.9 °C)] 98 °F (36.7 °C)  Pulse:  [71-93] 90  Resp:  [16-18] 17  SpO2:  [90 %-95 %] 94 %  BP: (102-128)/(56-64) 102/64     Weight: 54.9 kg (121 lb)  Height: 5' 3" (160 cm)  Body mass index is 21.43 kg/m².      Intake/Output Summary (Last 24 hours) at 4/30/2024 0632  Last data filed at 4/29/2024 0642  Gross per 24 hour   Intake --   Output 300 ml   Net -300 ml        Ortho/SPM Exam  Gen: NAD, sitting comfortably in bed  CV: regular rate  Resp: non-labored respirations    LLE:  Dressings clean, dry, and intact  No significant hematoma over operative sites  Appropriate postoperative TTP  SILT Sa/Watts/DP/SP/T  Motor intact EHL/FHL/TA/Gastroc  2+ DP, 2+ PT       Significant Labs: All pertinent labs within the past 24 hours have been reviewed.    Significant Imaging: I have reviewed and interpreted all pertinent imaging results/findings.  "

## 2024-04-30 NOTE — ANESTHESIA POST-OP PAIN MANAGEMENT
Acute Pain Service Progress Note    Mary Ellen Miller is a 89 y.o., female, [id number].    Surgery:  INSERTION, INTRAMEDULLARY LENO, FEMUR: Left (Left: Leg)     Post Op Day #: 2    Catheter type: perineural  SIFI    Infusion type: Ropivacaine 0.2%  15 cc/3hr basal    Problem List:    Active Hospital Problems    Diagnosis  POA    *Closed displaced intertrochanteric fracture of left femur [S72.142A]  Yes    Thrombocytopenia [D69.6]  No    JOHAN (acute kidney injury) [N17.9]  No    Acute blood loss anemia [D62]  No    Hypotension [I95.9]  Yes    Long term (current) use of anticoagulants [Z79.01]  Not Applicable     - followed by cardiology  - no signs of bleeding      Advance care planning [Z71.89]  Not Applicable     - pt reports that she has living will and mPOA  - mPOA is her son Valentino (youngest)  - she does not want invasive or aggressive measures to maintain life  - she does not want CPR or intubation      Acute on chronic diastolic heart failure [I50.33]  Yes     - no signs of acute decompensation  - followed by cardiology      Chronic atrial fibrillation [I48.20]  Yes     - followed by Dr. Last  - OAC Eliquis without signs of bleeding  - rate controlled      Essential hypertension [I10]  Yes     - well controlled  - continue current medications      Recurrent major depressive disorder, in full remission [F33.42]  Yes     - well controlled  - continue current medication        Resolved Hospital Problems    Diagnosis Date Resolved POA    Hip fracture [S72.009A] 04/27/2024 Yes       Subjective:     General appearance of  alert, mild confusion, no complaints   Pain with rest: 1    Faces   Pain with movement: 8    Faces   Side Effects    1. Pruritis No    2. Nausea No    3. Motor Blockade No, 0=Ability to raise lower extremities off bed    4. Sedation No, 1=awake and alert    Objective:     Catheter site clean, dry, intact      Vitals   Vitals:    04/30/24 0332   BP:    Pulse: 90   Resp:    Temp:         Labs    No  results displayed because visit has over 200 results.           Meds   Current Facility-Administered Medications   Medication Dose Route Frequency Provider Last Rate Last Admin    albuterol-ipratropium 2.5 mg-0.5 mg/3 mL nebulizer solution 3 mL  3 mL Nebulization Q4H PRN Elicia Barnes PA-C        albuterol-ipratropium 2.5 mg-0.5 mg/3 mL nebulizer solution 3 mL  3 mL Nebulization Q4H WAKE Hannah Andre MD   3 mL at 04/29/24 2019    aluminum-magnesium hydroxide-simethicone 200-200-20 mg/5 mL suspension 30 mL  30 mL Oral QID PRN Elicia Barnes PA-C        apixaban tablet 2.5 mg  2.5 mg Oral BID Hannah Andre MD   2.5 mg at 04/29/24 2132    atenoloL tablet 25 mg  25 mg Oral Daily Hannah Andre MD   25 mg at 04/27/24 0953    bisacodyL suppository 10 mg  10 mg Rectal Daily PRN Hannah Andre MD        clindamycin in D5W 900 mg/50 mL IVPB 900 mg  900 mg Intravenous On Call Procedure Damián Ramos MD        dextrose 10% bolus 125 mL 125 mL  12.5 g Intravenous PRN Elicia Barnes PA-C        dextrose 10% bolus 250 mL 250 mL  25 g Intravenous PRN Elicia Barnes PA-C        droPERidol injection 0.625 mg  0.625 mg Intravenous Once PRN Clarence Chacon MD        EScitalopram oxalate tablet 10 mg  10 mg Oral Daily Elicia Barnes PA-C   10 mg at 04/29/24 0934    glucagon (human recombinant) injection 1 mg  1 mg Intramuscular PRN Elicia Barnes PA-C        glucose chewable tablet 16 g  16 g Oral PRN Elicia Barnes PA-C        glucose chewable tablet 24 g  24 g Oral PRN Elicia Barnes PA-C        melatonin tablet 6 mg  6 mg Oral Nightly PRN Hannah Andre MD   6 mg at 04/29/24 1844    memantine tablet 5 mg  5 mg Oral BID Elicia Barnes PA-C   5 mg at 04/29/24 2133    methocarbamoL tablet 500 mg  500 mg Oral Q6H PRN Hannah Andre MD   500 mg at 04/29/24 2132    metoclopramide injection 5 mg  5 mg Intravenous Q6H PRN Hannah Andre MD        multivitamin tablet   1 tablet Oral Daily Hannah Andre MD   1 tablet at 04/29/24 0934    mupirocin 2 % ointment 1 g  1 g Nasal BID Hannah Andre MD   1 g at 04/29/24 2133    mupirocin 2 % ointment   Nasal On Call Procedure Damián Ramos MD        naloxone 0.4 mg/mL injection 0.02 mg  0.02 mg Intravenous PRN Elicia Barnes PA-C        ondansetron disintegrating tablet 8 mg  8 mg Oral Q8H PRN Elicia Barnes PA-C        ondansetron injection 4 mg  4 mg Intravenous Once PRN Clarence Chacon MD        ondansetron injection 4 mg  4 mg Intravenous Q12H PRN Hannah Andre MD        polyethylene glycol packet 17 g  17 g Oral Daily Hannah Andre MD   17 g at 04/29/24 0934    prochlorperazine injection Soln 5 mg  5 mg Intravenous Q6H PRN Elicia Barnes PA-C        ROPIvacaine (PF) 2 mg/ml (0.2%) solution  0.1 mL/hr Perineural Continuous Hannah Andre MD 0.1 mL/hr at 04/30/24 0107 0.1 mL/hr at 04/30/24 0107    senna-docusate 8.6-50 mg per tablet 1 tablet  1 tablet Oral BID Hannah Andre MD   1 tablet at 04/29/24 2132    simethicone chewable tablet 80 mg  1 tablet Oral QID PRN Elicia Barnes PA-C        sodium chloride 0.9% flush 10 mL  10 mL Intravenous PRN Damián Ramos MD        sodium chloride 0.9% flush 10 mL  10 mL Intravenous PRN Hannah Andre MD        tranexamic acid (CYKLOKAPRON) 3,000 mg in sodium chloride 0.9% SolP 100 mL  3,000 mg Irrigation On Call Procedure Hannah Andre MD            Anticoagulant dose apixaban 2.5 BID     Assessment:     Pain control adequate, likely some mild hospital delirium.    Plan:     Patient doing well, continue present treatment.  1) Continue SIFI PNC at current infusion rate  2) continue acetaminophen 1G every 6 hours for 8 doses total  3) Continue prn robaxin    Case discussed with staff, Dr. Finn; final recommendations per attestation above.     Thank you for your consult and allowing us to participate in the care of this  patient. We will continue to follow along. Please call the Acute Pain Service at [phone] or Anesthesia at [phone] if you have any questions or concerns.      Jacob Moore, DO [facility]est[facility] Resident PGY3

## 2024-04-30 NOTE — ASSESSMENT & PLAN NOTE
Mary Ellen Miller is a 89 y.o. female with a closed left intertrochanteric femur fracture now s/p CMN on 4/28/24. Doing well this morning.    Plan:  Pain control: multimodal  PT/OT: WBAT LLE  DVT PPx: Eliquis 2.5 mg BID, SCDs at all times when not ambulating  Labs: hemoglobin 8.3 from 8.3 yesterday  Rawls: removed 4/29    Dispo: pending SNF

## 2024-04-30 NOTE — PLAN OF CARE
Problem: Infection  Goal: Absence of Infection Signs and Symptoms  Outcome: Progressing     Problem: Adult Inpatient Plan of Care  Goal: Plan of Care Review  Outcome: Progressing  Goal: Patient-Specific Goal (Individualized)  Outcome: Progressing  Goal: Absence of Hospital-Acquired Illness or Injury  Outcome: Progressing  Goal: Optimal Comfort and Wellbeing  Outcome: Progressing  Goal: Readiness for Transition of Care  Outcome: Progressing     Problem: Hip Fracture Medical Management  Goal: Optimal Coping with Change in Health Status  Outcome: Progressing  Goal: Absence of Bleeding  Outcome: Progressing  Goal: Effective Bowel Elimination  Outcome: Progressing  Goal: Baseline Cognitive Function Maintained  Outcome: Progressing  Goal: Absence of Embolism  Outcome: Progressing  Goal: Fracture Stability  Outcome: Progressing  Goal: Optimal Functional Performance  Outcome: Progressing  Goal: Pain Control and Function  Outcome: Progressing  Goal: Effective Urinary Elimination  Outcome: Progressing     Problem: Surgery Nonspecified  Goal: Absence of Bleeding  Outcome: Progressing  Goal: Effective Bowel Elimination  Outcome: Progressing  Goal: Fluid and Electrolyte Balance  Outcome: Progressing  Goal: Blood Glucose Level Within Targeted Range  Outcome: Progressing  Goal: Absence of Infection Signs and Symptoms  Outcome: Progressing  Goal: Anesthesia/Sedation Recovery  Outcome: Progressing  Goal: Optimal Pain Control and Function  Outcome: Progressing  Goal: Nausea and Vomiting Relief  Outcome: Progressing  Goal: Effective Urinary Elimination  Outcome: Progressing  Goal: Effective Oxygenation and Ventilation  Outcome: Progressing     Problem: Anesthesia/Analgesia, Neuraxial  Goal: Safe, Effective Infusion Delivery  Outcome: Progressing  Goal: Absence of Infection Signs and Symptoms  Outcome: Progressing  Goal: Nausea and Vomiting Relief  Outcome: Progressing  Goal: Effective Pain Control  Outcome: Progressing  Goal:  Effective Oxygenation and Ventilation  Outcome: Progressing  Goal: Baseline Motor Function  Outcome: Progressing  Goal: Effective Urinary Elimination  Outcome: Progressing     Problem: Fall Injury Risk  Goal: Absence of Fall and Fall-Related Injury  Outcome: Progressing     Problem: Skin Injury Risk Increased  Goal: Skin Health and Integrity  Outcome: Progressing     Problem: Acute Kidney Injury/Impairment  Goal: Fluid and Electrolyte Balance  Outcome: Progressing  Goal: Improved Oral Intake  Outcome: Progressing  Goal: Effective Renal Function  Outcome: Progressing     Problem: Wound  Goal: Optimal Coping  Outcome: Progressing  Goal: Optimal Functional Ability  Outcome: Progressing  Goal: Absence of Infection Signs and Symptoms  Outcome: Progressing  Goal: Improved Oral Intake  Outcome: Progressing  Goal: Optimal Pain Control and Function  Outcome: Progressing  Goal: Skin Health and Integrity  Outcome: Progressing  Goal: Optimal Wound Healing  Outcome: Progressing   She had a good day.   O2 remains supplemented with O2 3 L.  Breathing treatments started this pm.   Camera placed for safety.   She seems more forgetful today. '  No sign of distress noted at this time.   Safety precautions remain in place.

## 2024-04-30 NOTE — PT/OT/SLP PROGRESS
Physical Therapy Co-Treatment    OT present for cotreat due to pt's multiple medical comorbidities and functional/cognition deficits requiring two skilled therapists to appropriately progress pt's musculoskeletal strength, neuromuscular control, and endurance while taking into consideration medical acuity and pt safety.    Patient Name:  Mary Ellen Miller   MRN:  [id number]    Recommendations:     Discharge Recommendations: Moderate Intensity Therapy  Discharge Equipment Recommendations: walker, rolling, bedside commode  Barriers to discharge: None    Assessment:     Mary Ellen Miller is a 89 y.o. female admitted with a medical diagnosis of Closed displaced intertrochanteric fracture of left femur.  She presents with the following impairments/functional limitations: weakness, impaired endurance, impaired self care skills, impaired functional mobility, gait instability, impaired balance, decreased coordination, orthopedic precautions, pain, decreased lower extremity function     Pt shows progress with therapy demonstrated by decrease in assistance needed to perform functional mobility this session. Pt able to amb to bedside chair with RW and mod A of 2 persons. Patient continues to demonstrate the need for moderate intensity therapy on a daily basis post acute exhibited by decreased independence with self-care and functional mobility    Rehab Prognosis: Good; patient would benefit from acute skilled PT services to address these deficits and reach maximum level of function.    Recent Surgery: Procedure(s) (LRB):  INSERTION, INTRAMEDULLARY LENO, FEMUR: Left (Left) 2 Days Post-Op    Plan:     During this hospitalization, patient to be seen  (Hip pathway 4/29, 4/30, 5/1 then 4x/week after pathway complete.) to address the identified rehab impairments via gait training, therapeutic activities, therapeutic exercises, neuromuscular re-education and progress toward the following goals:    Plan of Care Expires:   05/01/24    Subjective     Chief Complaint: mild L hip pain  Patient/Family Comments/goals: Pt agreeable to PT  Pain/Comfort:  Pain Rating 1:  (did not rate)  Location - Side 1: Left  Location - Orientation 1: generalized  Location 1: hip  Pain Addressed 1: Reposition, Distraction  Pain Rating Post-Intervention 1:  (pt did not rate)      Objective:     Communicated with RN prior to session.  Patient found HOB elevated with SCD, perineural catheter, oxygen upon PT entry to room.     General Precautions: Standard, fall  Orthopedic Precautions: LLE weight bearing as tolerated  Braces: N/A  Respiratory Status: Nasal cannula, flow 2 L/min     Functional Mobility:    Bed Mobility:   Supine > Sit: moderate assistance and of 2 persons  Scooting: moderate assistance    Transfers:   Sit <> Stand Transfer: minimum assistance from EOB using rolling walker   Bed <> Chair: moderate assistance and of 2 persons using rolling walker     Balance:   Sitting balance: FAIR+: Maintains balance through MINIMAL excursions of active trunk motion  Pt sat EOB ~10 mins with SBA while taking meds with nursing  Standing balance:   POOR+: Needs MINIMAL assist to maintain  POOR: Needs MOD (moderate) assist during gait                 Gait:  Distance: ~6 steps to bedside chair   Assistive Device: RW   Assistance Level: moderate assistance and of 2 persons  Gait Assessment: Pt amb with small shuffling steps, forward flexed posture and needing increased time to complete task      AM-PAC 6 CLICK MOBILITY  Turning over in bed (including adjusting bedclothes, sheets and blankets)?: 2  Sitting down on and standing up from a chair with arms (e.g., wheelchair, bedside commode, etc.): 2  Moving from lying on back to sitting on the side of the bed?: 2  Moving to and from a bed to a chair (including a wheelchair)?: 2  Need to walk in hospital room?: 2  Climbing 3-5 steps with a railing?: 2  Basic Mobility Total Score: 12       Treatment & Education:  Pt  educated on tip to reduce fall risk and safety with mobility and using call button for assistance from nursing staff with OOB mobility.  Pt educated on sitting up in chair throughout most of day  All questions answered within the scope of PT.  White board updated accordingly.      Patient left up in chair with all lines intact and call button in reach..    GOALS:   Multidisciplinary Problems       Physical Therapy Goals          Problem: Physical Therapy    Goal Priority Disciplines Outcome Goal Variances Interventions   Physical Therapy Goal     PT, PT/OT Progressing     Description: Goals to be met by: 24     Patient will increase functional independence with mobility by performin. Supine to sit with Contact Guard Assistance  2. Sit to stand transfer with Contact Guard Assistance  3. Bed to chair transfer with Contact Guard Assistance using Rolling Walker  4. Gait  x 50 feet with Minimal Assistance using Rolling Walker.   5. Stand for 5 minutes with Contact Guard Assistance using Rolling Walker  6. Lower extremity exercise program x30 reps per handout, with supervision                         Time Tracking:     PT Received On: 24  PT Start Time: 905     PT Stop Time: 939  PT Total Time (min): 34 min     Billable Minutes: Gait Training 20 and Therapeutic Activity 14    Treatment Type: Treatment  PT/PTA: PT           2024

## 2024-04-30 NOTE — PLAN OF CARE
Problem: Infection  Goal: Absence of Infection Signs and Symptoms  Outcome: Progressing     Problem: Adult Inpatient Plan of Care  Goal: Plan of Care Review  Outcome: Progressing  Goal: Patient-Specific Goal (Individualized)  Outcome: Progressing  Goal: Absence of Hospital-Acquired Illness or Injury  Outcome: Progressing   Patient AAOx2, pleasantly confused, VSS, NADN at this time.  Bed in lowest position, call light in reach along with personal items. Plan of care ongoing.

## 2024-04-30 NOTE — NURSING
Nurses Note -- 4 Eyes      4/30/2024   1:25 AM      Skin assessed during: Q Shift Change      [x] No Altered Skin Integrity Present    []Prevention Measures Documented      [] Yes- Altered Skin Integrity Present or Discovered   [] LDA Added if Not in Epic (Describe Wound)   [] New Altered Skin Integrity was Present on Admit and Documented in LDA   [] Wound Image Taken    Wound Care Consulted? No    Attending Nurse:  Adebayo Baez RN/Staff Member:  Per

## 2024-04-30 NOTE — ASSESSMENT & PLAN NOTE
Holding further diuresis, improving with light IVF, watch volume status closely given overload on admit  Improved 4/29 after IVF yesterday and stable now in 130s

## 2024-04-30 NOTE — ASSESSMENT & PLAN NOTE
Patient is identified as having Diastolic (HFpEF) heart failure that is Acute on Chronic. CHF is currently uncontrolled due to Dyspnea not returned to baseline after 1 doses of IV diuretic, Rales/crackles on pulmonary exam, and Pulmonary edema/pleural effusion on CXR. Pt is on CHF pathway.   - Patient decompensated on admit (4/27/2024). (+)Subjective SOB, (--)JVD, (--)orthnopnea.   - Last BNP reviewed- and noted below   Recent Labs   Lab 04/27/24  0702   *     - Troponin 0.009, continue to trend   - CXR with cardiomegaly and pulmonary edema with R pleural effusion   - EKG without acute ST changes   - Echo with Echo    Result Date: 4/27/2024    Left Ventricle: The left ventricle is normal in size. Ventricular mass   is normal. Normal wall thickness. Normal wall motion. There is normal   systolic function with a visually estimated ejection fraction of 60 - 65%.   There is indeterminate diastolic function.Normal left ventricular filling   pressure.    Right Ventricle: Normal right ventricular cavity size. Wall thickness   is normal. Systolic function is normal.    Left Atrium: Left atrium is severely dilated.    Right Atrium: Right atrium is severely dilated.    Aortic Valve: The aortic valve is a trileaflet valve. There is mild   aortic valve sclerosis. There is mild annular calcification present.    Mitral Valve: There is moderate anterior leaflet sclerosis.    Tricuspid Valve: There is moderate regurgitation with a centrally   directed jet.    Aorta: Aortic root is normal in size measuring 3.08 cm. Ascending aorta   is normal measuring 3.43 cm.    Pulmonary Artery: The estimated pulmonary artery systolic pressure is   57 mmHg.    IVC/SVC: Intermediate venous pressure at 8 mmHg.          Intake/Output Summary (Last 24 hours) at 4/30/2024 1248  Last data filed at 4/30/2024 0909  Gross per 24 hour   Intake 120 ml   Output --   Net 120 ml        - Supplemental O2 to keep Spo2 >90%  - Continue to stress to patient  importance of self efficacy and  on diet for CHF.  Suspect overdiuresis as Aster on 4/28 after lasix 40 BID + home aldactone on admit + hypotension. Hold for now, very light IVF for hypotension now, CXr similar to before, on similar oxygen and reassess volume status this PM. Liberate diet for now while equliibirating volume status.  -appears approaching euvolemic now on 4/29 with IVF yesterday, hold lasix and IVF today to allow to further self equilbrate with oral intake. Plan to resume likely either lower dose of lasix vs holding aldcatone once euvolemic and Cr stabilzed as she reports urinating 3 times a night at home and felt she was on maybe too much long term  About 1.5 net negative on 4/30 and Bp stable, Cr 1.3, so will give a lasix 20 once (got lasix 120 mg total prior to surgery) as still on 1L and has probably a small bit of fluid to still remove

## 2024-04-30 NOTE — NURSING
Nurses Note -- 4 Eyes      4/30/2024   1:00 PM      Skin assessed during: Daily Assessment      [x] No Altered Skin Integrity Present    [x]Prevention Measures Documented      [] Yes- Altered Skin Integrity Present or Discovered   [] LDA Added if Not in Epic (Describe Wound)   [] New Altered Skin Integrity was Present on Admit and Documented in LDA   [] Wound Image Taken    Wound Care Consulted? No    Attending Nurse:  Shirley Pepe RN    Second RN/Staff Member:   Corin Hernandez RN

## 2024-04-30 NOTE — ASSESSMENT & PLAN NOTE
89 y.o. who presents after a fall with a closed displaced L intertrochanteric fracture after a fall. Very active at baseline, does daily exercise classes  -ortho consulted, s/p IM nail 4/28 with dr gill. Resume home eliquis for anticoag, pharm reports meets criteria to adjsut to 2.5 mg daily for age/indication and adjusetd to this  -slight hg downtick expected post op from 12--9.4--8.3 and cont to trend  -vit D very high at 77 and rec to hold vit d to avoid toxicity levels  -UA wnl on admit.   -multimodal pain control with ropivicaine and anesthesia managing  -bowel regimen with BM 4/30  -eliquis for PPX for DVt already on for afib and resumed  -bandages to remove in clinic in 2 weeks  -PT/OT on POD 1 with recs for post acute placement and Cm to send ref

## 2024-04-30 NOTE — ASSESSMENT & PLAN NOTE
Mary Ellen Miller is a 89 y.o. female with a closed left intertrochanteric femur fracture now s/p CMN on 4/28/24. Doing well this morning.    Plan:  Pain control: multimodal  PT/OT: WBAT LLE  DVT PPx: Eliquis 2.5 mg BID, SCDs at all times when not ambulating  Labs: hemoglobin 8.3 from 8.3 yesterday  Rawls: removed 4/29    Dispo: f/u PT/OT recommendations

## 2024-04-30 NOTE — PT/OT/SLP PROGRESS
Occupational Therapy   Co-Treatment    Name: Mary Ellen Miller  MRN: [id number]  Admitting Diagnosis:  Closed displaced intertrochanteric fracture of left femur  2 Days Post-Op    Recommendations:     Discharge Recommendations: Moderate Intensity Therapy  Discharge Equipment Recommendations:  walker, rolling, bedside commode  Barriers to discharge:  Other (Comment)    Assessment:     Mary Ellen Miller is a 89 y.o. female with a medical diagnosis of Closed displaced intertrochanteric fracture of left femur.  She presents with noted increase anxiety about fxl mob.Performance deficits affecting function are weakness, impaired endurance, impaired self care skills, impaired functional mobility, gait instability, impaired balance, decreased safety awareness, decreased lower extremity function, pain, orthopedic precautions, decreased ROM, impaired coordination, impaired skin.     Rehab Prognosis:  Good; patient would benefit from acute skilled OT services to address these deficits and reach maximum level of function.       Plan:     Patient to be seen  (Hip path (4/29-5/1) then 4x/wk) to address the above listed problems via self-care/home management, therapeutic activities, therapeutic exercises, neuromuscular re-education  Plan of Care Expires: 05/29/24  Plan of Care Reviewed with: patient    Subjective     Chief Complaint: Increase Pain at hip LLE  Patient/Family Comments/goals: Pt.reports that her leg is hurting  Pain Rating 1:  (did not rate)    Objective:     Communicated with: Nurse prior to session.  Patient found HOB elevated with SCD, perineural catheter, oxygen upon OT entry to room.    General Precautions: Standard, fall    Orthopedic Precautions:LLE weight bearing as tolerated  Braces: N/A  Respiratory Status: Nasal cannula, flow 2 L/min      Occupational Performance:     Bed Mobility:    Patient completed Scooting/Bridging with moderate assistance  Patient completed Supine to Sit with moderate  assistance and 2 persons     Functional Mobility/Transfers:  Patient completed Sit <> Stand Transfer with minimum assistance  with  rolling walker   Patient completed Bed <> Chair Transfer using Step Transfer technique with moderate assistance and of 2 persons with rolling walker ~ 6steps  shuffling steps,FFP with mod VC for upright posture    Activities of Daily Living:  Feeding:  supervision self feeds meds seated at EOB  Grooming: set up (A) oral care seated at bed side  Upper Body Dressing: moderate assistance doff and don gown seated at EOB  Seated at EOB to adjust socks     AMPAC 6 Click ADL: 15    Treatment & Education:  Pt educated on role of occupational therapy, POC, and safety during ADLs and functional mobility. Pt and OT discussed importance of safe, continued mobility to optimize daily living skills. Pt verbalized understanding. Pt given instruction to call for medical staff/nurse for assistance.   Co-treatment with PT for maximal pt participation, safety, and activity tolerance     Patient left up in chair with all lines intact and call button in reach    GOALS:   Multidisciplinary Problems       Occupational Therapy Goals          Problem: Occupational Therapy    Goal Priority Disciplines Outcome Interventions   Occupational Therapy Goal     OT, PT/OT Progressing    Description: Goals to be met by: 5/29/24     Patient will increase functional independence with ADLs by performing:    LE Dressing with Stand-by Assistance.  Grooming while standing at sink with Supervision.  Toileting from bedside commode with Stand-by Assistance for hygiene and clothing management.   Supine to sit with Supervision  Toilet transfer to bedside commode with Stand-by Assistance.                         Time Tracking:     OT Date of Treatment: 04/30/24  OT Start Time: 0905  OT Stop Time: 0939  OT Total Time (min): 34 min    Billable Minutes:Self Care/Home Management 18  Therapeutic Activity 16    OT/GIRMA: OT           4/30/2024

## 2024-05-01 PROBLEM — J96.01 ACUTE HYPOXEMIC RESPIRATORY FAILURE: Status: ACTIVE | Noted: 2024-05-01

## 2024-05-01 LAB
ANION GAP SERPL CALC-SCNC: 8 MMOL/L (ref 8–16)
BASOPHILS # BLD AUTO: 0.05 K/UL (ref 0–0.2)
BASOPHILS NFR BLD: 0.6 % (ref 0–1.9)
BILIRUB UR QL STRIP: NEGATIVE
BUN SERPL-MCNC: 39 MG/DL (ref 10–30)
CALCIUM SERPL-MCNC: 8.6 MG/DL (ref 8.7–10.5)
CHLORIDE SERPL-SCNC: 113 MMOL/L (ref 95–110)
CLARITY UR REFRACT.AUTO: CLEAR
CO2 SERPL-SCNC: 20 MMOL/L (ref 23–29)
COLOR UR AUTO: YELLOW
CREAT SERPL-MCNC: 0.9 MG/DL (ref 0.5–1.4)
DIFFERENTIAL METHOD BLD: ABNORMAL
EOSINOPHIL # BLD AUTO: 0.1 K/UL (ref 0–0.5)
EOSINOPHIL NFR BLD: 1.5 % (ref 0–8)
ERYTHROCYTE [DISTWIDTH] IN BLOOD BY AUTOMATED COUNT: 14.6 % (ref 11.5–14.5)
EST. GFR  (NO RACE VARIABLE): 59.2 ML/MIN/1.73 M^2
GLUCOSE SERPL-MCNC: 98 MG/DL (ref 70–110)
GLUCOSE UR QL STRIP: NEGATIVE
HCT VFR BLD AUTO: 26.9 % (ref 37–48.5)
HGB BLD-MCNC: 8.5 G/DL (ref 12–16)
HGB UR QL STRIP: NEGATIVE
IMM GRANULOCYTES # BLD AUTO: 0.09 K/UL (ref 0–0.04)
IMM GRANULOCYTES NFR BLD AUTO: 1.1 % (ref 0–0.5)
KETONES UR QL STRIP: NEGATIVE
LEUKOCYTE ESTERASE UR QL STRIP: NEGATIVE
LYMPHOCYTES # BLD AUTO: 1.2 K/UL (ref 1–4.8)
LYMPHOCYTES NFR BLD: 14 % (ref 18–48)
MAGNESIUM SERPL-MCNC: 2.1 MG/DL (ref 1.6–2.6)
MCH RBC QN AUTO: 30.4 PG (ref 27–31)
MCHC RBC AUTO-ENTMCNC: 31.6 G/DL (ref 32–36)
MCV RBC AUTO: 96 FL (ref 82–98)
MONOCYTES # BLD AUTO: 0.8 K/UL (ref 0.3–1)
MONOCYTES NFR BLD: 9.7 % (ref 4–15)
NEUTROPHILS # BLD AUTO: 6 K/UL (ref 1.8–7.7)
NEUTROPHILS NFR BLD: 73.1 % (ref 38–73)
NITRITE UR QL STRIP: NEGATIVE
NRBC BLD-RTO: 1 /100 WBC
PH UR STRIP: 6 [PH] (ref 5–8)
PHOSPHATE SERPL-MCNC: 2.7 MG/DL (ref 2.7–4.5)
PLATELET # BLD AUTO: 150 K/UL (ref 150–450)
PMV BLD AUTO: 11.1 FL (ref 9.2–12.9)
POTASSIUM SERPL-SCNC: 3.9 MMOL/L (ref 3.5–5.1)
PROT UR QL STRIP: ABNORMAL
RBC # BLD AUTO: 2.8 M/UL (ref 4–5.4)
SODIUM SERPL-SCNC: 141 MMOL/L (ref 136–145)
SP GR UR STRIP: 1.02 (ref 1–1.03)
TB INDURATION 48 - 72 HR READ: 0 MM
URN SPEC COLLECT METH UR: ABNORMAL
WBC # BLD AUTO: 8.22 K/UL (ref 3.9–12.7)

## 2024-05-01 PROCEDURE — 94761 N-INVAS EAR/PLS OXIMETRY MLT: CPT

## 2024-05-01 PROCEDURE — 85025 COMPLETE CBC W/AUTO DIFF WBC: CPT | Performed by: HOSPITALIST

## 2024-05-01 PROCEDURE — 63600175 PHARM REV CODE 636 W HCPCS: Performed by: HOSPITALIST

## 2024-05-01 PROCEDURE — 84100 ASSAY OF PHOSPHORUS: CPT | Performed by: HOSPITALIST

## 2024-05-01 PROCEDURE — 97530 THERAPEUTIC ACTIVITIES: CPT

## 2024-05-01 PROCEDURE — 99900035 HC TECH TIME PER 15 MIN (STAT)

## 2024-05-01 PROCEDURE — 27000221 HC OXYGEN, UP TO 24 HOURS

## 2024-05-01 PROCEDURE — 81003 URINALYSIS AUTO W/O SCOPE: CPT | Performed by: HOSPITALIST

## 2024-05-01 PROCEDURE — 99231 SBSQ HOSP IP/OBS SF/LOW 25: CPT | Mod: GC,,, | Performed by: ANESTHESIOLOGY

## 2024-05-01 PROCEDURE — 21400001 HC TELEMETRY ROOM

## 2024-05-01 PROCEDURE — 94640 AIRWAY INHALATION TREATMENT: CPT

## 2024-05-01 PROCEDURE — 97112 NEUROMUSCULAR REEDUCATION: CPT

## 2024-05-01 PROCEDURE — 25000242 PHARM REV CODE 250 ALT 637 W/ HCPCS: Performed by: HOSPITALIST

## 2024-05-01 PROCEDURE — 80048 BASIC METABOLIC PNL TOTAL CA: CPT | Performed by: HOSPITALIST

## 2024-05-01 PROCEDURE — 36415 COLL VENOUS BLD VENIPUNCTURE: CPT | Performed by: HOSPITALIST

## 2024-05-01 PROCEDURE — 97535 SELF CARE MNGMENT TRAINING: CPT

## 2024-05-01 PROCEDURE — 97116 GAIT TRAINING THERAPY: CPT

## 2024-05-01 PROCEDURE — 83735 ASSAY OF MAGNESIUM: CPT | Performed by: HOSPITALIST

## 2024-05-01 PROCEDURE — 94799 UNLISTED PULMONARY SVC/PX: CPT

## 2024-05-01 PROCEDURE — 25000003 PHARM REV CODE 250

## 2024-05-01 PROCEDURE — 25000003 PHARM REV CODE 250: Performed by: HOSPITALIST

## 2024-05-01 RX ORDER — POTASSIUM CHLORIDE 750 MG/1
10 CAPSULE, EXTENDED RELEASE ORAL ONCE
Status: COMPLETED | OUTPATIENT
Start: 2024-05-01 | End: 2024-05-01

## 2024-05-01 RX ORDER — FUROSEMIDE 10 MG/ML
20 INJECTION INTRAMUSCULAR; INTRAVENOUS ONCE
Status: COMPLETED | OUTPATIENT
Start: 2024-05-01 | End: 2024-05-01

## 2024-05-01 RX ORDER — ACETAMINOPHEN 500 MG
1000 TABLET ORAL EVERY 8 HOURS PRN
Status: DISCONTINUED | OUTPATIENT
Start: 2024-05-01 | End: 2024-05-02 | Stop reason: HOSPADM

## 2024-05-01 RX ADMIN — Medication 6 MG: at 08:05

## 2024-05-01 RX ADMIN — IPRATROPIUM BROMIDE AND ALBUTEROL SULFATE 3 ML: 2.5; .5 SOLUTION RESPIRATORY (INHALATION) at 03:05

## 2024-05-01 RX ADMIN — FUROSEMIDE 20 MG: 10 INJECTION, SOLUTION INTRAMUSCULAR; INTRAVENOUS at 08:05

## 2024-05-01 RX ADMIN — DOCUSATE SODIUM AND SENNOSIDES 1 TABLET: 8.6; 5 TABLET, FILM COATED ORAL at 08:05

## 2024-05-01 RX ADMIN — METHOCARBAMOL 500 MG: 500 TABLET ORAL at 09:05

## 2024-05-01 RX ADMIN — APIXABAN 2.5 MG: 2.5 TABLET, FILM COATED ORAL at 08:05

## 2024-05-01 RX ADMIN — IPRATROPIUM BROMIDE AND ALBUTEROL SULFATE 3 ML: 2.5; .5 SOLUTION RESPIRATORY (INHALATION) at 07:05

## 2024-05-01 RX ADMIN — BISACODYL 10 MG: 10 SUPPOSITORY RECTAL at 04:05

## 2024-05-01 RX ADMIN — IPRATROPIUM BROMIDE AND ALBUTEROL SULFATE 3 ML: 2.5; .5 SOLUTION RESPIRATORY (INHALATION) at 11:05

## 2024-05-01 RX ADMIN — ESCITALOPRAM OXALATE 10 MG: 5 TABLET, FILM COATED ORAL at 08:05

## 2024-05-01 RX ADMIN — ATENOLOL 25 MG: 25 TABLET ORAL at 08:05

## 2024-05-01 RX ADMIN — POTASSIUM CHLORIDE 10 MEQ: 10 CAPSULE, COATED, EXTENDED RELEASE ORAL at 08:05

## 2024-05-01 RX ADMIN — THERA TABS 1 TABLET: TAB at 08:05

## 2024-05-01 RX ADMIN — POLYETHYLENE GLYCOL 3350 17 G: 17 POWDER, FOR SOLUTION ORAL at 08:05

## 2024-05-01 RX ADMIN — MEMANTINE 5 MG: 5 TABLET ORAL at 08:05

## 2024-05-01 RX ADMIN — MUPIROCIN 1 G: 20 OINTMENT TOPICAL at 08:05

## 2024-05-01 NOTE — ASSESSMENT & PLAN NOTE
89 y.o. who presents after a fall with a closed displaced L intertrochanteric fracture after a fall. Very active at baseline, does daily exercise classes  -ortho consulted, s/p IM nail 4/28 with dr gill. Resume home eliquis for anticoag, pharm reports meets criteria to adjsut to 2.5 mg daily for age/indication and adjusetd to this  -slight hg downtick expected post op from 12--9.4--8.3 and cont to trend  -vit D very high at 77 and rec to hold vit d to avoid toxicity levels  -UA wnl on admit.   -multimodal pain control   -bowel regimen with BM 4/30  -eliquis for PPX for DVt already on for afib and resumed  -bandages to remove in clinic in 2 weeks  -PT/OT on POD 1 with recs for post acute placement and  [facility]ed at [facility]

## 2024-05-01 NOTE — PLAN OF CARE
Spoke with MD who reports patient not stable for d/c until 5/2/24. [facility] liaison reports they will have an available bed for patient at that time. Updated son at bedside and daughter-in-law via phone. Plan for d/c on 5/2/24.    Cat Loja RNCM  Case Management  [facility]  [phone]

## 2024-05-01 NOTE — PLAN OF CARE
Problem: Adult Inpatient Plan of Care  Goal: Plan of Care Review  Outcome: Progressing  Goal: Patient-Specific Goal (Individualized)  Outcome: Progressing  Goal: Absence of Hospital-Acquired Illness or Injury  Outcome: Progressing  Goal: Optimal Comfort and Wellbeing  Outcome: Progressing  Goal: Readiness for Transition of Care  Outcome: Progressing     Problem: Infection  Goal: Absence of Infection Signs and Symptoms  Outcome: Progressing     Problem: Hip Fracture Medical Management  Goal: Optimal Coping with Change in Health Status  Outcome: Progressing  Goal: Absence of Bleeding  Outcome: Progressing  Goal: Effective Bowel Elimination  Outcome: Progressing  Goal: Baseline Cognitive Function Maintained  Outcome: Progressing  Goal: Absence of Embolism  Outcome: Progressing  Goal: Fracture Stability  Outcome: Progressing  Goal: Optimal Functional Performance  Outcome: Progressing  Goal: Pain Control and Function  Outcome: Progressing  Goal: Effective Urinary Elimination  Outcome: Progressing     Addressed patients pain, pump, position, need for potty, and possessions in reach. Patient remains free of falls, and verbalizes understanding in fall prevention and safety. Safety measures remain in place. Reinforced fall prevention and safety education. Call light and personal belongings within reach, bed in lowest position with wheels locked, side rails up x2, Instructed to call with any needs or complaints, verbalized understanding. Continue current plan of care.

## 2024-05-01 NOTE — ANESTHESIA POST-OP PAIN MANAGEMENT
Acute Pain Service Progress Note    Mary Ellen Miller is a 89 y.o., female, [id number].    Surgery:  INSERTION, INTRAMEDULLARY LENO, FEMUR: Left (Left: Leg)     Post Op Day #: 3    Catheter type: perineural  SIFI    Infusion type: Ropivacaine 0.2%  15 cc/3hr basal    Problem List:    Active Hospital Problems    Diagnosis  POA    *Closed displaced intertrochanteric fracture of left femur [S72.142A]  Yes    Acute hypoxemic respiratory failure [J96.01]  No    Thrombocytopenia [D69.6]  No    JOHAN (acute kidney injury) [N17.9]  No    Acute blood loss anemia [D62]  No    Hypotension [I95.9]  Yes    Long term (current) use of anticoagulants [Z79.01]  Not Applicable     - followed by cardiology  - no signs of bleeding      Advance care planning [Z71.89]  Not Applicable     - pt reports that she has living will and mPOA  - mPOGABRIELA is her son Valentino (youngest)  - she does not want invasive or aggressive measures to maintain life  - she does not want CPR or intubation      Acute on chronic diastolic heart failure [I50.33]  Yes     - no signs of acute decompensation  - followed by cardiology      Chronic atrial fibrillation [I48.20]  Yes     - followed by Dr. Last  - OAC Eliquis without signs of bleeding  - rate controlled      Essential hypertension [I10]  Yes     - well controlled  - continue current medications      Recurrent major depressive disorder, in full remission [F33.42]  Yes     - well controlled  - continue current medication        Resolved Hospital Problems    Diagnosis Date Resolved POA    Hip fracture [S72.009A] 04/27/2024 Yes       Subjective:     General appearance of  alert, mild confusion, no complaints   Pain with rest: 1    Faces   Pain with movement: 8    Faces   Side Effects    1. Pruritis No    2. Nausea No    3. Motor Blockade No, 0=Ability to raise lower extremities off bed    4. Sedation No, 1=awake and alert    Objective:     Catheter site clean, dry, intact      Vitals   Vitals:    05/01/24 0733    BP: 125/68   Pulse: 108   Resp: 20   Temp: 36.6 °C (97.9 °F)        Labs    No results displayed because visit has over 200 results.           Meds   Current Facility-Administered Medications   Medication Dose Route Frequency Provider Last Rate Last Admin    albuterol-ipratropium 2.5 mg-0.5 mg/3 mL nebulizer solution 3 mL  3 mL Nebulization Q4H PRN Elicia Barnes PA-C        albuterol-ipratropium 2.5 mg-0.5 mg/3 mL nebulizer solution 3 mL  3 mL Nebulization Q4H Davis Hannah Andre MD   3 mL at 05/01/24 0733    aluminum-magnesium hydroxide-simethicone 200-200-20 mg/5 mL suspension 30 mL  30 mL Oral QID PRN Elicia Barnes PA-C        apixaban tablet 2.5 mg  2.5 mg Oral BID Hannah Andre MD   2.5 mg at 05/01/24 0853    atenoloL tablet 25 mg  25 mg Oral Daily Hannah Andre MD   25 mg at 05/01/24 0853    bisacodyL suppository 10 mg  10 mg Rectal Daily PRN Hannah Andre MD        clindamycin in D5W 900 mg/50 mL IVPB 900 mg  900 mg Intravenous On Call Procedure Damián Ramos MD        dextrose 10% bolus 125 mL 125 mL  12.5 g Intravenous PRN Elicia Barnes PA-C        dextrose 10% bolus 250 mL 250 mL  25 g Intravenous PRN Elicia Barnes PA-C        droPERidol injection 0.625 mg  0.625 mg Intravenous Once PRN Clarence Chacon MD        EScitalopram oxalate tablet 10 mg  10 mg Oral Daily Elicia Barnes PA-C   10 mg at 05/01/24 0853    glucagon (human recombinant) injection 1 mg  1 mg Intramuscular PRN Elicia Barnes PA-C        glucose chewable tablet 16 g  16 g Oral PRN Elicia Barnes PA-C        glucose chewable tablet 24 g  24 g Oral PRN Elicia Barnes PA-C        melatonin tablet 6 mg  6 mg Oral Nightly PRN Hannah Andre MD   6 mg at 04/29/24 1844    memantine tablet 5 mg  5 mg Oral BID Elicia Barnes PA-C   5 mg at 05/01/24 0853    methocarbamoL tablet 500 mg  500 mg Oral Q6H PRN Hannah Andre MD   500 mg at 05/01/24 0901    metoclopramide injection 5  mg  5 mg Intravenous Q6H PRN Hannah Andre MD        multivitamin tablet  1 tablet Oral Daily Hannah Andre MD   1 tablet at 05/01/24 0853    mupirocin 2 % ointment 1 g  1 g Nasal BID Hannah Andre MD   1 g at 05/01/24 0853    mupirocin 2 % ointment   Nasal On Call Procedure Damián Ramos MD        naloxone 0.4 mg/mL injection 0.02 mg  0.02 mg Intravenous PRN Elicia Barnes PA-C        ondansetron disintegrating tablet 8 mg  8 mg Oral Q8H PRN Elicia Barnes PA-C        ondansetron injection 4 mg  4 mg Intravenous Once PRN Clarence Chacon MD        ondansetron injection 4 mg  4 mg Intravenous Q12H PRN Hannah Andre MD        polyethylene glycol packet 17 g  17 g Oral Daily Hannah Andre MD   17 g at 05/01/24 0853    prochlorperazine injection Soln 5 mg  5 mg Intravenous Q6H PRN Elicia Barnes PA-C        senna-docusate 8.6-50 mg per tablet 1 tablet  1 tablet Oral BID Hannah Andre MD   1 tablet at 05/01/24 0853    simethicone chewable tablet 80 mg  1 tablet Oral QID PRN Elicia Barnes PA-C        sodium chloride 0.9% flush 10 mL  10 mL Intravenous PRN Damián Ramos MD        sodium chloride 0.9% flush 10 mL  10 mL Intravenous PRN Hannah Andre MD        tranexamic acid (CYKLOKAPRON) 3,000 mg in sodium chloride 0.9% SolP 100 mL  3,000 mg Irrigation On Call Procedure Hannah Andre MD            Anticoagulant dose apixaban 2.5 BID     Assessment:     Pain control adequate, likely some mild hospital delirium.    Plan:     Patient doing well, continue present treatment.  [facility] Will reevaluate and pull this afternoon  2) Continue acetaminophen 1G every 6 hours for 8 doses total  3) Continue prn robaxin    Case discussed with staff, final recommendations per attestation above.     Thank you for the consult and allowing us to participate in the care of this patient. We will signoff. Please call Acute Pain Service at [phone] or  Anesthesia at [phone] if you have any further questions or concerns.      Reinier Cabrera MD, PGY3  Department of Anesthesiology  [facility]  05/01/2024

## 2024-05-01 NOTE — PROGRESS NOTES
[facility] Medicine  Progress Note    Patient Name: Mary Ellen Miller  MRN: [id number]  Patient Class: IP- Inpatient   Admission Date: 4/27/2024  Length of Stay: 4 days  Attending Physician: Hannah Andre MD  Primary Care Provider: Dionne Jeter MD        Subjective:     Principal Problem:Closed displaced intertrochanteric fracture of left femur        HPI:  Mary Ellen Miller is a 89 y.o. F with PMH of HTN, HLD, CHF, and afib on eliquis presenting to the ED with significant left hip pain after a fall last night. Patient was ambulating to the restroom to urinate last night and slipped and fell onto her left hip. She had immediate pain and significant difficulty bearing weight. She denies head injury or LOC and is on eliquis for paroxysmal A fib. She denies numbness, tingling, or paresthesias to the LLE. 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 Prior to the incident, she reports intermittent worsening of chronic lower extremity edema and SOB. Her PCP recently discontinued amlodipine in favor of lasix and continuing aldactone, and she has not missed any doses of her home medications. She has been drinking excess tea lately but has completely cut salt and has decreased her overall fluid intake. She denies GARVIN or orthopnea and does not use oxygen at home. Pt endorses intermittent confusion and abdominal distention but otherwise denies fever, chills, chest pain, palpitations, abdominal pain, N/V/D, dysuria, HA, or syncope.       In the ED: Hypertensive and hypoxic, sating 93% on 3L NC, o/w VSSAF. CBC with leukocytosis to 16, likely reactive. CMP grossly unremarkable. . Troponin 0.009. EKG c/w known rate controlled A fib. CXR c/w pulmonary edema & R pleural effusion. XR pelvis with mildly displaced L intertrochanteric femur fracture with impaction,  minimally displaced comminuted L superior pubic ramus fracture, and nondisplaced fracture of the L inferior pubic ramus. Pt was given 80 mg IV lasix, zofran, and dilaudid and was admitted to hospital medicine for further management.     Overview/Hospital Course:  No notes on file    Interval history- had a rougher night than she expected as woke up and got a little confused and disoriented she said and nursing reoriented her and she is oriented and herself today and able to relay those events to us with family at bedside. Rechecked UA jsut to make sure no other precipitant and normal. Cr doing well and better than admit at . 9 and on exam on 1L, has a slight sign of mild dyspnea every few sentenecs a slightly more air intake breathing so will giev another lasix 20 today as can optimize a little bit more with 1 more dose of IV before likely resuming home lasi but plan to hold aldactone on discharge with recent JOHAN. I suspect she will be close to euvolemic tomorrow and hopefully off oxygen at that time as well. She is extremely active at baseline doing exercises daily, used to do step aerobics with a painted step and her son reports they would run and do exercise every day int he 1960s even before running was popular. She wants to regain her prior level of function as best she can and is very motivated for therapies. BM yesterday. Accepted to [facility] for tomorrow        Review of patient's allergies indicates:   Allergen Reactions    Percodan [oxycodone hcl-oxycodone-asa] Other (See Comments)     halucinations    Penicillins Itching    Sulfa (sulfonamide antibiotics) Diarrhea and Nausea Only    Amoxicillin        Current Facility-Administered Medications   Medication Dose Route Frequency Provider Last Rate Last Admin    albuterol-ipratropium 2.5 mg-0.5 mg/3 mL nebulizer solution 3 mL  3 mL Nebulization Q4H PRN Elicia Barnes, JASPREET        albuterol-ipratropium 2.5 mg-0.5 mg/3 mL nebulizer solution 3 mL  3 mL  Nebulization Q4H WAKE Hannah Andre MD   3 mL at 05/01/24 1158    aluminum-magnesium hydroxide-simethicone 200-200-20 mg/5 mL suspension 30 mL  30 mL Oral QID PRN Elicia Barnes PA-C        apixaban tablet 2.5 mg  2.5 mg Oral BID Hannah Andre MD   2.5 mg at 05/01/24 0853    atenoloL tablet 25 mg  25 mg Oral Daily Hannah Andre MD   25 mg at 05/01/24 0853    bisacodyL suppository 10 mg  10 mg Rectal Daily PRN Hannah Andre MD        clindamycin in D5W 900 mg/50 mL IVPB 900 mg  900 mg Intravenous On Call Procedure Damián Ramos MD        dextrose 10% bolus 125 mL 125 mL  12.5 g Intravenous PRN Elicia Barnes PA-C        dextrose 10% bolus 250 mL 250 mL  25 g Intravenous PRN Elicia Barnes PA-C        droPERidol injection 0.625 mg  0.625 mg Intravenous Once PRN Clarence Chacon MD        EScitalopram oxalate tablet 10 mg  10 mg Oral Daily Elicia Barnes PA-C   10 mg at 05/01/24 0853    glucagon (human recombinant) injection 1 mg  1 mg Intramuscular PRN Elicia Barnes PA-C        glucose chewable tablet 16 g  16 g Oral PRN Elicia Barnes PA-C        glucose chewable tablet 24 g  24 g Oral PRN Elicia Barnes PA-C        melatonin tablet 6 mg  6 mg Oral Nightly PRN Hannah Andre MD   6 mg at 04/29/24 1844    memantine tablet 5 mg  5 mg Oral BID Elicia Barnes PA-C   5 mg at 05/01/24 0853    methocarbamoL tablet 500 mg  500 mg Oral Q6H PRN Hannah Andre MD   500 mg at 05/01/24 0901    metoclopramide injection 5 mg  5 mg Intravenous Q6H PRN Hannah Andre MD        multivitamin tablet  1 tablet Oral Daily Hannah Andre MD   1 tablet at 05/01/24 0853    mupirocin 2 % ointment 1 g  1 g Nasal BID Hannah Andre MD   1 g at 05/01/24 0853    mupirocin 2 % ointment   Nasal On Call Procedure Damián Ramos MD        naloxone 0.4 mg/mL injection 0.02 mg  0.02 mg Intravenous PRN Elicia Barnes, PA-C        ondansetron disintegrating tablet 8  mg  8 mg Oral Q8H PRN Elicia Barnes PA-C        ondansetron injection 4 mg  4 mg Intravenous Once PRN Clarence Chacon MD        ondansetron injection 4 mg  4 mg Intravenous Q12H PRN Hannah Andre MD        polyethylene glycol packet 17 g  17 g Oral Daily Hannah Andre MD   17 g at 05/01/24 0853    prochlorperazine injection Soln 5 mg  5 mg Intravenous Q6H PRN Elicia Barnes PA-C        senna-docusate 8.6-50 mg per tablet 1 tablet  1 tablet Oral BID Hannah Andre MD   1 tablet at 05/01/24 0853    simethicone chewable tablet 80 mg  1 tablet Oral QID PRN Elicia Barnes PA-C        sodium chloride 0.9% flush 10 mL  10 mL Intravenous PRN Damián Ramos MD        sodium chloride 0.9% flush 10 mL  10 mL Intravenous PRN Hannah Andre MD        tranexamic acid (CYKLOKAPRON) 3,000 mg in sodium chloride 0.9% SolP 100 mL  3,000 mg Irrigation On Call Procedure Hannah Andre MD         Family History       Problem Relation (Age of Onset)    No Known Problems Mother, Father          Tobacco Use    Smoking status: Never     Passive exposure: Never    Smokeless tobacco: Never   Substance and Sexual Activity    Alcohol use: No    Drug use: No    Sexual activity: Not Currently     Review of Systems   Constitutional:  Positive for activity change. Negative for chills and fever.   HENT:  Negative for trouble swallowing.    Eyes:  Negative for photophobia and visual disturbance.   Respiratory:  Positive for cough and shortness of breath. Negative for chest tightness.    Cardiovascular:  Positive for leg swelling. Negative for chest pain and palpitations.   Gastrointestinal:  Negative for abdominal pain, constipation, diarrhea, nausea and vomiting.   Genitourinary:  Negative for dysuria, frequency and hematuria.   Musculoskeletal:  Positive for arthralgias, back pain and gait problem. Negative for neck pain.   Skin:  Negative for rash and wound.   Neurological:  Positive for weakness.  Negative for dizziness, syncope, speech difficulty and light-headedness.   Psychiatric/Behavioral:  Positive for confusion (intermittent). Negative for agitation. The patient is not nervous/anxious.      Objective:     Vital Signs (Most Recent):  Temp: 98.2 °F (36.8 °C) (05/01/24 1144)  Pulse: 98 (05/01/24 1446)  Resp: 17 (05/01/24 1158)  BP: 118/61 (05/01/24 1144)  SpO2: (!) 93 % (05/01/24 1158) Vital Signs (24h Range):  Temp:  [97.9 °F (36.6 °C)-98.8 °F (37.1 °C)] 98.2 °F (36.8 °C)  Pulse:  [] 98  Resp:  [17-20] 17  SpO2:  [90 %-95 %] 93 %  BP: (112-147)/(61-74) 118/61     Weight: 54.9 kg (121 lb)  Body mass index is 21.43 kg/m².     Physical Exam  Vitals and nursing note reviewed.   Constitutional:       General: She is not in acute distress.     Appearance: She is well-developed. She is not toxic-appearing.   HENT:      Head: Normocephalic.      Mouth/Throat:      Mouth: Mucous membranes are moist.   Eyes:      Extraocular Movements: Extraocular movements intact.      Conjunctiva/sclera: Conjunctivae normal.      Pupils: Pupils are equal, round, and reactive to light.   Cardiovascular:      Rate and Rhythm: Tachycardia present. Rhythm irregular.      Heart sounds: Normal heart sounds.      Comments: No JVD.  with afib  Pulmonary:      Effort: Pulmonary effort is normal. No respiratory distress.      Breath sounds: Rales present. No wheezing.      Comments: On 1L On low santos oxygen, speaking in full sentences with a slight increase in deep braething every few sentences seen  Abdominal:      General: Bowel sounds are normal. There is distension.      Palpations: Abdomen is soft.      Tenderness: There is no abdominal tenderness.   Musculoskeletal:         General: Normal range of motion.      Cervical back: Normal range of motion and neck supple.      Comments: LLE with bandages in place. Ropivicaine in place.   Skin:     General: Skin is warm and dry.      Capillary Refill: Capillary refill takes less  "than 2 seconds.      Findings: Bruising present. No rash.   Neurological:      Mental Status: She is alert and oriented to person, place, and time.      Cranial Nerves: No cranial nerve deficit.      Sensory: No sensory deficit.      Coordination: Coordination normal.   Psychiatric:         Behavior: Behavior normal.         Thought Content: Thought content normal.         Judgment: Judgment normal.              CRANIAL NERVES     CN III, IV, VI   Pupils are equal, round, and reactive to light.       Significant Labs: All pertinent labs within the past 24 hours have been reviewed.  CBC:   Recent Labs   Lab 04/30/24  0509 05/01/24  0456   WBC 9.66 8.22   HGB 8.3* 8.5*   HCT 25.9* 26.9*   * 150     CMP:   Recent Labs   Lab 04/30/24  0509 05/01/24  0456    141   K 4.1 3.9   * 113*   CO2 19* 20*   GLU 92 98   BUN 56* 39*   CREATININE 1.3 0.9   CALCIUM 8.1* 8.6*   ANIONGAP 9 8     Cardiac Markers:   No results for input(s): "CKMB", "MYOGLOBIN", "BNP", "TROPISTAT" in the last 48 hours.    Troponin:   No results for input(s): "TROPONINI", "TROPONINIHS" in the last 48 hours.    Urine Studies:   Recent Labs   Lab 05/01/24  0922   COLORU Yellow   APPEARANCEUA Clear   PHUR 6.0   SPECGRAV 1.020   PROTEINUA Trace*   GLUCUA Negative   KETONESU Negative   BILIRUBINUA Negative   OCCULTUA Negative   NITRITE Negative   LEUKOCYTESUR Negative         Significant Imaging: I have reviewed all pertinent imaging results/findings within the past 24 hours.  SURG FL Surgery Fluoro Usage  See OP Notes for results.     IMPRESSION: See OP Notes for results.     This procedure was auto-finalized by: Virtual Radiologist        Assessment/Plan:      * Closed displaced intertrochanteric fracture of left femur  89 y.o. who presents after a fall with a closed displaced L intertrochanteric fracture after a fall. Very active at baseline, does daily exercise classes  -ortho consulted, s/p IM nail 4/28 with dr gill. Resume home " eliquis for anticoag, pharm reports meets criteria to adjsut to 2.5 mg daily for age/indication and adjusetd to this  -slight hg downtick expected post op from 12--9.4--8.3 and cont to trend  -vit D very high at 77 and rec to hold vit d to avoid toxicity levels  -UA wnl on admit.   -multimodal pain control   -bowel regimen with BM 4/30  -eliquis for PPX for DVt already on for afib and resumed  -bandages to remove in clinic in 2 weeks  -PT/OT on POD 1 with recs for post acute placement and  accepted at [facility]      Acute hypoxemic respiratory failure  Patient with Hypoxic Respiratory failure which is Acute.  she is not on home oxygen. Supplemental oxygen was provided and noted-      .   Signs/symptoms of respiratory failure include- tachypnea and increased work of breathing. Contributing diagnoses includes - CHF Labs and images were reviewed. Patient Has not had a recent ABG. Will treat underlying causes and adjust management of respiratory failure as follows- wean off, on 1L, lasix    Hypotension  Holding further diuresis, improving with light IVF, watch volume status closely given overload on admit  Improved 4/29 after IVF yesterday and stable now in 130s      Acute blood loss anemia  Patient's anemia is currently uncontrolled. Has not received any PRBCs to date. Etiology likely d/t acute blood loss which was from surgical repair  Current CBC reviewed-   Lab Results   Component Value Date    HGB 8.3 (L) 04/29/2024    HCT 26.5 (L) 04/29/2024     Monitor serial CBC and transfuse if patient becomes hemodynamically unstable, symptomatic or H/H drops below 7/21.    JOHAN (acute kidney injury)  Patient with acute kidney injury/acute renal failure likely due to diuersis with lasix JOHAN is currently  present 4/28 at Cr 2.0 . Baseline creatinine  1.1  - Labs reviewed- Renal function/electrolytes with Estimated Creatinine Clearance: 31.6 mL/min (based on SCr of 0.9 mg/dL). according to latest data. Monitor urine output and serial  BMP and adjust therapy as needed. Avoid nephrotoxins and renally dose meds for GFR listed above.  Hold lasix/aldactone and very light IVF considering overload on admit to allow volume equilibration  Improving to 1.6 now-->1.3. small dose of lasix 4/30 as improved and still on oxygen and a touch overloaded still appearnace on exam  Resolved with Cr . 9 now and tolerated lasix dose well again    Long term (current) use of anticoagulants  This patient has long term use on an anticoagulant with Select Anticoagulant(s): Direct oral anticoagulant: Apixaban (Eliquis). Their long term anticoagulation will be Held or Continued: continued. They are on long term anticoagulation due to Reason for Anticoagulation: Atrial fibrillation.     Acute on chronic diastolic heart failure  Patient is identified as having Diastolic (HFpEF) heart failure that is Acute on Chronic. CHF is currently uncontrolled due to Dyspnea not returned to baseline after 1 doses of IV diuretic, Rales/crackles on pulmonary exam, and Pulmonary edema/pleural effusion on CXR. Pt is on CHF pathway.   - Patient decompensated on admit (4/27/2024). (+)Subjective SOB, (--)JVD, (--)orthnopnea.   - Last BNP reviewed- and noted below   Recent Labs   Lab 04/27/24  0702   *     - Troponin 0.009, continue to trend   - CXR with cardiomegaly and pulmonary edema with R pleural effusion   - EKG without acute ST changes   - Echo with Echo    Result Date: 4/27/2024    Left Ventricle: The left ventricle is normal in size. Ventricular mass   is normal. Normal wall thickness. Normal wall motion. There is normal   systolic function with a visually estimated ejection fraction of 60 - 65%.   There is indeterminate diastolic function.Normal left ventricular filling   pressure.    Right Ventricle: Normal right ventricular cavity size. Wall thickness   is normal. Systolic function is normal.    Left Atrium: Left atrium is severely dilated.    Right Atrium: Right atrium is  severely dilated.    Aortic Valve: The aortic valve is a trileaflet valve. There is mild   aortic valve sclerosis. There is mild annular calcification present.    Mitral Valve: There is moderate anterior leaflet sclerosis.    Tricuspid Valve: There is moderate regurgitation with a centrally   directed jet.    Aorta: Aortic root is normal in size measuring 3.08 cm. Ascending aorta   is normal measuring 3.43 cm.    Pulmonary Artery: The estimated pulmonary artery systolic pressure is   57 mmHg.    IVC/SVC: Intermediate venous pressure at 8 mmHg.          Intake/Output Summary (Last 24 hours) at 5/1/2024 1451  Last data filed at 5/1/2024 0500  Gross per 24 hour   Intake 125 ml   Output 600 ml   Net -475 ml        - Supplemental O2 to keep Spo2 >90%  - Continue to stress to patient importance of self efficacy and  on diet for CHF.  Suspect overdiuresis as Aster on 4/28 after lasix 40 BID + home aldactone on admit + hypotension. Hold for now, very light IVF for hypotension now, CXr similar to before, on similar oxygen and reassess volume status this PM. Liberate diet for now while equliibirating volume status.  -appears approaching euvolemic now on 4/29 with IVF yesterday, hold lasix and IVF today to allow to further self equilbrate with oral intake. Plan to resume likely either lower dose of lasix vs holding aldcatone once euvolemic and Cr stabilzed as she reports urinating 3 times a night at home and felt she was on maybe too much long term  About 1.5 net negative on 4/30 and Bp stable, Cr 1.3, so will give a lasix 20 once (got lasix 120 mg total prior to surgery) as still on 1L and has probably a small bit of fluid to still remove  Cr stable at .9 on 5/1 and just a small bit of increased breathing every few sentences seen so would benefit from 1 more IV dose today and then back to orals tomorrow and hold aldacont plans    Chronic atrial fibrillation  Long term use of anticoagulants   Patient with Persistent (7  days or more) atrial fibrillation which is controlled currently with Beta Blocker. Patient is currently in atrial fibrillation.[id number] Score: 3. Anticoagulation indicated. Anticoagulation done with eliquis .  - hold atenolol 4/28 for hypotension, HR 60s-70s in pacu  -eliquis resumed post op, per pharm brittany criteria based on age and indications to start 2.5 dosing instead of 5 mg dosing  Mild uptick to 105 on exam 4/30 but just exerted into chair. Trend on home atenolol. HR a tick over 100 on 5/1, can consider adjusting to metoprolol long term for better control if becomes a chronic issue of jaunts above 100    Recurrent major depressive disorder, in full remission  Patient has recurrent depression which is mild and is currently controlled. Will Continue anti-depressant medications. We will not consult psychiatry at this time. Patient does not display psychosis at this time. Continue to monitor closely and adjust plan of care as needed.  - Continue home lexapro & namenda     Essential hypertension  Chronic, controlled. Latest blood pressure and vitals reviewed-     Temp:  [97 °F (36.1 °C)-98.4 °F (36.9 °C)]   Pulse:  [65-83]   Resp:  [16-18]   BP: ()/(51-65)   SpO2:  [90 %-96 %] .   Home meds for hypertension were reviewed and noted below.   Hypertension Medications                    furosemide (LASIX) 20 MG tablet TAKE 1 TABLET BY MOUTH ONCE DAILY.    spironolactone (ALDACTONE) 50 MG tablet Take 1 tablet (50 mg total) by mouth once daily.     Not taking norvasc anymore. Atenolol started 4/427 but held 4/28 for lower BPs. Hold aldactone and lasix for JOHAN 4/28    Will utilize p.r.n. blood pressure medication only if patient's blood pressure greater than 180/110 and she develops symptoms such as worsening chest pain or shortness of breath.      VTE Risk Mitigation (From admission, onward)           Ordered     apixaban tablet 2.5 mg  2 times daily         04/28/24 0943     IP VTE HIGH RISK PATIENT  Once          04/28/24 0707     Place sequential compression device  Until discontinued         04/28/24 0707                    Discharge Planning   HEAVEN: 5/2/2024     Code Status: DNR   Is the patient medically ready for discharge?:     Reason for patient still in hospital (select all that apply): Patient trending condition  Discharge Plan A: Skilled Nursing Facility                  Hannah Andre MD  Department of Hospital Medicine   [facility] - Surgery

## 2024-05-01 NOTE — ASSESSMENT & PLAN NOTE
Patient with Hypoxic Respiratory failure which is Acute.  she is not on home oxygen. Supplemental oxygen was provided and noted-      .   Signs/symptoms of respiratory failure include- tachypnea and increased work of breathing. Contributing diagnoses includes - CHF Labs and images were reviewed. Patient Has not had a recent ABG. Will treat underlying causes and adjust management of respiratory failure as follows- wean off, on 1L, lasix

## 2024-05-01 NOTE — SUBJECTIVE & OBJECTIVE
Interval history- had a rougher night than she expected as woke up and got a little confused and disoriented she said and nursing reoriented her and she is oriented and herself today and able to relay those events to us with family at bedside. Rechecked UA jsut to make sure no other precipitant and normal. Cr doing well and better than admit at . 9 and on exam on 1L, has a slight sign of mild dyspnea every few sentenecs a slightly more air intake breathing so will giev another lasix 20 today as can optimize a little bit more with 1 more dose of IV before likely resuming home lasi but plan to hold aldactone on discharge with recent JOHAN. I suspect she will be close to euvolemic tomorrow and hopefully off oxygen at that time as well. She is extremely active at baseline doing exercises daily, used to do step aerobics with a painted step and her son reports they would run and do exercise every day int he 1960s even before running was popular. She wants to regain her prior level of function as best she can and is very motivated for therapies. BM yesterday. Accepted to [facility] for tomorrow        Review of patient's allergies indicates:   Allergen Reactions    Percodan [oxycodone hcl-oxycodone-asa] Other (See Comments)     halucinations    Penicillins Itching    Sulfa (sulfonamide antibiotics) Diarrhea and Nausea Only    Amoxicillin        Current Facility-Administered Medications   Medication Dose Route Frequency Provider Last Rate Last Admin    albuterol-ipratropium 2.5 mg-0.5 mg/3 mL nebulizer solution 3 mL  3 mL Nebulization Q4H PRN Elicia Barnes PA-C        albuterol-ipratropium 2.5 mg-0.5 mg/3 mL nebulizer solution 3 mL  3 mL Nebulization Q4H WAKE Hannah Andre MD   3 mL at 05/01/24 1158    aluminum-magnesium hydroxide-simethicone 200-200-20 mg/5 mL suspension 30 mL  30 mL Oral QID PRN Elicia Barnes PA-C        apixaban tablet 2.5 mg  2.5 mg Oral BID Hannah Andre MD   2.5 mg at 05/01/24 7236     atenoloL tablet 25 mg  25 mg Oral Daily Hannah Andre MD   25 mg at 05/01/24 0853    bisacodyL suppository 10 mg  10 mg Rectal Daily PRN Hannah Andre MD        clindamycin in D5W 900 mg/50 mL IVPB 900 mg  900 mg Intravenous On Call Procedure Damián Ramos MD        dextrose 10% bolus 125 mL 125 mL  12.5 g Intravenous PRN Elicia Barnes PA-C        dextrose 10% bolus 250 mL 250 mL  25 g Intravenous PRN Elicia Barnes PA-C        droPERidol injection 0.625 mg  0.625 mg Intravenous Once PRN Clarence Chacon MD        EScitalopram oxalate tablet 10 mg  10 mg Oral Daily Elicia Barnes PA-C   10 mg at 05/01/24 0853    glucagon (human recombinant) injection 1 mg  1 mg Intramuscular PRN Elicia Barnes PA-C        glucose chewable tablet 16 g  16 g Oral PRN Elicia Barnes PA-C        glucose chewable tablet 24 g  24 g Oral PRN Elicia Barnes PA-C        melatonin tablet 6 mg  6 mg Oral Nightly PRN Hannah Andre MD   6 mg at 04/29/24 1844    memantine tablet 5 mg  5 mg Oral BID Elicia Barnes PA-C   5 mg at 05/01/24 0853    methocarbamoL tablet 500 mg  500 mg Oral Q6H PRN Hannah Andre MD   500 mg at 05/01/24 0901    metoclopramide injection 5 mg  5 mg Intravenous Q6H PRN Hannah Andre MD        multivitamin tablet  1 tablet Oral Daily Hannah Andre MD   1 tablet at 05/01/24 0853    mupirocin 2 % ointment 1 g  1 g Nasal BID Hannah Andre MD   1 g at 05/01/24 0853    mupirocin 2 % ointment   Nasal On Call Procedure Damián Ramos MD        naloxone 0.4 mg/mL injection 0.02 mg  0.02 mg Intravenous PRN Elicia Barnes PA-C        ondansetron disintegrating tablet 8 mg  8 mg Oral Q8H PRN Elicia Barnes PA-C        ondansetron injection 4 mg  4 mg Intravenous Once PRN Clarence Chacon MD        ondansetron injection 4 mg  4 mg Intravenous Q12H PRN Hannah Andre MD        polyethylene glycol packet 17 g  17 g Oral Daily Hannah Andre  MD ROSANA   17 g at 05/01/24 0853    prochlorperazine injection Soln 5 mg  5 mg Intravenous Q6H PRN Elicia Barnes PA-C        senna-docusate 8.6-50 mg per tablet 1 tablet  1 tablet Oral BID Hannah Andre MD   1 tablet at 05/01/24 0853    simethicone chewable tablet 80 mg  1 tablet Oral QID PRN Elicia Barnes PA-C        sodium chloride 0.9% flush 10 mL  10 mL Intravenous PRN Damián Ramos MD        sodium chloride 0.9% flush 10 mL  10 mL Intravenous PRN Hannah Andre MD        tranexamic acid (CYKLOKAPRON) 3,000 mg in sodium chloride 0.9% SolP 100 mL  3,000 mg Irrigation On Call Procedure Hannah Andre MD         Family History       Problem Relation (Age of Onset)    No Known Problems Mother, Father          Tobacco Use    Smoking status: Never     Passive exposure: Never    Smokeless tobacco: Never   Substance and Sexual Activity    Alcohol use: No    Drug use: No    Sexual activity: Not Currently     Review of Systems   Constitutional:  Positive for activity change. Negative for chills and fever.   HENT:  Negative for trouble swallowing.    Eyes:  Negative for photophobia and visual disturbance.   Respiratory:  Positive for cough and shortness of breath. Negative for chest tightness.    Cardiovascular:  Positive for leg swelling. Negative for chest pain and palpitations.   Gastrointestinal:  Negative for abdominal pain, constipation, diarrhea, nausea and vomiting.   Genitourinary:  Negative for dysuria, frequency and hematuria.   Musculoskeletal:  Positive for arthralgias, back pain and gait problem. Negative for neck pain.   Skin:  Negative for rash and wound.   Neurological:  Positive for weakness. Negative for dizziness, syncope, speech difficulty and light-headedness.   Psychiatric/Behavioral:  Positive for confusion (intermittent). Negative for agitation. The patient is not nervous/anxious.      Objective:     Vital Signs (Most Recent):  Temp: 98.2 °F (36.8 °C) (05/01/24  1144)  Pulse: 98 (05/01/24 1446)  Resp: 17 (05/01/24 1158)  BP: 118/61 (05/01/24 1144)  SpO2: (!) 93 % (05/01/24 1158) Vital Signs (24h Range):  Temp:  [97.9 °F (36.6 °C)-98.8 °F (37.1 °C)] 98.2 °F (36.8 °C)  Pulse:  [] 98  Resp:  [17-20] 17  SpO2:  [90 %-95 %] 93 %  BP: (112-147)/(61-74) 118/61     Weight: 54.9 kg (121 lb)  Body mass index is 21.43 kg/m².     Physical Exam  Vitals and nursing note reviewed.   Constitutional:       General: She is not in acute distress.     Appearance: She is well-developed. She is not toxic-appearing.   HENT:      Head: Normocephalic.      Mouth/Throat:      Mouth: Mucous membranes are moist.   Eyes:      Extraocular Movements: Extraocular movements intact.      Conjunctiva/sclera: Conjunctivae normal.      Pupils: Pupils are equal, round, and reactive to light.   Cardiovascular:      Rate and Rhythm: Tachycardia present. Rhythm irregular.      Heart sounds: Normal heart sounds.      Comments: No JVD.  with afib  Pulmonary:      Effort: Pulmonary effort is normal. No respiratory distress.      Breath sounds: Rales present. No wheezing.      Comments: On 1L On low santos oxygen, speaking in full sentences with a slight increase in deep braething every few sentences seen  Abdominal:      General: Bowel sounds are normal. There is distension.      Palpations: Abdomen is soft.      Tenderness: There is no abdominal tenderness.   Musculoskeletal:         General: Normal range of motion.      Cervical back: Normal range of motion and neck supple.      Comments: LLE with bandages in place. Ropivicaine in place.   Skin:     General: Skin is warm and dry.      Capillary Refill: Capillary refill takes less than 2 seconds.      Findings: Bruising present. No rash.   Neurological:      Mental Status: She is alert and oriented to person, place, and time.      Cranial Nerves: No cranial nerve deficit.      Sensory: No sensory deficit.      Coordination: Coordination normal.  "  Psychiatric:         Behavior: Behavior normal.         Thought Content: Thought content normal.         Judgment: Judgment normal.              CRANIAL NERVES     CN III, IV, VI   Pupils are equal, round, and reactive to light.       Significant Labs: All pertinent labs within the past 24 hours have been reviewed.  CBC:   Recent Labs   Lab 04/30/24  0509 05/01/24  0456   WBC 9.66 8.22   HGB 8.3* 8.5*   HCT 25.9* 26.9*   * 150     CMP:   Recent Labs   Lab 04/30/24  0509 05/01/24  0456    141   K 4.1 3.9   * 113*   CO2 19* 20*   GLU 92 98   BUN 56* 39*   CREATININE 1.3 0.9   CALCIUM 8.1* 8.6*   ANIONGAP 9 8     Cardiac Markers:   No results for input(s): "CKMB", "MYOGLOBIN", "BNP", "TROPISTAT" in the last 48 hours.    Troponin:   No results for input(s): "TROPONINI", "TROPONINIHS" in the last 48 hours.    Urine Studies:   Recent Labs   Lab 05/01/24  0922   COLORU Yellow   APPEARANCEUA Clear   PHUR 6.0   SPECGRAV 1.020   PROTEINUA Trace*   GLUCUA Negative   KETONESU Negative   BILIRUBINUA Negative   OCCULTUA Negative   NITRITE Negative   LEUKOCYTESUR Negative         Significant Imaging: I have reviewed all pertinent imaging results/findings within the past 24 hours.  SURG FL Surgery Fluoro Usage  See OP Notes for results.     IMPRESSION: See OP Notes for results.     This procedure was auto-finalized by: Virtual Radiologist      "

## 2024-05-01 NOTE — ASSESSMENT & PLAN NOTE
Patient is identified as having Diastolic (HFpEF) heart failure that is Acute on Chronic. CHF is currently uncontrolled due to Dyspnea not returned to baseline after 1 doses of IV diuretic, Rales/crackles on pulmonary exam, and Pulmonary edema/pleural effusion on CXR. Pt is on CHF pathway.   - Patient decompensated on admit (4/27/2024). (+)Subjective SOB, (--)JVD, (--)orthnopnea.   - Last BNP reviewed- and noted below   Recent Labs   Lab 04/27/24  0702   *     - Troponin 0.009, continue to trend   - CXR with cardiomegaly and pulmonary edema with R pleural effusion   - EKG without acute ST changes   - Echo with Echo    Result Date: 4/27/2024    Left Ventricle: The left ventricle is normal in size. Ventricular mass   is normal. Normal wall thickness. Normal wall motion. There is normal   systolic function with a visually estimated ejection fraction of 60 - 65%.   There is indeterminate diastolic function.Normal left ventricular filling   pressure.    Right Ventricle: Normal right ventricular cavity size. Wall thickness   is normal. Systolic function is normal.    Left Atrium: Left atrium is severely dilated.    Right Atrium: Right atrium is severely dilated.    Aortic Valve: The aortic valve is a trileaflet valve. There is mild   aortic valve sclerosis. There is mild annular calcification present.    Mitral Valve: There is moderate anterior leaflet sclerosis.    Tricuspid Valve: There is moderate regurgitation with a centrally   directed jet.    Aorta: Aortic root is normal in size measuring 3.08 cm. Ascending aorta   is normal measuring 3.43 cm.    Pulmonary Artery: The estimated pulmonary artery systolic pressure is   57 mmHg.    IVC/SVC: Intermediate venous pressure at 8 mmHg.          Intake/Output Summary (Last 24 hours) at 5/1/2024 1451  Last data filed at 5/1/2024 0500  Gross per 24 hour   Intake 125 ml   Output 600 ml   Net -475 ml        - Supplemental O2 to keep Spo2 >90%  - Continue to stress to  patient importance of self efficacy and  on diet for CHF.  Suspect overdiuresis as Aster on 4/28 after lasix 40 BID + home aldactone on admit + hypotension. Hold for now, very light IVF for hypotension now, CXr similar to before, on similar oxygen and reassess volume status this PM. Liberate diet for now while equliibirating volume status.  -appears approaching euvolemic now on 4/29 with IVF yesterday, hold lasix and IVF today to allow to further self equilbrate with oral intake. Plan to resume likely either lower dose of lasix vs holding aldcatone once euvolemic and Cr stabilzed as she reports urinating 3 times a night at home and felt she was on maybe too much long term  About 1.5 net negative on 4/30 and Bp stable, Cr 1.3, so will give a lasix 20 once (got lasix 120 mg total prior to surgery) as still on 1L and has probably a small bit of fluid to still remove  Cr stable at .9 on 5/1 and just a small bit of increased breathing every few sentences seen so would benefit from 1 more IV dose today and then back to orals tomorrow and hold aldacont plans

## 2024-05-01 NOTE — PLAN OF CARE
Patient's PNC paused this am. Ropivacaine orders discontinued. Will reevaluate this afternoon.     Reinier Cabrera MD, PGY3  Department of Anesthesiology  [facility]  05/01/2024

## 2024-05-01 NOTE — NURSING
Nurses Note -- 4 Eyes      4/30/2024   10:53 PM      Skin assessed during: Q Shift Change      [x] No Altered Skin Integrity Present    []Prevention Measures Documented      [] Yes- Altered Skin Integrity Present or Discovered   [] LDA Added if Not in Epic (Describe Wound)   [] New Altered Skin Integrity was Present on Admit and Documented in LDA   [] Wound Image Taken    Wound Care Consulted? No    Attending Nurse:  Per Baez RN/Staff Member:  Rosibel ANDERS

## 2024-05-01 NOTE — PT/OT/SLP PROGRESS
Occupational Therapy   Co-Treatment    Name: Mary Ellen Miller  MRN: [id number]  Admitting Diagnosis:  Closed displaced intertrochanteric fracture of left femur  3 Days Post-Op    Recommendations:     Discharge Recommendations: Moderate Intensity Therapy  Discharge Equipment Recommendations:  bedside commode, walker, rolling    Patient has a mobility limitation that significantly impairs their ability to participate in one or more mobility related activities of daily living, including toileting. This deficit can be resolved by using a bedside commode. Patient demonstrates mobility limitations that will cause them to be confined to one room at home without bathroom access for up to 30 days. Using a bedside commode will greatly improve the patient's ability to participate in MRADLs.    Patient demonstrates a mobility limitation that significantly impairs their ability to participate in one or more mobility related activities of daily living. Patient's mobility limitation cannot be sufficiently resolved with the use of a cane, but can be sufficiently resolved with the use of a rolling walker.The use of a rolling walker will considerably improve their ability to participate in MRADLs. Patient will use the walker on a regular basis at home.   Barriers to discharge:  Other (Comment)    Assessment:     Mary Ellen Miller is a 89 y.o. female with a medical diagnosis of Closed displaced intertrochanteric fracture of left femur. Performance deficits affecting function are impaired endurance, weakness, impaired self care skills, impaired functional mobility, pain, decreased safety awareness, decreased lower extremity function, decreased ROM, orthopedic precautions.     Rehab Prognosis:  Good; patient would benefit from acute skilled OT services to address these deficits and reach maximum level of function.       Plan:     Patient to be seen  (Hip path (4/29-5/1) then 4x/wk) to address the above listed problems via  self-care/home management, neuromuscular re-education, therapeutic activities  Plan of Care Expires: 05/29/24  Plan of Care Reviewed with: patient    Subjective     Chief Complaint: LLE pain   Patient/Family Comments/goals: Pt.reports she thinks she is getting better  Pain/Comfort:  Pain Rating 1:  (did not rate)  Location - Side 1: Left  Location - Orientation 1: generalized  Location 1: hip  Pain Addressed 1: Reposition, Distraction  Pain Rating Post-Intervention 1:  (pt did not rate)    Objective:     Communicated with: Nurse prior to session.  Patient found HOB elevated with SCD, perineural catheter, oxygen upon OT entry to room.    General Precautions: Standard, fall    Orthopedic Precautions:LLE weight bearing as tolerated  Braces: N/A  Respiratory Status: Nasal cannula, flow 2 L/min     Occupational Performance:     Bed Mobility:    Patient completed Scooting/Bridging with minimum assistance  Patient completed Supine to Sit with moderate assistance and 2 persons     Functional Mobility/Transfers:  Patient completed Sit <> Stand Transfer with moderate assistance  with  rolling walker from EOB and Chair  Patient completed Bed <> Chair Transfer using Step Transfer technique with maximal assistance and of 2 persons with rolling walker  Functional Mobility: ~6 step fwd with a chair follow with a RW with Max A of 2 person progressing to a Mod A of 2 person   Noted improvement in foot clearance     Activities of Daily Living:  Lower Body Dressing: maximal assistance don undergarments at Bedside chair  Grooming : set up (A) oral care and facial/ make up task seated at bedside chair    AMPAC 6 Click ADL: 15    Treatment & Education:  Pt.educated and reviewed WB precautions  Pt educated on role of occupational therapy, POC, and safety during ADLs and functional mobility. Pt and OT discussed importance of safe, continued mobility to optimize daily living skills. Pt verbalized understanding. Pt given instruction to call  for medical staff/nurse for assistance.   Co-treatment with PT for maximal pt participation, safety, and activity tolerance     Patient left up in chair with all lines intact, call button in reach, and son present    GOALS:   Multidisciplinary Problems       Occupational Therapy Goals          Problem: Occupational Therapy    Goal Priority Disciplines Outcome Interventions   Occupational Therapy Goal     OT, PT/OT Progressing    Description: Goals to be met by: 5/29/24     Patient will increase functional independence with ADLs by performing:    LE Dressing with Stand-by Assistance.  Grooming while standing at sink with Supervision.  Toileting from bedside commode with Stand-by Assistance for hygiene and clothing management.   Supine to sit with Supervision  Toilet transfer to bedside commode with Stand-by Assistance.                         Time Tracking:     OT Date of Treatment: 05/01/24  OT Start Time: 0913  OT Stop Time: 0944  OT Total Time (min): 31 min    Billable Minutes:Self Care/Home Management 21  Therapeutic Activity 10    OT/GIRMA: OT          5/1/2024

## 2024-05-01 NOTE — NURSING
Nurses Note -- 4 Eyes      5/1/2024   2:54 PM      Skin assessed during: Daily Assessment      [x] No Altered Skin Integrity Present    [x]Prevention Measures Documented      [] Yes- Altered Skin Integrity Present or Discovered   [] LDA Added if Not in Epic (Describe Wound)   [] New Altered Skin Integrity was Present on Admit and Documented in LDA   [] Wound Image Taken    Wound Care Consulted? No    Attending Nurse:  Shirley Pepe RN    Second RN/Staff Member:   BRETT Austin

## 2024-05-01 NOTE — PT/OT/SLP PROGRESS
Physical Therapy Co-Treatment    OT present for cotreat due to pt's multiple medical comorbidities and functional/cognition deficits requiring two skilled therapists to appropriately progress pt's musculoskeletal strength, neuromuscular control, and endurance while taking into consideration medical acuity and pt safety.    Patient Name:  Mary Ellen Miller   MRN:  [id number]    Recommendations:     Discharge Recommendations: Moderate Intensity Therapy  Discharge Equipment Recommendations: bedside commode, walker, rolling  Barriers to discharge: None    Assessment:     Mary Ellen Miller is a 89 y.o. female admitted with a medical diagnosis of Closed displaced intertrochanteric fracture of left femur.  She presents with the following impairments/functional limitations: weakness, impaired endurance, impaired self care skills, impaired functional mobility, gait instability, impaired balance, decreased coordination, decreased lower extremity function, pain, orthopedic precautions     Pt receptive and tolerated PT co-treatment with OT well. Pt able to amb to bedside chair and then amb ~ 6 fwd steps with max A of 2 persons initially progressing to mod A of 2 persons and RW. Patient continues to demonstrate the need for moderate intensity therapy on a daily basis post acute exhibited by decreased independence with self-care and functional mobility  .    Rehab Prognosis: Good; patient would benefit from acute skilled PT services to address these deficits and reach maximum level of function.    Recent Surgery: Procedure(s) (LRB):  INSERTION, INTRAMEDULLARY LENO, FEMUR: Left (Left) 3 Days Post-Op    Plan:     During this hospitalization, patient to be seen 4 x/week to address the identified rehab impairments via gait training, therapeutic activities, therapeutic exercises, neuromuscular re-education and progress toward the following goals:    Plan of Care Expires:  05/29/24    Subjective     Chief Complaint: L hip  "pain  Patient/Family Comments/goals: "That was difficult"  Pain/Comfort:  Pain Rating 1:  (pt did not rate)  Location - Side 1: Left  Location - Orientation 1: generalized  Location 1: hip  Pain Addressed 1: Reposition, Distraction  Pain Rating Post-Intervention 1:  (pt did not rate)      Objective:     Communicated with RN prior to session.  Patient found HOB elevated with SCD, perineural catheter, oxygen upon PT entry to room.     General Precautions: Standard, fall  Orthopedic Precautions: LLE weight bearing as tolerated  Braces: N/A  Respiratory Status: Nasal cannula, flow 1 L/min     Functional Mobility:    Bed Mobility:   Supine > Sit: moderate assistance and of 2 persons  Scooting: minimum assistance    Transfers:   Sit <> Stand Transfer: moderate assistance from EOB and from bedside chair using rolling walker   Bed <> Chair: maximal assistance and of 2 persons using rolling walker     Balance:   Sitting balance: FAIR+: Maintains balance through MINIMAL excursions of active trunk motion  Standing balance:   POOR: Needs MODERATE assist to maintain  0: N/A                 Gait:  Distance: ~6 steps fwd with chair follow   Assistive Device: RW  Assistance Level: maximal assistance and of 2 persons progressing to moderate assistance of 2 persons  Gait Assessment: Pt needed manual facilitation from OT and PT initially with verbal cues for sequence. Pt able to take last 3 steps without manual facilitation.  Manual facilitation given with therapist foot behind pts heel    AM-PAC 6 CLICK MOBILITY  Turning over in bed (including adjusting bedclothes, sheets and blankets)?: 2  Sitting down on and standing up from a chair with arms (e.g., wheelchair, bedside commode, etc.): 2  Moving from lying on back to sitting on the side of the bed?: 2  Moving to and from a bed to a chair (including a wheelchair)?: 2  Need to walk in hospital room?: 2  Climbing 3-5 steps with a railing?: 2  Basic Mobility Total Score: " 12       Treatment & Education:  Pt educated on tip to reduce fall risk and safety with mobility and using call button for assistance from nursing staff with OOB mobility.  Pt educated on sitting up in chair throughout most of day  All questions answered within the scope of PT.  White board updated accordingly.      Patient left up in chair with all lines intact, call button in reach, and son present..    GOALS:   Multidisciplinary Problems       Physical Therapy Goals          Problem: Physical Therapy    Goal Priority Disciplines Outcome Goal Variances Interventions   Physical Therapy Goal     PT, PT/OT Progressing     Description: Goals to be met by: 24     Patient will increase functional independence with mobility by performin. Supine to sit with Contact Guard Assistance  2. Sit to stand transfer with Contact Guard Assistance  3. Bed to chair transfer with Contact Guard Assistance using Rolling Walker  4. Gait  x 50 feet with Minimal Assistance using Rolling Walker.   5. Stand for 5 minutes with Contact Guard Assistance using Rolling Walker  6. Lower extremity exercise program x30 reps per handout, with supervision                         Time Tracking:     PT Received On: 24  PT Start Time: 913     PT Stop Time: 944  PT Total Time (min): 31 min     Billable Minutes: Gait Training 20 and Neuromuscular Re-education 11    Treatment Type: Treatment  PT/PTA: PT           2024

## 2024-05-01 NOTE — ASSESSMENT & PLAN NOTE
Patient with acute kidney injury/acute renal failure likely due to diuersis with lasix JOHAN is currently  present 4/28 at Cr 2.0 . Baseline creatinine  1.1  - Labs reviewed- Renal function/electrolytes with Estimated Creatinine Clearance: 31.6 mL/min (based on SCr of 0.9 mg/dL). according to latest data. Monitor urine output and serial BMP and adjust therapy as needed. Avoid nephrotoxins and renally dose meds for GFR listed above.  Hold lasix/aldactone and very light IVF considering overload on admit to allow volume equilibration  Improving to 1.6 now-->1.3. small dose of lasix 4/30 as improved and still on oxygen and a touch overloaded still appearnace on exam  Resolved with Cr . 9 now and tolerated lasix dose well again

## 2024-05-01 NOTE — ASSESSMENT & PLAN NOTE
Long term use of anticoagulants   Patient with Persistent (7 days or more) atrial fibrillation which is controlled currently with Beta Blocker. Patient is currently in atrial fibrillation.[id number] Score: 3. Anticoagulation indicated. Anticoagulation done with eliquis .  - hold atenolol 4/28 for hypotension, HR 60s-70s in pacu  -eliquis resumed post op, per pharm meeds criteria based on age and indications to start 2.5 dosing instead of 5 mg dosing  Mild uptick to 105 on exam 4/30 but just exerted into chair. Trend on home atenolol. HR a tick over 100 on 5/1, can consider adjusting to metoprolol long term for better control if becomes a chronic issue of jaunts above 100

## 2024-05-01 NOTE — PLAN OF CARE
Problem: Adult Inpatient Plan of Care  Goal: Plan of Care Review  Outcome: Progressing  Goal: Patient-Specific Goal (Individualized)  Outcome: Progressing  Goal: Absence of Hospital-Acquired Illness or Injury  Outcome: Progressing  Goal: Optimal Comfort and Wellbeing  Outcome: Progressing  Goal: Readiness for Transition of Care  Outcome: Progressing   Patient AAOX1, pleasantly confused. VSS. NADN at this time. Bed in lowest position. Call light in reach along with personal items. Plan of care ongoing.

## 2024-05-02 ENCOUNTER — HOSPITAL ENCOUNTER (INPATIENT)
Facility: HOSPITAL | Age: 89
LOS: 21 days | Discharge: HOME HEALTH | DRG: 559 | End: 2024-05-23
Attending: HOSPITALIST | Admitting: HOSPITALIST
Payer: MEDICARE

## 2024-05-02 VITALS
BODY MASS INDEX: 21.44 KG/M2 | DIASTOLIC BLOOD PRESSURE: 71 MMHG | HEIGHT: 63 IN | HEART RATE: 92 BPM | RESPIRATION RATE: 20 BRPM | TEMPERATURE: 96 F | WEIGHT: 121 LBS | SYSTOLIC BLOOD PRESSURE: 137 MMHG | OXYGEN SATURATION: 95 %

## 2024-05-02 DIAGNOSIS — I10 ESSENTIAL HYPERTENSION: ICD-10-CM

## 2024-05-02 DIAGNOSIS — S72.142D CLOSED DISPLACED INTERTROCHANTERIC FRACTURE OF LEFT FEMUR WITH ROUTINE HEALING: ICD-10-CM

## 2024-05-02 DIAGNOSIS — D59.39 OTHER HEMOLYTIC-UREMIC SYNDROME: ICD-10-CM

## 2024-05-02 DIAGNOSIS — I48.20 CHRONIC ATRIAL FIBRILLATION: ICD-10-CM

## 2024-05-02 DIAGNOSIS — I70.0 AORTIC ATHEROSCLEROSIS: Primary | ICD-10-CM

## 2024-05-02 DIAGNOSIS — F33.42 RECURRENT MAJOR DEPRESSIVE DISORDER, IN FULL REMISSION: ICD-10-CM

## 2024-05-02 PROBLEM — I95.9 HYPOTENSION: Status: RESOLVED | Noted: 2024-04-28 | Resolved: 2024-05-02

## 2024-05-02 PROBLEM — Z71.89 ADVANCE CARE PLANNING: Status: RESOLVED | Noted: 2020-01-29 | Resolved: 2024-05-02

## 2024-05-02 LAB
ANION GAP SERPL CALC-SCNC: 11 MMOL/L (ref 8–16)
BASOPHILS # BLD AUTO: 0.04 K/UL (ref 0–0.2)
BASOPHILS NFR BLD: 0.4 % (ref 0–1.9)
BUN SERPL-MCNC: 43 MG/DL (ref 10–30)
CALCIUM SERPL-MCNC: 8.8 MG/DL (ref 8.7–10.5)
CHLORIDE SERPL-SCNC: 113 MMOL/L (ref 95–110)
CO2 SERPL-SCNC: 18 MMOL/L (ref 23–29)
CREAT SERPL-MCNC: 0.9 MG/DL (ref 0.5–1.4)
DIFFERENTIAL METHOD BLD: ABNORMAL
EOSINOPHIL # BLD AUTO: 0.1 K/UL (ref 0–0.5)
EOSINOPHIL NFR BLD: 0.5 % (ref 0–8)
ERYTHROCYTE [DISTWIDTH] IN BLOOD BY AUTOMATED COUNT: 14.8 % (ref 11.5–14.5)
EST. GFR  (NO RACE VARIABLE): 59.2 ML/MIN/1.73 M^2
GLUCOSE SERPL-MCNC: 115 MG/DL (ref 70–110)
HCT VFR BLD AUTO: 28.3 % (ref 37–48.5)
HGB BLD-MCNC: 9.2 G/DL (ref 12–16)
IMM GRANULOCYTES # BLD AUTO: 0.14 K/UL (ref 0–0.04)
IMM GRANULOCYTES NFR BLD AUTO: 1.3 % (ref 0–0.5)
LYMPHOCYTES # BLD AUTO: 1.1 K/UL (ref 1–4.8)
LYMPHOCYTES NFR BLD: 9.7 % (ref 18–48)
MAGNESIUM SERPL-MCNC: 2.1 MG/DL (ref 1.6–2.6)
MCH RBC QN AUTO: 31.2 PG (ref 27–31)
MCHC RBC AUTO-ENTMCNC: 32.5 G/DL (ref 32–36)
MCV RBC AUTO: 96 FL (ref 82–98)
MONOCYTES # BLD AUTO: 1 K/UL (ref 0.3–1)
MONOCYTES NFR BLD: 9 % (ref 4–15)
NEUTROPHILS # BLD AUTO: 8.8 K/UL (ref 1.8–7.7)
NEUTROPHILS NFR BLD: 79.1 % (ref 38–73)
NRBC BLD-RTO: 0 /100 WBC
PHOSPHATE SERPL-MCNC: 3.4 MG/DL (ref 2.7–4.5)
PLATELET # BLD AUTO: 175 K/UL (ref 150–450)
PMV BLD AUTO: 11.3 FL (ref 9.2–12.9)
POTASSIUM SERPL-SCNC: 4.1 MMOL/L (ref 3.5–5.1)
RBC # BLD AUTO: 2.95 M/UL (ref 4–5.4)
SARS-COV-2 RNA RESP QL NAA+PROBE: NOT DETECTED
SODIUM SERPL-SCNC: 142 MMOL/L (ref 136–145)
WBC # BLD AUTO: 11.05 K/UL (ref 3.9–12.7)

## 2024-05-02 PROCEDURE — 25000003 PHARM REV CODE 250

## 2024-05-02 PROCEDURE — 25000242 PHARM REV CODE 250 ALT 637 W/ HCPCS: Performed by: HOSPITALIST

## 2024-05-02 PROCEDURE — 94640 AIRWAY INHALATION TREATMENT: CPT

## 2024-05-02 PROCEDURE — 11000004 HC SNF PRIVATE

## 2024-05-02 PROCEDURE — 85025 COMPLETE CBC W/AUTO DIFF WBC: CPT | Performed by: HOSPITALIST

## 2024-05-02 PROCEDURE — 97112 NEUROMUSCULAR REEDUCATION: CPT

## 2024-05-02 PROCEDURE — 99900035 HC TECH TIME PER 15 MIN (STAT)

## 2024-05-02 PROCEDURE — 27000221 HC OXYGEN, UP TO 24 HOURS

## 2024-05-02 PROCEDURE — 97530 THERAPEUTIC ACTIVITIES: CPT

## 2024-05-02 PROCEDURE — 25000003 PHARM REV CODE 250: Performed by: HOSPITALIST

## 2024-05-02 PROCEDURE — 84100 ASSAY OF PHOSPHORUS: CPT | Performed by: HOSPITALIST

## 2024-05-02 PROCEDURE — 94761 N-INVAS EAR/PLS OXIMETRY MLT: CPT

## 2024-05-02 PROCEDURE — 87635 SARS-COV-2 COVID-19 AMP PRB: CPT | Performed by: INTERNAL MEDICINE

## 2024-05-02 PROCEDURE — 83735 ASSAY OF MAGNESIUM: CPT | Performed by: HOSPITALIST

## 2024-05-02 PROCEDURE — 36415 COLL VENOUS BLD VENIPUNCTURE: CPT | Performed by: HOSPITALIST

## 2024-05-02 PROCEDURE — 80048 BASIC METABOLIC PNL TOTAL CA: CPT | Performed by: HOSPITALIST

## 2024-05-02 PROCEDURE — 97535 SELF CARE MNGMENT TRAINING: CPT

## 2024-05-02 RX ORDER — TALC
6 POWDER (GRAM) TOPICAL NIGHTLY PRN
Status: DISCONTINUED | OUTPATIENT
Start: 2024-05-02 | End: 2024-05-06

## 2024-05-02 RX ORDER — ACETAMINOPHEN 500 MG
1000 TABLET ORAL EVERY 8 HOURS
Status: DISCONTINUED | OUTPATIENT
Start: 2024-05-02 | End: 2024-05-06

## 2024-05-02 RX ORDER — ESCITALOPRAM OXALATE 10 MG/1
10 TABLET ORAL DAILY
Status: DISCONTINUED | OUTPATIENT
Start: 2024-05-03 | End: 2024-05-04

## 2024-05-02 RX ORDER — ACETAMINOPHEN 325 MG/1
650 TABLET ORAL EVERY 6 HOURS PRN
Status: DISCONTINUED | OUTPATIENT
Start: 2024-05-02 | End: 2024-05-06

## 2024-05-02 RX ORDER — MEMANTINE HYDROCHLORIDE 5 MG/1
5 TABLET ORAL 2 TIMES DAILY
Status: DISCONTINUED | OUTPATIENT
Start: 2024-05-02 | End: 2024-05-23 | Stop reason: HOSPADM

## 2024-05-02 RX ORDER — ONDANSETRON 4 MG/1
8 TABLET, FILM COATED ORAL EVERY 8 HOURS PRN
Status: DISCONTINUED | OUTPATIENT
Start: 2024-05-02 | End: 2024-05-23 | Stop reason: HOSPADM

## 2024-05-02 RX ORDER — CHOLECALCIFEROL (VITAMIN D3) 25 MCG
2000 TABLET ORAL DAILY
Status: DISCONTINUED | OUTPATIENT
Start: 2024-05-03 | End: 2024-05-23 | Stop reason: HOSPADM

## 2024-05-02 RX ORDER — FUROSEMIDE 20 MG/1
20 TABLET ORAL DAILY
Status: DISCONTINUED | OUTPATIENT
Start: 2024-05-03 | End: 2024-05-23 | Stop reason: HOSPADM

## 2024-05-02 RX ORDER — AMOXICILLIN 250 MG
1 CAPSULE ORAL 2 TIMES DAILY
Status: DISCONTINUED | OUTPATIENT
Start: 2024-05-02 | End: 2024-05-08

## 2024-05-02 RX ORDER — SPIRONOLACTONE 25 MG/1
50 TABLET ORAL DAILY
Status: DISCONTINUED | OUTPATIENT
Start: 2024-05-03 | End: 2024-05-23 | Stop reason: HOSPADM

## 2024-05-02 RX ORDER — ATENOLOL 25 MG/1
25 TABLET ORAL DAILY
Start: 2024-05-03

## 2024-05-02 RX ORDER — ONDANSETRON 8 MG/1
8 TABLET, ORALLY DISINTEGRATING ORAL EVERY 8 HOURS PRN
Start: 2024-05-02

## 2024-05-02 RX ORDER — CALCIUM CARBONATE 200(500)MG
1000 TABLET,CHEWABLE ORAL DAILY
Status: DISCONTINUED | OUTPATIENT
Start: 2024-05-03 | End: 2024-05-23 | Stop reason: HOSPADM

## 2024-05-02 RX ORDER — ACETAMINOPHEN 500 MG
1000 TABLET ORAL EVERY 8 HOURS
COMMUNITY
Start: 2024-05-02

## 2024-05-02 RX ORDER — ATENOLOL 25 MG/1
25 TABLET ORAL DAILY
Status: DISCONTINUED | OUTPATIENT
Start: 2024-05-03 | End: 2024-05-23 | Stop reason: HOSPADM

## 2024-05-02 RX ORDER — CALCIUM CARBONATE 200(500)MG
500 TABLET,CHEWABLE ORAL 2 TIMES DAILY PRN
Status: DISCONTINUED | OUTPATIENT
Start: 2024-05-02 | End: 2024-05-23 | Stop reason: HOSPADM

## 2024-05-02 RX ADMIN — IPRATROPIUM BROMIDE AND ALBUTEROL SULFATE 3 ML: 2.5; .5 SOLUTION RESPIRATORY (INHALATION) at 08:05

## 2024-05-02 RX ADMIN — IPRATROPIUM BROMIDE AND ALBUTEROL SULFATE 3 ML: 2.5; .5 SOLUTION RESPIRATORY (INHALATION) at 04:05

## 2024-05-02 RX ADMIN — IPRATROPIUM BROMIDE AND ALBUTEROL SULFATE 3 ML: 2.5; .5 SOLUTION RESPIRATORY (INHALATION) at 12:05

## 2024-05-02 RX ADMIN — APIXABAN 2.5 MG: 2.5 TABLET, FILM COATED ORAL at 09:05

## 2024-05-02 RX ADMIN — ESCITALOPRAM OXALATE 10 MG: 5 TABLET, FILM COATED ORAL at 09:05

## 2024-05-02 RX ADMIN — DOCUSATE SODIUM AND SENNOSIDES 1 TABLET: 8.6; 5 TABLET, FILM COATED ORAL at 09:05

## 2024-05-02 RX ADMIN — THERA TABS 1 TABLET: TAB at 09:05

## 2024-05-02 RX ADMIN — MUPIROCIN 1 G: 20 OINTMENT TOPICAL at 09:05

## 2024-05-02 RX ADMIN — MEMANTINE 5 MG: 5 TABLET ORAL at 09:05

## 2024-05-02 RX ADMIN — ACETAMINOPHEN 1000 MG: 500 TABLET ORAL at 09:05

## 2024-05-02 RX ADMIN — ATENOLOL 25 MG: 25 TABLET ORAL at 09:05

## 2024-05-02 NOTE — HOSPITAL COURSE
Patient admitted to [facility] Medicine Team H: Hip Fracture team and started on Hip Fracture Pathway with Orthopedic surgery consult for closed intertrochanteric left femur fracture. Patient was seen and evaluated by Orthopedic surgery who recommended operative repair of hip fracture. Patient was medically optimized prior to surgery and was taken to OR after optimization on 4/28/2024. Patient underwent left hip cephalomedullary nail fixation by Dr. Kurt Melendez. Post-op patient weight bear as tolerated to the left lower extremity as per Orthopedics recommendation. Patient restarted on home Apixiban 2.5 mg po BID post-op for her known atrial fibrillation so no other DVT prophylaxis needed. Perineural pain catheter placed by Anesthesia Pain Service with continuous infusion of Ropivacaine to help with pain control post-op and Anesthesia Pain Service managing while patient in the hospital. Patient placed on multimodal pain management post-op with Tylenol 1000 mg po every 6 hours post-op and will continue. PT/OT consulted post-op and evaluated patient. Patient progressing well with PT and OT and recommended  moderate intensity therapy  when medically ready for hospital discharge. SNF referrals sent. Patient accepted to [facility] and discharged in good condition on 5/2. Perineural catheter removed by Anesthesia on 5/2. Pain controlled after catheter removed. Continue home Apixiban on discharge for her A. Fib. Surgical bandages to remain in place to left hip and thigh area on discharge until Orthopedic clinic follow-up. Patient discharged on multimodals for pain management.

## 2024-05-02 NOTE — PLAN OF CARE
05/02/24 1521   Post-Acute Status   Post-Acute Authorization Placement   Post-Acute Placement Status Set-up Complete/Auth obtained   Discharge Plan   Discharge Plan A Skilled Nursing Facility     Patient's set-up has been completed.ANGELA scheduled d/c transportation to [facility]. Patient is scheduled to be picked up at 6:00 PM. ANGELA provided patient's nurse with report number #[phone]; ask for the nurse for the patient. Requested  time does not guarantee arrival time.      Lupe Knight LMSW  Case Management   [facility]   Ext. [phone]

## 2024-05-02 NOTE — ASSESSMENT & PLAN NOTE
Resolved on discharge.   Patient with acute kidney injury/acute renal failure likely due to diuersis with lasix JOAHN is currently  present 4/28 at Cr 2.0 . Baseline creatinine  1.1  - Labs reviewed- Renal function/electrolytes with Estimated Creatinine Clearance: 31.6 mL/min (based on SCr of 0.9 mg/dL). according to latest data. Monitor urine output and serial BMP and adjust therapy as needed. Avoid nephrotoxins and renally dose meds for GFR listed above.  Hold lasix/aldactone and very light IVF considering overload on admit to allow volume equilibration  Improving to 1.6 now-->1.3. small dose of lasix 4/30 as improved and still on oxygen and a touch overloaded still appearnace on exam

## 2024-05-02 NOTE — DISCHARGE SUMMARY
[facility] Medicine  Discharge Summary      Patient Name: Mary Ellen Miller  MRN: [id number]  PARTH: [id number]  Patient Class: IP- Inpatient  Admission Date: 4/27/2024  Hospital Length of Stay: 5 days  Discharge Date and Time: 5/2/2024  5:56 PM  Attending Physician: Adriane Lopez MD   Discharging Provider: Adriane Lopez MD  Primary Care Provider: Dionne Jeter MD  [facility] Medicine Team: [facility] [place] [facility]  Adriane Lopez MD  Primary Care Team: [facility]    HPI:   Mary Ellen Miller is a 89 y.o. F with PMH of HTN, HLD, CHF, and afib on eliquis presenting to the ED with significant left hip pain after a fall last night. Patient was ambulating to the restroom to urinate last night and slipped and fell onto her left hip. She had immediate pain and significant difficulty bearing weight. She denies head injury or LOC and is on eliquis for paroxysmal A fib. She denies numbness, tingling, or paresthesias to the LLE. 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 Prior to the incident, she reports intermittent worsening of chronic lower extremity edema and SOB. Her PCP recently discontinued amlodipine in favor of lasix and continuing aldactone, and she has not missed any doses of her home medications. She has been drinking excess tea lately but has completely cut salt and has decreased her overall fluid intake. She denies GARVIN or orthopnea and does not use oxygen at home. Pt endorses intermittent confusion and abdominal distention but otherwise denies fever, chills, chest pain, palpitations, abdominal pain, N/V/D, dysuria, HA, or syncope.       In the ED: Hypertensive and hypoxic, sating 93% on 3L NC, o/w VSSAF. CBC with leukocytosis to 16, likely reactive. CMP grossly unremarkable. . Troponin 0.009. EKG c/w known rate controlled A fib. CXR c/w pulmonary  edema & R pleural effusion. XR pelvis with mildly displaced L intertrochanteric femur fracture with impaction, minimally displaced comminuted L superior pubic ramus fracture, and nondisplaced fracture of the L inferior pubic ramus. Pt was given 80 mg IV lasix, zofran, and dilaudid and was admitted to hospital medicine for further management.       4/28/2024  Procedure(s) (LRB):  INSERTION, INTRAMEDULLARY LENO, FEMUR: Left (Left)    Surgeon(s):  Damián Ramos MD Desai, MD Ghulam Soto J. Heath, MD Montgomery, Kurt ADKINS MD      Hospital Course:   [facility] Medicine Team H: Hip Fracture team and started on Hip Fracture Pathway with Orthopedic surgery consult for closed intertrochanteric left femur fracture. Patient was seen and evaluated by Orthopedic surgery who recommended operative repair of hip fracture. Patient was medically optimized prior to surgery and was taken to OR after optimization on 4/28/2024. Patient underwent left hip cephalomedullary nail fixation by Dr. Kurt Melendez. Post-op patient weight bear as tolerated to the left lower extremity as per Orthopedics recommendation. Patient restarted on home Apixiban 2.5 mg po BID post-op for her known atrial fibrillation so no other DVT prophylaxis needed. Perineural pain catheter placed by Anesthesia Pain Service with continuous infusion of Ropivacaine to help with pain control post-op and Anesthesia Pain Service managing while patient in the hospital. Patient placed on multimodal pain management post-op with Tylenol 1000 mg po every 6 hours post-op and will continue. PT/OT consulted post-op and evaluated patient. Patient progressing well with PT and OT and recommended  moderate intensity therapy  when medically ready for hospital discharge. SNF referrals sent. Patient accepted to [facility] and discharged in good condition on 5/2. Perineural catheter removed by Anesthesia on 5/2. Pain controlled after catheter removed.  Continue home Apixiban on discharge for her A. Fib. Surgical bandages to remain in place to left hip and thigh area on discharge until Orthopedic clinic follow-up. Patient discharged on multimodals for pain management.        Goals of Care Treatment Preferences:  Code Status: DNR          What is most important right now is to focus on remaining as independent as possible, symptom/pain control, improvement in condition but with limits to invasive therapies.  Accordingly, we have decided that the best plan to meet the patient's goals includes continuing with treatment.      Consults:   Consults (From admission, onward)          Status Ordering Provider     Inpatient consult to Social Work/Case Management  Once        Provider:  (Not yet assigned)    Acknowledged MARRY CROWE     Inpatient consult to Orthopedic Surgery  Once        Provider:  (Not yet assigned)    Completed DAMASO BURTON            Psychiatric  Recurrent major depressive disorder, in full remission  Patient has recurrent depression which is mild and is currently controlled. Will Continue anti-depressant medications. We will not consult psychiatry at this time. Patient does not display psychosis at this time. Continue to monitor closely and adjust plan of care as needed.  Continue home Lexapro on discharge to treat.     Pulmonary  Acute hypoxemic respiratory failure-resolved as of 5/4/2024  Resolved on discharge. Patient weaned off oxygen. Patient with Hypoxic Respiratory failure which is Acute.  she is not on home oxygen. Supplemental oxygen was provided and noted-      .   Signs/symptoms of respiratory failure include- tachypnea and increased work of breathing. Contributing diagnoses includes - CHF Labs and images were reviewed. Patient Has not had a recent ABG. Will treat underlying causes and adjust management of respiratory failure as follows- wean off.     Cardiac/Vascular  Chronic atrial fibrillation  Long term use of anticoagulants    Patient with Persistent (7 days or more) atrial fibrillation which is controlled currently with Atenolol. Patient is currently in atrial fibrillation.[id number] Score: 3. Anticoagulation indicated. Anticoagulation done with Apixiban .  Patient to continue Atenolol for rate control on discharge and Apixiban 2.5 mg po BID for long term anticoagulation on discharge.       Essential hypertension  Chronic, controlled. Latest blood pressure and vitals reviewed-     Temp:  [96.4 °F (35.8 °C)-98.4 °F (36.9 °C)]   Pulse:  []   Resp:  [16-20]   BP: (122-138)/(71-82)   SpO2:  [86 %-97 %] .   Home meds for hypertension were reviewed and noted below.   Hypertension Medications                    furosemide (LASIX) 20 MG tablet TAKE 1 TABLET BY MOUTH ONCE DAILY.    spironolactone (ALDACTONE) 50 MG tablet Take 1 tablet (50 mg total) by mouth once daily.     Patient discharged on Atenolol and Lasix and Aldactone to treat.     Hypotension-resolved as of 5/2/2024  Resolved on discharge.  Holding further diuresis, improving with light IVF, watch volume status closely given overload on admit  Improved 4/29 after IVF yesterday and stable now in 130s      Acute on chronic diastolic heart failure-resolved as of 5/4/2024  Patient discharged with compensated diastolic heart failure to [facility] on 5/2. Patient discharged on Atenolol and Lasix and Aldactone on discharge to treat chronic heart failure. Patient weaned off oxygen prior to discharge and denying any SOB. No edema noted.   Patient is identified as having Diastolic (HFpEF) heart failure that is Acute on Chronic. CHF is currently uncontrolled due to Dyspnea not returned to baseline after 1 doses of IV diuretic, Rales/crackles on pulmonary exam, and Pulmonary edema/pleural effusion on CXR. Pt is on CHF pathway.   - Patient decompensated on admit (4/27/2024). (+)Subjective SOB, (--)JVD, (--)orthnopnea.   - Last BNP reviewed- and noted below   Recent Labs   Lab 04/27/24  0702    *     - Troponin 0.009, continue to trend   - CXR with cardiomegaly and pulmonary edema with R pleural effusion   - EKG without acute ST changes   - Echo with Echo    Result Date: 4/27/2024    Left Ventricle: The left ventricle is normal in size. Ventricular mass   is normal. Normal wall thickness. Normal wall motion. There is normal   systolic function with a visually estimated ejection fraction of 60 - 65%.   There is indeterminate diastolic function.Normal left ventricular filling   pressure.    Right Ventricle: Normal right ventricular cavity size. Wall thickness   is normal. Systolic function is normal.    Left Atrium: Left atrium is severely dilated.    Right Atrium: Right atrium is severely dilated.    Aortic Valve: The aortic valve is a trileaflet valve. There is mild   aortic valve sclerosis. There is mild annular calcification present.    Mitral Valve: There is moderate anterior leaflet sclerosis.    Tricuspid Valve: There is moderate regurgitation with a centrally   directed jet.    Aorta: Aortic root is normal in size measuring 3.08 cm. Ascending aorta   is normal measuring 3.43 cm.    Pulmonary Artery: The estimated pulmonary artery systolic pressure is   57 mmHg.    IVC/SVC: Intermediate venous pressure at 8 mmHg.        No intake or output data in the 24 hours ending 05/02/24 1757    - Continue to stress to patient importance of self efficacy and  on diet for CHF.  Suspect overdiuresis as JOHAN on 4/28 after Lasix 40 mg IV BID + home Aldactone on admit + hypotension. Hold for now, very light IVF for hypotension now, CXR similar to before, on similar oxygen and reassess volume status this PM. Liberate diet for now while equliibirating volume status.  - Patient appears approaching euvolemic now on 4/29 with IVF yesterday, hold Lasix and IVF today to allow to further self equilbrate with oral intake. Cr stabilized as she reports urinating 3 times a night at home and felt she was on maybe  too much long term  - About 1.5 net negative on 4/30 and BP stable, Cr 1.3, so will give a Lasix 20 mg IV x 1 dose as still on 1L and has probably a small bit of fluid to still remove  - Cr stable at 0.9 on 5/1 and just a small bit of increased breathing every few sentences seen so would benefit from 1 more IV dose today and then back to orals Lasix on discharge.     Renal/  JOHAN (acute kidney injury), unspecified-resolved as of 5/4/2024  Resolved on discharge.   Patient with acute kidney injury/acute renal failure likely due to diuersis with lasix JOHAN is currently  present 4/28 at Cr 2.0 . Baseline creatinine  1.1  - Labs reviewed- Renal function/electrolytes with Estimated Creatinine Clearance: 31.6 mL/min (based on SCr of 0.9 mg/dL). according to latest data. Monitor urine output and serial BMP and adjust therapy as needed. Avoid nephrotoxins and renally dose meds for GFR listed above.  Hold lasix/aldactone and very light IVF considering overload on admit to allow volume equilibration  Improving to 1.6 now-->1.3. small dose of lasix 4/30 as improved and still on oxygen and a touch overloaded still appearnace on exam      Hematology  Long term (current) use of anticoagulants  This patient has long term use on an anticoagulant with Select Anticoagulant(s): Direct oral anticoagulant: Apixaban (Eliquis). Their long term anticoagulation will be Held or Continued: continued. They are on long term anticoagulation due to Reason for Anticoagulation: Atrial fibrillation.     Thrombocytopenia-resolved as of 5/3/2024  Resolved on discharge. Patient was found to have thrombocytopenia, the likely etiology is stress from surgery and resolve don discharge. Will transfuse if platelet count is <10k. Hold DVT prophylaxis if platelets are <50k. The patient's platelet results have been reviewed and are listed below.  Recent Labs   Lab 05/02/24  0344            Oncology  Acute blood loss anemia  Patient's anemia is currently  controlled. Has not received any PRBCs to date. Etiology likely due to acute blood loss which was from surgical repair and expected blood loss. Hgb 9.2 on day of discharge and stable.  Current CBC reviewed-   Lab Results   Component Value Date    HGB 9.2 (L) 05/02/2024    HCT 28.3 (L) 05/02/2024       Orthopedic  * Closed displaced intertrochanteric fracture of left femur with routine healing s/p IM nail on 4/28/2024  89 y.o. who presents after a fall with a closed displaced left femur intertrochanteric fracture after a fall. Very active at baseline, does daily exercise classes.  - Ortho consulted and patient taken to the OR and had IM nail 4/28 with Dr. Kurt Melendez.   - Post-op, patient is weight bear as tolerated to left lower extremity as per Ortho and continue on discharge.   - Patient resume home on home Apixiban for long term anticoagulation post-op. Pharmacy reports meets criteria to adjust to 2.5 mg po BID for age/indication and adjusted to this. Patient to continue on discharge and no other DVT prophylaxis needed post-op as fully anticoagulated for her A. Fib.   - Pain controlled post-op with multimodal pain meds and PNC. PNC removed by Anesthesia on 5/2. Patient discharged on multimodals for pain control.   - Surgical bandages to remain in place until Orthopedic clinic follow-up.   - PT/OT consulted post-op and recommended moderate intensity therapy. Discussed with patient and family and wanted to move forward with post acute placement. Referrals sent and patient accepted and discharged in good condition to [facility] on 5/2.      Final Active Diagnoses:    Diagnosis Date Noted POA    PRINCIPAL PROBLEM:  Closed displaced intertrochanteric fracture of left femur with routine healing s/p IM nail on 4/28/2024 [S72.142D] 04/27/2024 Not Applicable    Acute on chronic diastolic heart failure [I50.33] 05/09/2019 Yes    Acute hypoxemic respiratory failure [J96.01] 05/01/2024 No    Acute blood loss anemia  [D62] 04/28/2024 No    JOHAN (acute kidney injury), unspecified [N17.9] 04/28/2024 No    Essential hypertension [I10]  Yes    Long term (current) use of anticoagulants [Z79.01] 07/30/2020 Not Applicable    Chronic atrial fibrillation [I48.20] 11/17/2017 Yes    Thrombocytopenia [D69.6] 04/29/2024 No    Recurrent major depressive disorder, in full remission [F33.42]  Yes      Problems Resolved During this Admission:    Diagnosis Date Noted Date Resolved POA    Hypotension [I95.9] 04/28/2024 05/02/2024 Yes    Advance care planning [Z71.89] 01/29/2020 05/02/2024 Not Applicable    Hip fracture [S72.009A] 04/27/2024 04/27/2024 Yes       Discharged Condition: good    Disposition: Skilled Nursing Facility ([facility])    Follow Up:    Future Appointments   Date Time Provider Department Center   5/13/2024  1:00 PM Any Cantu PA-C [facility] ORTHO [facility]   5/20/2024  4:00 PM Dionne Jeter MD [facility] [place]       Patient Instructions:      Ambulatory referral/consult to Orthopedics   Standing Status: Future   Referral Priority: Routine Referral Type: Consultation   Requested Specialty: Orthopedic Surgery   Number of Visits Requested: 1     Ambulatory referral/consult to Orthopedics Fracture Care   Standing Status: Future   Referral Priority: Routine Referral Type: Consultation   Requested Specialty: Orthopedic Surgery   Number of Visits Requested: 1     Diet Adult Regular     Leave dressing on - Keep it clean, dry, and intact until clinic visit     Notify your health care provider if you experience any of the following:  temperature >100.4     Notify your health care provider if you experience any of the following:  persistent nausea and vomiting or diarrhea     Notify your health care provider if you experience any of the following:  severe uncontrolled pain     Notify your health care provider if you experience any of the following:  redness, tenderness, or signs of infection (pain, swelling, redness, odor or  green/yellow discharge around incision site)     Notify your health care provider if you experience any of the following:  difficulty breathing or increased cough     Notify your health care provider if you experience any of the following:  severe persistent headache     Notify your health care provider if you experience any of the following:  worsening rash     Notify your health care provider if you experience any of the following:  persistent dizziness, light-headedness, or visual disturbances     Notify your health care provider if you experience any of the following:  increased confusion or weakness     Activity as tolerated     Weight bearing restrictions (specify):   Order Comments: Weight bear as tolerated to left lower extremity       Significant Diagnostic Studies: Labs: CMP   Recent Labs   Lab 05/01/24  0456 05/02/24  0344    142   K 3.9 4.1   * 113*   CO2 20* 18*   GLU 98 115*   BUN 39* 43*   CREATININE 0.9 0.9   CALCIUM 8.6* 8.8   ANIONGAP 8 11    and CBC   Recent Labs   Lab 05/01/24  0456 05/02/24  0344   WBC 8.22 11.05   HGB 8.5* 9.2*   HCT 26.9* 28.3*    175       Pending Diagnostic Studies:       None           Medications:  Reconciled Home Medications:      Medication List        START taking these medications      acetaminophen 500 MG tablet  Commonly known as: TYLENOL  Take 2 tablets (1,000 mg total) by mouth every 8 (eight) hours.     apixaban 2.5 mg Tab  Commonly known as: ELIQUIS  Take 1 tablet (2.5 mg total) by mouth 2 (two) times daily.     atenoloL 25 MG tablet  Commonly known as: TENORMIN  Take 1 tablet (25 mg total) by mouth once daily.  Start taking on: May 3, 2024     ondansetron 8 MG Tbdl  Commonly known as: ZOFRAN-ODT  Take 1 tablet (8 mg total) by mouth every 8 (eight) hours as needed (Nausea).            CHANGE how you take these medications      calcium carbonate 600 mg calcium (1,500 mg) Tab  Commonly known as: OS-ZOILA  Take 1 tablet (600 mg total) by mouth once  daily.  What changed: when to take this            CONTINUE taking these medications      EScitalopram oxalate 10 MG tablet  Commonly known as: LEXAPRO  Take 1 tablet (10 mg total) by mouth once daily.     furosemide 20 MG tablet  Commonly known as: LASIX  TAKE 1 TABLET BY MOUTH ONCE DAILY.     I-BLAKE Tab  Generic drug: vit A,C & E-lutein-minerals 1,000-60-2 unit  TAKE 1 TABLET BY MOUTH ONCE DAILY.     LIDOcaine 5 %  Commonly known as: LIDODERM  Place 1 patch onto the skin once daily. Remove & Discard patch within 12 hours or as directed by MD     memantine 5 MG Tab  Commonly known as: NAMENDA  Take 1 tablet (5 mg total) by mouth 2 (two) times daily.     ONE-A-DAY WOMENS FORMULA ORAL  Take 1 tablet by mouth once daily.     spironolactone 50 MG tablet  Commonly known as: ALDACTONE  Take 1 tablet (50 mg total) by mouth once daily.     vitamin D 1000 units Tab  Commonly known as: VITAMIN D3  Take 2,000 mg by mouth once daily.            STOP taking these medications      amLODIPine 10 MG tablet  Commonly known as: NORVASC     calcipotriene 0.005 % cream  Commonly known as: DOVONOX     HAIR,SKIN AND NAILS(FA-BIOTIN) 66.7-1,666.7 mcg Tab  Generic drug: multivit-min-folic acid-biotin              Indwelling Lines/Drains at time of discharge:   Lines/Drains/Airways       None                 32 minutes of time spent on discharge, including examining the patient, providing discharge instructions, arranging follow-up and documentation.            Adriane Lopez MD  Department of [facility] Medicine  [facility] - Surgery

## 2024-05-02 NOTE — PLAN OF CARE
[facility]     Department of Hospital Medicine     [address] [place] [address]     [phone] after hours  [phone] fax       NURSING HOME ORDERS    05/02/2024    Admit to [facility]:  Skilled Bed                                            Diagnoses:  Active Hospital Problems    Diagnosis  POA    *Closed displaced intertrochanteric fracture of left femur with routine healing s/p IM nail on 4/28/2024 [S72.142D]  Not Applicable     Priority: 1 - High    Acute on chronic diastolic heart failure [I50.33]  Yes     Priority: 2      - no signs of acute decompensation  - followed by cardiology      Acute hypoxemic respiratory failure [J96.01]  No     Priority: 3     Acute blood loss anemia [D62]  No     Priority: 5     JOHAN (acute kidney injury), unspecified [N17.9]  No     Priority: 6     Essential hypertension [I10]  Yes     Priority: 7      - well controlled  - continue current medications      Long term (current) use of anticoagulants [Z79.01]  Not Applicable     Priority: 8      - followed by cardiology  - no signs of bleeding      Chronic atrial fibrillation [I48.20]  Yes     Priority: 8      - followed by Dr. Shala Ortiz without signs of bleeding  - rate controlled      Thrombocytopenia [D69.6]  No     Priority: 9     Advance care planning [Z71.89]  Not Applicable     Priority: 10      - pt reports that she has living will and mPOA  - Emma is her son Valentino (youngest)  - she does not want invasive or aggressive measures to maintain life  - she does not want CPR or intubation      Recurrent major depressive disorder, in full remission [F33.42]  Yes     Priority: 11      - well controlled  - continue current medication        Resolved Hospital Problems    Diagnosis Date Resolved POA    Hypotension [I95.9] 05/02/2024 Yes     Priority: 4     Hip fracture [S72.009A] 04/27/2024 Yes       Allergies:  Review of patient's allergies indicates:    Allergen Reactions    Percodan [oxycodone hcl-oxycodone-asa] Other (See Comments)     halucinations    Penicillins Itching    Sulfa (sulfonamide antibiotics) Diarrhea and Nausea Only    Amoxicillin        Vitals: Every shift (Skilled Nursing patients)        Code Status: DNR     Diet: regular diet      Supplement: House supplement one can by mouth with meals                             Activities:   - Up in a chair each morning as tolerated   - Ambulate with assistance to bathroom   - May use walker, cane, or self-propelled wheelchair   - Weight bearing: Weight bear as tolerated to left lower extremity    LABS: Per facility protocol     Nursing: Out of bed BID, Up with assistance    Nursing Precautions:           - Fall precautions per nursing home protocol      CONSULTS:       Physical Therapy to evaluate and treat 5 times a week     Occupational Therapy to evaluate and treat 5 times a week         MISCELLANEOUS CARE:  Routine Skin for Bedridden Patients: Instruct patient/caregiver to apply moisture barrier cream to all skin folds and wet areas in perineal area daily and after baths and all bowel movements.  Home Oxygen:  Oxygen at 1 L/min nasal canula to be used:  Continuously., Assess oxygen saturation via pulse oximeter as needed for increase in SOB., and Notify physician if oxygen saturation less than 88%. Wean oxygen to off with goal of oxygen sats > 92%.       WOUND CARE ORDERS  Keep surgical dressings in place that was applied post-op and leave left hip and do not remove until Orthopedic clinic follow-up. Assess surgical dressing with each treatment. Call MD if any drainage reaches border to border of dressing horizontally, signs or symptoms of infection, temp >101 F, induration, swelling or redness.                Medications:        Medication List        START taking these medications      acetaminophen 500 MG tablet  Commonly known as: TYLENOL  Take 2 tablets (1,000 mg total) by mouth every 8 (eight)  hours.     apixaban 2.5 mg Tab  Commonly known as: ELIQUIS  Take 1 tablet (2.5 mg total) by mouth 2 (two) times daily.     atenoloL 25 MG tablet  Commonly known as: TENORMIN  Take 1 tablet (25 mg total) by mouth once daily.       ondansetron 8 MG Tbdl  Commonly known as: ZOFRAN-ODT  Take 1 tablet (8 mg total) by mouth every 8 (eight) hours as needed (Nausea).            CHANGE how you take these medications      calcium carbonate 600 mg calcium (1,500 mg) Tab  Commonly known as: OS-ZOILA  Take 1 tablet (600 mg total) by mouth once daily.              CONTINUE taking these medications      EScitalopram oxalate 10 MG tablet  Commonly known as: LEXAPRO  Take 1 tablet (10 mg total) by mouth once daily.     furosemide 20 MG tablet  Commonly known as: LASIX  TAKE 1 TABLET BY MOUTH ONCE DAILY.     memantine 5 MG Tab  Commonly known as: NAMENDA  Take 1 tablet (5 mg total) by mouth 2 (two) times daily.     spironolactone 50 MG tablet  Commonly known as: ALDACTONE  Take 1 tablet (50 mg total) by mouth once daily.     vitamin D 1000 units Tab  Commonly known as: VITAMIN D3  Take 2,000 mg by mouth once daily.            STOP taking these medications      calcipotriene 0.005 % cream  Commonly known as: DOVONOX     HAIR,SKIN AND NAILS(FA-BIOTIN) 66.7-1,666.7 mcg Tab  Generic drug: multivit-min-folic acid-biotin                Follow-up:   Future Appointments   Date Time Provider Department Center   5/13/2024  1:00 PM Any Cantu PA-C [facility]   5/20/2024  4:00 PM Dionne Jeter MD [facility] [place]       _________________________________  Adriane Lopez MD  05/02/2024

## 2024-05-02 NOTE — ASSESSMENT & PLAN NOTE
Patient discharged with compensated diastolic heart failure to [facility] on 5/2. Patient discharged on Atenolol and Lasix and Aldactone on discharge to treat chronic heart failure. Patient weaned off oxygen prior to discharge and denying any SOB. No edema noted.   Patient is identified as having Diastolic (HFpEF) heart failure that is Acute on Chronic. CHF is currently uncontrolled due to Dyspnea not returned to baseline after 1 doses of IV diuretic, Rales/crackles on pulmonary exam, and Pulmonary edema/pleural effusion on CXR. Pt is on CHF pathway.   - Patient decompensated on admit (4/27/2024). (+)Subjective SOB, (--)JVD, (--)orthnopnea.   - Last BNP reviewed- and noted below   Recent Labs   Lab 04/27/24  0702   *     - Troponin 0.009, continue to trend   - CXR with cardiomegaly and pulmonary edema with R pleural effusion   - EKG without acute ST changes   - Echo with Echo    Result Date: 4/27/2024    Left Ventricle: The left ventricle is normal in size. Ventricular mass   is normal. Normal wall thickness. Normal wall motion. There is normal   systolic function with a visually estimated ejection fraction of 60 - 65%.   There is indeterminate diastolic function.Normal left ventricular filling   pressure.    Right Ventricle: Normal right ventricular cavity size. Wall thickness   is normal. Systolic function is normal.    Left Atrium: Left atrium is severely dilated.    Right Atrium: Right atrium is severely dilated.    Aortic Valve: The aortic valve is a trileaflet valve. There is mild   aortic valve sclerosis. There is mild annular calcification present.    Mitral Valve: There is moderate anterior leaflet sclerosis.    Tricuspid Valve: There is moderate regurgitation with a centrally   directed jet.    Aorta: Aortic root is normal in size measuring 3.08 cm. Ascending aorta   is normal measuring 3.43 cm.    Pulmonary Artery: The estimated pulmonary artery systolic pressure is   57 mmHg.    IVC/SVC:  Intermediate venous pressure at 8 mmHg.        No intake or output data in the 24 hours ending 05/02/24 6427    - Continue to stress to patient importance of self efficacy and  on diet for CHF.  Suspect overdiuresis as JOHAN on 4/28 after Lasix 40 mg IV BID + home Aldactone on admit + hypotension. Hold for now, very light IVF for hypotension now, CXR similar to before, on similar oxygen and reassess volume status this PM. Liberate diet for now while equliibirating volume status.  - Patient appears approaching euvolemic now on 4/29 with IVF yesterday, hold Lasix and IVF today to allow to further self equilbrate with oral intake. Cr stabilized as she reports urinating 3 times a night at home and felt she was on maybe too much long term  - About 1.5 net negative on 4/30 and BP stable, Cr 1.3, so will give a Lasix 20 mg IV x 1 dose as still on 1L and has probably a small bit of fluid to still remove  - Cr stable at 0.9 on 5/1 and just a small bit of increased breathing every few sentences seen so would benefit from 1 more IV dose today and then back to orals Lasix on discharge.

## 2024-05-02 NOTE — ASSESSMENT & PLAN NOTE
89 y.o. who presents after a fall with a closed displaced left femur intertrochanteric fracture after a fall. Very active at baseline, does daily exercise classes.  - Ortho consulted and patient taken to the OR and had IM nail 4/28 with Dr. Kurt Melendez.   - Post-op, patient is weight bear as tolerated to left lower extremity as per Ortho and continue on discharge.   - Patient resume home on home Apixiban for long term anticoagulation post-op. Pharmacy reports meets criteria to adjust to 2.5 mg po BID for age/indication and adjusted to this. Patient to continue on discharge and no other DVT prophylaxis needed post-op as fully anticoagulated for her A. Fib.   - Pain controlled post-op with multimodal pain meds and PNC. PNC removed by Anesthesia on 5/2. Patient discharged on multimodals for pain control.   - Surgical bandages to remain in place until Orthopedic clinic follow-up.   - PT/OT consulted post-op and recommended moderate intensity therapy. Discussed with patient and family and wanted to move forward with post acute placement. Referrals sent and patient accepted and discharged in good condition to [facility] on 5/2.

## 2024-05-02 NOTE — ASSESSMENT & PLAN NOTE
Patient's anemia is currently controlled. Has not received any PRBCs to date. Etiology likely due to acute blood loss which was from surgical repair and expected blood loss. Hgb 9.2 on day of discharge and stable.  Current CBC reviewed-   Lab Results   Component Value Date    HGB 9.2 (L) 05/02/2024    HCT 28.3 (L) 05/02/2024

## 2024-05-02 NOTE — PLAN OF CARE
Problem: Adult Inpatient Plan of Care  Goal: Optimal Comfort and Wellbeing  Outcome: Not Progressing

## 2024-05-02 NOTE — PT/OT/SLP PROGRESS
"Occupational Therapy   Co-Treatment    Name: Mary Ellen Miller  MRN: [id number]  Admitting Diagnosis:  Closed displaced intertrochanteric fracture of left femur with routine healing  4 Days Post-Op    Recommendations:     Discharge Recommendations: Moderate Intensity Therapy  Discharge Equipment Recommendations:  bedside commode, walker, rolling  Barriers to discharge:  Other (Comment) (increased level of skilled assistance required)    Assessment:     Mary Ellen Miller is a 89 y.o. female with a medical diagnosis of Closed displaced intertrochanteric fracture of left femur with routine healing.  She presents with good motivation and participation for therapeutic activities. Pt presents with decreased orientation and impaired cognitive state compared to previous therapy session. OOB activities/functional mobility tasks were limited on this date due to therapists addressing perineal hygiene/functional ADL training, pt's decreased activity/standing tolerance, and pain.    Performance deficits affecting function are weakness, impaired endurance, impaired self care skills, impaired functional mobility, gait instability, impaired balance, impaired cognition, decreased coordination, impaired coordination, decreased upper extremity function, decreased lower extremity function, pain, orthopedic precautions.     Rehab Prognosis:  Fair; patient would benefit from acute skilled OT services to address these deficits and reach maximum level of function.       Plan:     Patient to be seen 4 x/week to address the above listed problems via self-care/home management, therapeutic activities, therapeutic exercises, neuromuscular re-education, cognitive retraining  Plan of Care Expires: 05/29/24  Plan of Care Reviewed with: patient, son, family    Subjective     Chief Complaint: pain in L hip  Patient/Family Comments/goals: "Y'all did so good today"  Pain/Comfort:  Pain Rating 1: other (see comments) (not rated)  Location - Side " 1: Left  Location - Orientation 1: generalized  Location 1: hip  Pain Addressed 1: Reposition, Distraction  Pain Rating Post-Intervention 1: other (see comments) (not rated)    Objective:   Co-evaluation/treatment performed due to patient's multiple deficits requiring two skilled therapists to appropriately and safely assess patient's strength, endurance, functional mobility, and ADL performance while facilitating functional tasks in addition to accommodating for patient's activity tolerance and medical acuity.     Communicated with: RN prior to session.  Patient found supine with SCD, PureWick, oxygen, telemetry upon OT entry to room.    General Precautions: Standard, fall    Orthopedic Precautions:LLE weight bearing as tolerated  Braces: N/A  Respiratory Status: Nasal cannula, flow 1 L/min     Occupational Performance:     Bed Mobility:    Patient completed Rolling/Turning to Left with  maximal assistance  Patient completed Rolling/Turning to Right with maximal assistance  Patient completed Scooting/Bridging with maximal assistance  Patient completed Supine to Sit with maximal assistance and 2 persons  Patient completed Sit to Supine with maximal assistance and 2 persons     Functional Mobility/Transfers:  Patient completed Sit <> Stand Transfer with moderate assistance  with  rolling walker for 2 trials, 2-3 mins each trial  Functional Mobility: not completed today due to pt's pain and decreased activity tolerance    Activities of Daily Living:  Upper Body Dressing: maximal assistance to doff and don gown in supine  Toileting: total assistance to perform perineal care in standing       [facility] 6 Click ADL: 14    Treatment & Education:  Pt participated in dynamic standing balance and functional reaching activity attempting to perform perineal care with use of RW and MOD A.   Pt provided with cognitive retraining and orientation training throughout session.    Patient left supine with all lines intact, call button in  reach, and RN notified    GOALS:   Multidisciplinary Problems       Occupational Therapy Goals          Problem: Occupational Therapy    Goal Priority Disciplines Outcome Interventions   Occupational Therapy Goal     OT, PT/OT Progressing    Description: Goals to be met by: 5/29/24     Patient will increase functional independence with ADLs by performing:    LE Dressing with Stand-by Assistance.  Grooming while standing at sink with Supervision.  Toileting from bedside commode with Stand-by Assistance for hygiene and clothing management.   Supine to sit with Supervision  Toilet transfer to bedside commode with Stand-by Assistance.                         Time Tracking:     OT Date of Treatment: 05/02/24  OT Start Time: 0946  OT Stop Time: 1015  OT Total Time (min): 29 min    Billable Minutes:Self Care/Home Management 29    OT/GIRMA: OT          5/2/2024

## 2024-05-02 NOTE — PLAN OF CARE
CHW met with patient/family at bedside. Patient experience rounding completed and reviewed the following.     Do you know your discharge plan? Yes       If yes, what is the plan? Home    Have you discussed your needs and preferences with your SW/CM? Yes    If you are discharging home, do you have help at home? Yes     Do you think you will need help additional at home at discharge? No     Do you currently have difficulty keeping up with bills, affording medicine or buying food? No    Assigned SW/CM notified of any patient/family needs or concerns. Appropriate resources provided to address patient's needs.      CM: Cat Rivera

## 2024-05-02 NOTE — PLAN OF CARE
Problem: Adult Inpatient Plan of Care  Goal: Plan of Care Review  Outcome: Met  Goal: Patient-Specific Goal (Individualized)  Outcome: Met  Goal: Absence of Hospital-Acquired Illness or Injury  Outcome: Met  Goal: Optimal Comfort and Wellbeing  Outcome: Met  Goal: Readiness for Transition of Care  Outcome: Met     Problem: Infection  Goal: Absence of Infection Signs and Symptoms  Outcome: Met     Problem: Hip Fracture Medical Management  Goal: Optimal Coping with Change in Health Status  Outcome: Met  Goal: Absence of Bleeding  Outcome: Met  Goal: Effective Bowel Elimination  Outcome: Met  Goal: Baseline Cognitive Function Maintained  Outcome: Met  Goal: Absence of Embolism  Outcome: Met  Goal: Fracture Stability  Outcome: Met  Goal: Optimal Functional Performance  Outcome: Met  Goal: Pain Control and Function  Outcome: Met  Goal: Effective Urinary Elimination  Outcome: Met     Problem: Surgery Nonspecified  Goal: Absence of Bleeding  Outcome: Met  Goal: Effective Bowel Elimination  Outcome: Met  Goal: Fluid and Electrolyte Balance  Outcome: Met  Goal: Blood Glucose Level Within Targeted Range  Outcome: Met  Goal: Absence of Infection Signs and Symptoms  Outcome: Met  Goal: Anesthesia/Sedation Recovery  Outcome: Met  Goal: Optimal Pain Control and Function  Outcome: Met  Goal: Nausea and Vomiting Relief  Outcome: Met  Goal: Effective Urinary Elimination  Outcome: Met  Goal: Effective Oxygenation and Ventilation  Outcome: Met     Problem: Anesthesia/Analgesia, Neuraxial  Goal: Safe, Effective Infusion Delivery  Outcome: Met  Goal: Absence of Infection Signs and Symptoms  Outcome: Met  Goal: Nausea and Vomiting Relief  Outcome: Met  Goal: Effective Pain Control  Outcome: Met  Goal: Effective Oxygenation and Ventilation  Outcome: Met  Goal: Baseline Motor Function  Outcome: Met  Goal: Effective Urinary Elimination  Outcome: Met     Problem: Fall Injury Risk  Goal: Absence of Fall and Fall-Related Injury  Outcome:  Met     Problem: Skin Injury Risk Increased  Goal: Skin Health and Integrity  Outcome: Met     Problem: Acute Kidney Injury/Impairment  Goal: Fluid and Electrolyte Balance  Outcome: Met  Goal: Improved Oral Intake  Outcome: Met  Goal: Effective Renal Function  Outcome: Met     Problem: Wound  Goal: Optimal Coping  Outcome: Met  Goal: Optimal Functional Ability  Outcome: Met  Goal: Absence of Infection Signs and Symptoms  Outcome: Met  Goal: Improved Oral Intake  Outcome: Met  Goal: Optimal Pain Control and Function  Outcome: Met  Goal: Skin Health and Integrity  Outcome: Met  Goal: Optimal Wound Healing  Outcome: Met    Patient discharging to [facility], attempted to call report to receiving nurse, spoke with Magalys who took call back information and stated that she would find out who is taking pt and have them return call.

## 2024-05-02 NOTE — NURSING
Nurses Note -- 4 Eyes      5/2/2024   4:50 AM      Skin assessed during: Q Shift Change      [] No Altered Skin Integrity Present    []Prevention Measures Documented      [x] Yes- Altered Skin Integrity Present or Discovered   [] LDA Added if Not in Epic (Describe Wound)   [] New Altered Skin Integrity was Present on Admit and Documented in LDA   [] Wound Image Taken    Wound Care Consulted? No    Attending Nurse:  Adela Baez RN/Staff Member:   bianoca pct

## 2024-05-02 NOTE — NURSING
Nurses Note -- 4 Eyes      5/2/2024   2:53 PM      Skin assessed during: Daily Assessment      [x] No Altered Skin Integrity Present    [x]Prevention Measures Documented      [] Yes- Altered Skin Integrity Present or Discovered   [] LDA Added if Not in Epic (Describe Wound)   [] New Altered Skin Integrity was Present on Admit and Documented in LDA   [] Wound Image Taken    Wound Care Consulted? No    Attending Nurse:  Shirley Pepe RN    Second RN/Staff Member:   BRETT Coombs

## 2024-05-02 NOTE — ASSESSMENT & PLAN NOTE
Resolved on discharge.  Holding further diuresis, improving with light IVF, watch volume status closely given overload on admit  Improved 4/29 after IVF yesterday and stable now in 130s

## 2024-05-02 NOTE — ASSESSMENT & PLAN NOTE
Long term use of anticoagulants   Patient with Persistent (7 days or more) atrial fibrillation which is controlled currently with Atenolol. Patient is currently in atrial fibrillation.[id number] Score: 3. Anticoagulation indicated. Anticoagulation done with Apixiban .  Patient to continue Atenolol for rate control on discharge and Apixiban 2.5 mg po BID for long term anticoagulation on discharge.

## 2024-05-02 NOTE — NURSING
Call back received from Noemi at [facility], report given, allowed time for questions, all questions answered, pt leaving  per wheelchair with [facility] transportation.

## 2024-05-02 NOTE — ASSESSMENT & PLAN NOTE
Patient has recurrent depression which is mild and is currently controlled. Will Continue anti-depressant medications. We will not consult psychiatry at this time. Patient does not display psychosis at this time. Continue to monitor closely and adjust plan of care as needed.  Continue home Lexapro on discharge to treat.

## 2024-05-02 NOTE — ASSESSMENT & PLAN NOTE
Chronic, controlled. Latest blood pressure and vitals reviewed-     Temp:  [96.4 °F (35.8 °C)-98.4 °F (36.9 °C)]   Pulse:  []   Resp:  [16-20]   BP: (122-138)/(71-82)   SpO2:  [86 %-97 %] .   Home meds for hypertension were reviewed and noted below.   Hypertension Medications                    furosemide (LASIX) 20 MG tablet TAKE 1 TABLET BY MOUTH ONCE DAILY.    spironolactone (ALDACTONE) 50 MG tablet Take 1 tablet (50 mg total) by mouth once daily.     Patient discharged on Atenolol and Lasix and Aldactone to treat.

## 2024-05-02 NOTE — NURSING
Pt arrived via wheelchair transport. Awake, alert and oriented X3 disoriented to time. Family at bedside, vitals assessed and documented pt placed on 1L NC per order. Pt diagnosis left femur fracture. Report given to on coming shift nurse for continuance of admit.

## 2024-05-02 NOTE — PT/OT/SLP PROGRESS
Physical Therapy Co-Treatment    OT present for cotreat due to pt's multiple medical comorbidities and functional/cognition deficits requiring two skilled therapists to appropriately progress pt's musculoskeletal strength, neuromuscular control, and endurance while taking into consideration medical acuity and pt safety.    Patient Name:  Mary Ellen Miller   MRN:  [id number]    Recommendations:     Discharge Recommendations: Moderate Intensity Therapy  Discharge Equipment Recommendations: bedside commode, walker, rolling  Barriers to discharge: None    Assessment:     Mary Ellen Miller is a 89 y.o. female admitted with a medical diagnosis of Closed displaced intertrochanteric fracture of left femur with routine healing.  She presents with the following impairments/functional limitations: weakness, impaired endurance, impaired self care skills, impaired functional mobility, gait instability, impaired balance, decreased lower extremity function, orthopedic precautions, pain, decreased safety awareness    Pt receptive and tolerated PT co-eval with OT fairly. OOB activity limited due to increased fatigue this session. Pt able to work on standing balance and tolerance this session. Patient continues to demonstrate the need for moderate intensity therapy on a daily basis post acute exhibited by decreased independence with self-care and functional mobility  .    Rehab Prognosis: Good; patient would benefit from acute skilled PT services to address these deficits and reach maximum level of function.    Recent Surgery: Procedure(s) (LRB):  INSERTION, INTRAMEDULLARY LENO, FEMUR: Left (Left) 4 Days Post-Op    Plan:     During this hospitalization, patient to be seen 4 x/week to address the identified rehab impairments via gait training, therapeutic activities, therapeutic exercises, neuromuscular re-education and progress toward the following goals:    Plan of Care Expires:  05/29/24    Subjective     Chief Complaint: L  "hip pain and fatigue  Patient/Family Comments/goals: "I'm not as spunky today"  Pain/Comfort:  Pain Rating 1:  (pt did not rate)  Location - Side 1: Left  Location - Orientation 1: generalized  Location 1: hip  Pain Addressed 1: Reposition, Distraction      Objective:     Communicated with Rn prior to session.  Patient found HOB elevated with SCD, oxygen upon PT entry to room.     General Precautions: Standard, fall  Orthopedic Precautions: LLE weight bearing as tolerated  Braces: N/A  Respiratory Status: Nasal cannula, flow 1 L/min     Functional Mobility:    Bed Mobility:   Rolling: to L and R with maximal assistance  Supine > Sit: maximal assistance and of 2 persons  Sit > Supine: maximal assistance and of 2 persons    Transfers:   Sit <> Stand Transfer: moderate assistance from EOB using rolling walker  x2 trials  Verbal cues for hand placement    Balance:   Sitting balance: FAIR+: Maintains balance through MINIMAL excursions of active trunk motion  Standing balance:   POOR: Needs MODERATE assist to maintain  Pt stood twice ~2-3 min each trial with RW and mod A                   AM-PAC 6 CLICK MOBILITY  Turning over in bed (including adjusting bedclothes, sheets and blankets)?: 2  Sitting down on and standing up from a chair with arms (e.g., wheelchair, bedside commode, etc.): 2  Moving from lying on back to sitting on the side of the bed?: 2  Moving to and from a bed to a chair (including a wheelchair)?: 2  Need to walk in hospital room?: 2  Climbing 3-5 steps with a railing?: 1  Basic Mobility Total Score: 11       Treatment & Education:  Pt educated on tip to reduce fall risk and safety with mobility and using call button for assistance from nursing staff with OOB mobility.  Pt educated on sitting up in chair throughout most of day  All questions answered within the scope of PT.  White board updated accordingly.      Patient left HOB elevated with all lines intact, call button in reach, and family " present..    GOALS:   Multidisciplinary Problems       Physical Therapy Goals          Problem: Physical Therapy    Goal Priority Disciplines Outcome Goal Variances Interventions   Physical Therapy Goal     PT, PT/OT Progressing     Description: Goals to be met by: 24     Patient will increase functional independence with mobility by performin. Supine to sit with Contact Guard Assistance  2. Sit to stand transfer with Contact Guard Assistance  3. Bed to chair transfer with Contact Guard Assistance using Rolling Walker  4. Gait  x 50 feet with Minimal Assistance using Rolling Walker.   5. Stand for 5 minutes with Contact Guard Assistance using Rolling Walker  6. Lower extremity exercise program x30 reps per handout, with supervision                         Time Tracking:     PT Received On: 24  PT Start Time: 0945     PT Stop Time: 1015  PT Total Time (min): 30 min     Billable Minutes: Therapeutic Activity 12 and Neuromuscular Re-education 18    Treatment Type: Treatment  PT/PTA: PT           2024

## 2024-05-02 NOTE — ASSESSMENT & PLAN NOTE
Resolved on discharge. Patient weaned off oxygen. Patient with Hypoxic Respiratory failure which is Acute.  she is not on home oxygen. Supplemental oxygen was provided and noted-      .   Signs/symptoms of respiratory failure include- tachypnea and increased work of breathing. Contributing diagnoses includes - CHF Labs and images were reviewed. Patient Has not had a recent ABG. Will treat underlying causes and adjust management of respiratory failure as follows- wean off.

## 2024-05-03 PROBLEM — D69.6 THROMBOCYTOPENIA: Status: RESOLVED | Noted: 2024-04-29 | Resolved: 2024-05-03

## 2024-05-03 PROBLEM — E43 SEVERE PROTEIN-CALORIE MALNUTRITION: Status: ACTIVE | Noted: 2024-05-03

## 2024-05-03 PROCEDURE — 25000003 PHARM REV CODE 250: Performed by: HOSPITALIST

## 2024-05-03 PROCEDURE — 97535 SELF CARE MNGMENT TRAINING: CPT

## 2024-05-03 PROCEDURE — 97530 THERAPEUTIC ACTIVITIES: CPT

## 2024-05-03 PROCEDURE — 25000003 PHARM REV CODE 250: Performed by: STUDENT IN AN ORGANIZED HEALTH CARE EDUCATION/TRAINING PROGRAM

## 2024-05-03 PROCEDURE — 97162 PT EVAL MOD COMPLEX 30 MIN: CPT

## 2024-05-03 PROCEDURE — 97110 THERAPEUTIC EXERCISES: CPT

## 2024-05-03 PROCEDURE — 11000004 HC SNF PRIVATE

## 2024-05-03 RX ORDER — METHOCARBAMOL 500 MG/1
500 TABLET, FILM COATED ORAL EVERY 6 HOURS PRN
Status: DISCONTINUED | OUTPATIENT
Start: 2024-05-03 | End: 2024-05-06

## 2024-05-03 RX ADMIN — Medication 6 MG: at 09:05

## 2024-05-03 RX ADMIN — FUROSEMIDE 20 MG: 20 TABLET ORAL at 11:05

## 2024-05-03 RX ADMIN — ESCITALOPRAM OXALATE 10 MG: 10 TABLET ORAL at 11:05

## 2024-05-03 RX ADMIN — ATENOLOL 25 MG: 25 TABLET ORAL at 11:05

## 2024-05-03 RX ADMIN — MEMANTINE 5 MG: 5 TABLET ORAL at 11:05

## 2024-05-03 RX ADMIN — APIXABAN 2.5 MG: 2.5 TABLET, FILM COATED ORAL at 09:05

## 2024-05-03 RX ADMIN — APIXABAN 2.5 MG: 2.5 TABLET, FILM COATED ORAL at 11:05

## 2024-05-03 RX ADMIN — ACETAMINOPHEN 1000 MG: 500 TABLET ORAL at 09:05

## 2024-05-03 RX ADMIN — DOCUSATE SODIUM AND SENNOSIDES 1 TABLET: 8.6; 5 TABLET, FILM COATED ORAL at 11:05

## 2024-05-03 RX ADMIN — SPIRONOLACTONE 50 MG: 25 TABLET ORAL at 11:05

## 2024-05-03 RX ADMIN — METHOCARBAMOL 500 MG: 500 TABLET ORAL at 09:05

## 2024-05-03 RX ADMIN — ACETAMINOPHEN 1000 MG: 500 TABLET ORAL at 02:05

## 2024-05-03 RX ADMIN — CHOLECALCIFEROL TAB 25 MCG (1000 UNIT) 2000 UNITS: 25 TAB at 11:05

## 2024-05-03 RX ADMIN — METHOCARBAMOL 500 MG: 500 TABLET ORAL at 11:05

## 2024-05-03 RX ADMIN — ACETAMINOPHEN 1000 MG: 500 TABLET ORAL at 05:05

## 2024-05-03 RX ADMIN — MEMANTINE 5 MG: 5 TABLET ORAL at 09:05

## 2024-05-03 RX ADMIN — CALCIUM CARBONATE (ANTACID) CHEW TAB 500 MG 1000 MG: 500 CHEW TAB at 11:05

## 2024-05-03 RX ADMIN — DOCUSATE SODIUM AND SENNOSIDES 1 TABLET: 8.6; 5 TABLET, FILM COATED ORAL at 09:05

## 2024-05-03 RX ADMIN — METHOCARBAMOL 500 MG: 500 TABLET ORAL at 01:05

## 2024-05-03 NOTE — PT/OT/SLP EVAL
Occupational Therapy   Evaluation    Name: Mary Ellen Miller  MRN: [id number]  Admit Date: 5/2/2024  Recent Surgeries: Procedure(s) (LRB):  INSERTION, INTRAMEDULLARY LENO, FEMUR: Left (Left)      General Precautions: Standard, fall  Orthopedic Precautions:LLE weight bearing as tolerated   Braces: N/A    Recommendations:     Discharge Recommendations: other (see comments) (to be futher assessed)  Level of Assistance Recommended: 24 hours significant assistance  Discharge Equipment Recommendations:  walker, rolling, wheelchair, hip kit, bedside commode  Barriers to discharge:  Decreased caregiver support    Assessment:     Mary Ellen Miller is a 89 y.o. female with a medical diagnosis of Closed displaced intertrochanteric fracture of left femur with routine healing. Performance deficits affecting function: weakness, impaired endurance, impaired self care skills, impaired functional mobility, gait instability, impaired balance, impaired cognition, decreased coordination, decreased lower extremity function, pain, impaired coordination, decreased ROM, impaired fine motor. Pt tolerated today's session fair with increasing pain in LLE.  Pt required several rest breaks due to increase pain. Nursing was notified about pain. Per pt report, before hospitalization she was Ind with all ADLs and daily tasks and living in a assisted living home. Since then, she now required increased assistance with ADLs, bed mobility, and transfers.      Rehab Potential is good    Activity Tolerance: Fair    Plan:     Patient to be seen 5 x/week to address the above listed problems via self-care/home management, therapeutic exercises, cognitive retraining, therapeutic activities, wheelchair management/training, community/work re-entry  Plan of Care Expires: 06/02/24  Plan of Care Reviewed with: patient    Subjective     Chief Complaint: pain   Patient/Family Comments/goals: return to PLOF    Occupational Profile:  Living Environment:  assisted living home, 3 PIPE, standard toilet  Previous level of function: Ind  Roles and Routines: Retired  Equipment Used at Home: none  Assistance upon Discharge: staffing at assisted living     Pain/Comfort:  Pain Rating 1: 710  Location - Side 1: Left  Location - Orientation 1: lower  Location 1:  (R hip/leg)  Pain Addressed 1: Reposition, Nurse notified, Distraction  Pain Rating Post-Intervention 1: 10    Patients cultural, spiritual, Mormon conflicts given the current situation: no    Objective:     Communicated with: Nursing prior to session.  Patient found HOB elevated with oxygen, Other (comments), PureWick (1 L of oxygen) upon OT entry to room.    Bed Mobility:    Patient completed Supine to Sit with maximal assistance managing LLE and trunk     Functional Mobility/Transfers:  Patient completed Sit <> Stand Transfer with maximal assistance  with  rolling walker   Patient completed Bed <> Chair Transfer using Stand Pivot technique with maximal assistance with rolling walker    Activities of Daily Livin/03/24 1534   Eating   Was the activity attempted? Yes   Was the activity done independently? Yes   CARE Score - Eating 6   Oral Hygiene   Was the activity attempted? Yes   Was the activity done independently? No   Assistance Needed Setup / clean-up   CARE Score - Oral Hygiene 5   Toileting Hygiene   Was the activity attempted? No   Reason if not Attempted Safety concerns   CARE Score - Toileting Hygiene 88   Toileting Hygiene Discharge Goal   Discharge Goal 3   Shower/Bathe Self   Was the activity attempted? Yes   Was the activity done independently? No   Assistance Needed Physical assistance   Physical Assistance Level More than half  ((A) cleaning back, BLEs, buttocks)   CARE Score - Shower/Bathe Self 2   Upper Body Dressing   Was the activity attempted? Yes   Was the activity done independently? No   Assistance Needed Physical assistance   Physical Assistance Level More than half  ((A)  managing head and BUEs through pull over and hospital gown)   CARE Score - Upper Body Dressing 2   Lower Body Dressing   Was the activity attempted? No   Reason if not Attempted Safety concerns   CARE Score - Lower Body Dressing 88   Putting On/Taking Off Footwear   Was the activity attempted? Yes   Was the activity done independently? No   Assistance Needed Physical assistance   Physical Assistance Level Total assistance  ((A) donning B socks)   CARE Score - Putting On/Taking Off Footwear 1   Personal Hygiene   Was the activity attempted? Yes   Was the activity done independently? Yes   CARE Score - Personal Hygiene 6   Lying to Sitting on Side of Bed   Was the activity attempted? Yes   Was the activity done independently? No   Assistance Needed Physical assistance   Physical Assistance Level More than half  ((A) with LLE and trunk)   CARE Score - Lying to Sitting on Side of Bed 2   Sit to Stand   Was the activity attempted? Yes   Was the activity done independently? No   Assistance Needed Physical assistance   Physical Assistance Level More than half  (Max A w/ RW)   CARE Score - Sit to Stand 2   Chair/Bed-to-Chair Transfer   Was the activity attempted? Yes   Was the activity done independently? No   Assistance Needed Physical assistance  (Max A w/ RW)   Physical Assistance Level More than half   CARE Score - Chair/Bed-to-Chair Transfer 2         Cognitive/Visual Perceptual:  Cognitive/Psychosocial Skills:     -       Oriented to: Person and Place   -       Follows Commands/attention:Follows one-step commands  -       Communication: clear/fluent  -       Memory: Impaired STM  -       Safety awareness/insight to disability: intact   -       Mood/Affect/Coping skills/emotional control: Appropriate to situation  Visual/Perceptual:      -implanted contacts  and hearing aids    Physical Exam:  Balance:    -       poor sitting balance; poor standing balance  Postural examination/scapula alignment:    -       Rounded  shoulders  -       Forward head  Skin integrity: Bruising of L inner thigh, Wound L outer thigh and hips, Thin, and Dry  Sensation:    -       Intact    Dominant hand:    -       R handed  Upper Extremity Range of Motion:     -       Right Upper Extremity: WFL  -       Left Upper Extremity: WFL  Upper Extremity Strength:    -       Right Upper Extremity: WFL  -       Left Upper Extremity: WFL   Strength:    -       Right Upper Extremity: WFL  -       Left Upper Extremity: WFL  Fine Motor Coordination:    -       Impaired  Left hand thumb/finger opposition skills and Right hand thumb/finger opposition skills   Gross motor coordination:   WFL    AMPAC 6 Click ADL:  AMPAC Total Score: 16    Treatment & Education:  Role of OT and current POC    Patient left up in chair with all lines intact, call button in reach, nursing present, and all needs met    GOALS:   Multidisciplinary Problems       Occupational Therapy Goals          Problem: Occupational Therapy    Goal Priority Disciplines Outcome Interventions   Occupational Therapy Goal     OT, PT/OT Progressing    Description: Goals to be met by 6/2/2024:    Patient will increase functional independence with ADLs by performing:     UBD: SPV seated in w/c or EOB  LBD: Min A using AE seated in w/c or EOB   FW: MI using AE seated in w/c  Showering: Min A seated in w/c or EOB or sinkside  Toileting: Min A seated on BSC   Supine to sit: Min A   Sit to stand: Min A with RW                               History:     Past Medical History:   Diagnosis Date    Anxiety     Atrial fibrillation     Colon polyps 05/01/2012    Hypertension     Leg ulcer, right, limited to breakdown of skin 11/20/2019    Unilateral femoral hernia with obstruction, without gangrene 10/26/2015         Past Surgical History:   Procedure Laterality Date    HYSTERECTOMY      INTRAMEDULLARY RODDING OF FEMUR Left 4/28/2024    Procedure: INSERTION, INTRAMEDULLARY LENO, FEMUR: Left;  Surgeon: Kurt Melendez  MD LUCILLE;  Location: [facility] OR [address] [place] [address];  Service: Orthopedics;  Laterality: Left;    LUNG SURGERY  1988    OOPHORECTOMY      RADICAL HYSTERECTOMY  1990       Time Tracking:     OT Date of Treatment: 05/03/24  OT Start Time: 0940  OT Stop Time: 1040  OT Total Time (min): 60 min    Billable Minutes:Evaluation 20  Self Care/Home Management 40    5/3/2024  Bob Andrew Jr., OTR/L

## 2024-05-03 NOTE — PROGRESS NOTES
[facility]                                  Skilled Nursing Facility                   Progress Note     Patient Name: Mary Ellen Miller  YOB: 1930  MRN: [id number]  Room: [place] [address] [address] A     Admit Date: 5/2/2024   HEAVEN:      Principal Problem: Closed displaced intertrochanteric fracture of left femur with routine healing    HPI obtained from patient interview and chart review     Chief Complaint:   Establish Care/ Initial Visit    HPI:   Mary Ellen Miller is a 89 y.o. F with PMH of HTN, HLD, CHF, and afib on eliquis presenting to the ED with significant left hip pain after a fall. 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 Prior to the incident, she reports intermittent worsening of chronic lower extremity edema and SOB. Her PCP recently discontinued amlodipine in favor of lasix and continuing aldactone, and she has not missed any doses of her home medications.  XR pelvis with mildly displaced L intertrochanteric femur fracture with impaction, minimally displaced comminuted L superior pubic ramus fracture, and nondisplaced fracture of the L inferior pubic ramus. s/p left femur CMN on 4/28/24. Given diuresis and on 1L O2, not on oxygen at home.  PT/OT recommends skilled nursing placement for further therapy.     Patient will be treated at [facility] with PT and OT to improve functional status and ability to perform ADLs.     Interval History  All of today's labs reviewed and are listed below.  24 hr vital sign ranges listed below.  Patient denies shortness of breath, abdominal discomfort, nausea, or vomiting.  Patient reports an adequate appetite.  Patient denies dysuria.  Patient reports having regular bowel movements.  Patient progessing with PT/OT. Continuing to follow and treat all acute and  chronic conditions.    Past Medical History: Patient has a past medical history of Anxiety, Atrial fibrillation, Colon polyps (05/01/2012), Hypertension, Leg ulcer, right, limited to breakdown of skin (11/20/2019), and Unilateral femoral hernia with obstruction, without gangrene (10/26/2015).    Past Surgical History: Patient has a past surgical history that includes Radical hysterectomy (1990); Lung surgery (1988); Hysterectomy; Oophorectomy; and Intramedullary rodding of femur (Left, 4/28/2024).    Social History: Patient reports that she has never smoked. She has never been exposed to tobacco smoke. She has never used smokeless tobacco. She reports that she does not drink alcohol and does not use drugs.    Family History:  family history includes No Known Problems in her father and mother.    Allergies: Patient is allergic to percodan [oxycodone hcl-oxycodone-asa], penicillins, sulfa (sulfonamide antibiotics), and amoxicillin.        ROS  Constitutional:  Positive for activity change. Negative for chills and fever.   HENT:  Negative for trouble swallowing.    Eyes:  Negative for photophobia and visual disturbance.   Respiratory:  Positive for shortness of breath. Negative for chest tightness.    Cardiovascular:  Negative for chest pain and palpitations.   Gastrointestinal:  Negative for abdominal pain, constipation, diarrhea, nausea and vomiting.   Genitourinary:  Negative for dysuria, frequency and hematuria.   Musculoskeletal:  Positive for arthralgias, back pain and gait problem. Negative for neck pain.   Skin:  Negative for rash and wound.   Neurological:  Positive for weakness. Negative for dizziness, syncope, speech difficulty and light-headedness.   Psychiatric/Behavioral:  Positive for confusion (intermittent). Negative for agitation. The patient is not nervous/anxious.     24 hour Vital Sign Range   Temp:  [97.7 °F (36.5 °C)]   Pulse:  []   Resp:  [19-20]   BP: (119-138)/(63-65)   SpO2:  [95 %]        Physical Exam  Constitutional:       General: She is not in acute distress. Appears fatigued     Appearance: She is well-developed. She is not toxic-appearing.   HENT:      Head: Normocephalic.      Mouth/Throat:      Mouth: Mucous membranes are moist.   Eyes:      Extraocular Movements: Extraocular movements intact.      Conjunctiva/sclera: Conjunctivae normal.      Pupils: Pupils are equal, round, and reactive to light.   Cardiovascular:      Rate and Rhythm: Tachycardia present. Rhythm irregular.      Heart sounds: Normal heart sounds.   Pulmonary:      Effort: Pulmonary effort is normal. No respiratory distress. Lungs clear     Breath sounds: No wheezing.      Comments: On 1L nc  Abdominal:      General: Bowel sounds are normal.      Palpations: Abdomen is soft.      Tenderness: There is no abdominal tenderness.   Musculoskeletal:         General: Normal range of motion.      Cervical back: Normal range of motion and neck supple.      Comments: LLE with bandage intact  Skin:     General: Skin is warm and dry.      Capillary Refill: Capillary refill takes less than 2 seconds.      Findings: Bruising present. No rash.   Neurological:      Mental Status: She is alert and oriented to person, place, and time. Forgetful of recent details     Cranial Nerves: No cranial nerve deficit.      Sensory: No sensory deficit.      Coordination: Coordination normal.   Psychiatric:         Behavior: Behavior normal.         Thought Content: Thought content normal.       Judgment: Judgment normal.   Full skin assessment completed by this NP       Labs:  Recent Labs   Lab 04/30/24  0509 05/01/24  0456 05/02/24  0344   WBC 9.66 8.22 11.05   HGB 8.3* 8.5* 9.2*   HCT 25.9* 26.9* 28.3*   * 150 175     Recent Labs   Lab 04/30/24  0509 05/01/24  0456 05/02/24  0344    141 142   K 4.1 3.9 4.1   * 113* 113*   CO2 19* 20* 18*   BUN 56* 39* 43*   CREATININE 1.3 0.9 0.9   GLU 92 98 115*   CALCIUM 8.1* 8.6* 8.8   MG  "2.1 2.1 2.1   PHOS 3.7 2.7 3.4     Recent Labs   Lab 04/27/24  0517   ALKPHOS 47*   ALT 21   AST 26   ALBUMIN 3.6   PROT 6.8   BILITOT 1.2*   INR 1.1     No results for input(s): "POCTGLUCOSE" in the last 72 hours.    Meds Scheduled:  Current Facility-Administered Medications   Medication Dose Route Frequency    acetaminophen  1,000 mg Oral Q8H    apixaban  2.5 mg Oral BID    atenoloL  25 mg Oral Daily    calcium carbonate  1,000 mg Oral Daily    EScitalopram oxalate  10 mg Oral Daily    furosemide  20 mg Oral Daily    memantine  5 mg Oral BID    senna-docusate 8.6-50 mg  1 tablet Oral BID    spironolactone  50 mg Oral Daily    vitamin D  2,000 Units Oral Daily       PRN:     Current Facility-Administered Medications:     acetaminophen, 650 mg, Oral, Q6H PRN    calcium carbonate, 500 mg, Oral, BID PRN    melatonin, 6 mg, Oral, Nightly PRN    methocarbamoL, 500 mg, Oral, Q6H PRN    ondansetron, 8 mg, Oral, Q8H PRN      Assessment and Plan:    Closed displaced intertrochanteric fracture of left femur   -s/p CMN on 4/28/24 Dr. Kurt Melendez   -Pain control: multimodal  -PT/OT consult: WBAT LLE  -DVT PPx: Eliquis 2.5 mg BID, SCDs at all times when not ambulating    Altered mental status  Had some confusion inpatient, UA was negative  Patient does not remember falling at home  Delirium precautions currently oriented x4  Decrease home lexapro from 10mg to 5mg d/t advanced age    Acute hypoxemic respiratory failure  Continue diuretics as tolerated  Wean O2 as tolerated to maintain sat >90%      Acute blood loss anemia  Monitor routine CBC    Long term (current) use of anticoagulants  Eliquis for Afib, reduced to 2.5mg dose meets criteria     Acute on chronic diastolic heart failure  Continue Lasix 20mg, atenolol 25mg and spironolactone 50mg daily with parameters d/t recent hypotension  Follow up cardiology outpatient    Recurrent major depressive disorder, in full remission  - Continue home lexapro & namenda   -Reduce " lexapro to 5mg d/t recent confusion and advanced age     Essential hypertension  Continue Lasix 20mg, atenolol 25mg and spironolactone 50mg daily with parameters d/t recent hypotension    Advanced Care Planning  This was a voluntary visit and the option to decline counseling was given  Length of discussion: 16 min  Life limiting problem:hip fx, afib, advanced age  Topics discussed:code status  Outcome of discussion:Confirms DNR  Decision Maker:Patient       Anticipate disposition:    Home with home health      Follow-up needed during SNF admission:   Ortho    Follow-up NOT needed during SNF admission: 5/20 [facility]    Follow-up needed after discharge from SNF:   - PCP within 1-2 weeks  - See appt scheduled below     Future Appointments   Date Time Provider Department Center   5/13/2024  1:00 PM Any Cantu PA-C [facility]   5/20/2024  4:00 PM Dionne Jeter MD [facility]         I certify that SNF services are required to be given on an inpatient basis because Mary Ellen Miller needs for skilled nursing care and/or skilled rehabilitation are required on a daily basis and such services can only practically be provided in a skilled nursing facility setting and are for an ongoing condition for which she received inpatient care in the hospital.       Extended Visit:   Total time spent: 116 minutes  Non Face to Face Time: 95 minutes   Description of Time: counseling provided on clinical condition, therapies provided, plan of care, emotional support, coordinating patient care with other care team members, reviewing and interpreting labs and imaging, collaboration with physician, initiating new orders, chart review, and documentation. See interval hx.         LUDY KRAUS  Department of Hospital Medicine   [facility]     DOS: 5/3/2024       Patient note was created using MModal Dictation.  Any errors in syntax or even information may not have  been identified and edited on initial review prior to signing this note.

## 2024-05-03 NOTE — ASSESSMENT & PLAN NOTE
Malnutrition Type:  Context: acute illness or injury  Level: severe    Related to (etiology):   Inadequate oral intake, depression, confusion    Signs and Symptoms (as evidenced by):   BMI 21.6, sleeping a lot   PO 0-25%    Malnutrition Characteristic Summary:  Energy Intake (Malnutrition): less than or equal to 50% for greater than or equal to 5 days  Subcutaneous Fat (Malnutrition): moderate depletion  Muscle Mass (Malnutrition): moderate depletion      Interventions/Recommendations (treatment strategy):  Continue regular diet, boost plus TID, assist with meals, encourage intake,RD following    Nutrition Diagnosis Status:   New

## 2024-05-03 NOTE — NURSING
[facility] - Skilled Nursing  Initial Discharge Assessment     SW met with patient in room for Discharge Planning Assessment.     Preferred Name: Mary Ellen Miller   Primary Care Provider:  Dionne Jeter MD   Admission Diagnosis:  Closed displaced intertrochanteric fracture of left femur with routine healing s/p IM nail on 4/28/2024  Admission Date: 5/2/2024  Expected Discharge Date:      Discharge Barriers Identified: No barriers noted      Payor: Type: MEDICARE/MEDICARE PART A&B AND [facility]      Extended Emergency Contact Information:   Primary Emergency Contact: Valentino Miller   Mobile Phone: [phone]  Relation: son   Preferred language:English     needed? No      Discharge Plan A: home at the [facility] with home health   Discharge Plan B: No plan B      Preferred Pharmacy:  [facility] delivers medication       Initial Discharge Assessment        Assessment Type Discharge Planning Assessment       Source of Information       Communicated HEAVEN with patient/caregiver Patient       Lives With Alone at the [facility]       Do you expect to return to your current living situation?  Yes       Do you have help at home or someone to help you manage your care at home?  Yes at the [facility]       Prior to hospitalization cognitive status: AOX4      Current cognitive status: AOX4      Equipment Currently Used at Home none      Readmission within 30 days? no      Patient currently being followed by outpatient case management? Yes at the [facility]       Do you currently have service(s) that help you manage your care at home? Yes [facility] staff       Do you take prescription medications?  Yes       Do you have prescription coverage?  Yes       Do you have any problems affording any of your prescribed medications? no      Who is going to help you get home at discharge? Need  transportation at discharge       How do you get to doctors' appointments? Assistant living transportation     Has lack of transportation kept you from medical appointments, meetings, work, or from getting things needed for daily living?  No     How often do you feel lonely or isolated from those around you? none     How often do you need to have someone help you when you read instructions, pamphlets, or other written material from your doctor or pharmacy?  Not often, but her daughter in law sometimes helps.       Are you on dialysis? no      Do you take coumadin?  no      DME Needed Upon Discharge  no      Discharge Plan discussed with:  Patient      Discharge Barriers Identified none

## 2024-05-03 NOTE — PT/OT/SLP EVAL
Physical Therapy Evaluation    Patient Name:  Mary Ellen Miller   MRN:  [id number]  Admit Date: 5/2/2024  Recent Surgeries: Procedure(s) (LRB):  INSERTION, INTRAMEDULLARY LENO, FEMUR: Left (Left)     General Precautions: Standard, fall   Orthopedic Precautions: LLE weight bearing as tolerated   Braces: N/A    Recommendations:     Discharge Recommendations: home health PT   Level of Assistance Recommended: 24 hours significant assistance  Discharge Equipment Recommendations: bedside commode, walker, rolling, wheelchair   Barriers to discharge: Decreased caregiver support    Assessment:     Mary Ellen Miller is a 89 y.o. female admitted with a medical diagnosis of Closed displaced intertrochanteric fracture of left femur with routine healing .  Performance deficits affecting function  weakness, impaired endurance, impaired self care skills, impaired functional mobility, gait instability, impaired balance, decreased upper extremity function, decreased lower extremity function, decreased ROM, pain, impaired cardiopulmonary response to activity, orthopedic precautions. Pt was (I) with no AD with all ADLs and functional mobility PTA with no AD. Pt now requires MaxA for all bed mobility, transfers and very short GT distance. Pt plans on returning to the assisted living center and would therefore benefit  from skilled PT services during this hospital admit to continue to improve transfer ability and efficiency as well as continue to progress pt's ambulation distance and cardiopulmonary endurance to maximize pt's functional independence and return to PLOF.     Rehab Potential is good     Activity Tolerance: Fair    Plan:     Patient to be seen 5 x/week to address the above listed problems via gait training, therapeutic activities, therapeutic exercises, wheelchair management/training     Plan of Care Expires: 06/02/24  Plan of Care Reviewed with: patient    Subjective     Chief Complaint: pain  Patient/Family  Comments/goals: none noted by pt  Pain/Comfort:  Pain Rating 1: 5/10  Location - Side 1: Left  Location - Orientation 1: generalized  Location 1: hip  Pain Addressed 1: Pre-medicate for activity, Reposition, Distraction, Cessation of Activity, Nurse notified  Pain Rating Post-Intervention 1: 5/10    Living Environment:   Pt lives at [facility]. Has WIS with seat  Prior Level of Function: IND  DME owned: none  Caregiver Assistance: staff at AL      Patients cultural, spiritual, Anabaptism conflicts given the current situation: no    Objective:     Communicated with pt prior to session.  Patient found up in chair with  (O2 removed during PT d.t pt's O2 sats above 96% at rest and post activity)  upon PT entry to room.    Exams:  Cognitive Exam:  Patient is oriented to Person, Place, Time, and Situation  RLE ROM: WFL  RLE Strength: WFL  LLE ROM: WFL except Hip NT d.t surgery  LLE Strength: WFL except hip 2/5    Functional Mobility:   05/03/24 1411   Prior Functioning: Everyday Activities   Self Care Independent   Indoor Mobility (Ambulation) Independent   Stairs Unknown   Functional Cognition Independent   Prior Device Use None of the given options   Roll Left and Right   Was the activity attempted? Yes   Was the activity done independently? No   Assistance Needed Physical assistance   Physical Assistance Level More than half  (MaxA with no AD)   Was adaptive equipment used? No   CARE Score - Roll Left and Right 2   Sit to Lying   Was the activity attempted? Yes   Was the activity done independently? No   Assistance Needed Physical assistance   Physical Assistance Level More than half  (MaxA for trunk and BL/E management)   Was adaptive equipment used? No   CARE Score - Sit to Lying 2   Lying to Sitting on Side of Bed   Was the activity attempted? Yes   Was the activity done independently? No   Assistance Needed Physical assistance   Physical Assistance Level More than half  (MaxA for trunk and BL/E  management)   Was adaptive equipment used? No   CARE Score - Lying to Sitting on Side of Bed 2   Sit to Stand   Was the activity attempted? Yes   Was the activity done independently? No   Assistance Needed Physical assistance   Physical Assistance Level More than half  (MaxA with RW from w/c)   Was adaptive equipment used? Yes   CARE Score - Sit to Stand 2   Chair/Bed-to-Chair Transfer   Was the activity attempted? Yes   Was the activity done independently? No   Assistance Needed Physical assistance   Physical Assistance Level More than half  (MaxA SPT with RW)   Was adaptive equipment used? Yes   CARE Score - Chair/Bed-to-Chair Transfer 2   Car Transfer   Reason if not Attempted Environmental limitations   CARE Score - Car Transfer 10   Walk 10 Feet   Reason if not Attempted Safety concerns  (pt able to take 2 steps with RW and MaxA d.t pt c/o of weakness and pain)   CARE Score - Walk 10 Feet 88   Walk 50 Feet with Two Turns   Reason if not Attempted Safety concerns   CARE Score - Walk 50 Feet with Two Turns 88   Walk 150 Feet   Reason if not Attempted Safety concerns   CARE Score - Walk 150 Feet 88   Walking 10 Feet on Uneven Surfaces   Reason if not Attempted Safety concerns   CARE Score - Walking 10 Feet on Uneven Surfaces 88   1 Step (Curb)   Reason if not Attempted Safety concerns   CARE Score - 1 Step (Curb) 88   4 Steps   Reason if not Attempted Safety concerns   CARE Score - 4 Steps 88   12 Steps   Reason if not Attempted Safety concerns   CARE Score - 12 Steps 88   Picking Up Object   Reason if not Attempted Safety concerns   CARE Score - Picking Up Object 88   OTHER   Uses a Wheelchair/Scooter? Yes   Wheel 50 Feet with Two Turns   Was the activity attempted? Yes   Was the activity done independently? No   Assistance Needed Physical assistance   Physical Assistance Level More than half   Type of Wheelchair/Scooter Manual   CARE Score - Wheel 50 Feet with Two Turns 2   Wheel 150 Feet   Was the activity  attempted? Yes   Was the activity done independently? No   Assistance Needed Physical assistance   Physical Assistance Level More than half  (Mod A using B UE's)   Type of Wheelchair/Scooter Manual   CARE Score - Wheel 150 Feet 2       Therapeutic Activities and Exercises: Seated therex B l/Es MinAx 20reps: AP, LAQ, Hip Flex  UBEx 10mins to improve MMT and cardiovascular endurance.    Education:  Patient provided with daily orientation and goals of this PT session.  Patient educated to transfer to bedside chair/bedside commode/bathroom with RN/PCT present.   Patient educated on Fall risk and plan of care by explanation.   Patient Verbalized Understanding.  Time provided for therapeutic counseling and discussion of current health disposition. All questions answered to satisfaction, within scope of PT practice; voiced no other concerns. White board updated in patient's room, RN notified of session.     AM-PAC 6 CLICK MOBILITY  Total Score:10     Patient left up in chair with call button in reach.    GOALS:   Multidisciplinary Problems       Physical Therapy Goals          Problem: Physical Therapy    Goal Priority Disciplines Outcome Goal Variances Interventions   Physical Therapy Goal     PT, PT/OT Progressing     Description: Goals to be met by: 3/10/24     Patient will increase functional independence with mobility by performing:    . Supine to sit with MInimal Assistance  . Sit to supine with MInimal Assistance  . Rolling to Left and Right with Minimal Assistance.  . Sit to stand transfer with Minimal Assistance  . Bed to chair transfer with Minimal Assistance using Rolling Walker  . Gait  x 50 feet with Minimal Assistance using Rolling Walker.   . Wheelchair propulsion x150 feet with Supervision using bilateral uppper extremities                         History:     Past Medical History:   Diagnosis Date    Anxiety     Atrial fibrillation     Colon polyps 05/01/2012    Hypertension     Leg ulcer, right, limited  to breakdown of skin 11/20/2019    Unilateral femoral hernia with obstruction, without gangrene 10/26/2015       Past Surgical History:   Procedure Laterality Date    HYSTERECTOMY      INTRAMEDULLARY RODDING OF FEMUR Left 4/28/2024    Procedure: INSERTION, INTRAMEDULLARY LENO, FEMUR: Left;  Surgeon: Kurt Melendez MD;  Location: [facility] OR [address] [place] [address];  Service: Orthopedics;  Laterality: Left;    LUNG SURGERY  1988    OOPHORECTOMY      RADICAL HYSTERECTOMY  1990       Time Tracking:     PT Received On: 11/29/24  PT Start Time: 1129     PT Stop Time: 1214  PT Total Time (min): 45 min     Billable Minutes: Evaluation 20, Therapeutic Activity 10, and Therapeutic Exercise 15      05/03/2024

## 2024-05-03 NOTE — CONSULTS
[facility] - Skilled Nursing  Adult Nutrition  Consult Note    SUMMARY   Recommendations  Continue regular diet, boost plus TID, recommend MVI,assist with meals, encourage intake,RD following  Goals: PO to meet 75% of needs with ONS by next RD fu  Nutrition Goal Status: new  Communication of RD Recs: other (comment) (POC)    Assessment and Plan    Endocrine  Severe protein-calorie malnutrition  Malnutrition Type:  Context: acute illness or injury  Level: severe    Related to (etiology):   Inadequate oral intake, depression, confusion    Signs and Symptoms (as evidenced by):   BMI 21.6, sleeping a lot   PO 0-25%    Malnutrition Characteristic Summary:  Energy Intake (Malnutrition): less than or equal to 50% for greater than or equal to 5 days  Subcutaneous Fat (Malnutrition): moderate depletion  Muscle Mass (Malnutrition): moderate depletion      Interventions/Recommendations (treatment strategy):  Continue regular diet, boost plus TID, assist with meals, encourage intake,RD following    Nutrition Diagnosis Status:   New         Malnutrition Assessment  5/3/24  Malnutrition Context: acute illness or injury  Malnutrition Level: severe  Skin (Micronutrient): bruised, wounds unhealed  Hair/Scalp (Micronutrient):  (wig)  Eyes (Micronutrient): conjunctiva dull  Teeth (Micronutrient): broken dentition  Tongue (Micronutrient): red  Neck/Chest (Micronutrient): bony prominence, muscle wasting, subcutaneous fat loss  Musculoskeletal/Lower Extremities: muscle wasting, subcutaneous fat loss       Energy Intake (Malnutrition): less than or equal to 50% for greater than or equal to 5 days  Subcutaneous Fat (Malnutrition): moderate depletion  Muscle Mass (Malnutrition): moderate depletion   Orbital Region (Subcutaneous Fat Loss): moderate depletion  Upper Arm Region (Subcutaneous Fat Loss): moderate depletion  Thoracic and Lumbar Region: mild depletion   [place] (Muscle Loss): moderate depletion  Clavicle Bone Region  "(Muscle Loss): moderate depletion  Clavicle and Acromion Bone Region (Muscle Loss): severe depletion  Dorsal Hand (Muscle Loss): moderate depletion  Patellar Region (Muscle Loss): moderate depletion  Anterior Thigh Region (Muscle Loss): moderate depletion  Posterior Calf Region (Muscle Loss): mild depletion             Reason for Assessment  Reason For Assessment: consult  Diagnosis:  (L femur fx, s/p IM nail)  Relevant Medical History: HTN, HF, S/P JOHAN, AFIB, bloos loss anemia, depression, anxiety,  Interdisciplinary Rounds: did not attend    General Information Comments: disoriented, slept most of today, poor PO intake so far, does like the ensure enlive, needs assist with set up, encouragement to eat. NFPE completed. Resides in AL, has three meals per day. she does have ONS at home. Tries to watch her intake of fluid and sodium at home.  DNR    Nutrition Discharge Planning: low sodium diet, ONS of choice    Nutrition Related Social Determinants of Health: SDOH: Adequate food in home environment    Nutrition Risk Screen  Nutrition Risk Screen: no indicators present    Nutrition/Diet History  Patient Reported Diet/Restrictions/Preferences: low salt  Typical Food/Fluid Intake: 3 mels  Spiritual, Cultural Beliefs, Restoration Practices, Values that Affect Care: no  Supplemental Drinks or Food Habits: Boost Original  Vitamin/Mineral/Herbal Supplements: takes "lots of vitamins"  Food Allergies: NKFA  Factors Affecting Nutritional Intake: decreased appetite, impaired cognitive status/motor control    Anthropometrics    Temp: 98.2 °F (36.8 °C)  Height: 5' 3" (160 cm)  Height (inches): 63 in  Weight: 55.4 kg (122 lb 2.2 oz)  Weight (lb): 122.14 lb  Ideal Body Weight (IBW), Female: 115 lb  % Ideal Body Weight, Female (lb): 106.21 %  BMI (Calculated): 21.6  BMI Grade: 18.5-24.9 - normal  Usual Body Weight (UBW), k kg  % Usual Body Weight: 99.14  % Weight Change From Usual Weight: -1.07 %   "     Lab/Procedures/Meds    Pertinent Labs Reviewed: reviewed  Pertinent Labs Comments: Hg 9.2, Hct 28.3, glucose 115, Bili 1.2, PAB 16, HgA1c 5.3  Pertinent Medications Reviewed: reviewed  Pertinent Medications Comments: lasix, apixaban, Vit D, Ca Carbonate, spironolactone, senna-docusate, memantine    Estimated/Assessed Needs    Weight Used For Calorie Calculations: 55.4 kg (122 lb 2.2 oz)  Energy Calorie Requirements (kcal): 1200  Energy Need Method: Ray-St Jeor (x 1.3(PAL))  Protein Requirements: 66  Weight Used For Protein Calculations: 55.4 kg (122 lb 2.2 oz) (x 1.2g/kg)  Fluid Requirements (mL): 1200 or per MD  Estimated Fluid Requirement Method: RDA Method  RDA Method (mL): 1200         Nutrition Prescription Ordered    Current Diet Order: Regular  Nutrition Order Comments: PO 0-25%  Oral Nutrition Supplement: boost plus TID    Evaluation of Received Nutrient/Fluid Intake    I/O: no data  Energy Calories Required: not meeting needs  Protein Required: not meeting needs  Fluid Required: meeting needs  Comments: LBM 5/2  Tolerance: tolerating  % Intake of Estimated Energy Needs: 25-50  % Meal Intake: 0 - 25 %    Nutrition Risk  Level of Risk/Frequency of Follow-up: high (two times pe week)     Monitor and Evaluation  Food and Nutrient Intake: food and beverage intake  Food and Nutrient Adminstration: diet order  Physical Activity and Function: nutrition-related ADLs and IADLs  Anthropometric Measurements: weight change  Biochemical Data, Medical Tests and Procedures: gastrointestinal profile, electrolyte and renal panel  Nutrition-Focused Physical Findings: overall appearance, skin       Nutrition Follow-Up    RD Follow-up?: Yes

## 2024-05-03 NOTE — PLAN OF CARE
Problem: Adult Inpatient Plan of Care  Goal: Plan of Care Review  Outcome: Progressing  Goal: Patient-Specific Goal (Individualized)  Outcome: Progressing  Goal: Absence of Hospital-Acquired Illness or Injury  Outcome: Progressing  Goal: Optimal Comfort and Wellbeing  Outcome: Progressing  Goal: Readiness for Transition of Care  Outcome: Progressing     Problem: Acute Kidney Injury/Impairment  Goal: Fluid and Electrolyte Balance  Outcome: Progressing  Goal: Improved Oral Intake  Outcome: Progressing  Goal: Effective Renal Function  Outcome: Progressing     Problem: Wound  Goal: Optimal Coping  Outcome: Progressing  Goal: Optimal Functional Ability  Outcome: Progressing  Goal: Absence of Infection Signs and Symptoms  Outcome: Progressing  Goal: Improved Oral Intake  Outcome: Progressing  Goal: Optimal Pain Control and Function  Outcome: Progressing  Goal: Skin Health and Integrity  Outcome: Progressing  Goal: Optimal Wound Healing  Outcome: Progressing     Problem: Fall Injury Risk  Goal: Absence of Fall and Fall-Related Injury  Outcome: Progressing     Problem: Skin Injury Risk Increased  Goal: Skin Health and Integrity  Outcome: Progressing

## 2024-05-03 NOTE — PLAN OF CARE
Problem: Occupational Therapy  Goal: Occupational Therapy Goal  Description: Goals to be met by 6/2/2024:    Patient will increase functional independence with ADLs by performing:     UBD: SPV seated in w/c or EOB  LBD: Min A using AE seated in w/c or EOB   FW: MI using AE seated in w/c  Showering: Min A seated in w/c or EOB or sinkside  Toileting: Min A seated on BSC  Supine to sit: Min A   Sit to stand: Min A with RW          Outcome: Progressing

## 2024-05-03 NOTE — PLAN OF CARE
Problem: Physical Therapy  Goal: Physical Therapy Goal  Description: Goals to be met by: 3/10/24     Patient will increase functional independence with mobility by performing:    . Supine to sit with MInimal Assistance  . Sit to supine with MInimal Assistance  . Rolling to Left and Right with Minimal Assistance.  . Sit to stand transfer with Minimal Assistance  . Bed to chair transfer with Minimal Assistance using Rolling Walker  . Gait  x 50 feet with Minimal Assistance using Rolling Walker.   . Wheelchair propulsion x150 feet with Supervision using bilateral uppper extremities    Outcome: Progressing

## 2024-05-04 PROBLEM — I50.33 ACUTE ON CHRONIC DIASTOLIC HEART FAILURE: Status: RESOLVED | Noted: 2019-05-09 | Resolved: 2024-05-04

## 2024-05-04 PROBLEM — Z71.89 ADVANCE CARE PLANNING: Status: RESOLVED | Noted: 2020-01-29 | Resolved: 2024-05-04

## 2024-05-04 PROBLEM — N17.9 AKI (ACUTE KIDNEY INJURY): Status: RESOLVED | Noted: 2024-04-28 | Resolved: 2024-05-04

## 2024-05-04 PROBLEM — J96.01 ACUTE HYPOXEMIC RESPIRATORY FAILURE: Status: RESOLVED | Noted: 2024-05-01 | Resolved: 2024-05-04

## 2024-05-04 PROCEDURE — 99900035 HC TECH TIME PER 15 MIN (STAT)

## 2024-05-04 PROCEDURE — 25000003 PHARM REV CODE 250: Performed by: FAMILY MEDICINE

## 2024-05-04 PROCEDURE — 97530 THERAPEUTIC ACTIVITIES: CPT | Mod: CO

## 2024-05-04 PROCEDURE — 11000004 HC SNF PRIVATE

## 2024-05-04 PROCEDURE — 25000003 PHARM REV CODE 250: Performed by: HOSPITALIST

## 2024-05-04 PROCEDURE — 94761 N-INVAS EAR/PLS OXIMETRY MLT: CPT

## 2024-05-04 PROCEDURE — 97110 THERAPEUTIC EXERCISES: CPT | Mod: CO

## 2024-05-04 PROCEDURE — 27000221 HC OXYGEN, UP TO 24 HOURS

## 2024-05-04 PROCEDURE — 25000003 PHARM REV CODE 250: Performed by: STUDENT IN AN ORGANIZED HEALTH CARE EDUCATION/TRAINING PROGRAM

## 2024-05-04 RX ORDER — ESCITALOPRAM OXALATE 5 MG/1
5 TABLET ORAL DAILY
Status: DISCONTINUED | OUTPATIENT
Start: 2024-05-05 | End: 2024-05-04

## 2024-05-04 RX ORDER — ESCITALOPRAM OXALATE 5 MG/1
5 TABLET ORAL DAILY
Status: DISCONTINUED | OUTPATIENT
Start: 2024-05-04 | End: 2024-05-13

## 2024-05-04 RX ADMIN — METHOCARBAMOL 500 MG: 500 TABLET ORAL at 02:05

## 2024-05-04 RX ADMIN — SPIRONOLACTONE 50 MG: 25 TABLET ORAL at 10:05

## 2024-05-04 RX ADMIN — ACETAMINOPHEN 1000 MG: 500 TABLET ORAL at 02:05

## 2024-05-04 RX ADMIN — MEMANTINE 5 MG: 5 TABLET ORAL at 08:05

## 2024-05-04 RX ADMIN — ACETAMINOPHEN 1000 MG: 500 TABLET ORAL at 05:05

## 2024-05-04 RX ADMIN — MEMANTINE 5 MG: 5 TABLET ORAL at 10:05

## 2024-05-04 RX ADMIN — CALCIUM CARBONATE (ANTACID) CHEW TAB 500 MG 1000 MG: 500 CHEW TAB at 10:05

## 2024-05-04 RX ADMIN — APIXABAN 2.5 MG: 2.5 TABLET, FILM COATED ORAL at 08:05

## 2024-05-04 RX ADMIN — CHOLECALCIFEROL TAB 25 MCG (1000 UNIT) 2000 UNITS: 25 TAB at 10:05

## 2024-05-04 RX ADMIN — APIXABAN 2.5 MG: 2.5 TABLET, FILM COATED ORAL at 10:05

## 2024-05-04 RX ADMIN — ATENOLOL 25 MG: 25 TABLET ORAL at 10:05

## 2024-05-04 RX ADMIN — FUROSEMIDE 20 MG: 20 TABLET ORAL at 10:05

## 2024-05-04 RX ADMIN — METHOCARBAMOL 500 MG: 500 TABLET ORAL at 08:05

## 2024-05-04 RX ADMIN — ESCITALOPRAM OXALATE 5 MG: 5 TABLET, FILM COATED ORAL at 10:05

## 2024-05-04 RX ADMIN — CALCIUM CARBONATE (ANTACID) CHEW TAB 500 MG 500 MG: 500 CHEW TAB at 08:05

## 2024-05-04 NOTE — PT/OT/SLP PROGRESS
Occupational Therapy   Treatment    Name: Mary Ellen Miller  MRN: [id number]  Admit Date: 5/2/2024  Admitting Diagnosis:  Closed displaced intertrochanteric fracture of left femur with routine healing    General Precautions: Standard, fall   Orthopedic Precautions: LLE weight bearing as tolerated   Braces: N/A    Recommendations:     Discharge Recommendations:  other (see comments) (to be futher assessed)  Level of Assistance Recommended at Discharge:  significant assistance  Discharge Equipment Recommendations: walker, rolling, wheelchair, hip kit, bedside commode  Barriers to discharge:  Decreased caregiver support    Assessment:     Mary Ellen Miller is a 89 y.o. female with a medical diagnosis of Closed displaced intertrochanteric fracture of left femur with routine healing .  She presents with performance deficits affecting function are weakness, impaired endurance, impaired functional mobility, gait instability, impaired balance, decreased lower extremity function, pain, decreased ROM, impaired cognition, decreased coordination. Pt limited due to significant LLE pain. Pt yelling as therapist attempted to roll pt L/R and scoot pt to HOB. Pt cooperative with therapist, but unable to perform functional mobility due to increased pain. Pt performed BUE EX, UE dressing and self care HOB elevated.    Rehab Potential is fair    Activity tolerance:  Fair    Plan:     Patient to be seen 5 x/week to address the above listed problems via self-care/home management, therapeutic activities, therapeutic exercises    Plan of Care Expires: 06/02/24  Plan of Care Reviewed with: patient    Subjective   Pt agreeable to session.      Pain/Comfort:  Pain Rating 1: 7/10  Location - Side 1: Left  Location 1: hip  Pain Addressed 1: Nurse notified  Pain Rating Post-Intervention 1: 7/10    Patient's cultural, spiritual, [employer]aritan conflicts given the current situation:  no    Objective:     Patient found HOB elevated with oxygen  upon OT entry to room.    Bed Mobility:    Patient completed Rolling/Turning to Left with  maximal assistance  Patient completed Rolling/Turning to Right with maximal assistance  Patient completed Scooting/Bridging with maximal assistance  Patient completed Supine to Sit with total assistance (partially performed due to increased LLE pain)    Activities of Daily Living:  Grooming: oral hygiene and face washing with stand by assistance HOB elevated   Upper Body Dressing: Max A HOB elevated    AMPAC 6 Click ADL: 16    OT Exercises:   Pt performed BUE EX using 1# dowel 2x15 reps: wrist flex/ext, elbow flex/ext, shoulder protraction/retraction, shoulder rows with Supervision to increase BUE strength and endurance for ADLs and functional mobility      Treatment & Education:  BUE strength and endurance    Patient left HOB elevated with all lines intact and call button in reach    GOALS:   Multidisciplinary Problems       Occupational Therapy Goals          Problem: Occupational Therapy    Goal Priority Disciplines Outcome Interventions   Occupational Therapy Goal     OT, PT/OT Progressing    Description: Goals to be met by 6/2/2024:    Patient will increase functional independence with ADLs by performing:     UBD: SPV seated in w/c or EOB  LBD: Min A using AE seated in w/c or EOB   FW: MI using AE seated in w/c  Showering: Min A seated in w/c or EOB or sinkside  Toileting: Min A seated on BSC   Supine to sit: Min A   Sit to stand: Min A with RW                               Time Tracking:     OT Date of Treatment: 05/04/24  OT Start Time: 1328    OT Stop Time: 1410  OT Total Time (min): 42 min    Billable Minutes:Therapeutic Exercise 19  Therapeutic Activity 23    5/4/2024

## 2024-05-04 NOTE — H&P
"Hospital Medicine  Skilled Nursing Facility   History and Physical Exam      Date of Service: 05/04/2024      Patient Name: Mary Ellen Miller  MRN: [id number]  Admission Date: 5/2/2024  Attending Physician: Leroy Bonilla MD  Primary Care Provider: Dionne Jeter MD  Code Status: DNR (Do Not Resuscitate)      Principal problem:  Closed displaced intertrochanteric fracture of left femur with routine healing      Chief Complaint:   Chief Complaint   Patient presents with    Hip Injury     Admitted to [facility] for rehabilitation following hospital stay for left intertrochanteric femur fracture s/p IMN fixation.           HPI:   "Mary Ellen Miller is a 89 y.o. F with PMH of HTN, HLD, CHF, and afib on eliquis presenting to the ED with significant left hip pain after a fall. 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 Prior to the incident, she reports intermittent worsening of chronic lower extremity edema and SOB. Her PCP recently discontinued amlodipine in favor of lasix and continuing aldactone, and she has not missed any doses of her home medications.  XR pelvis with mildly displaced L intertrochanteric femur fracture with impaction, minimally displaced comminuted L superior pubic ramus fracture, and nondisplaced fracture of the L inferior pubic ramus. s/p left femur CMN on 4/28/24. Given diuresis and on 1L O2, not on oxygen at home.  PT/OT recommends skilled nursing placement for further therapy.      Interval History  All of today's labs reviewed and are listed below.  24 hr vital sign ranges listed below.  Patient denies shortness of breath, abdominal discomfort, nausea, or vomiting.  Patient reports an adequate appetite.  Patient denies dysuria.  Patient reports having regular bowel movements.  Patient progessing with PT/OT. Continuing to follow and treat all acute and chronic " "conditions." Per Aurora Malik NP on 5/3/24.     She is doing well today. She denies any pain currently and says that it is only mild pain when up. She was able to take 2 steps with RW and maxA with PT yesterday during evaluation. She says that her appetite is getting better and she denies any nausea or abdominal pain. She reports having regular BMs.     Patient admitted with skilled services with PT and OT to improve functional status and ability to perform ADLs.       Past Medical History:   Past Medical History:   Diagnosis Date    Anxiety     Atrial fibrillation     Colon polyps 05/01/2012    Hypertension     Leg ulcer, right, limited to breakdown of skin 11/20/2019    Unilateral femoral hernia with obstruction, without gangrene 10/26/2015       Past Surgical History:   Past Surgical History:   Procedure Laterality Date    HYSTERECTOMY      INTRAMEDULLARY RODDING OF FEMUR Left 4/28/2024    Procedure: INSERTION, INTRAMEDULLARY LENO, FEMUR: Left;  Surgeon: Kurt Melendez MD;  Location: [facility] OR [address] [place] [address];  Service: Orthopedics;  Laterality: Left;    LUNG SURGERY  1988    OOPHORECTOMY      RADICAL HYSTERECTOMY  1990       Social History:   Tobacco Use    Smoking status: Never     Passive exposure: Never    Smokeless tobacco: Never   Substance and Sexual Activity    Alcohol use: No    Drug use: No    Sexual activity: Not Currently       Family History:   Family History       Problem Relation (Age of Onset)    No Known Problems Mother, Father            Current Facility-Administered Medications   Medication Dose Route Frequency Provider Last Rate Last Admin    acetaminophen tablet 1,000 mg  1,000 mg Oral Q8H Leroy Bonilla MD   1,000 mg at 05/04/24 0558    acetaminophen tablet 650 mg  650 mg Oral Q6H PRN Leroy Bonilla MD        apixaban tablet 2.5 mg  2.5 mg Oral BID Leroy Bonilla MD   2.5 mg at 05/03/24 2131    atenoloL tablet 25 mg  25 mg Oral Daily Aurora Malik, APRN   25 mg at 05/03/24 1109 "    calcium carbonate 200 mg calcium (500 mg) chewable tablet 1,000 mg  1,000 mg Oral Daily Leroy Bonilla MD   1,000 mg at 05/03/24 1108    calcium carbonate 200 mg calcium (500 mg) chewable tablet 500 mg  500 mg Oral BID PRN Leroy Bonilla MD        EScitalopram oxalate tablet 10 mg  10 mg Oral Daily Leroy Bonilla MD   10 mg at 05/03/24 1108    furosemide tablet 20 mg  20 mg Oral Daily Aurora Malik APRN   20 mg at 05/03/24 1108    melatonin tablet 6 mg  6 mg Oral Nightly PRN Leroy Bonilla MD   6 mg at 05/03/24 2132    memantine tablet 5 mg  5 mg Oral BID Leroy Bonilla MD   5 mg at 05/03/24 2131    methocarbamoL tablet 500 mg  500 mg Oral Q6H PRN Kahlil Pedro MD   500 mg at 05/03/24 2131    ondansetron tablet 8 mg  8 mg Oral Q8H PRN Leroy Bonilla MD        senna-docusate 8.6-50 mg per tablet 1 tablet  1 tablet Oral BID Leroy Bonilla MD   1 tablet at 05/03/24 2132    spironolactone tablet 50 mg  50 mg Oral Daily Aurora Malik APRMONY   50 mg at 05/03/24 1108    vitamin D 1000 units tablet 2,000 Units  2,000 Units Oral Daily Leroy Bonilla MD   2,000 Units at 05/03/24 1109       Allergies:   Review of patient's allergies indicates:   Allergen Reactions    Percodan [oxycodone hcl-oxycodone-asa] Other (See Comments)     halucinations    Penicillins Itching    Sulfa (sulfonamide antibiotics) Diarrhea and Nausea Only    Amoxicillin        ROS:  Review of Systems   Constitutional:  Positive for appetite change (improving). Negative for fever.   Respiratory:  Negative for cough and shortness of breath.    Cardiovascular:  Negative for chest pain and palpitations.   Gastrointestinal:  Negative for abdominal pain, nausea and vomiting.   Genitourinary:  Negative for difficulty urinating and dysuria.   Musculoskeletal:  Negative for arthralgias and back pain.   Neurological:  Positive for weakness. Negative for dizziness and light-headedness.   Psychiatric/Behavioral:   Negative for sleep disturbance. The patient is not nervous/anxious.        Objective:  Temp:  [98.2 °F (36.8 °C)-98.5 °F (36.9 °C)]   Pulse:  []   Resp:  [20]   BP: (119-121)/(65-67)   SpO2:  [94 %-97 %]     Body mass index is 21.67 kg/m².      Physical Exam  Vitals and nursing note reviewed.   Constitutional:       General: She is not in acute distress.     Appearance: She is well-developed.   Cardiovascular:      Rate and Rhythm: Normal rate and regular rhythm.      Heart sounds: S1 normal and S2 normal. No murmur heard.  Pulmonary:      Effort: Pulmonary effort is normal. No respiratory distress.      Breath sounds: Normal breath sounds. No wheezing or rales.   Abdominal:      General: Bowel sounds are normal. There is no distension.      Palpations: Abdomen is soft.      Tenderness: There is no abdominal tenderness.   Musculoskeletal:         General: No tenderness.      Left hip: No tenderness.      Left upper leg: Swelling (mild) present.      Right lower leg: No edema.      Left lower leg: No edema.   Skin:     General: Skin is warm and dry.      Findings: Bruising present.   Neurological:      Mental Status: She is alert and oriented to person, place, and time.   Psychiatric:         Mood and Affect: Mood normal.         Behavior: Behavior normal. Behavior is cooperative.         Thought Content: Thought content normal.       Significant Labs:   A1C:   Recent Labs   Lab 04/27/24  0520   HGBA1C 5.3     CBC:  Recent Labs   Lab 05/02/24  0344   WBC 11.05   HGB 9.2*   HCT 28.3*      MCV 96     BMP:  Recent Labs   Lab 05/02/24  0344   *      K 4.1   *   CO2 18*   BUN 43*   CREATININE 0.9   CALCIUM 8.8   MG 2.1   PHOS 3.4       Significant Imaging: I have reviewed all pertinent imaging results/findings completed during prior hospitalization.      Assessment and Plan:  Active Diagnoses:    Diagnosis Date Noted POA    PRINCIPAL PROBLEM:  Closed displaced intertrochanteric fracture of  left femur with routine healing s/p IM nail on 4/28/2024 [S72.142D] 04/27/2024 Not Applicable    Severe protein-calorie malnutrition [E43] 05/03/2024 Yes    Acute hypoxemic respiratory failure [J96.01] 05/01/2024 Yes    Acute blood loss anemia [D62] 04/28/2024 Yes    JOHAN (acute kidney injury), unspecified [N17.9] 04/28/2024 Yes    Seasonal allergic rhinitis [J30.2] 03/06/2023 Yes    Long term (current) use of anticoagulants [Z79.01] 07/30/2020 Not Applicable    Advance care planning [Z71.89] 01/29/2020 Not Applicable    Acute on chronic diastolic heart failure [I50.33] 05/09/2019 Yes    Chronic atrial fibrillation [I48.20] 11/17/2017 Yes    Recurrent major depressive disorder, in full remission [F33.42]  Yes    Essential hypertension [I10]  Yes      Problems Resolved During this Admission:       Closed displaced intertrochanteric fracture of left femur   - s/p CMN on 4/28/24 with Dr. Kurt Melendez   - Continue acetaminophen 650 mg q6 hrs PRN and methocarbamol 500 mg q6 hrs PRN.   - PT/OT  - WBAT LLE  - Continue DVT PPx with apixaban 2.5 mg BID for 30 days post-op.   - Ortho O[facility]RA to follow weekly while at [facility].   - Follow up with Ortho after discharge.      Altered mental status  Had some confusion inpatient, UA was negative  Patient does not remember falling at home  Delirium precautions  Home lexapro decreased from 10mg to 5mg due to advanced age     Acute hypoxemic respiratory failure  Continue diuretics as tolerated  Currently on room air.       Acute blood loss anemia  Monitor routine CBC twice weekly.   Transfuse as needed.    Severe protein-calorie malnutrition  Appreciate RD evaluation and will follow.   Continue to encourage oral intake.   Continue regular diet with Boost Plus TID.       Chronic atrial fibrillation  Long term (current) use of anticoagulants  Continue atenolol 25 mg daily and apixaban 2.5 mg BID.      Acute on chronic diastolic heart failure  Essential hypertension  Continue furosemide 20  mg daily, atenolol 25 mg daily and spironolactone 50mg daily.  Follow up cardiology outpatient     Recurrent major depressive disorder, in full remission  - Continue home lexapro & namenda   - Home lexapro decreased from 10mg to 5mg due to advanced age     Advanced Care Planning  - NP held discussion with patient.   - DNR       IP [facility] RISK OF UNPLANNED READMISSION Model: Moderate      Anticipated Disposition:  Home with home health      Future Appointments   Date Time Provider Department Center   5/13/2024  1:00 PM Any Cantu PA-C [facility]   5/20/2024  4:00 PM Dionne Jeter MD [facility]       I certify that SNF services are required to be given on an inpatient basis because Mary Ellen Miller needs for skilled nursing care and/or skilled rehabilitation are required on a daily basis and such services can only practically be provided in a skilled nursing facility setting and are for an ongoing condition for which she received inpatient care in the hospital.       Leroy Bonilla MD  Department of Hospital Medicine   [facility] - Skilled Nursing

## 2024-05-04 NOTE — PLAN OF CARE
Continue regular diet, boost plus TID, assist with meals, encourage intake,RD following  Goals: PO to meet 75% of needs with ONS by next RD fu  Nutrition Goal Status: new  Communication of RD Recs: other (comment) (POC)     Assessment and Plan     Endocrine  Severe protein-calorie malnutrition  Malnutrition Type:  Context: acute illness or injury  Level: severe     Related to (etiology):   Inadequate oral intake, depression, confusion     Signs and Symptoms (as evidenced by):   BMI 21.6, sleeping a lot   PO 0-25%     Malnutrition Characteristic Summary:  Energy Intake (Malnutrition): less than or equal to 50% for greater than or equal to 5 days  Subcutaneous Fat (Malnutrition): moderate depletion  Muscle Mass (Malnutrition): moderate depletion        Interventions/Recommendations (treatment strategy):  Continue regular diet, boost plus TID, assist with meals, encourage intake,RD following     Nutrition Diagnosis Status:

## 2024-05-05 PROCEDURE — 25000003 PHARM REV CODE 250: Performed by: HOSPITALIST

## 2024-05-05 PROCEDURE — 25000003 PHARM REV CODE 250: Performed by: STUDENT IN AN ORGANIZED HEALTH CARE EDUCATION/TRAINING PROGRAM

## 2024-05-05 PROCEDURE — 97530 THERAPEUTIC ACTIVITIES: CPT | Mod: CQ

## 2024-05-05 PROCEDURE — 25000003 PHARM REV CODE 250: Performed by: FAMILY MEDICINE

## 2024-05-05 PROCEDURE — 97110 THERAPEUTIC EXERCISES: CPT | Mod: CQ

## 2024-05-05 PROCEDURE — 11000004 HC SNF PRIVATE

## 2024-05-05 RX ADMIN — ESCITALOPRAM OXALATE 5 MG: 5 TABLET, FILM COATED ORAL at 09:05

## 2024-05-05 RX ADMIN — APIXABAN 2.5 MG: 2.5 TABLET, FILM COATED ORAL at 09:05

## 2024-05-05 RX ADMIN — FUROSEMIDE 20 MG: 20 TABLET ORAL at 09:05

## 2024-05-05 RX ADMIN — APIXABAN 2.5 MG: 2.5 TABLET, FILM COATED ORAL at 08:05

## 2024-05-05 RX ADMIN — MEMANTINE 5 MG: 5 TABLET ORAL at 09:05

## 2024-05-05 RX ADMIN — SPIRONOLACTONE 50 MG: 25 TABLET ORAL at 09:05

## 2024-05-05 RX ADMIN — ACETAMINOPHEN 1000 MG: 500 TABLET ORAL at 06:05

## 2024-05-05 RX ADMIN — ATENOLOL 25 MG: 25 TABLET ORAL at 09:05

## 2024-05-05 RX ADMIN — MEMANTINE 5 MG: 5 TABLET ORAL at 08:05

## 2024-05-05 RX ADMIN — METHOCARBAMOL 500 MG: 500 TABLET ORAL at 10:05

## 2024-05-05 RX ADMIN — CHOLECALCIFEROL TAB 25 MCG (1000 UNIT) 2000 UNITS: 25 TAB at 09:05

## 2024-05-05 RX ADMIN — ACETAMINOPHEN 1000 MG: 500 TABLET ORAL at 10:05

## 2024-05-05 RX ADMIN — ACETAMINOPHEN 1000 MG: 500 TABLET ORAL at 02:05

## 2024-05-05 RX ADMIN — METHOCARBAMOL 500 MG: 500 TABLET ORAL at 08:05

## 2024-05-05 RX ADMIN — Medication 6 MG: at 08:05

## 2024-05-05 RX ADMIN — CALCIUM CARBONATE (ANTACID) CHEW TAB 500 MG 1000 MG: 500 CHEW TAB at 09:05

## 2024-05-05 NOTE — PT/OT/SLP PROGRESS
Physical Therapy Treatment    Patient Name:  Mary Ellen Miller   MRN:  [id number]  Admit Date: 5/2/2024  Admitting Diagnosis: Closed displaced intertrochanteric fracture of left femur with routine healing  Recent Surgeries: INSERTION, INTRAMEDULLARY LENO, FEMUR: Left (Left)     General Precautions: Standard, fall  Orthopedic Precautions: LLE weight bearing as tolerated  Braces: N/A    Recommendations:     Discharge Recommendations: home health PT  Level of Assistance Recommended at Discharge: 24 hours significant assistance  Discharge Equipment Recommendations: bedside commode, walker, rolling, wheelchair  Barriers to discharge: Decreased caregiver support    Assessment:     Mary Ellen Miller is a 89 y.o. female admitted with a medical diagnosis of Closed displaced intertrochanteric fracture of left femur with routine healing.      Pt tolerated treatment fairly well today though session was limited to bed mobility, seated therex, and bed<>chair transfer. Pt attempted STS from WC x one trial though quickly collapsed back into WC having produced a BM in her briefs. Pt notably embarrassed and distracted from that point forward. Pt requested to be returned to bed in order to be cleaned. PCT Anni assisted with performing pericare tasks with writing PTA performing bed mobility. Pt with increased sensitivity with rolling due to increased pain thru L hip. Pt will continue to benefit from skilled PT services in order to further promote functional mobility, endurance, and BLE strengthening. Pt remains appropriate for PT treatment at this time.    Performance deficits affecting function: weakness, impaired endurance, impaired self care skills, impaired functional mobility, gait instability, impaired balance, decreased upper extremity function, decreased lower extremity function, decreased ROM, pain, impaired cardiopulmonary response to activity, orthopedic precautions.    Rehab Potential is good    Activity  Tolerance: Fair    Plan:     Patient to be seen 5 x/week to address the above listed problems via gait training, therapeutic activities, therapeutic exercises, wheelchair management/training    Plan of Care Expires: 06/02/24  Plan of Care Reviewed with: patient    Subjective     Pt agreeable to treatment.     Pain/Comfort:  Pain Rating 1: 7/10  Location - Side 1: Left  Location - Orientation 1: generalized  Location 1: hip  Pain Addressed 1: Distraction, Pre-medicate for activity, Reposition  Pain Rating Post-Intervention 1: 7/10    Patient's cultural, spiritual, Sikh conflicts given the current situation:  no    Objective:     Communicated with nursing, PCT prior to session.  Patient found supine with HOB elevated with PureWick upon PT entry to room.     Therapeutic Activities and Exercises: Pt able to perform seated exercises consisting of ankle DF/PF, glute sets, B knee extension, B hip flexion, B hip abd/adduction, and HS curls. AAROM LLE performed intermittently to increase movements to end range. Left HS/heel cord stretching also performed with pt seated in WC. All exercises incorporated to promote BLE strengthening, tissue extensibility, endurance and functional mobility. (2x8 reps)    Patient educated on role of therapy, goals of session, and benefits of out of bed mobility.   Instructed on use of call button and importance of calling nursing staff for assistance with mobility   Questions/concerns addressed within PTA scope of practice  Pt verbalized understanding     Functional Mobility:  Bed Mobility:     Scooting: minimal assistance and CGA in WC; moderate-maximal assistance at EOB  Rolling Left:  moderate assistance (bed flat, pt using left bed rail)  Rolling Right: maximal assistance (bed flat, pt using right bed rail, increased pain thru LLE)  Supine to Sit: maximal assistance (HOB elevated, pt using left bed rail)  Sit to Supine: maximal assistance and of 2 persons (bed flat, PCT assisting with  "trunk, PTA with BLE mgmt)  Transfers:     Sit to Stand: attempted x one trial from WC with RW, unable to complete due to pt producing BM, promptly returning to sit  Bed to Chair: moderate assistance with  no AD  using  Stand Pivot  Chair to Bed: maximal assistance with no AD using Stand Pivot  Balance: static sitting at EOB with CGA-SBA in preparation for bed to chair transfer    Increased time required with all bed mobility as pt reported 10/10 with most movements. Pt preoccupied throughout session with PureWick placement, unsure of needing to use restroom. Pt producing both urine and BM with increased exertion with bed mobility/therex/STS attempt, stating "I think my bowels need moving." PCT Anni present to assist with pericare/cleanup during bed mobility tasks.    AM-PAC 6 CLICK MOBILITY  10    Patient left supine with HOB elevated with call button in reach and family present.    GOALS:   Multidisciplinary Problems       Physical Therapy Goals          Problem: Physical Therapy    Goal Priority Disciplines Outcome Goal Variances Interventions   Physical Therapy Goal     PT, PT/OT Progressing     Description: Goals to be met by: 3/10/24     Patient will increase functional independence with mobility by performing:    . Supine to sit with MInimal Assistance  . Sit to supine with MInimal Assistance  . Rolling to Left and Right with Minimal Assistance.  . Sit to stand transfer with Minimal Assistance  . Bed to chair transfer with Minimal Assistance using Rolling Walker  . Gait  x 50 feet with Minimal Assistance using Rolling Walker.   . Wheelchair propulsion x150 feet with Supervision using bilateral uppper extremities                         Time Tracking:     PT Received On: 05/05/24  PT Start Time: 1344  PT Stop Time: 1430  PT Total Time (min): 46 min    Billable Minutes: Therapeutic Activity 32 and Therapeutic Exercise 14    Treatment Type: Treatment  PT/PTA: PTA     Number of PTA visits since last PT visit: " 1     05/05/2024

## 2024-05-06 LAB
ALBUMIN SERPL BCP-MCNC: 2.8 G/DL (ref 3.5–5.2)
ALP SERPL-CCNC: 59 U/L (ref 55–135)
ALT SERPL W/O P-5'-P-CCNC: 15 U/L (ref 10–44)
ANION GAP SERPL CALC-SCNC: 11 MMOL/L (ref 8–16)
AST SERPL-CCNC: 18 U/L (ref 10–40)
BASOPHILS # BLD AUTO: 0.05 K/UL (ref 0–0.2)
BASOPHILS NFR BLD: 0.5 % (ref 0–1.9)
BILIRUB DIRECT SERPL-MCNC: 0.5 MG/DL (ref 0.1–0.3)
BILIRUB SERPL-MCNC: 1.5 MG/DL (ref 0.1–1)
BUN SERPL-MCNC: 37 MG/DL (ref 10–30)
CALCIUM SERPL-MCNC: 8.4 MG/DL (ref 8.7–10.5)
CHLORIDE SERPL-SCNC: 108 MMOL/L (ref 95–110)
CO2 SERPL-SCNC: 18 MMOL/L (ref 23–29)
CREAT SERPL-MCNC: 0.9 MG/DL (ref 0.5–1.4)
DIFFERENTIAL METHOD BLD: ABNORMAL
EOSINOPHIL # BLD AUTO: 0.2 K/UL (ref 0–0.5)
EOSINOPHIL NFR BLD: 1.4 % (ref 0–8)
ERYTHROCYTE [DISTWIDTH] IN BLOOD BY AUTOMATED COUNT: 15.6 % (ref 11.5–14.5)
EST. GFR  (NO RACE VARIABLE): 59.2 ML/MIN/1.73 M^2
GLUCOSE SERPL-MCNC: 94 MG/DL (ref 70–110)
HCT VFR BLD AUTO: 28.8 % (ref 37–48.5)
HGB BLD-MCNC: 9.1 G/DL (ref 12–16)
IMM GRANULOCYTES # BLD AUTO: 0.16 K/UL (ref 0–0.04)
IMM GRANULOCYTES NFR BLD AUTO: 1.5 % (ref 0–0.5)
LYMPHOCYTES # BLD AUTO: 1.4 K/UL (ref 1–4.8)
LYMPHOCYTES NFR BLD: 13.1 % (ref 18–48)
MAGNESIUM SERPL-MCNC: 2 MG/DL (ref 1.6–2.6)
MCH RBC QN AUTO: 31 PG (ref 27–31)
MCHC RBC AUTO-ENTMCNC: 31.6 G/DL (ref 32–36)
MCV RBC AUTO: 98 FL (ref 82–98)
MONOCYTES # BLD AUTO: 0.9 K/UL (ref 0.3–1)
MONOCYTES NFR BLD: 8.7 % (ref 4–15)
NEUTROPHILS # BLD AUTO: 7.8 K/UL (ref 1.8–7.7)
NEUTROPHILS NFR BLD: 74.8 % (ref 38–73)
NRBC BLD-RTO: 0 /100 WBC
PHOSPHATE SERPL-MCNC: 3.7 MG/DL (ref 2.7–4.5)
PLATELET # BLD AUTO: 279 K/UL (ref 150–450)
PMV BLD AUTO: 10.7 FL (ref 9.2–12.9)
POTASSIUM SERPL-SCNC: 4.1 MMOL/L (ref 3.5–5.1)
PROT SERPL-MCNC: 5.8 G/DL (ref 6–8.4)
RBC # BLD AUTO: 2.94 M/UL (ref 4–5.4)
SODIUM SERPL-SCNC: 137 MMOL/L (ref 136–145)
WBC # BLD AUTO: 10.47 K/UL (ref 3.9–12.7)

## 2024-05-06 PROCEDURE — 80048 BASIC METABOLIC PNL TOTAL CA: CPT | Performed by: HOSPITALIST

## 2024-05-06 PROCEDURE — 36415 COLL VENOUS BLD VENIPUNCTURE: CPT | Performed by: HOSPITALIST

## 2024-05-06 PROCEDURE — 25000003 PHARM REV CODE 250: Performed by: FAMILY MEDICINE

## 2024-05-06 PROCEDURE — 97116 GAIT TRAINING THERAPY: CPT | Mod: CQ

## 2024-05-06 PROCEDURE — 84100 ASSAY OF PHOSPHORUS: CPT | Performed by: HOSPITALIST

## 2024-05-06 PROCEDURE — 85025 COMPLETE CBC W/AUTO DIFF WBC: CPT | Performed by: HOSPITALIST

## 2024-05-06 PROCEDURE — 83735 ASSAY OF MAGNESIUM: CPT | Performed by: HOSPITALIST

## 2024-05-06 PROCEDURE — 80076 HEPATIC FUNCTION PANEL: CPT | Performed by: HOSPITALIST

## 2024-05-06 PROCEDURE — 97110 THERAPEUTIC EXERCISES: CPT

## 2024-05-06 PROCEDURE — 97110 THERAPEUTIC EXERCISES: CPT | Mod: CQ

## 2024-05-06 PROCEDURE — 25000003 PHARM REV CODE 250: Performed by: HOSPITALIST

## 2024-05-06 PROCEDURE — 11000004 HC SNF PRIVATE

## 2024-05-06 PROCEDURE — 97530 THERAPEUTIC ACTIVITIES: CPT | Mod: CQ

## 2024-05-06 PROCEDURE — 25000003 PHARM REV CODE 250: Performed by: STUDENT IN AN ORGANIZED HEALTH CARE EDUCATION/TRAINING PROGRAM

## 2024-05-06 PROCEDURE — 97530 THERAPEUTIC ACTIVITIES: CPT

## 2024-05-06 RX ORDER — ACETAMINOPHEN 325 MG/1
650 TABLET ORAL EVERY 6 HOURS PRN
Status: DISCONTINUED | OUTPATIENT
Start: 2024-05-06 | End: 2024-05-13

## 2024-05-06 RX ORDER — METHOCARBAMOL 500 MG/1
500 TABLET, FILM COATED ORAL EVERY 6 HOURS PRN
Status: DISCONTINUED | OUTPATIENT
Start: 2024-05-06 | End: 2024-05-07

## 2024-05-06 RX ORDER — ASCORBIC ACID 500 MG
500 TABLET ORAL 2 TIMES DAILY
Status: DISCONTINUED | OUTPATIENT
Start: 2024-05-06 | End: 2024-05-23 | Stop reason: HOSPADM

## 2024-05-06 RX ORDER — ZINC SULFATE 50(220)MG
220 CAPSULE ORAL DAILY
Status: DISCONTINUED | OUTPATIENT
Start: 2024-05-06 | End: 2024-05-23 | Stop reason: HOSPADM

## 2024-05-06 RX ORDER — TALC
9 POWDER (GRAM) TOPICAL NIGHTLY
Status: DISCONTINUED | OUTPATIENT
Start: 2024-05-06 | End: 2024-05-23 | Stop reason: HOSPADM

## 2024-05-06 RX ADMIN — APIXABAN 2.5 MG: 2.5 TABLET, FILM COATED ORAL at 08:05

## 2024-05-06 RX ADMIN — METHOCARBAMOL 500 MG: 500 TABLET ORAL at 07:05

## 2024-05-06 RX ADMIN — ACETAMINOPHEN 650 MG: 325 TABLET ORAL at 07:05

## 2024-05-06 RX ADMIN — ZINC SULFATE 220 MG (50 MG) CAPSULE 220 MG: CAPSULE at 01:05

## 2024-05-06 RX ADMIN — ACETAMINOPHEN 1000 MG: 500 TABLET ORAL at 06:05

## 2024-05-06 RX ADMIN — MEMANTINE 5 MG: 5 TABLET ORAL at 08:05

## 2024-05-06 RX ADMIN — METHOCARBAMOL 500 MG: 500 TABLET ORAL at 09:05

## 2024-05-06 RX ADMIN — ATENOLOL 25 MG: 25 TABLET ORAL at 09:05

## 2024-05-06 RX ADMIN — THERA TABS 1 TABLET: TAB at 01:05

## 2024-05-06 RX ADMIN — CALCIUM CARBONATE (ANTACID) CHEW TAB 500 MG 1000 MG: 500 CHEW TAB at 09:05

## 2024-05-06 RX ADMIN — Medication 500 MG: at 08:05

## 2024-05-06 RX ADMIN — Medication 9 MG: at 08:05

## 2024-05-06 RX ADMIN — CHOLECALCIFEROL TAB 25 MCG (1000 UNIT) 2000 UNITS: 25 TAB at 09:05

## 2024-05-06 RX ADMIN — ESCITALOPRAM OXALATE 5 MG: 5 TABLET, FILM COATED ORAL at 09:05

## 2024-05-06 RX ADMIN — FUROSEMIDE 20 MG: 20 TABLET ORAL at 09:05

## 2024-05-06 RX ADMIN — APIXABAN 2.5 MG: 2.5 TABLET, FILM COATED ORAL at 09:05

## 2024-05-06 RX ADMIN — MEMANTINE 5 MG: 5 TABLET ORAL at 09:05

## 2024-05-06 RX ADMIN — SPIRONOLACTONE 50 MG: 25 TABLET ORAL at 09:05

## 2024-05-06 NOTE — PROGRESS NOTES
[facility] - Skilled Nursing  Adult Nutrition  Progress Note    SUMMARY   Recommendations  Continue regular diet, chop meats, dc boost not taking, assist with set up, encourage PO intake, RD following  Goals: PO to meet 75% of needs with ONS by next RD fu  Nutrition Goal Status: progressing towards goal, goal not met  Communication of RD Recs: other (comment) (POC)    Assessment and Plan    Severe protein-calorie malnutrition  Malnutrition Type:  Context: acute illness or injury  Level: severe     Related to (etiology):   Inadequate oral intake, depression, confusion     Signs and Symptoms (as evidenced by):   BMI 21.6, sleeping a lot   PO 0-25%     Malnutrition Characteristic Summary:  Energy Intake (Malnutrition): less than or equal to 50% for greater than or equal to 5 days  Subcutaneous Fat (Malnutrition): moderate depletion  Muscle Mass (Malnutrition): moderate depletion        Interventions/Recommendations (treatment strategy):  Continue regular diet, chopped meat,  assist with meals, encourage intake,RD following     Nutrition Diagnosis Status: Active      Malnutrition Assessment   5/3  Malnutrition Context: acute illness or injury  Malnutrition Level: severe  Skin (Micronutrient): bruised, wounds unhealed  Hair/Scalp (Micronutrient):  (wig)  Eyes (Micronutrient): conjunctiva dull  Teeth (Micronutrient): broken dentition  Tongue (Micronutrient): red  Neck/Chest (Micronutrient): bony prominence, muscle wasting, subcutaneous fat loss  Musculoskeletal/Lower Extremities: muscle wasting, subcutaneous fat loss       Energy Intake (Malnutrition): less than or equal to 50% for greater than or equal to 5 days  Subcutaneous Fat (Malnutrition): moderate depletion  Muscle Mass (Malnutrition): moderate depletion   Orbital Region (Subcutaneous Fat Loss): moderate depletion  Upper Arm Region (Subcutaneous Fat Loss): moderate depletion  Thoracic and Lumbar Region: mild depletion   [place] Region (Muscle Loss): moderate  "depletion  Clavicle Bone Region (Muscle Loss): moderate depletion  Clavicle and Acromion Bone Region (Muscle Loss): severe depletion  Dorsal Hand (Muscle Loss): moderate depletion  Patellar Region (Muscle Loss): moderate depletion  Anterior Thigh Region (Muscle Loss): moderate depletion  Posterior Calf Region (Muscle Loss): mild depletion                 Reason for Assessment    Reason For Assessment: RD follow-up  Diagnosis:  (L femur fx, s/p IM nail)  Relevant Medical History: HTN, HF, S/P JOHAN, AFIB, bloos loss anemia, depression, anxiety,  Interdisciplinary Rounds: did not attend  General Information Comments: PO 25-50%, family in room today, attempting to encourage PO intake which is still poor, she states she will eat and then does not, she is taking a bite and leaving the rest, the boost is collecting in the room. She has diarrhea and will not take the boost now. order will be put on hold, she has a table full of supplements, This according to family is not her. Since the surgery her mental status is changed and her appetite is gone. MVI was added as recommended  Nutrition Discharge Planning: low sodium diet, ONS of choice    Nutrition/Diet History    Patient Reported Diet/Restrictions/Preferences: low salt  Typical Food/Fluid Intake: 3 mels  Spiritual, Cultural Beliefs, Taoism Practices, Values that Affect Care: no  Supplemental Drinks or Food Habits: Boost Original  Vitamin/Mineral/Herbal Supplements: takes "lots of vitamins"  Food Allergies: NKFA  Factors Affecting Nutritional Intake: decreased appetite, impaired cognitive status/motor control    Anthropometrics    Temp: 97.4 °F (36.3 °C)  Height: 5' 3" (160 cm)  Height (inches): 63 in  Weight Method: Bed Scale  Weight: 55.5 kg (122 lb 5.7 oz)  Weight (lb): 122.36 lb  Ideal Body Weight (IBW), Female: 115 lb  % Ideal Body Weight, Female (lb): 106.21 %  BMI (Calculated): 21.7  BMI Grade: 18.5-24.9 - normal  Usual Body Weight (UBW), k kg  % Usual Body " Weight: 99.14  % Weight Change From Usual Weight: -1.07 %       Lab/Procedures/Meds    Pertinent Labs Reviewed: reviewed  Pertinent Labs Comments: Hg 9.1, Hct 28.8, Ca 8.4,  Pertinent Medications Reviewed: reviewed  Pertinent Medications Comments: ascorbid acid, zinc, MVI    Estimated/Assessed Needs    Weight Used For Calorie Calculations: 55.4 kg (122 lb 2.2 oz)  Energy Calorie Requirements (kcal): 1200  Energy Need Method: [place]-St Jeor (x 1.3(PAL))  Protein Requirements: 66  Weight Used For Protein Calculations: 55.4 kg (122 lb 2.2 oz) (x 1.2g/kg)  Fluid Requirements (mL): 1200 or per MD  Estimated Fluid Requirement Method: RDA Method  RDA Method (mL): 1200         Nutrition Prescription Ordered    Current Diet Order: Regular  Nutrition Order Comments: PPO 25%  Oral Nutrition Supplement: on hold refuses    Evaluation of Received Nutrient/Fluid Intake    I/O: no data  Energy Calories Required: meeting needs, not meeting needs  Protein Required: meeting needs, not meeting needs  Fluid Required: meeting needs, not meeting needs  Comments: LBM 5/6  Tolerance: tolerating  % Intake of Estimated Energy Needs: 25 - 50 %  % Meal Intake: 25 - 50 %    Nutrition Risk    Level of Risk/Frequency of Follow-up: low (one time per week)     Monitor and Evaluation    Food and Nutrient Intake: food and beverage intake  Food and Nutrient Adminstration: diet order  Physical Activity and Function: nutrition-related ADLs and IADLs  Anthropometric Measurements: weight change  Biochemical Data, Medical Tests and Procedures: gastrointestinal profile, electrolyte and renal panel  Nutrition-Focused Physical Findings: overall appearance, skin     Nutrition Follow-Up    RD Follow-up?: Yes

## 2024-05-06 NOTE — PT/OT/SLP PROGRESS
"Occupational Therapy   Treatment    Name: Mary Ellen Miller  MRN: [id number]  Admit Date: 5/2/2024  Admitting Diagnosis:  Closed displaced intertrochanteric fracture of left femur with routine healing    General Precautions: Standard, fall   Orthopedic Precautions: LLE weight bearing as tolerated   Braces: N/A    Recommendations:     Discharge Recommendations:  other (see comments) (to be futher assessed)  Level of Assistance Recommended at Discharge: 24 hours physical assistance for all ADL's and home management tasks  Discharge Equipment Recommendations: walker, rolling, wheelchair, hip kit, bedside commode  Barriers to discharge:  Decreased caregiver support    Assessment:     Mary Ellen Miller is a 89 y.o. female with a medical diagnosis of Closed displaced intertrochanteric fracture of left femur with routine healing. Performance deficits affecting function are weakness, impaired endurance, impaired functional mobility, gait instability, impaired balance, decreased lower extremity function, pain, decreased ROM, impaired cognition, decreased coordination. Pt tolerated today's session well w/o c/o pain or discomfort. Pt required multiple breaks throughout session due to fatigue. Pt would benefit from cont'd OT services in the SNF setting to improve safety and independence /c functional tasks and ADLs upon discharge.       Rehab Potential is good    Activity tolerance:  Fair    Plan:     Patient to be seen 5 x/week to address the above listed problems via self-care/home management, therapeutic activities, therapeutic exercises    Plan of Care Expires: 06/02/24  Plan of Care Reviewed with: patient    Subjective     Communicated with: Nursing prior to session.  " I dont feel as bad today as yesterday"    Pain/Comfort:  Pain Rating 1: 0/10  Pain Rating Post-Intervention 1: 0/10    Patient's cultural, spiritual, Rastafari conflicts given the current situation:  no    Objective:     Patient found up in chair " with  (no active lines) upon OT entry to room.      [facility] 6 Click ADL: 16    OT Exercises: 1. UE Ergometer 10 mins seated in w/c; focus on endurance/cardiovascular      2. Bilateral Manuel 30 reps each; seated in w/c; shoulder flex/ext/abd/add w/ 4lb inside box; focus on BUEs strength in order to improve overall performance with ADLs/daily tasks    3. FMC activity via small peg board; pt placed small pegs onboard following a moderate visual prompt; focus on FMC skills in order to improve overall performance with ADLs/daily tasks    Treatment & Education:  Role of OT  Current POC  Energy conservation    Patient left up in chair with all lines intact, call button in reach, and all needs met    GOALS:   Multidisciplinary Problems       Occupational Therapy Goals          Problem: Occupational Therapy    Goal Priority Disciplines Outcome Interventions   Occupational Therapy Goal     OT, PT/OT Progressing    Description: Goals to be met by 6/2/2024:    Patient will increase functional independence with ADLs by performing:     UBD: SPV seated in w/c or EOB  LBD: Min A using AE seated in w/c or EOB   FW: MI using AE seated in w/c  Showering: Min A seated in w/c or EOB or sinkside  Toileting: Min A seated on BSC   Supine to sit: Min A   Sit to stand: Min A with RW                               Time Tracking:     OT Date of Treatment: 05/06/24  OT Start Time: 1136    OT Stop Time: 1220  OT Total Time (min): 44 min    Billable Minutes:Therapeutic Activity 10  Therapeutic Exercise 34    5/6/2024  Bob Andrew Jr., OTR/L

## 2024-05-06 NOTE — PT/OT/SLP PROGRESS
Physical Therapy Treatment    Patient Name:  Mary Ellen Miller   MRN:  [id number]  Admit Date: 5/2/2024  Admitting Diagnosis: Closed displaced intertrochanteric fracture of left femur with routine healing  Recent Surgeries: INSERTION, INTRAMEDULLARY LENO, FEMUR: Left (Left)     General Precautions: Standard, fall  Orthopedic Precautions: LLE weight bearing as tolerated  Braces: N/A    Recommendations:     Discharge Recommendations: home health PT  Level of Assistance Recommended at Discharge: 24 hours significant assistance  Discharge Equipment Recommendations: bedside commode, walker, rolling, wheelchair  Barriers to discharge: Decreased caregiver support    Assessment:     Mary Ellen Miller is a 89 y.o. female admitted with a medical diagnosis of Closed displaced intertrochanteric fracture of left femur with routine healing . Pt tolerated well, pt with increased BLE pain L>R when ambulating/ with TE. Pt required Min/Mod A for STS and Min A with within // bars when ambulating. Pt also completed static/dynamic standing balance with Lateral weight shifts. Pt encouraged throughout session for max progress, pt limited by BLE pain. Pt pleasant and would continue to benefit from skilled PT services to improve overall functional mobility, strength and endurance.      Performance deficits affecting function: weakness, impaired endurance, impaired self care skills, impaired functional mobility, gait instability, impaired balance, decreased upper extremity function, decreased lower extremity function, decreased ROM, pain, impaired cardiopulmonary response to activity, orthopedic precautions.    Rehab Potential is good    Activity Tolerance: Good    Plan:     Patient to be seen 5 x/week to address the above listed problems via gait training, therapeutic activities, therapeutic exercises, wheelchair management/training    Plan of Care Expires: 06/02/24  Plan of Care Reviewed with: patient    Subjective     Pt agreeable  for PT.     Pain/Comfort:  Pain Rating 1: 0/10  Location - Side 1: Left  Location - Orientation 1: generalized  Location 1: hip  Pain Addressed 1: Reposition, Distraction, Cessation of Activity, Pre-medicate for activity  Pain Rating Post-Intervention 1: 10/10 (with ambulation/ TE)  Pain Rating 2: 0/10  Location - Side 2: Right  Location - Orientation 2: generalized  Location 2: hip  Pain Addressed 2: Reposition, Distraction, Cessation of Activity, Pre-medicate for activity  Pain Rating Post-Intervention 2: 7/10    Patient's cultural, spiritual, Gnosticist conflicts given the current situation:  no    Objective:       Patient found with  (no lines, up in chair) upon PT entry to room.     Therapeutic Activities and Exercises: Seated TE: hip flex (AAROM LLE), hip abd/add, LAQ, AP 2 x 10 reps for BLE strengthening vc/demo for proper completion. Weakness noted L>R     Patient educated on role of therapy, goals of session, and benefits of out of bed mobility.   Instructed on use of call button and importance of calling nursing staff for assistance with mobility   Questions/concerns addressed within PTA scope of practice  Pt verbalized understanding    NP speaking with patient briefly in gym    Functional Mobility:  Transfers:     Sit to Stand:  minimum assistance and moderate assistance with rolling walker. Vc to scoot to edge of chair, hand placement on wheelchair arm rests with anterior lean when ascending  Gait: pt amb the length of // bars ~7 ft with Min A and grab bars. Vc to maintain BUE in front of pt d/t light posterior lean. Short step length, very slow justino and increased BLE pain. Pt declined second attempt    Balance: static/dynamic balance with grab bars and CGA/Min A. X 10 lateral weight shifts to L/R,light pain reported     AM-PAC 6 CLICK MOBILITY  10    Patient left up in chair with  PT tech .    GOALS:   Multidisciplinary Problems       Physical Therapy Goals          Problem: Physical Therapy    Goal  Priority Disciplines Outcome Goal Variances Interventions   Physical Therapy Goal     PT, PT/OT Progressing     Description: Goals to be met by: 3/10/24     Patient will increase functional independence with mobility by performing:    . Supine to sit with MInimal Assistance  . Sit to supine with MInimal Assistance  . Rolling to Left and Right with Minimal Assistance.  . Sit to stand transfer with Minimal Assistance  . Bed to chair transfer with Minimal Assistance using Rolling Walker  . Gait  x 50 feet with Minimal Assistance using Rolling Walker.   . Wheelchair propulsion x150 feet with Supervision using bilateral uppper extremities                         Time Tracking:     PT Received On: 05/06/24  PT Start Time: 1022  PT Stop Time: 1107  PT Total Time (min): 45 min    Billable Minutes: Gait Training 13, Therapeutic Activity 17, and Therapeutic Exercise 15    Treatment Type: Treatment  PT/PTA: PTA     Number of PTA visits since last PT visit: 2     05/06/2024

## 2024-05-06 NOTE — PLAN OF CARE
Continue regular diet, chop meats, dc boost not taking, assist with set up, encourage PO intake, RD following  Goals: PO to meet 75% of needs with ONS by next RD fu  Nutrition Goal Status: progressing towards goal, goal not met  Communication of RD Recs: other (comment) (POC)     Assessment and Plan     Severe protein-calorie malnutrition  Malnutrition Type:  Context: acute illness or injury  Level: severe     Related to (etiology):   Inadequate oral intake, depression, confusion     Signs and Symptoms (as evidenced by):   BMI 21.6, sleeping a lot   PO 0-25%     Malnutrition Characteristic Summary:  Energy Intake (Malnutrition): less than or equal to 50% for greater than or equal to 5 days  Subcutaneous Fat (Malnutrition): moderate depletion  Muscle Mass (Malnutrition): moderate depletion        Interventions/Recommendations (treatment strategy):  Continue regular diet, chopped meat,  assist with meals, encourage intake,RD following     Nutrition Diagnosis Status: Active

## 2024-05-06 NOTE — PROGRESS NOTES
[facility]                                  Skilled Nursing Facility                   Progress Note     Patient Name: Mary Ellen Miller  YOB: 1930  MRN: [id number]  Room: [place] [address] [address] A     Admit Date: 5/2/2024   HEAVEN: 5/23/2024     Principal Problem: Closed displaced intertrochanteric fracture of left femur with routine healing    HPI obtained from patient interview and chart review     Chief Complaint: Re-evaluation of medical treatment and therapy status: vitamin and supplements per RD recs, lab review, recent prior hyperbili, insomnia      HPI:   Mary Ellen Miller is a 89 y.o. F with PMH of HTN, HLD, CHF, and afib on eliquis presenting to the ED with significant left hip pain after a fall. 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 Prior to the incident, she reports intermittent worsening of chronic lower extremity edema and SOB. Her PCP recently discontinued amlodipine in favor of lasix and continuing aldactone, and she has not missed any doses of her home medications.  XR pelvis with mildly displaced L intertrochanteric femur fracture with impaction, minimally displaced comminuted L superior pubic ramus fracture, and nondisplaced fracture of the L inferior pubic ramus. s/p left femur CMN on 4/28/24. Given diuresis and on 1L O2, not on oxygen at home.  PT/OT recommends skilled nursing placement for further therapy.     Patient will be treated at [facility] with PT and OT to improve functional status and ability to perform ADLs.     Interval History  24 hour chart review completed. Pertinent lab, micro, and/or radiology results addressed below. NAEON. NAD.   Patient seen in gym this morning, no family present at bedside.  Afebrile, hemodynamically stable.  Lab: Last T bili 1.2, was not  repeated. Will obtain hepatic function panel from [facility]. Stop q 8 hour 1000 mg APAP, continue 650 mg APAP prn. -->Hepatic profile resulted with T bili elevate to 1.5, direct bili 0.5. ALP, AST, ALT all WNL. Will obtain CMP in am to trend. Obtain acute hepatis panel in am.  I/O: Daily MVI and nursing order to assist with feedings added per RD recommendation. Patient reports her appetite is starting to improve.  LBM 5/6.  Pain management: Patient reports her pain is adequately controlled, however unreliable historian given dementia. Per nursing, family report patient having difficulty sleeping due to pain and insomnia despite prn melatonin 6mg. Increase melatonin from 6 to 9 mg and change from qhs prn to qhs.  Skilled Therapy: Patient progressing with therapy as tolerated and would continue to benefit from skilled PT and OT services to improve overall functional mobility and independence, strength, endurance, and safety.        Past Medical History: Patient has a past medical history of Anxiety, Atrial fibrillation, Colon polyps (05/01/2012), Hypertension, Leg ulcer, right, limited to breakdown of skin (11/20/2019), and Unilateral femoral hernia with obstruction, without gangrene (10/26/2015).    Past Surgical History: Patient has a past surgical history that includes Radical hysterectomy (1990); Lung surgery (1988); Hysterectomy; Oophorectomy; and Intramedullary rodding of femur (Left, 4/28/2024).    Social History: Patient reports that she has never smoked. She has never been exposed to tobacco smoke. She has never used smokeless tobacco. She reports that she does not drink alcohol and does not use drugs.    Family History:  family history includes No Known Problems in her father and mother.    Allergies: Patient is allergic to percodan [oxycodone hcl-oxycodone-asa], penicillins, sulfa (sulfonamide antibiotics), and amoxicillin.        ROS  Constitutional:  Positive for activity change. Negative for chills and fever.    HENT:  Negative for trouble swallowing.    Eyes:  Negative for photophobia and visual disturbance.   Respiratory:  Positive for shortness of breath. Negative for chest tightness.    Cardiovascular:  Negative for chest pain and palpitations.   Gastrointestinal:  Negative for abdominal pain, constipation, diarrhea, nausea and vomiting.   Genitourinary:  Negative for dysuria, frequency and hematuria.   Musculoskeletal:  Positive for arthralgias, back pain and gait problem. Negative for neck pain.   Skin:  Negative for rash and wound.   Neurological:  Positive for weakness. Negative for dizziness, syncope, speech difficulty and light-headedness.   Psychiatric/Behavioral:  Positive for confusion (intermittent). Negative for agitation. The patient is not nervous/anxious.     24 hour Vital Sign Range   Temp:  [97.4 °F (36.3 °C)-98.1 °F (36.7 °C)]   Pulse:  [80-95]   Resp:  [18-20]   BP: (138-142)/(60-80)   SpO2:  [96 %]       Physical Exam  Constitutional:       General: She is not in acute distress. Appears fatigued     Appearance: She is well-developed. She is not toxic-appearing.   HENT:      Head: Normocephalic.      Mouth/Throat:      Mouth: Mucous membranes are moist.   Eyes:      Extraocular Movements: Extraocular movements intact.      Conjunctiva/sclera: Conjunctivae normal.      Pupils: Pupils are equal, round, and reactive to light.   Cardiovascular:      Rate and Rhythm: Tachycardia present. Rhythm irregular.      Heart sounds: Normal heart sounds.   Pulmonary:      Effort: Pulmonary effort is normal. No respiratory distress. Lungs clear     Breath sounds: No wheezing.      Comments: On 1L nc  Abdominal:      General: Bowel sounds are normal.      Palpations: Abdomen is soft.      Tenderness: There is no abdominal tenderness.   Musculoskeletal:         General: Normal range of motion.      Cervical back: Normal range of motion and neck supple.      Comments: LLE with bandage intact  Skin:     General: Skin is  "warm and dry.      Capillary Refill: Capillary refill takes less than 2 seconds.      Findings: Bruising present. No rash.   Neurological:      Mental Status: She is alert and oriented to person, place, and time. Forgetful of recent details     Cranial Nerves: No cranial nerve deficit.      Sensory: No sensory deficit.      Coordination: Coordination normal.   Psychiatric:         Behavior: Behavior normal.         Thought Content: Thought content normal.       Judgment: Judgment normal.   Full skin assessment completed by this NP       Labs:  Recent Labs   Lab 05/01/24 0456 05/02/24 0344 05/06/24 0459   WBC 8.22 11.05 10.47   HGB 8.5* 9.2* 9.1*   HCT 26.9* 28.3* 28.8*    175 279     Recent Labs   Lab 05/01/24 0456 05/02/24 0344 05/06/24 0459    142 137   K 3.9 4.1 4.1   * 113* 108   CO2 20* 18* 18*   BUN 39* 43* 37*   CREATININE 0.9 0.9 0.9   GLU 98 115* 94   CALCIUM 8.6* 8.8 8.4*   MG 2.1 2.1 2.0   PHOS 2.7 3.4 3.7     No results for input(s): "ALKPHOS", "ALT", "AST", "ALBUMIN", "PROT", "BILITOT", "INR" in the last 168 hours.    No results for input(s): "POCTGLUCOSE" in the last 72 hours.    Meds Scheduled:  Current Facility-Administered Medications   Medication Dose Route Frequency    acetaminophen  1,000 mg Oral Q8H    apixaban  2.5 mg Oral BID    atenoloL  25 mg Oral Daily    calcium carbonate  1,000 mg Oral Daily    EScitalopram oxalate  5 mg Oral Daily    furosemide  20 mg Oral Daily    memantine  5 mg Oral BID    multivitamin  1 tablet Oral Daily    senna-docusate 8.6-50 mg  1 tablet Oral BID    spironolactone  50 mg Oral Daily    vitamin D  2,000 Units Oral Daily       PRN:     Current Facility-Administered Medications:     acetaminophen, 650 mg, Oral, Q6H PRN    calcium carbonate, 500 mg, Oral, BID PRN    melatonin, 6 mg, Oral, Nightly PRN    methocarbamoL, 500 mg, Oral, Q6H PRN    ondansetron, 8 mg, Oral, Q8H PRN      Assessment and Plan:    Closed displaced intertrochanteric " fracture of left femur   -s/p CMN on 4/28/24 Dr. Kurt Melendez   -Pain control: multimodal  -PT/OT consult: WBAT LLE  -DVT PPx: Eliquis 2.5 mg BID, SCDs at all times when not ambulating    Severe Protein Calorie Malnutrition  Hypoalbuminemia 2/2 Protein Calorie Malnutrition  POA, unstable  - RD consulted, ongoing collaboration for optimized nutrition   - Diet: regular diet  - Assist with meals  - Encourage intake   - Dietary Supplements: Boost TID  - Vitamin and Mineral Supplements: Zinc, Vit C, and Multivitamin     Hyperbilirubinemia  New, 5/6: unstable  Last T bili obtained inpt on 4/27/24: 1.2  - Will obtain hepatic function panel from [facility].   - Stop q 8 hour 1000 mg APAP, continue 650 mg APAP prn.   -->Hepatic profile resulted with T bili elevate to 1.5, direct bili 0.5. ALP, AST, ALT all WNL. Will obtain CMP in am to trend. Obtain acute hepatis panel in am.    Altered mental status  Had some confusion inpatient, UA was negative  Patient does not remember falling at home  Delirium precautions currently oriented x4  Decreased home lexapro from 10mg to 5mg d/t advanced age    Acute hypoxemic respiratory failure  Continue diuretics as tolerated  Wean O2 as tolerated to maintain sat >90%      Acute blood loss anemia  Monitor routine CBC    Long term (current) use of anticoagulants  Eliquis for Afib, reduced to 2.5mg dose meets criteria     Acute on chronic diastolic heart failure  Continue Lasix 20mg, atenolol 25mg and spironolactone 50mg daily with parameters d/t recent hypotension  Follow up cardiology outpatient    Recurrent major depressive disorder, in full remission  - Continue home lexapro & namenda   -Reduce lexapro to 5mg d/t recent confusion and advanced age     Essential hypertension  Continue Lasix 20mg, atenolol 25mg and spironolactone 50mg daily with parameters d/t recent hypotension    Advanced Care Planning  This was a voluntary visit and the option to decline counseling was given  Length of  discussion: 16 min  Life limiting problem:hip fx, afib, advanced age  Topics discussed:code status  Outcome of discussion:Confirms DNR  Decision Maker:Patient       Anticipate disposition:    Home with home health      Follow-up needed during SNF admission:   Ortho    Follow-up NOT needed during SNF admission: 5/20 [facility]    Follow-up needed after discharge from SNF:   - PCP within 1-2 weeks  - See appt scheduled below     Future Appointments   Date Time Provider Department Center   5/13/2024  1:00 PM Any Cantu PA-C [facility]   5/20/2024  4:00 PM Dionne Jeter MD [facility]         I certify that SNF services are required to be given on an inpatient basis because Mary Ellen Miller needs for skilled nursing care and/or skilled rehabilitation are required on a daily basis and such services can only practically be provided in a skilled nursing facility setting and are for an ongoing condition for which she received inpatient care in the hospital.     Extended Visit  Total time spent: 86 minutes  Description of Time: counseling patient on clinical condition, therapies provided, plan of care, emotional support, coordinating patient care with other care team members, reviewing and interpreting labs and imaging, collaboration with physician, initiating new orders, chart review, and documentation. See interval hx.           Christa Loaiza NP  Department of Hospital Medicine   [facility]     DOS: 5/6/2024       Patient note was created using MModal Dictation.  Any errors in syntax or even information may not have been identified and edited on initial review prior to signing this note.

## 2024-05-06 NOTE — PLAN OF CARE
Problem: Adult Inpatient Plan of Care  Goal: Plan of Care Review  Outcome: Progressing     Problem: Acute Kidney Injury/Impairment  Goal: Fluid and Electrolyte Balance  Outcome: Progressing     Problem: Wound  Goal: Optimal Coping  Outcome: Progressing     Problem: Fall Injury Risk  Goal: Absence of Fall and Fall-Related Injury  Outcome: Progressing

## 2024-05-06 NOTE — PROGRESS NOTES
[facility] - Skilled Nursing  Adult Nutrition  Progress Note    SUMMARY   Recommendations  Continue regular diet, boost plus TID, assist with meals, encourage intake,RD following  Goals: PO to meet 75% of needs with ONS by next RD fu  Nutrition Goal Status: progressing towards goal  Communication of RD Recs: other (comment) (POC)    Assessment and Plan  Severe protein-calorie malnutrition  Malnutrition Type:  Context: acute illness or injury  Level: severe     Related to (etiology):   Inadequate oral intake, depression, confusion     Signs and Symptoms (as evidenced by):   BMI 21.6, sleeping a lot   PO 0-25%     Malnutrition Characteristic Summary:  Energy Intake (Malnutrition): less than or equal to 50% for greater than or equal to 5 days  Subcutaneous Fat (Malnutrition): moderate depletion  Muscle Mass (Malnutrition): moderate depletion        Interventions/Recommendations (treatment strategy):  Continue regular diet, boost plus TID, assist with meals, encourage intake,RD following     Nutrition Diagnosis Status: Active    Malnutrition Assessment  5/3  Malnutrition Context: acute illness or injury  Malnutrition Level: severe  Skin (Micronutrient): bruised, wounds unhealed  Hair/Scalp (Micronutrient):  (wig)  Eyes (Micronutrient): conjunctiva dull  Teeth (Micronutrient): broken dentition  Tongue (Micronutrient): red  Neck/Chest (Micronutrient): bony prominence, muscle wasting, subcutaneous fat loss  Musculoskeletal/Lower Extremities: muscle wasting, subcutaneous fat loss       Energy Intake (Malnutrition): less than or equal to 50% for greater than or equal to 5 days  Subcutaneous Fat (Malnutrition): moderate depletion  Muscle Mass (Malnutrition): moderate depletion   Orbital Region (Subcutaneous Fat Loss): moderate depletion  Upper Arm Region (Subcutaneous Fat Loss): moderate depletion  Thoracic and Lumbar Region: mild depletion   Gaston Region (Muscle Loss): moderate depletion  Clavicle Bone Region (Muscle  "Loss): moderate depletion  Clavicle and Acromion Bone Region (Muscle Loss): severe depletion  Dorsal Hand (Muscle Loss): moderate depletion  Patellar Region (Muscle Loss): moderate depletion  Anterior Thigh Region (Muscle Loss): moderate depletion  Posterior Calf Region (Muscle Loss): mild depletion                 Reason for Assessment    Reason For Assessment: RD follow-up  Diagnosis:  (L femur fx, s/p IM nail)  Relevant Medical History: HTN, HF, S/P JOHAN, AFIB, bloos loss anemia, depression, anxiety,  Interdisciplinary Rounds: did not attend  General Information Comments: PO 25-50%  Nutrition Discharge Planning: low sodium diet, ONS of choice    Nutrition/Diet History    Patient Reported Diet/Restrictions/Preferences: low salt  Typical Food/Fluid Intake: 3 mels  Spiritual, Cultural Beliefs, Oriental orthodox Practices, Values that Affect Care: no  Supplemental Drinks or Food Habits: Boost Original  Vitamin/Mineral/Herbal Supplements: takes "lots of vitamins"  Food Allergies: NKFA  Factors Affecting Nutritional Intake: decreased appetite, impaired cognitive status/motor control    Anthropometrics    Temp: 97.4 °F (36.3 °C)  Height: 5' 3" (160 cm)  Height (inches): 63 in  Weight Method: Bed Scale  Weight: 55.5 kg (122 lb 5.7 oz)  Weight (lb): 122.36 lb  Ideal Body Weight (IBW), Female: 115 lb  % Ideal Body Weight, Female (lb): 106.21 %  BMI (Calculated): 21.7  BMI Grade: 18.5-24.9 - normal  Usual Body Weight (UBW), k kg  % Usual Body Weight: 99.14  % Weight Change From Usual Weight: -1.07 %       Lab/Procedures/Meds    Pertinent Labs Reviewed: reviewed  Pertinent Labs Comments: Hg 9.1, Hct 28.8, Ca 8.4,  Pertinent Medications Reviewed: reviewed  Pertinent Medications Comments: ascorbid acid, zinc, MVI    Estimated/Assessed Needs    Weight Used For Calorie Calculations: 55.4 kg (122 lb 2.2 oz)  Energy Calorie Requirements (kcal): 1200  Energy Need Method: [place] (x 1.3(PAL))  Protein Requirements: 66  Weight " Used For Protein Calculations: 55.4 kg (122 lb 2.2 oz) (x 1.2g/kg)  Fluid Requirements (mL): 1200 or per MD  Estimated Fluid Requirement Method: RDA Method  RDA Method (mL): 1200       Nutrition Prescription Ordered  Current Diet Order: Regular  Nutrition Order Comments: PO 75%  Oral Nutrition Supplement: boost plus TID    Evaluation of Received Nutrient/Fluid Intake  I/O: no data  Energy Calories Required: meeting needs  Protein Required: meeting needs  Fluid Required: meeting needs  Comments: LBM 5/6  Tolerance: tolerating  % Intake of Estimated Energy Needs: 75 - 100 %  % Meal Intake: 75 - 100 %    Nutrition Risk    Level of Risk/Frequency of Follow-up: low (one time per week)     Monitor and Evaluation    Food and Nutrient Intake: food and beverage intake  Food and Nutrient Adminstration: diet order  Physical Activity and Function: nutrition-related ADLs and IADLs  Anthropometric Measurements: weight change  Biochemical Data, Medical Tests and Procedures: gastrointestinal profile, electrolyte and renal panel  Nutrition-Focused Physical Findings: overall appearance, skin     Nutrition Follow-Up    RD Follow-up?: Yes

## 2024-05-06 NOTE — CLINICAL REVIEW
Clinical Pharmacy Chart Review Note      Admit Date: 5/2/2024   LOS: 4 days       Mary Ellen Miller is a 89 y.o. female admitted to SNF for PT/OT after hospitalization for closed displaced intertrochanteric fracture of left femur with routine healing s/p IM nail on 4/28/2024.    Active Hospital Problems    Diagnosis  POA    *Closed displaced intertrochanteric fracture of left femur with routine healing s/p IM nail on 4/28/2024 [S72.142D]  Not Applicable    Severe protein-calorie malnutrition [E43]  Yes    Acute blood loss anemia [D62]  Yes    Seasonal allergic rhinitis [J30.2]  Yes    Long term (current) use of anticoagulants [Z79.01]  Not Applicable     - followed by cardiology  - no signs of bleeding      Chronic atrial fibrillation [I48.20]  Yes     - followed by Dr. Last  - OAC Eliquis without signs of bleeding  - rate controlled      Recurrent major depressive disorder, in full remission [F33.42]  Yes     - well controlled  - continue current medication      Essential hypertension [I10]  Yes     - well controlled  - continue current medications        Resolved Hospital Problems    Diagnosis Date Resolved POA    Acute hypoxemic respiratory failure [J96.01] 05/04/2024 Yes    JOHAN (acute kidney injury), unspecified [N17.9] 05/04/2024 Yes    Advance care planning [Z71.89] 05/04/2024 Not Applicable     - pt reports that she has living will and mPOA  - mPOA is her son Valentino (youngest)  - she does not want invasive or aggressive measures to maintain life  - she does not want CPR or intubation      Acute on chronic diastolic heart failure [I50.33] 05/04/2024 Yes     - no signs of acute decompensation  - followed by cardiology       Review of patient's allergies indicates:   Allergen Reactions    Percodan [oxycodone hcl-oxycodone-asa] Other (See Comments)     halucinations    Penicillins Itching    Sulfa (sulfonamide antibiotics) Diarrhea and Nausea Only    Amoxicillin      Patient Active Problem List     Diagnosis Date Noted    Severe protein-calorie malnutrition 05/03/2024    Acute blood loss anemia 04/28/2024    Closed displaced intertrochanteric fracture of left femur with routine healing s/p IM nail on 4/28/2024 04/27/2024    Aortic atherosclerosis 10/25/2023    Seasonal allergic rhinitis 03/06/2023    Long term (current) use of anticoagulants 07/30/2020    Osteopenia of left hip 01/28/2020    Chronic atrial fibrillation 11/17/2017    Essential hypertension     Recurrent major depressive disorder, in full remission     Primary localized osteoarthrosis, ankle and foot 02/25/2014       Scheduled Meds:   Current Facility-Administered Medications   Medication Dose Route Frequency    acetaminophen  1,000 mg Oral Q8H    apixaban  2.5 mg Oral BID    atenoloL  25 mg Oral Daily    calcium carbonate  1,000 mg Oral Daily    EScitalopram oxalate  5 mg Oral Daily    furosemide  20 mg Oral Daily    memantine  5 mg Oral BID    senna-docusate 8.6-50 mg  1 tablet Oral BID    spironolactone  50 mg Oral Daily    vitamin D  2,000 Units Oral Daily     Continuous Infusions:   Current Facility-Administered Medications   Medication Dose Route Frequency Last Rate Last Admin     PRN Meds:   Current Facility-Administered Medications:     acetaminophen, 650 mg, Oral, Q6H PRN    calcium carbonate, 500 mg, Oral, BID PRN    melatonin, 6 mg, Oral, Nightly PRN    methocarbamoL, 500 mg, Oral, Q6H PRN    ondansetron, 8 mg, Oral, Q8H PRN    OBJECTIVE:     Vital Signs (Last 24H)  Temp:  [97.4 °F (36.3 °C)-98.1 °F (36.7 °C)]   Pulse:  [80-95]   Resp:  [18-20]   BP: (138-142)/(60-80)   SpO2:  [96 %]     Laboratory:  CBC:   Recent Labs   Lab 05/01/24 0456 05/02/24 0344 05/06/24  0459   WBC 8.22 11.05 10.47   RBC 2.80* 2.95* 2.94*   HGB 8.5* 9.2* 9.1*   HCT 26.9* 28.3* 28.8*    175 279   MCV 96 96 98   MCH 30.4 31.2* 31.0   MCHC 31.6* 32.5 31.6*     BMP:   Recent Labs   Lab 05/01/24 0456 05/02/24  0344 05/06/24  0459   GLU 98 115* 94     142 137   K 3.9 4.1 4.1   * 113* 108   CO2 20* 18* 18*   BUN 39* 43* 37*   CREATININE 0.9 0.9 0.9   CALCIUM 8.6* 8.8 8.4*   MG 2.1 2.1 2.0     CMP:   Recent Labs   Lab 05/01/24  0456 05/02/24  0344 05/06/24  0459   GLU 98 115* 94   CALCIUM 8.6* 8.8 8.4*    142 137   K 3.9 4.1 4.1   CO2 20* 18* 18*   * 113* 108   BUN 39* 43* 37*   CREATININE 0.9 0.9 0.9     Lab Results   Component Value Date    ALT 21 04/27/2024    AST 26 04/27/2024    ALKPHOS 47 (L) 04/27/2024    BILITOT 1.2 (H) 04/27/2024     Lab Results   Component Value Date    HGBA1C 5.3 04/27/2024           ASSESSMENT/PLAN:     I have reviewed the medications in compliance with CMS Regulation F756 of the [place]. No irregularities were noted at this time.    **Patient is taking escitalopram (home med) for major depressive disorder. Dose was decreased to 5 mg daily on 5/4/24. A gradual dose reduction is not recommended at this time. Patient to follow-up with prescribing MD as outpatient.  Monitor: mental status for depression, suicide ideation, anxiety, serotonin syndrome, hyponatremia, dizziness, drowsiness, somnolence      Medications reviewed by PharmD, please re-consult if needed.      Bailee Anderson, Pharm. D.  Clinical Pharmacist  [facility]

## 2024-05-07 ENCOUNTER — TELEPHONE (OUTPATIENT)
Dept: FAMILY MEDICINE | Facility: CLINIC | Age: 89
End: 2024-05-07
Payer: MEDICARE

## 2024-05-07 DIAGNOSIS — I48.20 CHRONIC ATRIAL FIBRILLATION: ICD-10-CM

## 2024-05-07 LAB
ALBUMIN SERPL BCP-MCNC: 3.1 G/DL (ref 3.5–5.2)
ALP SERPL-CCNC: 69 U/L (ref 55–135)
ALT SERPL W/O P-5'-P-CCNC: 19 U/L (ref 10–44)
ANION GAP SERPL CALC-SCNC: 11 MMOL/L (ref 8–16)
AST SERPL-CCNC: 26 U/L (ref 10–40)
BILIRUB SERPL-MCNC: 1.6 MG/DL (ref 0.1–1)
BUN SERPL-MCNC: 33 MG/DL (ref 10–30)
CALCIUM SERPL-MCNC: 8.7 MG/DL (ref 8.7–10.5)
CHLORIDE SERPL-SCNC: 109 MMOL/L (ref 95–110)
CO2 SERPL-SCNC: 17 MMOL/L (ref 23–29)
CREAT SERPL-MCNC: 0.9 MG/DL (ref 0.5–1.4)
EST. GFR  (NO RACE VARIABLE): 59.2 ML/MIN/1.73 M^2
GGT SERPL-CCNC: 34 U/L (ref 8–55)
GLUCOSE SERPL-MCNC: 89 MG/DL (ref 70–110)
HAV IGM SERPL QL IA: NORMAL
HBV CORE IGM SERPL QL IA: NORMAL
HBV SURFACE AG SERPL QL IA: NORMAL
HCV AB SERPL QL IA: NORMAL
POTASSIUM SERPL-SCNC: 3.9 MMOL/L (ref 3.5–5.1)
PROT SERPL-MCNC: 6.7 G/DL (ref 6–8.4)
SODIUM SERPL-SCNC: 137 MMOL/L (ref 136–145)

## 2024-05-07 PROCEDURE — 97116 GAIT TRAINING THERAPY: CPT | Mod: CQ

## 2024-05-07 PROCEDURE — 11000004 HC SNF PRIVATE

## 2024-05-07 PROCEDURE — 97110 THERAPEUTIC EXERCISES: CPT | Mod: CQ

## 2024-05-07 PROCEDURE — 36415 COLL VENOUS BLD VENIPUNCTURE: CPT | Performed by: FAMILY MEDICINE

## 2024-05-07 PROCEDURE — 97535 SELF CARE MNGMENT TRAINING: CPT

## 2024-05-07 PROCEDURE — 80074 ACUTE HEPATITIS PANEL: CPT | Performed by: FAMILY MEDICINE

## 2024-05-07 PROCEDURE — 97530 THERAPEUTIC ACTIVITIES: CPT

## 2024-05-07 PROCEDURE — 97530 THERAPEUTIC ACTIVITIES: CPT | Mod: CQ

## 2024-05-07 PROCEDURE — 25000003 PHARM REV CODE 250: Performed by: FAMILY MEDICINE

## 2024-05-07 PROCEDURE — 25000003 PHARM REV CODE 250: Performed by: HOSPITALIST

## 2024-05-07 PROCEDURE — 82977 ASSAY OF GGT: CPT | Performed by: FAMILY MEDICINE

## 2024-05-07 PROCEDURE — 80053 COMPREHEN METABOLIC PANEL: CPT | Performed by: FAMILY MEDICINE

## 2024-05-07 RX ORDER — METHOCARBAMOL 750 MG/1
750 TABLET, FILM COATED ORAL EVERY 6 HOURS PRN
Status: DISCONTINUED | OUTPATIENT
Start: 2024-05-07 | End: 2024-05-13

## 2024-05-07 RX ORDER — GABAPENTIN 300 MG/1
300 CAPSULE ORAL ONCE
Status: CANCELLED | OUTPATIENT
Start: 2024-05-07

## 2024-05-07 RX ORDER — METHOCARBAMOL 500 MG/1
500 TABLET, FILM COATED ORAL ONCE
Status: DISCONTINUED | OUTPATIENT
Start: 2024-05-07 | End: 2024-05-07

## 2024-05-07 RX ADMIN — MEMANTINE 5 MG: 5 TABLET ORAL at 10:05

## 2024-05-07 RX ADMIN — METHOCARBAMOL 750 MG: 750 TABLET ORAL at 10:05

## 2024-05-07 RX ADMIN — METHOCARBAMOL 500 MG: 500 TABLET ORAL at 10:05

## 2024-05-07 RX ADMIN — ESCITALOPRAM OXALATE 5 MG: 5 TABLET, FILM COATED ORAL at 10:05

## 2024-05-07 RX ADMIN — Medication 500 MG: at 08:05

## 2024-05-07 RX ADMIN — CALCIUM CARBONATE (ANTACID) CHEW TAB 500 MG 1000 MG: 500 CHEW TAB at 10:05

## 2024-05-07 RX ADMIN — APIXABAN 2.5 MG: 2.5 TABLET, FILM COATED ORAL at 10:05

## 2024-05-07 RX ADMIN — Medication 9 MG: at 08:05

## 2024-05-07 RX ADMIN — APIXABAN 2.5 MG: 2.5 TABLET, FILM COATED ORAL at 08:05

## 2024-05-07 RX ADMIN — ZINC SULFATE 220 MG (50 MG) CAPSULE 220 MG: CAPSULE at 10:05

## 2024-05-07 RX ADMIN — MEMANTINE 5 MG: 5 TABLET ORAL at 08:05

## 2024-05-07 RX ADMIN — CHOLECALCIFEROL TAB 25 MCG (1000 UNIT) 2000 UNITS: 25 TAB at 10:05

## 2024-05-07 RX ADMIN — Medication 500 MG: at 10:05

## 2024-05-07 RX ADMIN — THERA TABS 1 TABLET: TAB at 10:05

## 2024-05-07 RX ADMIN — METHOCARBAMOL 750 MG: 750 TABLET ORAL at 03:05

## 2024-05-07 RX ADMIN — ONDANSETRON HYDROCHLORIDE 8 MG: 4 TABLET, FILM COATED ORAL at 01:05

## 2024-05-07 NOTE — PROGRESS NOTES
[facility]                                  Skilled Nursing Facility                   Progress Note     Patient Name: Mary Ellen Miller  YOB: 1930  MRN: [id number]  Room: [place] [address] [address] A     Admit Date: 5/2/2024   HEAVEN: 5/23/2024     Principal Problem: Closed displaced intertrochanteric fracture of left femur with routine healing    HPI obtained from patient interview and chart review     Chief Complaint: Re-evaluation of medical treatment and therapy status: vitamin and supplements per RD recs, lab review, recent prior hyperbili, insomnia      HPI:   Mary Ellen Miller is a 89 y.o. F with PMH of HTN, HLD, CHF, and afib on eliquis presenting to the ED with significant left hip pain after a fall. 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 Prior to the incident, she reports intermittent worsening of chronic lower extremity edema and SOB. Her PCP recently discontinued amlodipine in favor of lasix and continuing aldactone, and she has not missed any doses of her home medications.  XR pelvis with mildly displaced L intertrochanteric femur fracture with impaction, minimally displaced comminuted L superior pubic ramus fracture, and nondisplaced fracture of the L inferior pubic ramus. s/p left femur CMN on 4/28/24. Given diuresis and on 1L O2, not on oxygen at home.  PT/OT recommends skilled nursing placement for further therapy.     Patient will be treated at [facility] with PT and OT to improve functional status and ability to perform ADLs.     Interval History  24 hour chart review completed. Pertinent lab, micro, and/or radiology results addressed below. NAEON. NAD.   Furosemide, Spironolactone, and Atenelol held per hold parameters this morning.  AM VS: B/P:117/69 HR: 88 HR: 99.   Patient seen sitting  up in wheelchair. PT and nursing present.   Afebrile, hemodynamically stable.  Lab: Acute hepatitis panel was negative. T bili continues to rise. Patient is asymptomatic: non tender to palpation, denies N/V/D or abdominal pain or discomfort. Given ALP, aminotransferases, and GGT are normal, hyperbili is not likely due to hepatic injury or biliary tract disease. Will assess for hemolytic anemia  with retic, LDH, and haptoglobin. Will obtain CBC Diff with peripheral smear if lab can accommodate. Also obtain CMP in am.   I/O: Patient reports her appetite is improving, she is pleased with breakfast intake.  LBM 5/6.  Pain management: Patient reports her pain is adequately controlled, however unreliable historian given dementia. Per nursing, patient slept better with increased melatonin. Pain management limited by oxycodone allergy. Pending hyperbili work-up, may be able to resume scheduled APAP.  Skilled Therapy: Patient progressing with therapy as tolerated and would continue to benefit from skilled PT and OT services to improve overall functional mobility and independence, strength, endurance, and safety.        Past Medical History: Patient has a past medical history of Anxiety, Atrial fibrillation, Colon polyps (05/01/2012), Hypertension, Leg ulcer, right, limited to breakdown of skin (11/20/2019), and Unilateral femoral hernia with obstruction, without gangrene (10/26/2015).    Past Surgical History: Patient has a past surgical history that includes Radical hysterectomy (1990); Lung surgery (1988); Hysterectomy; Oophorectomy; and Intramedullary rodding of femur (Left, 4/28/2024).    Social History: Patient reports that she has never smoked. She has never been exposed to tobacco smoke. She has never used smokeless tobacco. She reports that she does not drink alcohol and does not use drugs.    Family History:  family history includes No Known Problems in her father and mother.    Allergies: Patient is allergic to  percodan [oxycodone hcl-oxycodone-asa], penicillins, sulfa (sulfonamide antibiotics), and amoxicillin.        ROS  Constitutional:  Positive for activity change. Negative for chills and fever.   HENT:  Negative for trouble swallowing.    Eyes:  Negative for photophobia and visual disturbance.   Respiratory:  Positive for shortness of breath. Negative for chest tightness.    Cardiovascular:  Negative for chest pain and palpitations.   Gastrointestinal:  Negative for abdominal pain, constipation, diarrhea, nausea and vomiting.   Genitourinary:  Negative for dysuria, frequency and hematuria.   Musculoskeletal:  Positive for arthralgias, back pain and gait problem. Negative for neck pain.   Skin:  Negative for rash and wound.   Neurological:  Positive for weakness. Negative for dizziness, syncope, speech difficulty and light-headedness.   Psychiatric/Behavioral:  Positive for confusion (intermittent). Negative for agitation. The patient is not nervous/anxious.     24 hour Vital Sign Range   Temp:  [97.5 °F (36.4 °C)-97.6 °F (36.4 °C)]   Pulse:  [79-89]   Resp:  [18-20]   BP: (112-161)/(59-88)   SpO2:  [95 %-97 %]       Physical Exam  Constitutional:       General: She is not in acute distress. Appears fatigued     Appearance: She is well-developed. She is not toxic-appearing.   HENT:      Head: Normocephalic.      Mouth/Throat:      Mouth: Mucous membranes are moist.   Eyes:      Extraocular Movements: Extraocular movements intact.      Conjunctiva/sclera: Conjunctivae normal.      Pupils: Pupils are equal, round, and reactive to light.   Cardiovascular:      Rate and Rhythm: Tachycardia present. Rhythm irregular.      Heart sounds: Normal heart sounds.   Pulmonary:      Effort: Pulmonary effort is normal. No respiratory distress. Lungs clear     Breath sounds: No wheezing.      Comments: On 1L nc  Abdominal:      General: Bowel sounds are normal.      Palpations: Abdomen is soft.      Tenderness: There is no abdominal  "tenderness.   Musculoskeletal:         General: Normal range of motion.      Cervical back: Normal range of motion and neck supple.      Comments: LLE with bandage intact  Skin:     General: Skin is warm and dry.      Capillary Refill: Capillary refill takes less than 2 seconds.      Findings: Bruising present. No rash.   Neurological:      Mental Status: She is alert and oriented to person, place, and time. Forgetful of recent details     Cranial Nerves: No cranial nerve deficit.      Sensory: No sensory deficit.      Coordination: Coordination normal.   Psychiatric:         Behavior: Behavior normal.         Thought Content: Thought content normal.       Judgment: Judgment normal.   Full skin assessment completed by this NP       Labs:  Recent Labs   Lab 05/01/24 0456 05/02/24 0344 05/06/24  0459   WBC 8.22 11.05 10.47   HGB 8.5* 9.2* 9.1*   HCT 26.9* 28.3* 28.8*    175 279     Recent Labs   Lab 05/01/24 0456 05/02/24 0344 05/06/24 0459 05/07/24  0343    142 137 137   K 3.9 4.1 4.1 3.9   * 113* 108 109   CO2 20* 18* 18* 17*   BUN 39* 43* 37* 33*   CREATININE 0.9 0.9 0.9 0.9   GLU 98 115* 94 89   CALCIUM 8.6* 8.8 8.4* 8.7   MG 2.1 2.1 2.0  --    PHOS 2.7 3.4 3.7  --      Recent Labs   Lab 05/06/24 0459 05/07/24  0343   ALKPHOS 59 69   ALT 15 19   AST 18 26   ALBUMIN 2.8* 3.1*   PROT 5.8* 6.7   BILITOT 1.5* 1.6*       No results for input(s): "POCTGLUCOSE" in the last 72 hours.    Meds Scheduled:   apixaban  2.5 mg Oral BID    ascorbic acid (vitamin C)  500 mg Oral BID    atenoloL  25 mg Oral Daily    calcium carbonate  1,000 mg Oral Daily    EScitalopram oxalate  5 mg Oral Daily    furosemide  20 mg Oral Daily    melatonin  9 mg Oral Nightly    memantine  5 mg Oral BID    multivitamin  1 tablet Oral Daily    senna-docusate 8.6-50 mg  1 tablet Oral BID    spironolactone  50 mg Oral Daily    vitamin D  2,000 Units Oral Daily    zinc sulfate  220 mg Oral Daily       PRN:     Current " Facility-Administered Medications:     acetaminophen, 650 mg, Oral, Q6H PRN    calcium carbonate, 500 mg, Oral, BID PRN    methocarbamoL, 500 mg, Oral, Q6H PRN    ondansetron, 8 mg, Oral, Q8H PRN      Assessment and Plan:    Closed displaced intertrochanteric fracture of left femur   -s/p CMN on 4/28/24 Dr. Kurt Melendez   -Pain control: multimodal  -PT/OT consult: WBAT LLE  -DVT PPx: Eliquis 2.5 mg BID, SCDs at all times when not ambulating    Hyperbilirubinemia  New, 5/6: unstable  Last T bili obtained inpt on 4/27/24: 1.2  - Will obtain hepatic function panel from [facility].   - Stop q 8 hour 1000 mg APAP, continue 650 mg APAP prn.   -->Hepatic profile resulted with T bili elevate to 1.5, direct bili 0.5. ALP, AST, ALT all WNL. Will obtain CMP in am to trend. Obtain acute hepatis panel in am.  5/7: Acute hepatitis panel negative.   - T bili continues to rise.   - Patient is asymptomatic:   - Given ALP, aminotransferases, and GGT are normal, hyperbili not likely due to hepatic injury or biliary tract disease.   - Eval for hemolytic anemia: in am obtain retic, LDH, and haptoglobin, CBC Diff with peripheral smear if lab can accommodate, and CMP.    Severe Protein Calorie Malnutrition  Hypoalbuminemia 2/2 Protein Calorie Malnutrition  POA, unstable  - RD consulted, ongoing collaboration for optimized nutrition   - Diet: regular diet  - Assist with meals  - Encourage intake   - Dietary Supplements: Boost TID  - Vitamin and Mineral Supplements: Zinc, Vit C, and Multivitamin     Altered mental status  Had some confusion inpatient, UA was negative  Patient does not remember falling at home  Delirium precautions currently oriented x4  Decreased home lexapro from 10mg to 5mg d/t advanced age    Acute hypoxemic respiratory failure  Continue diuretics as tolerated  Wean O2 as tolerated to maintain sat >90%      Acute blood loss anemia  Monitor routine CBC    Long term (current) use of anticoagulants  Eliquis for Afib, reduced  to 2.5mg dose meets criteria     Acute on chronic diastolic heart failure  Continue Lasix 20mg, atenolol 25mg and spironolactone 50mg daily with parameters d/t recent hypotension  Follow up cardiology outpatient    Recurrent major depressive disorder, in full remission  - Continue home lexapro & namenda   -Reduce lexapro to 5mg d/t recent confusion and advanced age     Essential hypertension  Continue Lasix 20mg, atenolol 25mg and spironolactone 50mg daily with parameters d/t recent hypotension    Advanced Care Planning  This was a voluntary visit and the option to decline counseling was given  Length of discussion: 16 min  Life limiting problem:hip fx, afib, advanced age  Topics discussed:code status  Outcome of discussion:Confirms DNR  Decision Maker:Patient       Anticipate disposition:    Home with home health      Follow-up needed during SNF admission:   Ortho    Follow-up NOT needed during SNF admission: 5/20 [facility]    Follow-up needed after discharge from SNF:   - PCP within 1-2 weeks  - See appt scheduled below     Future Appointments   Date Time Provider Department Center   5/13/2024  1:00 PM Any Cantu PA-C [facility]   5/20/2024  4:00 PM Dionne Jeter MD [facility] [place]         I certify that SNF services are required to be given on an inpatient basis because Mary Ellen Miller needs for skilled nursing care and/or skilled rehabilitation are required on a daily basis and such services can only practically be provided in a skilled nursing facility setting and are for an ongoing condition for which she received inpatient care in the hospital.     Extended Visit  Total time spent: 86 minutes  Description of Time: counseling patient on clinical condition, therapies provided, plan of care, emotional support, coordinating patient care with other care team members, reviewing and interpreting labs and imaging, collaboration with physician, initiating new orders, chart review,  and documentation. See interval hx.           Christa Loaiza NP  Department of Hospital Medicine   [facility]     DOS: 5/7/2024       Patient note was created using MModal Dictation.  Any errors in syntax or even information may not have been identified and edited on initial review prior to signing this note.

## 2024-05-07 NOTE — PT/OT/SLP PROGRESS
Physical Therapy Treatment    Patient Name:  Mary Ellen Miller   MRN:  [id number]  Admit Date: 5/2/2024  Admitting Diagnosis: Closed displaced intertrochanteric fracture of left femur with routine healing  Recent Surgeries: INSERTION, INTRAMEDULLARY LENO, FEMUR: Left (Left)     General Precautions: Standard, fall  Orthopedic Precautions: LLE weight bearing as tolerated  Braces: N/A    Recommendations:     Discharge Recommendations: home health PT  Level of Assistance Recommended at Discharge: 24 hours significant assistance  Discharge Equipment Recommendations: bedside commode, walker, rolling, wheelchair  Barriers to discharge: Decreased caregiver support    Assessment:     Mary Ellen Miller is a 89 y.o. female admitted with a medical diagnosis of Closed displaced intertrochanteric fracture of left femur with routine healing . Pt tolerated well, pt demo increased BLE pain with all functional standing tasks. Pt cont to require ModA for tf and sit to stand using a RW. Pt continues to demo increased anxiety with all standing tasks d/t fear of falling. Pt only able to tolerate taking 3 steps in // bars with ModA of 1 person with one person following closely behind with wheelchair for safety. Pt would continue to benefit from skilled PT services to improve overall functional mobility, strength and endurance.  .      Performance deficits affecting function: weakness, impaired endurance, impaired self care skills, impaired functional mobility, gait instability, impaired balance, decreased upper extremity function, decreased lower extremity function, decreased ROM, pain, impaired cardiopulmonary response to activity, orthopedic precautions.    Rehab Potential is good    Activity Tolerance: Good    Plan:     Patient to be seen 5 x/week to address the above listed problems via gait training, therapeutic activities, therapeutic exercises, wheelchair management/training    Plan of Care Expires: 06/02/24  Plan of Care  "Reviewed with: patient    Subjective     "I want to get out of this room".     Pain/Comfort:  Pain Rating 1: 0/10  Pain Rating Post-Intervention 1: 0/10    Patient's cultural, spiritual, Sikh conflicts given the current situation:  no    Objective:       Patient found up in chair with Other (comments) (No active lines) upon PT entry to room.     Therapeutic Activities and Exercises:   Patient educated on role of therapy, goals of session, and benefits of out of bed mobility.   Instructed on use of call button and importance of calling nursing staff for assistance with mobility   Questions/concerns addressed within PTA scope of practice  Pt verbalized understanding.    Mini elliptical performed x10 minutes without any complaints of pain or need for any rest breaks.     Functional Mobility:  Transfers:     Sit to Stand:  moderate assistance with rolling walker  Bed to Chair: moderate assistance, maximal assistance, and of 1 persons with  rolling walker  using  Stand Pivot and Step Transfer  Gait: 3 steps taken in parallel bars with ModA of 1 person with one person following closely behind with wheelchair for safety. Pt unable to take more step due to pain and increased anxiety.   Balance: static standing balance with Min/ModA of 1 person using //bars fair-    AM-PAC 6 CLICK MOBILITY  10    Patient left up in chair with  OT present.    GOALS:   Multidisciplinary Problems       Physical Therapy Goals          Problem: Physical Therapy    Goal Priority Disciplines Outcome Goal Variances Interventions   Physical Therapy Goal     PT, PT/OT Progressing     Description: Goals to be met by: 3/10/24     Patient will increase functional independence with mobility by performing:    . Supine to sit with MInimal Assistance  . Sit to supine with MInimal Assistance  . Rolling to Left and Right with Minimal Assistance.  . Sit to stand transfer with Minimal Assistance  . Bed to chair transfer with Minimal Assistance using Rolling " Walker  . Gait  x 50 feet with Minimal Assistance using Rolling Walker.   . Wheelchair propulsion x150 feet with Supervision using bilateral uppper extremities                         Time Tracking:     PT Received On: 05/07/24  PT Start Time: 0842  PT Stop Time: 0922  PT Total Time (min): 40 min    Billable Minutes: Gait Training 12, Therapeutic Activity 14, and Therapeutic Exercise 14    Treatment Type: Treatment  PT/PTA: PTA     Number of PTA visits since last PT visit: 3     05/07/2024

## 2024-05-07 NOTE — PLAN OF CARE
[facility]  Discharge Final Note    Primary Care Provider: Dionne Jeter MD    Expected Discharge Date: 5/2/2024    Final Discharge Note (most recent)       Final Note - 05/07/24 1531          Final Note    Assessment Type Final Discharge Note     Anticipated Discharge Disposition Skilled Nursing Facility     What phone number can be called within the next 1-3 days to see how you are doing after discharge? --   [phone]       Post-Acute Status    Post-Acute Authorization Placement     Post-Acute Placement Status Set-up Complete/Auth obtained                     Important Message from Medicare  Important Message from Medicare regarding Discharge Appeal Rights: Explained to patient/caregiver, Signed/date by patient/caregiver, Given to patient/caregiver     Date IMM was signed: 05/02/24  Time IMM was signed: 1056      Future Appointments   Date Time Provider Department Center   5/13/2024  1:00 PM Any Cantu PA-C [facility]   5/20/2024  4:00 PM Dionne Jeter MD [facility]     Patient discharged to [facility] on 5/2/24.    Cat Loja RNCM  Case Management  [facility]  [phone]

## 2024-05-07 NOTE — TELEPHONE ENCOUNTER
Pt is currently admitted in the hospital at this time; Dr. Lehman states she isn't approving no medications for the pt at this time until pt is discharged; Dr. Lehman states she has previously refused prescriptions and is awaiting pt discharge

## 2024-05-07 NOTE — PROGRESS NOTES
"Ms. Miller was seen at [facility] today for a post-operative visit after undergoing the following:    Open reduction internal fixation Left intertrochanteric hip fracture with intramedullary nail      by Dr. Bojorquez on 4/28/2024.      Interval History:  She reports that she is doing ok.  She reports their pain is controlled.  She is not taking pain medication.  She denies any falls or injuries since surgery/last seen in the clinic.  She denies fever, chills, and sweats since the time of the surgery.     Physical exam:  The patient's dressing was left in place as it was too soon to removed.  Dressing is clean, dry and intact.  No signs of infection noted.   She has tactile stimulation to their lower leg, she denies calf pain, there is no leg edema and their pedal pulse is palpable x 2. Per their therapist note, they have indicated the patient's activity level is "Good and their rehab potential is Good.    Functional Mobility:  Transfers:     Sit to Stand:  moderate assistance with rolling walker  Bed to Chair: moderate assistance, maximal assistance, and of 1 persons with  rolling walker  using  Stand Pivot and Step Transfer  Gait: 3 steps taken in parallel bars with ModA of 1 person with one person following closely behind with wheelchair for safety. Pt unable to take more step due to pain and increased anxiety.   Balance: static standing balance with Min/ModA of 1 person using //bars fair-    RADS: none done today    Assessment:  Post-op visit (9 days)    Plan:  Current care, treatment plan, precautions, activity level/ modifications, limitations, rehabilitation exercises and proposed future treatment were discussed with the patient. We discussed the need to monitor for changes in symptoms and condition and report them to the physician.  Discussed importance of compliance with all appointments and follow up examinations.       WOUND CARE ORDERS  - Do not remove surgical dressing for 2 weeks post-op. This will be done " only by MD/OMAYRA at initial post-op visit. If dressing is completely saturated, Call number below.   - Do not get dressings wet.   - Do not shower.   - If dressing continues to be saturated or there are signs of infection, please call [facility] [phone] for further instructions and to make appt to be seen.       PHYSICAL THERAPY:   - Continue therapy as ordered.  - Weight bearing as tolerated   - Range of motion as tolerated.      PAIN MEDICATION:   - Pain medication: refill was not needed,   - Pain medication refill policy provided to patient for review, yes      DVT PROPHYLAXIS:   - Eliquis 2.5 mg bid    FRAGILITY:  - Patient has been referred to the fracture clinic.     FOLLOW UP:   - Patient will continue to be seen on SNF weekly until their discharge then she is to return to clinic for follow up.  - Future Appointments:   Future Appointments   Date Time Provider Department Center   5/13/2024  1:00 PM Any Cantu PA-C [facility]   5/20/2024  4:00 PM Dionne Jeter MD [facility] [place]           If there are any questions prior to scheduled follow up, the patient was instructed to contact the office

## 2024-05-07 NOTE — PLAN OF CARE
Problem: Occupational Therapy  Goal: Occupational Therapy Goal  Description: Goals to be met by 6/2/2024:    Patient will increase functional independence with ADLs by performing:     UBD: SPV seated in w/c or EOB- ongoing   LBD: Min A using AE seated in w/c or EOB- ongoing  FW: MI using AE seated in w/c- ongoing  Showering: Min A seated in w/c or EOB or sinkside- ongoing  Toileting: Min A seated on BSC- ongoing   Supine to sit: Min A- ongoing  Sit to stand: Min A with RW- ongoing        Outcome: Progressing

## 2024-05-07 NOTE — NURSING
Patient woke up at 02:30 confused, stating she needed to get up because she was sewing and needed something. Reoriented patient to situation. Patient continues to believe she was just up sewing. Patient adamant about getting out of bed, stating she tired of lying down.  Assisted patient to recliner. After 30 minutes at bedside patient appears to be understanding her surroundings. Educated patient of fall precautions and not to get out of chair. Call bell left on her lap, door left open. Will continue to monitor patient.

## 2024-05-07 NOTE — TELEPHONE ENCOUNTER
----- Message from Ashanti Arrieta sent at 5/7/2024  9:39 AM CDT -----  Type:  RX Refill Request    Who Called: Caller [facility]   Refill or New Rx: Refill   RX Name and Strength: apixaban (ELIQUIS)  How is the patient currently taking it? (ex. 1XDay):Take 1 tablet (2.5 mg total) by mouth 2 (two) times daily.  Is this a 30 day or 90 day RX: 180 tablet  Preferred Pharmacy with phone number: [facility] - [place], [place] - [address]  [address] [facility] [address]  Phone: [phone] Fax: [phone]  Would the patient rather a call back or a response via [facility]? Callback   Best Call Back Number:[phone]  Additional Information:

## 2024-05-07 NOTE — TELEPHONE ENCOUNTER
Pt is currently admitted in Hospital; Dr. Lehman states no refills on medications are being approved at this time until pt is discharged

## 2024-05-07 NOTE — PT/OT/SLP PROGRESS
"Occupational Therapy   Treatment    Name: Mary Ellen Miller  MRN: [id number]  Admit Date: 5/2/2024  Admitting Diagnosis:  Closed displaced intertrochanteric fracture of left femur with routine healing    General Precautions: Standard, fall   Orthopedic Precautions: LLE weight bearing as tolerated   Braces: N/A    Recommendations:     Discharge Recommendations:  other (see comments) (to be futher assessed)  Level of Assistance Recommended at Discharge: 24 hours physical assistance for all ADL's and home management tasks  Discharge Equipment Recommendations: walker, rolling, wheelchair, hip kit, bedside commode  Barriers to discharge:  Decreased caregiver support    Assessment:     Mary Ellen Miller is a 89 y.o. female with a medical diagnosis of Closed displaced intertrochanteric fracture of left femur with routine healing. Performance deficits affecting function are weakness, impaired endurance, impaired functional mobility, gait instability, impaired balance, decreased lower extremity function, pain, decreased ROM, impaired cognition, decreased coordination. Pt tolerated today's session well w/o incident. She was able to complete toileting seated on BSC over toilet seat, requiring Max A for clothing and hygiene. Pt is motivated to returning to [facility]. Pt would benefit from cont'd OT services in the SNF setting to improve safety and independence /c functional tasks and ADLs upon discharge.       Rehab Potential is good    Activity tolerance:  Fair    Plan:     Patient to be seen 5 x/week to address the above listed problems via self-care/home management, therapeutic activities, therapeutic exercises    Plan of Care Expires: 06/02/24  Plan of Care Reviewed with: patient    Subjective     Communicated with: PT prior to session.   "I have to use the restroom"    Pain/Comfort:  Pain Rating 1: 0/10  Pain Rating Post-Intervention 1: 0/10    Patient's cultural, spiritual, Congregational conflicts given the current " situation:  no    Objective:     Patient found  up in chair in rehab gym after PT session  with  (no active lines; up in chair in rehab gym) upon OT entry to room.    Functional Mobility/Transfers:  Patient completed Sit <> Stand Transfer with moderate assistance  with  no assistive device   Patient completed Toilet Transfer Stand Pivot technique with moderate assistance with  no AD  Pt performed stand pivot for toileting transfer using grab bars    Activities of Daily Living:  Toileting: maximal assistance required assistance with hygiene and clothing management    AMPAC 6 Click ADL: 16    OT Exercises:   FMC: pt placed matching cards onto vertical card board seated in w/c; focus on FMC skills, crossing midline, dynamic sitting balance, and visual scanning      Treatment & Education:  Hand placement during toilet transfer   Education on energy conservation and controlled breathing  Role of OT and current POC    Patient left  up in chair in the activity room  with  GONZALEZ & /therapist present and all needs met    GOALS:   Multidisciplinary Problems       Occupational Therapy Goals          Problem: Occupational Therapy    Goal Priority Disciplines Outcome Interventions   Occupational Therapy Goal     OT, PT/OT Progressing    Description: Goals to be met by 6/2/2024:    Patient will increase functional independence with ADLs by performing:     UBD: SPV seated in w/c or EOB- ongoing   LBD: Min A using AE seated in w/c or EOB- ongoing  FW: MI using AE seated in w/c- ongoing  Showering: Min A seated in w/c or EOB or sinkside- ongoing  Toileting: Min A seated on BSC- ongoing   Supine to sit: Min A- ongoing  Sit to stand: Min A with RW- ongoing                             Time Tracking:     OT Date of Treatment: 05/07/24  OT Start Time: 0924    OT Stop Time: 1005  OT Total Time (min): 41 min    Billable Minutes:Self Care/Home Management 30  Therapeutic Activity 11    5/7/2024  Bob Andrew Jr.,  OTR/L

## 2024-05-07 NOTE — TELEPHONE ENCOUNTER
----- Message from Pedro Posey sent at 5/7/2024  9:18 AM CDT -----  Type:  RX Refill Request    [facility]  Refill or New Rx:  RX Name and Strength: 1 daily women's vitamin(caller said); vitamin D 1000 units Tab; biotin 1000 mcg; furosemide tablet 20 mg     Preferred Pharmacy:    Ordering Provider:  Reach pt via:  Best Call Back Number: [phone](call) [phone](fax)  Additional Information:

## 2024-05-07 NOTE — PLAN OF CARE
Problem: Adult Inpatient Plan of Care  Goal: Plan of Care Review  Outcome: Progressing     Problem: Acute Kidney Injury/Impairment  Goal: Fluid and Electrolyte Balance  Outcome: Progressing     Problem: Wound  Goal: Optimal Coping  Outcome: Progressing  Goal: Optimal Functional Ability  Outcome: Progressing  Goal: Absence of Infection Signs and Symptoms  Outcome: Progressing     Problem: Fall Injury Risk  Goal: Absence of Fall and Fall-Related Injury  Outcome: Progressing

## 2024-05-08 DIAGNOSIS — I50.32 CHRONIC DIASTOLIC HEART FAILURE: ICD-10-CM

## 2024-05-08 LAB
ALBUMIN SERPL BCP-MCNC: 2.6 G/DL (ref 3.5–5.2)
ALP SERPL-CCNC: 64 U/L (ref 55–135)
ALT SERPL W/O P-5'-P-CCNC: 18 U/L (ref 10–44)
ANION GAP SERPL CALC-SCNC: 7 MMOL/L (ref 8–16)
AST SERPL-CCNC: 21 U/L (ref 10–40)
BASOPHILS # BLD AUTO: 0.04 K/UL (ref 0–0.2)
BASOPHILS NFR BLD: 0.5 % (ref 0–1.9)
BILIRUB DIRECT SERPL-MCNC: 0.6 MG/DL (ref 0.1–0.3)
BILIRUB SERPL-MCNC: 1.4 MG/DL (ref 0.1–1)
BUN SERPL-MCNC: 28 MG/DL (ref 10–30)
CALCIUM SERPL-MCNC: 8.2 MG/DL (ref 8.7–10.5)
CHLORIDE SERPL-SCNC: 110 MMOL/L (ref 95–110)
CO2 SERPL-SCNC: 18 MMOL/L (ref 23–29)
CREAT SERPL-MCNC: 0.9 MG/DL (ref 0.5–1.4)
DIFFERENTIAL METHOD BLD: ABNORMAL
EOSINOPHIL # BLD AUTO: 0.1 K/UL (ref 0–0.5)
EOSINOPHIL NFR BLD: 1.3 % (ref 0–8)
ERYTHROCYTE [DISTWIDTH] IN BLOOD BY AUTOMATED COUNT: 16.4 % (ref 11.5–14.5)
EST. GFR  (NO RACE VARIABLE): 59.2 ML/MIN/1.73 M^2
GLUCOSE SERPL-MCNC: 88 MG/DL (ref 70–110)
HAPTOGLOB SERPL-MCNC: 139 MG/DL (ref 30–250)
HCT VFR BLD AUTO: 25.1 % (ref 37–48.5)
HGB BLD-MCNC: 8.3 G/DL (ref 12–16)
IMM GRANULOCYTES # BLD AUTO: 0.11 K/UL (ref 0–0.04)
IMM GRANULOCYTES NFR BLD AUTO: 1.3 % (ref 0–0.5)
LDH SERPL L TO P-CCNC: 260 U/L (ref 110–260)
LYMPHOCYTES # BLD AUTO: 1 K/UL (ref 1–4.8)
LYMPHOCYTES NFR BLD: 11.9 % (ref 18–48)
MCH RBC QN AUTO: 31.6 PG (ref 27–31)
MCHC RBC AUTO-ENTMCNC: 33.1 G/DL (ref 32–36)
MCV RBC AUTO: 95 FL (ref 82–98)
MONOCYTES # BLD AUTO: 0.7 K/UL (ref 0.3–1)
MONOCYTES NFR BLD: 8.4 % (ref 4–15)
NEUTROPHILS # BLD AUTO: 6.6 K/UL (ref 1.8–7.7)
NEUTROPHILS NFR BLD: 76.6 % (ref 38–73)
NRBC BLD-RTO: 0 /100 WBC
PATH REV BLD -IMP: NORMAL
PATH REV BLD -IMP: NORMAL
PLATELET # BLD AUTO: 255 K/UL (ref 150–450)
PMV BLD AUTO: 10.2 FL (ref 9.2–12.9)
POTASSIUM SERPL-SCNC: 4 MMOL/L (ref 3.5–5.1)
PROT SERPL-MCNC: 5.4 G/DL (ref 6–8.4)
RBC # BLD AUTO: 2.63 M/UL (ref 4–5.4)
RETICS/RBC NFR AUTO: 5.8 % (ref 0.5–2.5)
SODIUM SERPL-SCNC: 135 MMOL/L (ref 136–145)
WBC # BLD AUTO: 8.62 K/UL (ref 3.9–12.7)

## 2024-05-08 PROCEDURE — 36415 COLL VENOUS BLD VENIPUNCTURE: CPT | Performed by: FAMILY MEDICINE

## 2024-05-08 PROCEDURE — 97535 SELF CARE MNGMENT TRAINING: CPT

## 2024-05-08 PROCEDURE — 97110 THERAPEUTIC EXERCISES: CPT

## 2024-05-08 PROCEDURE — 85060 BLOOD SMEAR INTERPRETATION: CPT | Mod: ,,, | Performed by: PATHOLOGY

## 2024-05-08 PROCEDURE — 83010 ASSAY OF HAPTOGLOBIN QUANT: CPT | Performed by: FAMILY MEDICINE

## 2024-05-08 PROCEDURE — 83615 LACTATE (LD) (LDH) ENZYME: CPT | Performed by: FAMILY MEDICINE

## 2024-05-08 PROCEDURE — 11000004 HC SNF PRIVATE

## 2024-05-08 PROCEDURE — 85045 AUTOMATED RETICULOCYTE COUNT: CPT | Performed by: FAMILY MEDICINE

## 2024-05-08 PROCEDURE — 25000003 PHARM REV CODE 250: Performed by: FAMILY MEDICINE

## 2024-05-08 PROCEDURE — 82248 BILIRUBIN DIRECT: CPT | Performed by: FAMILY MEDICINE

## 2024-05-08 PROCEDURE — 80053 COMPREHEN METABOLIC PANEL: CPT | Performed by: FAMILY MEDICINE

## 2024-05-08 PROCEDURE — 97530 THERAPEUTIC ACTIVITIES: CPT

## 2024-05-08 PROCEDURE — 25000003 PHARM REV CODE 250: Performed by: HOSPITALIST

## 2024-05-08 PROCEDURE — 85025 COMPLETE CBC W/AUTO DIFF WBC: CPT | Performed by: FAMILY MEDICINE

## 2024-05-08 RX ORDER — SODIUM BICARBONATE 650 MG/1
1300 TABLET ORAL 2 TIMES DAILY
Status: COMPLETED | OUTPATIENT
Start: 2024-05-08 | End: 2024-05-10

## 2024-05-08 RX ORDER — AMOXICILLIN 250 MG
1 CAPSULE ORAL 2 TIMES DAILY PRN
Status: DISCONTINUED | OUTPATIENT
Start: 2024-05-08 | End: 2024-05-23 | Stop reason: HOSPADM

## 2024-05-08 RX ADMIN — ZINC SULFATE 220 MG (50 MG) CAPSULE 220 MG: CAPSULE at 09:05

## 2024-05-08 RX ADMIN — Medication 500 MG: at 08:05

## 2024-05-08 RX ADMIN — THERA TABS 1 TABLET: TAB at 09:05

## 2024-05-08 RX ADMIN — SODIUM BICARBONATE 650 MG TABLET 1300 MG: at 09:05

## 2024-05-08 RX ADMIN — MEMANTINE 5 MG: 5 TABLET ORAL at 09:05

## 2024-05-08 RX ADMIN — Medication 500 MG: at 09:05

## 2024-05-08 RX ADMIN — APIXABAN 2.5 MG: 2.5 TABLET, FILM COATED ORAL at 08:05

## 2024-05-08 RX ADMIN — SODIUM BICARBONATE 650 MG TABLET 1300 MG: at 08:05

## 2024-05-08 RX ADMIN — METHOCARBAMOL 750 MG: 750 TABLET ORAL at 02:05

## 2024-05-08 RX ADMIN — APIXABAN 2.5 MG: 2.5 TABLET, FILM COATED ORAL at 09:05

## 2024-05-08 RX ADMIN — ESCITALOPRAM OXALATE 5 MG: 5 TABLET, FILM COATED ORAL at 09:05

## 2024-05-08 RX ADMIN — CHOLECALCIFEROL TAB 25 MCG (1000 UNIT) 2000 UNITS: 25 TAB at 10:05

## 2024-05-08 RX ADMIN — Medication 9 MG: at 08:05

## 2024-05-08 RX ADMIN — MEMANTINE 5 MG: 5 TABLET ORAL at 08:05

## 2024-05-08 RX ADMIN — CALCIUM CARBONATE (ANTACID) CHEW TAB 500 MG 1000 MG: 500 CHEW TAB at 09:05

## 2024-05-08 NOTE — TELEPHONE ENCOUNTER
No care due was identified.  [facility] Embedded Care Due Messages. Reference number: [id number].   5/08/2024 10:15:09 AM CDT

## 2024-05-08 NOTE — NURSING
..Nurses Note -- 4 Eyes         5:21 AM      Skin assessed during: Admit      [] No Altered Skin Integrity Present    []Prevention Measures Documented      [x] Yes- Altered Skin Integrity Present or Discovered   [] LDA Added if Not in Epic (Describe Wound)   [] New Altered Skin Integrity was Present on Admit and Documented in LDA   [] Wound Image Taken  Pt has an incision left anterior leg and bruising to left leg  Wound Care Consulted? No    Attending Nurse:  Alden Baez RN/Staff Member:  Zachary

## 2024-05-08 NOTE — PT/OT/SLP PROGRESS
"Occupational Therapy   Treatment    Name: Mary Ellen Miller  MRN: [id number]  Admit Date: 5/2/2024  Admitting Diagnosis:  Closed displaced intertrochanteric fracture of left femur with routine healing    General Precautions: Standard, fall   Orthopedic Precautions: LLE weight bearing as tolerated   Braces: N/A    Recommendations:     Discharge Recommendations:  other (see comments) (to be futher assessed)  Level of Assistance Recommended at Discharge: 24 hours physical assistance for all ADL's and home management tasks  Discharge Equipment Recommendations: walker, rolling, wheelchair, hip kit, bedside commode  Barriers to discharge:  Decreased caregiver support    Assessment:     Mary Ellen Miller is a 89 y.o. female with a medical diagnosis of Closed displaced intertrochanteric fracture of left femur with routine healing. Pt tolerated treatment well without incident. Pt is progressing towards goals, however would benefit from continued skilled OT for improved coordination, strength, and activity tolerance neccessary for safe ADL completion  to maximize QOL and functional independence.  She presents with deficits listed below . Performance deficits affecting function are weakness, impaired endurance, impaired self care skills, impaired functional mobility, gait instability, impaired balance, orthopedic precautions, decreased lower extremity function, decreased coordination.     Rehab Potential is good    Activity tolerance:  Fair    Plan:     Patient to be seen 5 x/week to address the above listed problems via self-care/home management, therapeutic activities, therapeutic exercises    Plan of Care Expires: 06/02/24  Plan of Care Reviewed with: patient    Subjective   " It's just terrible to get so old and hurt yourself like I did, make sure you stay moving while you're young"   Communicated with: Pt agreeable to OT this date.     Pain/Comfort:  Pain Rating 1: 4/10  Location - Side 1: Left  Location - " Orientation 1: generalized  Location 1: hip  Pain Addressed 1: Pre-medicate for activity, Reposition, Distraction, Cessation of Activity  Pain Rating Post-Intervention 1: 2/10    Patient's cultural, spiritual, Presybeterian conflicts given the current situation:  no    Objective:     Patient found  transferring to toilet with PCT.   with  (no atcive lines) upon OT entry to room.    Functional Mobility/Transfers:  Patient completed Sit <> Stand Transfer with moderate assistance  with  grab bars(s)   Patient completed Toilet Transfer Stand Pivot technique with moderate assistance with  grab bars  Functional Mobility: Pt self propelled light weight wheelchair along even tile surface with  SPV, BUE support  and increased timing. Pt requiring 1 x Min A to navigate around obstacle in hallway.     Activities of Daily Living:  Grooming: stand by assistance and set up while seated at the sink  for grooming.   Toileting: maximal assistance with hygiene and clothes management thi date while using grab bars to steady in standing.     [facility] 6 Click ADL: 16    OT Exercises: UE Ergometer 9 min with Min resistance.   UE exercises performed to increase functional endurance and strength in order increase independence when performing self care tasks, functional ambulation, W/C propulsion, and functional standing activities .      Treatment & Education:  Role of OT and OT POC  Fall Prevention/Safety Awareness Training - RW management during functional mobility and standing ADLs to decrease risk of falls  Educated on OOB activity and impact on increasing functional independence.  Educated on importance of progressive mobilization to increase strength and endurance.      Patient left up in chair with all lines intact, call button in reach, and PCT notified    GOALS:   Multidisciplinary Problems       Occupational Therapy Goals          Problem: Occupational Therapy    Goal Priority Disciplines Outcome Interventions   Occupational Therapy  Goal     OT, PT/OT Progressing    Description: Goals to be met by 6/2/2024:    Patient will increase functional independence with ADLs by performing:     UBD: SPV seated in w/c or EOB- ongoing   LBD: Min A using AE seated in w/c or EOB- ongoing  FW: MI using AE seated in w/c- ongoing  Showering: Min A seated in w/c or EOB or sinkside- ongoing  Toileting: Min A seated on BSC- ongoing   Supine to sit: Min A- ongoing  Sit to stand: Min A with RW- ongoing                             Time Tracking:     OT Date of Treatment: 05/08/24  OT Start Time: 1140    OT Stop Time: 1220  OT Total Time (min): 40 min    Billable Minutes:Self Care/Home Management 18  Therapeutic Activity 12  Therapeutic Exercise 10    5/8/2024

## 2024-05-08 NOTE — PROGRESS NOTES
[facility]                                  Skilled Nursing Facility                   Progress Note     Patient Name: Mary Ellen Miller  YOB: 1930  MRN: [id number]  Room: [place] [address] [address] A     Admit Date: 5/2/2024   HEAVEN: 5/23/2024     Principal Problem: Closed displaced intertrochanteric fracture of left femur with routine healing    HPI obtained from patient interview and chart review     Chief Complaint: Re-evaluation of medical treatment and therapy status: lab review, hyperbili, diarrhea, pain management      HPI:   Mary Ellen Miller is a 89 y.o. F with PMH of HTN, HLD, CHF, and afib on eliquis presenting to the ED with significant left hip pain after a fall. 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 Prior to the incident, she reports intermittent worsening of chronic lower extremity edema and SOB. Her PCP recently discontinued amlodipine in favor of lasix and continuing aldactone, and she has not missed any doses of her home medications.  XR pelvis with mildly displaced L intertrochanteric femur fracture with impaction, minimally displaced comminuted L superior pubic ramus fracture, and nondisplaced fracture of the L inferior pubic ramus. s/p left femur CMN on 4/28/24. Given diuresis and on 1L O2, not on oxygen at home.  PT/OT recommends skilled nursing placement for further therapy.     Patient will be treated at [facility] with PT and OT to improve functional status and ability to perform ADLs.     Interval History  24 hour chart review completed. Pertinent lab, micro, and/or radiology results addressed below. NAEON. NAD. Afebrile. VSS.  Patient seen sitting up in wheelchair at bedside. No visitors present.    Lab: T bili stabilizing at 1.4 from 1.6 yesterday  direct bili 0.6. Patient  "asymptomatic. Acute hepatitis panel negative. Given ALP, aminotransferases, and GGT are normal, hyperbili is not likely due to hepatic injury or biliary tract disease. LDH and haptoglobin WNL, however elevated retic 5.8 and peripheral smear notes "red cells show mild anisocytosis with a subset of small spherocytes" suggestive of hemolysis.  Suspect hemolytic anemia as etiology of hyperbilirubinemia.    I/O: Patient reports her appetite is decreased today. She denies nausea / vomiting. Reports new diarrhea, denies abdominal cramping or discomfort. Change senokot from bid to bid prn.    Pain management: Patient reports her pain is adequately controlled, however unreliable historian given dementia. Pain management limited by oxycodone allergy. Pending hyperbili work-up, may be able to resume scheduled APAP.    Skilled Therapy: Patient progressing with therapy as tolerated and would continue to benefit from skilled PT and OT services to improve overall functional mobility and independence, strength, endurance, and safety.        Past Medical History: Patient has a past medical history of Anxiety, Atrial fibrillation, Colon polyps (05/01/2012), Hypertension, Leg ulcer, right, limited to breakdown of skin (11/20/2019), and Unilateral femoral hernia with obstruction, without gangrene (10/26/2015).    Past Surgical History: Patient has a past surgical history that includes Radical hysterectomy (1990); Lung surgery (1988); Hysterectomy; Oophorectomy; and Intramedullary rodding of femur (Left, 4/28/2024).    Social History: Patient reports that she has never smoked. She has never been exposed to tobacco smoke. She has never used smokeless tobacco. She reports that she does not drink alcohol and does not use drugs.    Family History:  family history includes No Known Problems in her father and mother.    Allergies: Patient is allergic to percodan [oxycodone hcl-oxycodone-asa], penicillins, sulfa (sulfonamide antibiotics), and " amoxicillin.        ROS  Constitutional:  Positive for activity change. Negative for chills and fever.   HENT:  Negative for trouble swallowing.    Eyes:  Negative for photophobia and visual disturbance.   Respiratory:  Positive for shortness of breath. Negative for chest tightness.    Cardiovascular:  Negative for chest pain and palpitations.   Gastrointestinal: + diarrhea Negative for abdominal pain, constipation, nausea and vomiting.   Genitourinary:  Negative for dysuria, frequency and hematuria.   Musculoskeletal:  Positive for arthralgias, back pain and gait problem. Negative for neck pain.   Skin:  Negative for rash and wound.   Neurological:  Positive for weakness. Negative for dizziness, syncope, speech difficulty and light-headedness.   Psychiatric/Behavioral:  Positive for confusion (intermittent). Negative for agitation. The patient is not nervous/anxious.     24 hour Vital Sign Range   Temp:  [97.3 °F (36.3 °C)]   Pulse:  [90]   Resp:  [18]   BP: (117-121)/(50-69)   SpO2:  [97 %]       Physical Exam  Constitutional:       General: She is not in acute distress. Appears fatigued     Appearance: She is well-developed. She is not toxic-appearing.   HENT:      Head: Normocephalic.      Mouth/Throat:      Mouth: Mucous membranes are moist.   Eyes:      Extraocular Movements: Extraocular movements intact.      Conjunctiva/sclera: Conjunctivae normal.      Pupils: Pupils are equal, round, and reactive to light.   Cardiovascular:      Rate and Rhythm: Regular rate. Rhythm irregular.      Heart sounds: Normal heart sounds.   Pulmonary:      Effort: Pulmonary effort is normal. No respiratory distress. Lungs clear     Breath sounds: No wheezing.      Comments: RA  Abdominal:      General: Bowel sounds are normal.      Palpations: Abdomen is soft.      Tenderness: There is no abdominal tenderness.   Musculoskeletal:         General: Normal range of motion.      Cervical back: Normal range of motion and neck supple.  "  Skin:     General: Skin is warm and dry.      Capillary Refill: Capillary refill takes less than 2 seconds.      Findings: Bruising present. No rash. Left anterior incisional wound.  Neurological:      Mental Status: She is alert and oriented to person, place, and time. Forgetful of recent details     Cranial Nerves: No cranial nerve deficit.      Sensory: No sensory deficit.      Coordination: Coordination normal.   Psychiatric:         Behavior: Behavior normal.              Labs:  Recent Labs   Lab 05/02/24 0344 05/06/24 0459 05/08/24 0436   WBC 11.05 10.47 8.62   HGB 9.2* 9.1* 8.3*   HCT 28.3* 28.8* 25.1*    279 255     Recent Labs   Lab 05/02/24 0344 05/06/24 0459 05/07/24 0343 05/08/24 0436    137 137 135*   K 4.1 4.1 3.9 4.0   * 108 109 110   CO2 18* 18* 17* 18*   BUN 43* 37* 33* 28   CREATININE 0.9 0.9 0.9 0.9   * 94 89 88   CALCIUM 8.8 8.4* 8.7 8.2*   MG 2.1 2.0  --   --    PHOS 3.4 3.7  --   --      Recent Labs   Lab 05/06/24 0459 05/07/24 0343 05/08/24 0436   ALKPHOS 59 69 64   ALT 15 19 18   AST 18 26 21   ALBUMIN 2.8* 3.1* 2.6*   PROT 5.8* 6.7 5.4*   BILITOT 1.5* 1.6* 1.4*       No results for input(s): "POCTGLUCOSE" in the last 72 hours.    Meds Scheduled:   apixaban  2.5 mg Oral BID    ascorbic acid (vitamin C)  500 mg Oral BID    atenoloL  25 mg Oral Daily    calcium carbonate  1,000 mg Oral Daily    EScitalopram oxalate  5 mg Oral Daily    furosemide  20 mg Oral Daily    melatonin  9 mg Oral Nightly    memantine  5 mg Oral BID    multivitamin  1 tablet Oral Daily    senna-docusate 8.6-50 mg  1 tablet Oral BID    spironolactone  50 mg Oral Daily    vitamin D  2,000 Units Oral Daily    zinc sulfate  220 mg Oral Daily       PRN:     Current Facility-Administered Medications:     acetaminophen, 650 mg, Oral, Q6H PRN    calcium carbonate, 500 mg, Oral, BID PRN    methocarbamoL, 750 mg, Oral, Q6H PRN    ondansetron, 8 mg, Oral, Q8H PRN      Assessment and " "Plan:    Closed displaced intertrochanteric fracture of left femur   -s/p CMN on 4/28/24 Dr. Kurt Melendez   -Pain control: multimodal  -PT/OT consult: WBAT LLE  -DVT PPx: Eliquis 2.5 mg BID, SCDs at all times when not ambulating    Hyperbilirubinemia  ?Hemolytic Anemia  POA  5/6: T bili obtained inpt on 4/27/24: 1.2  - Stop q 8 hour 1000 mg APAP, continue 650 mg APAP prn.  - T bili elevated to 1.5, direct bili 0.5. ALP, AST, ALT all WNL.   5/7: Acute hepatitis panel negative.   - T bili continues to rise.   - Patient is asymptomatic:   - Given ALP, aminotransferases, and GGT are normal, hyperbili not likely due to hepatic injury or biliary tract disease.   - Eval for hemolytic anemia: in am obtain retic, LDH, and haptoglobin, CBC Diff with peripheral smear if lab can accommodate, and CMP.  5/8: T bili stabilizing at 1.4 from 1.6 yesterday  direct bili 0.6.   - Hgb 8.3 from 9.1  - LDH and haptoglobin WNL, however elevated retic 5.8 and peripheral smear notes "red cells show mild anisocytosis with a subset of small spherocytes" suggestive of hemolysis.  - Suspect hemolytic anemia as etiology of hyperbilirubinemia.  - Obtain GUERDA (Gordon) and type/screen in am with repeat cbc diff, cmp    Metabolic Acidosis  New 5/8. Na 135  CO2 18  - Start 1300 mg na bicarb po bid x 3 days  - Monitor with lab and adjust prn    Diarrhea  New 5/8  - denies nausea / vomiting  - denies abdominal cramping or discomfort.   - Change senokot from bid to bid prn.  - Encouraged fluids    Severe Protein Calorie Malnutrition  Hypoalbuminemia 2/2 Protein Calorie Malnutrition  POA, unstable  - RD consulted, ongoing collaboration for optimized nutrition   - Diet: regular diet  - Assist with meals  - Encourage intake   - Dietary Supplements: Boost TID  - Vitamin and Mineral Supplements: Zinc, Vit C, and Multivitamin     Altered mental status  Had some confusion inpatient, UA was negative  Patient does not remember falling at home  Delirium " precautions currently oriented x4  Decreased home lexapro from 10mg to 5mg d/t advanced age    Acute hypoxemic respiratory failure  Continue diuretics as tolerated  Wean O2 as tolerated to maintain sat >90%    Long term (current) use of anticoagulants  Eliquis for Afib, reduced to 2.5mg dose meets criteria     Acute on chronic diastolic heart failure  Continue Lasix 20mg, atenolol 25mg and spironolactone 50mg daily with parameters d/t recent hypotension  Follow up cardiology outpatient    Recurrent major depressive disorder, in full remission  - Continue home lexapro & namenda   -Reduce lexapro to 5mg d/t recent confusion and advanced age     Essential hypertension  Continue Lasix 20mg, atenolol 25mg and spironolactone 50mg daily with parameters d/t recent hypotension    Advanced Care Planning  This was a voluntary visit and the option to decline counseling was given  Length of discussion: 16 min  Life limiting problem:hip fx, afib, advanced age  Topics discussed:code status  Outcome of discussion:Confirms DNR  Decision Maker:Patient       Anticipate disposition:    Home with home health      Follow-up needed during SNF admission:   Ortho    Follow-up NOT needed during SNF admission: 5/20 [facility]    Follow-up needed after discharge from SNF:   - PCP within 1-2 weeks  - See appt scheduled below     Future Appointments   Date Time Provider Department Center   5/13/2024  1:00 PM Any Cantu PA-C [facility]   5/20/2024  4:00 PM Dionne Jeter MD [facility]         I certify that SNF services are required to be given on an inpatient basis because Mary Ellen Miller needs for skilled nursing care and/or skilled rehabilitation are required on a daily basis and such services can only practically be provided in a skilled nursing facility setting and are for an ongoing condition for which she received inpatient care in the hospital.       Extended Visit  Total time spent: 86  minutes  Description of Time: counseling patient on clinical condition, therapies provided, plan of care, emotional support, coordinating patient care with other care team members, reviewing and interpreting labs and imaging, collaboration with physician, initiating new orders, chart review, and documentation. See interval hx.           Christa Loaiza NP  Department of Hospital Medicine   [facility]     DOS: 5/8/2024       Patient note was created using MModal Dictation.  Any errors in syntax or even information may not have been identified and edited on initial review prior to signing this note.

## 2024-05-09 LAB
ABO + RH BLD: NORMAL
ALBUMIN SERPL BCP-MCNC: 2.6 G/DL (ref 3.5–5.2)
ALP SERPL-CCNC: 68 U/L (ref 55–135)
ALT SERPL W/O P-5'-P-CCNC: 19 U/L (ref 10–44)
ANION GAP SERPL CALC-SCNC: 10 MMOL/L (ref 8–16)
AST SERPL-CCNC: 27 U/L (ref 10–40)
BASOPHILS # BLD AUTO: 0.03 K/UL (ref 0–0.2)
BASOPHILS NFR BLD: 0.4 % (ref 0–1.9)
BILIRUB SERPL-MCNC: 1.3 MG/DL (ref 0.1–1)
BLD GP AB SCN CELLS X3 SERPL QL: NORMAL
BUN SERPL-MCNC: 31 MG/DL (ref 10–30)
CALCIUM SERPL-MCNC: 8.3 MG/DL (ref 8.7–10.5)
CHLORIDE SERPL-SCNC: 107 MMOL/L (ref 95–110)
CO2 SERPL-SCNC: 19 MMOL/L (ref 23–29)
CREAT SERPL-MCNC: 0.8 MG/DL (ref 0.5–1.4)
DAT IGG-SP REAG RBC-IMP: NORMAL
DIFFERENTIAL METHOD BLD: ABNORMAL
EOSINOPHIL # BLD AUTO: 0.1 K/UL (ref 0–0.5)
EOSINOPHIL NFR BLD: 1.4 % (ref 0–8)
ERYTHROCYTE [DISTWIDTH] IN BLOOD BY AUTOMATED COUNT: 16.9 % (ref 11.5–14.5)
EST. GFR  (NO RACE VARIABLE): >60 ML/MIN/1.73 M^2
GLUCOSE SERPL-MCNC: 76 MG/DL (ref 70–110)
HCT VFR BLD AUTO: 27 % (ref 37–48.5)
HGB BLD-MCNC: 8.5 G/DL (ref 12–16)
IMM GRANULOCYTES # BLD AUTO: 0.09 K/UL (ref 0–0.04)
IMM GRANULOCYTES NFR BLD AUTO: 1.1 % (ref 0–0.5)
LYMPHOCYTES # BLD AUTO: 0.8 K/UL (ref 1–4.8)
LYMPHOCYTES NFR BLD: 10.3 % (ref 18–48)
MAGNESIUM SERPL-MCNC: 1.8 MG/DL (ref 1.6–2.6)
MCH RBC QN AUTO: 31 PG (ref 27–31)
MCHC RBC AUTO-ENTMCNC: 31.5 G/DL (ref 32–36)
MCV RBC AUTO: 99 FL (ref 82–98)
MONOCYTES # BLD AUTO: 0.6 K/UL (ref 0.3–1)
MONOCYTES NFR BLD: 7.5 % (ref 4–15)
NEUTROPHILS # BLD AUTO: 6.4 K/UL (ref 1.8–7.7)
NEUTROPHILS NFR BLD: 79.3 % (ref 38–73)
NRBC BLD-RTO: 0 /100 WBC
PHOSPHATE SERPL-MCNC: 3.3 MG/DL (ref 2.7–4.5)
PLATELET # BLD AUTO: 286 K/UL (ref 150–450)
PMV BLD AUTO: 10.5 FL (ref 9.2–12.9)
POTASSIUM SERPL-SCNC: 4 MMOL/L (ref 3.5–5.1)
PROT SERPL-MCNC: 5.6 G/DL (ref 6–8.4)
RBC # BLD AUTO: 2.74 M/UL (ref 4–5.4)
SODIUM SERPL-SCNC: 136 MMOL/L (ref 136–145)
SPECIMEN OUTDATE: NORMAL
WBC # BLD AUTO: 8.05 K/UL (ref 3.9–12.7)

## 2024-05-09 PROCEDURE — 86901 BLOOD TYPING SEROLOGIC RH(D): CPT | Performed by: FAMILY MEDICINE

## 2024-05-09 PROCEDURE — 25000003 PHARM REV CODE 250: Performed by: FAMILY MEDICINE

## 2024-05-09 PROCEDURE — 97535 SELF CARE MNGMENT TRAINING: CPT

## 2024-05-09 PROCEDURE — 11000004 HC SNF PRIVATE

## 2024-05-09 PROCEDURE — 36415 COLL VENOUS BLD VENIPUNCTURE: CPT | Performed by: HOSPITALIST

## 2024-05-09 PROCEDURE — 25000003 PHARM REV CODE 250: Performed by: HOSPITALIST

## 2024-05-09 PROCEDURE — 97110 THERAPEUTIC EXERCISES: CPT

## 2024-05-09 PROCEDURE — 80053 COMPREHEN METABOLIC PANEL: CPT | Performed by: FAMILY MEDICINE

## 2024-05-09 PROCEDURE — 84100 ASSAY OF PHOSPHORUS: CPT | Performed by: HOSPITALIST

## 2024-05-09 PROCEDURE — 97116 GAIT TRAINING THERAPY: CPT

## 2024-05-09 PROCEDURE — 97530 THERAPEUTIC ACTIVITIES: CPT

## 2024-05-09 PROCEDURE — 83735 ASSAY OF MAGNESIUM: CPT | Performed by: HOSPITALIST

## 2024-05-09 PROCEDURE — 86880 COOMBS TEST DIRECT: CPT | Performed by: FAMILY MEDICINE

## 2024-05-09 PROCEDURE — 85025 COMPLETE CBC W/AUTO DIFF WBC: CPT | Performed by: HOSPITALIST

## 2024-05-09 RX ORDER — BIOTIN 1 MG
TABLET ORAL
Qty: 90 TABLET | Refills: 3 | Status: SHIPPED | OUTPATIENT
Start: 2024-05-09

## 2024-05-09 RX ORDER — FUROSEMIDE 20 MG/1
20 TABLET ORAL
Qty: 30 TABLET | Refills: 3 | Status: SHIPPED | OUTPATIENT
Start: 2024-05-09

## 2024-05-09 RX ORDER — APIXABAN 5 MG/1
5 TABLET, FILM COATED ORAL 2 TIMES DAILY
Qty: 60 TABLET | Refills: 3 | Status: SHIPPED | OUTPATIENT
Start: 2024-05-09 | End: 2024-05-21

## 2024-05-09 RX ADMIN — SODIUM BICARBONATE 650 MG TABLET 1300 MG: at 09:05

## 2024-05-09 RX ADMIN — MEMANTINE 5 MG: 5 TABLET ORAL at 09:05

## 2024-05-09 RX ADMIN — Medication 9 MG: at 09:05

## 2024-05-09 RX ADMIN — ACETAMINOPHEN 650 MG: 325 TABLET ORAL at 12:05

## 2024-05-09 RX ADMIN — APIXABAN 2.5 MG: 2.5 TABLET, FILM COATED ORAL at 09:05

## 2024-05-09 RX ADMIN — ZINC SULFATE 220 MG (50 MG) CAPSULE 220 MG: CAPSULE at 09:05

## 2024-05-09 RX ADMIN — FUROSEMIDE 20 MG: 20 TABLET ORAL at 09:05

## 2024-05-09 RX ADMIN — CALCIUM CARBONATE (ANTACID) CHEW TAB 500 MG 1000 MG: 500 CHEW TAB at 09:05

## 2024-05-09 RX ADMIN — ESCITALOPRAM OXALATE 5 MG: 5 TABLET, FILM COATED ORAL at 09:05

## 2024-05-09 RX ADMIN — ATENOLOL 25 MG: 25 TABLET ORAL at 09:05

## 2024-05-09 RX ADMIN — SPIRONOLACTONE 50 MG: 25 TABLET ORAL at 09:05

## 2024-05-09 RX ADMIN — Medication 500 MG: at 09:05

## 2024-05-09 RX ADMIN — CHOLECALCIFEROL TAB 25 MCG (1000 UNIT) 2000 UNITS: 25 TAB at 09:05

## 2024-05-09 RX ADMIN — THERA TABS 1 TABLET: TAB at 09:05

## 2024-05-09 NOTE — PT/OT/SLP PROGRESS
Physical Therapy Treatment    Patient Name:  Mary Ellen Miller   MRN:  [id number]  Admit Date: 5/2/2024  Admitting Diagnosis: Closed displaced intertrochanteric fracture of left femur with routine healing  Recent Surgeries: INSERTION, INTRAMEDULLARY LENO, FEMUR: Left (Left)     General Precautions: Standard, fall  Orthopedic Precautions: LLE weight bearing as tolerated  Braces: N/A    Recommendations:     Discharge Recommendations: home health PT  Level of Assistance Recommended at Discharge: 24 hours significant assistance  Discharge Equipment Recommendations: bedside commode, walker, rolling, wheelchair  Barriers to discharge: Decreased caregiver support    Assessment:     Mary Ellen Miller is a 89 y.o. female admitted with a medical diagnosis of Closed displaced intertrochanteric fracture of left femur with routine healing . Pt will continue to benefit from skilled PT services during this hospital admit to continue to improve transfer ability and efficiency as well as continue to progress pt's ambulation distance and cardiopulmonary endurance to maximize pt's functional independence and return to PLOF.     Performance deficits affecting function: weakness, impaired endurance, impaired self care skills, impaired functional mobility, gait instability, impaired balance, decreased upper extremity function, decreased lower extremity function, decreased ROM, pain, impaired cardiopulmonary response to activity, orthopedic precautions.    Rehab Potential is good    Activity Tolerance: Fair    Plan:     Patient to be seen 5 x/week to address the above listed problems via gait training, therapeutic activities, therapeutic exercises, wheelchair management/training    Plan of Care Expires: 06/02/24  Plan of Care Reviewed with: patient    Subjective     Pt. Agreeable to work with PT.     Pain/Comfort:  Pain Rating 1: 7/10  Location - Side 1: Bilateral  Location - Orientation 1: generalized  Location 1: groin  Pain  Addressed 1: Pre-medicate for activity, Reposition, Distraction, Cessation of Activity, Nurse notified  Pain Rating Post-Intervention 1: 7/10    Patient's cultural, spiritual, Orthodoxy conflicts given the current situation:  no    Objective:     Communicated with pt prior to session.  Patient found up in chair with  (O2 removed during PT d.t pt's O2 sats above 96% at rest and post activity) upon PT entry to room.     Therapeutic Activities and Exercises: B l/E seated therex x 30reps: AP, LAQ, Hip flexion    Functional Mobility:  Transfers:     Sit to Stand:  moderate assistance with rolling walker  Gait: 19ft +14ft with RW and Tru d.t pt with pain. FFT, decrease step length and decrease justino.  Wheelchair Propulsion:  Pt propelled Light weight wheelchair x ~150 feet on Level tile with  Bilateral upper extremity with Minimal Assistance.     AM-PAC 6 CLICK MOBILITY  10    Patient left up in chair with call button in reach.    GOALS:   Multidisciplinary Problems       Physical Therapy Goals          Problem: Physical Therapy    Goal Priority Disciplines Outcome Goal Variances Interventions   Physical Therapy Goal     PT, PT/OT Progressing     Description: Goals to be met by: 3/10/24     Patient will increase functional independence with mobility by performing:    . Supine to sit with MInimal Assistance  . Sit to supine with MInimal Assistance  . Rolling to Left and Right with Minimal Assistance.  . Sit to stand transfer with Minimal Assistance  . Bed to chair transfer with Minimal Assistance using Rolling Walker  . Gait  x 50 feet with Minimal Assistance using Rolling Walker.   . Wheelchair propulsion x150 feet with Supervision using bilateral uppper extremities                         Time Tracking:     PT Received On: 05/09/24  PT Start Time: 1153  PT Stop Time: 1219  PT Total Time (min): 26 min    Billable Minutes: Gait Training 16 and Therapeutic Exercise 10    Treatment Type: Treatment  PT/PTA: PT     Number  of PTA visits since last PT visit: 0     05/09/2024

## 2024-05-09 NOTE — PLAN OF CARE
AOX4, VSS, c/o pain - PRN tylenol given. Moderate relief obtained. Pt worked well with therapy today, however experience pain requiring RN to administer medication during therapy session. Pt unable to sit up in chair for long after lunch 2/2 pain. Incontinence care performed as needed. Remains free from falls. Bed remains locked and lowered. Personal items and call light within reach. NAD, WCTM.     Problem: Adult Inpatient Plan of Care  Goal: Plan of Care Review  Outcome: Progressing     Problem: Wound  Goal: Optimal Functional Ability  Outcome: Progressing     Problem: Fall Injury Risk  Goal: Absence of Fall and Fall-Related Injury  Outcome: Progressing

## 2024-05-09 NOTE — PLAN OF CARE
Interdisciplinary team, Amairani Raza, RN Nurse Manager, Kya Tubbs, RN Charge Nurse, Patricia Chaudhry, , Ruth Rice, PT, Rehab, Patricia Long, Activities, and Lianne Mishra, Dietician, spoke to patient and patient's daughter-in-law, Yesenia, for care plan conference, weekly status update, and therapy progress update. Tentative discharge date set for 5/23/24. Patient will discharge back to [facility].

## 2024-05-09 NOTE — PROGRESS NOTES
[facility]                                  Skilled Nursing Facility                   Progress Note     Patient Name: Mary Ellen Miller  YOB: 1930  MRN: [id number]  Room: [place] [address] [address] [address]     Admit Date: 5/2/2024   HEAVEN: 5/23/2024     Principal Problem: Closed displaced intertrochanteric fracture of left femur with routine healing    HPI obtained from patient interview and chart review     Chief Complaint: Re-evaluation of medical treatment and therapy status: lab review, hyperbili/hemolytic anemia work-up, diarrhea and pain management follow-up      HPI:   Mary Ellen Miller is a 89 y.o. F with PMH of HTN, HLD, CHF, and afib on eliquis presenting to the ED with significant left hip pain after a fall. 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 Prior to the incident, she reports intermittent worsening of chronic lower extremity edema and SOB. Her PCP recently discontinued amlodipine in favor of lasix and continuing aldactone, and she has not missed any doses of her home medications.  XR pelvis with mildly displaced L intertrochanteric femur fracture with impaction, minimally displaced comminuted L superior pubic ramus fracture, and nondisplaced fracture of the L inferior pubic ramus. s/p left femur CMN on 4/28/24. Given diuresis and on 1L O2, not on oxygen at home.  PT/OT recommends skilled nursing placement for further therapy.     Patient will be treated at [facility] with PT and OT to improve functional status and ability to perform ADLs.     Interval History  24 hour chart review completed. Pertinent lab, micro, and/or radiology results addressed below. NAEON. NAD. Afebrile. VSS.  Patient awake and alert, sitting up in wheelchair. No visitors present.    Lab: Suspect hemolytic anemia as etiology of  "hyperbilirubinemia. Patient asymptomatic. T bili improving 1.3 from 1.4 yesterday. Acute hepatitis panel negative. Given ALP, aminotransferases, and GGT are normal, hyperbili is not likely due to hepatic injury or biliary tract disease. LDH and haptoglobin WNL, however elevated retic 5.8 and peripheral smear notes "red cells show mild anisocytosis with a subset of small spherocytes".  Spherocytes suggestive of Autoimmune hemolytic anemia (AIHA) or Hereditary spherocytosis (HS). Direct and indirect Gordon negative today. AIHA associated with increased risk of thrombus. Obtain d dimer with next lab draw. If d dimer positive will need to obtain BLE Venous US. Will need referral to heme/onc for more specialized testing. Will continue to routinely monitor CBC and bilirubin with CMP trends.    I/O: Patient reports she wasn't hungry when lunch was served but now requesting a snack. She denies nausea / vomiting. Reports diarrhea has resolved, monitor closely for constipation and consider resuming scheduled senokot which was changed to prn for diarrhea.     Pain management: Patient reports her pain is adequately controlled, however unreliable historian given dementia. Pain management limited by oxycodone allergy. Okay to resume scheduled APAP if indicated.    Skilled Therapy: Patient progressing with therapy as tolerated and would continue to benefit from skilled PT and OT services to improve overall functional mobility and independence, strength, endurance, and safety.        Past Medical History: Patient has a past medical history of Anxiety, Atrial fibrillation, Colon polyps (05/01/2012), Hypertension, Leg ulcer, right, limited to breakdown of skin (11/20/2019), and Unilateral femoral hernia with obstruction, without gangrene (10/26/2015).    Past Surgical History: Patient has a past surgical history that includes Radical hysterectomy (1990); Lung surgery (1988); Hysterectomy; Oophorectomy; and Intramedullary rodding of " femur (Left, 4/28/2024).    Social History: Patient reports that she has never smoked. She has never been exposed to tobacco smoke. She has never used smokeless tobacco. She reports that she does not drink alcohol and does not use drugs.    Family History:  family history includes No Known Problems in her father and mother.    Allergies: Patient is allergic to percodan [oxycodone hcl-oxycodone-asa], penicillins, sulfa (sulfonamide antibiotics), and amoxicillin.        ROS  Constitutional:  Positive for activity change. Negative for chills and fever.   HENT:  Negative for trouble swallowing.    Eyes:  Negative for photophobia and visual disturbance.   Respiratory: Negative for chest tightness or shortness of breath.    Cardiovascular:  Negative for chest pain and palpitations.   Gastrointestinal: Negative for abdominal pain, constipation, diarrhea, nausea and vomiting.   Genitourinary:  Negative for dysuria, frequency and hematuria.   Musculoskeletal:  Positive for arthralgias, back pain and gait problem. Negative for neck pain.   Skin:  Negative for rash and wound.   Neurological:  Positive for weakness. Negative for dizziness, syncope, speech difficulty and light-headedness.   Psychiatric/Behavioral:  Positive for confusion (intermittent). Negative for agitation. The patient is not nervous/anxious.     24 hour Vital Sign Range   Temp:  [98.2 °F (36.8 °C)-98.3 °F (36.8 °C)]   Pulse:  [88-97]   Resp:  [16]   BP: (126-163)/(73)   SpO2:  [95 %]       Physical Exam  Constitutional:       General: She is not in acute distress. Appears fatigued     Appearance: She is well-developed. She is not toxic-appearing.   HENT:      Head: Normocephalic.      Mouth/Throat:      Mouth: Mucous membranes are moist.   Eyes:      Extraocular Movements: Extraocular movements intact.      Conjunctiva/sclera: Conjunctivae normal.      Pupils: Pupils are equal, round, and reactive to light.   Cardiovascular:      Rate and Rhythm: Regular  "rate. Rhythm irregular.      Heart sounds: Normal heart sounds.   Pulmonary:      Effort: Pulmonary effort is normal. No respiratory distress. Lungs clear     Breath sounds: No wheezing.      Comments: RA  Abdominal:      General: Bowel sounds are normal.      Palpations: Abdomen is soft.      Tenderness: There is no abdominal tenderness.   Musculoskeletal:         General: Normal range of motion.      Cervical back: Normal range of motion and neck supple.   Skin:     General: Skin is warm and dry.      Capillary Refill: Capillary refill takes less than 2 seconds.      Findings: Bruising present. No rash. Left anterior incisional wound.  Neurological:      Mental Status: She is alert and oriented to person, place, and time. Forgetful of recent details     Cranial Nerves: No cranial nerve deficit.      Sensory: No sensory deficit.      Coordination: Coordination normal.   Psychiatric:         Behavior: Behavior normal.              Labs:  Recent Labs   Lab 05/06/24 0459 05/08/24 0436 05/09/24 0424   WBC 10.47 8.62 8.05   HGB 9.1* 8.3* 8.5*   HCT 28.8* 25.1* 27.0*    255 286     Recent Labs   Lab 05/06/24 0459 05/07/24 0343 05/08/24 0436 05/09/24  0424    137 135* 136   K 4.1 3.9 4.0 4.0    109 110 107   CO2 18* 17* 18* 19*   BUN 37* 33* 28 31*   CREATININE 0.9 0.9 0.9 0.8   GLU 94 89 88 76   CALCIUM 8.4* 8.7 8.2* 8.3*   MG 2.0  --   --  1.8   PHOS 3.7  --   --  3.3     Recent Labs   Lab 05/07/24 0343 05/08/24 0436 05/09/24  0424   ALKPHOS 69 64 68   ALT 19 18 19   AST 26 21 27   ALBUMIN 3.1* 2.6* 2.6*   PROT 6.7 5.4* 5.6*   BILITOT 1.6* 1.4* 1.3*       No results for input(s): "POCTGLUCOSE" in the last 72 hours.    Meds Scheduled:   apixaban  2.5 mg Oral BID    ascorbic acid (vitamin C)  500 mg Oral BID    atenoloL  25 mg Oral Daily    calcium carbonate  1,000 mg Oral Daily    EScitalopram oxalate  5 mg Oral Daily    furosemide  20 mg Oral Daily    melatonin  9 mg Oral Nightly    memantine  " "5 mg Oral BID    multivitamin  1 tablet Oral Daily    sodium bicarbonate  1,300 mg Oral BID    spironolactone  50 mg Oral Daily    vitamin D  2,000 Units Oral Daily    zinc sulfate  220 mg Oral Daily       PRN:     Current Facility-Administered Medications:     acetaminophen, 650 mg, Oral, Q6H PRN    calcium carbonate, 500 mg, Oral, BID PRN    methocarbamoL, 750 mg, Oral, Q6H PRN    ondansetron, 8 mg, Oral, Q8H PRN    senna-docusate 8.6-50 mg, 1 tablet, Oral, BID PRN      Assessment and Plan:    Closed displaced intertrochanteric fracture of left femur   -s/p CMN on 4/28/24 Dr. Kurt Melendez   -Pain control: multimodal  -PT/OT consult: WBAT LLE  -DVT PPx: Eliquis 2.5 mg BID, SCDs at all times when not ambulating    Hemolytic Anemia  Hyperbilirubinemia  POA  - Suspect hemolytic anemia as etiology of hyperbilirubinemia.  5/6: T bili obtained inpt on 4/27/24: 1.2  - Stop q 8 hour 1000 mg APAP, continue 650 mg APAP prn.  - T bili elevated to 1.5, direct bili 0.5. ALP, AST, ALT all WNL.   5/7: Acute hepatitis panel negative.   - T bili continues to rise.   - Patient is asymptomatic:   - Given ALP, aminotransferases, and GGT are normal, hyperbili not likely due to hepatic injury or biliary tract disease.   - Eval for hemolytic anemia: in am obtain retic, LDH, and haptoglobin, CBC Diff with peripheral smear if lab can accommodate, and CMP.  5/8: T bili stabilizing at 1.4 from 1.6 yesterday  direct bili 0.6.   - Hgb 8.3 from 9.1  - LDH and haptoglobin WNL, however elevated retic 5.8 and peripheral smear notes "red cells show mild anisocytosis with a subset of small spherocytes" Spherocytes suggestive of Autoimmune hemolytic anemia (AIHA) or Hereditary spherocytosis (HS).   - Obtain GUERDA (Gordon) and type/screen in am with repeat cbc diff, cmp  5/9: Direct and indirect Gordon negative.   - AIHA associated with increased risk of thrombus. Obtain d dimer with next lab draw. If d dimer positive will need to obtain BLE Venous " US.   - Will need referral to heme/onc for more specialized testing.   - Continue to routinely monitor CBC and bilirubin with CMP trends.    Metabolic Acidosis  New 5/8. Na 135  CO2 18  - Start 1300 mg na bicarb po bid x 3 days  - Monitor with lab and adjust prn    Diarrhea  New 5/8, improving  - denies nausea / vomiting  - denies abdominal cramping or discomfort.   - Changed senokot from bid to bid prn.  - Encouraged fluids    Severe Protein Calorie Malnutrition  Hypoalbuminemia 2/2 Protein Calorie Malnutrition  POA, unstable  - RD consulted, ongoing collaboration for optimized nutrition   - Diet: regular diet  - Assist with meals  - Encourage intake   - Dietary Supplements: Boost TID  - Vitamin and Mineral Supplements: Zinc, Vit C, and Multivitamin     Altered mental status  Had some confusion inpatient, UA was negative  Patient does not remember falling at home  Delirium precautions currently oriented x4  Decreased home lexapro from 10mg to 5mg d/t advanced age    Acute hypoxemic respiratory failure  Continue diuretics as tolerated  Wean O2 as tolerated to maintain sat >90%    Long term (current) use of anticoagulants  Eliquis for Afib, reduced to 2.5mg dose meets criteria     Acute on chronic diastolic heart failure  Continue Lasix 20mg, atenolol 25mg and spironolactone 50mg daily with parameters d/t recent hypotension  Follow up cardiology outpatient    Recurrent major depressive disorder, in full remission  - Continue home lexapro & namenda   -Reduce lexapro to 5mg d/t recent confusion and advanced age     Essential hypertension  Continue Lasix 20mg, atenolol 25mg and spironolactone 50mg daily with parameters d/t recent hypotension    Advanced Care Planning  This was a voluntary visit and the option to decline counseling was given  Length of discussion: 16 min  Life limiting problem:hip fx, afib, advanced age  Topics discussed:code status  Outcome of discussion:Confirms DNR  Decision Maker:Patient        Anticipate disposition:    Home with home health      Follow-up needed during SNF admission:   Ortho    Follow-up NOT needed during SNF admission: 5/20 [facility]    Follow-up needed after discharge from SNF:   - PCP within 1-2 weeks  - See appt scheduled below     Future Appointments   Date Time Provider Department Center   5/13/2024  1:00 PM Any Cantu PA-C [facility] [address] [facility]   5/20/2024  4:00 PM Dionne Jeter MD [facility] [place]         I certify that SNF services are required to be given on an inpatient basis because Mary Ellen Miller needs for skilled nursing care and/or skilled rehabilitation are required on a daily basis and such services can only practically be provided in a skilled nursing facility setting and are for an ongoing condition for which she received inpatient care in the hospital.       Extended Visit  Total time spent: 86 minutes  Description of Time: counseling patient on clinical condition, therapies provided, plan of care, emotional support, coordinating patient care with other care team members, reviewing and interpreting labs and imaging, collaboration with physician, initiating new orders, chart review, and documentation. See interval hx.         Christa Loaiza NP  Department of Hospital Medicine   [facility]     DOS: 5/9/2024       Patient note was created using MModal Dictation.  Any errors in syntax or even information may not have been identified and edited on initial review prior to signing this note.

## 2024-05-09 NOTE — PT/OT/SLP PROGRESS
"Occupational Therapy   Treatment    Name: Mary Ellen Miller  MRN: [id number]  Admit Date: 5/2/2024  Admitting Diagnosis:  Closed displaced intertrochanteric fracture of left femur with routine healing    General Precautions: Standard, fall   Orthopedic Precautions: LLE weight bearing as tolerated   Braces: N/A    Recommendations:     Discharge Recommendations:  other (see comments) (to be futher assessed)  Level of Assistance Recommended at Discharge: 24 hours physical assistance for all ADL's and home management tasks  Discharge Equipment Recommendations: walker, rolling, wheelchair, hip kit, bedside commode  Barriers to discharge:  Decreased caregiver support    Assessment:     Mary Ellen Miller is a 89 y.o. female with a medical diagnosis of Closed displaced intertrochanteric fracture of left femur with routine healing. Performance deficits affecting function are weakness, impaired endurance, impaired self care skills, impaired functional mobility, gait instability, impaired balance, orthopedic precautions, decreased lower extremity function, decreased coordination. Pt tolerated today's session well w/o incident. She completed grooming and hygiene care seated in w/c at [facility]. During grooming tasks, she demonstrated impaired FMC resulting in her dropping multiple small items needed for the task. Pt would benefit from cont'd OT services in the SNF setting to improve safety and independence /c functional tasks and ADLs upon discharge.       Rehab Potential is good    Activity tolerance:  Good    Plan:     Patient to be seen 5 x/week to address the above listed problems via self-care/home management, therapeutic activities, therapeutic exercises    Plan of Care Expires: 06/02/24  Plan of Care Reviewed with: patient    Subjective     Communicated with: Nursing prior to session .  "You coming to get me already"?    Pain/Comfort:  Pain Rating 1: 0/10  Pain Rating Post-Intervention 1: 0/10    Patient's " cultural, spiritual, Pentecostal conflicts given the current situation:  no    Objective:     Patient found up in chair with  (no active lines) upon OT entry to room.    Activities of Daily Living:  Grooming: modified independence MI seated in w/c; pt washed her face, applied makeup, combed her hair, and applied moisturizer     AMPAC 6 Click ADL: 17    OT Exercises: UE Ergometer 10 mins seated in w/c; focus on BUE endurance and overall cardiovascular     FMC activity: pt placed small pegs into peg holes following a moderate visual prompt; pt completed tasks seated in w/c; focus on FMC skills/dexterity in order to improve overall performance with ADLs    Treatment & Education:  Current POC and role of OT    Patient left  up in w/c in rehab gym awaiting PT  with  PT present and all needs met    GOALS:   Multidisciplinary Problems       Occupational Therapy Goals          Problem: Occupational Therapy    Goal Priority Disciplines Outcome Interventions   Occupational Therapy Goal     OT, PT/OT Progressing    Description: Goals to be met by 6/2/2024:    Patient will increase functional independence with ADLs by performing:     UBD: SPV seated in w/c or EOB- ongoing   LBD: Min A using AE seated in w/c or EOB- ongoing  FW: MI using AE seated in w/c- ongoing  Showering: Min A seated in w/c or EOB or sinkside- ongoing  Toileting: Min A seated on BSC- ongoing   Supine to sit: Min A- ongoing  Sit to stand: Min A with RW- ongoing                             Time Tracking:     OT Date of Treatment: 05/09/24  OT Start Time: 1108    OT Stop Time: 1153  OT Total Time (min): 45 min    Billable Minutes:Self Care/Home Management 25  Therapeutic Activity 20    5/9/2024  Bob Andrew Jr., OTR/L

## 2024-05-10 PROCEDURE — 25000003 PHARM REV CODE 250: Performed by: FAMILY MEDICINE

## 2024-05-10 PROCEDURE — 97530 THERAPEUTIC ACTIVITIES: CPT | Mod: CQ

## 2024-05-10 PROCEDURE — 97110 THERAPEUTIC EXERCISES: CPT

## 2024-05-10 PROCEDURE — 25000003 PHARM REV CODE 250: Performed by: HOSPITALIST

## 2024-05-10 PROCEDURE — 97116 GAIT TRAINING THERAPY: CPT | Mod: CQ

## 2024-05-10 PROCEDURE — 97110 THERAPEUTIC EXERCISES: CPT | Mod: CQ

## 2024-05-10 PROCEDURE — 97530 THERAPEUTIC ACTIVITIES: CPT

## 2024-05-10 PROCEDURE — 11000004 HC SNF PRIVATE

## 2024-05-10 RX ADMIN — FUROSEMIDE 20 MG: 20 TABLET ORAL at 09:05

## 2024-05-10 RX ADMIN — APIXABAN 2.5 MG: 2.5 TABLET, FILM COATED ORAL at 10:05

## 2024-05-10 RX ADMIN — Medication 500 MG: at 09:05

## 2024-05-10 RX ADMIN — SODIUM BICARBONATE 650 MG TABLET 1300 MG: at 10:05

## 2024-05-10 RX ADMIN — SPIRONOLACTONE 50 MG: 25 TABLET ORAL at 09:05

## 2024-05-10 RX ADMIN — ZINC SULFATE 220 MG (50 MG) CAPSULE 220 MG: CAPSULE at 09:05

## 2024-05-10 RX ADMIN — METHOCARBAMOL 750 MG: 750 TABLET ORAL at 10:05

## 2024-05-10 RX ADMIN — CALCIUM CARBONATE (ANTACID) CHEW TAB 500 MG 1000 MG: 500 CHEW TAB at 09:05

## 2024-05-10 RX ADMIN — ATENOLOL 25 MG: 25 TABLET ORAL at 09:05

## 2024-05-10 RX ADMIN — MEMANTINE 5 MG: 5 TABLET ORAL at 09:05

## 2024-05-10 RX ADMIN — ESCITALOPRAM OXALATE 5 MG: 5 TABLET, FILM COATED ORAL at 09:05

## 2024-05-10 RX ADMIN — Medication 9 MG: at 10:05

## 2024-05-10 RX ADMIN — CHOLECALCIFEROL TAB 25 MCG (1000 UNIT) 2000 UNITS: 25 TAB at 09:05

## 2024-05-10 RX ADMIN — SODIUM BICARBONATE 650 MG TABLET 1300 MG: at 09:05

## 2024-05-10 RX ADMIN — ACETAMINOPHEN 650 MG: 325 TABLET ORAL at 01:05

## 2024-05-10 RX ADMIN — THERA TABS 1 TABLET: TAB at 09:05

## 2024-05-10 RX ADMIN — METHOCARBAMOL 750 MG: 750 TABLET ORAL at 01:05

## 2024-05-10 RX ADMIN — APIXABAN 2.5 MG: 2.5 TABLET, FILM COATED ORAL at 09:05

## 2024-05-10 NOTE — PT/OT/SLP PROGRESS
"Physical Therapy Treatment    Patient Name:  Mary Ellen Miller   MRN:  [id number]  Admit Date: 5/2/2024  Admitting Diagnosis: Closed displaced intertrochanteric fracture of left femur with routine healing  Recent Surgeries: INSERTION, INTRAMEDULLARY LENO, FEMUR: Left (Left)     General Precautions: Standard, fall  Orthopedic Precautions: LLE weight bearing as tolerated  Braces: N/A    Recommendations:     Discharge Recommendations: home health PT  Level of Assistance Recommended at Discharge: 24 hours significant assistance  Discharge Equipment Recommendations: bedside commode, walker, rolling, wheelchair  Barriers to discharge: Decreased caregiver support    Assessment:     Mary Ellen Miller is a 89 y.o. female admitted with a medical diagnosis of Closed displaced intertrochanteric fracture of left femur with routine healing . Pt tolerated well, pt required Max encouragement by PTA and niece in order to even attempt therapy today, pt with increased B upper groin pain. Pt amb x 2 trials with CGA and RW and was able to complete seated TE and LBE without issue. Pt appreciative for encouraging her to attend therapy today. Pt would continue to benefit from skilled PT services to improve overall functional mobility, strength and endurance.      Performance deficits affecting function: weakness, impaired endurance, impaired self care skills, impaired functional mobility, gait instability, impaired balance, decreased upper extremity function, decreased lower extremity function, decreased ROM, pain, impaired cardiopulmonary response to activity, orthopedic precautions.    Rehab Potential is good    Activity Tolerance: Good    Plan:     Patient to be seen 5 x/week to address the above listed problems via gait training, therapeutic activities, therapeutic exercises, wheelchair management/training    Plan of Care Expires: 06/02/24  Plan of Care Reviewed with: patient    Subjective     "Im just in so much pain". "     Pain/Comfort:  Pain Rating 1:  (not rated, increased pain today)  Location - Side 1: Bilateral  Location - Orientation 1: upper  Location 1: groin  Pain Addressed 1: Reposition, Distraction, Pre-medicate for activity  Pain Rating Post-Intervention 1:  (not rated)    Patient's cultural, spiritual, Gnosticism conflicts given the current situation:  no    Objective:      Patient found up in chair with  (no lines in wheelchair) upon PT entry to room.     Therapeutic Activities and Exercises: LBE x 10 min For BLE strengthening, endurance and ROM. No rest    Seated TE: hip flex (AAROM LLE), HS curls (YTB), LAQ x 20 reps for BLE strengthening    Patient educated on role of therapy, goals of session, and benefits of out of bed mobility.   Instructed on use of call button and importance of calling nursing staff for assistance with mobility   Questions/concerns addressed within PTA scope of practice  Pt verbalized understanding     Increased time spent encouraging patient to attend therapy with help from pt's niece    Functional Mobility:  Transfers:     Sit to Stand:  minimum assistance with rolling walker, vc to scoot to edge of chair and hand placement on wheelchair arm rests.   Gait: pt amb 31 ft x 2 trials with CGA. Narrow Essence. Short step length and slow justino. Seated rest break in between trials.    AM-PAC 6 CLICK MOBILITY  10    Patient left up in chair with  PT tech .    GOALS:   Multidisciplinary Problems       Physical Therapy Goals          Problem: Physical Therapy    Goal Priority Disciplines Outcome Goal Variances Interventions   Physical Therapy Goal     PT, PT/OT Progressing     Description: Goals to be met by: 3/10/24     Patient will increase functional independence with mobility by performing:    . Supine to sit with MInimal Assistance  . Sit to supine with MInimal Assistance  . Rolling to Left and Right with Minimal Assistance.  . Sit to stand transfer with Minimal Assistance  . Bed to chair transfer  with Minimal Assistance using Rolling Walker  . Gait  x 50 feet with Minimal Assistance using Rolling Walker.   . Wheelchair propulsion x150 feet with Supervision using bilateral uppper extremities                         Time Tracking:     PT Received On: 05/10/24  PT Start Time: 1421  PT Stop Time: 1504  PT Total Time (min): 43 min    Billable Minutes: Gait Training 15, Therapeutic Activity 10, and Therapeutic Exercise 18    Treatment Type: Treatment  PT/PTA: PTA     Number of PTA visits since last PT visit: 1     05/10/2024

## 2024-05-10 NOTE — PLAN OF CARE
Problem: Fall Injury Risk  Goal: Absence of Fall and Fall-Related Injury  Outcome: Progressing  Intervention: Promote Injury-Free Environment  Flowsheets (Taken 5/10/2024 1634)  Safety Promotion/Fall Prevention:   assistive device/personal item within reach   side rails raised x 2   instructed to call staff for mobility

## 2024-05-10 NOTE — PT/OT/SLP PROGRESS
"Occupational Therapy   Treatment    Name: Mary Ellen Miller  MRN: [id number]  Admit Date: 5/2/2024  Admitting Diagnosis:  Closed displaced intertrochanteric fracture of left femur with routine healing    General Precautions: Standard, fall   Orthopedic Precautions: LLE weight bearing as tolerated   Braces: N/A    Recommendations:     Discharge Recommendations:  other (see comments) (to be futher assessed)  Level of Assistance Recommended at Discharge: 24 hours physical assistance for all ADL's and home management tasks  Discharge Equipment Recommendations: walker, rolling, wheelchair, hip kit, bedside commode  Barriers to discharge:  Decreased caregiver support    Assessment:     Mary Ellen Miller is a 89 y.o. female with a medical diagnosis of Closed displaced intertrochanteric fracture of left femur with routine healing. Performance deficits affecting function are weakness, impaired endurance, impaired self care skills, impaired functional mobility, gait instability, impaired balance, orthopedic precautions, decreased lower extremity function, decreased coordination. Pt tolerated today's session well w/o incident. During FMC/ADLs activities she cont's to demonstrate impaired fine motor skills requiring increased time to complete tasks. Pt cont's to be motivated to getting better and returning to PLOF. Pt would benefit from cont'd OT services in the SNF setting to improve safety and independence /c functional tasks and ADLs upon discharge.       Rehab Potential is good    Activity tolerance:  Fair    Plan:     Patient to be seen 5 x/week to address the above listed problems via self-care/home management, therapeutic activities, therapeutic exercises    Plan of Care Expires: 06/02/24  Plan of Care Reviewed with: patient    Subjective     Communicated with: Nursing prior to session.  "Zachary Rojas, what are we going to do today"    Pain/Comfort:  Pain Rating 1: 0/10  Pain Rating Post-Intervention 1: " 0/10    Patient's cultural, spiritual, Jewish conflicts given the current situation:  no    Objective:     Patient found up in chair with  (no active lines) upon OT entry to room.      [facility] 6 Click ADL: 17    OT Exercises: 1. UE Ergometer 10 mins seated in w/c; focus on BUE endurance and overall cardiovascular in order to improve overall performance with ADLs  2. IADLs/ADLs activity; seated in w/c pt opened and closed zippers, door locks, door chains, bottle tops, belt loops, shoelace tying, door hooks, and pants snappers; focus on FMC skills and dexterity in order to improve overall performance with ADLs  3. FMC activity; seated in w/c pt placed and removed large pegs from peg board following a moderate prompt; focus on FMC skills and visual scanning in order to improve overall performance with ADLs    Treatment & Education:  Role of OT  Current POC      Patient left up in chair with call button in reach and all needs met    GOALS:   Multidisciplinary Problems       Occupational Therapy Goals          Problem: Occupational Therapy    Goal Priority Disciplines Outcome Interventions   Occupational Therapy Goal     OT, PT/OT Progressing    Description: Goals to be met by 6/2/2024:    Patient will increase functional independence with ADLs by performing:     UBD: SPV seated in w/c or EOB- ongoing   LBD: Min A using AE seated in w/c or EOB- ongoing  FW: MI using AE seated in w/c- ongoing  Showering: Min A seated in w/c or EOB or sinkside- ongoing  Toileting: Min A seated on BSC- ongoing   Supine to sit: Min A- ongoing  Sit to stand: Min A with RW- ongoing                             Time Tracking:     OT Date of Treatment: 05/10/24  OT Start Time: 1130    OT Stop Time: 1215  OT Total Time (min): 45 min    Billable Minutes:Therapeutic Activity 35  Therapeutic Exercise 10    5/10/2024  Bob Andrew Jr., OTR/L

## 2024-05-10 NOTE — PROGRESS NOTES
[facility]                                  Skilled Nursing Facility                   Progress Note     Patient Name: Mary Ellen Miller  YOB: 1930  MRN: [id number]  Room: [place] [address] [address] A     Admit Date: 5/2/2024   HEAVEN: 5/23/2024     Principal Problem: Closed displaced intertrochanteric fracture of left femur with routine healing    HPI obtained from patient interview and chart review     Chief Complaint: Re-evaluation of medical treatment and therapy status: HTN, rad review, diarrhea and pain management follow-up      HPI:   Mary Ellen Miller is a 89 y.o. F with PMH of HTN, HLD, CHF, and afib on eliquis presenting to the ED with significant left hip pain after a fall. 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 Prior to the incident, she reports intermittent worsening of chronic lower extremity edema and SOB. Her PCP recently discontinued amlodipine in favor of lasix and continuing aldactone, and she has not missed any doses of her home medications.  XR pelvis with mildly displaced L intertrochanteric femur fracture with impaction, minimally displaced comminuted L superior pubic ramus fracture, and nondisplaced fracture of the L inferior pubic ramus. s/p left femur CMN on 4/28/24. Given diuresis and on 1L O2, not on oxygen at home.  PT/OT recommends skilled nursing placement for further therapy.     Patient will be treated at [facility] with PT and OT to improve functional status and ability to perform ADLs.     Interval History  24 hour chart review completed. Pertinent lab, micro, and/or radiology results addressed below. NAEON. NAD.  Afebrile. VSS. HTN: -144, at goal.  Patient awake and alert, sitting up in wheelchair awaiting therapy. No visitors present.    Lab: Suspect hemolytic anemia  as etiology of hyperbilirubinemia which is trending down toward normal limits.   - Will need referral to heme/onc for more specialized testing -  Monitor CBC and bilirubin with CMP trends.Check d dimer on Monday. Increased clot risk with hemolytic anemia.  Rad: 5/9 BLE Venous US negative for thrombus.    I/O: Patient reports decreased appetite. She denies nausea / vomiting. Reports diarrhea remains resolved, LBM 5/9. Monitor closely for constipation and consider resuming scheduled senokot which was changed to prn for diarrhea.     Pain management: Patient continues to report that her pain is adequately controlled, however unreliable historian given dementia. Pain management limited by oxycodone allergy. Okay to resume scheduled APAP if indicated.    Skilled Therapy: Patient progressing with therapy as tolerated and would continue to benefit from skilled PT and OT services to improve overall functional mobility and independence, strength, endurance, and safety.        Past Medical History: Patient has a past medical history of Anxiety, Atrial fibrillation, Colon polyps (05/01/2012), Hypertension, Leg ulcer, right, limited to breakdown of skin (11/20/2019), and Unilateral femoral hernia with obstruction, without gangrene (10/26/2015).    Past Surgical History: Patient has a past surgical history that includes Radical hysterectomy (1990); Lung surgery (1988); Hysterectomy; Oophorectomy; and Intramedullary rodding of femur (Left, 4/28/2024).    Social History: Patient reports that she has never smoked. She has never been exposed to tobacco smoke. She has never used smokeless tobacco. She reports that she does not drink alcohol and does not use drugs.    Family History:  family history includes No Known Problems in her father and mother.    Allergies: Patient is allergic to percodan [oxycodone hcl-oxycodone-asa], penicillins, sulfa (sulfonamide antibiotics), and amoxicillin.        ROS  Constitutional:  Positive for  activity change. Negative for chills and fever.   HENT:  Negative for trouble swallowing.    Eyes:  Negative for photophobia and visual disturbance.   Respiratory: Negative for chest tightness or shortness of breath.    Cardiovascular:  Negative for chest pain and palpitations.   Gastrointestinal: Negative for abdominal pain, constipation, diarrhea, nausea and vomiting.   Genitourinary:  Negative for dysuria, frequency and hematuria.   Musculoskeletal:  Positive for arthralgias, back pain and gait problem. Negative for neck pain.   Skin:  Negative for rash and wound.   Neurological:  Positive for weakness. Negative for dizziness, syncope, speech difficulty and light-headedness.   Psychiatric/Behavioral:  Positive for confusion (intermittent). Negative for agitation. The patient is not nervous/anxious.     24 hour Vital Sign Range   Temp:  [98.1 °F (36.7 °C)-98.4 °F (36.9 °C)]   Pulse:  [80-85]   Resp:  [16-18]   BP: (135-144)/(67-78)   SpO2:  [96 %]       Physical Exam  Constitutional:       General: She is not in acute distress. Appears fatigued     Appearance: She is well-developed. She is not toxic-appearing.   HENT:      Head: Normocephalic.      Mouth/Throat:      Mouth: Mucous membranes are moist.   Eyes:      Extraocular Movements: Extraocular movements intact.      Conjunctiva/sclera: Conjunctivae normal.      Pupils: Pupils are equal, round, and reactive to light.   Cardiovascular:      Rate and Rhythm: Regular rate. Rhythm irregular.      Heart sounds: Normal heart sounds.   Pulmonary:      Effort: Pulmonary effort is normal. No respiratory distress. Lungs clear     Breath sounds: No wheezing.      Comments: RA  Abdominal:      General: Bowel sounds are normal.      Palpations: Abdomen is soft.      Tenderness: There is no abdominal tenderness.   Musculoskeletal:         General: Normal range of motion.      Cervical back: Normal range of motion and neck supple.   Skin:     General: Skin is warm and dry.  "     Capillary Refill: Capillary refill takes less than 2 seconds.      Findings: Bruising present. No rash. Left anterior incisional wound.  Neurological:      Mental Status: She is alert and oriented to person, place, and time. Forgetful of recent details     Cranial Nerves: No cranial nerve deficit.      Sensory: No sensory deficit.      Coordination: Coordination normal.   Psychiatric:         Behavior: Behavior normal.              Labs:  Recent Labs   Lab 05/06/24 0459 05/08/24 0436 05/09/24  0424   WBC 10.47 8.62 8.05   HGB 9.1* 8.3* 8.5*   HCT 28.8* 25.1* 27.0*    255 286     Recent Labs   Lab 05/06/24 0459 05/07/24 0343 05/08/24 0436 05/09/24 0424    137 135* 136   K 4.1 3.9 4.0 4.0    109 110 107   CO2 18* 17* 18* 19*   BUN 37* 33* 28 31*   CREATININE 0.9 0.9 0.9 0.8   GLU 94 89 88 76   CALCIUM 8.4* 8.7 8.2* 8.3*   MG 2.0  --   --  1.8   PHOS 3.7  --   --  3.3     Recent Labs   Lab 05/07/24 0343 05/08/24 0436 05/09/24  0424   ALKPHOS 69 64 68   ALT 19 18 19   AST 26 21 27   ALBUMIN 3.1* 2.6* 2.6*   PROT 6.7 5.4* 5.6*   BILITOT 1.6* 1.4* 1.3*       No results for input(s): "POCTGLUCOSE" in the last 72 hours.    Meds Scheduled:   apixaban  2.5 mg Oral BID    ascorbic acid (vitamin C)  500 mg Oral BID    atenoloL  25 mg Oral Daily    calcium carbonate  1,000 mg Oral Daily    EScitalopram oxalate  5 mg Oral Daily    furosemide  20 mg Oral Daily    melatonin  9 mg Oral Nightly    memantine  5 mg Oral BID    multivitamin  1 tablet Oral Daily    sodium bicarbonate  1,300 mg Oral BID    spironolactone  50 mg Oral Daily    vitamin D  2,000 Units Oral Daily    zinc sulfate  220 mg Oral Daily       PRN:     Current Facility-Administered Medications:     acetaminophen, 650 mg, Oral, Q6H PRN    calcium carbonate, 500 mg, Oral, BID PRN    methocarbamoL, 750 mg, Oral, Q6H PRN    ondansetron, 8 mg, Oral, Q8H PRN    senna-docusate 8.6-50 mg, 1 tablet, Oral, BID PRN      Assessment and " "Plan:    Closed displaced intertrochanteric fracture of left femur   -s/p CMN on 4/28/24 Dr. Kurt Melendez   -Pain control: multimodal  -PT/OT consult: WBAT LLE  -DVT PPx: Eliquis 2.5 mg BID, SCDs at all times when not ambulating    Hemolytic Anemia  Hyperbilirubinemia  POA  - Suspect hemolytic anemia as etiology of hyperbilirubinemia.  5/6: T bili obtained inpt on 4/27/24: 1.2  - Stop q 8 hour 1000 mg APAP, continue 650 mg APAP prn.  - T bili elevated to 1.5, direct bili 0.5. ALP, AST, ALT all WNL.   5/7: Acute hepatitis panel negative.   - T bili continues to rise.   - Patient is asymptomatic:   - Given ALP, aminotransferases, and GGT are normal, hyperbili not likely due to hepatic injury or biliary tract disease.   - Eval for hemolytic anemia: in am obtain retic, LDH, and haptoglobin, CBC Diff with peripheral smear if lab can accommodate, and CMP.  5/8: T bili stabilizing at 1.4 from 1.6 yesterday  direct bili 0.6.   - Hgb 8.3 from 9.1  - LDH and haptoglobin WNL, however elevated retic 5.8 and peripheral smear notes "red cells show mild anisocytosis with a subset of small spherocytes" Spherocytes suggestive of Autoimmune hemolytic anemia (AIHA) or Hereditary spherocytosis (HS).   - Obtain GUERDA (Gordon) and type/screen in am with repeat cbc diff, cmp  5/9: Direct and indirect Gordon negative.   - AIHA associated with increased risk of thrombus. Obtain d dimer with next lab draw. If d dimer positive will need to obtain BLE Venous US.   - Will need referral to heme/onc for more specialized testing.   - Continue to routinely monitor CBC and bilirubin with CMP trends.    Metabolic Acidosis  New 5/8. Na 135  CO2 18  - Start 1300 mg na bicarb po bid x 3 days  - Monitor with lab and adjust prn    Diarrhea  New 5/8, improving  - denies nausea / vomiting  - denies abdominal cramping or discomfort.   - Changed senokot from bid to bid prn.  - Encouraged fluids    Severe Protein Calorie Malnutrition  Hypoalbuminemia 2/2 " Protein Calorie Malnutrition  POA, unstable  - RD consulted, ongoing collaboration for optimized nutrition   - Diet: regular diet  - Assist with meals  - Encourage intake   - Dietary Supplements: Boost TID  - Vitamin and Mineral Supplements: Zinc, Vit C, and Multivitamin     Altered mental status  Had some confusion inpatient, UA was negative  Patient does not remember falling at home  Delirium precautions currently oriented x4  Decreased home lexapro from 10mg to 5mg d/t advanced age    Acute hypoxemic respiratory failure  Continue diuretics as tolerated  Wean O2 as tolerated to maintain sat >90%    Long term (current) use of anticoagulants  Eliquis for Afib, reduced to 2.5mg dose meets criteria     Acute on chronic diastolic heart failure  Continue Lasix 20mg, atenolol 25mg and spironolactone 50mg daily with parameters d/t recent hypotension  Follow up cardiology outpatient    Recurrent major depressive disorder, in full remission  - Continue home lexapro & namenda   -Reduce lexapro to 5mg d/t recent confusion and advanced age     Essential hypertension  Continue Lasix 20mg, atenolol 25mg and spironolactone 50mg daily with parameters d/t recent hypotension    Advanced Care Planning  This was a voluntary visit and the option to decline counseling was given  Length of discussion: 16 min  Life limiting problem:hip fx, afib, advanced age  Topics discussed:code status  Outcome of discussion:Confirms DNR  Decision Maker:Patient       Anticipate disposition:    Home with home health      Follow-up needed during SNF admission:   Ortho    Follow-up NOT needed during SNF admission: 5/20 [facility]    Follow-up needed after discharge from SNF: Heme/Onc  - PCP within 1-2 weeks  - See appt scheduled below     Future Appointments   Date Time Provider Department Center   5/13/2024  1:00 PM Any Cantu PA-C [facility]   6/25/2024  3:00 PM Dionne Jeter MD [facility] [place]         I certify that SNF  services are required to be given on an inpatient basis because Mary Ellen Miller needs for skilled nursing care and/or skilled rehabilitation are required on a daily basis and such services can only practically be provided in a skilled nursing facility setting and are for an ongoing condition for which she received inpatient care in the hospital.       Extended Visit  Total time spent: 49 minutes  Description of Time: counseling patient on clinical condition, therapies provided, plan of care, emotional support, coordinating patient care with other care team members, reviewing and interpreting labs and imaging, collaboration with physician, initiating new orders, chart review, and documentation. See interval hx.         Christa Loaiza NP  Department of Hospital Medicine   [facility]     DOS: 5/10/2024       Patient note was created using MModal Dictation.  Any errors in syntax or even information may not have been identified and edited on initial review prior to signing this note.

## 2024-05-11 PROCEDURE — 25000003 PHARM REV CODE 250: Performed by: FAMILY MEDICINE

## 2024-05-11 PROCEDURE — 97110 THERAPEUTIC EXERCISES: CPT | Mod: CQ

## 2024-05-11 PROCEDURE — 11000004 HC SNF PRIVATE

## 2024-05-11 PROCEDURE — 97530 THERAPEUTIC ACTIVITIES: CPT | Mod: CQ

## 2024-05-11 PROCEDURE — 25000003 PHARM REV CODE 250: Performed by: HOSPITALIST

## 2024-05-11 PROCEDURE — 97116 GAIT TRAINING THERAPY: CPT | Mod: CQ

## 2024-05-11 RX ADMIN — MEMANTINE 5 MG: 5 TABLET ORAL at 08:05

## 2024-05-11 RX ADMIN — MEMANTINE 5 MG: 5 TABLET ORAL at 09:05

## 2024-05-11 RX ADMIN — APIXABAN 2.5 MG: 2.5 TABLET, FILM COATED ORAL at 08:05

## 2024-05-11 RX ADMIN — CALCIUM CARBONATE (ANTACID) CHEW TAB 500 MG 1000 MG: 500 CHEW TAB at 08:05

## 2024-05-11 RX ADMIN — FUROSEMIDE 20 MG: 20 TABLET ORAL at 08:05

## 2024-05-11 RX ADMIN — ATENOLOL 25 MG: 25 TABLET ORAL at 08:05

## 2024-05-11 RX ADMIN — Medication 500 MG: at 09:05

## 2024-05-11 RX ADMIN — Medication 9 MG: at 09:05

## 2024-05-11 RX ADMIN — ACETAMINOPHEN 650 MG: 325 TABLET ORAL at 08:05

## 2024-05-11 RX ADMIN — Medication 500 MG: at 08:05

## 2024-05-11 RX ADMIN — SPIRONOLACTONE 50 MG: 25 TABLET ORAL at 08:05

## 2024-05-11 RX ADMIN — CHOLECALCIFEROL TAB 25 MCG (1000 UNIT) 2000 UNITS: 25 TAB at 08:05

## 2024-05-11 RX ADMIN — APIXABAN 2.5 MG: 2.5 TABLET, FILM COATED ORAL at 09:05

## 2024-05-11 RX ADMIN — THERA TABS 1 TABLET: TAB at 08:05

## 2024-05-11 RX ADMIN — METHOCARBAMOL 750 MG: 750 TABLET ORAL at 08:05

## 2024-05-11 RX ADMIN — ACETAMINOPHEN 650 MG: 325 TABLET ORAL at 04:05

## 2024-05-11 RX ADMIN — METHOCARBAMOL 750 MG: 750 TABLET ORAL at 04:05

## 2024-05-11 RX ADMIN — ESCITALOPRAM OXALATE 5 MG: 5 TABLET, FILM COATED ORAL at 08:05

## 2024-05-11 RX ADMIN — ZINC SULFATE 220 MG (50 MG) CAPSULE 220 MG: CAPSULE at 08:05

## 2024-05-11 NOTE — PROGRESS NOTES
[facility]                                  Skilled Nursing Facility                   Progress Note     Patient Name: Mary Ellen Miller  YOB: 1930  MRN: [id number]  Room: [place] [address] [address] [address]     Admit Date: 5/2/2024   HEAVEN: 5/23/2024     Principal Problem: Closed displaced intertrochanteric fracture of left femur with routine healing    HPI obtained from patient interview and chart review     Chief Complaint: Re-evaluation of medical treatment and therapy status: HTN follow-up, intake and weight monitoring.      HPI:   Mary Ellen Miller is a 89 y.o. F with PMH of HTN, HLD, CHF, and afib on eliquis presenting to the ED with significant left hip pain after a fall. 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 Prior to the incident, she reports intermittent worsening of chronic lower extremity edema and SOB. Her PCP recently discontinued amlodipine in favor of lasix and continuing aldactone, and she has not missed any doses of her home medications.  XR pelvis with mildly displaced L intertrochanteric femur fracture with impaction, minimally displaced comminuted L superior pubic ramus fracture, and nondisplaced fracture of the L inferior pubic ramus. s/p left femur CMN on 4/28/24. Given diuresis and on 1L O2, not on oxygen at home.  PT/OT recommends skilled nursing placement for further therapy.     Patient will be treated at [facility] with PT and OT to improve functional status and ability to perform ADLs.     Interval History  24 hour chart review completed. Pertinent lab, micro, and/or radiology results addressed below. NAEON. NAD.  Afebrile. VSS. HTN: -135, at goal.  Patient awake and alert, sitting up in wheelchair at bedside. No visitors present.    Lab: Suspect hemolytic anemia as etiology of  hyperbilirubinemia which is trending down toward normal limits.   - Will need referral to heme/onc for more specialized testing -  Monitor CBC and bilirubin with CMP trends.Check d dimer on Monday. Increased clot risk with hemolytic anemia.  Rad: 5/9 BLE Venous US negative for thrombus.    I/O: Patient reports chronically decreased appetite. Ate 25% of breakfast today. She denies nausea / vomiting. Reports diarrhea remains resolved, LBM 5/10. Monitor closely for constipation and consider resuming scheduled senokot which was changed to prn for diarrhea.     Pain management: Patient continues to report that her pain is adequately controlled, however unreliable historian given dementia. Pain management limited by oxycodone allergy. Okay to resume scheduled APAP if indicated.    Skilled Therapy: Patient progressing with therapy as tolerated and would continue to benefit from skilled PT and OT services to improve overall functional mobility and independence, strength, endurance, and safety.        Past Medical History: Patient has a past medical history of Anxiety, Atrial fibrillation, Colon polyps (05/01/2012), Hypertension, Leg ulcer, right, limited to breakdown of skin (11/20/2019), and Unilateral femoral hernia with obstruction, without gangrene (10/26/2015).    Past Surgical History: Patient has a past surgical history that includes Radical hysterectomy (1990); Lung surgery (1988); Hysterectomy; Oophorectomy; and Intramedullary rodding of femur (Left, 4/28/2024).    Social History: Patient reports that she has never smoked. She has never been exposed to tobacco smoke. She has never used smokeless tobacco. She reports that she does not drink alcohol and does not use drugs.    Family History:  family history includes No Known Problems in her father and mother.    Allergies: Patient is allergic to percodan [oxycodone hcl-oxycodone-asa], penicillins, sulfa (sulfonamide antibiotics), and  amoxicillin.        ROS  Constitutional:  Positive for activity change. Negative for chills and fever.   HENT:  Negative for trouble swallowing.    Eyes:  Negative for photophobia and visual disturbance.   Respiratory: Negative for chest tightness or shortness of breath.    Cardiovascular:  Negative for chest pain and palpitations.   Gastrointestinal: Negative for abdominal pain, constipation, diarrhea, nausea and vomiting.   Genitourinary:  Negative for dysuria, frequency and hematuria.   Musculoskeletal:  Positive for arthralgias, back pain and gait problem. Negative for neck pain.   Skin:  Negative for rash and wound.   Neurological:  Positive for weakness. Negative for dizziness, syncope, speech difficulty and light-headedness.   Psychiatric/Behavioral:  Positive for confusion (intermittent). Negative for agitation. The patient is not nervous/anxious.     24 hour Vital Sign Range   Temp:  [98.1 °F (36.7 °C)-98.2 °F (36.8 °C)]   Pulse:  [74-80]   Resp:  [18]   BP: (130-135)/(60-68)   SpO2:  [97 %-98 %]       Physical Exam  Constitutional:       General: She is not in acute distress. Appears fatigued     Appearance: She is well-developed. She is not toxic-appearing.   HENT:      Head: Normocephalic.      Mouth/Throat:      Mouth: Mucous membranes are moist.   Eyes:      Extraocular Movements: Extraocular movements intact.      Conjunctiva/sclera: Conjunctivae normal.      Pupils: Pupils are equal, round, and reactive to light.   Cardiovascular:      Rate and Rhythm: Regular rate. Rhythm irregular.      Heart sounds: Normal heart sounds.   Pulmonary:      Effort: Pulmonary effort is normal. No respiratory distress. Lungs clear     Breath sounds: No wheezing.      Comments: RA  Abdominal:      General: Bowel sounds are normal.      Palpations: Abdomen is soft.      Tenderness: There is no abdominal tenderness.   Musculoskeletal:         General: Normal range of motion.      Cervical back: Normal range of motion and  "neck supple.   Skin:     General: Skin is warm and dry.      Capillary Refill: Capillary refill takes less than 2 seconds.      Findings: Bruising present. No rash. Left anterior incisional wound.  Neurological:      Mental Status: She is alert and oriented to person, place, and time. Forgetful of recent details     Cranial Nerves: No cranial nerve deficit.      Sensory: No sensory deficit.      Coordination: Coordination normal.   Psychiatric:         Behavior: Behavior normal.              Labs:  Recent Labs   Lab 05/06/24 0459 05/08/24 0436 05/09/24 0424   WBC 10.47 8.62 8.05   HGB 9.1* 8.3* 8.5*   HCT 28.8* 25.1* 27.0*    255 286     Recent Labs   Lab 05/06/24 0459 05/07/24 0343 05/08/24 0436 05/09/24 0424    137 135* 136   K 4.1 3.9 4.0 4.0    109 110 107   CO2 18* 17* 18* 19*   BUN 37* 33* 28 31*   CREATININE 0.9 0.9 0.9 0.8   GLU 94 89 88 76   CALCIUM 8.4* 8.7 8.2* 8.3*   MG 2.0  --   --  1.8   PHOS 3.7  --   --  3.3     Recent Labs   Lab 05/07/24 0343 05/08/24 0436 05/09/24 0424   ALKPHOS 69 64 68   ALT 19 18 19   AST 26 21 27   ALBUMIN 3.1* 2.6* 2.6*   PROT 6.7 5.4* 5.6*   BILITOT 1.6* 1.4* 1.3*       No results for input(s): "POCTGLUCOSE" in the last 72 hours.    Meds Scheduled:   apixaban  2.5 mg Oral BID    ascorbic acid (vitamin C)  500 mg Oral BID    atenoloL  25 mg Oral Daily    calcium carbonate  1,000 mg Oral Daily    EScitalopram oxalate  5 mg Oral Daily    furosemide  20 mg Oral Daily    melatonin  9 mg Oral Nightly    memantine  5 mg Oral BID    multivitamin  1 tablet Oral Daily    spironolactone  50 mg Oral Daily    vitamin D  2,000 Units Oral Daily    zinc sulfate  220 mg Oral Daily       PRN:     Current Facility-Administered Medications:     acetaminophen, 650 mg, Oral, Q6H PRN    calcium carbonate, 500 mg, Oral, BID PRN    methocarbamoL, 750 mg, Oral, Q6H PRN    ondansetron, 8 mg, Oral, Q8H PRN    senna-docusate 8.6-50 mg, 1 tablet, Oral, BID PRN      Assessment " "and Plan:    Closed displaced intertrochanteric fracture of left femur   -s/p CMN on 4/28/24 Dr. Kurt Melendez   -Pain control: multimodal  -PT/OT consult: WBAT LLE  -DVT PPx: Eliquis 2.5 mg BID, SCDs at all times when not ambulating    Hemolytic Anemia  Hyperbilirubinemia  POA  - Suspect hemolytic anemia as etiology of hyperbilirubinemia.  5/6: T bili obtained inpt on 4/27/24: 1.2  - Stop q 8 hour 1000 mg APAP, continue 650 mg APAP prn.  - T bili elevated to 1.5, direct bili 0.5. ALP, AST, ALT all WNL.   5/7: Acute hepatitis panel negative.   - T bili continues to rise.   - Patient is asymptomatic:   - Given ALP, aminotransferases, and GGT are normal, hyperbili not likely due to hepatic injury or biliary tract disease.   - Eval for hemolytic anemia: in am obtain retic, LDH, and haptoglobin, CBC Diff with peripheral smear if lab can accommodate, and CMP.  5/8: T bili stabilizing at 1.4 from 1.6 yesterday  direct bili 0.6.   - Hgb 8.3 from 9.1  - LDH and haptoglobin WNL, however elevated retic 5.8 and peripheral smear notes "red cells show mild anisocytosis with a subset of small spherocytes" Spherocytes suggestive of Autoimmune hemolytic anemia (AIHA) or Hereditary spherocytosis (HS).   - Obtain GUERDA (Gordon) and type/screen in am with repeat cbc diff, cmp  5/9: Direct and indirect Gordon negative.   - AIHA associated with increased risk of thrombus. Obtain d dimer with next lab draw. If d dimer positive will need to obtain BLE Venous US.   - Will need referral to heme/onc for more specialized testing.   - Continue to routinely monitor CBC and bilirubin with CMP trends.    Metabolic Acidosis  New 5/8. Na 135  CO2 18  - Start 1300 mg na bicarb po bid x 3 days  - Monitor with lab and adjust prn    Diarrhea  New 5/8, improving  - denies nausea / vomiting  - denies abdominal cramping or discomfort.   - Changed senokot from bid to bid prn.  - Encouraged fluids    Severe Protein Calorie Malnutrition  Hypoalbuminemia " 2/2 Protein Calorie Malnutrition  POA, unstable  - RD consulted, ongoing collaboration for optimized nutrition   - Diet: regular diet  - Assist with meals  - Encourage intake   - Dietary Supplements: Boost TID  - Vitamin and Mineral Supplements: Zinc, Vit C, and Multivitamin     Altered mental status  Had some confusion inpatient, UA was negative  Patient does not remember falling at home  Delirium precautions currently oriented x4  Decreased home lexapro from 10mg to 5mg d/t advanced age    Acute hypoxemic respiratory failure  Continue diuretics as tolerated  Wean O2 as tolerated to maintain sat >90%    Long term (current) use of anticoagulants  Eliquis for Afib, reduced to 2.5mg dose meets criteria     Acute on chronic diastolic heart failure  Continue Lasix 20mg, atenolol 25mg and spironolactone 50mg daily with parameters d/t recent hypotension  Follow up cardiology outpatient    Recurrent major depressive disorder, in full remission  - Continue home lexapro & namenda   -Reduce lexapro to 5mg d/t recent confusion and advanced age     Essential hypertension  Continue Lasix 20mg, atenolol 25mg and spironolactone 50mg daily with parameters d/t recent hypotension    Advanced Care Planning  This was a voluntary visit and the option to decline counseling was given  Length of discussion: 16 min  Life limiting problem:hip fx, afib, advanced age  Topics discussed:code status  Outcome of discussion:Confirms DNR  Decision Maker:Patient       Anticipate disposition:    Home with home health      Follow-up needed during SNF admission:   Ortho    Follow-up NOT needed during SNF admission: 5/20 [facility]    Follow-up needed after discharge from SNF: Heme/Onc  - PCP within 1-2 weeks  - See appt scheduled below     Future Appointments   Date Time Provider Department Center   5/13/2024  1:00 PM Any Cantu PA-C [facility]   6/25/2024  3:00 PM Dionne Jeter MD [facility] [place]         I certify that SNF  services are required to be given on an inpatient basis because Mary Ellen Miller needs for skilled nursing care and/or skilled rehabilitation are required on a daily basis and such services can only practically be provided in a skilled nursing facility setting and are for an ongoing condition for which she received inpatient care in the hospital.         Christa Loaiza NP  Department of Hospital Medicine   [facility]     DOS: 5/11/2024       Patient note was created using MModal Dictation.  Any errors in syntax or even information may not have been identified and edited on initial review prior to signing this note.

## 2024-05-11 NOTE — PT/OT/SLP PROGRESS
Physical Therapy Treatment    Patient Name:  Mary Ellen Miller   MRN:  [id number]  Admit Date: 5/2/2024  Admitting Diagnosis: Closed displaced intertrochanteric fracture of left femur with routine healing  Recent Surgeries: INSERTION, INTRAMEDULLARY LENO, FEMUR: Left (Left)     General Precautions: Standard, fall  Orthopedic Precautions: LLE weight bearing as tolerated  Braces: N/A    Recommendations:     Discharge Recommendations: home health PT  Level of Assistance Recommended at Discharge: 24 hours significant assistance  Discharge Equipment Recommendations: bedside commode, walker, rolling, wheelchair  Barriers to discharge: Decreased caregiver support    Assessment:     Mary Ellen Miller is a 89 y.o. female admitted with a medical diagnosis of Closed displaced intertrochanteric fracture of left femur with routine healing . Pt tolerated well, pt reported increased B upper groin pain upon entry to room. Pt agreeable with encouragement for amb x 2 trials and propelling LBE. Pt would continue to benefit from skilled PT services to improve overall functional mobility, strength and endurance.      Performance deficits affecting function: weakness, impaired endurance, impaired self care skills, impaired functional mobility, gait instability, impaired balance, decreased upper extremity function, decreased lower extremity function, decreased ROM, pain, impaired cardiopulmonary response to activity, orthopedic precautions.    Rehab Potential is good    Activity Tolerance: Good    Plan:     Patient to be seen 5 x/week to address the above listed problems via gait training, therapeutic activities, therapeutic exercises, wheelchair management/training    Plan of Care Expires: 06/02/24  Plan of Care Reviewed with: patient    Subjective     Pt agreeable for PT with encouragement .     Pain/Comfort:  Pain Rating 1: 5/10  Location - Side 1: Bilateral  Location - Orientation 1: upper  Location 1: groin  Pain Addressed 1:  Reposition, Distraction, Cessation of Activity  Pain Rating Post-Intervention 1:  (increased with ambulation/LBE)    Patient's cultural, spiritual, Islam conflicts given the current situation:  no    Objective:     Patient found up in chair with  (no lines, in W/C) upon PT entry to room.     Therapeutic Activities and Exercises: LBE x 10 min For BLE strengthening, endurance and ROM, 1 rest breaks, mod to light resistance    Patient educated on role of therapy, goals of session, and benefits of out of bed mobility.   Instructed on use of call button and importance of calling nursing staff for assistance with mobility   Questions/concerns addressed within PTA scope of practice  Pt verbalized understanding    Functional Mobility:  Transfers:     Sit to Stand:  minimum assistance with rolling walker. Vc to scoot to edge of chair and proper hand placement on wheelchair arm rests  Bed to Chair: contact guard assistance with  no AD  using  Stand Pivot  Gait: pt amb 32 ft + 19 ft SBA/CGA and RW. Short step length although step through gait. Slow justino. Mild shaking at end of second trial.Seated rest break in between trials.     AM-PAC 6 CLICK MOBILITY  10    Patient left  in BSC  with call button in reach.    GOALS:   Multidisciplinary Problems       Physical Therapy Goals          Problem: Physical Therapy    Goal Priority Disciplines Outcome Goal Variances Interventions   Physical Therapy Goal     PT, PT/OT Progressing     Description: Goals to be met by: 3/10/24     Patient will increase functional independence with mobility by performing:    . Supine to sit with MInimal Assistance  . Sit to supine with MInimal Assistance  . Rolling to Left and Right with Minimal Assistance.  . Sit to stand transfer with Minimal Assistance  . Bed to chair transfer with Minimal Assistance using Rolling Walker  . Gait  x 50 feet with Minimal Assistance using Rolling Walker.   . Wheelchair propulsion x150 feet with Supervision using  bilateral uppper extremities                         Time Tracking:     PT Received On: 05/11/24  PT Start Time: 1034  PT Stop Time: 1112  PT Total Time (min): 38 min    Billable Minutes: Gait Training 13, Therapeutic Activity 13, and Therapeutic Exercise 12    Treatment Type: Treatment  PT/PTA: PTA     Number of PTA visits since last PT visit: 2     05/11/2024

## 2024-05-12 PROCEDURE — 11000004 HC SNF PRIVATE

## 2024-05-12 PROCEDURE — 25000003 PHARM REV CODE 250: Performed by: FAMILY MEDICINE

## 2024-05-12 PROCEDURE — 25000003 PHARM REV CODE 250: Performed by: HOSPITALIST

## 2024-05-12 RX ADMIN — ATENOLOL 25 MG: 25 TABLET ORAL at 09:05

## 2024-05-12 RX ADMIN — ZINC SULFATE 220 MG (50 MG) CAPSULE 220 MG: CAPSULE at 09:05

## 2024-05-12 RX ADMIN — Medication 500 MG: at 09:05

## 2024-05-12 RX ADMIN — MEMANTINE 5 MG: 5 TABLET ORAL at 09:05

## 2024-05-12 RX ADMIN — SPIRONOLACTONE 50 MG: 25 TABLET ORAL at 09:05

## 2024-05-12 RX ADMIN — ACETAMINOPHEN 650 MG: 325 TABLET ORAL at 11:05

## 2024-05-12 RX ADMIN — APIXABAN 2.5 MG: 2.5 TABLET, FILM COATED ORAL at 09:05

## 2024-05-12 RX ADMIN — METHOCARBAMOL 750 MG: 750 TABLET ORAL at 06:05

## 2024-05-12 RX ADMIN — CALCIUM CARBONATE (ANTACID) CHEW TAB 500 MG 1000 MG: 500 CHEW TAB at 09:05

## 2024-05-12 RX ADMIN — THERA TABS 1 TABLET: TAB at 09:05

## 2024-05-12 RX ADMIN — ESCITALOPRAM OXALATE 5 MG: 5 TABLET, FILM COATED ORAL at 09:05

## 2024-05-12 RX ADMIN — ACETAMINOPHEN 650 MG: 325 TABLET ORAL at 06:05

## 2024-05-12 RX ADMIN — FUROSEMIDE 20 MG: 20 TABLET ORAL at 09:05

## 2024-05-12 RX ADMIN — METHOCARBAMOL 750 MG: 750 TABLET ORAL at 11:05

## 2024-05-12 RX ADMIN — CHOLECALCIFEROL TAB 25 MCG (1000 UNIT) 2000 UNITS: 25 TAB at 09:05

## 2024-05-12 RX ADMIN — Medication 9 MG: at 09:05

## 2024-05-12 NOTE — PLAN OF CARE
Problem: Adult Inpatient Plan of Care  Goal: Plan of Care Review  Outcome: Progressing  Goal: Patient-Specific Goal (Individualized)  Outcome: Progressing  Goal: Absence of Hospital-Acquired Illness or Injury  Outcome: Progressing  Goal: Optimal Comfort and Wellbeing  Outcome: Progressing  Goal: Readiness for Transition of Care  Outcome: Progressing     Problem: Acute Kidney Injury/Impairment  Goal: Fluid and Electrolyte Balance  Outcome: Progressing  Goal: Improved Oral Intake  Outcome: Progressing  Goal: Effective Renal Function  Outcome: Progressing     Problem: Wound  Goal: Optimal Coping  Outcome: Progressing  Goal: Optimal Functional Ability  Outcome: Progressing  Goal: Absence of Infection Signs and Symptoms  Outcome: Progressing  Goal: Improved Oral Intake  Outcome: Progressing  Goal: Optimal Pain Control and Function  Outcome: Progressing  Goal: Skin Health and Integrity  Outcome: Progressing  Goal: Optimal Wound Healing  Outcome: Progressing     Problem: Fall Injury Risk  Goal: Absence of Fall and Fall-Related Injury  Outcome: Progressing     Problem: Skin Injury Risk Increased  Goal: Skin Health and Integrity  Outcome: Progressing     Problem: Malnutrition  Goal: Improved Nutritional Intake  Outcome: Progressing

## 2024-05-12 NOTE — PROGRESS NOTES
[facility]                                  Skilled Nursing Facility                   Progress Note     Patient Name: Mary Ellen Miller  YOB: 1930  MRN: [id number]  Room: [place] [id number] [address] A     Admit Date: 5/2/2024   HEAVEN: 5/23/2024     Principal Problem: Closed displaced intertrochanteric fracture of left femur with routine healing    HPI obtained from patient interview and chart review     Chief Complaint: Re-evaluation of medical treatment and therapy status: HTN follow-up, intake and weight monitoring.      HPI:   Mary Ellen Miller is a 89 y.o. F with PMH of HTN, HLD, CHF, and afib on eliquis presenting to the ED with significant left hip pain after a fall. 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 Prior to the incident, she reports intermittent worsening of chronic lower extremity edema and SOB. Her PCP recently discontinued amlodipine in favor of lasix and continuing aldactone, and she has not missed any doses of her home medications.  XR pelvis with mildly displaced L intertrochanteric femur fracture with impaction, minimally displaced comminuted L superior pubic ramus fracture, and nondisplaced fracture of the L inferior pubic ramus. s/p left femur CMN on 4/28/24. Given diuresis and on 1L O2, not on oxygen at home.  PT/OT recommends skilled nursing placement for further therapy.     Patient will be treated at [facility] with PT and OT to improve functional status and ability to perform ADLs.     Interval History  24 hour chart review completed. Pertinent lab, micro, and/or radiology results addressed below. NAEON. NAD.  Afebrile. VSS. HTN: SBP 130s/60s until this morning /76. Monitor.    Lab: Suspect hemolytic anemia as etiology of hyperbilirubinemia which is trending down toward normal  limits.   - Will need referral to heme/onc for more specialized testing -  Monitor CBC and bilirubin with CMP trends.Check d dimer on Monday. Increased clot risk with hemolytic anemia.  Rad: 5/9 BLE Venous US negative for thrombus.    I/O: Daily weight up 2 lbs. Patient reports chronically decreased appetite. Ate 25% of breakfast yesterday, no documentation on intake since and patient cannot recall. She denies nausea / vomiting. Reports diarrhea remains resolved, LBM 5/12. Monitor closely for constipation and consider resuming scheduled senokot which was changed to prn for diarrhea.     Pain management: Patient continues to report that her pain is adequately controlled, however unreliable historian given dementia. Pain management limited by oxycodone allergy. Okay to resume scheduled APAP if indicated.    Skilled Therapy: Patient progressing with therapy as tolerated and would continue to benefit from skilled PT and OT services to improve overall functional mobility and independence, strength, endurance, and safety.        Past Medical History: Patient has a past medical history of Anxiety, Atrial fibrillation, Colon polyps (05/01/2012), Hypertension, Leg ulcer, right, limited to breakdown of skin (11/20/2019), and Unilateral femoral hernia with obstruction, without gangrene (10/26/2015).    Past Surgical History: Patient has a past surgical history that includes Radical hysterectomy (1990); Lung surgery (1988); Hysterectomy; Oophorectomy; and Intramedullary rodding of femur (Left, 4/28/2024).    Social History: Patient reports that she has never smoked. She has never been exposed to tobacco smoke. She has never used smokeless tobacco. She reports that she does not drink alcohol and does not use drugs.    Family History:  family history includes No Known Problems in her father and mother.    Allergies: Patient is allergic to percodan [oxycodone hcl-oxycodone-asa], penicillins, sulfa (sulfonamide antibiotics), and  amoxicillin.        ROS  Constitutional:  Positive for activity change. Negative for chills and fever.   HENT:  Negative for trouble swallowing.    Eyes:  Negative for photophobia and visual disturbance.   Respiratory: Negative for chest tightness or shortness of breath.    Cardiovascular:  Negative for chest pain and palpitations.   Gastrointestinal: Negative for abdominal pain, constipation, diarrhea, nausea and vomiting.   Genitourinary:  Negative for dysuria, frequency and hematuria.   Musculoskeletal:  Positive for arthralgias, back pain and gait problem. Negative for neck pain.   Skin:  Negative for rash and wound.   Neurological:  Positive for weakness. Negative for dizziness, syncope, speech difficulty and light-headedness.   Psychiatric/Behavioral:  Positive for confusion (intermittent). Negative for agitation. The patient is not nervous/anxious.     24 hour Vital Sign Range   Temp:  [97.6 °F (36.4 °C)-97.9 °F (36.6 °C)]   Pulse:  [75-85]   Resp:  [18-19]   BP: (130-159)/(60-76)   SpO2:  [96 %-97 %]       Physical Exam  Constitutional:       General: She is not in acute distress. Appears fatigued     Appearance: She is well-developed. She is not toxic-appearing.   HENT:      Head: Normocephalic.      Mouth/Throat:      Mouth: Mucous membranes are moist.   Eyes:      Extraocular Movements: Extraocular movements intact.      Conjunctiva/sclera: Conjunctivae normal.      Pupils: Pupils are equal, round, and reactive to light.   Cardiovascular:      Rate and Rhythm: Regular rate. Rhythm irregular.      Heart sounds: Normal heart sounds.   Pulmonary:      Effort: Pulmonary effort is normal. No respiratory distress. Lungs clear     Breath sounds: No wheezing.      Comments: RA  Abdominal:      General: Bowel sounds are normal.      Palpations: Abdomen is soft.      Tenderness: There is no abdominal tenderness.   Musculoskeletal:         General: Normal range of motion.      Cervical back: Normal range of motion  "and neck supple.   Skin:     General: Skin is warm and dry.      Capillary Refill: Capillary refill takes less than 2 seconds.      Findings: Bruising present. No rash. Left anterior incisional wound.  Neurological:      Mental Status: She is alert and oriented to person, place, and time. Forgetful of recent details     Cranial Nerves: No cranial nerve deficit.      Sensory: No sensory deficit.      Coordination: Coordination normal.   Psychiatric:         Behavior: Behavior normal.              Labs:  Recent Labs   Lab 05/06/24 0459 05/08/24 0436 05/09/24 0424   WBC 10.47 8.62 8.05   HGB 9.1* 8.3* 8.5*   HCT 28.8* 25.1* 27.0*    255 286     Recent Labs   Lab 05/06/24 0459 05/07/24 0343 05/08/24 0436 05/09/24 0424    137 135* 136   K 4.1 3.9 4.0 4.0    109 110 107   CO2 18* 17* 18* 19*   BUN 37* 33* 28 31*   CREATININE 0.9 0.9 0.9 0.8   GLU 94 89 88 76   CALCIUM 8.4* 8.7 8.2* 8.3*   MG 2.0  --   --  1.8   PHOS 3.7  --   --  3.3     Recent Labs   Lab 05/07/24 0343 05/08/24 0436 05/09/24 0424   ALKPHOS 69 64 68   ALT 19 18 19   AST 26 21 27   ALBUMIN 3.1* 2.6* 2.6*   PROT 6.7 5.4* 5.6*   BILITOT 1.6* 1.4* 1.3*       No results for input(s): "POCTGLUCOSE" in the last 72 hours.    Meds Scheduled:   apixaban  2.5 mg Oral BID    ascorbic acid (vitamin C)  500 mg Oral BID    atenoloL  25 mg Oral Daily    calcium carbonate  1,000 mg Oral Daily    EScitalopram oxalate  5 mg Oral Daily    furosemide  20 mg Oral Daily    melatonin  9 mg Oral Nightly    memantine  5 mg Oral BID    multivitamin  1 tablet Oral Daily    spironolactone  50 mg Oral Daily    vitamin D  2,000 Units Oral Daily    zinc sulfate  220 mg Oral Daily       PRN:     Current Facility-Administered Medications:     acetaminophen, 650 mg, Oral, Q6H PRN    calcium carbonate, 500 mg, Oral, BID PRN    methocarbamoL, 750 mg, Oral, Q6H PRN    ondansetron, 8 mg, Oral, Q8H PRN    senna-docusate 8.6-50 mg, 1 tablet, Oral, BID " "PRN      Assessment and Plan:    Closed displaced intertrochanteric fracture of left femur   -s/p CMN on 4/28/24 Dr. Kurt Melendez   -Pain control: multimodal  -PT/OT consult: WBAT LLE  -DVT PPx: Eliquis 2.5 mg BID, SCDs at all times when not ambulating    Hemolytic Anemia  Hyperbilirubinemia  POA  - Suspect hemolytic anemia as etiology of hyperbilirubinemia.  5/6: T bili obtained inpt on 4/27/24: 1.2  - Stop q 8 hour 1000 mg APAP, continue 650 mg APAP prn.  - T bili elevated to 1.5, direct bili 0.5. ALP, AST, ALT all WNL.   5/7: Acute hepatitis panel negative.   - T bili continues to rise.   - Patient is asymptomatic:   - Given ALP, aminotransferases, and GGT are normal, hyperbili not likely due to hepatic injury or biliary tract disease.   - Eval for hemolytic anemia: in am obtain retic, LDH, and haptoglobin, CBC Diff with peripheral smear if lab can accommodate, and CMP.  5/8: T bili stabilizing at 1.4 from 1.6 yesterday  direct bili 0.6.   - Hgb 8.3 from 9.1  - LDH and haptoglobin WNL, however elevated retic 5.8 and peripheral smear notes "red cells show mild anisocytosis with a subset of small spherocytes" Spherocytes suggestive of Autoimmune hemolytic anemia (AIHA) or Hereditary spherocytosis (HS).   - Obtain GUERDA (Gordon) and type/screen in am with repeat cbc diff, cmp  5/9: Direct and indirect Gordon negative.   - AIHA associated with increased risk of thrombus. Obtain d dimer with next lab draw. If d dimer positive will need to obtain BLE Venous US.   - Will need referral to heme/onc for more specialized testing.   - Continue to routinely monitor CBC and bilirubin with CMP trends.    Metabolic Acidosis  New 5/8. Na 135  CO2 18  - Start 1300 mg na bicarb po bid x 3 days  - Monitor with lab and adjust prn    Diarrhea  New 5/8, improving  - denies nausea / vomiting  - denies abdominal cramping or discomfort.   - Changed senokot from bid to bid prn.  - Encouraged fluids    Severe Protein Calorie " Malnutrition  Hypoalbuminemia 2/2 Protein Calorie Malnutrition  POA, unstable  - RD consulted, ongoing collaboration for optimized nutrition   - Diet: regular diet  - Assist with meals  - Encourage intake   - Dietary Supplements: Boost TID  - Vitamin and Mineral Supplements: Zinc, Vit C, and Multivitamin     Altered mental status  Had some confusion inpatient, UA was negative  Patient does not remember falling at home  Delirium precautions currently oriented x4  Decreased home lexapro from 10mg to 5mg d/t advanced age    Acute hypoxemic respiratory failure  Continue diuretics as tolerated  Wean O2 as tolerated to maintain sat >90%    Long term (current) use of anticoagulants  Eliquis for Afib, reduced to 2.5mg dose meets criteria     Acute on chronic diastolic heart failure  Continue Lasix 20mg, atenolol 25mg and spironolactone 50mg daily with parameters d/t recent hypotension  Follow up cardiology outpatient    Recurrent major depressive disorder, in full remission  - Continue home lexapro & namenda   -Reduce lexapro to 5mg d/t recent confusion and advanced age     Essential hypertension  Continue Lasix 20mg, atenolol 25mg and spironolactone 50mg daily with parameters d/t recent hypotension    Advanced Care Planning  This was a voluntary visit and the option to decline counseling was given  Length of discussion: 16 min  Life limiting problem:hip fx, afib, advanced age  Topics discussed:code status  Outcome of discussion:Confirms DNR  Decision Maker: Patient       IP [facility] RISK OF UNPLANNED READMISSION Model: LOW     Anticipate disposition:    Home with home health      Follow-up needed during SNF admission:   Ortho    Follow-up NOT needed during SNF admission: 5/20 [facility]    Follow-up needed after discharge from SNF: Heme/Onc  - PCP within 1-2 weeks  - See appt scheduled below     Future Appointments   Date Time Provider Department Center   5/13/2024  1:00 PM Any Cantu PA-C [facility]   6/25/2024   3:00 PM Dionne Jeter MD [facility] I certify that SNF services are required to be given on an inpatient basis because Mary Ellen Miller needs for skilled nursing care and/or skilled rehabilitation are required on a daily basis and such services can only practically be provided in a skilled nursing facility setting and are for an ongoing condition for which she received inpatient care in the hospital.         Christa Loaiza NP  Department of Hospital Medicine   [facility]     DOS: 5/12/2024       Patient note was created using MModal Dictation.  Any errors in syntax or even information may not have been identified and edited on initial review prior to signing this note.

## 2024-05-13 ENCOUNTER — TELEPHONE (OUTPATIENT)
Dept: FAMILY MEDICINE | Facility: CLINIC | Age: 89
End: 2024-05-13
Payer: MEDICARE

## 2024-05-13 LAB
ALBUMIN SERPL BCP-MCNC: 2.9 G/DL (ref 3.5–5.2)
ALP SERPL-CCNC: 117 U/L (ref 55–135)
ALT SERPL W/O P-5'-P-CCNC: 15 U/L (ref 10–44)
ANION GAP SERPL CALC-SCNC: 10 MMOL/L (ref 8–16)
AST SERPL-CCNC: 17 U/L (ref 10–40)
BASOPHILS # BLD AUTO: 0.05 K/UL (ref 0–0.2)
BASOPHILS NFR BLD: 0.8 % (ref 0–1.9)
BILIRUB SERPL-MCNC: 1.3 MG/DL (ref 0.1–1)
BUN SERPL-MCNC: 21 MG/DL (ref 10–30)
CALCIUM SERPL-MCNC: 8.3 MG/DL (ref 8.7–10.5)
CHLORIDE SERPL-SCNC: 109 MMOL/L (ref 95–110)
CO2 SERPL-SCNC: 21 MMOL/L (ref 23–29)
CREAT SERPL-MCNC: 0.8 MG/DL (ref 0.5–1.4)
DIFFERENTIAL METHOD BLD: ABNORMAL
EOSINOPHIL # BLD AUTO: 0.1 K/UL (ref 0–0.5)
EOSINOPHIL NFR BLD: 1.9 % (ref 0–8)
ERYTHROCYTE [DISTWIDTH] IN BLOOD BY AUTOMATED COUNT: 17.3 % (ref 11.5–14.5)
EST. GFR  (NO RACE VARIABLE): >60 ML/MIN/1.73 M^2
GLUCOSE SERPL-MCNC: 81 MG/DL (ref 70–110)
HCT VFR BLD AUTO: 30.6 % (ref 37–48.5)
HGB BLD-MCNC: 9.3 G/DL (ref 12–16)
IMM GRANULOCYTES # BLD AUTO: 0.05 K/UL (ref 0–0.04)
IMM GRANULOCYTES NFR BLD AUTO: 0.8 % (ref 0–0.5)
LYMPHOCYTES # BLD AUTO: 1.1 K/UL (ref 1–4.8)
LYMPHOCYTES NFR BLD: 18.1 % (ref 18–48)
MAGNESIUM SERPL-MCNC: 1.8 MG/DL (ref 1.6–2.6)
MCH RBC QN AUTO: 31 PG (ref 27–31)
MCHC RBC AUTO-ENTMCNC: 30.4 G/DL (ref 32–36)
MCV RBC AUTO: 102 FL (ref 82–98)
MONOCYTES # BLD AUTO: 0.5 K/UL (ref 0.3–1)
MONOCYTES NFR BLD: 8.1 % (ref 4–15)
NEUTROPHILS # BLD AUTO: 4.4 K/UL (ref 1.8–7.7)
NEUTROPHILS NFR BLD: 70.3 % (ref 38–73)
NRBC BLD-RTO: 0 /100 WBC
PHOSPHATE SERPL-MCNC: 3.7 MG/DL (ref 2.7–4.5)
PLATELET # BLD AUTO: 317 K/UL (ref 150–450)
PMV BLD AUTO: 9.9 FL (ref 9.2–12.9)
POTASSIUM SERPL-SCNC: 4.2 MMOL/L (ref 3.5–5.1)
PROT SERPL-MCNC: 5.9 G/DL (ref 6–8.4)
RBC # BLD AUTO: 3 M/UL (ref 4–5.4)
SODIUM SERPL-SCNC: 140 MMOL/L (ref 136–145)
WBC # BLD AUTO: 6.31 K/UL (ref 3.9–12.7)

## 2024-05-13 PROCEDURE — 80053 COMPREHEN METABOLIC PANEL: CPT | Performed by: FAMILY MEDICINE

## 2024-05-13 PROCEDURE — 97530 THERAPEUTIC ACTIVITIES: CPT | Mod: CQ

## 2024-05-13 PROCEDURE — 36415 COLL VENOUS BLD VENIPUNCTURE: CPT | Performed by: HOSPITALIST

## 2024-05-13 PROCEDURE — 97530 THERAPEUTIC ACTIVITIES: CPT

## 2024-05-13 PROCEDURE — 84100 ASSAY OF PHOSPHORUS: CPT | Performed by: HOSPITALIST

## 2024-05-13 PROCEDURE — 97110 THERAPEUTIC EXERCISES: CPT

## 2024-05-13 PROCEDURE — 97116 GAIT TRAINING THERAPY: CPT | Mod: CQ

## 2024-05-13 PROCEDURE — 97535 SELF CARE MNGMENT TRAINING: CPT

## 2024-05-13 PROCEDURE — 85025 COMPLETE CBC W/AUTO DIFF WBC: CPT | Performed by: HOSPITALIST

## 2024-05-13 PROCEDURE — 25000003 PHARM REV CODE 250: Performed by: HOSPITALIST

## 2024-05-13 PROCEDURE — 25000003 PHARM REV CODE 250: Performed by: FAMILY MEDICINE

## 2024-05-13 PROCEDURE — 83735 ASSAY OF MAGNESIUM: CPT | Performed by: HOSPITALIST

## 2024-05-13 PROCEDURE — 11000004 HC SNF PRIVATE

## 2024-05-13 PROCEDURE — 97110 THERAPEUTIC EXERCISES: CPT | Mod: CQ

## 2024-05-13 RX ORDER — METHOCARBAMOL 500 MG/1
500 TABLET, FILM COATED ORAL 4 TIMES DAILY
Status: DISCONTINUED | OUTPATIENT
Start: 2024-05-13 | End: 2024-05-23 | Stop reason: HOSPADM

## 2024-05-13 RX ORDER — ESCITALOPRAM OXALATE 5 MG/1
5 TABLET ORAL EVERY OTHER DAY
Status: COMPLETED | OUTPATIENT
Start: 2024-05-15 | End: 2024-05-23

## 2024-05-13 RX ORDER — ACETAMINOPHEN 325 MG/1
650 TABLET ORAL 3 TIMES DAILY
Status: DISCONTINUED | OUTPATIENT
Start: 2024-05-13 | End: 2024-05-23 | Stop reason: HOSPADM

## 2024-05-13 RX ADMIN — CALCIUM CARBONATE (ANTACID) CHEW TAB 500 MG 1000 MG: 500 CHEW TAB at 09:05

## 2024-05-13 RX ADMIN — Medication 500 MG: at 09:05

## 2024-05-13 RX ADMIN — MEMANTINE 5 MG: 5 TABLET ORAL at 08:05

## 2024-05-13 RX ADMIN — MEMANTINE 5 MG: 5 TABLET ORAL at 09:05

## 2024-05-13 RX ADMIN — APIXABAN 2.5 MG: 2.5 TABLET, FILM COATED ORAL at 09:05

## 2024-05-13 RX ADMIN — ESCITALOPRAM OXALATE 5 MG: 5 TABLET, FILM COATED ORAL at 09:05

## 2024-05-13 RX ADMIN — THERA TABS 1 TABLET: TAB at 09:05

## 2024-05-13 RX ADMIN — ZINC SULFATE 220 MG (50 MG) CAPSULE 220 MG: CAPSULE at 09:05

## 2024-05-13 RX ADMIN — Medication 500 MG: at 10:05

## 2024-05-13 RX ADMIN — ACETAMINOPHEN 650 MG: 325 TABLET ORAL at 03:05

## 2024-05-13 RX ADMIN — Medication 9 MG: at 08:05

## 2024-05-13 RX ADMIN — APIXABAN 2.5 MG: 2.5 TABLET, FILM COATED ORAL at 08:05

## 2024-05-13 RX ADMIN — CHOLECALCIFEROL TAB 25 MCG (1000 UNIT) 2000 UNITS: 25 TAB at 09:05

## 2024-05-13 RX ADMIN — ACETAMINOPHEN 650 MG: 325 TABLET ORAL at 09:05

## 2024-05-13 RX ADMIN — METHOCARBAMOL 500 MG: 500 TABLET ORAL at 05:05

## 2024-05-13 RX ADMIN — METHOCARBAMOL 500 MG: 500 TABLET ORAL at 08:05

## 2024-05-13 NOTE — PT/OT/SLP PROGRESS
"Physical Therapy Treatment    Patient Name:  Mary Ellen Miller   MRN:  [id number]  Admit Date: 5/2/2024  Admitting Diagnosis: Closed displaced intertrochanteric fracture of left femur with routine healing  Recent Surgeries: INSERTION, INTRAMEDULLARY LENO, FEMUR: Left (Left)     General Precautions: Standard, fall  Orthopedic Precautions: LLE weight bearing as tolerated  Braces: N/A    Recommendations:     Discharge Recommendations: home health PT  Level of Assistance Recommended at Discharge: 24 hours significant assistance  Discharge Equipment Recommendations: bedside commode, walker, rolling, wheelchair  Barriers to discharge: Decreased caregiver support    Assessment:     Mary Ellen Miller is a 89 y.o. female admitted with a medical diagnosis of Closed displaced intertrochanteric fracture of left femur with routine healing . Pt tolerated well, pt completed STS with CGA/Min A and RW. Pt amb x 3 trials with encouragement and CGA due to ligth shakiness when standing/ambulating. Pt completed seated TE with light discomfort in LLE. Pt progressing and would continue to benefit from skilled PT services to improve overall functional mobility, strength and endurance.      Performance deficits affecting function: weakness, impaired endurance, impaired self care skills, impaired functional mobility, gait instability, impaired balance, decreased upper extremity function, decreased lower extremity function, decreased ROM, pain, impaired cardiopulmonary response to activity, orthopedic precautions.    Rehab Potential is good    Activity Tolerance: Good    Plan:     Patient to be seen 5 x/week to address the above listed problems via gait training, therapeutic activities, therapeutic exercises, wheelchair management/training    Plan of Care Expires: 06/02/24  Plan of Care Reviewed with: patient    Subjective     "Thanks for pushing me".     Pain/Comfort:  Pain Rating 1: 5/10  Location - Side 1: Bilateral  Location - " Orientation 1: upper  Location 1: groin  Pain Addressed 1: Reposition, Distraction, Cessation of Activity  Pain Rating Post-Intervention 1:  (not rated)    Patient's cultural, spiritual, Advent conflicts given the current situation:  no    Objective:     Patient found up in chair with  (no LIines) upon PT entry to room.     Therapeutic Activities and Exercises: seated TE: Hip flex (RLE only), LAQ, hip abd/add, HS curls (RTB) x 20-30 reps on RLE and x 20 reps on LLE.  LLE weakness/discomfort     Patient educated on role of therapy, goals of session, and benefits of out of bed mobility.   Instructed on use of call button and importance of calling nursing staff for assistance with mobility   Questions/concerns addressed within PTA scope of practice  Pt verbalized understanding.    Functional Mobility:  Transfers:     Chair to chair: CGA with RW, step transfer   Sit to Stand:  contact guard assistance and minimum assistance with rolling walker. Vc to scoot to edge of chair and hand placement/ sequencing   Gait: pt amb 20 ft + 43 ft + 38 ft CGA with RW. Slow justino, short step length. No LOB. Seated rest break in between trials. Pt mildly shaky when upright    AM-PAC 6 CLICK MOBILITY  10    Patient left  in bedside chair  with call button in reach.    GOALS:   Multidisciplinary Problems       Physical Therapy Goals          Problem: Physical Therapy    Goal Priority Disciplines Outcome Goal Variances Interventions   Physical Therapy Goal     PT, PT/OT Progressing     Description: Goals to be met by: 3/10/24     Patient will increase functional independence with mobility by performing:    . Supine to sit with MInimal Assistance  . Sit to supine with MInimal Assistance  . Rolling to Left and Right with Minimal Assistance.  . Sit to stand transfer with Minimal Assistance  . Bed to chair transfer with Minimal Assistance using Rolling Walker  . Gait  x 50 feet with Minimal Assistance using Rolling Walker.   . Wheelchair  propulsion x150 feet with Supervision using bilateral uppper extremities                         Time Tracking:     PT Received On: 05/13/24  PT Start Time: 1102  PT Stop Time: 1146  PT Total Time (min): 44 min    Billable Minutes: Gait Training 21, Therapeutic Activity 08, and Therapeutic Exercise 15    Treatment Type: Treatment  PT/PTA: PTA     Number of PTA visits since last PT visit: 3     05/13/2024

## 2024-05-13 NOTE — TELEPHONE ENCOUNTER
Returned phone call for Dary at [facility]; She states she sent over a refill for pt; I advised her that we have received the refills and Dr. Lehman will review pt chart and make a decision to sign the refills, If she do decide to sign the refills they will faxed back over on today

## 2024-05-13 NOTE — PT/OT/SLP PROGRESS
Occupational Therapy   Treatment    Name: Mary Ellen Miller  MRN: [id number]  Admit Date: 5/2/2024  Admitting Diagnosis:  Closed displaced intertrochanteric fracture of left femur with routine healing    General Precautions: Standard, fall   Orthopedic Precautions: LLE weight bearing as tolerated   Braces: N/A    Recommendations:     Discharge Recommendations:  other (see comments) (to be futher assessed)  Level of Assistance Recommended at Discharge: 24 hours light assistance for ADL's and homemaking tasks  Discharge Equipment Recommendations: walker, rolling, wheelchair, hip kit, bedside commode  Barriers to discharge:  Decreased caregiver support    Assessment:     Mary Ellen Miller is a 89 y.o. female with a medical diagnosis of Closed displaced intertrochanteric fracture of left femur with routine healing .  She presents with performance deficits affecting function including weakness, impaired endurance, impaired self care skills, impaired functional mobility, gait instability, impaired balance, orthopedic precautions, decreased lower extremity function, decreased coordination.   Pt tolerated Tx without incident and is making progress but continues to require assist to perform self care tasks, functional mobility and functional transfers . Pt reporting significant (L) groin pain with transitional movements.  She would continue to benefit from OT intervention to further her functional (I)ce and safety.     Rehab Potential is good    Activity tolerance:  Good    Plan:     Patient to be seen 5 x/week to address the above listed problems via self-care/home management, therapeutic activities, therapeutic exercises    Plan of Care Expires: 06/02/24  Plan of Care Reviewed with: patient    Subjective     Communicated with: nurse prior to session.  .    Pain/Comfort:  Pain Rating 1: 5/10  Location - Side 1: Bilateral  Location - Orientation 1: upper  Location 1: groin  Pain Addressed 1: Pre-medicate for activity,  Reposition, Distraction, Cessation of Activity  Pain Rating Post-Intervention 1: 4/10    Patient's cultural, spiritual, Worship conflicts given the current situation:  no    Objective:     Patient found up in chair with   upon OT entry to room.    Bed Mobility:    Pt seated in bedside chair at onset of therapy session.      Functional Mobility/Transfers:  Patient completed Sit <> Stand Transfer with moderate assistance  with  rolling walker   Patient completed Bed <> Chair Transfer using Stand Pivot technique with moderate assistance with rolling walker    Activities of Daily Living:  Lower Body Dressing: contact guard assistance with Pt introduced to use of hip kit to perform donning/doffing of pants, socks and slip on shoes.  Reviewed with Pt on [employer] which hip kit is recommended with info provided to Pt's daughter for ordering hip kit.  V/Cs required for proper use of hip kit.     [facility] 6 Click ADL: 17    OT Exercises: Pt performed UBE exercise for 10 minutes with  Mod resistance.  UE exercises performed to increase functional endurance and strength in order increase independence when performing self care tasks, functional ambulation, W/C propulsion, and functional standing activities .     Treatment & Education:  Pt and her daughter edu on where to obtain hip kir as well as which kit would be most beneficial. Pt educated on safety when performing self care tasks using hip kit, and safety when performing functional transfers and transitioning from sitting to standing.    - White board updated  - Self care tasks completed-- as noted above      Patient left up in chair with call button in reach and daughter present    GOALS:   Multidisciplinary Problems       Occupational Therapy Goals          Problem: Occupational Therapy    Goal Priority Disciplines Outcome Interventions   Occupational Therapy Goal     OT, PT/OT Progressing    Description: Goals to be met by 6/2/2024:    Patient will increase functional  independence with ADLs by performing:     UBD: SPV seated in w/c or EOB- ongoing   LBD: Min A using AE seated in w/c or EOB- ongoing  FW: MI using AE seated in w/c- ongoing  Showering: Min A seated in w/c or EOB or sinkside- ongoing  Toileting: Min A seated on BSC- ongoing   Supine to sit: Min A- ongoing  Sit to stand: Min A with RW- ongoing                             Time Tracking:     OT Date of Treatment: 05/13/24  OT Start Time: 1410    OT Stop Time: 1455  OT Total Time (min): 45 min    Billable Minutes:Self Care/Home Management 20  Therapeutic Activity 15  Therapeutic Exercise 10    5/13/2024

## 2024-05-13 NOTE — PROGRESS NOTES
[facility]                                  Skilled Nursing Facility                   Progress Note     Patient Name: Mary Ellen Miller  YOB: 1930  MRN: [id number]  Room: [place] [address] [address] [address]     Admit Date: 5/2/2024   HEAVEN: 5/23/2024     Principal Problem: Closed displaced intertrochanteric fracture of left femur with routine healing    HPI obtained from patient interview and chart review     Chief Complaint: Re-evaluation of medical treatment and therapy status: lab review, pain control, depression      HPI:   Mary Ellen Miller is a 89 y.o. F with PMH of HTN, HLD, CHF, and afib on eliquis presenting to the ED with significant left hip pain after a fall. She lives alone at 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 Prior to the incident, she reports intermittent worsening of chronic lower extremity edema and SOB. Her PCP recently discontinued amlodipine in favor of lasix and continuing aldactone, and she has not missed any doses of her home medications.  XR pelvis with mildly displaced L intertrochanteric femur fracture with impaction, minimally displaced comminuted L superior pubic ramus fracture, and nondisplaced fracture of the L inferior pubic ramus. s/p left femur CMN on 4/28/24. Given diuresis and on 1L O2, not on oxygen at home.  PT/OT recommends skilled nursing placement for further therapy.     Patient will be treated at [facility] with PT and OT to improve functional status and ability to perform ADLs.     Interval History  24 hour chart review completed. Pertinent lab, micro, and/or radiology results addressed below. NAEON. NAD.  PHQ9, depression in remission continue to wean off lexapro prior to DC. Reduce to 5mg QOD x5 doses  Afebrile. VSS. HTN: 124/58  Lab: Suspect hemolytic anemia as etiology of  hyperbilirubinemia which is trending down toward normal limits. 1.3  - Will need referral to heme/onc for more specialized testing -  Monitor CBC and bilirubin with CMP trends.Check d dimer on Thurs, Increased clot risk with hemolytic anemia.  Pain management: Patient continues to report that her pain is bothersome. Pain management limited by oxycodone allergy.  will scheduled APAP and robaxin QID  Skilled Therapy: Patient progressing with therapy as tolerated and would continue to benefit from skilled PT and OT services to improve overall functional mobility and independence, strength, endurance, and safety.        Past Medical History: Patient has a past medical history of Anxiety, Atrial fibrillation, Colon polyps (05/01/2012), Hypertension, Leg ulcer, right, limited to breakdown of skin (11/20/2019), and Unilateral femoral hernia with obstruction, without gangrene (10/26/2015).    Past Surgical History: Patient has a past surgical history that includes Radical hysterectomy (1990); Lung surgery (1988); Hysterectomy; Oophorectomy; and Intramedullary rodding of femur (Left, 4/28/2024).    Social History: Patient reports that she has never smoked. She has never been exposed to tobacco smoke. She has never used smokeless tobacco. She reports that she does not drink alcohol and does not use drugs.    Family History:  family history includes No Known Problems in her father and mother.    Allergies: Patient is allergic to percodan [oxycodone hcl-oxycodone-asa], penicillins, sulfa (sulfonamide antibiotics), and amoxicillin.        ROS  Constitutional:  Positive for activity change. Negative for chills and fever.   HENT:  Negative for trouble swallowing.    Eyes:  Negative for photophobia and visual disturbance.   Respiratory: Negative for chest tightness or shortness of breath.    Cardiovascular:  Negative for chest pain and palpitations.   Gastrointestinal: Negative for abdominal pain, constipation, diarrhea, nausea and  vomiting.   Genitourinary:  Negative for dysuria, frequency and hematuria.   Musculoskeletal:  Positive for arthralgias, back pain and gait problem. Negative for neck pain.   Skin:  Negative for rash and wound.   Neurological:  Positive for weakness. Negative for dizziness, syncope, speech difficulty and light-headedness.   Psychiatric/Behavioral:  Positive for confusion (intermittent). Negative for agitation. The patient is not nervous/anxious.     24 hour Vital Sign Range   Temp:  [97.6 °F (36.4 °C)-98 °F (36.7 °C)]   Pulse:  [79-95]   Resp:  [17-18]   BP: (124-158)/(58-78)   SpO2:  [97 %-98 %]       Physical Exam  Constitutional:       General: She is not in acute distress. Appears fatigued     Appearance: She is well-developed. She is not toxic-appearing.   HENT:      Head: Normocephalic.      Mouth/Throat:      Mouth: Mucous membranes are moist.   Eyes:      Extraocular Movements: Extraocular movements intact.      Conjunctiva/sclera: Conjunctivae normal.      Pupils: Pupils are equal, round, and reactive to light.   Cardiovascular:      Rate and Rhythm: Regular rate. Rhythm irregular.      Heart sounds: Normal heart sounds.   Pulmonary:      Effort: Pulmonary effort is normal. No respiratory distress. Lungs clear     Breath sounds: No wheezing.      Comments: RA  Abdominal:      General: Bowel sounds are normal.      Palpations: Abdomen is soft.      Tenderness: There is no abdominal tenderness.   Musculoskeletal:         General: Normal range of motion.      Cervical back: Normal range of motion and neck supple.   Skin:     General: Skin is warm and dry.      Capillary Refill: Capillary refill takes less than 2 seconds.      Findings: Bruising present. No rash. Left anterior incisional wound.  Neurological:      Mental Status: She is alert and oriented to person, place, and time. Forgetful of recent details     Cranial Nerves: No cranial nerve deficit.      Sensory: No sensory deficit.      Coordination:  "Coordination normal.   Psychiatric:         Behavior: Behavior normal.              Labs:  Recent Labs   Lab 05/08/24 0436 05/09/24 0424 05/13/24  0435   WBC 8.62 8.05 6.31   HGB 8.3* 8.5* 9.3*   HCT 25.1* 27.0* 30.6*    286 317     Recent Labs   Lab 05/08/24 0436 05/09/24 0424 05/13/24  0435   * 136 140   K 4.0 4.0 4.2    107 109   CO2 18* 19* 21*   BUN 28 31* 21   CREATININE 0.9 0.8 0.8   GLU 88 76 81   CALCIUM 8.2* 8.3* 8.3*   MG  --  1.8 1.8   PHOS  --  3.3 3.7     Recent Labs   Lab 05/08/24 0436 05/09/24 0424 05/13/24 0435   ALKPHOS 64 68 117   ALT 18 19 15   AST 21 27 17   ALBUMIN 2.6* 2.6* 2.9*   PROT 5.4* 5.6* 5.9*   BILITOT 1.4* 1.3* 1.3*       No results for input(s): "POCTGLUCOSE" in the last 72 hours.    Meds Scheduled:   apixaban  2.5 mg Oral BID    ascorbic acid (vitamin C)  500 mg Oral BID    atenoloL  25 mg Oral Daily    calcium carbonate  1,000 mg Oral Daily    [START ON 5/15/2024] EScitalopram oxalate  5 mg Oral Every other day    furosemide  20 mg Oral Daily    melatonin  9 mg Oral Nightly    memantine  5 mg Oral BID    methocarbamoL  500 mg Oral QID    multivitamin  1 tablet Oral Daily    spironolactone  50 mg Oral Daily    vitamin D  2,000 Units Oral Daily    zinc sulfate  220 mg Oral Daily       PRN:     Current Facility-Administered Medications:     acetaminophen, 650 mg, Oral, Q6H PRN    calcium carbonate, 500 mg, Oral, BID PRN    ondansetron, 8 mg, Oral, Q8H PRN    senna-docusate 8.6-50 mg, 1 tablet, Oral, BID PRN      Assessment and Plan:    Closed displaced intertrochanteric fracture of left femur   -s/p CMN on 4/28/24 Dr. Kurt Melendez   -Pain control: multimodal,   5/13 change robaxin to scheduled and schedule APAP  -PT/OT consult: WBAT LLE  -DVT PPx: Eliquis 2.5 mg BID, SCDs at all times when not ambulating    Hemolytic Anemia  Hyperbilirubinemia  POA  - Suspect hemolytic anemia as etiology of hyperbilirubinemia.  5/6: T bili obtained inpt on 4/27/24: 1.2  - " "Stop q 8 hour 1000 mg APAP, continue 650 mg APAP prn.  - T bili elevated to 1.5, direct bili 0.5. ALP, AST, ALT all WNL.   5/7: Acute hepatitis panel negative.   - T bili continues to rise.   - Patient is asymptomatic:   - Given ALP, aminotransferases, and GGT are normal, hyperbili not likely due to hepatic injury or biliary tract disease.   - Eval for hemolytic anemia: in am obtain retic, LDH, and haptoglobin, CBC Diff with peripheral smear if lab can accommodate, and CMP.  5/8: T bili stabilizing at 1.4 from 1.6 yesterday  direct bili 0.6.   - Hgb 8.3 from 9.1  - LDH and haptoglobin WNL, however elevated retic 5.8 and peripheral smear notes "red cells show mild anisocytosis with a subset of small spherocytes" Spherocytes suggestive of Autoimmune hemolytic anemia (AIHA) or Hereditary spherocytosis (HS).   - Obtain GUERDA (Gordon) and type/screen in am with repeat cbc diff, cmp  5/9: Direct and indirect Gordon negative.   - AIHA associated with increased risk of thrombus. Obtain d dimer with next lab draw. If d dimer positive will need to obtain BLE Venous US.   - Will need referral to heme/onc for more specialized testing.   - Continue to routinely monitor CBC and bilirubin with CMP trends.    Metabolic Acidosis  New 5/8. Na 135  CO2 18  - Start 1300 mg na bicarb po bid x 3 days  - Monitor with lab and adjust prn    Diarrhea  New 5/8, improving  - denies nausea / vomiting  - denies abdominal cramping or discomfort.   - Changed senokot from bid to bid prn.  - Encouraged fluids    Severe Protein Calorie Malnutrition  Hypoalbuminemia 2/2 Protein Calorie Malnutrition  POA, unstable  - RD consulted, ongoing collaboration for optimized nutrition   - Diet: regular diet  - Assist with meals  - Encourage intake   - Dietary Supplements: Boost TID  - Vitamin and Mineral Supplements: Zinc, Vit C, and Multivitamin     Altered mental status  Had some confusion inpatient, UA was negative  Patient does not remember falling at " home  Delirium precautions currently oriented x4  Decreased home lexapro from 10mg to 5mg d/t advanced age    Acute hypoxemic respiratory failure  Continue diuretics as tolerated  Wean O2 as tolerated to maintain sat >90%    Long term (current) use of anticoagulants  Eliquis for Afib, reduced to 2.5mg dose meets criteria     Acute on chronic diastolic heart failure  Continue Lasix 20mg, atenolol 25mg and spironolactone 50mg daily with parameters d/t recent hypotension  Follow up cardiology outpatient    Recurrent major depressive disorder, in full remission  - Continue home lexapro & namenda   -Reduce lexapro to 5mg d/t recent confusion and advanced age     Psychiatric Assessment  Patient Health Questionnaire-9 (PHQ-9)    Date: 5/13/2024      1. Little interest or pleasure in doing things? no,  Not at all                       = 0    2. Feeling down, depressed, or hopeless? no, Not at all                       = 0    3. Trouble falling or staying asleep, or sleeping too much? no, Not at all                       = 0    4. Feeling tired or having little energy? yes, Several days                = 1    5. Poor appetite or overeating? no, Not at all                       = 0    6. Feeling bad about yourself- or that you are a failure or have let yourself or your family down? no, Not at all                       = 0    7. Trouble concentrating on things, such as reading the newspaper or watching TV? no, Not at all                       = 0    8. Moving or speaking so slowly that other people could have noticed? Or the opposite- being so fidgety or restless that you have been moving around a lot more than usual? no, Not at all                       = 0    9. Thoughts that you would be better off dead or of hurting yourself in some way? no, Not at all                       = 0    Total Score: 1     How difficult have these problems made it for you to do your work, take care of things at home, or get along with other  people? no      Screening is Positive or Negative negative         Diagnosis and Treatment Plan:  continue to wean off lexapro prior to DC. Reduce to 5mg QOD x5 doses        Current Medications:   Home Psychiatric Meds:   Psychotherapeutics (From admission, onward)      Start     Stop Route Frequency Ordered    05/15/24 0900  EScitalopram oxalate tablet 5 mg         05/25/24 0859 Oral Every other day 05/13/24 1343              CONSUELO MCCLELLAND, NP        Essential hypertension  Continue Lasix 20mg, atenolol 25mg and spironolactone 50mg daily with parameters d/t recent hypotension    Advanced Care Planning  Outcome of discussion:Confirms DNR    IP [facility] RISK OF UNPLANNED READMISSION Model: MOD    Anticipate disposition:    shelter with home health      Follow-up needed during SNF admission:   Ortho    Follow-up NOT needed during SNF admission: 5/20 [facility]    Follow-up needed after discharge from SNF: Heme/Onc  - PCP within 1-2 weeks  - See appt scheduled below     Future Appointments   Date Time Provider Department Center   6/25/2024  3:00 PM Dionne Jeter MD [facility] [place]         I certify that SNF services are required to be given on an inpatient basis because Mary Ellen Miller needs for skilled nursing care and/or skilled rehabilitation are required on a daily basis and such services can only practically be provided in a skilled nursing facility setting and are for an ongoing condition for which she received inpatient care in the hospital.         LUDY KRAUS  Department of Hospital Medicine   [facility]     DOS: 5/13/2024     Extended Visit  Total time spent: 48 minutes  Description of Time: counseling patient on clinical condition, therapies provided, plan of care, emotional support, coordinating patient care with other care team members, reviewing and interpreting labs and imaging, collaboration with physician, initiating new orders, chart review, and  documentation. See interval hx.     Patient note was created using MModal Dictation.  Any errors in syntax or even information may not have been identified and edited on initial review prior to signing this note.

## 2024-05-13 NOTE — TELEPHONE ENCOUNTER
----- Message from Ricardo Otero sent at 5/13/2024 10:51 AM CDT -----  Type:  RX Refill Request    Who Called: Dary with pharmacy   Refill or New Rx:refill  Preferred Pharmacy with phone number:[place], [place] - [address] [address]    Phone: [phone]  Fax: [phone]  Local or Mail Order:local  Ordering Provider: Dionne Jeter  Would the patient rather a call back or a response via [facility]?   Best Call Back Number:  Additional Information:   vitamin D 3  multivit,calc,mins/iron/folic (ONE-A-DAY WOMENS FORMULA ORAL)

## 2024-05-13 NOTE — PLAN OF CARE
Problem: Adult Inpatient Plan of Care  Goal: Plan of Care Review  Outcome: Progressing  Goal: Patient-Specific Goal (Individualized)  Outcome: Progressing  Goal: Absence of Hospital-Acquired Illness or Injury  Outcome: Progressing  Goal: Optimal Comfort and Wellbeing  Outcome: Progressing  Goal: Readiness for Transition of Care  Outcome: Progressing     Problem: Acute Kidney Injury/Impairment  Goal: Fluid and Electrolyte Balance  Outcome: Progressing  Goal: Improved Oral Intake  Outcome: Progressing  Goal: Effective Renal Function  Outcome: Progressing     Problem: Wound  Goal: Optimal Coping  Outcome: Progressing  Goal: Optimal Functional Ability  Outcome: Progressing  Goal: Absence of Infection Signs and Symptoms  Outcome: Progressing  Goal: Improved Oral Intake  Outcome: Progressing  Goal: Optimal Pain Control and Function  Outcome: Progressing  Goal: Skin Health and Integrity  Outcome: Progressing  Goal: Optimal Wound Healing  Outcome: Progressing     Problem: Fall Injury Risk  Goal: Absence of Fall and Fall-Related Injury  Outcome: Progressing  Intervention: Identify and Manage Contributors  Flowsheets (Taken 5/13/2024 0310)  Self-Care Promotion:   BADL personal objects within reach   BADL personal routines maintained  Medication Review/Management: medications reviewed  Intervention: Promote Injury-Free Environment  Flowsheets (Taken 5/13/2024 0310)  Safety Promotion/Fall Prevention:   assistive device/personal item within reach   side rails raised x 2   instructed to call staff for mobility   family to remain at bedside   Fall Risk signage in place     Problem: Skin Injury Risk Increased  Goal: Skin Health and Integrity  Outcome: Progressing     Problem: Malnutrition  Goal: Improved Nutritional Intake  Outcome: Progressing

## 2024-05-14 ENCOUNTER — TELEPHONE (OUTPATIENT)
Dept: FAMILY MEDICINE | Facility: CLINIC | Age: 89
End: 2024-05-14
Payer: MEDICARE

## 2024-05-14 PROCEDURE — 97530 THERAPEUTIC ACTIVITIES: CPT

## 2024-05-14 PROCEDURE — 97110 THERAPEUTIC EXERCISES: CPT | Mod: CQ

## 2024-05-14 PROCEDURE — 11000004 HC SNF PRIVATE

## 2024-05-14 PROCEDURE — 97110 THERAPEUTIC EXERCISES: CPT

## 2024-05-14 PROCEDURE — 25000003 PHARM REV CODE 250: Performed by: FAMILY MEDICINE

## 2024-05-14 PROCEDURE — 25000003 PHARM REV CODE 250: Performed by: HOSPITALIST

## 2024-05-14 PROCEDURE — 97116 GAIT TRAINING THERAPY: CPT | Mod: CQ

## 2024-05-14 RX ADMIN — ACETAMINOPHEN 650 MG: 325 TABLET ORAL at 02:05

## 2024-05-14 RX ADMIN — ACETAMINOPHEN 650 MG: 325 TABLET ORAL at 09:05

## 2024-05-14 RX ADMIN — SPIRONOLACTONE 50 MG: 25 TABLET ORAL at 08:05

## 2024-05-14 RX ADMIN — APIXABAN 2.5 MG: 2.5 TABLET, FILM COATED ORAL at 08:05

## 2024-05-14 RX ADMIN — CALCIUM CARBONATE (ANTACID) CHEW TAB 500 MG 1000 MG: 500 CHEW TAB at 08:05

## 2024-05-14 RX ADMIN — Medication 500 MG: at 09:05

## 2024-05-14 RX ADMIN — THERA TABS 1 TABLET: TAB at 08:05

## 2024-05-14 RX ADMIN — MEMANTINE 5 MG: 5 TABLET ORAL at 09:05

## 2024-05-14 RX ADMIN — METHOCARBAMOL 500 MG: 500 TABLET ORAL at 09:05

## 2024-05-14 RX ADMIN — METHOCARBAMOL 500 MG: 500 TABLET ORAL at 12:05

## 2024-05-14 RX ADMIN — CHOLECALCIFEROL TAB 25 MCG (1000 UNIT) 2000 UNITS: 25 TAB at 08:05

## 2024-05-14 RX ADMIN — APIXABAN 2.5 MG: 2.5 TABLET, FILM COATED ORAL at 09:05

## 2024-05-14 RX ADMIN — MEMANTINE 5 MG: 5 TABLET ORAL at 08:05

## 2024-05-14 RX ADMIN — METHOCARBAMOL 500 MG: 500 TABLET ORAL at 08:05

## 2024-05-14 RX ADMIN — ZINC SULFATE 220 MG (50 MG) CAPSULE 220 MG: CAPSULE at 08:05

## 2024-05-14 RX ADMIN — FUROSEMIDE 20 MG: 20 TABLET ORAL at 08:05

## 2024-05-14 RX ADMIN — ACETAMINOPHEN 650 MG: 325 TABLET ORAL at 08:05

## 2024-05-14 RX ADMIN — Medication 500 MG: at 08:05

## 2024-05-14 RX ADMIN — ATENOLOL 25 MG: 25 TABLET ORAL at 08:05

## 2024-05-14 RX ADMIN — METHOCARBAMOL 500 MG: 500 TABLET ORAL at 05:05

## 2024-05-14 RX ADMIN — Medication 9 MG: at 09:05

## 2024-05-14 NOTE — PT/OT/SLP PROGRESS
Occupational Therapy   Treatment    Name: Mary Ellen Miller  MRN: [id number]  Admit Date: 5/2/2024  Admitting Diagnosis:  Closed displaced intertrochanteric fracture of left femur with routine healing    General Precautions: Standard, fall   Orthopedic Precautions: LLE weight bearing as tolerated   Braces: N/A    Recommendations:     Discharge Recommendations:  other (see comments) (to be futher assessed)  Level of Assistance Recommended at Discharge: 24 hours light assistance for ADL's and homemaking tasks  Discharge Equipment Recommendations: walker, rolling, wheelchair, hip kit, bedside commode  Barriers to discharge:  Decreased caregiver support    Assessment:     Mary Ellen Miller is a 89 y.o. female with a medical diagnosis of Closed displaced intertrochanteric fracture of left femur with routine healing. Pt tolerated session well and without incident and shows excellent motivation and potential to improve, but the pt continues to require assistance to perform self-care tasks and mobility. Pt strengths include Functional cognition, Following multi-step tasks, and Attention to task and PLOF, Motivation, and Willingness to participate. Pt participated in therapeutic tasks to improve core strength/mobility, footwear tasks, UE strength, functional reach, GMS, and FMS. However, pt would continue to benefit from cont'd OT services in the SNF setting to improve safety and independence /c functional tasks and ADLs upon discharge. Performance deficits affecting function are weakness, impaired endurance, impaired self care skills, impaired functional mobility, gait instability, impaired balance, orthopedic precautions, decreased lower extremity function, decreased coordination.     Rehab Potential is good    Activity tolerance:  Good    Plan:     Patient to be seen 5 x/week to address the above listed problems via self-care/home management, therapeutic activities, therapeutic exercises    Plan of Care Expires:  "06/02/24  Plan of Care Reviewed with: patient    Subjective     Communicated with: NSG prior to session.     "I was just moving into assisted living before this. I was doing so good."     "Like an old lady!" Re: pt asked how she was feeling .    Pain/Comfort:  Pain Rating 1: other (see comments) (did not rate; tolerable for participation in therapy)  Location - Side 1: Left  Location - Orientation 1: generalized  Location 1: hip  Pain Addressed 1: Distraction  Pain Rating Post-Intervention 1: other (see comments) (did not rate)    Patient's cultural, spiritual, Baptist conflicts given the current situation:  no    Objective:     Patient found up in chair with  (no lines) upon OT entry to room. Pt alert and agreeable to therapy.         Functional Mobility/Transfers:  Patient completed Sit <> Stand Transfer with contact guard assistance  with  rolling walker and increased effort /c vcs for proper use of RW   Patient completed Chair <> Mat Stand Pivot technique with contact guard assistance with rolling walker and increased effort/time /c difficulty moving LLE. Vcs for step sequence and use of RW  Pt completed WC>BSC transfer /c CGA and RW /c vcs for use of RW and step sequencing      Activities of Daily Living:  Footwear: Min A to doff/don socks and slip on shoes seated /c trunk support and use of AE. Pt educated on adaptive techniques for preforming footwear tasks and proper use if AE. Pt receptive to education and demonstrated appropriately, however req (A) to set-up sock aid and to don shoe to L foot    AMPAC 6 Click ADL: 17    OT Exercises:     ~Pt participated in seated exercises to improve functional reach, core strength/mobility, hand eye coordination, and visual scanning. Pt played "fishing game" that required pt flex trunk to hook "fish" onto "edyta", extend torso to doff "fish" from "edyta" and then rotate trunk reach cross-body to place fish into "bucket."  Pt able to complete all trials of task seated " without trunk support /c SBA.     ~Pt preformed 2x10 reps modified crunches seated without trunk support    ~Pt participated in task to improve BUE integration, FMS, GMS, hand-eye coordination, and functional reach. Pt asked to stack and un-stack blocks using BUE while seated /c trunk support and .75 lb wrist weights placed on BUE. Pt req minor assist to stabilize blocks during task.      Treatment & Education:  Pt educated on POC, role of OT, safety, and proper body mechanics for performing functional transfers. Pt performed tasks to improve safety and independence in functional tasks and ADLs as mentioned above.       Patient left up in chair with call button in reach and all needs met    GOALS:   Multidisciplinary Problems       Occupational Therapy Goals          Problem: Occupational Therapy    Goal Priority Disciplines Outcome Interventions   Occupational Therapy Goal     OT, PT/OT Progressing    Description: Goals to be met by 6/2/2024:    Patient will increase functional independence with ADLs by performing:     UBD: SPV seated in w/c or EOB- ongoing   LBD: Min A using AE seated in w/c or EOB- ongoing  FW: MI using AE seated in w/c- ongoing  Showering: Min A seated in w/c or EOB or sinkside- ongoing  Toileting: Min A seated on BSC- ongoing   Supine to sit: Min A- ongoing  Sit to stand: Min A with RW- ongoing                             Time Tracking:     OT Date of Treatment: 05/14/24  OT Start Time: 1259    OT Stop Time: 1339  OT Total Time (min): 40 min    Billable Minutes:Self Care/Home Management 15  Therapeutic Activity 25    5/14/2024

## 2024-05-14 NOTE — TELEPHONE ENCOUNTER
----- Message from Fabby Gan sent at 5/13/2024  4:51 PM CDT -----  Type:  Notifying Provider    Who Called: Pt's daughter in lawYesenia  Does the patient know what this is regarding?: notifying provider that pt was admitted 2 wks ago for hip surgery due to fall and was moved to [facility] for physical therapy, discharge date is set for 05/23  Would the patient rather a call back or a response via [facility]? Call  Best Call Back Number:[phone] pt's daughter in law  Additional Information:

## 2024-05-14 NOTE — PT/OT/SLP PROGRESS
Physical Therapy Treatment    Patient Name:  Mary Ellen Miller   MRN:  [id number]  Admit Date: 5/2/2024  Admitting Diagnosis: Closed displaced intertrochanteric fracture of left femur with routine healing  Recent Surgeries:  INSERTION, INTRAMEDULLARY LENO, FEMUR: Left (Left)     General Precautions: Standard, fall  Orthopedic Precautions: LLE weight bearing as tolerated  Braces: N/A    Recommendations:     Discharge Recommendations: home health PT  Level of Assistance Recommended at Discharge: 24 hours significant assistance  Discharge Equipment Recommendations: bedside commode, walker, rolling, wheelchair  Barriers to discharge: Decreased caregiver support    Assessment:     Mary Ellen Miller is a 89 y.o. female admitted with a medical diagnosis of Closed displaced intertrochanteric fracture of left femur with routine healing . Pt tolerated well, pt with decreased LLE pain today, pt amb x 3 trials with CGA/SBA. Pt requires vc for hand placement and scooting before ascending from wheelchair. Pt progressing and would continue to benefit from skilled PT services to improve overall functional mobility, strength and endurance.      Performance deficits affecting function: weakness, impaired endurance, impaired self care skills, impaired functional mobility, gait instability, impaired balance, decreased upper extremity function, decreased lower extremity function, decreased ROM, pain, impaired cardiopulmonary response to activity, orthopedic precautions.    Rehab Potential is good    Activity Tolerance: Good    Plan:     Patient to be seen 5 x/week to address the above listed problems via gait training, therapeutic activities, therapeutic exercises, wheelchair management/training    Plan of Care Expires: 06/02/24  Plan of Care Reviewed with: patient    Subjective     Pt agreeable for PT.     Pain/Comfort:  Pain Rating 1:  (little pain reported)  Location - Side 1: Left  Location - Orientation 1:  generalized  Location 1: hip  Pain Addressed 1: Pre-medicate for activity, Reposition, Distraction, Cessation of Activity  Pain Rating Post-Intervention 1:  (little pain reported)    Patient's cultural, spiritual, Sabianism conflicts given the current situation:  no    Objective:      Patient found up in chair with  (no lines) upon PT entry to room.     Therapeutic Activities and Exercises: LBE x 10 min For BLE strengthening, endurance and ROM, 1 rest break, light to mod resistance    Patient educated on role of therapy, goals of session, and benefits of out of bed mobility.   Instructed on use of call button and importance of calling nursing staff for assistance with mobility   Questions/concerns addressed within PTA scope of practice  Pt verbalized understanding    Functional Mobility:  Transfers:     Chair to chair: CGA with RW. End of session due to Pt fatigued   Sit to Stand:  stand by assistance, contact guard assistance, and minimum assistance with rolling walker. Varying levels of assistance when pt adheres to sequencing cues she is able to complete with SBA/CGA. Vc to scoot to edge of chair and hand placement on wheelchair arm rests  Gait: pt amb 90 ft + 100 ft + 60 ft CGA/SBA with RW. Step through gait, slow justino. No LOB. Seated rest break in between trials. Decreased LLE pain today    AM-PAC 6 CLICK MOBILITY  10    Patient left  in bedside chair  with call button in reach.    GOALS:   Multidisciplinary Problems       Physical Therapy Goals          Problem: Physical Therapy    Goal Priority Disciplines Outcome Goal Variances Interventions   Physical Therapy Goal     PT, PT/OT Progressing     Description: Goals to be met by: 3/10/24     Patient will increase functional independence with mobility by performing:    . Supine to sit with MInimal Assistance  . Sit to supine with MInimal Assistance  . Rolling to Left and Right with Minimal Assistance.  . Sit to stand transfer with Minimal Assistance  . Bed to  chair transfer with Minimal Assistance using Rolling Walker  . Gait  x 50 feet with Minimal Assistance using Rolling Walker.   . Wheelchair propulsion x150 feet with Supervision using bilateral uppper extremities                         Time Tracking:     PT Received On: 05/14/24  PT Start Time: 0939  PT Stop Time: 1020  PT Total Time (min): 41 min    Billable Minutes: Gait Training 30 and Therapeutic Exercise 11    Treatment Type: Treatment  PT/PTA: PTA     Number of PTA visits since last PT visit: 4     05/14/2024

## 2024-05-15 ENCOUNTER — TELEPHONE (OUTPATIENT)
Dept: ORTHOPEDICS | Facility: CLINIC | Age: 89
End: 2024-05-15
Payer: MEDICARE

## 2024-05-15 PROCEDURE — 25000003 PHARM REV CODE 250: Performed by: HOSPITALIST

## 2024-05-15 PROCEDURE — 25000003 PHARM REV CODE 250: Performed by: FAMILY MEDICINE

## 2024-05-15 PROCEDURE — 97530 THERAPEUTIC ACTIVITIES: CPT

## 2024-05-15 PROCEDURE — 11000004 HC SNF PRIVATE

## 2024-05-15 PROCEDURE — 97110 THERAPEUTIC EXERCISES: CPT

## 2024-05-15 RX ADMIN — Medication 500 MG: at 09:05

## 2024-05-15 RX ADMIN — APIXABAN 2.5 MG: 2.5 TABLET, FILM COATED ORAL at 09:05

## 2024-05-15 RX ADMIN — Medication 9 MG: at 09:05

## 2024-05-15 RX ADMIN — ESCITALOPRAM OXALATE 5 MG: 5 TABLET, FILM COATED ORAL at 09:05

## 2024-05-15 RX ADMIN — ZINC SULFATE 220 MG (50 MG) CAPSULE 220 MG: CAPSULE at 09:05

## 2024-05-15 RX ADMIN — FUROSEMIDE 20 MG: 20 TABLET ORAL at 09:05

## 2024-05-15 RX ADMIN — METHOCARBAMOL 500 MG: 500 TABLET ORAL at 09:05

## 2024-05-15 RX ADMIN — THERA TABS 1 TABLET: TAB at 09:05

## 2024-05-15 RX ADMIN — METHOCARBAMOL 500 MG: 500 TABLET ORAL at 05:05

## 2024-05-15 RX ADMIN — CHOLECALCIFEROL TAB 25 MCG (1000 UNIT) 2000 UNITS: 25 TAB at 09:05

## 2024-05-15 RX ADMIN — ATENOLOL 25 MG: 25 TABLET ORAL at 09:05

## 2024-05-15 RX ADMIN — ACETAMINOPHEN 650 MG: 325 TABLET ORAL at 02:05

## 2024-05-15 RX ADMIN — ACETAMINOPHEN 650 MG: 325 TABLET ORAL at 09:05

## 2024-05-15 RX ADMIN — CALCIUM CARBONATE (ANTACID) CHEW TAB 500 MG 1000 MG: 500 CHEW TAB at 09:05

## 2024-05-15 RX ADMIN — MEMANTINE 5 MG: 5 TABLET ORAL at 09:05

## 2024-05-15 RX ADMIN — METHOCARBAMOL 500 MG: 500 TABLET ORAL at 12:05

## 2024-05-15 RX ADMIN — SPIRONOLACTONE 50 MG: 25 TABLET ORAL at 09:05

## 2024-05-15 NOTE — PT/OT/SLP PROGRESS
Occupational Therapy   Treatment    Name: Mary Ellen Miller  MRN: [id number]  Admit Date: 5/2/2024  Admitting Diagnosis:  Closed displaced intertrochanteric fracture of left femur with routine healing    General Precautions: Standard, fall   Orthopedic Precautions: LLE weight bearing as tolerated   Braces: N/A    Recommendations:     Discharge Recommendations:  other (see comments) (to be futher assessed)  Level of Assistance Recommended at Discharge: 24 hours light assistance for ADL's and homemaking tasks  Discharge Equipment Recommendations: walker, rolling, wheelchair, hip kit, bedside commode  Barriers to discharge:  Decreased caregiver support    Assessment:     Mary Ellen Miller is a 89 y.o. female with a medical diagnosis of Closed displaced intertrochanteric fracture of left femur with routine healing. Pt tolerated session well and without incident and shows excellent motivation and potential to improve, but the pt continues to require assistance to perform self-care tasks and mobility. Pt strengths include Functional cognition, Following multi-step tasks, and Attention to task and Family support, Motivation, and Willingness to participate. Pt  participated in tasks to improve functional endurance, dynamic standing balance, trunk mobility, and standing tolerance for improved performance of functional tasks. However, pt would continue to benefit from cont'd OT services in the SNF setting to improve safety and independence /c functional tasks and ADLs upon discharge. Performance deficits affecting function are weakness, impaired endurance, impaired self care skills, impaired functional mobility, gait instability, impaired balance, orthopedic precautions, decreased lower extremity function, decreased coordination.     Rehab Potential is good    Activity tolerance:  Good    Plan:     Patient to be seen 5 x/week to address the above listed problems via self-care/home management, therapeutic activities,  "therapeutic exercises    Plan of Care Expires: 06/02/24  Plan of Care Reviewed with: patient    Subjective     Communicated with: NSSHERICE prior to session.     "It's just that left leg that hurts when I am trying to stand up."    Pain/Comfort:  Pain Rating 1: other (see comments) (did not rate when prompted; "it hurts"; increased /c standing; tolerable for continuation of therapy per pt report)  Location - Side 1: Left  Location - Orientation 1: generalized  Location 1: hip  Pain Addressed 1: Distraction  Pain Rating Post-Intervention 1: other (see comments) (not rated; decreased when seated)    Patient's cultural, spiritual, Christianity conflicts given the current situation:  no    Objective:     Patient found up in chair with  (no active lines) upon OT entry to room. Pt granddaughter present. Pt alert and agreeable to therapy.       Functional Mobility/Transfers:  Patient completed Sit <> Stand Transfer with minimum assistance  with  rolling walker and vcs for proper use of RW       AMPA 6 Click ADL: 17    OT Exercises:   ~Pt participated in 12 minute UBE activity seated in WC at maximal resistance to address endurance and strength of UE to improve performance of ADLs and other functional tasks.     ~Pt participated in standing activity to improve standing balance, trunk rotation, and functional reach. Pt asked to pass between hands behind back and throw ball to targets of various distances while standing, graded for increased difficulty /c increased distance from target. Pt able to complete activity /c CGA/Min A and RW in place for safety and no LOB. Pt req vcs for proper performance of task. Seated rest breaks required.      Treatment & Education:  Pt educated on POC, role of OT, and safety. Pt performed tasks to improve safety and independence in functional tasks and ADLs as mentioned above.       Patient left up in chair with call button in reach, granddaughter present, and all needs met    GOALS: "   Multidisciplinary Problems       Occupational Therapy Goals          Problem: Occupational Therapy    Goal Priority Disciplines Outcome Interventions   Occupational Therapy Goal     OT, PT/OT Progressing    Description: Goals to be met by 6/2/2024:    Patient will increase functional independence with ADLs by performing:     UBD: SPV seated in w/c or EOB- ongoing   LBD: Min A using AE seated in w/c or EOB- ongoing  FW: MI using AE seated in w/c- ongoing  Showering: Min A seated in w/c or EOB or sinkside- ongoing  Toileting: Min A seated on BSC- ongoing   Supine to sit: Min A- ongoing  Sit to stand: Min A with RW- ongoing                             Time Tracking:     OT Date of Treatment: 05/15/24  OT Start Time: 1337    OT Stop Time: 1409  OT Total Time (min): 32 min    Billable Minutes:Therapeutic Activity 17  Therapeutic Exercise 15    5/15/2024

## 2024-05-15 NOTE — PROGRESS NOTES
[facility]                                  Skilled Nursing Facility                   Progress Note     Patient Name: Mary Ellen Miller  YOB: 1930  MRN: [id number]  Room: [place] [address] [address] [address]     Admit Date: 5/2/2024   HEAVEN: 5/23/2024     Principal Problem: Closed displaced intertrochanteric fracture of left femur with routine healing    HPI obtained from patient interview and chart review     Chief Complaint: Re-evaluation of medical treatment and therapy status: pain control      HPI:   Mary Ellen Miller is a 89 y.o. F with PMH of HTN, HLD, CHF, and afib on eliquis presenting to the ED with significant left hip pain after a fall. 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 Prior to the incident, she reports intermittent worsening of chronic lower extremity edema and SOB. Her PCP recently discontinued amlodipine in favor of lasix and continuing aldactone, and she has not missed any doses of her home medications.  XR pelvis with mildly displaced L intertrochanteric femur fracture with impaction, minimally displaced comminuted L superior pubic ramus fracture, and nondisplaced fracture of the L inferior pubic ramus. s/p left femur CMN on 4/28/24. Given diuresis and on 1L O2, not on oxygen at home.  PT/OT recommends skilled nursing placement for further therapy.     Patient will be treated at [facility] with PT and OT to improve functional status and ability to perform ADLs.     Interval History  24 hour chart review completed. Pertinent lab, micro, and/or radiology results addressed below. NAEON. NAD.  Pain better with scheduled robaxin and tylenol  Reluctant to add anything stronger d/t hallucinations with oxycodone in past  Afebrile. VSS. HTN: 124/58  Skilled Therapy: Patient progressing with therapy as  tolerated and would continue to benefit from skilled PT and OT services to improve overall functional mobility and independence, strength, endurance, and safety.        Past Medical History: Patient has a past medical history of Anxiety, Atrial fibrillation, Colon polyps (05/01/2012), Hypertension, Leg ulcer, right, limited to breakdown of skin (11/20/2019), and Unilateral femoral hernia with obstruction, without gangrene (10/26/2015).    Past Surgical History: Patient has a past surgical history that includes Radical hysterectomy (1990); Lung surgery (1988); Hysterectomy; Oophorectomy; and Intramedullary rodding of femur (Left, 4/28/2024).    Social History: Patient reports that she has never smoked. She has never been exposed to tobacco smoke. She has never used smokeless tobacco. She reports that she does not drink alcohol and does not use drugs.    Family History:  family history includes No Known Problems in her father and mother.    Allergies: Patient is allergic to percodan [oxycodone hcl-oxycodone-asa], penicillins, sulfa (sulfonamide antibiotics), and amoxicillin.        ROS  Constitutional:  Positive for activity change. Negative for chills and fever.   HENT:  Negative for trouble swallowing.    Eyes:  Negative for photophobia and visual disturbance.   Respiratory: Negative for chest tightness or shortness of breath.    Cardiovascular:  Negative for chest pain and palpitations.   Gastrointestinal: Negative for abdominal pain, constipation, diarrhea, nausea and vomiting.   Genitourinary:  Negative for dysuria, frequency and hematuria.   Musculoskeletal:  Positive for arthralgias, back pain and gait problem. Negative for neck pain.   Skin:  Negative for rash and wound.   Neurological:  Positive for weakness. Negative for dizziness, syncope, speech difficulty and light-headedness.   Psychiatric/Behavioral:  Positive for confusion (intermittent). Negative for agitation. The patient is not nervous/anxious.      24 hour Vital Sign Range   Temp:  [98.4 °F (36.9 °C)-98.7 °F (37.1 °C)]   Pulse:  [62-83]   Resp:  [16-17]   BP: (120-138)/(56-67)   SpO2:  [93 %-95 %]       Physical Exam  Constitutional:       General: She is not in acute distress. Appears fatigued     Appearance: She is well-developed. She is not toxic-appearing.   HENT:      Head: Normocephalic.      Mouth/Throat:      Mouth: Mucous membranes are moist.   Eyes:      Extraocular Movements: Extraocular movements intact.      Conjunctiva/sclera: Conjunctivae normal.      Pupils: Pupils are equal, round, and reactive to light.   Cardiovascular:      Rate and Rhythm: Regular rate. Rhythm irregular.      Heart sounds: Normal heart sounds.   Pulmonary:      Effort: Pulmonary effort is normal. No respiratory distress. Lungs clear     Breath sounds: No wheezing.      Comments: RA  Abdominal:      General: Bowel sounds are normal.      Palpations: Abdomen is soft.      Tenderness: There is no abdominal tenderness.   Musculoskeletal:         General: Normal range of motion.      Cervical back: Normal range of motion and neck supple.   Skin:     General: Skin is warm and dry.      Capillary Refill: Capillary refill takes less than 2 seconds.      Findings: Bruising present. No rash. Left anterior incisional wound.  Neurological:      Mental Status: She is alert and oriented to person, place, and time. Forgetful of recent details     Cranial Nerves: No cranial nerve deficit.      Sensory: No sensory deficit.      Coordination: Coordination normal.   Psychiatric:         Behavior: Behavior normal.              Labs:  Recent Labs   Lab 05/09/24 0424 05/13/24 0435   WBC 8.05 6.31   HGB 8.5* 9.3*   HCT 27.0* 30.6*    317     Recent Labs   Lab 05/09/24 0424 05/13/24  0435    140   K 4.0 4.2    109   CO2 19* 21*   BUN 31* 21   CREATININE 0.8 0.8   GLU 76 81   CALCIUM 8.3* 8.3*   MG 1.8 1.8   PHOS 3.3 3.7     Recent Labs   Lab 05/09/24 0424 05/13/24  0435  "  ALKPHOS 68 117   ALT 19 15   AST 27 17   ALBUMIN 2.6* 2.9*   PROT 5.6* 5.9*   BILITOT 1.3* 1.3*       No results for input(s): "POCTGLUCOSE" in the last 72 hours.    Meds Scheduled:   acetaminophen  650 mg Oral TID    apixaban  2.5 mg Oral BID    ascorbic acid (vitamin C)  500 mg Oral BID    atenoloL  25 mg Oral Daily    calcium carbonate  1,000 mg Oral Daily    EScitalopram oxalate  5 mg Oral Every other day    furosemide  20 mg Oral Daily    melatonin  9 mg Oral Nightly    memantine  5 mg Oral BID    methocarbamoL  500 mg Oral QID    multivitamin  1 tablet Oral Daily    spironolactone  50 mg Oral Daily    vitamin D  2,000 Units Oral Daily    zinc sulfate  220 mg Oral Daily       PRN:     Current Facility-Administered Medications:     calcium carbonate, 500 mg, Oral, BID PRN    ondansetron, 8 mg, Oral, Q8H PRN    senna-docusate 8.6-50 mg, 1 tablet, Oral, BID PRN      Assessment and Plan:    Closed displaced intertrochanteric fracture of left femur   -s/p CMN on 4/28/24 Dr. Kurt Melendez   -Pain control: multimodal,   5/13 change robaxin to scheduled and schedule APAP  -PT/OT consult: WBAT LLE  -DVT PPx: Eliquis 2.5 mg BID, SCDs at all times when not ambulating    Hemolytic Anemia  Hyperbilirubinemia  POA  - Suspect hemolytic anemia as etiology of hyperbilirubinemia.  5/6: T bili obtained inpt on 4/27/24: 1.2  - Stop q 8 hour 1000 mg APAP, continue 650 mg APAP prn.  - T bili elevated to 1.5, direct bili 0.5. ALP, AST, ALT all WNL.   5/7: Acute hepatitis panel negative.   - T bili continues to rise.   - Patient is asymptomatic:   - Given ALP, aminotransferases, and GGT are normal, hyperbili not likely due to hepatic injury or biliary tract disease.   - Eval for hemolytic anemia: in am obtain retic, LDH, and haptoglobin, CBC Diff with peripheral smear if lab can accommodate, and CMP.  5/8: T bili stabilizing at 1.4 from 1.6 yesterday  direct bili 0.6.   - Hgb 8.3 from 9.1  - LDH and haptoglobin WNL, however " "elevated retic 5.8 and peripheral smear notes "red cells show mild anisocytosis with a subset of small spherocytes" Spherocytes suggestive of Autoimmune hemolytic anemia (AIHA) or Hereditary spherocytosis (HS).   - Obtain GUERDA (Gordon) and type/screen in am with repeat cbc diff, cmp  5/9: Direct and indirect Gordon negative.   - AIHA associated with increased risk of thrombus. Obtain d dimer with next lab draw. If d dimer positive will need to obtain BLE Venous US.   - Will need referral to heme/onc for more specialized testing.   - Continue to routinely monitor CBC and bilirubin with CMP trends.    Metabolic Acidosis  New 5/8. Na 135  CO2 18  - Start 1300 mg na bicarb po bid x 3 days  - Monitor with lab and adjust prn    Diarrhea  New 5/8, improving  - denies nausea / vomiting  - denies abdominal cramping or discomfort.   - Changed senokot from bid to bid prn.  - Encouraged fluids    Severe Protein Calorie Malnutrition  Hypoalbuminemia 2/2 Protein Calorie Malnutrition  POA, unstable  - RD consulted, ongoing collaboration for optimized nutrition   - Diet: regular diet  - Assist with meals  - Encourage intake   - Dietary Supplements: Boost TID  - Vitamin and Mineral Supplements: Zinc, Vit C, and Multivitamin     Altered mental status  Had some confusion inpatient, UA was negative  Patient does not remember falling at home  Delirium precautions currently oriented x4  Decreased home lexapro from 10mg to 5mg d/t advanced age    Acute hypoxemic respiratory failure  Continue diuretics as tolerated  Wean O2 as tolerated to maintain sat >90%    Long term (current) use of anticoagulants  Eliquis for Afib, reduced to 2.5mg dose meets criteria     Acute on chronic diastolic heart failure  Continue Lasix 20mg, atenolol 25mg and spironolactone 50mg daily with parameters d/t recent hypotension  Follow up cardiology outpatient    Recurrent major depressive disorder, in full remission  PHQ9 score 1 on 5/13  - Continue home lexapro " & namenda   -Continue to wean off lexapro prior to DC     Essential hypertension  Continue Lasix 20mg, atenolol 25mg and spironolactone 50mg daily with parameters d/t recent hypotension    Advanced Care Planning  Outcome of discussion:Confirms DNR    IP [facility] RISK OF UNPLANNED READMISSION Model: MOD    Anticipate disposition:    custodial with home health      Follow-up needed during SNF admission:   Ortho    Follow-up NOT needed during SNF admission: 5/20 [facility]    Follow-up needed after discharge from SNF: Heme/Onc  - PCP within 1-2 weeks  - See appt scheduled below     Future Appointments   Date Time Provider Department Center   6/25/2024  3:00 PM Dionne Jeter MD [facility]         I certify that SNF services are required to be given on an inpatient basis because Mary Ellen Miller needs for skilled nursing care and/or skilled rehabilitation are required on a daily basis and such services can only practically be provided in a skilled nursing facility setting and are for an ongoing condition for which she received inpatient care in the hospital.         LUDY KRAUS  Department of Hospital Medicine   [facility]     DOS: 5/15/2024       Patient note was created using MModal Dictation.  Any errors in syntax or even information may not have been identified and edited on initial review prior to signing this note.

## 2024-05-15 NOTE — TELEPHONE ENCOUNTER
----- Message from Apple Patiño sent at 5/15/2024 10:27 AM CDT -----  Regarding: rachel  Pt daughter calling in regards to pt being admitted to hospital , wont be discharged until 5-23 , was     told she would have a post op visit while in hospital , please call     Confirmed patient's contact info below:  Contact Name: Mary Ellen Miller  Phone Number: [phone]

## 2024-05-16 LAB
ALBUMIN SERPL BCP-MCNC: 2.9 G/DL (ref 3.5–5.2)
ALP SERPL-CCNC: 130 U/L (ref 55–135)
ALT SERPL W/O P-5'-P-CCNC: 11 U/L (ref 10–44)
ANION GAP SERPL CALC-SCNC: 9 MMOL/L (ref 8–16)
AST SERPL-CCNC: 13 U/L (ref 10–40)
BASOPHILS # BLD AUTO: 0.05 K/UL (ref 0–0.2)
BASOPHILS NFR BLD: 0.9 % (ref 0–1.9)
BILIRUB SERPL-MCNC: 1 MG/DL (ref 0.1–1)
BUN SERPL-MCNC: 22 MG/DL (ref 10–30)
CALCIUM SERPL-MCNC: 8.2 MG/DL (ref 8.7–10.5)
CHLORIDE SERPL-SCNC: 111 MMOL/L (ref 95–110)
CO2 SERPL-SCNC: 20 MMOL/L (ref 23–29)
CREAT SERPL-MCNC: 0.8 MG/DL (ref 0.5–1.4)
D DIMER PPP IA.FEU-MCNC: 4.9 MG/L FEU
DIFFERENTIAL METHOD BLD: ABNORMAL
EOSINOPHIL # BLD AUTO: 0.1 K/UL (ref 0–0.5)
EOSINOPHIL NFR BLD: 1.8 % (ref 0–8)
ERYTHROCYTE [DISTWIDTH] IN BLOOD BY AUTOMATED COUNT: 17.3 % (ref 11.5–14.5)
EST. GFR  (NO RACE VARIABLE): >60 ML/MIN/1.73 M^2
GLUCOSE SERPL-MCNC: 79 MG/DL (ref 70–110)
HCT VFR BLD AUTO: 28.5 % (ref 37–48.5)
HGB BLD-MCNC: 9 G/DL (ref 12–16)
IMM GRANULOCYTES # BLD AUTO: 0.03 K/UL (ref 0–0.04)
IMM GRANULOCYTES NFR BLD AUTO: 0.5 % (ref 0–0.5)
LYMPHOCYTES # BLD AUTO: 0.8 K/UL (ref 1–4.8)
LYMPHOCYTES NFR BLD: 14.8 % (ref 18–48)
MAGNESIUM SERPL-MCNC: 1.8 MG/DL (ref 1.6–2.6)
MCH RBC QN AUTO: 31.4 PG (ref 27–31)
MCHC RBC AUTO-ENTMCNC: 31.6 G/DL (ref 32–36)
MCV RBC AUTO: 99 FL (ref 82–98)
MONOCYTES # BLD AUTO: 0.5 K/UL (ref 0.3–1)
MONOCYTES NFR BLD: 8 % (ref 4–15)
NEUTROPHILS # BLD AUTO: 4.2 K/UL (ref 1.8–7.7)
NEUTROPHILS NFR BLD: 74 % (ref 38–73)
NRBC BLD-RTO: 0 /100 WBC
PHOSPHATE SERPL-MCNC: 3.3 MG/DL (ref 2.7–4.5)
PLATELET # BLD AUTO: 289 K/UL (ref 150–450)
PMV BLD AUTO: 9.7 FL (ref 9.2–12.9)
POTASSIUM SERPL-SCNC: 4.3 MMOL/L (ref 3.5–5.1)
PROT SERPL-MCNC: 5.8 G/DL (ref 6–8.4)
RBC # BLD AUTO: 2.87 M/UL (ref 4–5.4)
SODIUM SERPL-SCNC: 140 MMOL/L (ref 136–145)
WBC # BLD AUTO: 5.61 K/UL (ref 3.9–12.7)

## 2024-05-16 PROCEDURE — 85379 FIBRIN DEGRADATION QUANT: CPT | Performed by: FAMILY MEDICINE

## 2024-05-16 PROCEDURE — 80053 COMPREHEN METABOLIC PANEL: CPT | Performed by: FAMILY MEDICINE

## 2024-05-16 PROCEDURE — 25000003 PHARM REV CODE 250: Performed by: FAMILY MEDICINE

## 2024-05-16 PROCEDURE — 83735 ASSAY OF MAGNESIUM: CPT | Performed by: HOSPITALIST

## 2024-05-16 PROCEDURE — 25000003 PHARM REV CODE 250: Performed by: HOSPITALIST

## 2024-05-16 PROCEDURE — 97110 THERAPEUTIC EXERCISES: CPT

## 2024-05-16 PROCEDURE — 84100 ASSAY OF PHOSPHORUS: CPT | Performed by: HOSPITALIST

## 2024-05-16 PROCEDURE — 85025 COMPLETE CBC W/AUTO DIFF WBC: CPT | Performed by: HOSPITALIST

## 2024-05-16 PROCEDURE — 97535 SELF CARE MNGMENT TRAINING: CPT

## 2024-05-16 PROCEDURE — 97116 GAIT TRAINING THERAPY: CPT

## 2024-05-16 PROCEDURE — 11000004 HC SNF PRIVATE

## 2024-05-16 RX ADMIN — CALCIUM CARBONATE (ANTACID) CHEW TAB 500 MG 1000 MG: 500 CHEW TAB at 08:05

## 2024-05-16 RX ADMIN — ACETAMINOPHEN 650 MG: 325 TABLET ORAL at 08:05

## 2024-05-16 RX ADMIN — Medication 500 MG: at 08:05

## 2024-05-16 RX ADMIN — THERA TABS 1 TABLET: TAB at 08:05

## 2024-05-16 RX ADMIN — Medication 500 MG: at 09:05

## 2024-05-16 RX ADMIN — MEMANTINE 5 MG: 5 TABLET ORAL at 08:05

## 2024-05-16 RX ADMIN — METHOCARBAMOL 500 MG: 500 TABLET ORAL at 09:05

## 2024-05-16 RX ADMIN — ZINC SULFATE 220 MG (50 MG) CAPSULE 220 MG: CAPSULE at 08:05

## 2024-05-16 RX ADMIN — ACETAMINOPHEN 650 MG: 325 TABLET ORAL at 09:05

## 2024-05-16 RX ADMIN — ACETAMINOPHEN 650 MG: 325 TABLET ORAL at 02:05

## 2024-05-16 RX ADMIN — CHOLECALCIFEROL TAB 25 MCG (1000 UNIT) 2000 UNITS: 25 TAB at 08:05

## 2024-05-16 RX ADMIN — SPIRONOLACTONE 50 MG: 25 TABLET ORAL at 08:05

## 2024-05-16 RX ADMIN — METHOCARBAMOL 500 MG: 500 TABLET ORAL at 05:05

## 2024-05-16 RX ADMIN — METHOCARBAMOL 500 MG: 500 TABLET ORAL at 08:05

## 2024-05-16 RX ADMIN — MEMANTINE 5 MG: 5 TABLET ORAL at 09:05

## 2024-05-16 RX ADMIN — ATENOLOL 25 MG: 25 TABLET ORAL at 08:05

## 2024-05-16 RX ADMIN — APIXABAN 2.5 MG: 2.5 TABLET, FILM COATED ORAL at 09:05

## 2024-05-16 RX ADMIN — APIXABAN 2.5 MG: 2.5 TABLET, FILM COATED ORAL at 08:05

## 2024-05-16 RX ADMIN — Medication 9 MG: at 09:05

## 2024-05-16 RX ADMIN — FUROSEMIDE 20 MG: 20 TABLET ORAL at 08:05

## 2024-05-16 RX ADMIN — METHOCARBAMOL 500 MG: 500 TABLET ORAL at 02:05

## 2024-05-16 NOTE — PROGRESS NOTES
[facility]                                  Skilled Nursing Facility                   Progress Note     Patient Name: Mary lElen Miller  YOB: 1930  MRN: [id number]  Room: [place] [address] [address] [address]     Admit Date: 5/2/2024   HEAVEN: 5/23/2024     Principal Problem: Closed displaced intertrochanteric fracture of left femur with routine healing    HPI obtained from patient interview and chart review     Chief Complaint: Re-evaluation of medical treatment and therapy status: lab review      HPI:   Mary Ellen Miller is a 89 y.o. F with PMH of HTN, HLD, CHF, and afib on eliquis presenting to the ED with significant left hip pain after a fall. 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 Prior to the incident, she reports intermittent worsening of chronic lower extremity edema and SOB. Her PCP recently discontinued amlodipine in favor of lasix and continuing aldactone, and she has not missed any doses of her home medications.  XR pelvis with mildly displaced L intertrochanteric femur fracture with impaction, minimally displaced comminuted L superior pubic ramus fracture, and nondisplaced fracture of the L inferior pubic ramus. s/p left femur CMN on 4/28/24. Given diuresis and on 1L O2, not on oxygen at home.  PT/OT recommends skilled nursing placement for further therapy.     Patient will be treated at [facility] with PT and OT to improve functional status and ability to perform ADLs.     Interval History  24 hour chart review completed. Pertinent lab, micro, and/or radiology results addressed below. NAEON. NAD. LBM 5/14  Pain better with scheduled robaxin and tylenol  Reluctant to add anything stronger d/t hallucinations with oxycodone in past  Seen by ortho today  Labs reviewed Ddimer 4.9 but LFTs and bili normal  now  Skilled Therapy: Patient progressing with therapy as tolerated and would continue to benefit from skilled PT and OT services to improve overall functional mobility and independence, strength, endurance, and safety.        Past Medical History: Patient has a past medical history of Anxiety, Atrial fibrillation, Colon polyps (05/01/2012), Hypertension, Leg ulcer, right, limited to breakdown of skin (11/20/2019), and Unilateral femoral hernia with obstruction, without gangrene (10/26/2015).    Past Surgical History: Patient has a past surgical history that includes Radical hysterectomy (1990); Lung surgery (1988); Hysterectomy; Oophorectomy; and Intramedullary rodding of femur (Left, 4/28/2024).    Social History: Patient reports that she has never smoked. She has never been exposed to tobacco smoke. She has never used smokeless tobacco. She reports that she does not drink alcohol and does not use drugs.    Family History:  family history includes No Known Problems in her father and mother.    Allergies: Patient is allergic to percodan [oxycodone hcl-oxycodone-asa], penicillins, sulfa (sulfonamide antibiotics), and amoxicillin.        ROS  Constitutional:  Positive for activity change. Negative for chills and fever.   HENT:  Negative for trouble swallowing.    Eyes:  Negative for photophobia and visual disturbance.   Respiratory: Negative for chest tightness or shortness of breath.    Cardiovascular:  Negative for chest pain and palpitations.   Gastrointestinal: Negative for abdominal pain, constipation, diarrhea, nausea and vomiting.   Genitourinary:  Negative for dysuria, frequency and hematuria.   Musculoskeletal:  Positive for arthralgias, back pain and gait problem. Negative for neck pain.   Skin:  Negative for rash and wound.   Neurological:  Positive for weakness. Negative for dizziness, syncope, speech difficulty and light-headedness.   Psychiatric/Behavioral:  Positive for confusion (intermittent).  Negative for agitation. The patient is not nervous/anxious.     24 hour Vital Sign Range   Temp:  [97.5 °F (36.4 °C)]   Pulse:  [74-78]   Resp:  [17-18]   BP: (145-157)/(73-74)   SpO2:  [98 %-100 %]       Physical Exam  Constitutional:       General: She is not in acute distress. Appears fatigued     Appearance: She is well-developed. She is not toxic-appearing.   HENT:      Head: Normocephalic.      Mouth/Throat:      Mouth: Mucous membranes are moist.   Eyes:      Extraocular Movements: Extraocular movements intact.      Conjunctiva/sclera: Conjunctivae normal.      Pupils: Pupils are equal, round, and reactive to light.   Cardiovascular:      Rate and Rhythm: Regular rate. Rhythm irregular.      Heart sounds: Normal heart sounds.   Pulmonary:      Effort: Pulmonary effort is normal. No respiratory distress. Lungs clear     Breath sounds: No wheezing.      Comments: RA  Abdominal:      General: Bowel sounds are normal.      Palpations: Abdomen is soft.      Tenderness: There is no abdominal tenderness.   Musculoskeletal:         General: Normal range of motion.      Cervical back: Normal range of motion and neck supple.   Skin:     General: Skin is warm and dry.      Capillary Refill: Capillary refill takes less than 2 seconds.      Findings: Bruising present. No rash. Left anterior incisional wound.  Neurological:      Mental Status: She is alert and oriented to person, place, and time. Forgetful of recent details     Cranial Nerves: No cranial nerve deficit.      Sensory: No sensory deficit.      Coordination: Coordination normal.   Psychiatric:         Behavior: Behavior normal.              Labs:  Recent Labs   Lab 05/13/24 0435 05/16/24 0449   WBC 6.31 5.61   HGB 9.3* 9.0*   HCT 30.6* 28.5*    289     Recent Labs   Lab 05/13/24 0435 05/16/24  0449    140   K 4.2 4.3    111*   CO2 21* 20*   BUN 21 22   CREATININE 0.8 0.8   GLU 81 79   CALCIUM 8.3* 8.2*   MG 1.8 1.8   PHOS 3.7 3.3  "    Recent Labs   Lab 05/13/24  0435 05/16/24  0449   ALKPHOS 117 130   ALT 15 11   AST 17 13   ALBUMIN 2.9* 2.9*   PROT 5.9* 5.8*   BILITOT 1.3* 1.0       No results for input(s): "POCTGLUCOSE" in the last 72 hours.    Meds Scheduled:   acetaminophen  650 mg Oral TID    apixaban  2.5 mg Oral BID    ascorbic acid (vitamin C)  500 mg Oral BID    atenoloL  25 mg Oral Daily    calcium carbonate  1,000 mg Oral Daily    EScitalopram oxalate  5 mg Oral Every other day    furosemide  20 mg Oral Daily    melatonin  9 mg Oral Nightly    memantine  5 mg Oral BID    methocarbamoL  500 mg Oral QID    multivitamin  1 tablet Oral Daily    spironolactone  50 mg Oral Daily    vitamin D  2,000 Units Oral Daily    zinc sulfate  220 mg Oral Daily       PRN:     Current Facility-Administered Medications:     calcium carbonate, 500 mg, Oral, BID PRN    ondansetron, 8 mg, Oral, Q8H PRN    senna-docusate 8.6-50 mg, 1 tablet, Oral, BID PRN      Assessment and Plan:    Closed displaced intertrochanteric fracture of left femur   -s/p CMN on 4/28/24 Dr. Kurt Melendez   -Pain control: multimodal,   5/13 change robaxin to scheduled and schedule APAP  -PT/OT consult: WBAT LLE  -DVT PPx: Eliquis 2.5 mg BID, SCDs at all times when not ambulating    Hemolytic Anemia  Hyperbilirubinemia  POA  - Suspect hemolytic anemia as etiology of hyperbilirubinemia.  5/6: T bili obtained inpt on 4/27/24: 1.2  - Stop q 8 hour 1000 mg APAP, continue 650 mg APAP prn.  - T bili elevated to 1.5, direct bili 0.5. ALP, AST, ALT all WNL.   5/7: Acute hepatitis panel negative.   - T bili continues to rise.   - Patient is asymptomatic:   - Given ALP, aminotransferases, and GGT are normal, hyperbili not likely due to hepatic injury or biliary tract disease.   - Eval for hemolytic anemia: in am obtain retic, LDH, and haptoglobin, CBC Diff with peripheral smear if lab can accommodate, and CMP.  5/8: T bili stabilizing at 1.4 from 1.6 yesterday  direct bili 0.6.   - Hgb " "8.3 from 9.1  - LDH and haptoglobin WNL, however elevated retic 5.8 and peripheral smear notes "red cells show mild anisocytosis with a subset of small spherocytes" Spherocytes suggestive of Autoimmune hemolytic anemia (AIHA) or Hereditary spherocytosis (HS).   - Obtain GUERDA (Gordon) and type/screen in am with repeat cbc diff, cmp  5/9: Direct and indirect Gordon negative.   - AIHA associated with increased risk of thrombus. Obtain d dimer with next lab draw. If d dimer positive will need to obtain BLE Venous US.   - Will need referral to heme/onc for more specialized testing.   - Continue to routinely monitor CBC and bilirubin with CMP trends.  5/16 Ddimer 4.9 but LFTs and bili normal now    Metabolic Acidosis  New 5/8. Na 135  CO2 18  - Start 1300 mg na bicarb po bid x 3 days  - Monitor with lab and adjust prn    Diarrhea  New 5/8, improving  - denies nausea / vomiting  - denies abdominal cramping or discomfort.   - Changed senokot from bid to bid prn.  - Encouraged fluids    Severe Protein Calorie Malnutrition  Hypoalbuminemia 2/2 Protein Calorie Malnutrition  POA, unstable  - RD consulted, ongoing collaboration for optimized nutrition   - Diet: regular diet  - Assist with meals  - Encourage intake   - Dietary Supplements: Boost TID  - Vitamin and Mineral Supplements: Zinc, Vit C, and Multivitamin     Altered mental status  Had some confusion inpatient, UA was negative  Patient does not remember falling at home  Delirium precautions currently oriented x4  Decreased home lexapro from 10mg to 5mg d/t advanced age    Acute hypoxemic respiratory failure  Continue diuretics as tolerated  Wean O2 as tolerated to maintain sat >90%    Long term (current) use of anticoagulants  Eliquis for Afib, reduced to 2.5mg dose meets criteria     Acute on chronic diastolic heart failure  Continue Lasix 20mg, atenolol 25mg and spironolactone 50mg daily with parameters d/t recent hypotension  Follow up cardiology " outpatient    Recurrent major depressive disorder, in full remission  PHQ9 score 1 on 5/13  - Continue home lexapro & namenda   -Continue to wean off lexapro prior to DC     Essential hypertension  Continue Lasix 20mg, atenolol 25mg and spironolactone 50mg daily with parameters d/t recent hypotension    Advanced Care Planning  Outcome of discussion:Confirms DNR    IP [facility] RISK OF UNPLANNED READMISSION Model: MOD    Anticipate disposition:    TANIA with home health      Follow-up needed during SNF admission:   Ortho    Follow-up NOT needed during SNF admission: 5/20 [facility]    Follow-up needed after discharge from SNF: Heme/Onc  - PCP within 1-2 weeks  - See appt scheduled below     Future Appointments   Date Time Provider Department Center   6/10/2024 12:15 PM Any Cantu PA-C [facility] darci [facility]   6/25/2024  3:00 PM Dionne Jeter MD [facility]         I certify that SNF services are required to be given on an inpatient basis because Mary Ellen Miller needs for skilled nursing care and/or skilled rehabilitation are required on a daily basis and such services can only practically be provided in a skilled nursing facility setting and are for an ongoing condition for which she received inpatient care in the hospital.         LUDY KRAUS  Department of Hospital Medicine   [facility]     DOS: 5/16/2024       Patient note was created using MModal Dictation.  Any errors in syntax or even information may not have been identified and edited on initial review prior to signing this note.

## 2024-05-16 NOTE — PT/OT/SLP PROGRESS
"Occupational Therapy   Treatment    Name: Mary Ellen Miller  MRN: [id number]  Admit Date: 5/2/2024  Admitting Diagnosis:  Closed displaced intertrochanteric fracture of left femur with routine healing    General Precautions: Standard, fall   Orthopedic Precautions: LLE weight bearing as tolerated   Braces: N/A    Recommendations:     Discharge Recommendations:  other (see comments) (to be futher assessed)  Level of Assistance Recommended at Discharge: 24 hours light assistance for ADL's and homemaking tasks  Discharge Equipment Recommendations: walker, rolling, wheelchair, hip kit, bedside commode  Barriers to discharge:  Decreased caregiver support    Assessment:     Mary Ellen Miller is a 89 y.o. female with a medical diagnosis of Closed displaced intertrochanteric fracture of left femur with routine healing. Pt tolerated today's session w/o c/o pain or discomfort. Performance deficits affecting function are weakness, impaired endurance, impaired self care skills, impaired functional mobility, gait instability, impaired balance, orthopedic precautions, decreased lower extremity function, decreased coordination. Pt would benefit from cont'd OT services in the SNF setting to improve safety and independence /c functional tasks and ADLs upon discharge.       Rehab Potential is good    Activity tolerance:  Good    Plan:     Patient to be seen 5 x/week to address the above listed problems via self-care/home management, therapeutic activities, therapeutic exercises    Plan of Care Expires: 06/02/24  Plan of Care Reviewed with: patient    Subjective     Communicated with: Nursing prior to session.  " I feel like I was hit by a truck last night" referring to how tired she is     Pain/Comfort:  Pain Rating 1: 0/10  Pain Rating Post-Intervention 1: 0/10    Patient's cultural, spiritual, Congregational conflicts given the current situation:  no    Objective:     Patient found  up in w/c with PCT  with  (no active lines; " found up in chair with PCT) upon OT entry to room.    Activities of Daily Living:  Grooming: modified independence seated in w/c at sinkside; (brushed teeth, washed face, applied foundation and makeup, and combed hair  Upper Body Dressing: minimum assistance to manage shirt down trunk; seated in w/c  FW: pt donned B socks using a sock aid with S/U; Min A to don B shoes using a shoehorn     [facility] 6 Click ADL: 18    Treatment & Education:  Current POC  Proper way to use a shoehorn    Patient left up in chair with call button in reach and all needs met    GOALS:   Multidisciplinary Problems       Occupational Therapy Goals          Problem: Occupational Therapy    Goal Priority Disciplines Outcome Interventions   Occupational Therapy Goal     OT, PT/OT Progressing    Description: Goals to be met by 6/2/2024:    Patient will increase functional independence with ADLs by performing:     UBD: SPV seated in w/c or EOB- ongoing   LBD: Min A using AE seated in w/c or EOB- ongoing  FW: MI using AE seated in w/c-  MET  Showering: Min A seated in w/c or EOB or sinkside- ongoing  Toileting: Min A seated on BSC- ongoing   Supine to sit: Min A- ongoing  Sit to stand: Min A with RW- MET                             Time Tracking:     OT Date of Treatment: 05/16/24  OT Start Time: 0915    OT Stop Time: 0955  OT Total Time (min): 40 min    Billable Minutes:Self Care/Home Management 40    5/16/2024  Bob Andrew Jr., OTR/L

## 2024-05-16 NOTE — PLAN OF CARE
Problem: Occupational Therapy  Goal: Occupational Therapy Goal  Description: Goals to be met by 6/2/2024:    Patient will increase functional independence with ADLs by performing:     UBD: SPV seated in w/c or EOB- ongoing   LBD: Min A using AE seated in w/c or EOB- ongoing  FW: MI using AE seated in w/c-  MET  Showering: Min A seated in w/c or EOB or sinkside- ongoing  Toileting: Min A seated on BSC- ongoing   Supine to sit: Min A- ongoing  Sit to stand: Min A with RW- MET        Outcome: Progressing   Goals updated and reassessed

## 2024-05-16 NOTE — PROGRESS NOTES
Ms. Miller was seen at [facility] today for a post-operative visit after undergoing Open reduction internal fixation Left intertrochanteric hip fracture with intramedullary nail    by Dr. Melendez  on 4/28/2024.      Interval History:  She reports that she is doing well.  Pain is controlled.  She is taking pain medication.    She denies fever, chills, and sweats since the time of the surgery.  She is participating in her therapy sessions.     Physical exam:  Dressing taken down.  Incision is clean, dry and intact.  .  She has tactile stimulation to their lower leg, she denies calf pain, there is no leg edema and their pedal pulse is palpable x 2.     RADS: none done today    Assessment:  Post-op visit (2 weeks)    Plan:  Current care, treatment plan, precautions, activity level/ modifications, limitations, rehabilitation exercises and proposed future treatment were discussed with the patient. We discussed the need to monitor for changes in symptoms and condition and report them to the physician.  Discussed importance of compliance with all appointments and follow up examinations.     WOUND CARE ORDERS  Do not remove surgical dressing for 2 weeks post-op. This will be done only by MD at initial post-op visit. If dressing is completely saturated, Call number below.      Do not get dressings wet. Do not shower.      If dressing continues to be saturated or there are signs of infection, please call Ortho Clinic [phone] for further instructions and to make appt to be seen.         PHYSICAL THERAPY:    - Continue therapy as ordered.        - weight bearing as tolerated         - range of motion as tolerated.      PAIN MEDICATION:               - Pain medication: refill was not needed,               - Pain medication refill policy provided to patient for review, yes      DVT PROPHYLAXIS:               - eluquis     FOLLOW UP:   - Patient will continue to be seen on [facility] weekly until their discharge then he is to return  to clinic for follow up.  - Future Appointments:   6 weeks post op       If there are any questions prior to scheduled follow up, the patient was instructed to contact the office

## 2024-05-16 NOTE — PLAN OF CARE
Problem: Adult Inpatient Plan of Care  Goal: Plan of Care Review  Outcome: Progressing  Goal: Patient-Specific Goal (Individualized)  Outcome: Progressing  Goal: Absence of Hospital-Acquired Illness or Injury  Outcome: Progressing  Intervention: Prevent Infection  Flowsheets (Taken 5/16/2024 6504)  Infection Prevention:   hand hygiene promoted   rest/sleep promoted   single patient room provided  Goal: Optimal Comfort and Wellbeing  Outcome: Progressing

## 2024-05-16 NOTE — PT/OT/SLP PROGRESS
Physical Therapy Treatment    Patient Name:  Mary Ellen Miller   MRN:  [id number]  Admit Date: 5/2/2024  Admitting Diagnosis: Closed displaced intertrochanteric fracture of left femur with routine healing  Recent Surgeries: INSERTION, INTRAMEDULLARY LENO, FEMUR: Left (Left)     General Precautions: Standard, fall  Orthopedic Precautions: LLE weight bearing as tolerated  Braces: N/A    Recommendations:     Discharge Recommendations: home health PT  Level of Assistance Recommended at Discharge: 24 hours light assistance  Discharge Equipment Recommendations: bedside commode, walker, rolling, wheelchair  Barriers to discharge: Decreased caregiver support    Assessment:     Mary Ellen Miller is a 89 y.o. female admitted with a medical diagnosis of Closed displaced intertrochanteric fracture of left femur with routine healing .Performance deficits affecting function: weakness, impaired endurance, impaired self care skills, impaired functional mobility, gait instability, impaired balance, decreased upper extremity function, decreased lower extremity function, decreased ROM, impaired cardiopulmonary response to activity, orthopedic precautions. Pt will continue to benefit from skilled PT services during this hospital admit to continue to improve transfer ability and efficiency as well as continue to progress pt's ambulation distance and cardiopulmonary endurance to maximize pt's functional independence and return to PLOF.     Rehab Potential is good    Activity Tolerance: Good    Plan:     Patient to be seen 5 x/week to address the above listed problems via gait training, therapeutic activities, therapeutic exercises, wheelchair management/training    Plan of Care Expires: 06/02/24  Plan of Care Reviewed with: patient    Subjective     Pt. Agreeable to work with PT.     Pain/Comfort:  Pain Rating 1: 0/10  Pain Rating Post-Intervention 1: 0/10    Patient's cultural, spiritual, Anabaptist conflicts given the current  "situation:  no    Objective:     Communicated with pt prior to session.  Patient found up in chair with  (O2 removed during PT d.t pt's O2 sats above 96% at rest and post activity) upon PT entry to room.     Therapeutic Activities and Exercises: LBEx 15mins to improve ROM, MMT and endurance    Functional Mobility:  Transfers:     Sit to Stand:  contact guard assistance with rolling walker  Gait: 67ft +50ft with RW and CGA for safety as pt slightly "shaky", decrease step length, and decrease justino.  Stairs:  Pt ascended/descended 4" curb step with Rolling Walker with no handrails with Minimal Assistance.     AM-PAC 6 CLICK MOBILITY  15    Patient left up in chair with call button in reach.    GOALS:   Multidisciplinary Problems       Physical Therapy Goals          Problem: Physical Therapy    Goal Priority Disciplines Outcome Goal Variances Interventions   Physical Therapy Goal     PT, PT/OT Progressing     Description: Goals to be met by: 3/10/24     Patient will increase functional independence with mobility by performing:    . Supine to sit with MInimal Assistance  . Sit to supine with MInimal Assistance  . Rolling to Left and Right with Minimal Assistance.  . Sit to stand transfer with Minimal Assistance  . Bed to chair transfer with Minimal Assistance using Rolling Walker  . Gait  x 50 feet with Minimal Assistance using Rolling Walker.-met  Updated 5/16/2024  Gait  x 100 feet with Contact Guard Assistance using Rolling Walker  . Wheelchair propulsion x150 feet with Supervision using bilateral uppper extremities  .Added 5/16/2024 Ascend/Descend 4 inch curb step with RW and CGA/SBA                         Time Tracking:     PT Received On: 05/16/24  PT Start Time: 1029  PT Stop Time: 1107  PT Total Time (min): 38 min    Billable Minutes: Gait Training 23 and Therapeutic Exercise 15    Treatment Type: Treatment  PT/PTA: PT     Number of PTA visits since last PT visit: 0     05/16/2024  "

## 2024-05-16 NOTE — PLAN OF CARE
Problem: Physical Therapy  Goal: Physical Therapy Goal  Description: Goals to be met by: 3/10/24     Patient will increase functional independence with mobility by performing:    . Supine to sit with MInimal Assistance  . Sit to supine with MInimal Assistance  . Rolling to Left and Right with Minimal Assistance.  . Sit to stand transfer with Minimal Assistance  . Bed to chair transfer with Minimal Assistance using Rolling Walker  . Gait  x 50 feet with Minimal Assistance using Rolling Walker.-met  Updated 5/16/2024  Gait  x 100 feet with Contact Guard Assistance using Rolling Walker  . Wheelchair propulsion x150 feet with Supervision using bilateral uppper extremities  .Added 5/16/2024 Ascend/Descend 4 inch curb step with RW and CGA/SBA    Outcome: Progressing

## 2024-05-16 NOTE — PROGRESS NOTES
[facility] (formerly [facility]) - Skilled Nursing  Adult Nutrition  Progress Note    SUMMARY   Recommendations  Contnue regular diet, chop meats, dc boost not taking, assist with set up, encourage PO intake, RD following  Goals: PO to meet 75% of needs with ONS by next RD fu  Nutrition Goal Status: progressing towards goal, goal not met  Communication of RD Recs: other (comment) (POC)    Assessment and Plan  Severe protein-calorie malnutrition  Malnutrition Type:  Context: acute illness or injury  Level: severe     Related to (etiology):   Inadequate oral intake, depression, confusion     Signs and Symptoms (as evidenced by):   BMI 21.6, sleeping a lot   PO 0-25%     Malnutrition Characteristic Summary:  Energy Intake (Malnutrition): less than or equal to 50% for greater than or equal to 5 days  Subcutaneous Fat (Malnutrition): moderate depletion  Muscle Mass (Malnutrition): moderate depletion     Plan  Assist with set up  Collaboration of nutrition professional with other providers  Texture modified diet- chopped meats  General healthy diet  Multivitamin mineral supplement therapy- MVI  Mineral supplement therapy- ascorbic acid  Vitamin supplement therapy- zinc    Malnutrition Assessment  5/3  Malnutrition Context: acute illness or injury  Malnutrition Level: severe  Skin (Micronutrient): bruised, wounds unhealed  Hair/Scalp (Micronutrient):  (wig)  Eyes (Micronutrient): conjunctiva dull  Teeth (Micronutrient): broken dentition  Tongue (Micronutrient): red  Neck/Chest (Micronutrient): bony prominence, muscle wasting, subcutaneous fat loss  Musculoskeletal/Lower Extremities: muscle wasting, subcutaneous fat loss       Energy Intake (Malnutrition): less than or equal to 50% for greater than or equal to 5 days  Subcutaneous Fat (Malnutrition): moderate depletion  Muscle Mass (Malnutrition): moderate depletion   Orbital Region (Subcutaneous Fat Loss): moderate depletion  Upper Arm Region (Subcutaneous Fat Loss): moderate  "depletion  Thoracic and Lumbar Region: mild depletion   Orthodox Region (Muscle Loss): moderate depletion  Clavicle Bone Region (Muscle Loss): moderate depletion  Clavicle and Acromion Bone Region (Muscle Loss): severe depletion  Dorsal Hand (Muscle Loss): moderate depletion  Patellar Region (Muscle Loss): moderate depletion  Anterior Thigh Region (Muscle Loss): moderate depletion  Posterior Calf Region (Muscle Loss): mild depletion                 Reason for Assessment    Reason For Assessment: RD follow-up  Diagnosis:  (L femur fx, s/p IM nail)  Relevant Medical History: HTN, HF, S/P JOHAN, AFIB, bloos loss anemia, depression, anxiety,  Interdisciplinary Rounds: did not attend  General Information Comments: PO improved, more alert now, She will not take boost plus  Nutrition Discharge Planning: low sodium diet, ONS of choice    Nutrition/Diet History    Patient Reported Diet/Restrictions/Preferences: low salt  Typical Food/Fluid Intake: 3 mels  Spiritual, Cultural Beliefs, Nondenominational Practices, Values that Affect Care: no  Supplemental Drinks or Food Habits: Boost Original  Vitamin/Mineral/Herbal Supplements: takes "lots of vitamins"  Food Allergies: NKFA  Factors Affecting Nutritional Intake: decreased appetite, impaired cognitive status/motor control    Anthropometrics    Temp: 97.5 °F (36.4 °C)  Height Method: Stated  Height: 5' 3" (160 cm)  Height (inches): 63 in  Weight Method: Bed Scale  Weight: 55.3 kg (121 lb 14.6 oz)  Weight (lb): 121.92 lb  Ideal Body Weight (IBW), Female: 115 lb  % Ideal Body Weight, Female (lb): 106.02 %  BMI (Calculated): 21.6  BMI Grade: 18.5-24.9 - normal  Usual Body Weight (UBW), k kg  % Usual Body Weight: 99.14  % Weight Change From Usual Weight: -1.07 %       Lab/Procedures/Meds    Pertinent Labs Reviewed: reviewed  Pertinent Labs Comments: Hg 9.0, Hct 28.5, Cl 111, Ca 8.2  Pertinent Medications Reviewed: reviewed  Pertinent Medications Comments: ascorbid acid, zinc, " MVI    Estimated/Assessed Needs    Weight Used For Calorie Calculations: 55.4 kg (122 lb 2.2 oz)  Energy Calorie Requirements (kcal): 1200  Energy Need Method: Merrill-St Jeor (x 1.3(PAL))  Protein Requirements: 66  Weight Used For Protein Calculations: 55.4 kg (122 lb 2.2 oz) (x 1.2g/kg)  Fluid Requirements (mL): 1200 or per MD  Estimated Fluid Requirement Method: RDA Method  RDA Method (mL): 1200         Nutrition Prescription Ordered    Current Diet Order: Regular diet, chopped meats  Nutrition Order Comments: PO  75%  Oral Nutrition Supplement: on hold refuses    Evaluation of Received Nutrient/Fluid Intake    I/O: no data  Energy Calories Required: meeting needs, not meeting needs  Protein Required: meeting needs, not meeting needs  Fluid Required: meeting needs, not meeting needs  Comments: LBM 5/14  Tolerance: tolerating  % Intake of Estimated Energy Needs: 50 - 75 %  % Meal Intake: 50 - 75 %    Nutrition Risk    Level of Risk/Frequency of Follow-up: low (one time per week)     Monitor and Evaluation    Food and Nutrient Intake: food and beverage intake  Food and Nutrient Adminstration: diet order  Physical Activity and Function: nutrition-related ADLs and IADLs  Anthropometric Measurements: weight change  Biochemical Data, Medical Tests and Procedures: gastrointestinal profile, electrolyte and renal panel  Nutrition-Focused Physical Findings: overall appearance, skin     Nutrition Follow-Up    RD Follow-up?: Yes

## 2024-05-16 NOTE — PLAN OF CARE
Interdisciplinary team, Amairani Raza, RN Nurse Manager, Linda Johnson PT, Rehab, Ewa Jurado, and Lianne Mishra, Dietician, spoke to patient for care plan conference, weekly status update, and therapy progress update. Tentative discharge date set for 5/23/24. Patient returning to [facility] upon discharge and will need walker, wheelchair, hip kit and bedside commode.

## 2024-05-17 PROCEDURE — 97110 THERAPEUTIC EXERCISES: CPT | Mod: CQ

## 2024-05-17 PROCEDURE — 97116 GAIT TRAINING THERAPY: CPT | Mod: CQ

## 2024-05-17 PROCEDURE — 97530 THERAPEUTIC ACTIVITIES: CPT

## 2024-05-17 PROCEDURE — 11000004 HC SNF PRIVATE

## 2024-05-17 PROCEDURE — 25000003 PHARM REV CODE 250: Performed by: FAMILY MEDICINE

## 2024-05-17 PROCEDURE — 25000003 PHARM REV CODE 250: Performed by: HOSPITALIST

## 2024-05-17 PROCEDURE — 97110 THERAPEUTIC EXERCISES: CPT

## 2024-05-17 RX ADMIN — CHOLECALCIFEROL TAB 25 MCG (1000 UNIT) 2000 UNITS: 25 TAB at 08:05

## 2024-05-17 RX ADMIN — MEMANTINE 5 MG: 5 TABLET ORAL at 08:05

## 2024-05-17 RX ADMIN — ACETAMINOPHEN 650 MG: 325 TABLET ORAL at 09:05

## 2024-05-17 RX ADMIN — SPIRONOLACTONE 50 MG: 25 TABLET ORAL at 08:05

## 2024-05-17 RX ADMIN — APIXABAN 2.5 MG: 2.5 TABLET, FILM COATED ORAL at 09:05

## 2024-05-17 RX ADMIN — THERA TABS 1 TABLET: TAB at 08:05

## 2024-05-17 RX ADMIN — FUROSEMIDE 20 MG: 20 TABLET ORAL at 08:05

## 2024-05-17 RX ADMIN — MEMANTINE 5 MG: 5 TABLET ORAL at 09:05

## 2024-05-17 RX ADMIN — METHOCARBAMOL 500 MG: 500 TABLET ORAL at 05:05

## 2024-05-17 RX ADMIN — METHOCARBAMOL 500 MG: 500 TABLET ORAL at 09:05

## 2024-05-17 RX ADMIN — CALCIUM CARBONATE (ANTACID) CHEW TAB 500 MG 1000 MG: 500 CHEW TAB at 08:05

## 2024-05-17 RX ADMIN — ACETAMINOPHEN 650 MG: 325 TABLET ORAL at 03:05

## 2024-05-17 RX ADMIN — ATENOLOL 25 MG: 25 TABLET ORAL at 08:05

## 2024-05-17 RX ADMIN — METHOCARBAMOL 500 MG: 500 TABLET ORAL at 08:05

## 2024-05-17 RX ADMIN — APIXABAN 2.5 MG: 2.5 TABLET, FILM COATED ORAL at 08:05

## 2024-05-17 RX ADMIN — Medication 500 MG: at 08:05

## 2024-05-17 RX ADMIN — Medication 500 MG: at 09:05

## 2024-05-17 RX ADMIN — METHOCARBAMOL 500 MG: 500 TABLET ORAL at 12:05

## 2024-05-17 RX ADMIN — ZINC SULFATE 220 MG (50 MG) CAPSULE 220 MG: CAPSULE at 08:05

## 2024-05-17 RX ADMIN — ACETAMINOPHEN 650 MG: 325 TABLET ORAL at 08:05

## 2024-05-17 RX ADMIN — ESCITALOPRAM OXALATE 5 MG: 5 TABLET, FILM COATED ORAL at 08:05

## 2024-05-17 RX ADMIN — Medication 9 MG: at 09:05

## 2024-05-17 NOTE — PT/OT/SLP PROGRESS
"Occupational Therapy   Treatment    Name: Mary Ellen Miller  MRN: [id number]  Admit Date: 5/2/2024  Admitting Diagnosis:  Closed displaced intertrochanteric fracture of left femur with routine healing    General Precautions: Standard, fall   Orthopedic Precautions: LLE weight bearing as tolerated   Braces: N/A    Recommendations:     Discharge Recommendations:  other (see comments) (to be futher assessed)  Level of Assistance Recommended at Discharge: 24 hours light assistance for ADL's and homemaking tasks  Discharge Equipment Recommendations: walker, rolling, wheelchair, hip kit, bedside commode  Barriers to discharge:  Decreased caregiver support    Assessment:     Mary Ellen Miller is a 89 y.o. female with a medical diagnosis of Closed displaced intertrochanteric fracture of left femur with routine healing. Pt tolerated today's session well w/o incident. She tolerated new operation C activity however she did demonstrate difficulty manipulating small tweezers, requiring increased time to complete. Performance deficits affecting function are weakness, impaired endurance, impaired self care skills, impaired functional mobility, gait instability, impaired balance, orthopedic precautions, decreased lower extremity function, decreased coordination. Pt would benefit from cont'd OT services in the SNF setting to improve safety and independence /c functional tasks and ADLs upon discharge.       Rehab Potential is good    Activity tolerance:  Good    Plan:     Patient to be seen 5 x/week to address the above listed problems via self-care/home management, therapeutic activities, therapeutic exercises    Plan of Care Expires: 06/02/24  Plan of Care Reviewed with: patient    Subjective     Communicated with: CAROLINA prior to session.  " I had therapy already" referring to PT instead of OT    Pain/Comfort:  Pain Rating 1: 0/10  Pain Rating Post-Intervention 1: 0/10    Patient's cultural, spiritual, Anabaptism conflicts " given the current situation:  no    Objective:     Patient found up in chair with  (no active lines) upon OT entry to room.      [facility] 6 Click ADL: 18    OT Exercises: UE Ergometer 10 mins seated in w/c; focus on BUE endurance and overall cardiovascular function in order to improve performance with ADLs/daily tasks   Bilateral Manuel 30 reps each; shoulder flex/ext/abd/add w/ 4lb; focus on BUE strength and ROM in order to improve performance with ADLs/daily task     FMC activity via operation: pt removed small items from anterior target using a small tweezers; focus on vision and FMC skills in order to improve performance with ADLs/daily task    FMC activity: pt removed small items from theraputty using BUEs; focus on FMC skills in order to improve performance with ADLs/daily task     Treatment & Education:  Education on operation game and how to properly use   Current POC     Patient left up in chair with call button in reach and all needs met    GOALS:   Multidisciplinary Problems       Occupational Therapy Goals          Problem: Occupational Therapy    Goal Priority Disciplines Outcome Interventions   Occupational Therapy Goal     OT, PT/OT Progressing    Description: Goals to be met by 6/2/2024:    Patient will increase functional independence with ADLs by performing:     UBD: SPV seated in w/c or EOB- ongoing   LBD: Min A using AE seated in w/c or EOB- ongoing  FW: MI using AE seated in w/c-  MET  Showering: Min A seated in w/c or EOB or sinkside- ongoing  Toileting: Min A seated on BSC- ongoing   Supine to sit: Min A- ongoing  Sit to stand: Min A with RW- MET                             Time Tracking:     OT Date of Treatment: 05/17/24  OT Start Time: 1100    OT Stop Time: 0955  OT Total Time (min): 40 min    Billable Minutes:Therapeutic Activity 15  Therapeutic Exercise 25    5/17/2024  Bob Andrew Jr., OTR/L

## 2024-05-17 NOTE — PT/OT/SLP PROGRESS
"Physical Therapy Treatment    Patient Name:  Mary Ellen Miller   MRN:  [id number]  Admit Date: 5/2/2024  Admitting Diagnosis: Closed displaced intertrochanteric fracture of left femur with routine healing  Recent Surgeries: INSERTION, INTRAMEDULLARY LENO, FEMUR: Left (Left)     General Precautions: Standard, fall  Orthopedic Precautions: LLE weight bearing as tolerated  Braces: N/A    Recommendations:     Discharge Recommendations: home health PT  Level of Assistance Recommended at Discharge: 24 hours light assistance  Discharge Equipment Recommendations: bedside commode, walker, rolling, wheelchair  Barriers to discharge: Decreased caregiver support    Assessment:     Mary Ellen Miller is a 89 y.o. female admitted with a medical diagnosis of Closed displaced intertrochanteric fracture of left femur with routine healing  Pt tolerated well, pt increasing amb distance with decreased LLE pain. Pt asc/desc 4" curb with SBA/CGA and RW. Pt progressing well and would continue to benefit from skilled PT services to improve overall functional mobility, strength and endurance.      Performance deficits affecting function: weakness, impaired endurance, impaired self care skills, impaired functional mobility, gait instability, impaired balance, decreased upper extremity function, decreased lower extremity function, decreased ROM, impaired cardiopulmonary response to activity, orthopedic precautions.    Rehab Potential is good    Activity Tolerance: Good    Plan:     Patient to be seen 5 x/week to address the above listed problems via gait training, therapeutic activities, therapeutic exercises, wheelchair management/training    Plan of Care Expires: 06/02/24  Plan of Care Reviewed with: patient    Subjective     Pt agreeable for PT.     Pain/Comfort:  Pain Rating 1: 0/10  Location - Side 1: Left  Location - Orientation 1: generalized  Location 1: hip  Pain Addressed 1: Distraction, Cessation of Activity  Pain Rating " "Post-Intervention 1:  (minimal pain reported')    Patient's cultural, spiritual, Sikh conflicts given the current situation:  no    Objective:      Patient found up in chair with  (no lines) upon PT entry to room.     Therapeutic Activities and Exercises: LBE x 10 min For BLE strengthening, endurance and ROM, light to mod resistance. No rest    Patient educated on role of therapy, goals of session, and benefits of out of bed mobility.   Instructed on use of call button and importance of calling nursing staff for assistance with mobility   Questions/concerns addressed within PTA scope of practice  Pt verbalized understanding    Functional Mobility:  Transfers:     Sit to Stand:  stand by assistance with rolling walker.vc for sequencing   Gait: pt amb 135 ft + 105 ft (including curb) + 60 ft SBA with RW. Step through gait, no LOB. Seated rest  break in between trials.   Stairs:  Pt ascended/descended 4" curb step with Rolling Walker with no handrails with Stand-by Assistance and Contact Guard Assistance. Vc and demo for sequencing     AM-PAC 6 CLICK MOBILITY  15    Patient left up in chair with  PT tech .    GOALS:   Multidisciplinary Problems       Physical Therapy Goals          Problem: Physical Therapy    Goal Priority Disciplines Outcome Goal Variances Interventions   Physical Therapy Goal     PT, PT/OT Progressing     Description: Goals to be met by: 3/10/24     Patient will increase functional independence with mobility by performing:    . Supine to sit with MInimal Assistance  . Sit to supine with MInimal Assistance  . Rolling to Left and Right with Minimal Assistance.  . Sit to stand transfer with Minimal Assistance  . Bed to chair transfer with Minimal Assistance using Rolling Walker  . Gait  x 50 feet with Minimal Assistance using Rolling Walker.-met  Updated 5/16/2024  Gait  x 100 feet with Contact Guard Assistance using Rolling Walker  . Wheelchair propulsion x150 feet with Supervision using " "bilateral uppper extremities  .Added 5/16/2024 Ascend/Descend 4 inch curb step with RW and CGA/SBA    Rehab Services' DME recommendations    Walker Adult (5'1"-6"6")      Wheels Yes  Justification for walker: Mobility limitation cannot be sufficiently resolved by use of a cane/patient cannot safely ambulate with a cane, Patient's functional mobility deficit can be sufficiently resolved with the use of a Rolling Walker of Walker, Patient's mobility limitation significantly impairs their ability to participate in one of more activities of daily living, The use of a Rolling Walker or Walker will significantly improve the patient's ability to participate in MRADLS and the patient will use it on a regular basis    Wheelchair  Number of hours up in a wheelchair per day 8      Style Light weight    Justification for light weight w/c: patient cannot propel in a standard wheelchair, patient can and does self-propel in a lightweight wheelchair, patient has impaired ability to participate in MRADLs, mobility limitations cannot be sifficiently resolved with a cane/walker, the home provides adequate access between rooms for a wheelchair, a wheelchair will significantly improve the ability to participate in MRADLs and will be used in the home on a regular basis, the patient is willing to use a wheelchair in the home, the patient has a caregiver who is available, willing, and able to provide assistance with the wheelchair, and the patient requires additional person for safety with mobility with walker  Seat Width 16 (Small adult)  Seat Depth 16  Back Height Standard  Leg Support Standard, Heel Loops, and Swing Away  Arm Height Desk and Swing Away  Lap Belt Buckle  Accessories Anti-tippers and Safety belt  Cushion Basic  Justification for Cushion skin integrity      3 in 1 commode Standard     Justification for 3 in 1 commode: The patient is confined to a single room , The patient is confined to one level of the home environment " and there is no toilet on that level, The patient is confined to the home and there are no toilet facilities in the home, The patient is bedroom confined for an extended time and non-ambulatory, The patient's deficits will not be sufficiently addressed by a raised toilet seat                              Time Tracking:     PT Received On: 05/17/24  PT Start Time: 0936  PT Stop Time: 1017  PT Total Time (min): 41 min    Billable Minutes: Gait Training 28 and Therapeutic Exercise 13    Treatment Type: Treatment  PT/PTA: PTA     Number of PTA visits since last PT visit: 1 05/17/2024

## 2024-05-17 NOTE — PROGRESS NOTES
[facility]                                  Skilled Nursing Facility                   Progress Note     Patient Name: Mary Ellen Miller  YOB: 1930  MRN: [id number]  Room: [place] [address] [address] A     Admit Date: 5/2/2024   HEAVEN: 5/23/2024     Principal Problem: Closed displaced intertrochanteric fracture of left femur with routine healing    HPI obtained from patient interview and chart review     Chief Complaint: Re-evaluation of medical treatment and therapy status      HPI:   Mary Ellen Miller is a 89 y.o. F with PMH of HTN, HLD, CHF, and afib on eliquis presenting to the ED with significant left hip pain after a fall. 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 Prior to the incident, she reports intermittent worsening of chronic lower extremity edema and SOB. Her PCP recently discontinued amlodipine in favor of lasix and continuing aldactone, and she has not missed any doses of her home medications.  XR pelvis with mildly displaced L intertrochanteric femur fracture with impaction, minimally displaced comminuted L superior pubic ramus fracture, and nondisplaced fracture of the L inferior pubic ramus. s/p left femur CMN on 4/28/24. Given diuresis and on 1L O2, not on oxygen at home.  PT/OT recommends skilled nursing placement for further therapy.     Patient will be treated at [facility] with PT and OT to improve functional status and ability to perform ADLs.     Interval History  24 hour chart review completed. Pertinent lab, micro, and/or radiology results addressed below. NAEON. NAD. LBM 5/15  Pain better with scheduled robaxin and tylenol  Skilled Therapy: Patient progressing with therapy as tolerated and would continue to benefit from skilled PT and OT services to improve overall functional mobility  and independence, strength, endurance, and safety.        Past Medical History: Patient has a past medical history of Anxiety, Atrial fibrillation, Colon polyps (05/01/2012), Hypertension, Leg ulcer, right, limited to breakdown of skin (11/20/2019), and Unilateral femoral hernia with obstruction, without gangrene (10/26/2015).    Past Surgical History: Patient has a past surgical history that includes Radical hysterectomy (1990); Lung surgery (1988); Hysterectomy; Oophorectomy; and Intramedullary rodding of femur (Left, 4/28/2024).    Social History: Patient reports that she has never smoked. She has never been exposed to tobacco smoke. She has never used smokeless tobacco. She reports that she does not drink alcohol and does not use drugs.    Family History:  family history includes No Known Problems in her father and mother.    Allergies: Patient is allergic to percodan [oxycodone hcl-oxycodone-asa], penicillins, sulfa (sulfonamide antibiotics), and amoxicillin.        ROS  Constitutional:  Positive for activity change. Negative for chills and fever.   HENT:  Negative for trouble swallowing.    Eyes:  Negative for photophobia and visual disturbance.   Respiratory: Negative for chest tightness or shortness of breath.    Cardiovascular:  Negative for chest pain and palpitations.   Gastrointestinal: Negative for abdominal pain, constipation, diarrhea, nausea and vomiting.   Genitourinary:  Negative for dysuria, frequency and hematuria.   Musculoskeletal:  Positive for arthralgias, back pain and gait problem. Negative for neck pain.   Skin:  Negative for rash and wound.   Neurological:  Positive for weakness. Negative for dizziness, syncope, speech difficulty and light-headedness.   Psychiatric/Behavioral:  Positive for confusion (intermittent). Negative for agitation. The patient is not nervous/anxious.     24 hour Vital Sign Range   Temp:  [98.1 °F (36.7 °C)-98.3 °F (36.8 °C)]   Pulse:  [76-85]   Resp:  [17-18]    BP: (138-146)/(69-76)   SpO2:  [96 %]       Physical Exam  Constitutional:       General: She is not in acute distress. Appears fatigued     Appearance: She is well-developed. She is not toxic-appearing.   HENT:      Head: Normocephalic.      Mouth/Throat:      Mouth: Mucous membranes are moist.   Eyes:      Extraocular Movements: Extraocular movements intact.      Conjunctiva/sclera: Conjunctivae normal.      Pupils: Pupils are equal, round, and reactive to light.   Cardiovascular:      Rate and Rhythm: Regular rate. Rhythm irregular.      Heart sounds: Normal heart sounds.   Pulmonary:      Effort: Pulmonary effort is normal. No respiratory distress. Lungs clear     Breath sounds: No wheezing.      Comments: RA  Abdominal:      General: Bowel sounds are normal.      Palpations: Abdomen is soft.      Tenderness: There is no abdominal tenderness.   Musculoskeletal:         General: Normal range of motion.      Cervical back: Normal range of motion and neck supple.   Skin:     General: Skin is warm and dry.      Capillary Refill: Capillary refill takes less than 2 seconds.      Findings: Bruising present. No rash. Left anterior incisional wound.  Neurological:      Mental Status: She is alert and oriented to person, place, and time. Forgetful of recent details     Cranial Nerves: No cranial nerve deficit.      Sensory: No sensory deficit.      Coordination: Coordination normal.   Psychiatric:         Behavior: Behavior normal.              Labs:  Recent Labs   Lab 05/13/24 0435 05/16/24 0449   WBC 6.31 5.61   HGB 9.3* 9.0*   HCT 30.6* 28.5*    289     Recent Labs   Lab 05/13/24 0435 05/16/24 0449    140   K 4.2 4.3    111*   CO2 21* 20*   BUN 21 22   CREATININE 0.8 0.8   GLU 81 79   CALCIUM 8.3* 8.2*   MG 1.8 1.8   PHOS 3.7 3.3     Recent Labs   Lab 05/13/24 0435 05/16/24 0449   ALKPHOS 117 130   ALT 15 11   AST 17 13   ALBUMIN 2.9* 2.9*   PROT 5.9* 5.8*   BILITOT 1.3* 1.0       No results  "for input(s): "POCTGLUCOSE" in the last 72 hours.    Meds Scheduled:   acetaminophen  650 mg Oral TID    apixaban  2.5 mg Oral BID    ascorbic acid (vitamin C)  500 mg Oral BID    atenoloL  25 mg Oral Daily    calcium carbonate  1,000 mg Oral Daily    EScitalopram oxalate  5 mg Oral Every other day    furosemide  20 mg Oral Daily    melatonin  9 mg Oral Nightly    memantine  5 mg Oral BID    methocarbamoL  500 mg Oral QID    multivitamin  1 tablet Oral Daily    spironolactone  50 mg Oral Daily    vitamin D  2,000 Units Oral Daily    zinc sulfate  220 mg Oral Daily       PRN:     Current Facility-Administered Medications:     calcium carbonate, 500 mg, Oral, BID PRN    ondansetron, 8 mg, Oral, Q8H PRN    senna-docusate 8.6-50 mg, 1 tablet, Oral, BID PRN      Assessment and Plan:    Closed displaced intertrochanteric fracture of left femur   -s/p CMN on 4/28/24 Dr. Kurt Melendez   -Pain control: multimodal,   5/13 change robaxin to scheduled and schedule APAP  -PT/OT consult: WBAT LLE  -DVT PPx: Eliquis 2.5 mg BID, SCDs at all times when not ambulating    Hemolytic Anemia  Hyperbilirubinemia  POA  - Suspect hemolytic anemia as etiology of hyperbilirubinemia.  5/6: T bili obtained inpt on 4/27/24: 1.2  - Stop q 8 hour 1000 mg APAP, continue 650 mg APAP prn.  - T bili elevated to 1.5, direct bili 0.5. ALP, AST, ALT all WNL.   5/7: Acute hepatitis panel negative.   - T bili continues to rise.   - Patient is asymptomatic:   - Given ALP, aminotransferases, and GGT are normal, hyperbili not likely due to hepatic injury or biliary tract disease.   - Eval for hemolytic anemia: in am obtain retic, LDH, and haptoglobin, CBC Diff with peripheral smear if lab can accommodate, and CMP.  5/8: T bili stabilizing at 1.4 from 1.6 yesterday  direct bili 0.6.   - Hgb 8.3 from 9.1  - LDH and haptoglobin WNL, however elevated retic 5.8 and peripheral smear notes "red cells show mild anisocytosis with a subset of small spherocytes" " Spherocytes suggestive of Autoimmune hemolytic anemia (AIHA) or Hereditary spherocytosis (HS).   - Obtain GUERDA (Gordon) and type/screen in am with repeat cbc diff, cmp  5/9: Direct and indirect Gordon negative.   - AIHA associated with increased risk of thrombus. Obtain d dimer with next lab draw. If d dimer positive will need to obtain BLE Venous US.   - Will need referral to heme/onc for more specialized testing.   - Continue to routinely monitor CBC and bilirubin with CMP trends.  5/16 Ddimer 4.9 but LFTs and bili normal now    Metabolic Acidosis  New 5/8. Na 135  CO2 18  - Start 1300 mg na bicarb po bid x 3 days  - Monitor with lab and adjust prn    Diarrhea  New 5/8, improving  - denies nausea / vomiting  - denies abdominal cramping or discomfort.   - Changed senokot from bid to bid prn.  - Encouraged fluids    Severe Protein Calorie Malnutrition  Hypoalbuminemia 2/2 Protein Calorie Malnutrition  POA, unstable  - RD consulted, ongoing collaboration for optimized nutrition   - Diet: regular diet  - Assist with meals  - Encourage intake   - Dietary Supplements: Boost TID  - Vitamin and Mineral Supplements: Zinc, Vit C, and Multivitamin     Altered mental status  Had some confusion inpatient, UA was negative  Patient does not remember falling at home  Delirium precautions currently oriented x4  Decreased home lexapro from 10mg to 5mg d/t advanced age    Acute hypoxemic respiratory failure  Continue diuretics as tolerated  Wean O2 as tolerated to maintain sat >90%    Long term (current) use of anticoagulants  Eliquis for Afib, reduced to 2.5mg dose meets criteria     Acute on chronic diastolic heart failure  Continue Lasix 20mg, atenolol 25mg and spironolactone 50mg daily with parameters d/t recent hypotension  Follow up cardiology outpatient    Recurrent major depressive disorder, in full remission  PHQ9 score 1 on 5/13  - Continue home lexapro & namenda   -Continue to wean off lexapro prior to DC     Essential  hypertension  Continue Lasix 20mg, atenolol 25mg and spironolactone 50mg daily with parameters d/t recent hypotension    Advanced Care Planning  Outcome of discussion:Confirms DNR    IP [facility] RISK OF UNPLANNED READMISSION Model: MOD    Anticipate disposition:    prison with home health      Follow-up needed during SNF admission:   Ortho    Follow-up NOT needed during SNF admission: 5/20 [facility]    Follow-up needed after discharge from SNF: Heme/Onc  - PCP within 1-2 weeks  - See appt scheduled below     Future Appointments   Date Time Provider Department Center   6/10/2024 12:15 PM Any Cantu PA-C [facility] [address] [facility]   6/25/2024  3:00 PM Dionne Jeter MD [facility] certify that SNF services are required to be given on an inpatient basis because Mary Ellen Miller needs for skilled nursing care and/or skilled rehabilitation are required on a daily basis and such services can only practically be provided in a skilled nursing facility setting and are for an ongoing condition for which she received inpatient care in the hospital.         LUDY KRAUS  Department of Hospital Medicine   [facility]     DOS: 5/17/2024       Patient note was created using MModal Dictation.  Any errors in syntax or even information may not have been identified and edited on initial review prior to signing this note.

## 2024-05-17 NOTE — PLAN OF CARE
Problem: Adult Inpatient Plan of Care  Goal: Plan of Care Review  Outcome: Progressing  Flowsheets (Taken 5/16/2024 2129)  Plan of Care Reviewed With: patient  Goal: Patient-Specific Goal (Individualized)  Outcome: Progressing  Goal: Absence of Hospital-Acquired Illness or Injury  Outcome: Progressing  Goal: Optimal Comfort and Wellbeing  Outcome: Progressing  Goal: Readiness for Transition of Care  Outcome: Progressing     Problem: Acute Kidney Injury/Impairment  Goal: Fluid and Electrolyte Balance  Outcome: Progressing  Goal: Improved Oral Intake  Outcome: Progressing  Goal: Effective Renal Function  Outcome: Progressing     Problem: Wound  Goal: Optimal Coping  Outcome: Progressing  Goal: Optimal Functional Ability  Outcome: Progressing  Goal: Absence of Infection Signs and Symptoms  Outcome: Progressing  Goal: Improved Oral Intake  Outcome: Progressing  Goal: Optimal Pain Control and Function  Outcome: Progressing  Goal: Skin Health and Integrity  Outcome: Progressing  Goal: Optimal Wound Healing  Outcome: Progressing     Problem: Fall Injury Risk  Goal: Absence of Fall and Fall-Related Injury  Outcome: Progressing     Problem: Skin Injury Risk Increased  Goal: Skin Health and Integrity  Outcome: Progressing     Problem: Malnutrition  Goal: Improved Nutritional Intake  Outcome: Progressing

## 2024-05-18 PROCEDURE — 25000003 PHARM REV CODE 250: Performed by: FAMILY MEDICINE

## 2024-05-18 PROCEDURE — 97116 GAIT TRAINING THERAPY: CPT | Mod: CQ

## 2024-05-18 PROCEDURE — 11000004 HC SNF PRIVATE

## 2024-05-18 PROCEDURE — 97530 THERAPEUTIC ACTIVITIES: CPT | Mod: CQ

## 2024-05-18 PROCEDURE — 97110 THERAPEUTIC EXERCISES: CPT | Mod: CQ

## 2024-05-18 PROCEDURE — 25000003 PHARM REV CODE 250: Performed by: HOSPITALIST

## 2024-05-18 RX ADMIN — ACETAMINOPHEN 650 MG: 325 TABLET ORAL at 03:05

## 2024-05-18 RX ADMIN — ATENOLOL 25 MG: 25 TABLET ORAL at 08:05

## 2024-05-18 RX ADMIN — METHOCARBAMOL 500 MG: 500 TABLET ORAL at 04:05

## 2024-05-18 RX ADMIN — THERA TABS 1 TABLET: TAB at 08:05

## 2024-05-18 RX ADMIN — METHOCARBAMOL 500 MG: 500 TABLET ORAL at 01:05

## 2024-05-18 RX ADMIN — SPIRONOLACTONE 50 MG: 25 TABLET ORAL at 08:05

## 2024-05-18 RX ADMIN — ACETAMINOPHEN 650 MG: 325 TABLET ORAL at 08:05

## 2024-05-18 RX ADMIN — ZINC SULFATE 220 MG (50 MG) CAPSULE 220 MG: CAPSULE at 08:05

## 2024-05-18 RX ADMIN — CALCIUM CARBONATE (ANTACID) CHEW TAB 500 MG 1000 MG: 500 CHEW TAB at 08:05

## 2024-05-18 RX ADMIN — METHOCARBAMOL 500 MG: 500 TABLET ORAL at 08:05

## 2024-05-18 RX ADMIN — APIXABAN 2.5 MG: 2.5 TABLET, FILM COATED ORAL at 08:05

## 2024-05-18 RX ADMIN — MEMANTINE 5 MG: 5 TABLET ORAL at 08:05

## 2024-05-18 RX ADMIN — Medication 500 MG: at 08:05

## 2024-05-18 RX ADMIN — Medication 9 MG: at 08:05

## 2024-05-18 RX ADMIN — FUROSEMIDE 20 MG: 20 TABLET ORAL at 08:05

## 2024-05-18 RX ADMIN — CHOLECALCIFEROL TAB 25 MCG (1000 UNIT) 2000 UNITS: 25 TAB at 08:05

## 2024-05-18 NOTE — PLAN OF CARE
Problem: Adult Inpatient Plan of Care  Goal: Plan of Care Review  Outcome: Progressing  Goal: Patient-Specific Goal (Individualized)  Outcome: Progressing  Goal: Absence of Hospital-Acquired Illness or Injury  Outcome: Progressing  Goal: Optimal Comfort and Wellbeing  Outcome: Progressing  Goal: Readiness for Transition of Care  Outcome: Progressing     Problem: Acute Kidney Injury/Impairment  Goal: Improved Oral Intake  Outcome: Progressing  Goal: Effective Renal Function  Outcome: Progressing     Problem: Wound  Goal: Absence of Infection Signs and Symptoms  Outcome: Progressing  Goal: Optimal Wound Healing  Outcome: Progressing     Problem: Fall Injury Risk  Goal: Absence of Fall and Fall-Related Injury  Outcome: Progressing

## 2024-05-18 NOTE — PLAN OF CARE
Problem: Adult Inpatient Plan of Care  Goal: Plan of Care Review  Outcome: Progressing  Flowsheets (Taken 5/17/2024 2103)  Plan of Care Reviewed With: patient  Goal: Patient-Specific Goal (Individualized)  Outcome: Progressing  Goal: Absence of Hospital-Acquired Illness or Injury  Outcome: Progressing  Goal: Optimal Comfort and Wellbeing  Outcome: Progressing  Goal: Readiness for Transition of Care  Outcome: Progressing     Problem: Acute Kidney Injury/Impairment  Goal: Fluid and Electrolyte Balance  Outcome: Progressing  Goal: Improved Oral Intake  Outcome: Progressing  Goal: Effective Renal Function  Outcome: Progressing     Problem: Wound  Goal: Optimal Coping  Outcome: Progressing  Goal: Optimal Functional Ability  Outcome: Progressing  Goal: Absence of Infection Signs and Symptoms  Outcome: Progressing  Goal: Improved Oral Intake  Outcome: Progressing  Goal: Optimal Pain Control and Function  Outcome: Progressing  Goal: Skin Health and Integrity  Outcome: Progressing  Goal: Optimal Wound Healing  Outcome: Progressing     Problem: Fall Injury Risk  Goal: Absence of Fall and Fall-Related Injury  Outcome: Progressing     Problem: Skin Injury Risk Increased  Goal: Skin Health and Integrity  Outcome: Progressing     Problem: Malnutrition  Goal: Improved Nutritional Intake  Outcome: Progressing

## 2024-05-18 NOTE — PT/OT/SLP PROGRESS
Physical Therapy Treatment    Patient Name:  Mary Ellen Miller   MRN:  [id number]  Admit Date: 5/2/2024  Admitting Diagnosis: Closed displaced intertrochanteric fracture of left femur with routine healing  Recent Surgeries: INSERTION, INTRAMEDULLARY LENO, FEMUR: Left (Left)     General Precautions: Standard, fall  Orthopedic Precautions: LLE weight bearing as tolerated  Braces: N/A    Recommendations:     Discharge Recommendations: home health PT  Level of Assistance Recommended at Discharge: 24 hours light assistance  Discharge Equipment Recommendations: bedside commode, walker, rolling, wheelchair  Barriers to discharge: Decreased caregiver support    Assessment:     Mary Ellen Miller is a 89 y.o. female admitted with a medical diagnosis of Closed displaced intertrochanteric fracture of left femur with routine healing . Pt tolerated well, pt without pain and complete all functional mobility with SBA/CGA. Pt progressing well and would continue to benefit from skilled PT services to improve overall functional mobility, strength and endurance.      Performance deficits affecting function: weakness, impaired endurance, impaired self care skills, impaired functional mobility, gait instability, impaired balance, decreased upper extremity function, decreased lower extremity function, decreased ROM, impaired cardiopulmonary response to activity, orthopedic precautions.    Rehab Potential is good    Activity Tolerance: Good    Plan:     Patient to be seen 5 x/week to address the above listed problems via gait training, therapeutic activities, therapeutic exercises, wheelchair management/training    Plan of Care Expires: 06/02/24  Plan of Care Reviewed with: patient    Subjective     Pt agreeable for PT.     Pain/Comfort:  Pain Rating 1: 0/10  Pain Rating Post-Intervention 1: 0/10    Patient's cultural, spiritual, [employer]ar[employer] conflicts given the current situation:  no    Objective:     Patient found up in chair with   "(no lines) upon PT entry to room.     Therapeutic Activities and Exercises: LBE x 15 min For BLE strengthening, endurance and ROM. No rest .light to mod resistance     Patient educated on role of therapy, goals of session, and benefits of out of bed mobility.   Instructed on use of call button and importance of calling nursing staff for assistance with mobility   Questions/concerns addressed within PTA scope of practice  Pt verbalized understanding    Functional Mobility:  Transfers:     Sit to Stand:  stand by assistance with rolling walker  Bed to Chair: stand by assistance with  rolling walker  using  Stand Pivot  Gait: pt amb 90 ft + 108 ft (including curb) with SBA and RW. Good justino. Short step length. Step to gait. No LOB. Seated rest break in between trials.   4"curb : pt asc/desc curb with SBA/CGA and RW. Vc for proper sequencing     AM-PAC 6 CLICK MOBILITY  15    Patient left  in BSC  with call button in reach.    GOALS:   Multidisciplinary Problems       Physical Therapy Goals          Problem: Physical Therapy    Goal Priority Disciplines Outcome Goal Variances Interventions   Physical Therapy Goal     PT, PT/OT Progressing     Description: Goals to be met by: 3/10/24     Patient will increase functional independence with mobility by performing:    . Supine to sit with MInimal Assistance  . Sit to supine with MInimal Assistance  . Rolling to Left and Right with Minimal Assistance.  . Sit to stand transfer with Minimal Assistance  . Bed to chair transfer with Minimal Assistance using Rolling Walker  . Gait  x 50 feet with Minimal Assistance using Rolling Walker.-met  Updated 5/16/2024  Gait  x 100 feet with Contact Guard Assistance using Rolling Walker  . Wheelchair propulsion x150 feet with Supervision using bilateral uppper extremities  .Added 5/16/2024 Ascend/Descend 4 inch curb step with RW and CGA/SBA    Rehab Services' DME recommendations    Walker Adult (5'1"-6"6")      Wheels Yes  Justification " for walker: Mobility limitation cannot be sufficiently resolved by use of a cane/patient cannot safely ambulate with a cane, Patient's functional mobility deficit can be sufficiently resolved with the use of a Rolling Walker of Walker, Patient's mobility limitation significantly impairs their ability to participate in one of more activities of daily living, The use of a Rolling Walker or Walker will significantly improve the patient's ability to participate in MRADLS and the patient will use it on a regular basis    Wheelchair  Number of hours up in a wheelchair per day 8      Style Light weight    Justification for light weight w/c: patient cannot propel in a standard wheelchair, patient can and does self-propel in a lightweight wheelchair, patient has impaired ability to participate in MRADLs, mobility limitations cannot be sifficiently resolved with a cane/walker, the home provides adequate access between rooms for a wheelchair, a wheelchair will significantly improve the ability to participate in MRADLs and will be used in the home on a regular basis, the patient is willing to use a wheelchair in the home, the patient has a caregiver who is available, willing, and able to provide assistance with the wheelchair, and the patient requires additional person for safety with mobility with walker  Seat Width 16 (Small adult)  Seat Depth 16  Back Height Standard  Leg Support Standard, Heel Loops, and Swing Away  Arm Height Desk and Swing Away  Lap Belt Buckle  Accessories Anti-tippers and Safety belt  Cushion Basic  Justification for Cushion skin integrity      3 in 1 commode Standard     Justification for 3 in 1 commode: The patient is confined to a single room , The patient is confined to one level of the home environment and there is no toilet on that level, The patient is confined to the home and there are no toilet facilities in the home, The patient is bedroom confined for an extended time and non-ambulatory, The  patient's deficits will not be sufficiently addressed by a raised toilet seat                              Time Tracking:     PT Received On: 05/18/24  PT Start Time: 1324  PT Stop Time: 1402  PT Total Time (min): 38 min    Billable Minutes: Gait Training 15, Therapeutic Activity 08, and Therapeutic Exercise 15    Treatment Type: Treatment  PT/PTA: PTA     Number of PTA visits since last PT visit: 2     05/18/2024   Low Dose Naltrexone Pregnancy And Lactation Text: Naltrexone is pregnancy category C.  There have been no adequate and well-controlled studies in pregnant women.  It should be used in pregnancy only if the potential benefit justifies the potential risk to the fetus.   Limited data indicates that naltrexone is minimally excreted into breastmilk.

## 2024-05-19 PROCEDURE — 25000003 PHARM REV CODE 250: Performed by: FAMILY MEDICINE

## 2024-05-19 PROCEDURE — 11000004 HC SNF PRIVATE

## 2024-05-19 PROCEDURE — 25000003 PHARM REV CODE 250: Performed by: HOSPITALIST

## 2024-05-19 RX ADMIN — THERA TABS 1 TABLET: TAB at 10:05

## 2024-05-19 RX ADMIN — MEMANTINE 5 MG: 5 TABLET ORAL at 09:05

## 2024-05-19 RX ADMIN — APIXABAN 2.5 MG: 2.5 TABLET, FILM COATED ORAL at 10:05

## 2024-05-19 RX ADMIN — METHOCARBAMOL 500 MG: 500 TABLET ORAL at 10:05

## 2024-05-19 RX ADMIN — ATENOLOL 25 MG: 25 TABLET ORAL at 10:05

## 2024-05-19 RX ADMIN — ESCITALOPRAM OXALATE 5 MG: 5 TABLET, FILM COATED ORAL at 10:05

## 2024-05-19 RX ADMIN — METHOCARBAMOL 500 MG: 500 TABLET ORAL at 05:05

## 2024-05-19 RX ADMIN — CHOLECALCIFEROL TAB 25 MCG (1000 UNIT) 2000 UNITS: 25 TAB at 10:05

## 2024-05-19 RX ADMIN — ZINC SULFATE 220 MG (50 MG) CAPSULE 220 MG: CAPSULE at 10:05

## 2024-05-19 RX ADMIN — SPIRONOLACTONE 50 MG: 25 TABLET ORAL at 10:05

## 2024-05-19 RX ADMIN — MEMANTINE 5 MG: 5 TABLET ORAL at 10:05

## 2024-05-19 RX ADMIN — APIXABAN 2.5 MG: 2.5 TABLET, FILM COATED ORAL at 09:05

## 2024-05-19 RX ADMIN — Medication 500 MG: at 10:05

## 2024-05-19 RX ADMIN — FUROSEMIDE 20 MG: 20 TABLET ORAL at 10:05

## 2024-05-19 RX ADMIN — Medication 9 MG: at 09:05

## 2024-05-19 RX ADMIN — METHOCARBAMOL 500 MG: 500 TABLET ORAL at 12:05

## 2024-05-19 RX ADMIN — ACETAMINOPHEN 650 MG: 325 TABLET ORAL at 02:05

## 2024-05-19 RX ADMIN — METHOCARBAMOL 500 MG: 500 TABLET ORAL at 09:05

## 2024-05-19 RX ADMIN — ACETAMINOPHEN 650 MG: 325 TABLET ORAL at 10:05

## 2024-05-19 RX ADMIN — Medication 500 MG: at 09:05

## 2024-05-19 RX ADMIN — CALCIUM CARBONATE (ANTACID) CHEW TAB 500 MG 1000 MG: 500 CHEW TAB at 10:05

## 2024-05-19 RX ADMIN — ACETAMINOPHEN 650 MG: 325 TABLET ORAL at 09:05

## 2024-05-19 NOTE — PLAN OF CARE
Problem: Adult Inpatient Plan of Care  Goal: Plan of Care Review  Outcome: Progressing  Flowsheets (Taken 5/18/2024 2040)  Plan of Care Reviewed With: patient  Goal: Patient-Specific Goal (Individualized)  Outcome: Progressing  Goal: Absence of Hospital-Acquired Illness or Injury  Outcome: Progressing  Goal: Optimal Comfort and Wellbeing  Outcome: Progressing  Goal: Readiness for Transition of Care  Outcome: Progressing     Problem: Acute Kidney Injury/Impairment  Goal: Fluid and Electrolyte Balance  Outcome: Progressing  Goal: Improved Oral Intake  Outcome: Progressing  Goal: Effective Renal Function  Outcome: Progressing     Problem: Wound  Goal: Optimal Coping  Outcome: Progressing  Goal: Optimal Functional Ability  Outcome: Progressing  Goal: Absence of Infection Signs and Symptoms  Outcome: Progressing  Goal: Improved Oral Intake  Outcome: Progressing  Goal: Optimal Pain Control and Function  Outcome: Progressing  Goal: Skin Health and Integrity  Outcome: Progressing  Goal: Optimal Wound Healing  Outcome: Progressing     Problem: Fall Injury Risk  Goal: Absence of Fall and Fall-Related Injury  Outcome: Progressing     Problem: Skin Injury Risk Increased  Goal: Skin Health and Integrity  Outcome: Progressing     Problem: Malnutrition  Goal: Improved Nutritional Intake  Outcome: Progressing

## 2024-05-20 LAB
ALBUMIN SERPL BCP-MCNC: 3.1 G/DL (ref 3.5–5.2)
ALP SERPL-CCNC: 171 U/L (ref 55–135)
ALT SERPL W/O P-5'-P-CCNC: 10 U/L (ref 10–44)
ANION GAP SERPL CALC-SCNC: 10 MMOL/L (ref 8–16)
AST SERPL-CCNC: 14 U/L (ref 10–40)
BASOPHILS # BLD AUTO: 0.06 K/UL (ref 0–0.2)
BASOPHILS NFR BLD: 1.1 % (ref 0–1.9)
BILIRUB SERPL-MCNC: 0.9 MG/DL (ref 0.1–1)
BUN SERPL-MCNC: 15 MG/DL (ref 10–30)
CALCIUM SERPL-MCNC: 8.7 MG/DL (ref 8.7–10.5)
CHLORIDE SERPL-SCNC: 107 MMOL/L (ref 95–110)
CO2 SERPL-SCNC: 21 MMOL/L (ref 23–29)
CREAT SERPL-MCNC: 0.8 MG/DL (ref 0.5–1.4)
DIFFERENTIAL METHOD BLD: ABNORMAL
EOSINOPHIL # BLD AUTO: 0.1 K/UL (ref 0–0.5)
EOSINOPHIL NFR BLD: 1.9 % (ref 0–8)
ERYTHROCYTE [DISTWIDTH] IN BLOOD BY AUTOMATED COUNT: 17.2 % (ref 11.5–14.5)
EST. GFR  (NO RACE VARIABLE): >60 ML/MIN/1.73 M^2
GLUCOSE SERPL-MCNC: 85 MG/DL (ref 70–110)
HCT VFR BLD AUTO: 32 % (ref 37–48.5)
HGB BLD-MCNC: 10 G/DL (ref 12–16)
IMM GRANULOCYTES # BLD AUTO: 0.02 K/UL (ref 0–0.04)
IMM GRANULOCYTES NFR BLD AUTO: 0.4 % (ref 0–0.5)
LYMPHOCYTES # BLD AUTO: 1.3 K/UL (ref 1–4.8)
LYMPHOCYTES NFR BLD: 23.7 % (ref 18–48)
MAGNESIUM SERPL-MCNC: 1.9 MG/DL (ref 1.6–2.6)
MCH RBC QN AUTO: 31.3 PG (ref 27–31)
MCHC RBC AUTO-ENTMCNC: 31.3 G/DL (ref 32–36)
MCV RBC AUTO: 100 FL (ref 82–98)
MONOCYTES # BLD AUTO: 0.5 K/UL (ref 0.3–1)
MONOCYTES NFR BLD: 8.7 % (ref 4–15)
NEUTROPHILS # BLD AUTO: 3.5 K/UL (ref 1.8–7.7)
NEUTROPHILS NFR BLD: 64.2 % (ref 38–73)
NRBC BLD-RTO: 0 /100 WBC
PHOSPHATE SERPL-MCNC: 3 MG/DL (ref 2.7–4.5)
PLATELET # BLD AUTO: 289 K/UL (ref 150–450)
PMV BLD AUTO: 9.6 FL (ref 9.2–12.9)
POTASSIUM SERPL-SCNC: 3.7 MMOL/L (ref 3.5–5.1)
PROT SERPL-MCNC: 6.3 G/DL (ref 6–8.4)
RBC # BLD AUTO: 3.2 M/UL (ref 4–5.4)
SODIUM SERPL-SCNC: 138 MMOL/L (ref 136–145)
WBC # BLD AUTO: 5.39 K/UL (ref 3.9–12.7)

## 2024-05-20 PROCEDURE — 80053 COMPREHEN METABOLIC PANEL: CPT | Performed by: FAMILY MEDICINE

## 2024-05-20 PROCEDURE — 25000003 PHARM REV CODE 250: Performed by: HOSPITALIST

## 2024-05-20 PROCEDURE — 11000004 HC SNF PRIVATE

## 2024-05-20 PROCEDURE — 25000003 PHARM REV CODE 250: Performed by: FAMILY MEDICINE

## 2024-05-20 PROCEDURE — 97535 SELF CARE MNGMENT TRAINING: CPT

## 2024-05-20 PROCEDURE — 97116 GAIT TRAINING THERAPY: CPT | Mod: CQ

## 2024-05-20 PROCEDURE — 97110 THERAPEUTIC EXERCISES: CPT | Mod: CQ

## 2024-05-20 PROCEDURE — 85025 COMPLETE CBC W/AUTO DIFF WBC: CPT | Performed by: HOSPITALIST

## 2024-05-20 PROCEDURE — 84100 ASSAY OF PHOSPHORUS: CPT | Performed by: HOSPITALIST

## 2024-05-20 PROCEDURE — 36415 COLL VENOUS BLD VENIPUNCTURE: CPT | Performed by: HOSPITALIST

## 2024-05-20 PROCEDURE — 83735 ASSAY OF MAGNESIUM: CPT | Performed by: HOSPITALIST

## 2024-05-20 RX ADMIN — ZINC SULFATE 220 MG (50 MG) CAPSULE 220 MG: CAPSULE at 08:05

## 2024-05-20 RX ADMIN — MEMANTINE 5 MG: 5 TABLET ORAL at 08:05

## 2024-05-20 RX ADMIN — METHOCARBAMOL 500 MG: 500 TABLET ORAL at 04:05

## 2024-05-20 RX ADMIN — METHOCARBAMOL 500 MG: 500 TABLET ORAL at 08:05

## 2024-05-20 RX ADMIN — CALCIUM CARBONATE (ANTACID) CHEW TAB 500 MG 1000 MG: 500 CHEW TAB at 08:05

## 2024-05-20 RX ADMIN — CHOLECALCIFEROL TAB 25 MCG (1000 UNIT) 2000 UNITS: 25 TAB at 08:05

## 2024-05-20 RX ADMIN — ATENOLOL 25 MG: 25 TABLET ORAL at 08:05

## 2024-05-20 RX ADMIN — ACETAMINOPHEN 650 MG: 325 TABLET ORAL at 09:05

## 2024-05-20 RX ADMIN — APIXABAN 2.5 MG: 2.5 TABLET, FILM COATED ORAL at 08:05

## 2024-05-20 RX ADMIN — ACETAMINOPHEN 650 MG: 325 TABLET ORAL at 04:05

## 2024-05-20 RX ADMIN — Medication 9 MG: at 08:05

## 2024-05-20 RX ADMIN — ACETAMINOPHEN 650 MG: 325 TABLET ORAL at 08:05

## 2024-05-20 RX ADMIN — Medication 500 MG: at 08:05

## 2024-05-20 RX ADMIN — METHOCARBAMOL 500 MG: 500 TABLET ORAL at 12:05

## 2024-05-20 RX ADMIN — THERA TABS 1 TABLET: TAB at 08:05

## 2024-05-20 RX ADMIN — SPIRONOLACTONE 50 MG: 25 TABLET ORAL at 08:05

## 2024-05-20 RX ADMIN — FUROSEMIDE 20 MG: 20 TABLET ORAL at 08:05

## 2024-05-20 NOTE — PLAN OF CARE
Problem: Adult Inpatient Plan of Care  Goal: Plan of Care Review  Outcome: Progressing     Problem: Acute Kidney Injury/Impairment  Goal: Fluid and Electrolyte Balance  Outcome: Progressing  Goal: Improved Oral Intake  Outcome: Progressing     Problem: Wound  Goal: Optimal Functional Ability  Outcome: Progressing  Goal: Skin Health and Integrity  Outcome: Progressing     Problem: Fall Injury Risk  Goal: Absence of Fall and Fall-Related Injury  Outcome: Progressing     Problem: Skin Injury Risk Increased  Goal: Skin Health and Integrity  Outcome: Progressing

## 2024-05-20 NOTE — PT/OT/SLP PROGRESS
"Occupational Therapy   Treatment    Name: Mary Ellen Miller  MRN: [id number]  Admit Date: 5/2/2024  Admitting Diagnosis:  Closed displaced intertrochanteric fracture of left femur with routine healing    General Precautions: Standard, fall   Orthopedic Precautions: LLE weight bearing as tolerated   Braces: N/A    Recommendations:     Discharge Recommendations:  other (see comments) (to be futher assessed)  Level of Assistance Recommended at Discharge: 24 hours light assistance for ADL's and homemaking tasks  Discharge Equipment Recommendations: walker, rolling, wheelchair, hip kit, bedside commode  Barriers to discharge:  Decreased caregiver support    Assessment:     Mary Ellen Miller is a 89 y.o. female with a medical diagnosis of Closed displaced intertrochanteric fracture of left femur with routine healing. Pt tolerated today's session well w/o c/o pain or discomfort. She cont's to demonstrate increased mobility and activity tolerance requiring less assistance with bed mobility, tranfers, and ADLs. Performance deficits affecting function are weakness, impaired endurance, impaired self care skills, impaired functional mobility, gait instability, impaired balance, orthopedic precautions, decreased lower extremity function, decreased coordination. Pt would benefit from cont'd OT services in the SNF setting to improve safety and independence /c functional tasks and ADLs upon discharge.       Rehab Potential is good    Activity tolerance:  Good    Plan:     Patient to be seen 5 x/week to address the above listed problems via self-care/home management, therapeutic activities, therapeutic exercises    Plan of Care Expires: 06/02/24  Plan of Care Reviewed with: patient    Subjective     Communicated with: CAROLINA prior to session.  "I'm ready to get up"    Pain/Comfort:  Pain Rating 1: 0/10  Pain Rating Post-Intervention 1: 0/10    Patient's cultural, spiritual, Sikhism conflicts given the current " situation:  no    Objective:     Patient found HOB elevated with  (no active lines) upon OT entry to room.    Bed Mobility:    Patient completed Supine to Sit with supervision     Functional Mobility/Transfers:  Patient completed Sit <> Stand Transfer with contact guard assistance  with  rolling walker   Patient completed Bed <> Chair Transfer using Stand Pivot technique with contact guard assistance with rolling walker  Patient completed Toilet Transfer Stand Pivot technique with contact guard assistance with  rolling walker      Activities of Daily Living:  Grooming: modified independence seated in w/c at sinkside; pt was able to complete all steps in tasks  Upper Body Dressing: modified independence seated in w/c; pt was able to doff pull over gown and don pull over shirt  Lower Body Dressing: contact guard assistance w/ RW while standing to manage over hips   Toileting: contact guard assistance while managing clothing and hygiene management     [facility] 6 Click ADL: 21      Treatment & Education:  POC    Patient left up in chair with call button in reach and all needs met    GOALS:   Multidisciplinary Problems       Occupational Therapy Goals          Problem: Occupational Therapy    Goal Priority Disciplines Outcome Interventions   Occupational Therapy Goal     OT, PT/OT Progressing    Description: Goals to be met by 6/2/2024:    Patient will increase functional independence with ADLs by performing:     UBD: SPV seated in w/c or EOB- MET   LBD: Min A using AE seated in w/c or EOB- MET  FW: MI using AE seated in w/c-  MET  Showering: Min A seated in w/c or EOB or sinkside- ongoing  Toileting: Min A seated on BSC- MET   Supine to sit: Min A- MET  Sit to stand: Min A with RW- MET                             Time Tracking:     OT Date of Treatment: 05/20/24  OT Start Time: 0845    OT Stop Time: 0930  OT Total Time (min): 45 min    Billable Minutes:Self Care/Home Management 45    5/20/2024  Bob Andrew Jr.,  OTR/L

## 2024-05-20 NOTE — PT/OT/SLP PROGRESS
"Physical Therapy Treatment    Patient Name:  Mary Ellen Miller   MRN:  [id number]  Admit Date: 5/2/2024  Admitting Diagnosis: Closed displaced intertrochanteric fracture of left femur with routine healing  Recent Surgeries:  INSERTION, INTRAMEDULLARY LENO, FEMUR: Left (Left)     General Precautions: Standard, fall  Orthopedic Precautions: LLE weight bearing as tolerated  Braces: N/A    Recommendations:     Discharge Recommendations: home health PT  Level of Assistance Recommended at Discharge: 24 hours light assistance  Discharge Equipment Recommendations: bedside commode, walker, rolling, wheelchair  Barriers to discharge: Decreased caregiver support    Assessment:     Mary Ellen Miller is a 89 y.o. female admitted with a medical diagnosis of Closed displaced intertrochanteric fracture of left femur with routine healing . Pt tolerated well, pt increased amb distance with SBA x 2 trials. Pt propelled LBE without rest/ without adverse reaction. Pt completed x 10 STS without rest. Pt progressing and would continue to benefit from skilled PT services to improve overall functional mobility, strength and endurance.      Performance deficits affecting function: weakness, impaired endurance, impaired self care skills, impaired functional mobility, gait instability, impaired balance, decreased upper extremity function, decreased lower extremity function, decreased ROM, impaired cardiopulmonary response to activity, orthopedic precautions.    Rehab Potential is good    Activity Tolerance: Good    Plan:     Patient to be seen 5 x/week to address the above listed problems via gait training, therapeutic activities, therapeutic exercises, wheelchair management/training    Plan of Care Expires: 06/02/24  Plan of Care Reviewed with: patient    Subjective     "I don't feel any pain anymore" .     Pain/Comfort:  Pain Rating 1: 0/10  Pain Rating Post-Intervention 1: 0/10    Patient's cultural, spiritual, Oriental orthodox conflicts " "given the current situation:  no    Objective:     Communicated with OT prior to session.  Patient found up in chair with  (no lines) upon PT entry to room.     Therapeutic Activities and Exercises: LBE x 10 min For BLE strengthening, endurance and ROM, no rest    Patient educated on role of therapy, goals of session, and benefits of out of bed mobility.   Instructed on use of call button and importance of calling nursing staff for assistance with mobility   Questions/concerns addressed within PTA scope of practice  Pt verbalized understanding    Functional Mobility:  Transfers:     Sit to Stand:  supervision and stand by assistance with rolling walker. Pt able to complete multiple throughout session and was able to complete x 10 reps without rest   Gait: pt amb 169 ft (including curb) + 120 ft SBA with RW. Seated rest break in between trials. Step through gait. Good justino. No LOB.  Stairs:  Pt ascended/descended 4" curb step with Rolling Walker with no handrails with Stand-by Assistance.     AM-PAC 6 CLICK MOBILITY  15    Patient left up in chair with  PT tech .    GOALS:   Multidisciplinary Problems       Physical Therapy Goals          Problem: Physical Therapy    Goal Priority Disciplines Outcome Goal Variances Interventions   Physical Therapy Goal     PT, PT/OT Progressing     Description: Goals to be met by: 3/10/24     Patient will increase functional independence with mobility by performing:    . Supine to sit with MInimal Assistance  . Sit to supine with MInimal Assistance  . Rolling to Left and Right with Minimal Assistance.  . Sit to stand transfer with Minimal Assistance  . Bed to chair transfer with Minimal Assistance using Rolling Walker  . Gait  x 50 feet with Minimal Assistance using Rolling Walker.-met  Updated 5/16/2024  Gait  x 100 feet with Contact Guard Assistance using Rolling Walker  . Wheelchair propulsion x150 feet with Supervision using bilateral uppper extremities  .Added 5/16/2024 " "Ascend/Descend 4 inch curb step with RW and CGA/SBA    Rehab Services' DME recommendations    Walker Adult (5'1"-6"6")      Wheels Yes  Justification for walker: Mobility limitation cannot be sufficiently resolved by use of a cane/patient cannot safely ambulate with a cane, Patient's functional mobility deficit can be sufficiently resolved with the use of a Rolling Walker of Walker, Patient's mobility limitation significantly impairs their ability to participate in one of more activities of daily living, The use of a Rolling Walker or Walker will significantly improve the patient's ability to participate in MRADLS and the patient will use it on a regular basis    Wheelchair  Number of hours up in a wheelchair per day 8      Style Light weight    Justification for light weight w/c: patient cannot propel in a standard wheelchair, patient can and does self-propel in a lightweight wheelchair, patient has impaired ability to participate in MRADLs, mobility limitations cannot be sifficiently resolved with a cane/walker, the home provides adequate access between rooms for a wheelchair, a wheelchair will significantly improve the ability to participate in MRADLs and will be used in the home on a regular basis, the patient is willing to use a wheelchair in the home, the patient has a caregiver who is available, willing, and able to provide assistance with the wheelchair, and the patient requires additional person for safety with mobility with walker  Seat Width 16 (Small adult)  Seat Depth 16  Back Height Standard  Leg Support Standard, Heel Loops, and Swing Away  Arm Height Desk and Swing Away  Lap Belt Buckle  Accessories Anti-tippers and Safety belt  Cushion Basic  Justification for Cushion skin integrity      3 in 1 commode Standard     Justification for 3 in 1 commode: The patient is confined to a single room , The patient is confined to one level of the home environment and there is no toilet on that level, The patient " is confined to the home and there are no toilet facilities in the home, The patient is bedroom confined for an extended time and non-ambulatory, The patient's deficits will not be sufficiently addressed by a raised toilet seat                              Time Tracking:     PT Received On: 05/20/24  PT Start Time: 0935  PT Stop Time: 1005  PT Total Time (min): 30 min    Billable Minutes: Gait Training 20 and Therapeutic Exercise 10    Treatment Type: Treatment  PT/PTA: PTA     Number of PTA visits since last PT visit: 3     05/20/2024

## 2024-05-20 NOTE — PROGRESS NOTES
[facility]                                  Skilled Nursing Facility                   Progress Note     Patient Name: Mary Ellen Miller  YOB: 1930  MRN: [id number]  Room: [place] [address] [address] A     Admit Date: 5/2/2024   HEAVEN: 5/23/2024     Principal Problem: Closed displaced intertrochanteric fracture of left femur with routine healing    HPI obtained from patient interview and chart review     Chief Complaint: Re-evaluation of medical treatment and therapy status: lab review, HTN      HPI:   Mary Ellen Miller is a 89 y.o. F with PMH of HTN, HLD, CHF, and afib on eliquis presenting to the ED with significant left hip pain after a fall. 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 Prior to the incident, she reports intermittent worsening of chronic lower extremity edema and SOB. Her PCP recently discontinued amlodipine in favor of lasix and continuing aldactone, and she has not missed any doses of her home medications.  XR pelvis with mildly displaced L intertrochanteric femur fracture with impaction, minimally displaced comminuted L superior pubic ramus fracture, and nondisplaced fracture of the L inferior pubic ramus. s/p left femur CMN on 4/28/24. Given diuresis and on 1L O2, not on oxygen at home.  PT/OT recommends skilled nursing placement for further therapy.     Patient will be treated at [facility] with PT and OT to improve functional status and ability to perform ADLs.     Interval History  24 hour chart review completed. Pertinent lab, micro, and/or radiology results addressed below. NAEON. NAD.   Afebrile, HDS.  HTN ongoing since yesterday. Weight / edema stable. Denies dyspnea. Patient reports pain controlled adequately with scheduled robaxin and tylenol. Will monitor closely.  LBM 5/19, denies  constipation.   Patient progressing with therapy as tolerated.  Facility [facility] served today for patient and her daughter in law. Anticipated discharge date Thursday, 5/23/24.  Will need ambulatory referral to heme/onc.     Past Medical History: Patient has a past medical history of Anxiety, Atrial fibrillation, Colon polyps (05/01/2012), Hypertension, Leg ulcer, right, limited to breakdown of skin (11/20/2019), and Unilateral femoral hernia with obstruction, without gangrene (10/26/2015).    Past Surgical History: Patient has a past surgical history that includes Radical hysterectomy (1990); Lung surgery (1988); Hysterectomy; Oophorectomy; and Intramedullary rodding of femur (Left, 4/28/2024).    Social History: Patient reports that she has never smoked. She has never been exposed to tobacco smoke. She has never used smokeless tobacco. She reports that she does not drink alcohol and does not use drugs.    Family History:  family history includes No Known Problems in her father and mother.    Allergies: Patient is allergic to percodan [oxycodone hcl-oxycodone-asa], penicillins, sulfa (sulfonamide antibiotics), and amoxicillin.        ROS  Constitutional:  Positive for activity change. Negative for chills and fever.   HENT:  Negative for trouble swallowing.    Eyes:  Negative for photophobia and visual disturbance.   Respiratory: Negative for chest tightness or shortness of breath.    Cardiovascular:  Negative for chest pain and palpitations.   Gastrointestinal: Negative for abdominal pain, constipation, diarrhea, nausea and vomiting.   Genitourinary:  Negative for dysuria, frequency and hematuria.   Musculoskeletal:  Positive for arthralgias, back pain and gait problem. Negative for neck pain.   Skin:  Negative for rash and wound.   Neurological:  Positive for weakness. Negative for dizziness, syncope, speech difficulty and light-headedness.   Psychiatric/Behavioral:  Positive for confusion (intermittent). Negative  for agitation. The patient is not nervous/anxious.     24 hour Vital Sign Range   Temp:  [97.7 °F (36.5 °C)-98.2 °F (36.8 °C)]   Pulse:  [80-82]   Resp:  [18-20]   BP: (140-173)/(60-83)   SpO2:  [97 %-98 %]       Physical Exam  Constitutional:       General: She is not in acute distress. Appears fatigued     Appearance: She is well-developed. She is not toxic-appearing.   HENT:      Head: Normocephalic.      Mouth/Throat:      Mouth: Mucous membranes are moist.   Eyes:      Extraocular Movements: Extraocular movements intact.      Conjunctiva/sclera: Conjunctivae normal.      Pupils: Pupils are equal, round, and reactive to light.   Cardiovascular:      Rate and Rhythm: Regular rate. Rhythm irregular.      Heart sounds: Normal heart sounds.   Pulmonary:      Effort: Pulmonary effort is normal. No respiratory distress. Lungs clear     Breath sounds: No wheezing.      Comments: RA  Abdominal:      General: Bowel sounds are normal.      Palpations: Abdomen is soft.      Tenderness: There is no abdominal tenderness.   Musculoskeletal:         General: Normal range of motion.      Cervical back: Normal range of motion and neck supple.   Skin:     General: Skin is warm and dry.      Capillary Refill: Capillary refill takes less than 2 seconds.      Findings: Bruising present. No rash. Left anterior incisional wound.  Neurological:      Mental Status: She is alert and oriented to person, place, and time. Forgetful of recent details     Cranial Nerves: No cranial nerve deficit.      Sensory: No sensory deficit.      Coordination: Coordination normal.   Psychiatric:         Behavior: Behavior normal.              Labs:  Recent Labs   Lab 05/16/24  0449 05/20/24  0544   WBC 5.61 5.39   HGB 9.0* 10.0*   HCT 28.5* 32.0*    289     Recent Labs   Lab 05/16/24  0449 05/20/24  0543    138   K 4.3 3.7   * 107   CO2 20* 21*   BUN 22 15   CREATININE 0.8 0.8   GLU 79 85   CALCIUM 8.2* 8.7   MG 1.8 1.9   PHOS 3.3 3.0  "    Recent Labs   Lab 05/16/24  0449 05/20/24  0543   ALKPHOS 130 171*   ALT 11 10   AST 13 14   ALBUMIN 2.9* 3.1*   PROT 5.8* 6.3   BILITOT 1.0 0.9       No results for input(s): "POCTGLUCOSE" in the last 72 hours.    Meds Scheduled:   acetaminophen  650 mg Oral TID    apixaban  2.5 mg Oral BID    ascorbic acid (vitamin C)  500 mg Oral BID    atenoloL  25 mg Oral Daily    calcium carbonate  1,000 mg Oral Daily    EScitalopram oxalate  5 mg Oral Every other day    furosemide  20 mg Oral Daily    melatonin  9 mg Oral Nightly    memantine  5 mg Oral BID    methocarbamoL  500 mg Oral QID    multivitamin  1 tablet Oral Daily    spironolactone  50 mg Oral Daily    vitamin D  2,000 Units Oral Daily    zinc sulfate  220 mg Oral Daily       PRN:     Current Facility-Administered Medications:     calcium carbonate, 500 mg, Oral, BID PRN    ondansetron, 8 mg, Oral, Q8H PRN    senna-docusate 8.6-50 mg, 1 tablet, Oral, BID PRN      Assessment and Plan:    Closed displaced intertrochanteric fracture of left femur   -s/p CMN on 4/28/24 Dr. Kurt Melendez   -Pain control: multimodal,   5/13 change robaxin to scheduled and schedule APAP  -PT/OT consult: WBAT LLE  -DVT PPx: Eliquis 2.5 mg BID, SCDs at all times when not ambulating    Hemolytic Anemia  Hyperbilirubinemia, resolved  POA  - Suspect hemolytic anemia as etiology of hyperbilirubinemia.  5/6: T bili obtained inpt on 4/27/24: 1.2  - Stop q 8 hour 1000 mg APAP, continue 650 mg APAP prn.  - T bili elevated to 1.5, direct bili 0.5. ALP, AST, ALT all WNL.   5/7: Acute hepatitis panel negative.   - T bili continues to rise.   - Patient is asymptomatic:   - Given ALP, aminotransferases, and GGT are normal, hyperbili not likely due to hepatic injury or biliary tract disease.   - Eval for hemolytic anemia: in am obtain retic, LDH, and haptoglobin, CBC Diff with peripheral smear if lab can accommodate, and CMP.  5/8: T bili stabilizing at 1.4 from 1.6 yesterday  direct bili 0.6. " "  - Hgb 8.3 from 9.1  - LDH and haptoglobin WNL, however elevated retic 5.8 and peripheral smear notes "red cells show mild anisocytosis with a subset of small spherocytes" Spherocytes suggestive of Autoimmune hemolytic anemia (AIHA) or Hereditary spherocytosis (HS).   - Obtain GUERDA (Gordon) and type/screen in am with repeat cbc diff, cmp  5/9: Direct and indirect Gordon negative.   - AIHA associated with increased risk of thrombus. Obtain d dimer with next lab draw. If d dimer positive will need to obtain BLE Venous US.   - Will need referral to heme/onc for more specialized testing.   - Continue to routinely monitor CBC and bilirubin with CMP trends.  5/16 Ddimer 4.9 but LFTs and bili normal now  5/20: Hgb 10 from 9.    Metabolic Acidosis  New 5/8. Na 135  CO2 18. 1300 mg na bicarb po bid x 3 days  5/20: Na 138  CO2 21. Initiate 650 mg na bicarb daily  - Monitor with lab and adjust prn    Diarrhea  New 5/8, improving  - denies nausea / vomiting  - denies abdominal cramping or discomfort.   - Changed senokot from bid to bid prn.  - Encouraged fluids    Severe Protein Calorie Malnutrition  Hypoalbuminemia 2/2 Protein Calorie Malnutrition  POA, improving: Albumin 3.1, up from admit  - RD consulted, ongoing collaboration for optimized nutrition   - Diet: regular diet  - Assist with meals  - Encourage intake   - Dietary Supplements: Boost TID  - Vitamin and Mineral Supplements: Zinc, Vit C, and Multivitamin     Altered mental status  Had some confusion inpatient, UA was negative  Patient does not remember falling at home  Delirium precautions currently oriented x4  Decreased home lexapro from 10mg to 5mg d/t advanced age    Acute hypoxemic respiratory failure  Continue diuretics as tolerated  Wean O2 as tolerated to maintain sat >90%    Long term (current) use of anticoagulants  Eliquis for Afib, reduced to 2.5mg dose meets criteria     Acute on chronic diastolic heart failure  Continue Lasix 20mg, atenolol 25mg and " spironolactone 50mg daily with parameters d/t recent hypotension  Follow up cardiology outpatient    Recurrent major depressive disorder, in full remission  PHQ9 score 1 on 5/13  - Continue home lexapro & namenda   -Continue to wean off lexapro prior to DC     Essential hypertension  Continue Lasix 20mg, atenolol 25mg and spironolactone 50mg daily with parameters d/t recent hypotension    Advanced Care Planning  Outcome of discussion:Confirms DNR    IP [facility] RISK OF UNPLANNED READMISSION Model: MOD    Anticipate disposition:    TANIA with home health      Follow-up needed during SNF admission:   Ortho    Follow-up NOT needed during SNF admission: 5/20 [facility]    Follow-up needed after discharge from SNF: Heme/Onc  - PCP within 1-2 weeks  - See appt scheduled below     Future Appointments   Date Time Provider Department Center   6/10/2024 12:15 PM Any Cantu [facility] [address]   6/25/2024  3:00 PM Dionne Jeter MD [facility] [place]         I certify that SNF services are required to be given on an inpatient basis because Mary Ellen Miller needs for skilled nursing care and/or skilled rehabilitation are required on a daily basis and such services can only practically be provided in a skilled nursing facility setting and are for an ongoing condition for which she received inpatient care in the hospital.     Extended Visit  Total time spent: 46 minutes  Description of Time: counseling patient on clinical condition, therapies provided, plan of care, emotional support, coordinating patient care with other care team members, reviewing and interpreting labs and imaging, collaboration with physician, initiating new orders, chart review, and documentation. See interval hx.       Christa Loaiza NP  Department of Hospital Medicine   [facility]     DOS: 5/20/2024       Patient note was created using MModal Dictation.  Any errors in syntax or even information may not  have been identified and edited on initial review prior to signing this note.

## 2024-05-20 NOTE — PLAN OF CARE
CHW served per facility [facility] to patient and daughter in law. Daughter in law signed [facility] and a copy was given to her.

## 2024-05-20 NOTE — PLAN OF CARE
Problem: Occupational Therapy  Goal: Occupational Therapy Goal  Description: Goals to be met by 6/2/2024:    Patient will increase functional independence with ADLs by performing:     UBD: SPV seated in w/c or EOB- MET   LBD: Min A using AE seated in w/c or EOB- MET  FW: MI using AE seated in w/c-  MET  Showering: Min A seated in w/c or EOB or sinkside- ongoing  Toileting: Min A seated on BSC- MET   Supine to sit: Min A- MET  Sit to stand: Min A with RW- MET        Outcome: Progressing   Goals reassessed and updated

## 2024-05-21 PROCEDURE — 97530 THERAPEUTIC ACTIVITIES: CPT | Mod: CQ

## 2024-05-21 PROCEDURE — 97110 THERAPEUTIC EXERCISES: CPT | Mod: CQ

## 2024-05-21 PROCEDURE — 25000003 PHARM REV CODE 250: Performed by: FAMILY MEDICINE

## 2024-05-21 PROCEDURE — 25000003 PHARM REV CODE 250: Performed by: HOSPITALIST

## 2024-05-21 PROCEDURE — 97116 GAIT TRAINING THERAPY: CPT | Mod: CQ

## 2024-05-21 PROCEDURE — 11000004 HC SNF PRIVATE

## 2024-05-21 RX ORDER — ZINC SULFATE 50(220)MG
220 CAPSULE ORAL DAILY
Qty: 30 CAPSULE | Refills: 2 | Status: SHIPPED | OUTPATIENT
Start: 2024-05-22

## 2024-05-21 RX ORDER — SPIRONOLACTONE 50 MG/1
50 TABLET, FILM COATED ORAL DAILY
Qty: 30 TABLET | Refills: 2 | Status: SHIPPED | OUTPATIENT
Start: 2024-05-22 | End: 2025-05-22

## 2024-05-21 RX ORDER — TALC
9 POWDER (GRAM) TOPICAL NIGHTLY
Qty: 30 TABLET | Refills: 0 | Status: SHIPPED | OUTPATIENT
Start: 2024-05-21

## 2024-05-21 RX ORDER — ASCORBIC ACID 500 MG
500 TABLET ORAL 2 TIMES DAILY
Qty: 60 TABLET | Refills: 2 | Status: SHIPPED | OUTPATIENT
Start: 2024-05-21

## 2024-05-21 RX ORDER — ESCITALOPRAM OXALATE 10 MG/1
5 TABLET ORAL DAILY
Qty: 90 TABLET | Refills: 3 | Status: SHIPPED | OUTPATIENT
Start: 2024-05-21 | End: 2024-06-12 | Stop reason: SDUPTHER

## 2024-05-21 RX ORDER — HYDRALAZINE HYDROCHLORIDE 25 MG/1
25 TABLET, FILM COATED ORAL EVERY 8 HOURS
Status: DISCONTINUED | OUTPATIENT
Start: 2024-05-21 | End: 2024-05-23 | Stop reason: HOSPADM

## 2024-05-21 RX ADMIN — ESCITALOPRAM OXALATE 5 MG: 5 TABLET, FILM COATED ORAL at 08:05

## 2024-05-21 RX ADMIN — CHOLECALCIFEROL TAB 25 MCG (1000 UNIT) 2000 UNITS: 25 TAB at 08:05

## 2024-05-21 RX ADMIN — METHOCARBAMOL 500 MG: 500 TABLET ORAL at 12:05

## 2024-05-21 RX ADMIN — APIXABAN 2.5 MG: 2.5 TABLET, FILM COATED ORAL at 09:05

## 2024-05-21 RX ADMIN — ACETAMINOPHEN 650 MG: 325 TABLET ORAL at 08:05

## 2024-05-21 RX ADMIN — FUROSEMIDE 20 MG: 20 TABLET ORAL at 09:05

## 2024-05-21 RX ADMIN — METHOCARBAMOL 500 MG: 500 TABLET ORAL at 08:05

## 2024-05-21 RX ADMIN — ACETAMINOPHEN 650 MG: 325 TABLET ORAL at 09:05

## 2024-05-21 RX ADMIN — CALCIUM CARBONATE (ANTACID) CHEW TAB 500 MG 1000 MG: 500 CHEW TAB at 09:05

## 2024-05-21 RX ADMIN — SENNOSIDES AND DOCUSATE SODIUM 1 TABLET: 50; 8.6 TABLET ORAL at 04:05

## 2024-05-21 RX ADMIN — METHOCARBAMOL 500 MG: 500 TABLET ORAL at 09:05

## 2024-05-21 RX ADMIN — Medication 500 MG: at 08:05

## 2024-05-21 RX ADMIN — METHOCARBAMOL 500 MG: 500 TABLET ORAL at 04:05

## 2024-05-21 RX ADMIN — MEMANTINE 5 MG: 5 TABLET ORAL at 09:05

## 2024-05-21 RX ADMIN — Medication 500 MG: at 09:05

## 2024-05-21 RX ADMIN — SENNOSIDES AND DOCUSATE SODIUM 1 TABLET: 50; 8.6 TABLET ORAL at 09:05

## 2024-05-21 RX ADMIN — ATENOLOL 25 MG: 25 TABLET ORAL at 08:05

## 2024-05-21 RX ADMIN — THERA TABS 1 TABLET: TAB at 08:05

## 2024-05-21 RX ADMIN — HYDRALAZINE HYDROCHLORIDE 25 MG: 25 TABLET ORAL at 09:05

## 2024-05-21 RX ADMIN — ZINC SULFATE 220 MG (50 MG) CAPSULE 220 MG: CAPSULE at 08:05

## 2024-05-21 RX ADMIN — ACETAMINOPHEN 650 MG: 325 TABLET ORAL at 02:05

## 2024-05-21 RX ADMIN — Medication 9 MG: at 09:05

## 2024-05-21 RX ADMIN — SPIRONOLACTONE 50 MG: 25 TABLET ORAL at 09:05

## 2024-05-21 NOTE — PLAN OF CARE
[facility] - Skilled Nursing      HOME HEALTH ORDERS  FACE TO FACE ENCOUNTER    Patient Name: Mary Ellen Miller  YOB: 1930    PCP: Dionne Jeter MD   PCP Address: [address] [place] [address] [address] [place] [address]  PCP Phone Number: [phone]  PCP Fax: [phone]    Encounter Date: 5/21/2024    Admit to Home Health    Diagnoses:  Active Hospital Problems    Diagnosis  POA    *Closed displaced intertrochanteric fracture of left femur with routine healing s/p IM nail on 4/28/2024 [S72.142D]  Not Applicable    Severe protein-calorie malnutrition [E43]  Yes    Acute blood loss anemia [D62]  Yes    Seasonal allergic rhinitis [J30.2]  Yes    Long term (current) use of anticoagulants [Z79.01]  Not Applicable     - followed by cardiology  - no signs of bleeding      Chronic atrial fibrillation [I48.20]  Yes     - followed by Dr. Last  - OAC Eliquis without signs of bleeding  - rate controlled      Recurrent major depressive disorder, in full remission [F33.42]  Yes     - well controlled  - continue current medication      Essential hypertension [I10]  Yes     - well controlled  - continue current medications        Resolved Hospital Problems    Diagnosis Date Resolved POA    Acute hypoxemic respiratory failure [J96.01] 05/04/2024 Yes    JOHNA (acute kidney injury), unspecified [N17.9] 05/04/2024 Yes    Advance care planning [Z71.89] 05/04/2024 Not Applicable     - pt reports that she has living will and mPOA  - mPOA is her son Valentino (youngest)  - she does not want invasive or aggressive measures to maintain life  - she does not want CPR or intubation      Acute on chronic diastolic heart failure [I50.33] 05/04/2024 Yes     - no signs of acute decompensation  - followed by cardiology         Follow Up Appointments:  Future Appointments   Date Time Provider Department Center   6/10/2024 12:15 PM Any Cantu PA-C [facility]   6/25/2024  3:00 PM Dionne Jeter MD [facility]  [facility]       Allergies:  Review of patient's allergies indicates:   Allergen Reactions    Percodan [oxycodone hcl-oxycodone-asa] Other (See Comments)     halucinations    Penicillins Itching    Sulfa (sulfonamide antibiotics) Diarrhea and Nausea Only    Amoxicillin        Medications: Review discharge medications with patient and family and provide education.      I have seen and examined this patient within the last 30 days. My clinical findings that support the need for the home health skilled services and home bound status are the following:no   Weakness/numbness causing balance and gait disturbance due to Weakness/Debility making it taxing to leave home.  Requiring assistive device to leave home due to unsteady gait caused by  Weakness/Debility.     Diet:   regular diet    Labs:  Report Lab results to PCP.    Referrals/ Consults  Physical Therapy to evaluate and treat. Evaluate for home safety and equipment needs; Establish/upgrade home exercise program. Perform / instruct on therapeutic exercises, gait training, transfer training, and Range of Motion.  Occupational Therapy to evaluate and treat. Evaluate home environment for safety and equipment needs. Perform/Instruct on transfers, ADL training, ROM, and therapeutic exercises.  Aide to provide assistance with personal care, ADLs, and vital signs.    Activities:   activity as tolerated and no driving while on analgesics    Nursing:   Agency to admit patient within 24 hours of hospital discharge unless specified on physician order or at patient request    SN to complete comprehensive assessment including routine vital signs. Instruct on disease process and s/s of complications to report to MD. Review/verify medication list sent home with the patient at time of discharge  and instruct patient/caregiver as needed. Frequency may be adjusted depending on start of care date.     Skilled nurse to perform up to 3 visits PRN for symptoms related to  diagnosis    Notify MD if SBP > 160 or < 90; DBP > 90 or < 50; HR > 120 or < 50; Temp > 101; O2 < 88%    Ok to schedule additional visits based on staff availability and patient request on consecutive days within the home health episode.    Miscellaneous   Heart Failure:      SN to instruct on the following:    Instruct on the definition of CHF.   Instruct on the signs/sympoms of CHF to be reported.   Instruct on and monitor daily weights.   Instruct on factors that cause exacerbation.   Instruct on action, dose, schedule, and side effects of medications.   Instruct on diet as prescribed.   Instruct on activity allowed.   Instruct on life-style modifications for life long management of CHF   SN to assess compliance with daily weights, diet, medications, fluid retention,    safety precautions, activities permitted and life-style modifications.   Additional 1-2 SN visits per week as needed for signs and symptoms     of CHF exacerbation.      For Weight Gain > 2-3 lbs in 1 day or 4-6 lbs over 1 week notify PCP:    Home Health Aide:  Nursing Weekly, Physical Therapy Three times weekly, Occupational Therapy Three times weekly, and Home Health Aide Three times weekly    Wound Care Orders  no    I certify that this patient is confined to her home and needs intermittent skilled nursing care, physical therapy, and occupational therapy.    Christa Loaiza NP  Hospital Medicine [facility]  5/21/2024

## 2024-05-21 NOTE — PROGRESS NOTES
[facility]                                  Skilled Nursing Facility                   Progress Note     Patient Name: Mary Ellen Miller  YOB: 1930  MRN: [id number]  Room: [place] [address]/[place] [address] A     Admit Date: 5/2/2024   HEAVEN: 5/23/2024     Principal Problem: Closed displaced intertrochanteric fracture of left femur with routine healing    HPI obtained from patient interview and chart review     Chief Complaint: Re-evaluation of medical treatment and therapy status: HTN follow-up, discharge planning      HPI:   Mary Ellen Miller is a 89 y.o. F with PMH of HTN, HLD, CHF, and afib on eliquis presenting to the ED with significant left hip pain after a fall. 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 Prior to the incident, she reports intermittent worsening of chronic lower extremity edema and SOB. Her PCP recently discontinued amlodipine in favor of lasix and continuing aldactone, and she has not missed any doses of her home medications.  XR pelvis with mildly displaced L intertrochanteric femur fracture with impaction, minimally displaced comminuted L superior pubic ramus fracture, and nondisplaced fracture of the L inferior pubic ramus. s/p left femur CMN on 4/28/24. Given diuresis and on 1L O2, not on oxygen at home.  PT/OT recommends skilled nursing placement for further therapy.     Patient will be treated at [facility] with PT and OT to improve functional status and ability to perform ADLs.     Interval History  24 hour chart review completed. Pertinent lab, micro, and/or radiology results addressed below. NAEON. NAD.   Afebrile, HDS.  HTN above goal at times in past 28 hours Weight / edema stable. Denies dyspnea. Patient reports pain controlled adequately with scheduled robaxin and tylenol.   Initiate  hydralazine 25 mg q 8 hours.   LBM 5/19, denies constipation.   Patient progressing with therapy as tolerated.  Anticipated discharge date Thursday, 5/23/24.  DME ordered per PT recs, home health orders in place, will complete dc med rec after monitoring on hydralazine added today.  Ambulatory referral placed to heme/onc for outpatient follow-up of hemolytic anemia.    Past Medical History: Patient has a past medical history of Anxiety, Atrial fibrillation, Colon polyps (05/01/2012), Hypertension, Leg ulcer, right, limited to breakdown of skin (11/20/2019), and Unilateral femoral hernia with obstruction, without gangrene (10/26/2015).    Past Surgical History: Patient has a past surgical history that includes Radical hysterectomy (1990); Lung surgery (1988); Hysterectomy; Oophorectomy; and Intramedullary rodding of femur (Left, 4/28/2024).    Social History: Patient reports that she has never smoked. She has never been exposed to tobacco smoke. She has never used smokeless tobacco. She reports that she does not drink alcohol and does not use drugs.    Family History:  family history includes No Known Problems in her father and mother.    Allergies: Patient is allergic to percodan [oxycodone hcl-oxycodone-asa], penicillins, sulfa (sulfonamide antibiotics), and amoxicillin.        ROS  Constitutional:  Positive for activity change. Negative for chills and fever.   HENT:  Negative for trouble swallowing.    Eyes:  Negative for photophobia and visual disturbance.   Respiratory: Negative for chest tightness or shortness of breath.    Cardiovascular:  Negative for chest pain and palpitations.   Gastrointestinal: Negative for abdominal pain, constipation, diarrhea, nausea and vomiting.   Genitourinary:  Negative for dysuria, frequency and hematuria.   Musculoskeletal:  Positive for arthralgias, back pain and gait problem. Negative for neck pain.   Skin:  Negative for rash and wound.   Neurological:  Positive for weakness.  Negative for dizziness, syncope, speech difficulty and light-headedness.   Psychiatric/Behavioral:  Positive for confusion (intermittent). Negative for agitation. The patient is not nervous/anxious.     24 hour Vital Sign Range   Temp:  [97.7 °F (36.5 °C)-97.8 °F (36.6 °C)]   Pulse:  [86]   Resp:  [20]   BP: (152-167)/(72-83)   SpO2:  [95 %-100 %]       Physical Exam  Constitutional:       General: She is not in acute distress. Appears fatigued     Appearance: She is well-developed. She is not toxic-appearing.   HENT:      Head: Normocephalic.      Mouth/Throat:      Mouth: Mucous membranes are moist.   Eyes:      Extraocular Movements: Extraocular movements intact.      Conjunctiva/sclera: Conjunctivae normal.      Pupils: Pupils are equal, round, and reactive to light.   Cardiovascular:      Rate and Rhythm: Regular rate. Rhythm irregular.      Heart sounds: Normal heart sounds.   Pulmonary:      Effort: Pulmonary effort is normal. No respiratory distress. Lungs clear     Breath sounds: No wheezing.      Comments: RA  Abdominal:      General: Bowel sounds are normal.      Palpations: Abdomen is soft.      Tenderness: There is no abdominal tenderness.   Musculoskeletal:         General: Normal range of motion.      Cervical back: Normal range of motion and neck supple.   Skin:     General: Skin is warm and dry.      Capillary Refill: Capillary refill takes less than 2 seconds.      Findings: Bruising present. No rash. Left anterior incisional wound.  Neurological:      Mental Status: She is alert and oriented to person, place, and time. Forgetful of recent details     Cranial Nerves: No cranial nerve deficit.      Sensory: No sensory deficit.      Coordination: Coordination normal.   Psychiatric:         Behavior: Behavior normal.              Labs:  Recent Labs   Lab 05/16/24 0449 05/20/24  0544   WBC 5.61 5.39   HGB 9.0* 10.0*   HCT 28.5* 32.0*    289     Recent Labs   Lab 05/16/24 0449 05/20/24  0543  "   138   K 4.3 3.7   * 107   CO2 20* 21*   BUN 22 15   CREATININE 0.8 0.8   GLU 79 85   CALCIUM 8.2* 8.7   MG 1.8 1.9   PHOS 3.3 3.0     Recent Labs   Lab 05/16/24  0449 05/20/24  0543   ALKPHOS 130 171*   ALT 11 10   AST 13 14   ALBUMIN 2.9* 3.1*   PROT 5.8* 6.3   BILITOT 1.0 0.9       No results for input(s): "POCTGLUCOSE" in the last 72 hours.    Meds Scheduled:   acetaminophen  650 mg Oral TID    apixaban  2.5 mg Oral BID    ascorbic acid (vitamin C)  500 mg Oral BID    atenoloL  25 mg Oral Daily    calcium carbonate  1,000 mg Oral Daily    EScitalopram oxalate  5 mg Oral Every other day    furosemide  20 mg Oral Daily    melatonin  9 mg Oral Nightly    memantine  5 mg Oral BID    methocarbamoL  500 mg Oral QID    multivitamin  1 tablet Oral Daily    spironolactone  50 mg Oral Daily    vitamin D  2,000 Units Oral Daily    zinc sulfate  220 mg Oral Daily       PRN:     Current Facility-Administered Medications:     calcium carbonate, 500 mg, Oral, BID PRN    ondansetron, 8 mg, Oral, Q8H PRN    senna-docusate 8.6-50 mg, 1 tablet, Oral, BID PRN      Assessment and Plan:    Closed displaced intertrochanteric fracture of left femur   -s/p CMN on 4/28/24 Dr. Kurt Melendez   -Pain control: multimodal,   5/13 change robaxin to scheduled and schedule APAP  -PT/OT consult: WBAT LLE  -DVT PPx: Eliquis 2.5 mg BID, SCDs at all times when not ambulating    Essential hypertension  Continue Lasix 20mg, atenolol 25mg and spironolactone 50mg daily   5/21: Initiate hydralazine 25 mg q 8 hours d/t  HTN past 48 hrs    Hemolytic Anemia  Hyperbilirubinemia, resolved  POA  - Suspect hemolytic anemia as etiology of hyperbilirubinemia.  5/6: T bili obtained inpt on 4/27/24: 1.2  - Stop q 8 hour 1000 mg APAP, continue 650 mg APAP prn.  - T bili elevated to 1.5, direct bili 0.5. ALP, AST, ALT all WNL.   5/7: Acute hepatitis panel negative.   - T bili continues to rise.   - Patient is asymptomatic:   - Given ALP, " "aminotransferases, and GGT are normal, hyperbili not likely due to hepatic injury or biliary tract disease.   - Eval for hemolytic anemia: in am obtain retic, LDH, and haptoglobin, CBC Diff with peripheral smear if lab can accommodate, and CMP.  5/8: T bili stabilizing at 1.4 from 1.6 yesterday  direct bili 0.6.   - Hgb 8.3 from 9.1  - LDH and haptoglobin WNL, however elevated retic 5.8 and peripheral smear notes "red cells show mild anisocytosis with a subset of small spherocytes" Spherocytes suggestive of Autoimmune hemolytic anemia (AIHA) or Hereditary spherocytosis (HS).   - Obtain GUERDA (Gordon) and type/screen in am with repeat cbc diff, cmp  5/9: Direct and indirect Gordon negative.   - AIHA associated with increased risk of thrombus. Obtain d dimer with next lab draw. If d dimer positive will need to obtain BLE Venous US.   - Will need referral to heme/onc for more specialized testing.   - Continue to routinely monitor CBC and bilirubin with CMP trends.  5/16 Ddimer 4.9 but LFTs and bili normal now  5/20: Hgb 10 from 9.  -5/21: ambulatory referral placed to heme/onc for outpatient follow-up    Metabolic Acidosis  New 5/8. Na 135  CO2 18. 1300 mg na bicarb po bid x 3 days  5/20: Na 138  CO2 21. Initiate 650 mg na bicarb daily  - Monitor with lab and adjust prn    Diarrhea  New 5/8, improving  - denies nausea / vomiting  - denies abdominal cramping or discomfort.   - Changed senokot from bid to bid prn.  - Encouraged fluids    Severe Protein Calorie Malnutrition  Hypoalbuminemia 2/2 Protein Calorie Malnutrition  POA, improving: Albumin 3.1, up from admit  - RD consulted, ongoing collaboration for optimized nutrition   - Diet: regular diet  - Assist with meals  - Encourage intake   - Dietary Supplements: Boost TID  - Vitamin and Mineral Supplements: Zinc, Vit C, and Multivitamin     Altered mental status  Had some confusion inpatient, UA was negative  Patient does not remember falling at home  Delirium " precautions currently oriented x4  Decreased home lexapro from 10mg to 5mg d/t advanced age    Acute hypoxemic respiratory failure  Continue diuretics as tolerated  Wean O2 as tolerated to maintain sat >90%    Long term (current) use of anticoagulants  Eliquis for Afib, reduced to 2.5mg dose meets criteria     Acute on chronic diastolic heart failure  Continue Lasix 20mg, atenolol 25mg and spironolactone 50mg daily with parameters d/t recent hypotension  Follow up cardiology outpatient    Recurrent major depressive disorder, in full remission  PHQ9 score 1 on 5/13  - Continue home lexapro & namenda   -Continue to wean off lexapro prior to DC     Advanced Care Planning  Outcome of discussion:Confirms DNR    IP [facility] RISK OF UNPLANNED READMISSION Model: MOD    Anticipate disposition:    TANIA with home health      Follow-up needed during SNF admission:   Ortho    Follow-up NOT needed during SNF admission: 5/20 [facility]    Follow-up needed after discharge from SNF: Heme/Onc  - PCP within 1-2 weeks  - See appt scheduled below     Future Appointments   Date Time Provider Department Center   6/10/2024 12:15 PM Any Cantu PA-C [facility] [address] [facility]   6/25/2024  3:00 PM Dionne Jeter MD [facility] [place]         I certify that SNF services are required to be given on an inpatient basis because Mary Ellen Miller needs for skilled nursing care and/or skilled rehabilitation are required on a daily basis and such services can only practically be provided in a skilled nursing facility setting and are for an ongoing condition for which she received inpatient care in the hospital.     Extended Visit  Total time spent: 48 minutes  Description of Time: counseling patient on clinical condition, therapies provided, plan of care, emotional support, coordinating patient care with other care team members, reviewing and interpreting labs and imaging, collaboration with physician, initiating new orders, chart review, and  documentation. See interval hx.       Chrisat Loaiza NP  Department of Hospital Medicine   [facility]     DOS: 5/21/2024       Patient note was created using MModal Dictation.  Any errors in syntax or even information may not have been identified and edited on initial review prior to signing this note.

## 2024-05-21 NOTE — PLAN OF CARE
Problem: Adult Inpatient Plan of Care  Goal: Plan of Care Review  Outcome: Progressing     Problem: Acute Kidney Injury/Impairment  Goal: Fluid and Electrolyte Balance  Outcome: Progressing  Goal: Improved Oral Intake  Outcome: Progressing     Problem: Wound  Goal: Optimal Coping  Outcome: Progressing  Goal: Absence of Infection Signs and Symptoms  Outcome: Progressing     Problem: Fall Injury Risk  Goal: Absence of Fall and Fall-Related Injury  Outcome: Progressing     Problem: Skin Injury Risk Increased  Goal: Skin Health and Integrity  Outcome: Progressing

## 2024-05-21 NOTE — PT/OT/SLP PROGRESS
Physical Therapy Treatment    Patient Name:  Mary Ellen Miller   MRN:  [id number]  Admit Date: 5/2/2024  Admitting Diagnosis: Closed displaced intertrochanteric fracture of left femur with routine healing  Recent Surgeries: INSERTION, INTRAMEDULLARY LENO, FEMUR: Left (Left)     General Precautions: Standard, fall  Orthopedic Precautions: LLE weight bearing as tolerated  Braces: N/A    Recommendations:     Discharge Recommendations: home health PT  Level of Assistance Recommended at Discharge: 24 hours light assistance  Discharge Equipment Recommendations: bedside commode, walker, rolling, wheelchair  Barriers to discharge: Decreased caregiver support    Assessment:     Mary Ellen Miller is a 89 y.o. female admitted with a medical diagnosis of Closed displaced intertrochanteric fracture of left femur with routine healing . Pt tolerated well, pt completed all functional mobility with S/SBA. Pt progressing and would continue to benefit from skilled PT services to improve overall functional mobility, strength and endurance.      Performance deficits affecting function: weakness, impaired endurance, impaired self care skills, impaired functional mobility, gait instability, impaired balance, decreased upper extremity function, decreased lower extremity function, decreased ROM, impaired cardiopulmonary response to activity, orthopedic precautions.    Rehab Potential is good    Activity Tolerance: Good    Plan:     Patient to be seen 5 x/week to address the above listed problems via gait training, therapeutic activities, therapeutic exercises, wheelchair management/training    Plan of Care Expires: 06/02/24  Plan of Care Reviewed with: patient    Subjective     Pt agreeable for PT.     Pain/Comfort:  Pain Rating 1: 0/10  Pain Rating Post-Intervention 1: 0/10    Patient's cultural, spiritual, Worship conflicts given the current situation:  no    Objective:       Patient found up in chair with  (no lines) upon PT  "entry to room.     Therapeutic Activities and Exercises: LBE x 15 min For BLE strengthening, endurance and ROM. No rest, mod resistance    Patient educated on role of therapy, goals of session, and benefits of out of bed mobility.   Instructed on use of call button and importance of calling nursing staff for assistance with mobility   Questions/concerns addressed within PTA scope of practice  Pt verbalized understanding    Functional Mobility:  Transfers:     Sit to Stand:  supervision and stand by assistance with rolling walker  Gait: pt amb 154 ft (including uneven mat/ curb step) S/SBA with RW. Good justino good step length. No LOB  Uneven mat: SBA with RW  : pt retrieved 4 rings from floor with reacher and RW using SBA.   Stairs:  Pt ascended/descended 4" curb step with Rolling Walker with no handrails with Stand-by Assistance.   Wheelchair Propulsion:  Pt propelled Light weight wheelchair x >150 feet on Level tile with  Bilateral upper extremity with Supervision or Set-up Assistance.     AM-PAC 6 CLICK MOBILITY  15    Patient left up in chair with  PT tech .    GOALS:   Multidisciplinary Problems       Physical Therapy Goals          Problem: Physical Therapy    Goal Priority Disciplines Outcome Goal Variances Interventions   Physical Therapy Goal     PT, PT/OT Progressing     Description: Goals to be met by: 3/10/24     Patient will increase functional independence with mobility by performing:    . Supine to sit with MInimal Assistance  . Sit to supine with MInimal Assistance  . Rolling to Left and Right with Minimal Assistance.  . Sit to stand transfer with Minimal Assistance  . Bed to chair transfer with Minimal Assistance using Rolling Walker  . Gait  x 50 feet with Minimal Assistance using Rolling Walker.-met  Updated 5/16/2024  Gait  x 100 feet with Contact Guard Assistance using Rolling Walker  . Wheelchair propulsion x150 feet with Supervision using bilateral uppper extremities  .Added 5/16/2024 " "Ascend/Descend 4 inch curb step with RW and CGA/SBA    Rehab Services' DME recommendations    Walker Adult (5'1"-6"6")      Wheels Yes  Justification for walker: Mobility limitation cannot be sufficiently resolved by use of a cane/patient cannot safely ambulate with a cane, Patient's functional mobility deficit can be sufficiently resolved with the use of a Rolling Walker of Walker, Patient's mobility limitation significantly impairs their ability to participate in one of more activities of daily living, The use of a Rolling Walker or Walker will significantly improve the patient's ability to participate in MRADLS and the patient will use it on a regular basis    Wheelchair  Number of hours up in a wheelchair per day 8      Style Light weight    Justification for light weight w/c: patient cannot propel in a standard wheelchair, patient can and does self-propel in a lightweight wheelchair, patient has impaired ability to participate in MRADLs, mobility limitations cannot be sifficiently resolved with a cane/walker, the home provides adequate access between rooms for a wheelchair, a wheelchair will significantly improve the ability to participate in MRADLs and will be used in the home on a regular basis, the patient is willing to use a wheelchair in the home, the patient has a caregiver who is available, willing, and able to provide assistance with the wheelchair, and the patient requires additional person for safety with mobility with walker  Seat Width 16 (Small adult)  Seat Depth 16  Back Height Standard  Leg Support Standard, Heel Loops, and Swing Away  Arm Height Desk and Swing Away  Lap Belt Buckle  Accessories Anti-tippers and Safety belt  Cushion Basic  Justification for Cushion skin integrity      3 in 1 commode Standard     Justification for 3 in 1 commode: The patient is confined to a single room , The patient is confined to one level of the home environment and there is no toilet on that level, The patient " is confined to the home and there are no toilet facilities in the home, The patient is bedroom confined for an extended time and non-ambulatory, The patient's deficits will not be sufficiently addressed by a raised toilet seat                              Time Tracking:     PT Received On: 05/21/24  PT Start Time: 0921  PT Stop Time: 0959  PT Total Time (min): 38 min    Billable Minutes: Gait Training 10, Therapeutic Activity 13, and Therapeutic Exercise 15    Treatment Type: Treatment  PT/PTA: PTA     Number of PTA visits since last PT visit: 4     05/21/2024

## 2024-05-21 NOTE — PT/OT/SLP PROGRESS
Occupational Therapy    Not Seen    Mary Ellen Vicente Miller  MRN: [id number]  Room/Bed: [id number] A    Pt not seen today by OT secondary to pt declining due to reported stomach pain. Attempted 2x in AM and 1x in PM. Will follow up per plan of care.     CHRIS Ledesma  5/21/2024

## 2024-05-22 LAB
ALBUMIN SERPL BCP-MCNC: 3.1 G/DL (ref 3.5–5.2)
ALP SERPL-CCNC: 170 U/L (ref 55–135)
ALT SERPL W/O P-5'-P-CCNC: 12 U/L (ref 10–44)
ANION GAP SERPL CALC-SCNC: 8 MMOL/L (ref 8–16)
AST SERPL-CCNC: 18 U/L (ref 10–40)
BASOPHILS # BLD AUTO: 0.04 K/UL (ref 0–0.2)
BASOPHILS NFR BLD: 0.7 % (ref 0–1.9)
BILIRUB SERPL-MCNC: 0.9 MG/DL (ref 0.1–1)
BUN SERPL-MCNC: 17 MG/DL (ref 10–30)
CALCIUM SERPL-MCNC: 8.7 MG/DL (ref 8.7–10.5)
CHLORIDE SERPL-SCNC: 110 MMOL/L (ref 95–110)
CO2 SERPL-SCNC: 21 MMOL/L (ref 23–29)
CREAT SERPL-MCNC: 0.9 MG/DL (ref 0.5–1.4)
DIFFERENTIAL METHOD BLD: ABNORMAL
EOSINOPHIL # BLD AUTO: 0.1 K/UL (ref 0–0.5)
EOSINOPHIL NFR BLD: 2.4 % (ref 0–8)
ERYTHROCYTE [DISTWIDTH] IN BLOOD BY AUTOMATED COUNT: 17.2 % (ref 11.5–14.5)
EST. GFR  (NO RACE VARIABLE): 59.2 ML/MIN/1.73 M^2
GLUCOSE SERPL-MCNC: 80 MG/DL (ref 70–110)
HCT VFR BLD AUTO: 32.4 % (ref 37–48.5)
HGB BLD-MCNC: 10.4 G/DL (ref 12–16)
IMM GRANULOCYTES # BLD AUTO: 0.02 K/UL (ref 0–0.04)
IMM GRANULOCYTES NFR BLD AUTO: 0.4 % (ref 0–0.5)
LYMPHOCYTES # BLD AUTO: 1.3 K/UL (ref 1–4.8)
LYMPHOCYTES NFR BLD: 24.1 % (ref 18–48)
MAGNESIUM SERPL-MCNC: 2 MG/DL (ref 1.6–2.6)
MCH RBC QN AUTO: 31.5 PG (ref 27–31)
MCHC RBC AUTO-ENTMCNC: 32.1 G/DL (ref 32–36)
MCV RBC AUTO: 98 FL (ref 82–98)
MONOCYTES # BLD AUTO: 0.5 K/UL (ref 0.3–1)
MONOCYTES NFR BLD: 8.7 % (ref 4–15)
NEUTROPHILS # BLD AUTO: 3.4 K/UL (ref 1.8–7.7)
NEUTROPHILS NFR BLD: 63.7 % (ref 38–73)
NRBC BLD-RTO: 0 /100 WBC
PHOSPHATE SERPL-MCNC: 4 MG/DL (ref 2.7–4.5)
PLATELET # BLD AUTO: 263 K/UL (ref 150–450)
PMV BLD AUTO: 9.8 FL (ref 9.2–12.9)
POTASSIUM SERPL-SCNC: 4.1 MMOL/L (ref 3.5–5.1)
PROT SERPL-MCNC: 6.4 G/DL (ref 6–8.4)
RBC # BLD AUTO: 3.3 M/UL (ref 4–5.4)
SODIUM SERPL-SCNC: 139 MMOL/L (ref 136–145)
WBC # BLD AUTO: 5.4 K/UL (ref 3.9–12.7)

## 2024-05-22 PROCEDURE — 84100 ASSAY OF PHOSPHORUS: CPT | Performed by: FAMILY MEDICINE

## 2024-05-22 PROCEDURE — 25000003 PHARM REV CODE 250: Performed by: FAMILY MEDICINE

## 2024-05-22 PROCEDURE — 25000003 PHARM REV CODE 250: Performed by: HOSPITALIST

## 2024-05-22 PROCEDURE — 97530 THERAPEUTIC ACTIVITIES: CPT

## 2024-05-22 PROCEDURE — 97535 SELF CARE MNGMENT TRAINING: CPT

## 2024-05-22 PROCEDURE — 11000004 HC SNF PRIVATE

## 2024-05-22 PROCEDURE — 97110 THERAPEUTIC EXERCISES: CPT

## 2024-05-22 PROCEDURE — 36415 COLL VENOUS BLD VENIPUNCTURE: CPT | Performed by: FAMILY MEDICINE

## 2024-05-22 PROCEDURE — 83735 ASSAY OF MAGNESIUM: CPT | Performed by: FAMILY MEDICINE

## 2024-05-22 PROCEDURE — 85025 COMPLETE CBC W/AUTO DIFF WBC: CPT | Performed by: FAMILY MEDICINE

## 2024-05-22 PROCEDURE — 80053 COMPREHEN METABOLIC PANEL: CPT | Performed by: FAMILY MEDICINE

## 2024-05-22 RX ORDER — METHOCARBAMOL 500 MG/1
500 TABLET, FILM COATED ORAL 3 TIMES DAILY PRN
Qty: 30 TABLET | Refills: 0 | Status: SHIPPED | OUTPATIENT
Start: 2024-05-22 | End: 2024-06-01

## 2024-05-22 RX ADMIN — APIXABAN 2.5 MG: 2.5 TABLET, FILM COATED ORAL at 09:05

## 2024-05-22 RX ADMIN — METHOCARBAMOL 500 MG: 500 TABLET ORAL at 09:05

## 2024-05-22 RX ADMIN — METHOCARBAMOL 500 MG: 500 TABLET ORAL at 12:05

## 2024-05-22 RX ADMIN — THERA TABS 1 TABLET: TAB at 09:05

## 2024-05-22 RX ADMIN — CALCIUM CARBONATE (ANTACID) CHEW TAB 500 MG 1000 MG: 500 CHEW TAB at 09:05

## 2024-05-22 RX ADMIN — ZINC SULFATE 220 MG (50 MG) CAPSULE 220 MG: CAPSULE at 09:05

## 2024-05-22 RX ADMIN — Medication 500 MG: at 09:05

## 2024-05-22 RX ADMIN — Medication 9 MG: at 09:05

## 2024-05-22 RX ADMIN — HYDRALAZINE HYDROCHLORIDE 25 MG: 25 TABLET ORAL at 05:05

## 2024-05-22 RX ADMIN — ACETAMINOPHEN 650 MG: 325 TABLET ORAL at 09:05

## 2024-05-22 RX ADMIN — MEMANTINE 5 MG: 5 TABLET ORAL at 09:05

## 2024-05-22 RX ADMIN — METHOCARBAMOL 500 MG: 500 TABLET ORAL at 05:05

## 2024-05-22 RX ADMIN — HYDRALAZINE HYDROCHLORIDE 25 MG: 25 TABLET ORAL at 02:05

## 2024-05-22 RX ADMIN — ACETAMINOPHEN 650 MG: 325 TABLET ORAL at 02:05

## 2024-05-22 RX ADMIN — CHOLECALCIFEROL TAB 25 MCG (1000 UNIT) 2000 UNITS: 25 TAB at 09:05

## 2024-05-22 NOTE — PROGRESS NOTES
[facility]                                  Skilled Nursing Facility                   Progress Note     Patient Name: Mary Ellen Miller  YOB: 1930  MRN: [id number]  Room: [place] [address] [address] A     Admit Date: 5/2/2024   HEAVEN: 5/23/2024     Principal Problem: Closed displaced intertrochanteric fracture of left femur with routine healing    HPI obtained from patient interview and chart review     Chief Complaint: Re-evaluation of medical treatment and therapy status: HTN follow-up, lab review      HPI:   Mary Ellen Miller is a 89 y.o. F with PMH of HTN, HLD, CHF, and afib on eliquis presenting to the ED with significant left hip pain after a fall. 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 Prior to the incident, she reports intermittent worsening of chronic lower extremity edema and SOB. Her PCP recently discontinued amlodipine in favor of lasix and continuing aldactone, and she has not missed any doses of her home medications.  XR pelvis with mildly displaced L intertrochanteric femur fracture with impaction, minimally displaced comminuted L superior pubic ramus fracture, and nondisplaced fracture of the L inferior pubic ramus. s/p left femur CMN on 4/28/24. Given diuresis and on 1L O2, not on oxygen at home.  PT/OT recommends skilled nursing placement for further therapy.     Patient will be treated at [facility] with PT and OT to improve functional status and ability to perform ADLs.     Interval History  24 hour chart review completed. Pertinent lab, micro, and/or radiology results addressed below. NAEON. NAD.   Afebrile, HDS.  HTN above goal at times in past few days. Weight / edema stable. Denies dyspnea. Patient reports pain controlled adequately with scheduled robaxin and tylenol.   Monitor on  recently added hydralazine 25 mg q 8 hours. Will order final effective dose to patient's pharmacy prior to her discharge tomorrow.  LBM 5/21, denies constipation.   Patient progressing well with therapy.  Ambulatory referral in place to heme/onc for outpatient follow-up of hemolytic anemia.  Anticipated discharge tomorrow: Thursday, 5/23/24.    Past Medical History: Patient has a past medical history of Anxiety, Atrial fibrillation, Colon polyps (05/01/2012), Hypertension, Leg ulcer, right, limited to breakdown of skin (11/20/2019), and Unilateral femoral hernia with obstruction, without gangrene (10/26/2015).    Past Surgical History: Patient has a past surgical history that includes Radical hysterectomy (1990); Lung surgery (1988); Hysterectomy; Oophorectomy; and Intramedullary rodding of femur (Left, 4/28/2024).    Social History: Patient reports that she has never smoked. She has never been exposed to tobacco smoke. She has never used smokeless tobacco. She reports that she does not drink alcohol and does not use drugs.    Family History:  family history includes No Known Problems in her father and mother.    Allergies: Patient is allergic to percodan [oxycodone hcl-oxycodone-asa], penicillins, sulfa (sulfonamide antibiotics), and amoxicillin.        ROS  Constitutional:  Positive for activity change. Negative for chills and fever.   HENT:  Negative for trouble swallowing.    Eyes:  Negative for photophobia and visual disturbance.   Respiratory: Negative for chest tightness or shortness of breath.    Cardiovascular:  Negative for chest pain and palpitations.   Gastrointestinal: Negative for abdominal pain, constipation, diarrhea, nausea and vomiting.   Genitourinary:  Negative for dysuria, frequency and hematuria.   Musculoskeletal:  Positive for arthralgias, back pain and gait problem. Negative for neck pain.   Skin:  Negative for rash and wound.   Neurological:  Positive for weakness. Negative for dizziness,  syncope, speech difficulty and light-headedness.   Psychiatric/Behavioral:  Positive for confusion (intermittent). Negative for agitation. The patient is not nervous/anxious.     24 hour Vital Sign Range   Temp:  [97.8 °F (36.6 °C)-98.4 °F (36.9 °C)]   Pulse:  [89-98]   Resp:  [17-18]   BP: (111-164)/(57-79)   SpO2:  [96 %-100 %]       Physical Exam  Constitutional:       General: She is not in acute distress. Appears fatigued     Appearance: She is well-developed. She is not toxic-appearing.   HENT:      Head: Normocephalic.      Mouth/Throat:      Mouth: Mucous membranes are moist.   Eyes:      Extraocular Movements: Extraocular movements intact.      Conjunctiva/sclera: Conjunctivae normal.      Pupils: Pupils are equal, round, and reactive to light.   Cardiovascular:      Rate and Rhythm: Regular rate. Rhythm irregular.      Heart sounds: Normal heart sounds.   Pulmonary:      Effort: Pulmonary effort is normal. No respiratory distress. Lungs clear     Breath sounds: No wheezing.      Comments: RA  Abdominal:      General: Bowel sounds are normal.      Palpations: Abdomen is soft.      Tenderness: There is no abdominal tenderness.   Musculoskeletal:         General: Normal range of motion.      Cervical back: Normal range of motion and neck supple.   Skin:     General: Skin is warm and dry.      Capillary Refill: Capillary refill takes less than 2 seconds.      Findings: Bruising present. No rash. Left anterior incisional wound.  Neurological:      Mental Status: She is alert and oriented to person, place, and time. Forgetful of recent details     Cranial Nerves: No cranial nerve deficit.      Sensory: No sensory deficit.      Coordination: Coordination normal.   Psychiatric:         Behavior: Behavior normal.              Labs:  Recent Labs   Lab 05/16/24 0449 05/20/24  0544 05/22/24  0441   WBC 5.61 5.39 5.40   HGB 9.0* 10.0* 10.4*   HCT 28.5* 32.0* 32.4*    289 263     Recent Labs   Lab 05/16/24 0449  "05/20/24  0543 05/22/24  0441    138 139   K 4.3 3.7 4.1   * 107 110   CO2 20* 21* 21*   BUN 22 15 17   CREATININE 0.8 0.8 0.9   GLU 79 85 80   CALCIUM 8.2* 8.7 8.7   MG 1.8 1.9 2.0   PHOS 3.3 3.0 4.0     Recent Labs   Lab 05/16/24  0449 05/20/24  0543 05/22/24  0441   ALKPHOS 130 171* 170*   ALT 11 10 12   AST 13 14 18   ALBUMIN 2.9* 3.1* 3.1*   PROT 5.8* 6.3 6.4   BILITOT 1.0 0.9 0.9       No results for input(s): "POCTGLUCOSE" in the last 72 hours.    Meds Scheduled:   acetaminophen  650 mg Oral TID    apixaban  2.5 mg Oral BID    ascorbic acid (vitamin C)  500 mg Oral BID    atenoloL  25 mg Oral Daily    calcium carbonate  1,000 mg Oral Daily    EScitalopram oxalate  5 mg Oral Every other day    furosemide  20 mg Oral Daily    hydrALAZINE  25 mg Oral Q8H    melatonin  9 mg Oral Nightly    memantine  5 mg Oral BID    methocarbamoL  500 mg Oral QID    multivitamin  1 tablet Oral Daily    spironolactone  50 mg Oral Daily    vitamin D  2,000 Units Oral Daily    zinc sulfate  220 mg Oral Daily       PRN:     Current Facility-Administered Medications:     calcium carbonate, 500 mg, Oral, BID PRN    ondansetron, 8 mg, Oral, Q8H PRN    senna-docusate 8.6-50 mg, 1 tablet, Oral, BID PRN      Assessment and Plan:    Closed displaced intertrochanteric fracture of left femur   -s/p CMN on 4/28/24 Dr. Kurt Melendez   -Pain control: multimodal,   5/13 change robaxin to scheduled and schedule APAP  -PT/OT consult: WBAT LLE  -DVT PPx: Eliquis 2.5 mg BID, SCDs at all times when not ambulating    Essential hypertension  Continue Lasix 20mg, atenolol 25mg and spironolactone 50mg daily   5/21: Initiate hydralazine 25 mg q 8 hours d/t  HTN past 48 hrs    Hemolytic Anemia  Hyperbilirubinemia, resolved  POA  - Suspect hemolytic anemia as etiology of hyperbilirubinemia.  5/6: T bili obtained inpt on 4/27/24: 1.2  - Stop q 8 hour 1000 mg APAP, continue 650 mg APAP prn.  - T bili elevated to 1.5, direct bili 0.5. ALP, AST, " "ALT all WNL.   5/7: Acute hepatitis panel negative.   - T bili continues to rise.   - Patient is asymptomatic:   - Given ALP, aminotransferases, and GGT are normal, hyperbili not likely due to hepatic injury or biliary tract disease.   - Eval for hemolytic anemia: in am obtain retic, LDH, and haptoglobin, CBC Diff with peripheral smear if lab can accommodate, and CMP.  5/8: T bili stabilizing at 1.4 from 1.6 yesterday  direct bili 0.6.   - Hgb 8.3 from 9.1  - LDH and haptoglobin WNL, however elevated retic 5.8 and peripheral smear notes "red cells show mild anisocytosis with a subset of small spherocytes" Spherocytes suggestive of Autoimmune hemolytic anemia (AIHA) or Hereditary spherocytosis (HS).   - Obtain GUERDA (Gordon) and type/screen in am with repeat cbc diff, cmp  5/9: Direct and indirect Gordon negative.   - AIHA associated with increased risk of thrombus. Obtain d dimer with next lab draw. If d dimer positive will need to obtain BLE Venous US.   - Will need referral to heme/onc for more specialized testing.   - Continue to routinely monitor CBC and bilirubin with CMP trends.  5/16 Ddimer 4.9 but LFTs and bili normal now  5/20: Hgb 10 from 9.  -5/21: ambulatory referral placed to heme/onc for outpatient follow-up    Elevated ALP  New 5/20, stable, monitor outpatient  5/22  from 171    Metabolic Acidosis  New 5/8. Na 135  CO2 18. 1300 mg na bicarb po bid x 3 days  5/20: Na 138  CO2 21. Initiate 650 mg na bicarb daily  5/21: continue daily na bicarb 650 mg  - Monitor with lab and adjust prn    Diarrhea  New 5/8, resolved  - denies nausea / vomiting  - denies abdominal cramping or discomfort.   - Changed senokot from bid to bid prn.  - Encouraged fluids    Severe Protein Calorie Malnutrition  Hypoalbuminemia 2/2 Protein Calorie Malnutrition  POA, improving: Albumin 3.1, up from admit  - RD consulted, ongoing collaboration for optimized nutrition   - Diet: regular diet  - Assist with meals  - Encourage " intake   - Dietary Supplements: Boost TID  - Vitamin and Mineral Supplements: Zinc, Vit C, and Multivitamin     Altered mental status  Had some confusion inpatient, UA was negative  Patient does not remember falling at home  Delirium precautions currently oriented x4  Decreased home lexapro from 10mg to 5mg d/t advanced age    Acute hypoxemic respiratory failure  Continue diuretics as tolerated  Wean O2 as tolerated to maintain sat >90%    Long term (current) use of anticoagulants  Eliquis for Afib, reduced to 2.5mg dose meets criteria     Acute on chronic diastolic heart failure  Continue Lasix 20mg, atenolol 25mg and spironolactone 50mg daily with parameters d/t recent hypotension  Follow up cardiology outpatient    Recurrent major depressive disorder, in full remission  PHQ9 score 1 on 5/13  - Continue home lexapro & namenda   -Continue to wean off lexapro prior to DC     Advanced Care Planning  Outcome of discussion:Confirms DNR    IP [facility] RISK OF UNPLANNED READMISSION Model: MOD    Anticipate disposition:    senior living with home health      Follow-up needed during SNF admission:   Ortho    Follow-up NOT needed during SNF admission: 5/20 [facility]    Follow-up needed after discharge from SNF: Heme/Onc  - PCP within 1-2 weeks  - See appt scheduled below     Future Appointments   Date Time Provider Department Center   6/10/2024 12:15 PM Any Cantu PA-C [facility]   6/25/2024  3:00 PM Dionne Jeter MD [facility]         I certify that SNF services are required to be given on an inpatient basis because Mary Ellen Miller needs for skilled nursing care and/or skilled rehabilitation are required on a daily basis and such services can only practically be provided in a skilled nursing facility setting and are for an ongoing condition for which she received inpatient care in the hospital.     Extended Visit  Total time spent: 47 minutes  Description of Time: counseling patient on clinical  condition, therapies provided, plan of care, emotional support, coordinating patient care with other care team members, reviewing and interpreting labs and imaging, collaboration with physician, initiating new orders, chart review, and documentation. See interval history.      Christa Loaiza NP  Department of Hospital Medicine   [facility]     DOS: 5/22/2024       Patient note was created using MModal Dictation.  Any errors in syntax or even information may not have been identified and edited on initial review prior to signing this note.

## 2024-05-22 NOTE — PT/OT/SLP PROGRESS
"Occupational Therapy   Treatment/ Discharge    Name: Mary Ellen Miller  MRN: [id number]  Admit Date: 5/2/2024  Admitting Diagnosis:  Closed displaced intertrochanteric fracture of left femur with routine healing    General Precautions: Standard, fall   Orthopedic Precautions: LLE weight bearing as tolerated   Braces: N/A    Recommendations:     Discharge Recommendations:  other (see comments) (to be futher assessed)  Level of Assistance Recommended at Discharge: 24 hours light assistance for ADL's and homemaking tasks  Discharge Equipment Recommendations: walker, rolling, wheelchair, hip kit, bedside commode  Barriers to discharge:  Decreased caregiver support    Assessment:     Mary Ellen Miller is a 89 y.o. female with a medical diagnosis of Closed displaced intertrochanteric fracture of left femur with routine healing. Pt tolerated today's session well w/o incident. She is scheduled to d/c from this SNF tomorrow on 5/22/2024 and is appropriate for continued HHOT. Performance deficits affecting function are weakness, impaired endurance, impaired self care skills, impaired functional mobility, gait instability, impaired balance, orthopedic precautions, decreased lower extremity function, decreased coordination.     Rehab Potential is good    Activity tolerance:  Good    Plan:     Patient to be seen 5 x/week to address the above listed problems via self-care/home management, therapeutic activities, therapeutic exercises    Plan of Care Expires: 06/02/24  Plan of Care Reviewed with: patient    Subjective     Communicated with: CAROLINA prior to session.  "I leave tomorrow"    Pain/Comfort:  Pain Rating 1: 0/10  Pain Rating Post-Intervention 1: 0/10    Patient's cultural, spiritual, Hindu conflicts given the current situation:  no    Objective:     Patient found  up in w/c at sinkside  with  (no active lines) upon OT entry to room.    Functional Mobility/Transfers:  Patient completed Sit <> Stand Transfer with " contact guard assistance  with  rolling walker   Patient completed Toilet Transfer Stand Pivot technique with contact guard assistance with  rolling walker      Activities of Daily Livin/22/24 1057   Eating   Was the activity attempted? Yes   Was the activity done independently? Yes   CARE Score - Eating 6   Oral Hygiene   Was the activity attempted? Yes   Was the activity done independently? Yes   CARE Score - Oral Hygiene 6   Toileting Hygiene   Was the activity attempted? Yes   Was the activity done independently? No   Assistance Needed Physical assistance   Physical Assistance Level Less than half  ((A) posterior hygiene management)   CARE Score - Toileting Hygiene 3   Toileting Hygiene Discharge Goal   Discharge Goal 3  (Goal MET)   Shower/Bathe Self   Was the activity attempted? Yes   Was the activity done independently? No   Assistance Needed Physical assistance   Physical Assistance Level Less than half  ((A) cleaning back and B feet; spnge bathe seated in w/c)   CARE Score - Shower/Bathe Self 3   Upper Body Dressing   Was the activity attempted? Yes   Was the activity done independently? Yes   CARE Score - Upper Body Dressing 6   Lower Body Dressing   Was the activity attempted? Yes   Was the activity done independently? No   Assistance Needed Physical assistance   Physical Assistance Level Less than half  (CGA while standing to manage over hips)   CARE Score - Lower Body Dressing 3   Putting On/Taking Off Footwear   Was the activity attempted? Yes   Was the activity done independently? No   Assistance Needed Physical assistance   Physical Assistance Level More than half  (MI to doff and don B socks using dressing stick and sock aid; Min A to don B shoes using shoehorn)   CARE Score - Putting On/Taking Off Footwear 2   Personal Hygiene   Was the activity attempted? Yes   Was the activity done independently? Yes   CARE Score - Personal Hygiene 6   Sit to Stand   Was the activity attempted? Yes   Was  the activity done independently? No   Assistance Needed Touching assistance   Physical Assistance Level Less than half  ((A) CGA with RW)   CARE Score - Sit to Stand 3   Chair/Bed-to-Chair Transfer   Was the activity attempted? Yes   Was the activity done independently? No   Assistance Needed Touching assistance   Physical Assistance Level Less than half  ((A) CGA with RW)   CARE Score - Chair/Bed-to-Chair Transfer 3   Toilet Transfer   Was the activity attempted? Yes   Was the activity done independently? No   Assistance Needed Touching assistance   Physical Assistance Level Less than half  ((A) CGA with RW)   CARE Score - Toilet Transfer 3   Tub/Shower Transfer   Was the activity attempted?   (sponge bathe seated in w/c)       [facility] 6 Click ADL: 21    Treatment & Education:  POC  D/c information      Patient left up in chair with call button in reach and all needs met    GOALS:   Multidisciplinary Problems       Occupational Therapy Goals          Problem: Occupational Therapy    Goal Priority Disciplines Outcome Interventions   Occupational Therapy Goal     OT, PT/OT Adequate for Care Transition    Description: Goals to be met by 6/2/2024:    Patient will increase functional independence with ADLs by performing:     UBD: SPV seated in w/c or EOB- MET   LBD: Min A using AE seated in w/c or EOB- MET  FW: MI using AE seated in w/c-  MET  Showering: Min A seated in w/c or EOB or sinkside- MET  Toileting: Min A seated on BSC- MET   Supine to sit: Min A- MET  Sit to stand: Min A with RW- MET                             Time Tracking:     OT Date of Treatment: 05/22/24  OT Start Time: 0735    OT Stop Time: 0815  OT Total Time (min): 40 min    Billable Minutes:Self Care/Home Management 40    5/22/2024  Bob Andrew Jr., OTR/L

## 2024-05-22 NOTE — PLAN OF CARE
Problem: Adult Inpatient Plan of Care  Goal: Plan of Care Review  Outcome: Progressing  Goal: Patient-Specific Goal (Individualized)  Outcome: Progressing  Goal: Absence of Hospital-Acquired Illness or Injury  Outcome: Progressing  Intervention: Prevent Infection  Flowsheets (Taken 5/22/2024 5341)  Infection Prevention:   rest/sleep promoted   hand hygiene promoted   single patient room provided  Goal: Optimal Comfort and Wellbeing  Outcome: Progressing

## 2024-05-22 NOTE — PLAN OF CARE
[facility]    FACILITY TRANSFER ORDERS      Patient Name: Mary Ellen Miller  YOB: 1930    PCP: Dionne Jeter MD   PCP Address: [address] [place] [address] [place] / [place] [address]  PCP Phone Number: [phone]  PCP Fax: [phone]    Encounter Date: 05/22/2024    Admit to: [facility]     Vital Signs:  Routine    Diagnoses:   Active Hospital Problems    Diagnosis  POA    *Closed displaced intertrochanteric fracture of left femur with routine healing s/p IM nail on 4/28/2024 [S72.142D]  Not Applicable    Severe protein-calorie malnutrition [E43]  Yes    Acute blood loss anemia [D62]  Yes    Seasonal allergic rhinitis [J30.2]  Yes    Long term (current) use of anticoagulants [Z79.01]  Not Applicable     - followed by cardiology  - no signs of bleeding      Chronic atrial fibrillation [I48.20]  Yes     - followed by Dr. Last  - OAC Eliquis without signs of bleeding  - rate controlled      Recurrent major depressive disorder, in full remission [F33.42]  Yes     - well controlled  - continue current medication      Essential hypertension [I10]  Yes     - well controlled  - continue current medications        Resolved Hospital Problems    Diagnosis Date Resolved POA    Acute hypoxemic respiratory failure [J96.01] 05/04/2024 Yes    JOHAN (acute kidney injury), unspecified [N17.9] 05/04/2024 Yes    Advance care planning [Z71.89] 05/04/2024 Not Applicable     - pt reports that she has living will and mPOA  - mPOA is her son Valentino (youngest)  - she does not want invasive or aggressive measures to maintain life  - she does not want CPR or intubation      Acute on chronic diastolic heart failure [I50.33] 05/04/2024 Yes     - no signs of acute decompensation  - followed by cardiology         Allergies:  Review of patient's allergies indicates:   Allergen Reactions    Percodan [oxycodone hcl-oxycodone-asa] Other (See Comments)     halucinations    Penicillins Itching    Sulfa (sulfonamide  antibiotics) Diarrhea and Nausea Only    Amoxicillin        Diet: regular diet    Activities: activity as tolerated and no driving while on analgesics     Goals of Care Treatment Preferences:  Code Status: DNR          What is most important right now is to focus on remaining as independent as possible, symptom/pain control, improvement in condition but with limits to invasive therapies.  Accordingly, we have decided that the best plan to meet the patient's goals includes continuing with treatment.      Nursing: Routine care.   Heart Failure:       Labs: CBC, CMP, and mag and phos   biweekly x 1 month then routine per facility      CONSULTS:    Physical Therapy to evaluate and treat.  and Occupational Therapy to evaluate and treat.    MISCELLANEOUS CARE:  SN to instruct on the following:               Instruct on the definition of CHF.              Instruct on the signs/sympoms of CHF to be reported.              Instruct on and monitor daily weights.              Instruct on factors that cause exacerbation.              Instruct on action, dose, schedule, and side effects of medications.              Instruct on diet as prescribed.              Instruct on activity allowed.              Instruct on life-style modifications for life long management of CHF              SN to assess compliance with daily weights, diet, medications, fluid retention,                          safety precautions, activities permitted and life-style modifications.              Additional 1-2 SN visits per week as needed for signs and symptoms                           of CHF exacerbation.        For Weight Gain > 2-3 lbs in 1 day or 4-6 lbs over 1 week notify PCP:      WOUND CARE ORDERS  None    Medications: Review discharge medications with patient and family and provide education.      Current Discharge Medication List        START taking these medications    Details   apixaban (ELIQUIS) 2.5 mg Tab Take 1 tablet (2.5 mg total) by mouth 2  (two) times daily.  Qty: 60 tablet, Refills: 2      ascorbic acid, vitamin C, (VITAMIN C) 500 MG tablet Take 1 tablet (500 mg total) by mouth 2 (two) times daily.  Qty: 60 tablet, Refills: 2      melatonin (MELATIN) 3 mg tablet Take 3 tablets (9 mg total) by mouth nightly.  Qty: 30 tablet, Refills: 0      spironolactone (ALDACTONE) 50 MG tablet Take 1 tablet (50 mg total) by mouth once daily.  Qty: 30 tablet, Refills: 2    Comments: .      zinc sulfate (ZINCATE) 50 mg zinc (220 mg) capsule Take 1 capsule (220 mg total) by mouth once daily.  Qty: 30 capsule, Refills: 2           CONTINUE these medications which have CHANGED    Details   EScitalopram oxalate (LEXAPRO) 10 MG tablet Take 0.5 tablets (5 mg total) by mouth once daily.  Qty: 90 tablet, Refills: 3    Associated Diagnoses: Recurrent major depressive disorder, in full remission           CONTINUE these medications which have NOT CHANGED    Details   acetaminophen (TYLENOL) 500 MG tablet Take 2 tablets (1,000 mg total) by mouth every 8 (eight) hours.      atenoloL (TENORMIN) 25 MG tablet Take 1 tablet (25 mg total) by mouth once daily.    Comments: .      biotin 1 mg tablet TAKE 1/2 TABLET BY MOUTH ONCE DAILY.  Qty: 90 tablet, Refills: 3      calcium carbonate (OS-ZOILA) 600 mg calcium (1,500 mg) Tab Take 1 tablet (600 mg total) by mouth once daily.  Qty: 90 tablet, Refills: 3      furosemide (LASIX) 20 MG tablet TAKE 1 TABLET BY MOUTH ONCE DAILY.  Qty: 30 tablet, Refills: 3    Associated Diagnoses: Chronic diastolic heart failure      LIDOcaine (LIDODERM) 5 % Place 1 patch onto the skin once daily. Remove & Discard patch within 12 hours or as directed by MD  Qty: 7 patch, Refills: 0      memantine (NAMENDA) 5 MG Tab Take 1 tablet (5 mg total) by mouth 2 (two) times daily.  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      ondansetron (ZOFRAN-ODT) 8 MG TbDL Take 1 tablet (8 mg total) by mouth every 8 (eight) hours as needed (Nausea).      vit A,C & E-lutein-minerals 1,000-60-2 unit (I-BLAKE) Tab TAKE 1 TABLET BY MOUTH ONCE DAILY.  Qty: 90 tablet, Refills: 3      vitamin D 1000 units Tab Take 2,000 mg by mouth once daily.                Immunizations Administered as of 5/22/2024       Name Date Dose VIS Date Route Exp Date    COVID-19, MRNA, LN-S, PF (Pfizer) (Purple Cap) 10/15/2021 11:42 AM 0.3 mL 12/12/2020 Intramuscular 10/31/2021    Site: Left deltoid     Given By: Hima Ordonez LPN     : [employer]     Lot: [id number]     COVID-19, MRNA, LN-S, PF (Pfizer) (Purple Cap) 1/30/2021 12:21 PM 0.3 mL 12/12/2020 Intramuscular 5/31/2021    Site: Left deltoid     Given By: Maria E Yip, RN     : [employer]     Lot: [id number]     COVID-19, MRNA, LN-S, PF (Pfizer) (Purple Cap) 1/9/2021 11:32 AM 0.3 mL 12/12/2020 Intramuscular 4/30/2021    Site: Left deltoid     Given By: Keerthi Angeles LPN     : [employer]     Lot: [id number]             _________________________________  Christa Loaiza, TARA  05/22/2024

## 2024-05-22 NOTE — PT/OT/SLP PROGRESS
Physical Therapy Treatment/ Discharge Summary    Patient Name:  Mary Ellen Miller   MRN:  [id number]  Admit Date: 5/2/2024  Admitting Diagnosis: Closed displaced intertrochanteric fracture of left femur with routine healing  Recent Surgeries: INSERTION, INTRAMEDULLARY LENO, FEMUR: Left (Left)     General Precautions: Standard, fall  Orthopedic Precautions: LLE weight bearing as tolerated  Braces: N/A    Recommendations:     Discharge Recommendations: home health PT  Level of Assistance Recommended at Discharge: 24 hours light assistance  Discharge Equipment Recommendations: bedside commode, walker, rolling, wheelchair  Barriers to discharge: Decreased caregiver support    Assessment:     Mary Ellen Miller is a 89 y.o. female admitted with a medical diagnosis of Closed displaced intertrochanteric fracture of left femur with routine healing . Pt is appropriate to d/c tomorrow.      Performance deficits affecting function: weakness, impaired endurance, impaired self care skills, impaired functional mobility, gait instability, impaired balance, decreased upper extremity function, decreased lower extremity function, impaired cardiopulmonary response to activity, orthopedic precautions, impaired skin.    Rehab Potential is good    Activity Tolerance: Good    Plan:     Patient to be seen 5 x/week to address the above listed problems via gait training, therapeutic activities, therapeutic exercises, wheelchair management/training    Plan of Care Expires: 06/02/24  Plan of Care Reviewed with: patient    Subjective     Pt agreeable to work with PT.     Pain/Comfort:  Pain Rating 1: 0/10  Pain Rating Post-Intervention 1: 0/10    Patient's cultural, spiritual, Anglican conflicts given the current situation:  no    Objective:     Communicated with pt prior to session.  Patient found up in chair with  (O2 removed during PT d.t pt's O2 sats above 96% at rest and post activity) upon PT entry to room.     Therapeutic  Activities and Exercises: LBEx 20mins and UBEx 10mins to improve MMT and endurance    Functional Mobility:     05/22/24 1517   Roll Left and Right   Was the activity attempted? Yes   Was the activity done independently? No   Assistance Needed Supervision   Was adaptive equipment used? No   CARE Score - Roll Left and Right 4   Sit to Lying   Was the activity attempted? Yes   Was the activity done independently? No   Assistance Needed Supervision   Was adaptive equipment used? No   CARE Score - Sit to Lying 4   Lying to Sitting on Side of Bed   Was the activity attempted? Yes   Was the activity done independently? No   Assistance Needed Supervision   Was adaptive equipment used? No   CARE Score - Lying to Sitting on Side of Bed 4   Sit to Stand   Was the activity attempted? Yes   Was the activity done independently? No   Assistance Needed Touching assistance   Was adaptive equipment used? Yes   CARE Score - Sit to Stand 4   Chair/Bed-to-Chair Transfer   Was the activity attempted? Yes   Was the activity done independently? No   Assistance Needed Touching assistance   Was adaptive equipment used? No   CARE Score - Chair/Bed-to-Chair Transfer 4   Car Transfer   Reason if not Attempted Environmental limitations   CARE Score - Car Transfer 10   Walk 10 Feet   Was the activity attempted? Yes   Was the activity done independently? No   Assistance Needed Touching assistance   Was adaptive equipment used? Yes   CARE Score - Walk 10 Feet 4   Walk 50 Feet with Two Turns   Was the activity attempted? Yes   Was the activity done independently? No   Assistance Needed Touching assistance   Was adaptive equipment used? Yes   CARE Score - Walk 50 Feet with Two Turns 4   Walk 150 Feet   Was the activity attempted? Yes   Was the activity done independently? No   Assistance Needed Touching assistance   Was adaptive equipment used? Yes   CARE Score - Walk 150 Feet 4   Walking 10 Feet on Uneven Surfaces   Was the activity attempted? Yes    Was the activity done independently? No   Assistance Needed Supervision  (SBA with RW per PTA visit 5/21)   Was adaptive equipment used? Yes   CARE Score - Walking 10 Feet on Uneven Surfaces 4   1 Step (Curb)   Was the activity attempted? Yes   Was the activity done independently? No   Assistance Needed Supervision  (SBA with RW per PTA visit 5/21)   Was adaptive equipment used? Yes   CARE Score - 1 Step (Curb) 4   4 Steps   Reason if not Attempted Safety concerns   CARE Score - 4 Steps 88   12 Steps   Reason if not Attempted Safety concerns   CARE Score - 12 Steps 88   Picking Up Object   Was the activity attempted? Yes   Was the activity done independently? No   Assistance Needed Supervision  (SBA with RW and reacher per PTA visit)   Was adaptive equipment used? Yes   CARE Score - Picking Up Object 4   OTHER   Uses a Wheelchair/Scooter? Yes   Wheel 50 Feet with Two Turns   Was the activity attempted? Yes   Was the activity done independently? No   Assistance Needed Supervision  (per PTA visit 5/21)   Type of Wheelchair/Scooter Manual   CARE Score - Wheel 50 Feet with Two Turns 4   Wheel 150 Feet   Was the activity attempted? Yes   Was the activity done independently? No   Assistance Needed Supervision  (per PTA visit 5/21)   Type of Wheelchair/Scooter Manual   CARE Score - Wheel 150 Feet 4       AM-PAC 6 CLICK MOBILITY  16    Patient left up in chair with call button in reach.    GOALS:   Multidisciplinary Problems       Physical Therapy Goals          Problem: Physical Therapy    Goal Priority Disciplines Outcome Goal Variances Interventions   Physical Therapy Goal     PT, PT/OT Progressing     Description: Goals to be met by: 3/10/24     Patient will increase functional independence with mobility by performing:    . Supine to sit with MInimal Assistance  . Sit to supine with MInimal Assistance  . Rolling to Left and Right with Minimal Assistance.  . Sit to stand transfer with Minimal Assistance  . Bed to chair  "transfer with Minimal Assistance using Rolling Walker  . Gait  x 50 feet with Minimal Assistance using Rolling Walker.-met  Updated 5/16/2024  Gait  x 100 feet with Contact Guard Assistance using Rolling Walker  . Wheelchair propulsion x150 feet with Supervision using bilateral uppper extremities  .Added 5/16/2024 Ascend/Descend 4 inch curb step with RW and CGA/SBA    Rehab Services' DME recommendations    Walker Adult (5'1"-6"6")      Wheels Yes  Justification for walker: Mobility limitation cannot be sufficiently resolved by use of a cane/patient cannot safely ambulate with a cane, Patient's functional mobility deficit can be sufficiently resolved with the use of a Rolling Walker of Walker, Patient's mobility limitation significantly impairs their ability to participate in one of more activities of daily living, The use of a Rolling Walker or Walker will significantly improve the patient's ability to participate in MRADLS and the patient will use it on a regular basis    Wheelchair  Number of hours up in a wheelchair per day 8      Style Light weight    Justification for light weight w/c: patient cannot propel in a standard wheelchair, patient can and does self-propel in a lightweight wheelchair, patient has impaired ability to participate in MRADLs, mobility limitations cannot be sifficiently resolved with a cane/walker, the home provides adequate access between rooms for a wheelchair, a wheelchair will significantly improve the ability to participate in MRADLs and will be used in the home on a regular basis, the patient is willing to use a wheelchair in the home, the patient has a caregiver who is available, willing, and able to provide assistance with the wheelchair, and the patient requires additional person for safety with mobility with walker  Seat Width 16 (Small adult)  Seat Depth 16  Back Height Standard  Leg Support Standard, Heel Loops, and Swing Away  Arm Height Desk and Swing Away  Lap Belt " Buckle  Accessories Anti-tippers and Safety belt  Cushion Basic  Justification for Cushion skin integrity      3 in 1 commode Standard     Justification for 3 in 1 commode: The patient is confined to a single room , The patient is confined to one level of the home environment and there is no toilet on that level, The patient is confined to the home and there are no toilet facilities in the home, The patient is bedroom confined for an extended time and non-ambulatory, The patient's deficits will not be sufficiently addressed by a raised toilet seat                              Time Tracking:     PT Received On: 05/22/24  PT Start Time: 0943  PT Stop Time: 1029  PT Total Time (min): 46 min    Billable Minutes: Therapeutic Activity 16 and Therapeutic Exercise 30    Treatment Type: Treatment  PT/PTA: PT     Number of PTA visits since last PT visit: 0     05/22/2024

## 2024-05-23 VITALS
RESPIRATION RATE: 18 BRPM | HEART RATE: 91 BPM | OXYGEN SATURATION: 96 % | TEMPERATURE: 98 F | BODY MASS INDEX: 20.51 KG/M2 | DIASTOLIC BLOOD PRESSURE: 83 MMHG | HEIGHT: 63 IN | SYSTOLIC BLOOD PRESSURE: 158 MMHG | WEIGHT: 115.75 LBS

## 2024-05-23 DIAGNOSIS — S72.142D CLOSED DISPLACED INTERTROCHANTERIC FRACTURE OF LEFT FEMUR WITH ROUTINE HEALING: Primary | ICD-10-CM

## 2024-05-23 PROCEDURE — 25000003 PHARM REV CODE 250: Performed by: HOSPITALIST

## 2024-05-23 PROCEDURE — 25000003 PHARM REV CODE 250: Performed by: FAMILY MEDICINE

## 2024-05-23 RX ORDER — HYDRALAZINE HYDROCHLORIDE 25 MG/1
25 TABLET, FILM COATED ORAL EVERY 8 HOURS
Qty: 90 TABLET | Refills: 3 | Status: SHIPPED | OUTPATIENT
Start: 2024-05-23 | End: 2025-05-23

## 2024-05-23 RX ADMIN — THERA TABS 1 TABLET: TAB at 08:05

## 2024-05-23 RX ADMIN — ATENOLOL 25 MG: 25 TABLET ORAL at 08:05

## 2024-05-23 RX ADMIN — CALCIUM CARBONATE (ANTACID) CHEW TAB 500 MG 1000 MG: 500 CHEW TAB at 08:05

## 2024-05-23 RX ADMIN — FUROSEMIDE 20 MG: 20 TABLET ORAL at 08:05

## 2024-05-23 RX ADMIN — ESCITALOPRAM OXALATE 5 MG: 5 TABLET, FILM COATED ORAL at 08:05

## 2024-05-23 RX ADMIN — APIXABAN 2.5 MG: 2.5 TABLET, FILM COATED ORAL at 08:05

## 2024-05-23 RX ADMIN — CHOLECALCIFEROL TAB 25 MCG (1000 UNIT) 2000 UNITS: 25 TAB at 08:05

## 2024-05-23 RX ADMIN — MEMANTINE 5 MG: 5 TABLET ORAL at 08:05

## 2024-05-23 RX ADMIN — ZINC SULFATE 220 MG (50 MG) CAPSULE 220 MG: CAPSULE at 08:05

## 2024-05-23 RX ADMIN — METHOCARBAMOL 500 MG: 500 TABLET ORAL at 08:05

## 2024-05-23 RX ADMIN — ACETAMINOPHEN 650 MG: 325 TABLET ORAL at 08:05

## 2024-05-23 RX ADMIN — Medication 500 MG: at 08:05

## 2024-05-23 RX ADMIN — SPIRONOLACTONE 50 MG: 25 TABLET ORAL at 08:05

## 2024-05-23 RX ADMIN — HYDRALAZINE HYDROCHLORIDE 25 MG: 25 TABLET ORAL at 06:05

## 2024-05-23 NOTE — NURSING
Patient left facility stable via wheelchair and transported home via personal vehicle with family. Discharge papers received, reviewed, and verbalized understanding. No new skin issues. All personal belongings taken. No concerned voiced upon departure.

## 2024-05-23 NOTE — DISCHARGE SUMMARY
Department of Hospital Medicine   [facility]   Discharge Summary    Patient Name: Mary Ellen Miller  Room: GEBF798/CXMX023 A   MRN: [id number]  Admit Date: 5/2/2024   Date of Discharge:    Length of Stay:  LOS: 21 days   Attending Physician: Leroy Bonilla MD  Nurse Practitioner: Christa Loaiza NP  Code Status: DNR (Do Not Resuscitate)    Date of Service: 05/23/2024    Principal Problem: Closed displaced intertrochanteric fracture of left femur with routine healing    HPI  Mary Ellen Miller is a 89 y.o. F with PMH of HTN, HLD, CHF, and afib on eliquis presenting to the ED with significant left hip pain after a fall. 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 Prior to the incident, she reports intermittent worsening of chronic lower extremity edema and SOB. Her PCP recently discontinued amlodipine in favor of lasix and continuing aldactone, and she has not missed any doses of her home medications.  XR pelvis with mildly displaced L intertrochanteric femur fracture with impaction, minimally displaced comminuted L superior pubic ramus fracture, and nondisplaced fracture of the L inferior pubic ramus. s/p left femur CMN on 4/28/24. Given diuresis and on 1L O2, not on oxygen at home.     Hospital Course  Patient admitted to [facility] with PT and OT to improve functional status and ability to perform ADLs. She sucessfully weaned off oxygen. Patient progressed well with PT and OT. Patient had no significant events during their stay at SNF. Home health was set up. DME was ordered if needed. Follow up appointment to be made by patient within one week. All prescriptions and discharge instructions were ordered to be given to the patient prior to discharge.     Physical Exam  Constitutional:       General: She is not in acute distress. Appears fatigued      "Appearance: She is well-developed. She is not toxic-appearing.   HENT:      Head: Normocephalic.      Mouth/Throat:      Mouth: Mucous membranes are moist.   Eyes:      Extraocular Movements: Extraocular movements intact.      Conjunctiva/sclera: Conjunctivae normal.      Pupils: Pupils are equal, round, and reactive to light.   Cardiovascular:      Rate and Rhythm: Regular rate. Rhythm irregular.      Heart sounds: Normal heart sounds.   Pulmonary:      Effort: Pulmonary effort is normal. No respiratory distress. Lungs clear     Breath sounds: No wheezing.      Comments: RA  Abdominal:      General: Bowel sounds are normal.      Palpations: Abdomen is soft.      Tenderness: There is no abdominal tenderness.   Musculoskeletal:         General: Normal range of motion.      Cervical back: Normal range of motion and neck supple.   Skin:     General: Skin is warm and dry.      Capillary Refill: Capillary refill takes less than 2 seconds.      Findings: Bruising present. No rash. Left anterior incisional wound.  Neurological:      Mental Status: She is alert and oriented to person, place, and time. Forgetful of recent details     Cranial Nerves: No cranial nerve deficit.      Sensory: No sensory deficit.      Coordination: Coordination normal.   Psychiatric:         Behavior: Behavior normal.        Recent Labs   Lab 05/20/24  0544 05/22/24  0441   WBC 5.39 5.40   HGB 10.0* 10.4*   HCT 32.0* 32.4*    263     Recent Labs   Lab 05/20/24  0543 05/22/24  0441    139   K 3.7 4.1    110   CO2 21* 21*   BUN 15 17   CREATININE 0.8 0.9   GLU 85 80   CALCIUM 8.7 8.7   MG 1.9 2.0   PHOS 3.0 4.0     Recent Labs   Lab 05/20/24  0543 05/22/24  0441   ALKPHOS 171* 170*   ALT 10 12   AST 14 18   ALBUMIN 3.1* 3.1*   PROT 6.3 6.4   BILITOT 0.9 0.9      No results for input(s): "CPK", "CPKMB", "MB", "TROPONINI" in the last 72 hours.  No results for input(s): "LACTATE" in the last 72 hours.    No results for input(s): " ""POCTGLUCOSE" in the last 168 hours.       Consultations included: Orthopedic Surgery, Wound Care, Infectious Disease, Registered Dietitian, PT, OT, and Case Management.      Current Discharge Medication List        START taking these medications    Details   apixaban (ELIQUIS) 2.5 mg Tab Take 1 tablet (2.5 mg total) by mouth 2 (two) times daily.  Qty: 60 tablet, Refills: 2      ascorbic acid, vitamin C, (VITAMIN C) 500 MG tablet Take 1 tablet (500 mg total) by mouth 2 (two) times daily.  Qty: 60 tablet, Refills: 2      hydrALAZINE (APRESOLINE) 25 MG tablet Take 1 tablet (25 mg total) by mouth every 8 (eight) hours.  Qty: 90 tablet, Refills: 3    Comments: .      melatonin (MELATIN) 3 mg tablet Take 3 tablets (9 mg total) by mouth nightly.  Qty: 30 tablet, Refills: 0      methocarbamoL (ROBAXIN) 500 MG Tab Take 1 tablet (500 mg total) by mouth 3 (three) times daily as needed (muscle spasms).  Qty: 30 tablet, Refills: 0      spironolactone (ALDACTONE) 50 MG tablet Take 1 tablet (50 mg total) by mouth once daily.  Qty: 30 tablet, Refills: 2    Comments: .      zinc sulfate (ZINCATE) 50 mg zinc (220 mg) capsule Take 1 capsule (220 mg total) by mouth once daily.  Qty: 30 capsule, Refills: 2           CONTINUE these medications which have CHANGED    Details   EScitalopram oxalate (LEXAPRO) 10 MG tablet Take 0.5 tablets (5 mg total) by mouth once daily.  Qty: 90 tablet, Refills: 3    Associated Diagnoses: Recurrent major depressive disorder, in full remission           CONTINUE these medications which have NOT CHANGED    Details   acetaminophen (TYLENOL) 500 MG tablet Take 2 tablets (1,000 mg total) by mouth every 8 (eight) hours.      atenoloL (TENORMIN) 25 MG tablet Take 1 tablet (25 mg total) by mouth once daily.    Comments: .      biotin 1 mg tablet TAKE 1/2 TABLET BY MOUTH ONCE DAILY.  Qty: 90 tablet, Refills: 3      calcium carbonate (OS-ZOILA) 600 mg calcium (1,500 mg) Tab Take 1 tablet (600 mg total) by mouth once " daily.  Qty: 90 tablet, Refills: 3      furosemide (LASIX) 20 MG tablet TAKE 1 TABLET BY MOUTH ONCE DAILY.  Qty: 30 tablet, Refills: 3    Associated Diagnoses: Chronic diastolic heart failure      LIDOcaine (LIDODERM) 5 % Place 1 patch onto the skin once daily. Remove & Discard patch within 12 hours or as directed by MD  Qty: 7 patch, Refills: 0      memantine (NAMENDA) 5 MG Tab Take 1 tablet (5 mg total) by mouth 2 (two) times daily.  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      ondansetron (ZOFRAN-ODT) 8 MG TbDL Take 1 tablet (8 mg total) by mouth every 8 (eight) hours as needed (Nausea).      vit A,C & E-lutein-minerals 1,000-60-2 unit (I-BLAKE) Tab TAKE 1 TABLET BY MOUTH ONCE DAILY.  Qty: 90 tablet, Refills: 3      vitamin D 1000 units Tab Take 2,000 mg by mouth once daily.             Diet: regular diet     Activities: activity as tolerated and no driving while on analgesics     Discharge Condition: Stable    Tests pending at the time of discharge: none      Disposition: Home-Health Care Svc/Assisted Living with Skilled Therapy    Future Appointments   Date Time Provider Department Center   6/10/2024 12:15 PM Any Cantu PA-C [facility]   6/25/2024  3:00 PM Dionne Jeter MD [facility]       34 minutes total time spent on the discharge of the patient including review of hospital course with the patient, reviewing discharge medications, and arranging follow-up care.      Christa Loaiza NP  Department of Hospital Medicine   [facility]

## 2024-05-23 NOTE — PLAN OF CARE
Problem: Adult Inpatient Plan of Care  Goal: Plan of Care Review  Outcome: Progressing  Goal: Patient-Specific Goal (Individualized)  Outcome: Progressing  Goal: Absence of Hospital-Acquired Illness or Injury  Outcome: Progressing  Intervention: Prevent Infection  Flowsheets (Taken 5/23/2024 0618)  Infection Prevention:   single patient room provided   hand hygiene promoted   rest/sleep promoted  Goal: Optimal Comfort and Wellbeing  Outcome: Progressing  Goal: Readiness for Transition of Care  Outcome: Progressing

## 2024-05-23 NOTE — NURSING
Patient is set to discharge on 5/23/2024 at 11am . Patient discharging to [facility] with daughter in law via personal car. Patient set up with Home Health at [facility].  The patient has PCP appointment on May 30, 2024 at 9am.

## 2024-05-23 NOTE — PLAN OF CARE
Problem: Adult Inpatient Plan of Care  Goal: Plan of Care Review  Outcome: Met  Goal: Patient-Specific Goal (Individualized)  Outcome: Met  Goal: Absence of Hospital-Acquired Illness or Injury  Outcome: Met  Goal: Optimal Comfort and Wellbeing  Outcome: Met  Goal: Readiness for Transition of Care  Outcome: Met     Problem: Acute Kidney Injury/Impairment  Goal: Fluid and Electrolyte Balance  Outcome: Met  Goal: Improved Oral Intake  Outcome: Met  Goal: Effective Renal Function  Outcome: Met     Problem: Wound  Goal: Optimal Coping  Outcome: Met  Goal: Optimal Functional Ability  Outcome: Met  Goal: Absence of Infection Signs and Symptoms  Outcome: Met  Goal: Improved Oral Intake  Outcome: Met  Goal: Optimal Pain Control and Function  Outcome: Met  Goal: Skin Health and Integrity  Outcome: Met  Goal: Optimal Wound Healing  Outcome: Met     Problem: Fall Injury Risk  Goal: Absence of Fall and Fall-Related Injury  Outcome: Met     Problem: Skin Injury Risk Increased  Goal: Skin Health and Integrity  Outcome: Met     Problem: Malnutrition  Goal: Improved Nutritional Intake  Outcome: Met

## 2024-05-23 NOTE — PLAN OF CARE
Interdisciplinary team, Amairani Raza, RN Nurse Manager, Lacey Dobbins, RN Charge Nurse, Kya Tubbs, RN, Susana Geiger, OT, Rehab, Ewa Jurado, and Haylee Lopez, Dietician, spoke to patient for care plan conference, weekly status update, and therapy progress update. Discharge date set for 5/23/24.

## 2024-05-24 ENCOUNTER — TELEPHONE (OUTPATIENT)
Dept: ORTHOPEDICS | Facility: CLINIC | Age: 89
End: 2024-05-24
Payer: MEDICARE

## 2024-05-24 NOTE — TELEPHONE ENCOUNTER
----- Message from Pedro Posey sent at 5/24/2024 10:02 AM CDT -----  Pt Requesting to Reschedule an Appointment     Pt is requesting to Reschedule an appointment that our scheduling dept cannot Reschedule.    Who called: pt's daughter-in-law  Initial appt date: 6/10/24  When pt wants appt:  6/11-6/14/24 starting at 11am or later  Reason: caller said she will have something going on the night before  Best call back #: [phone]  Additional notes:

## 2024-05-24 NOTE — TELEPHONE ENCOUNTER
Called and Informed patient daughter-in-law Yesenia of appointment on 6/11/24 @ 12:15 p. Patient daughter-in-law Yesenia states verbal understanding and has no further questions.

## 2024-05-28 ENCOUNTER — TELEPHONE (OUTPATIENT)
Dept: FAMILY MEDICINE | Facility: CLINIC | Age: 89
End: 2024-05-28
Payer: MEDICARE

## 2024-05-28 ENCOUNTER — OUTPATIENT CASE MANAGEMENT (OUTPATIENT)
Dept: ADMINISTRATIVE | Facility: OTHER | Age: 89
End: 2024-05-28
Payer: MEDICARE

## 2024-05-28 NOTE — TELEPHONE ENCOUNTER
Called and spoke with Kecia; She states pt has been having increased panic attacks and increased anxiety; She states pt daughter in law says she is seeking to increase pt medication (Lexapro); I advised her that I will let Dr. Lehman know and she can advise me with note on what she wants going forward with pt; Please advise

## 2024-05-28 NOTE — TELEPHONE ENCOUNTER
----- Message from Maddison Angeles sent at 5/28/2024  4:05 PM CDT -----  Type:  Patient Returning Call    Who Called:Kecia  Who Left Message for Patient:Judy  Does the patient know what this is regarding?:yes  Would the patient rather a call back or a response via [facility]? Call back  Best Call Back Number:[phone]

## 2024-05-28 NOTE — TELEPHONE ENCOUNTER
----- Message from Kecia Santana RN sent at 5/28/2024  2:18 PM CDT -----  Regarding: Outpatient Care Management  Hello,    I work with [facility] Outpatient [facility] Department. Ms. Mary Ellen Miller was referred to our OPCM program. During contact with Ms. Hyde's DIL, Ms. Yesenia Miller she declined enrollment into our program. DIL is requesting assistance with possibly increasing Ms. Caos anxiety medication believes she is having increased anxiety and panic attacks. JOSE is interested in patient speaking with therapist or counselor.      Would it be possible to refer patient to an [facility] provider. If you feel this is appropriate please contact Ms. Yesenia Miller at [phone] to further discuss.    Respectfully,    Kecia Santana RN  [facility]  [email or web address]  Tel:[phone]

## 2024-05-28 NOTE — TELEPHONE ENCOUNTER
Spoke with pt caregiver regarding appointment, informed pt caregiver of date and time. Pt verbally understood

## 2024-06-04 ENCOUNTER — CARE AT HOME (OUTPATIENT)
Dept: HOME HEALTH SERVICES | Facility: CLINIC | Age: 89
End: 2024-06-04
Payer: MEDICARE

## 2024-06-04 VITALS
OXYGEN SATURATION: 98 % | RESPIRATION RATE: 16 BRPM | SYSTOLIC BLOOD PRESSURE: 112 MMHG | DIASTOLIC BLOOD PRESSURE: 54 MMHG | TEMPERATURE: 97 F | HEART RATE: 68 BPM

## 2024-06-04 DIAGNOSIS — S72.142D CLOSED DISPLACED INTERTROCHANTERIC FRACTURE OF LEFT FEMUR WITH ROUTINE HEALING: ICD-10-CM

## 2024-06-04 PROCEDURE — 99348 HOME/RES VST EST LOW MDM 30: CPT | Mod: ,,, | Performed by: NURSE PRACTITIONER

## 2024-06-05 NOTE — PROGRESS NOTES
[facility]  Transitional Care Management (TCM) Home Visit    Encounter Provider: Georgia Servin   PCP: Dionne Jeter MD  Consult Requested By: Dr. Dionne Akbar*  Admit Date: 5/2/24   IP Discharge Date: 5/23/24  Hospital Length of Stay: 21 days  Days since discharge (from IP or SNF): 12 days   Hospital Diagnosis: Closed displaced intertrochanteric fracture of left femur with routine healing [S72.142D]     HISTORY OF PRESENT ILLNESS      Patient ID: Mary Ellen Miller is a 89 y.o. female was recently admitted to the hospital, this is their TCM encounter.    Hospital Course Synopsis:  Mary Ellen Miller is a 89 y.o. F with PMH of HTN, HLD, CHF, and afib on eliquis presenting to the ED with significant left hip pain after a fall. 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 Prior to the incident, she reports intermittent worsening of chronic lower extremity edema and SOB. Her PCP recently discontinued amlodipine in favor of lasix and continuing aldactone, and she has not missed any doses of her home medications.  XR pelvis with mildly displaced L intertrochanteric femur fracture with impaction, minimally displaced comminuted L superior pubic ramus fracture, and nondisplaced fracture of the L inferior pubic ramus. s/p left femur CMN on 4/28/24. Given diuresis and on 1L O2, not on oxygen at home.      Hospital Course  Patient admitted to [facility] with PT and OT to improve functional status and ability to perform ADLs. She sucessfully weaned off oxygen. Patient progressed well with PT and OT. Patient had no significant events during their stay at SNF. Home health was set up. DME was ordered if needed. Follow up appointment to be made by patient within one week. All prescriptions and discharge instructions were ordered to be given to the patient prior to discharge.        Today:  The patient was seen at home (assisted living facility) for hospital follow up. She is doing well. She continues PT and feels she is making progress. She has some soreness to her left hip when she first gets up but has improved. She states she hasn't been taking tylenol for it anymore as she doesn't need it. She is eating well. No complaints today. Getting ready to go play games with her living community this afternoon.       DECISION MAKING TODAY       Assessment & Plan:  1. Closed displaced intertrochanteric fracture of left femur with routine healing s/p IM nail on 4/28/2024  -     Ambulatory referral/consult to [facility]    --cont PT, doing well  --f/u with ortho as scheduled  --f/u with PCP          Medication List on Discharge:     Medication List            Accurate as of June 4, 2024  8:53 PM. If you have any questions, ask your nurse or doctor.                CONTINUE taking these medications      acetaminophen 500 MG tablet  Commonly known as: TYLENOL  Take 2 tablets (1,000 mg total) by mouth every 8 (eight) hours.     apixaban 2.5 mg Tab  Commonly known as: ELIQUIS  Take 1 tablet (2.5 mg total) by mouth 2 (two) times daily.     ascorbic acid (vitamin C) 500 MG tablet  Commonly known as: VITAMIN C  Take 1 tablet (500 mg total) by mouth 2 (two) times daily.     atenoloL 25 MG tablet  Commonly known as: TENORMIN  Take 1 tablet (25 mg total) by mouth once daily.     biotin 1 mg tablet  TAKE 1/2 TABLET BY MOUTH ONCE DAILY.     calcium carbonate 600 mg calcium (1,500 mg) Tab  Commonly known as: OS-ZOILA  Take 1 tablet (600 mg total) by mouth once daily.     EScitalopram oxalate 10 MG tablet  Commonly known as: LEXAPRO  Take 0.5 tablets (5 mg total) by mouth once daily.     furosemide 20 MG tablet  Commonly known as: LASIX  TAKE 1 TABLET BY MOUTH ONCE DAILY.     hydrALAZINE 25 MG tablet  Commonly known as: APRESOLINE  Take 1 tablet (25 mg total) by mouth every 8 (eight) hours.      I-BLAKE Tab  Generic drug: vit A,C & E-lutein-minerals 1,000-60-2 unit  TAKE 1 TABLET BY MOUTH ONCE DAILY.     LIDOcaine 5 %  Commonly known as: LIDODERM  Place 1 patch onto the skin once daily. Remove & Discard patch within 12 hours or as directed by MD     melatonin 3 mg tablet  Commonly known as: MELATIN  Take 3 tablets (9 mg total) by mouth nightly.     memantine 5 MG Tab  Commonly known as: NAMENDA  Take 1 tablet (5 mg total) by mouth 2 (two) times daily.     ondansetron 8 MG Tbdl  Commonly known as: ZOFRAN-ODT  Take 1 tablet (8 mg total) by mouth every 8 (eight) hours as needed (Nausea).     ONE-A-DAY WOMENS FORMULA ORAL  Take 1 tablet by mouth once daily.     spironolactone 50 MG tablet  Commonly known as: ALDACTONE  Take 1 tablet (50 mg total) by mouth once daily.     vitamin D 1000 units Tab  Commonly known as: VITAMIN D3  Take 2,000 mg by mouth once daily.     zinc sulfate 50 mg zinc (220 mg) capsule  Commonly known as: ZINCATE  Take 1 capsule (220 mg total) by mouth once daily.              Medication Reconciliation:  Were medications changed on discharge? No  Were medications in the home? Yes  Is the patient taking the medications as directed? Yes  Does the patient understand the medications and changes? Yes  Does updated med list accurately reflects meds patient is currently taking? Yes    ENVIRONMENT OF CARE      Family and/or Caregiver present at visit?  No  Name of Caregiver: no  History provided by: patient    Advance Care Planning   Advanced Care Planning Status:  Patient has had an ACP conversation  Living Will: No  Power of : No  [place]: No    Does Caregiver have HCPoA: No  Changes today: no  Is patient hospice appropriate: No  (If needed, use PPS <30 or FAST score >7)  Was referral to hospice placed: No       Impression upon entering the home:  Physical Dwelling: assisted living facility (name of facility: [facility])   Appearance of home environment: cleaniness: clean  Functional  Status: minimal assistance  Mobility: ambulatory with device  Nutritional access: adequate intake and access  Home Health: Yes,  Agency      DME/Supplies: rolling walker and wheelchair     Diagnostic tests reviewed/disposition: No diagnosic tests pending after this hospitalization.  Disease/illness education:  hip fracture  Establishment or re-establishment of referral orders for community resources: No other necessary community resources.   Discussion with other health care providers: No discussion with other health care providers necessary.   Does patient have a PCP at [facility]? Yes   Repatriation plan with PCP? follow-up with PCP within 30d   Does patient have an ostomy (ileostomy, colostomy, suprapubic catheter, nephrostomy tube, tracheostomy, PEG tube, pleurex catheter, cholecystostomy, etc)? No  Were BPAs reviewed? No    Social History     Socioeconomic History    Marital status:    Tobacco Use    Smoking status: Never     Passive exposure: Never    Smokeless tobacco: Never   Substance and Sexual Activity    Alcohol use: No    Drug use: No    Sexual activity: Not Currently   Social History Narrative     since 36 yo when   tragically. Single mother of 2 now adult son. Valentino Carter. Drives and very active. 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     Social Determinants of Health     Financial Resource Strain: Low Risk  (2024)    Overall Financial Resource Strain (CARDIA)     Difficulty of Paying Living Expenses: Not hard at all   Food Insecurity: No Food Insecurity (2024)    Hunger Vital Sign     Worried About Running Out of Food in the Last Year: Never true     Ran Out of Food in the Last Year: Never true   Transportation Needs: No Transportation Needs (2024)    PRAPARE - Transportation     Lack of Transportation (Medical): No     Lack of Transportation (Non-Medical): No   Physical Activity: Inactive (2024)    Exercise Vital Sign     Days of Exercise per Week: 0  days     Minutes of Exercise per Session: 0 min   Stress: No Stress Concern Present (4/29/2024)    [employer] [place] [employer] - Occupational Stress Questionnaire     Feeling of Stress : Not at all   Housing Stability: High Risk (4/29/2024)    Housing Stability Vital Sign     Unable to Pay for Housing in the Last Year: No     Homeless in the Last Year: No       OBJECTIVE:     Vital Signs:  There were no vitals filed for this visit.    Review of Systems   Constitutional:  Positive for activity change. Negative for chills and fever.   HENT:  Negative for congestion.    Eyes:  Negative for visual disturbance.   Respiratory:  Negative for shortness of breath.    Cardiovascular:  Negative for chest pain.   Gastrointestinal:  Negative for abdominal pain and nausea.   Genitourinary:  Negative for dysuria.   Musculoskeletal:  Positive for arthralgias and gait problem.   Skin:  Negative for wound.   Neurological:  Positive for weakness. Negative for headaches.   Psychiatric/Behavioral:  Negative for confusion.        Physical Exam:  Physical Exam  Vitals reviewed.   Constitutional:       General: She is not in acute distress.     Appearance: She is not ill-appearing.   HENT:      Head: Normocephalic and atraumatic.      Nose: Nose normal.      Mouth/Throat:      Mouth: Mucous membranes are moist.   Eyes:      Pupils: Pupils are equal, round, and reactive to light.   Cardiovascular:      Rate and Rhythm: Normal rate. Rhythm irregular.      Pulses: Normal pulses.      Heart sounds: Normal heart sounds.   Pulmonary:      Effort: Pulmonary effort is normal.      Breath sounds: Normal breath sounds.   Abdominal:      General: Bowel sounds are normal.      Palpations: Abdomen is soft.   Musculoskeletal:      Cervical back: Normal range of motion.      Right lower leg: Edema present.      Left lower leg: Edema present.   Skin:     General: Skin is warm and dry.   Neurological:      Mental Status: She is alert and  oriented to person, place, and time. Mental status is at baseline.      Motor: Weakness present.      Comments: Mild forgetfulness   Psychiatric:         Mood and Affect: Mood normal.         Behavior: Behavior normal.         Thought Content: Thought content normal.         INSTRUCTIONS FOR PATIENT:     Scheduled Follow-up, Appts Reviewed with Modifications if Needed: Yes  Future Appointments   Date Time Provider Department Center   6/11/2024 12:15 PM Any Cantu PA-C [facility]   6/12/2024 12:00 PM Dionne Jeter MD [facility]   6/13/2024 11:00 AM Amira Armijo NP [facility]   6/25/2024  3:00 PM Dionne Jeter MD [facility]       Signature: Georgia Servin NP    Transition of Care Visit:  I have reviewed and updated the history and problem list.  I have reconciled the medication list.  I have discussed the hospitalization and current medical issues, prognosis and plans with the patient/family.

## 2024-06-11 ENCOUNTER — TELEPHONE (OUTPATIENT)
Dept: HEMATOLOGY/ONCOLOGY | Facility: CLINIC | Age: 89
End: 2024-06-11
Payer: MEDICARE

## 2024-06-11 ENCOUNTER — HOSPITAL ENCOUNTER (OUTPATIENT)
Dept: RADIOLOGY | Facility: HOSPITAL | Age: 89
Discharge: HOME | End: 2024-06-11
Attending: PHYSICIAN ASSISTANT
Payer: MEDICARE

## 2024-06-11 ENCOUNTER — OFFICE VISIT (OUTPATIENT)
Dept: ORTHOPEDICS | Facility: CLINIC | Age: 89
End: 2024-06-11
Payer: MEDICARE

## 2024-06-11 DIAGNOSIS — S72.142A CLOSED DISPLACED INTERTROCHANTERIC FRACTURE OF LEFT FEMUR, INITIAL ENCOUNTER: ICD-10-CM

## 2024-06-11 DIAGNOSIS — S72.142D CLOSED DISPLACED INTERTROCHANTERIC FRACTURE OF LEFT FEMUR WITH ROUTINE HEALING: Primary | ICD-10-CM

## 2024-06-11 PROCEDURE — 99212 OFFICE O/P EST SF 10 MIN: CPT | Mod: PBBFAC,25 | Performed by: PHYSICIAN ASSISTANT

## 2024-06-11 PROCEDURE — 99024 POSTOP FOLLOW-UP VISIT: CPT | Mod: POP,,, | Performed by: PHYSICIAN ASSISTANT

## 2024-06-11 PROCEDURE — 73552 X-RAY EXAM OF FEMUR 2/>: CPT | Mod: TC,LT

## 2024-06-11 PROCEDURE — 73552 X-RAY EXAM OF FEMUR 2/>: CPT | Mod: 26,LT,, | Performed by: RADIOLOGY

## 2024-06-11 PROCEDURE — 99999 PR PBB SHADOW E&M-EST. PATIENT-LVL II: CPT | Mod: PBBFAC,,, | Performed by: PHYSICIAN ASSISTANT

## 2024-06-11 NOTE — PROGRESS NOTES
Principal Orthopedic Problem: Left intertrochanteric hip fracture, closed, displaced, initial encounter  Fall from standing     04/28/24: : Open reduction internal fixation Left intertrochanteric hip fracture with intramedullary nail       Ms. Miller is here today for a post-operative visit    Interval History:  she reports that she is doing well.   she is at home. she is  participating in PT/OT. Using walker  Pain is controlled.  she is  taking pain medication.  Tylenol as needed. Reports intermittent achy pain from hip to mid shin. No back pain.   she denies fever, chills, and sweats .     Physical exam:    Patient arrives to exam room: walked in with walker.  Patient is  accompanied    Incision is clean, dry and intact.  Healing well no signs of breakdown or infection. Full range of motion knee and ankle.     RADS: All pertinent images were reviewed by myself:   Postoperative changes of prior internal fixation of the intertrochanteric left femoral fracture initially documented on 04/27/2024 are again noted. No unusual postoperative findings or significant detrimental change since the immediate postoperative examination of 04/28/2024 appreciated. Additional recent fracture of the inferior pubic ramus can be seen on this examination     Assessment:  Post-op visit ( 6 weeks)    Plan:  Current care, treatment plan, precautions, activity level/ modifications, limitations, rehabilitation exercises and proposed future treatment were discussed with the patient. We discussed the need to monitor for changes in symptoms and condition and report them to the physician.  Discussed importance of compliance with all appointments and follow up examinations.     WOUND CARE :  - The patient was advised to keep the incision clean and dry for the next 24 hours after which she may wash the area with antibacterial soap in the shower. Will not submerge until the incision is completely healed  -Patient was advised to  monitor wound closely and multiple times daily for any problems. Call clinic immediately or report to ED for immediate medical attention for any complications including reopening of wound, drainage, purulence, redness, streaking, odor, pain out of proportion, fever, chills, etc.       ACTIVITY:   - as tolerated  -range of motion as tolerated    - WBAT      -PT/OT, home health , Patient is responsible to establish and continue care      PAIN MEDICATION:   - Multimodal pain control  - Pain medication: refill was not needed  - Pain medication refill policy provided to patient for review, yes.    - Patient was informed a multi-modal approach is used to treat their pain. With the goal to get off of narcotic pain medication and discontinue as soon as possible.   - ice and elevation to reduce pain and swelling       FOLLOW UP:   - Patient will follow up in the clinic in 6 weeks, sooner if any concerns.  - X-ray of her femur is needed.        If there are any questions prior to scheduled follow up, the patient was instructed to contact the office

## 2024-06-12 ENCOUNTER — OFFICE VISIT (OUTPATIENT)
Dept: FAMILY MEDICINE | Facility: CLINIC | Age: 89
End: 2024-06-12
Payer: MEDICARE

## 2024-06-12 VITALS
SYSTOLIC BLOOD PRESSURE: 115 MMHG | DIASTOLIC BLOOD PRESSURE: 60 MMHG | HEART RATE: 97 BPM | BODY MASS INDEX: 21.09 KG/M2 | WEIGHT: 119 LBS | TEMPERATURE: 98 F | OXYGEN SATURATION: 96 % | HEIGHT: 63 IN

## 2024-06-12 DIAGNOSIS — Z79.01 LONG TERM (CURRENT) USE OF ANTICOAGULANTS: ICD-10-CM

## 2024-06-12 DIAGNOSIS — D53.8 OTHER SPECIFIED NUTRITIONAL ANEMIAS: ICD-10-CM

## 2024-06-12 DIAGNOSIS — G30.1 MILD LATE ONSET ALZHEIMER'S DEMENTIA WITHOUT BEHAVIORAL DISTURBANCE, PSYCHOTIC DISTURBANCE, MOOD DISTURBANCE, OR ANXIETY: ICD-10-CM

## 2024-06-12 DIAGNOSIS — F02.A0 MILD LATE ONSET ALZHEIMER'S DEMENTIA WITHOUT BEHAVIORAL DISTURBANCE, PSYCHOTIC DISTURBANCE, MOOD DISTURBANCE, OR ANXIETY: ICD-10-CM

## 2024-06-12 DIAGNOSIS — I50.32 CHRONIC DIASTOLIC HEART FAILURE: ICD-10-CM

## 2024-06-12 DIAGNOSIS — S72.142D CLOSED DISPLACED INTERTROCHANTERIC FRACTURE OF LEFT FEMUR WITH ROUTINE HEALING: ICD-10-CM

## 2024-06-12 DIAGNOSIS — N18.31 STAGE 3A CHRONIC KIDNEY DISEASE: ICD-10-CM

## 2024-06-12 DIAGNOSIS — F33.42 RECURRENT MAJOR DEPRESSIVE DISORDER, IN FULL REMISSION: ICD-10-CM

## 2024-06-12 PROCEDURE — 99999 PR PBB SHADOW E&M-EST. PATIENT-LVL IV: CPT | Mod: PBBFAC,,, | Performed by: STUDENT IN AN ORGANIZED HEALTH CARE EDUCATION/TRAINING PROGRAM

## 2024-06-12 PROCEDURE — 99215 OFFICE O/P EST HI 40 MIN: CPT | Mod: S$PBB,,, | Performed by: STUDENT IN AN ORGANIZED HEALTH CARE EDUCATION/TRAINING PROGRAM

## 2024-06-12 PROCEDURE — 99214 OFFICE O/P EST MOD 30 MIN: CPT | Mod: PBBFAC,PN | Performed by: STUDENT IN AN ORGANIZED HEALTH CARE EDUCATION/TRAINING PROGRAM

## 2024-06-12 RX ORDER — ESCITALOPRAM OXALATE 10 MG/1
10 TABLET ORAL DAILY
Qty: 90 TABLET | Refills: 3 | Status: SHIPPED | OUTPATIENT
Start: 2024-06-12

## 2024-06-12 NOTE — PROGRESS NOTES
[facility] Note    Chief Complaint      Chief Complaint   Patient presents with    Hospital follow up visit  F/u on chronic conditions     History of Present Illness      Mary Ellen Miller is a 89 y.o. female with sHTN, chronic Afib on DOAC, HFpEF, KLARISSA, OA and osteopenia, resident of [facility] presents for hospital follow up visit (05/2/2024-05/23/2024). She was recently managed for CHF exacerbation after found on the floor , found to sustain mildly displaced Left intertrochanteric femur # and comminuted Left Superior pubic ramus fracture /fracture of the Left inferior pubic ramus s/p surgical intervention( IM nail on 04/28/204) , now on PT and walker to aid mobility . She is healing as expected , hospital course was complicated by delirium but she is getting better since discharge from the hospital to the NH. She was accompanied in office today by her daughter in Law. She is concerned she may have anxiety with panic attacks every now and then. Her finances managed by her son now, does not cook or drive around anymore ), but she grooms herself, eats unassisted, toilet unassisted also. She enjoys yard work and gardening. She denies any fever, chills, cough, CP, orthopnea, PND.  She described her appetite, BM and mood to be great.    Problem List Addressed This Visit:    As listed below    Health Maintenance   Topic Date Due    TETANUS VACCINE  07/25/2026    Lipid Panel  12/18/2028    Shingles Vaccine  Completed       Past Medical History:   Diagnosis Date    Anxiety     Atrial fibrillation     Colon polyps 05/01/2012    Hypertension     Leg ulcer, right, limited to breakdown of skin 11/20/2019    Unilateral femoral hernia with obstruction, without gangrene 10/26/2015       Past Surgical History:   Procedure Laterality Date    HYSTERECTOMY      INTRAMEDULLARY RODDING OF FEMUR Left 4/28/2024    Procedure: INSERTION, INTRAMEDULLARY LENO, FEMUR: Left;  Surgeon: Kurt Melendez MD;   Location: [facility] OR [address] [place] [address];  Service: Orthopedics;  Laterality: Left;    LUNG SURGERY  1988    OOPHORECTOMY      RADICAL HYSTERECTOMY  1990       family history includes No Known Problems in her father and mother.    Social History     Tobacco Use    Smoking status: Never     Passive exposure: Never    Smokeless tobacco: Never   Substance Use Topics    Alcohol use: No    Drug use: No       Review of Systems   Constitutional:  Negative for fatigue and fever.   Respiratory:  Negative for cough and chest tightness.    Gastrointestinal:  Negative for abdominal pain, diarrhea and vomiting.   Endocrine: Negative for polydipsia and polyphagia.   Genitourinary:  Negative for difficulty urinating, dysuria and frequency.   Musculoskeletal:  Positive for arthralgias and back pain. Negative for gait problem and joint swelling.   Skin:  Negative for rash.   Neurological:  Negative for seizures, weakness, numbness and headaches.   Psychiatric/Behavioral:  Negative for sleep disturbance. The patient is nervous/anxious.        Outpatient Encounter Medications as of 6/12/2024   Medication Sig Dispense Refill    acetaminophen (TYLENOL) 500 MG tablet Take 2 tablets (1,000 mg total) by mouth every 8 (eight) hours.      apixaban (ELIQUIS) 2.5 mg Tab Take 1 tablet (2.5 mg total) by mouth 2 (two) times daily. 60 tablet 2    ascorbic acid, vitamin C, (VITAMIN C) 500 MG tablet Take 1 tablet (500 mg total) by mouth 2 (two) times daily. 60 tablet 2    atenoloL (TENORMIN) 25 MG tablet Take 1 tablet (25 mg total) by mouth once daily.      biotin 1 mg tablet TAKE 1/2 TABLET BY MOUTH ONCE DAILY. 90 tablet 3    calcium carbonate (OS-ZOILA) 600 mg calcium (1,500 mg) Tab Take 1 tablet (600 mg total) by mouth once daily. 90 tablet 3    furosemide (LASIX) 20 MG tablet TAKE 1 TABLET BY MOUTH ONCE DAILY. 30 tablet 3    hydrALAZINE (APRESOLINE) 25 MG tablet Take 1 tablet (25 mg total) by mouth every 8 (eight) hours. 90 tablet 3    LIDOcaine (LIDODERM) 5 %  "Place 1 patch onto the skin once daily. Remove & Discard patch within 12 hours or as directed by MD Cruz patch 0    melatonin (MELATIN) 3 mg tablet Take 3 tablets (9 mg total) by mouth nightly. 30 tablet 0    memantine (NAMENDA) 5 MG Tab Take 1 tablet (5 mg total) by mouth 2 (two) times daily. 60 tablet 11    multivit,calc,mins/iron/folic (ONE-A-DAY WOMENS FORMULA ORAL) Take 1 tablet by mouth once daily.      ondansetron (ZOFRAN-ODT) 8 MG TbDL Take 1 tablet (8 mg total) by mouth every 8 (eight) hours as needed (Nausea).      spironolactone (ALDACTONE) 50 MG tablet Take 1 tablet (50 mg total) by mouth once daily. 30 tablet 2    vit A,C & E-lutein-minerals 1,000-60-2 unit (I-BLAKE) Tab TAKE 1 TABLET BY MOUTH ONCE DAILY. 90 tablet 3    vitamin D 1000 units Tab Take 2,000 mg by mouth once daily.      zinc sulfate (ZINCATE) 50 mg zinc (220 mg) capsule Take 1 capsule (220 mg total) by mouth once daily. 30 capsule 2    [DISCONTINUED] EScitalopram oxalate (LEXAPRO) 10 MG tablet Take 0.5 tablets (5 mg total) by mouth once daily. 90 tablet 3    EScitalopram oxalate (LEXAPRO) 10 MG tablet Take 1 tablet (10 mg total) by mouth once daily. 90 tablet 3    [] methocarbamoL (ROBAXIN) 500 MG Tab Take 1 tablet (500 mg total) by mouth 3 (three) times daily as needed (muscle spasms). 30 tablet 0     No facility-administered encounter medications on file as of 2024.        Review of patient's allergies indicates:   Allergen Reactions    Percodan [oxycodone hcl-oxycodone-asa] Other (See Comments)     halucinations    Penicillins Itching    Sulfa (sulfonamide antibiotics) Diarrhea and Nausea Only    Amoxicillin        Physical Exam      Vital Signs  Temp: 98.1 °F (36.7 °C)  Pulse: 97  SpO2: 96 %  BP: 115/60  BP Location: Right arm  Patient Position: Sitting  Pain Score:   4  Pain Loc: Hip (left hip)  Height and Weight  Height: 5' 3" (160 cm)  Weight: 54 kg (119 lb)  BSA (Calculated - sq m): 1.55 sq meters  BMI (Calculated): " 21.1  Weight in (lb) to have BMI = 25: 140.8]    Physical Exam  Vitals reviewed.   Constitutional:       Appearance: Normal appearance.   HENT:      Head: Normocephalic and atraumatic.      Right Ear: Tympanic membrane normal.      Left Ear: Tympanic membrane normal.      Mouth/Throat:      Mouth: Mucous membranes are moist.      Pharynx: Oropharynx is clear.   Eyes:      Extraocular Movements: Extraocular movements intact.      Conjunctiva/sclera: Conjunctivae normal.      Pupils: Pupils are equal, round, and reactive to light.   Cardiovascular:      Rate and Rhythm: Normal rate and regular rhythm.      Pulses: Normal pulses.      Heart sounds: Normal heart sounds.   Pulmonary:      Effort: Pulmonary effort is normal.      Breath sounds: Normal breath sounds.   Abdominal:      General: Abdomen is flat. Bowel sounds are normal.      Palpations: Abdomen is soft.   Musculoskeletal:         General: Deformity: Varicose veins bilaterally.      Cervical back: Normal range of motion.      Right lower leg: Edema (Trace) present.      Left lower leg: Edema (trace) present.   Skin:     General: Skin is warm and dry.   Neurological:      General: No focal deficit present.      Mental Status: She is alert and oriented to person, place, and time.      Sensory: No sensory deficit.      Gait: Gait abnormal (antalgic gait).   Psychiatric:         Mood and Affect: Mood normal.         Behavior: Behavior normal.          Laboratory:  CBC:  Recent Labs   Lab Result Units 05/16/24  0449 05/20/24  0544 05/22/24  0441   WBC K/uL 5.61 5.39 5.40   RBC M/uL 2.87* 3.20* 3.30*   Hemoglobin g/dL 9.0* 10.0* 10.4*   Hematocrit % 28.5* 32.0* 32.4*   Platelets K/uL 289 289 263   MCV fL 99* 100* 98   MCH pg 31.4* 31.3* 31.5*   MCHC g/dL 31.6* 31.3* 32.1     CMP:  Recent Labs   Lab Result Units 05/16/24  0449 05/20/24  0543 05/22/24  0441   Glucose mg/dL 79 85 80   Calcium mg/dL 8.2* 8.7 8.7   Albumin g/dL 2.9* 3.1* 3.1*   Total Protein g/dL 5.8*  "6.3 6.4   Sodium mmol/L 140 138 139   Potassium mmol/L 4.3 3.7 4.1   CO2 mmol/L 20* 21* 21*   Chloride mmol/L 111* 107 110   BUN mg/dL 22 15 17   Alkaline Phosphatase U/L 130 171* 170*   ALT U/L 11 10 12   AST U/L 13 14 18   Total Bilirubin mg/dL 1.0 0.9 0.9     URINALYSIS:  Recent Labs   Lab Result Units 04/27/24  0557 05/01/24  0922   Color, UA  Yellow Yellow   Specific Gravity, UA  1.015 1.020   pH, UA  7.0 6.0   Protein, UA  Negative Trace*   Bacteria /hpf Rare  --    Nitrite, UA  Negative Negative   Leukocytes, UA  Negative Negative      LIPIDS:  No results for input(s): "TSH", "HDL", "CHOL", "TRIG", "LDLCALC", "CHOLHDL", "NONHDLCHOL", "TOTALCHOLEST" in the last 2160 hours.  TSH:  No results for input(s): "TSH" in the last 2160 hours.  A1C:  Recent Labs   Lab Result Units 04/27/24  0520   Hemoglobin A1C % 5.3       Radiology:      Assessment/Plan     Mary Ellen Vicente Miller is a 89 y.o.female with:    1.  Closed displaced intertrochanteric fracture of left femur with routine healing s/p IM nail on 4/28/2024  -healing as expected  --continue with PT/OT  -f/u with ortho as scheduled, last being  (06/04/2024)       2. 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 Essential hypertension  Overview:  - now @ goal  -followed by cardiology ( Dr Last)    4.  Chronic atrial fibrillation  Overview:  - followed by Dr. Last  - OAC Eliquis without signs of bleeding  - rate controlled    4. Chronic diastolic heart failure  Overview:  - no signs of acute decompensation  - followed by cardiology    5. Long term (current) use of anticoagulants  Overview:  - followed by cardiology  - no signs of bleeding    6. H/o MDD  -well controlled on current regimen    7. H/o aortic atherosclerosis  -c/w ASA  -c/t monitor    9. MDD/KLARISSA  -c/w  lexapro 10mg daily    -Continue current " medications and maintain follow up with specialists.      Patient verbalizes understanding and agrees with current treatment plan.      Dr Dionne Jeter MD  [facility] [place] [place]

## 2024-06-13 ENCOUNTER — OFFICE VISIT (OUTPATIENT)
Dept: HEMATOLOGY/ONCOLOGY | Facility: CLINIC | Age: 89
End: 2024-06-13
Payer: MEDICARE

## 2024-06-13 VITALS
RESPIRATION RATE: 18 BRPM | OXYGEN SATURATION: 88 % | HEIGHT: 62 IN | SYSTOLIC BLOOD PRESSURE: 114 MMHG | TEMPERATURE: 98 F | WEIGHT: 119.63 LBS | BODY MASS INDEX: 22.01 KG/M2 | DIASTOLIC BLOOD PRESSURE: 64 MMHG | HEART RATE: 99 BPM

## 2024-06-13 DIAGNOSIS — I48.20 CHRONIC ATRIAL FIBRILLATION: ICD-10-CM

## 2024-06-13 DIAGNOSIS — I50.9 CHRONIC CONGESTIVE HEART FAILURE, UNSPECIFIED HEART FAILURE TYPE: ICD-10-CM

## 2024-06-13 DIAGNOSIS — D53.9 MACROCYTIC ANEMIA: ICD-10-CM

## 2024-06-13 DIAGNOSIS — D62 ACUTE BLOOD LOSS ANEMIA: Primary | ICD-10-CM

## 2024-06-13 DIAGNOSIS — D64.9 ANEMIA, UNSPECIFIED TYPE: ICD-10-CM

## 2024-06-13 DIAGNOSIS — D59.39 OTHER HEMOLYTIC-UREMIC SYNDROME: ICD-10-CM

## 2024-06-13 DIAGNOSIS — Z79.01 LONG TERM (CURRENT) USE OF ANTICOAGULANTS: ICD-10-CM

## 2024-06-13 PROCEDURE — 99999 PR PBB SHADOW E&M-EST. PATIENT-LVL V: CPT | Mod: PBBFAC,,, | Performed by: NURSE PRACTITIONER

## 2024-06-13 PROCEDURE — 99215 OFFICE O/P EST HI 40 MIN: CPT | Mod: PBBFAC,PN | Performed by: NURSE PRACTITIONER

## 2024-06-13 PROCEDURE — 99205 OFFICE O/P NEW HI 60 MIN: CPT | Mod: S$PBB,,, | Performed by: NURSE PRACTITIONER

## 2024-06-13 RX ORDER — IBUPROFEN 200 MG/1
TABLET ORAL
COMMUNITY
Start: 2024-05-31

## 2024-06-13 NOTE — PROGRESS NOTES
Answers submitted by the patient for this visit:  Review of Systems Questionnaire (Submitted on 6/10/2024)  appetite change : Yes  unexpected weight change: Yes  mouth sores: No  visual disturbance: No  cough: No  shortness of breath: No  chest pain: No  abdominal pain: Yes  diarrhea: Yes  frequency: Yes  back pain: Yes  rash: No  headaches: No  adenopathy: No  nervous/ anxious: Yes    Patient ID: Mary Ellen Miller is a 89 y.o. female.    Chief Complaint: Anemia        History     HPI    Mary Ellen Miller is a 89yr old female, new to [facility] and this provider, referred for anemia.    Pt with normocytic anemia starting recently, 4/28/24 , and macrocytic evidence 5/9/24. No anemia 2016-4/27/24. Pt presented 4/27/24 to ED s/p fall with left hip pain, shortening and rotation and 4/28/24 surgery for intramedullary edyta left femur placement. 4/28/24 ED labs h/h 12.8/39.8, 4/29/24 post edyta placement, h/h 8.3/26.5, normocytic. Retics minimal elevation, negative GUERDA, haptoglobin, LDH.  She had an acute drop in gfr 4/27-4/30/24 while given high doses of lasix for chf prior surgery and it has since recovered to the 59.2 mL/min/1.73m2, some days >60 noted as well. Hgb recovered to 10.4 g/dL on 5/22/24.     Pt s/p surgery completed rehab and has returned to assisted living and is getting therapy on daily basis at present. Today states tired after dr visits 3 days in a row but feels overall she has been recovering well from surgery. Daughter in law with pt and states pt has been increasing activity at assisted living since return and doing well. Appetite has improved again, initially not eating in hospital post surgery. Pt states she has some pain in LLE but it has steadily improved. Today she is walking in clinic with walker. She does endorse some sob with activity, states it has improved. Denies HA, dizziness, cp, abdominal pain, n/v/d, hematemesis, melena, hemorrhoidal issues, hematuria. She has had some stomach pain with  constipation, that is now controlled with colace. She does have some early dementia, on namenda. She is [place], inconsistent in wearing hearing aids.    Comorbid diagnoses include Htn, HLD, CHF, afib on eliquis, osteoporosis, vit d deficiency. No prior history of anemia.     She has not followed with cardiology post surgery, next appt 9/2024, advised pt given chf issues prior surgery she should see cardiologist for sooner follow up and she will call today for soonest available.     Sister with anemia and receiving iron infusions. No known family history of significant family cancers, blood conditions.     Past medical, surgical, and medication history, past family history reviewed and uptdated with patient today as noted.        Past Medical History:   Diagnosis Date    Anxiety     Atrial fibrillation     Colon polyps 05/01/2012    Hypertension     Leg ulcer, right, limited to breakdown of skin 11/20/2019    Unilateral femoral hernia with obstruction, without gangrene 10/26/2015       Past Surgical History:   Procedure Laterality Date    HYSTERECTOMY      INTRAMEDULLARY RODDING OF FEMUR Left 4/28/2024    Procedure: INSERTION, INTRAMEDULLARY LENO, FEMUR: Left;  Surgeon: Kurt Melendez MD;  Location: [facility] OR [address] [place] [address];  Service: Orthopedics;  Laterality: Left;    LUNG SURGERY  1988    OOPHORECTOMY      RADICAL HYSTERECTOMY  1990       Oncology History:  Oncology History    No history exists.        Review of Systems:  Review of Systems   Constitutional:  Positive for appetite change and unexpected weight change (weight change in hospital, appetite has returned to normal). Negative for activity change, chills, fatigue and fever.   HENT:  Negative for mouth sores.    Eyes:  Negative for photophobia and visual disturbance.   Respiratory:  Negative for cough and shortness of breath.    Cardiovascular:  Negative for chest pain and leg swelling.   Gastrointestinal:  Positive for abdominal pain (denies at present,  "constipation resolved with colace). Negative for diarrhea (noted on previsit questions as yes, denies in visit), nausea and vomiting.   Genitourinary:  Positive for frequency. Negative for dysuria and hematuria.   Musculoskeletal:  Positive for back pain. Negative for gait problem, joint swelling and myalgias.   Integumentary:  Negative for pallor and rash.   Neurological:  Negative for dizziness, syncope, weakness and headaches.   Hematological:  Negative for adenopathy.   Psychiatric/Behavioral:  Negative for sleep disturbance. The patient is nervous/anxious.           Physical Exam   Vitals:  /64 (BP Location: Left arm, Patient Position: Sitting, BP Method: Medium (Automatic))   Pulse 99   Temp 98.1 °F (36.7 °C) (Oral)   Resp 18   Ht 5' 2" (1.575 m)   Wt 54.2 kg (119 lb 9.6 oz)   SpO2 (!) 88%   BMI 21.88 kg/m²     Labs:  Lab Results   Component Value Date    WBC 5.40 05/22/2024    HGB 10.4 (L) 05/22/2024    HCT 32.4 (L) 05/22/2024    MCV 98 05/22/2024     05/22/2024       Lab Results   Component Value Date    TRANSFERRIN 239 04/27/2024      No results found for: "FERRITIN"  No results found for: "FOLATE"  No results found for: "EUDYRZFQ91"      5/8/24 path review  Signed on 05/08/24 at 08:12   Pathologist interpretation of peripheral blood smear:   Normochromic normocytic red cells show mild anisocytosis with a   subset of small spherocytes seen.  Ruling out hemolysis would be   prudent.    White cells show a rare 5 lobed neutrophil, suggestive of early   hypersegmentation.    Platelets are morphologically unremarkable on scanning.       Physical Exam:  Physical Exam  Vitals and nursing note reviewed.   Constitutional:       General: She is not in acute distress.     Appearance: She is not toxic-appearing.   HENT:      Head: Normocephalic and atraumatic.      Right Ear: External ear normal.      Left Ear: External ear normal.      Mouth/Throat:      Mouth: Mucous membranes are moist.      " Pharynx: Oropharynx is clear.   Eyes:      General: No scleral icterus.     Conjunctiva/sclera: Conjunctivae normal.   Cardiovascular:      Rate and Rhythm: Normal rate. Rhythm irregular.      Pulses: Normal pulses.      Heart sounds: Normal heart sounds. No murmur heard.     Comments: Minimal right ankle to foot swelling, nonpitting, minimal left ankle swelling, nonpitting  Pulmonary:      Effort: Pulmonary effort is normal. No respiratory distress.      Breath sounds: Normal breath sounds.   Abdominal:      General: Bowel sounds are normal. There is no distension.      Palpations: Abdomen is soft.      Tenderness: There is no abdominal tenderness. There is no guarding.   Musculoskeletal:         General: No swelling or tenderness.      Cervical back: Normal range of motion and neck supple. No rigidity.   Lymphadenopathy:      Cervical: No cervical adenopathy.   Skin:     General: Skin is warm and dry.      Capillary Refill: Capillary refill takes less than 2 seconds.      Coloration: Skin is not jaundiced or pale.      Findings: No bruising or erythema.   Neurological:      Mental Status: She is alert and oriented to person, place, and time.      Gait: Gait normal.      Comments: Standard walker   Psychiatric:         Mood and Affect: Mood normal.         Behavior: Behavior normal.          ECOG:   ECOG SCORE    2 - Capable of all selfcare but unable to carry out any work activities, active > 50% of hours            PROCEDURES/IMAGING      Discussion     Problem List:  Problem List Items Addressed This Visit          Cardiac/Vascular    Chronic atrial fibrillation    Overview     - followed by Dr. Shala Ortiz without signs of bleeding  - rate controlled            Hematology    Long term (current) use of anticoagulants    Overview     - followed by cardiology  - no signs of bleeding            Oncology    Acute blood loss anemia - Primary     Other Visit Diagnoses       Other hemolytic-uremic syndrome         Anemia, unspecified type        Relevant Orders    CBC Auto Differential    Iron and TIBC    Ferritin    Folate    Vitamin B12    CMP    Lactate Dehydrogenase    Reticulocytes    HAPTOGLOBIN    Macrocytic anemia        Relevant Orders    CBC Auto Differential    Iron and TIBC    Ferritin    Folate    Vitamin B12    CMP    Chronic congestive heart failure, unspecified heart failure type               Anemia unspecified, acute blood loss anemia, hemolytic anemia r/o, macrocytic anemia  - no anemia 2016-4/27/24.   - normocytic anemia starting recently, 4/28/24 , and macrocytic evidence 5/9/24.  - 4/27/24 to ED s/p fall with left hip pain, 4/28/24 surgery for intramedullary edyta left femur placement.   - 4/28/24 ED labs h/h 12.8/39.8, 4/29/24 post edyta placement, h/h 8.3/26.5, normocytic. Retics minimal elevation, negative GUERDA, haptoglobin, LDH.    - She had an acute drop in gfr 4/27-4/30/24 while given high doses of lasix for chf prior surgery and it has since recovered to the 59.2 mL/min/1.73m2, some days >60 noted as well.  - Hgb improved to 10.4 g/dL on 5/22/24, no blood t/f indicated during hospitalization  - will update labs today, cbc, iron studies, b12, folate, inflammatory markers, call with results. Will order iv iron or oral iron if indicated, b12 or folic acid if indicated.   - educated on healthy dietary habits  - rtc 3 months with labs    CHF, afib, chronic afib  - on eliquis 2.5mg bid, reduced in hospital  - denies gi blood loss, tolerates eliquis well  - no evidence fluid overload today  - daughter in law will call to make appt with Dr Last at [facility] cardiology locally for follow up    Advance Care Planning     Date: 06/13/2024    Power of   I initiated the process of voluntary advance care planning today and explained the importance of this process to the patient.  I introduced the concept of advance directives to the patient, as well. Then the patient received detailed information about the  importance of designating a Health Care Power of  (HCPOA). She was also instructed to communicate with this person about their wishes for future healthcare, should she become sick and lose decision-making capacity. The patient has previously appointed a HCPOA. After our discussion, the patient wanted to complete form today noting daughter in law who has POA on form, but will also bring POA forms for upload to next [facility] visit. Pt noted Yesenia Miller as Power of  at [phone].         A total of 70 min was spent on advance care planning, goals of care discussion, emotional support, formulating and communicating prognosis and exploring burden/benefit of various approaches of treatment. This discussion occurred on a fully voluntary basis with the verbal consent of the patient and/or family.           Amira Armijo, NP-C  [facility]  Hematology/Oncology  [address] [place] [address] [address]  [id number]  [phone]

## 2024-06-14 ENCOUNTER — TELEPHONE (OUTPATIENT)
Dept: HEMATOLOGY/ONCOLOGY | Facility: CLINIC | Age: 89
End: 2024-06-14
Payer: MEDICARE

## 2024-06-14 DIAGNOSIS — E53.8 B12 DEFICIENCY: Primary | ICD-10-CM

## 2024-06-14 DIAGNOSIS — D50.8 IRON DEFICIENCY ANEMIA SECONDARY TO INADEQUATE DIETARY IRON INTAKE: ICD-10-CM

## 2024-06-14 RX ORDER — ACETAMINOPHEN AND PHENYLEPHRINE HCL 325; 5 MG/1; MG/1
500 TABLET ORAL DAILY
Qty: 90 TABLET | Refills: 3 | Status: SHIPPED | OUTPATIENT
Start: 2024-06-14 | End: 2025-06-14

## 2024-06-14 RX ORDER — FERROUS SULFATE 325(65) MG
325 TABLET ORAL EVERY OTHER DAY
Qty: 45 TABLET | Refills: 3 | Status: SHIPPED | OUTPATIENT
Start: 2024-06-14 | End: 2025-06-14

## 2024-06-14 NOTE — TELEPHONE ENCOUNTER
Lab Results   Component Value Date    WBC 6.80 06/13/2024    HGB 11.2 (L) 06/13/2024    HCT 35.8 (L) 06/13/2024    MCV 96 06/13/2024     06/13/2024       Lab Results   Component Value Date    IRON 54 06/13/2024    TRANSFERRIN 254 06/13/2024    TIBC 376 06/13/2024    FESATURATED 14 (L) 06/13/2024      Lab Results   Component Value Date    FERRITIN 392 (H) 06/13/2024     Lab Results   Component Value Date    FOLATE 18.4 06/13/2024     Lab Results   Component Value Date    MMJMPVEC03 245 06/13/2024     Johnson Morillo: send scripts. Spoke with daughter in law Yesenia re oral iron every other day and b12 for iron deficiency and b12 deficiency. Anemia has improved with hemoglobin improved to 11.2 g/dL, normocytic.

## 2024-06-24 ENCOUNTER — TELEPHONE (OUTPATIENT)
Dept: PULMONOLOGY | Facility: CLINIC | Age: 89
End: 2024-06-24
Payer: MEDICARE

## 2024-06-24 ENCOUNTER — OUTPATIENT CASE MANAGEMENT (OUTPATIENT)
Dept: ADMINISTRATIVE | Facility: OTHER | Age: 89
End: 2024-06-24
Payer: MEDICARE

## 2024-06-24 NOTE — TELEPHONE ENCOUNTER
----- Message from Candy Parks sent at 6/24/2024 10:26 AM CDT -----  Regarding: Call back  Contact: [phone]  Type:  Sooner Apoointment Request    Caller is requesting a sooner appointment.  Caller declined first available appointment listed below.  Caller will not accept being placed on the waitlist and is requesting a message be sent to doctor.  Name of Caller: New PT   When is the first available appointment?   Symptoms: shortness of breath lung nodule   Would the patient rather a call back or a response via [facility]? Call back   Best Call Back Number: [phone]  Additional Information:

## 2024-06-24 NOTE — TELEPHONE ENCOUNTER
1 Call was returned to patient daughter (Ms Patterson) in regards to scheduling an appointment. Patient is scheduled with the first available appointment on 7/8/24 at 8 AM with Dr Agudelo. Patient is added to the waiting list. Appointment mailed. She confirmed and verbalized understanding.   Principal Discharge DX:	Hip pain

## 2024-07-02 ENCOUNTER — TELEPHONE (OUTPATIENT)
Dept: FAMILY MEDICINE | Facility: CLINIC | Age: 89
End: 2024-07-02
Payer: MEDICARE

## 2024-07-02 DIAGNOSIS — R06.09 DOE (DYSPNEA ON EXERTION): Primary | ICD-10-CM

## 2024-07-02 NOTE — TELEPHONE ENCOUNTER
Called and spoke with Jyothi with [facility]; She states pt needs a refill on over the counter med, pravegen; She states she sent a fax with the request but states it was denied; She states she will fax again for refill on pt

## 2024-07-02 NOTE — TELEPHONE ENCOUNTER
----- Message from Bridgett Berry sent at 7/2/2024  3:32 PM CDT -----  Type:  Pharmacy Calling to Clarify an RX      Pharmacy Name:[facility]   Prescription Name:pravegen (not on list)  What do they need to clarify?:refill   Best Call Back Number:  Additional Information:

## 2024-07-08 ENCOUNTER — HOSPITAL ENCOUNTER (OUTPATIENT)
Dept: RADIOLOGY | Facility: HOSPITAL | Age: 89
Discharge: HOME | End: 2024-07-08
Attending: INTERNAL MEDICINE
Payer: MEDICARE

## 2024-07-08 ENCOUNTER — OFFICE VISIT (OUTPATIENT)
Dept: PULMONOLOGY | Facility: CLINIC | Age: 89
End: 2024-07-08
Payer: MEDICARE

## 2024-07-08 ENCOUNTER — HOSPITAL ENCOUNTER (OUTPATIENT)
Dept: PULMONOLOGY | Facility: CLINIC | Age: 89
Discharge: HOME | End: 2024-07-08
Payer: MEDICARE

## 2024-07-08 VITALS
HEIGHT: 59 IN | HEART RATE: 89 BPM | BODY MASS INDEX: 23.65 KG/M2 | DIASTOLIC BLOOD PRESSURE: 72 MMHG | OXYGEN SATURATION: 96 % | WEIGHT: 117.31 LBS | SYSTOLIC BLOOD PRESSURE: 126 MMHG

## 2024-07-08 DIAGNOSIS — R06.09 DOE (DYSPNEA ON EXERTION): ICD-10-CM

## 2024-07-08 DIAGNOSIS — R06.09 DOE (DYSPNEA ON EXERTION): Primary | ICD-10-CM

## 2024-07-08 DIAGNOSIS — R93.89 ABNORMAL CXR: ICD-10-CM

## 2024-07-08 LAB
FEF 25 75 LLN: 0.2
FEF 25 75 PRE REF: 115.5 %
FEF 25 75 REF: 1.6
FEV05 LLN: 0.27
FEV05 REF: 1.13
FEV1 FVC LLN: 61
FEV1 FVC PRE REF: 114.3 %
FEV1 FVC REF: 71
FEV1 LLN: 0.9
FEV1 PRE REF: 126.1 %
FEV1 REF: 1.36
FEV1FVCZSCORE: 1.7
FEV1ZSCORE: 1.33
FVC LLN: 1.2
FVC PRE REF: 116.6 %
FVC REF: 1.82
FVCZSCORE: 0.76
PEF LLN: 1.06
PEF PRE REF: 162.9 %
PEF REF: 2.51
PHYSICIAN COMMENT: ABNORMAL
PRE FEF 25 75: 1.84 L/S (ref 0.2–3)
PRE FET 100: 6.78 SEC
PRE FEV05 REF: 127.2 %
PRE FEV1 FVC: 80.95 % (ref 61.04–80.63)
PRE FEV1: 1.72 L (ref 0.9–1.8)
PRE FEV5: 1.43 L (ref 0.27–1.98)
PRE FVC: 2.13 L (ref 1.2–2.48)
PRE PEF: 4.09 L/S (ref 1.06–3.97)

## 2024-07-08 PROCEDURE — 94010 BREATHING CAPACITY TEST: CPT | Mod: PBBFAC | Performed by: INTERNAL MEDICINE

## 2024-07-08 PROCEDURE — 99204 OFFICE O/P NEW MOD 45 MIN: CPT | Mod: 25,S$PBB,, | Performed by: INTERNAL MEDICINE

## 2024-07-08 PROCEDURE — 94010 BREATHING CAPACITY TEST: CPT | Mod: 26,S$PBB,, | Performed by: INTERNAL MEDICINE

## 2024-07-08 PROCEDURE — 99214 OFFICE O/P EST MOD 30 MIN: CPT | Mod: PBBFAC,25 | Performed by: INTERNAL MEDICINE

## 2024-07-08 PROCEDURE — 99999 PR PBB SHADOW E&M-EST. PATIENT-LVL IV: CPT | Mod: PBBFAC,,, | Performed by: INTERNAL MEDICINE

## 2024-07-08 PROCEDURE — 71250 CT THORAX DX C-: CPT | Mod: TC

## 2024-07-08 PROCEDURE — 71250 CT THORAX DX C-: CPT | Mod: 26,,, | Performed by: STUDENT IN AN ORGANIZED HEALTH CARE EDUCATION/TRAINING PROGRAM

## 2024-07-08 RX ORDER — AMLODIPINE BESYLATE 10 MG/1
10 TABLET ORAL DAILY
COMMUNITY
Start: 2024-06-21

## 2024-07-08 NOTE — PROGRESS NOTES
"History & Physical  [facility]    SUBJECTIVE:     Chief Complaint:   Shortness of breath    History of Present Illness:  Mary Ellen Miller is a 89 y.o. female who presents for evaluation of shortness of breath.    The patient has been working with physical therapy over the past 6 weeks since she broke her hip. Working with physical therapy is when they have noticed the shortness of breath.    The patient states "I haven't noticed any shortness of breath. Everybody else says I am short of breath."    She is a lifetime nonsmoker.    She has no history of asthma. She has never been hospitalized for breathing trouble.    PMH: had a surgery on her lungs around 1986 possibly for a spot on her lungs    She has a history of afib & heart failure. Her hip surgery was delayed a little so she could be diuresed. She takes eliquis, lasix, & spironolactone.    Review of patient's allergies indicates:   Allergen Reactions    Percodan [oxycodone hcl-oxycodone-asa] Other (See Comments)     halucinations    Penicillins Itching    Sulfa (sulfonamide antibiotics) Diarrhea and Nausea Only    Amoxicillin        Past Medical History:   Diagnosis Date    Anxiety     Atrial fibrillation     Colon polyps 05/01/2012    Hypertension     Leg ulcer, right, limited to breakdown of skin 11/20/2019    Unilateral femoral hernia with obstruction, without gangrene 10/26/2015     Past Surgical History:   Procedure Laterality Date    HYSTERECTOMY      INTRAMEDULLARY RODDING OF FEMUR Left 4/28/2024    Procedure: INSERTION, INTRAMEDULLARY LENO, FEMUR: Left;  Surgeon: Kurt Melendez MD;  Location: [facility] OR [address] [place] [address];  Service: Orthopedics;  Laterality: Left;    LUNG SURGERY  1988    OOPHORECTOMY      RADICAL HYSTERECTOMY  1990     Family History   Problem Relation Name Age of Onset    No Known Problems Mother      No Known Problems Father       Social History     Socioeconomic History    Marital status:    Tobacco Use    Smoking " "status: Never     Passive exposure: Never    Smokeless tobacco: Never   Substance and Sexual Activity    Alcohol use: No    Drug use: No    Sexual activity: Not Currently   Social History Narrative     since 34 yo when   tragically. Single mother of 2 now adult son. Valentino Carter. Drives and very active. 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     Social Determinants of Health     Financial Resource Strain: Low Risk  (6/10/2024)    Overall Financial Resource Strain (CARDIA)     Difficulty of Paying Living Expenses: Not hard at all   Food Insecurity: No Food Insecurity (6/10/2024)    Hunger Vital Sign     Worried About Running Out of Food in the Last Year: Never true     Ran Out of Food in the Last Year: Never true   Transportation Needs: No Transportation Needs (2024)    PRAPARE - Transportation     Lack of Transportation (Medical): No     Lack of Transportation (Non-Medical): No   Physical Activity: Inactive (6/10/2024)    Exercise Vital Sign     Days of Exercise per Week: 7 days     Minutes of Exercise per Session: 0 min   Stress: No Stress Concern Present (2024)    [employer] - Occupational Stress Questionnaire     Feeling of Stress : Not at all   Housing Stability: High Risk (6/10/2024)    Housing Stability Vital Sign     Unable to Pay for Housing in the Last Year: No     Homeless in the Last Year: No     Review of Systems:  CV: no syncope  ENT: no sore throat  Resp: per hpi  Eyes: no eye pain  Gastrointestinal: no vomiting  Integument/Breast: no rash  Musculoskeletal: hip & knee pain related to recent fracture  Neurological: no headaches  Behavioral/Psych: mood has been good  Heme: takes eliquis    OBJECTIVE:     Vital Signs  Vitals:    24 0820   BP: 126/72   Pulse: 89   SpO2: 96%   Weight: 53.2 kg (117 lb 4.6 oz)   Height: 4' 11" (1.499 m)     Body mass index is 23.69 kg/m².    Physical Exam:  General: no distress  Eyes:  conjunctivae/corneas " clear  Nose: no discharge  Neck: trachea midline with no masses appreciated  Lungs:  normal respiratory effort, no wheezes, no rales  Heart: regular rate and rhythm and no murmur  Abdomen: non-distended  Extremities: no cyanosis, +dependent edema in ankles, no clubbing, +OA in fingers  Skin: No rashes or lesions. good skin turgor  Neurologic: alert, oriented, thought content appropriate    Laboratory:  Lab Results   Component Value Date    WBC 6.80 06/13/2024    HGB 11.2 (L) 06/13/2024    HCT 35.8 (L) 06/13/2024    MCV 96 06/13/2024     06/13/2024     Chest Imaging, My Impression:   Chest CT 2021: mild changes- atelectasis vs scarring  CXR 4/2024: +cardiomegaly & bibasilar infiltrates, blunting of right costophrenic angle    Diagnostic Results:    Left Ventricle: The left ventricle is normal in size. Ventricular mass is normal. Normal wall thickness. Normal wall motion. There is normal systolic function with a visually estimated ejection fraction of 60 - 65%. There is indeterminate diastolic function.Normal left ventricular filling pressure.    Right Ventricle: Normal right ventricular cavity size. Wall thickness is normal. Systolic function is normal.    Left Atrium: Left atrium is severely dilated.    Right Atrium: Right atrium is severely dilated.    Aortic Valve: The aortic valve is a trileaflet valve. There is mild aortic valve sclerosis. There is mild annular calcification present.    Mitral Valve: There is moderate anterior leaflet sclerosis.    Tricuspid Valve: There is moderate regurgitation with a centrally directed jet.    Aorta: Aortic root is normal in size measuring 3.08 cm. Ascending aorta is normal measuring 3.43 cm.    Pulmonary Artery: The estimated pulmonary artery systolic pressure is 57 mmHg.    IVC/SVC: Intermediate venous pressure at 8 mmHg.    Spirometry today: above average, height may not be correct though so probably closer to average    ASSESSMENT/PLAN:     Dyspnea on  exertion  Abnormal chest xray    Recommend chest CT to further evaluate. Will call daughter-in-law Yesenia to discuss results.    Ernesto Billings, MD  [facility]

## 2024-07-09 ENCOUNTER — TELEPHONE (OUTPATIENT)
Dept: PULMONOLOGY | Facility: CLINIC | Age: 89
End: 2024-07-09
Payer: MEDICARE

## 2024-07-09 NOTE — TELEPHONE ENCOUNTER
Reviewed chest CT over the telephone. Provided reassurance that I do not see any signs of parenchymal lung disease. There are mild, benign changes like atelectasis which I would consider normal for age. There is cardiomegaly as seen on recent echo, but no new concerning findings. No further evaluation or treatment to recommend at this time.    Ernesto Agudelo MD  [facility]

## 2024-08-14 ENCOUNTER — OFFICE VISIT (OUTPATIENT)
Dept: ORTHOPEDICS | Facility: CLINIC | Age: 89
End: 2024-08-14
Payer: MEDICARE

## 2024-08-14 ENCOUNTER — HOSPITAL ENCOUNTER (OUTPATIENT)
Dept: RADIOLOGY | Facility: HOSPITAL | Age: 89
Discharge: HOME | End: 2024-08-14
Attending: PHYSICIAN ASSISTANT
Payer: MEDICARE

## 2024-08-14 VITALS — BODY MASS INDEX: 23.65 KG/M2 | HEIGHT: 59 IN | WEIGHT: 117.31 LBS

## 2024-08-14 DIAGNOSIS — S72.142A CLOSED DISPLACED INTERTROCHANTERIC FRACTURE OF LEFT FEMUR, INITIAL ENCOUNTER: Primary | ICD-10-CM

## 2024-08-14 DIAGNOSIS — S72.142A CLOSED DISPLACED INTERTROCHANTERIC FRACTURE OF LEFT FEMUR, INITIAL ENCOUNTER: ICD-10-CM

## 2024-08-14 PROCEDURE — 99213 OFFICE O/P EST LOW 20 MIN: CPT | Mod: PBBFAC,25 | Performed by: PHYSICIAN ASSISTANT

## 2024-08-14 PROCEDURE — 73552 X-RAY EXAM OF FEMUR 2/>: CPT | Mod: TC,LT

## 2024-08-14 PROCEDURE — 73552 X-RAY EXAM OF FEMUR 2/>: CPT | Mod: 26,LT,, | Performed by: RADIOLOGY

## 2024-08-14 PROCEDURE — 99213 OFFICE O/P EST LOW 20 MIN: CPT | Mod: S$PBB,,, | Performed by: PHYSICIAN ASSISTANT

## 2024-08-14 PROCEDURE — 99999 PR PBB SHADOW E&M-EST. PATIENT-LVL III: CPT | Mod: PBBFAC,,, | Performed by: PHYSICIAN ASSISTANT

## 2024-08-16 NOTE — PROGRESS NOTES
Principal Orthopedic Problem: Left intertrochanteric hip fracture, closed, displaced, initial encounter  Fall from standing     04/28/24: : Open reduction internal fixation Left intertrochanteric hip fracture with intramedullary nail     Ms. Miller is here today for a post-operative visit    Interval History:  she reports that she is doing well.   she is at home. she is  participating in PT/OT. Using walker. Working on balance and fall prevention.   Pain is controlled.  she is  taking pain medication.  Tylenol as needed.   she denies fever, chills, and sweats .     Physical exam:    Patient arrives to exam room: walked in with walker.  Patient is  accompanied    Incision is clean, dry and intact.  Healing well no signs of breakdown or infection. Full range of motion knee and ankle.     RADS: All pertinent images were reviewed by myself:   Postoperative changes of internal fixation of left hip fracture identified. Position alignment is satisfactory and unchanged as compared to the previous study. Healing fracture of the left inferior pubic ramus also identified in a satisfactory position and alignment     Assessment:  Post-op visit     Plan:  Current care, treatment plan, precautions, activity level/ modifications, limitations, rehabilitation exercises and proposed future treatment were discussed with the patient. We discussed the need to monitor for changes in symptoms and condition and report them to the physician.  Discussed importance of compliance with all appointments and follow up examinations.     WOUND CARE :  -  she may wash the area with antibacterial soap in the shower. Will not submerge until the incision is completely healed  -Patient was advised to monitor wound closely and multiple times daily for any problems. Call clinic immediately or report to ED for immediate medical attention for any complications including reopening of wound, drainage, purulence, redness, streaking, odor, pain  out of proportion, fever, chills, etc.       ACTIVITY:   - as tolerated  -range of motion as tolerated    - WBAT      -PT/OT , Patient is responsible to establish and continue care      PAIN MEDICATION:   - OTC as needed..   - ice and elevation to reduce pain and swelling     FOLLOW UP:   - Patient will follow up in the clinic in 12 weeks, sooner if any concerns.  - X-ray of her femur is needed.        If there are any questions prior to scheduled follow up, the patient was instructed to contact the office

## 2024-09-03 RX ORDER — ASCORBIC ACID 500 MG
500 TABLET ORAL 2 TIMES DAILY
Qty: 60 TABLET | Refills: 2 | Status: SHIPPED | OUTPATIENT
Start: 2024-09-03

## 2024-09-03 RX ORDER — ZINC SULFATE 50(220)MG
220 CAPSULE ORAL DAILY
Qty: 90 CAPSULE | Refills: 3 | Status: SHIPPED | OUTPATIENT
Start: 2024-09-03

## 2024-09-03 NOTE — TELEPHONE ENCOUNTER
----- Message from Afbby Elvia sent at 9/3/2024 10:15 AM CDT -----  Type:  RX Refill Request    Who Called: pharmacy    Refill or New Rx: refill  RX Name and Strength:zinc sulfate (ZINCATE) 50 mg zinc (220 mg) capsule  ascorbic acid, vitamin C, (VITAMIN C) 500 MG tablet  Preferred Pharmacy with phone number:[facility] [id number] [place] [address]  Local or Mail Order:Local   Ordering Provider: Steffany  Would the patient rather a call back or [facility]? Call   Best Call Back Number: [phone]  Additional Information:  pt is in living facility so need rx sent for this by pcp

## 2024-09-04 DIAGNOSIS — I50.32 CHRONIC DIASTOLIC HEART FAILURE: ICD-10-CM

## 2024-09-04 NOTE — TELEPHONE ENCOUNTER
No care due was identified.  [facility] Embedded Care Due Messages. Reference number: [id number].   9/04/2024 6:07:16 PM CDT

## 2024-09-06 RX ORDER — FUROSEMIDE 20 MG/1
20 TABLET ORAL
Qty: 90 TABLET | Refills: 3 | Status: SHIPPED | OUTPATIENT
Start: 2024-09-06

## 2024-09-06 NOTE — TELEPHONE ENCOUNTER
Refill Routing Note   Medication(s) are not appropriate for processing by [facility] for the following reason(s):     DDI not previously overridden by current provider--after initial override, the Refill Center will be able to continue overrides      Drug-disease interaction: Duplicate Therapy: furosemide, spironolactone     [facility] action(s):  Defer           Pharmacist review requested: Yes     Appointments  past 12m or future 3m with PCP    Date Provider   Last Visit   6/12/2024 Dionne Jeter MD   Next Visit   10/17/2024 Dionne Jeter MD   ED visits in past 90 days: 0        Note composed:7:20 PM 09/05/2024

## 2024-09-06 NOTE — TELEPHONE ENCOUNTER
Refill Decision Note   Mary Ellen Antonieusebio  is requesting a refill authorization.  Brief Assessment and Rationale for Refill:  Approve     Medication Therapy Plan:         Pharmacist review requested: Yes   Extended chart review required: Yes   Comments:     Note composed:2:06 AM 09/06/2024

## 2024-09-17 NOTE — TELEPHONE ENCOUNTER
----- Message from Hardy Pizarro sent at 9/17/2024 10:18 AM CDT -----  Contact: [facility]  Type: Requesting refill    [facility] [address]   Phone: [phone]  Fax: [phone]    Regarding: apixaban (ELIQUIS) 2.5 mg Tab 60 tablet And   Sent to pharmacy as: apixaban (ELIQUIS) 2.5 mg Tab      Would the patient rather a call back or a response via [facility]? Call back

## 2024-09-24 RX ORDER — HYDRALAZINE HYDROCHLORIDE 25 MG/1
25 TABLET, FILM COATED ORAL EVERY 8 HOURS
Qty: 90 TABLET | Refills: 3 | Status: SHIPPED | OUTPATIENT
Start: 2024-09-24 | End: 2025-09-24

## 2024-09-24 NOTE — TELEPHONE ENCOUNTER
----- Message from Bailee Villanueva sent at 9/24/2024 10:48 AM CDT -----  Contact: [facility]  Type:  RX Refill Request    Who Called: Pharmacist  Refill or New Rx:refill  RX Name and Strength:hydrALAZINE (APRESOLINE) 25 MG tablet  How is the patient currently taking it? (ex. 1XDay):Take 1 tablet (25 mg total) by mouth every 8 (eight) hours.  Is this a 30 day or 90 day RX:90  Preferred Pharmacy with phone number:[facility] [address] [facility]   Phone: [phone]  Fax: [phone]    Local or Mail Order:local  Ordering Provider:Dr Jeter  Would the patient rather a call back or a response via [facility]? call  Best Call Back Number::[phone]    Additional Information:  
No care due was identified.  [facility] Embedded Care Due Messages. Reference number: [id number].   9/24/2024 11:05:16 AM CDT  
Speaking Coherently

## 2024-09-25 ENCOUNTER — OFFICE VISIT (OUTPATIENT)
Dept: HEMATOLOGY/ONCOLOGY | Facility: CLINIC | Age: 89
End: 2024-09-25
Payer: MEDICARE

## 2024-09-25 VITALS
BODY MASS INDEX: 22.28 KG/M2 | RESPIRATION RATE: 18 BRPM | DIASTOLIC BLOOD PRESSURE: 65 MMHG | TEMPERATURE: 98 F | HEIGHT: 62 IN | OXYGEN SATURATION: 93 % | WEIGHT: 121.06 LBS | HEART RATE: 80 BPM | SYSTOLIC BLOOD PRESSURE: 120 MMHG

## 2024-09-25 DIAGNOSIS — I50.9 CHRONIC CONGESTIVE HEART FAILURE, UNSPECIFIED HEART FAILURE TYPE: ICD-10-CM

## 2024-09-25 DIAGNOSIS — Z79.01 LONG TERM (CURRENT) USE OF ANTICOAGULANTS: ICD-10-CM

## 2024-09-25 DIAGNOSIS — I48.20 CHRONIC ATRIAL FIBRILLATION: ICD-10-CM

## 2024-09-25 DIAGNOSIS — D62 ACUTE BLOOD LOSS ANEMIA: Primary | ICD-10-CM

## 2024-09-25 DIAGNOSIS — D50.8 IRON DEFICIENCY ANEMIA SECONDARY TO INADEQUATE DIETARY IRON INTAKE: ICD-10-CM

## 2024-09-25 PROCEDURE — 99213 OFFICE O/P EST LOW 20 MIN: CPT | Mod: S$PBB,,, | Performed by: NURSE PRACTITIONER

## 2024-09-25 PROCEDURE — 99215 OFFICE O/P EST HI 40 MIN: CPT | Mod: PBBFAC,PN | Performed by: NURSE PRACTITIONER

## 2024-09-25 PROCEDURE — 99999 PR PBB SHADOW E&M-EST. PATIENT-LVL V: CPT | Mod: PBBFAC,,, | Performed by: NURSE PRACTITIONER

## 2024-09-25 NOTE — PROGRESS NOTES
Answers submitted by the patient for this visit:  Review of Systems Questionnaire (Submitted on 9/23/2024)  appetite change : No  unexpected weight change: No  mouth sores: No  visual disturbance: No  cough: No  shortness of breath: No  chest pain: No  abdominal pain: No  diarrhea: No  frequency: No  back pain: No  rash: No  headaches: No  adenopathy: No  nervous/ anxious: No        Patient ID: Mary Ellen Miller is a 89 y.o. female.    Chief Complaint: Acute blood loss anemia        History     HPI    Mary Ellen Miller is a 89yr old female, new to [facility] and this provider, referred for anemia.    Pt with normocytic anemia starting recently, 4/28/24 , and macrocytic evidence 5/9/24. No anemia 2016-4/27/24. Pt presented 4/27/24 to ED s/p fall with left hip pain, shortening and rotation and 4/28/24 surgery for intramedullary edyta left femur placement. 4/28/24 ED labs h/h 12.8/39.8, 4/29/24 post edyta placement, h/h 8.3/26.5, normocytic. Retics minimal elevation, negative GUERDA, haptoglobin, LDH.  She had an acute drop in gfr 4/27-4/30/24 while given high doses of lasix for chf prior surgery and it has since recovered to the 59.2 mL/min/1.73m2, some days >60 noted as well. Hgb recovered to 10.4 g/dL on 5/22/24. No anemia noted 2016- 2/2024.    Pt s/p surgery completed rehab and has returned to assisted living and is getting therapy on daily basis at present. Today states tired after dr visits 3 days in a row but feels overall she has been recovering well from surgery. Daughter in law with pt and states pt has been increasing activity at assisted living since return and doing well. Appetite has improved again, initially not eating in hospital post surgery. Pt states she has some pain in LLE but it has steadily improved. Today she is walking in clinic with walker. She does endorse some sob with activity, states it has improved. Denies HA, dizziness, cp, abdominal pain, n/v/d, hematemesis, melena, hemorrhoidal issues,  hematuria. She has had some stomach pain with constipation, that is now controlled with colace. She does have some early dementia, on namenda. She is [place], inconsistent in wearing hearing aids.    Comorbid diagnoses include Htn, HLD, CHF, afib on eliquis, osteoporosis, vit d deficiency. No prior history of anemia.     She has not followed with cardiology post surgery, next appt 9/2024, advised pt given chf issues prior surgery she should see cardiologist for sooner follow up and she will call today for soonest available.     Sister with anemia and receiving iron infusions. No known family history of significant family cancers, blood conditions.     Past medical, surgical, and medication history, past family history reviewed and uptdated with patient today as noted.    INTERVAL  - pt is here for anemia follow up, anemia started during hospitalization of left femur fx 4/2024  - she has been taking oral iron once QOD, tolerating well  - left intertrochanteric hip fx with ORIF 4/28/24, PT/OT continues at assisted living facility, healing well, walking with walker  - She does endorse some occasional fatigue. Today she denies HA, dizziness, cp, sob/spangler, abdominal pain, n/v/d, hematemesis, melena, constipation, hemorrhoidal issues, hematuria. She does have some early dementia, on namenda. She is [place], inconsistent in wearing hearing aids.  - states she is eating well, family member states appetite excellent and eating entire tray of food frequently as served    Wt Readings from Last 3 Encounters:   09/25/24 54.9 kg (121 lb 0.5 oz)   08/14/24 53.2 kg (117 lb 4.6 oz)   07/08/24 53.2 kg (117 lb 4.6 oz)             Past Medical History:   Diagnosis Date    Anxiety     Atrial fibrillation     Colon polyps 05/01/2012    Hypertension     Leg ulcer, right, limited to breakdown of skin 11/20/2019    Unilateral femoral hernia with obstruction, without gangrene 10/26/2015       Past Surgical History:   Procedure Laterality Date     "HYSTERECTOMY      INTRAMEDULLARY RODDING OF FEMUR Left 4/28/2024    Procedure: INSERTION, INTRAMEDULLARY LENO, FEMUR: Left;  Surgeon: Kurt Melendez MD;  Location: [facility] OR [address] [place] [address];  Service: Orthopedics;  Laterality: Left;    LUNG SURGERY  1988    OOPHORECTOMY      RADICAL HYSTERECTOMY  1990       Oncology History:  Oncology History    No history exists.        Review of Systems:  Review of Systems   Constitutional:  Positive for fatigue (occasional). Negative for activity change, fever and unexpected weight change.   HENT:  Negative for mouth sores.    Eyes:  Negative for visual disturbance.   Respiratory:  Negative for cough and shortness of breath.    Cardiovascular:  Negative for chest pain and leg swelling.   Gastrointestinal:  Negative for abdominal pain, blood in stool, constipation, diarrhea, nausea, vomiting and reflux.   Endocrine: Negative for cold intolerance.   Genitourinary:  Negative for dysuria, frequency and hematuria.   Musculoskeletal:  Positive for back pain. Negative for gait problem, joint swelling and myalgias.   Integumentary:  Negative for pallor and wound.   Neurological:  Negative for dizziness, syncope, weakness, light-headedness and headaches.   Hematological:  Negative for adenopathy.   Psychiatric/Behavioral:  Negative for sleep disturbance. The patient is not nervous/anxious.           Physical Exam   Vitals:  /65 (BP Location: Left arm, Patient Position: Sitting, BP Method: Medium (Automatic))   Pulse 80   Temp 97.8 °F (36.6 °C) (Oral)   Resp 18   Ht 5' 2" (1.575 m)   Wt 54.9 kg (121 lb 0.5 oz)   SpO2 (!) 93%   BMI 22.14 kg/m²     Labs:  Lab Results   Component Value Date    WBC 5.74 09/23/2024    HGB 12.3 09/23/2024    HCT 37.4 09/23/2024    MCV 93 09/23/2024     09/23/2024       Lab Results   Component Value Date    IRON 112 09/23/2024    TRANSFERRIN 267 09/23/2024    TIBC 395 09/23/2024    FESATURATED 28 09/23/2024      Lab Results   Component Value Date "    FERRITIN 163 09/23/2024     Lab Results   Component Value Date    FOLATE 18.4 06/13/2024     Lab Results   Component Value Date    GVXJCQWH70 245 06/13/2024 5/8/24 path review  Signed on 05/08/24 at 08:12   Pathologist interpretation of peripheral blood smear:   Normochromic normocytic red cells show mild anisocytosis with a   subset of small spherocytes seen.  Ruling out hemolysis would be   prudent.    White cells show a rare 5 lobed neutrophil, suggestive of early   hypersegmentation.    Platelets are morphologically unremarkable on scanning.       Physical Exam:  Physical Exam  Vitals and nursing note reviewed.   Constitutional:       General: She is not in acute distress.     Appearance: She is not toxic-appearing.   HENT:      Head: Normocephalic and atraumatic.      Right Ear: External ear normal.      Left Ear: External ear normal.      Mouth/Throat:      Mouth: Mucous membranes are moist.      Pharynx: Oropharynx is clear.   Eyes:      General: No scleral icterus.     Conjunctiva/sclera: Conjunctivae normal.   Cardiovascular:      Rate and Rhythm: Normal rate. Rhythm irregular.      Pulses: Normal pulses.      Heart sounds: Normal heart sounds. No murmur heard.     Comments: Minimal right ankle to foot swelling, nonpitting, R>L, minimal left ankle swelling, nonpitting  Pulmonary:      Effort: Pulmonary effort is normal. No respiratory distress.      Breath sounds: Normal breath sounds.   Abdominal:      General: Bowel sounds are normal. There is no distension.      Palpations: Abdomen is soft.      Tenderness: There is no abdominal tenderness. There is no guarding.   Musculoskeletal:         General: No swelling or tenderness.      Cervical back: Normal range of motion and neck supple. No rigidity.   Lymphadenopathy:      Cervical: No cervical adenopathy.   Skin:     General: Skin is warm and dry.      Capillary Refill: Capillary refill takes less than 2 seconds.      Coloration: Skin is not  jaundiced or pale.      Findings: No bruising or erythema.   Neurological:      Mental Status: She is alert and oriented to person, place, and time.      Comments: Standard walker   Psychiatric:         Mood and Affect: Mood normal.         Behavior: Behavior normal.          ECOG:   ECOG SCORE    1 - Restricted in strenuous activity-ambulatory and able to carry out work of a light nature            PROCEDURES/IMAGING      Discussion     Problem List:  Problem List Items Addressed This Visit          Cardiac/Vascular    Chronic atrial fibrillation    Overview     - followed by Dr. Last  - OAC Eliquis without signs of bleeding  - rate controlled            Hematology    Long term (current) use of anticoagulants    Overview     - followed by cardiology  - no signs of bleeding            Oncology    Acute blood loss anemia - Primary     Other Visit Diagnoses       Iron deficiency anemia secondary to inadequate dietary iron intake        Chronic congestive heart failure, unspecified heart failure type                 Anemia unspecified, acute blood loss anemia, hemolytic anemia r/o, macrocytic anemia  - no anemia 2016-4/27/24.   - normocytic anemia starting recently, 4/28/24 , and macrocytic evidence 5/9/24.  - 4/27/24 to ED s/p fall with left hip pain, 4/28/24 surgery for intramedullary edyta left femur placement.   - 4/28/24 ED labs h/h 12.8/39.8, 4/29/24 post edyta placement, h/h 8.3/26.5, normocytic. Retics minimal elevation, negative GUERDA, haptoglobin, LDH.    - She had an acute drop in gfr 4/27-4/30/24 while given high doses of lasix for chf prior surgery and it has since recovered to the 59.2 mL/min/1.73m2, some days >60 noted as well.  - Hgb improved to 10.4 g/dL on 5/22/24, no blood t/f indicated during hospitalization  - 6/13/24 labs hgb improved, ferritin elevated, continue oral iron  - 9/23/24 cbc normal h/h, normocytic, iron studies normal with iron sat 28% and ferritin 163 ng/mL, anemia and iron deficiency  resolved  - educated on healthy dietary habits  - she was not anemia prior LLE fracture, if labs remain stable with next visit, will likely change to prn f/u  - rtc 6 months with labs, will f/u with Dr Smith    CHF, afib, chronic afib  - on eliquis 2.5mg bid, reduced in hospital  - denies gi blood loss, tolerates eliquis well  - trace rle ankle swelling, nonpitting; she is agreeable to wearing compression socks and daughter in law will order otc ones pt will try; she declines prescriptive socks  - management deferred to cardiology    Advance Care Planning     Date: 06/13/2024    Power of   After our discussion, the patient wanted to complete form today noting daughter in law who has POA on form, but will also bring POA forms for upload to next [facility] visit. Pt noted Yesenia Miller as Power of  at [phone].                    Amira Armijo, NP-C  [facility]  Hematology/Oncology  [address]  [place]  [address] [phone]

## 2024-10-17 ENCOUNTER — OFFICE VISIT (OUTPATIENT)
Dept: FAMILY MEDICINE | Facility: CLINIC | Age: 89
End: 2024-10-17
Payer: MEDICARE

## 2024-10-17 VITALS
HEIGHT: 62 IN | TEMPERATURE: 97 F | BODY MASS INDEX: 22.05 KG/M2 | HEART RATE: 84 BPM | OXYGEN SATURATION: 96 % | DIASTOLIC BLOOD PRESSURE: 62 MMHG | WEIGHT: 119.81 LBS | SYSTOLIC BLOOD PRESSURE: 108 MMHG

## 2024-10-17 DIAGNOSIS — F33.42 RECURRENT MAJOR DEPRESSIVE DISORDER, IN FULL REMISSION: ICD-10-CM

## 2024-10-17 DIAGNOSIS — F41.1 GENERALIZED ANXIETY DISORDER: ICD-10-CM

## 2024-10-17 DIAGNOSIS — R79.9 ABNORMAL FINDING OF BLOOD CHEMISTRY, UNSPECIFIED: ICD-10-CM

## 2024-10-17 DIAGNOSIS — I48.20 CHRONIC ATRIAL FIBRILLATION: ICD-10-CM

## 2024-10-17 DIAGNOSIS — Z29.11 NEED FOR RSV IMMUNIZATION: ICD-10-CM

## 2024-10-17 DIAGNOSIS — N18.31 STAGE 3A CHRONIC KIDNEY DISEASE: ICD-10-CM

## 2024-10-17 DIAGNOSIS — F02.A0 MILD LATE ONSET ALZHEIMER'S DEMENTIA WITHOUT BEHAVIORAL DISTURBANCE, PSYCHOTIC DISTURBANCE, MOOD DISTURBANCE, OR ANXIETY: ICD-10-CM

## 2024-10-17 DIAGNOSIS — I10 ESSENTIAL HYPERTENSION: ICD-10-CM

## 2024-10-17 DIAGNOSIS — S72.142D CLOSED DISPLACED INTERTROCHANTERIC FRACTURE OF LEFT FEMUR WITH ROUTINE HEALING: ICD-10-CM

## 2024-10-17 DIAGNOSIS — G30.1 MILD LATE ONSET ALZHEIMER'S DEMENTIA WITHOUT BEHAVIORAL DISTURBANCE, PSYCHOTIC DISTURBANCE, MOOD DISTURBANCE, OR ANXIETY: ICD-10-CM

## 2024-10-17 DIAGNOSIS — Z79.01 LONG TERM (CURRENT) USE OF ANTICOAGULANTS: ICD-10-CM

## 2024-10-17 PROCEDURE — 99999 PR PBB SHADOW E&M-EST. PATIENT-LVL V: CPT | Mod: PBBFAC,,, | Performed by: STUDENT IN AN ORGANIZED HEALTH CARE EDUCATION/TRAINING PROGRAM

## 2024-10-17 PROCEDURE — 99215 OFFICE O/P EST HI 40 MIN: CPT | Mod: PBBFAC,PN | Performed by: STUDENT IN AN ORGANIZED HEALTH CARE EDUCATION/TRAINING PROGRAM

## 2024-10-17 NOTE — PROGRESS NOTES
Subjective:       Patient ID: Mary Ellen Miller is a 89 y.o. female.    Chief Complaint: Follow-up (3 month follow up)    -presents for follow-up after a hip fracture and recent fall, with concerns about hip soreness.    HPI:  Mary Ellen is an elderly woman seen for follow-up after a left hip fracture, surgery, and subsequent fall. She underwent left hip surgery 1-2 months ago. 3-4 weeks ago, she fell again, causing injury and soreness to the previously operated hip without a new fracture. She reports intermittent soreness in the left hip area, which she indicates during the exam. The discomfort is not daily, and sitting does not cause pain. She emphasizes it is soreness rather than pain.    Mary Ellen is undergoing physical therapy for hip surgery recovery and has soreness after sessions, which is expected. She notes improved mobility around her bed without assistive devices. Her arm was more bruised than her hip from the recent fall, on the same side as the hip injury.    She is actively trying to gain weight by increasing her food intake, including consuming foods she does not prefer. Mary Ellen denies constant, daily, or severe pain in her hip, as well as any new fractures from her recent fall.    MEDICAL HISTORY:  - Hip fracture: Left hip  - Flu shot received recently.    SURGICAL HISTORY:  - Left hip surgery: 1-2 months ago, for hip fracture repair. Mary Ellen currently undergoing physical therapy for rehabilitation.    IMAGING:  - X-ray hip: August, shows screws in place on the side that was broken, everything is in place and healing as expected      ROS:  General: -fever, -chills, -fatigue, -weight gain, -weight loss  Eyes: -vision changes, -redness, -discharge  ENT: -ear pain, -nasal congestion, -sore throat  Cardiovascular: -chest pain, -palpitations, -lower extremity edema  Respiratory: -cough, -shortness of breath  Gastrointestinal: -abdominal pain, -nausea, -vomiting, -diarrhea, -constipation, -blood in  "stool  Genitourinary: -dysuria, -hematuria, -frequency  Musculoskeletal: +joint pain, -muscle pain  Skin: -rash, -lesion  Neurological: -headache, -dizziness, -numbness, -tingling  Psychiatric: -anxiety, -depression, -sleep difficulty                  Objective:     Vitals:    10/17/24 1355   BP: 108/62   BP Location: Left arm   Patient Position: Sitting   Pulse: 84   Temp: 97.3 °F (36.3 °C)   TempSrc: Temporal   SpO2: 96%   Weight: 54.3 kg (119 lb 13.1 oz)   Height: 5' 2" (1.575 m)          Physical Exam    General: No acute distress. Well-developed. Well-nourished.  Eyes: EOMI. Sclerae anicteric.  HENT: Normocephalic. Atraumatic. Nares patent. Moist oral mucosa.  Ears: Bilateral TMs clear. Bilateral EACs clear.  Cardiovascular: Regular rate. Regular rhythm. No murmurs. No rubs. No gallops. Normal S1, S2.  Respiratory: Normal respiratory effort. Clear to auscultation bilaterally. No rales. No rhonchi. No wheezing.  Abdomen: Soft. Non-tender. Non-distended. Normoactive bowel sounds.  Musculoskeletal: No  obvious deformity.  Extremities: Mild edema in legs. Presence of varicose veins in legs.  Neurological: Alert & oriented x3. No slurred speech. Normal gait.  Psychiatric: Normal mood. Normal affect. Good insight. Good judgment.  Skin: Warm. Dry. No rash.            Laboratory:  CBC:  Recent Labs   Lab Result Units 09/23/24  1131   WBC K/uL 5.74   RBC M/uL 4.04   Hemoglobin g/dL 12.3   Hematocrit % 37.4   Platelets K/uL 247   MCV fL 93   MCH pg 30.4   MCHC g/dL 32.9     CMP:  No results for input(s): "GLU", "CALCIUM", "ALBUMIN", "PROT", "NA", "K", "CO2", "CL", "BUN", "ALKPHOS", "ALT", "AST", "BILITOT" in the last 2160 hours.    Invalid input(s): "CREATININ"  URINALYSIS:  No results for input(s): "COLORU", "CLARITYU", "SPECGRAV", "PHUR", "PROTEINUA", "GLUCOSEU", "BILIRUBINCON", "BLOODU", "WBCU", "RBCU", "BACTERIA", "MUCUS", "NITRITE", "LEUKOCYTESUR", "UROBILINOGEN", "HYALINECASTS" in the last 2160 hours.   LIPIDS:  No " "results for input(s): "TSH", "HDL", "CHOL", "TRIG", "LDLCALC", "CHOLHDL", "NONHDLCHOL", "TOTALCHOLEST" in the last 2160 hours.  TSH:  No results for input(s): "TSH" in the last 2160 hours.  A1C:  No results for input(s): "HGBA1C" in the last 2160 hours.    Assessment:         ICD-10-CM ICD-9-CM   1. Essential hypertension  I10 401.9   2. Closed displaced intertrochanteric fracture of left femur with routine healing s/p IM nail on 4/28/2024  S72.142D V54.13   3. Stage 3a chronic kidney disease  N18.31 585.3   4. Recurrent major depressive disorder, in full remission  F33.42 296.36   5. Mild late onset Alzheimer's dementia without behavioral disturbance, psychotic disturbance, mood disturbance, or anxiety  G30.1 331.0    F02.A0 294.10   6. Long term (current) use of anticoagulants  Z79.01 V58.61   7. Chronic atrial fibrillation  I48.20 427.31   8. Generalized anxiety disorder  F41.1 300.02   9. Abnormal finding of blood chemistry, unspecified  R79.9 790.6   10. Need for RSV immunization  Z29.11 V04.82       Plan:       Essential hypertension  -     TSH; Future; Expected date: 10/17/2024    Closed displaced intertrochanteric fracture of left femur with routine healing s/p IM nail on 4/28/2024    Stage 3a chronic kidney disease  -     Comprehensive Metabolic Panel; Future; Expected date: 10/17/2024  -     Microalbumin/Creatinine Ratio, Urine; Future; Expected date: 10/17/2024    Recurrent major depressive disorder, in full remission    Mild late onset Alzheimer's dementia without behavioral disturbance, psychotic disturbance, mood disturbance, or anxiety  -     Comprehensive Metabolic Panel; Future; Expected date: 10/17/2024  -     LIPID PANEL; Future; Expected date: 10/17/2024  -     TSH; Future; Expected date: 10/17/2024    Long term (current) use of anticoagulants    Chronic atrial fibrillation    Generalized anxiety disorder    Abnormal finding of blood chemistry, unspecified  -     LIPID PANEL; Future; Expected " date: 10/17/2024    Need for RSV immunization  -     Discontinue: RSV, preF A and preF B,PF, (ABRYSVO) 120 mcg/0.5 mL SolR vaccine; Inject 0.5 mLs (120 mcg total) into the muscle once. for 1 dose  Dispense: 0.5 mL; Refill: 0  -     RSV, preF A and preF B,PF, (ABRYSVO) 120 mcg/0.5 mL SolR vaccine; Inject 0.5 mLs (120 mcg total) into the muscle once. for 1 dose  Dispense: 0.5 mL; Refill: 0      No follow-ups on file.     Patient's Medications   New Prescriptions    RSV, PREF A AND PREF B,PF, (ABRYSVO) 120 MCG/0.5 ML SOLR VACCINE    Inject 0.5 mLs (120 mcg total) into the muscle once. for 1 dose   Previous Medications    ACETAMINOPHEN (TYLENOL) 500 MG TABLET    Take 2 tablets (1,000 mg total) by mouth every 8 (eight) hours.    APIXABAN (ELIQUIS) 2.5 MG TAB    Take 1 tablet (2.5 mg total) by mouth 2 (two) times daily.    ASCORBIC ACID, VITAMIN C, (VITAMIN C) 500 MG TABLET    Take 1 tablet (500 mg total) by mouth 2 (two) times daily.    ATENOLOL (TENORMIN) 25 MG TABLET    Take 1 tablet (25 mg total) by mouth once daily.    BIOTIN 1 MG TABLET    TAKE 1/2 TABLET BY MOUTH ONCE DAILY.    CALCIUM CARBONATE (OS-ZOILA) 600 MG CALCIUM (1,500 MG) TAB    Take 1 tablet (600 mg total) by mouth once daily.    CYANOCOBALAMIN, VITAMIN B-12, 500 MCG CHEW    Take 500 mcg by mouth once daily.    ESCITALOPRAM OXALATE (LEXAPRO) 10 MG TABLET    Take 1 tablet (10 mg total) by mouth once daily.    FERROUS SULFATE (FEOSOL) 325 MG (65 MG IRON) TAB TABLET    Take 1 tablet (325 mg total) by mouth every other day.    FUROSEMIDE (LASIX) 20 MG TABLET    TAKE 1 TABLET BY MOUTH ONCE DAILY.    HYDRALAZINE (APRESOLINE) 25 MG TABLET    Take 1 tablet (25 mg total) by mouth every 8 (eight) hours.    LAXATIVE, BISACODYL, 5 MG EC TABLET    Take by mouth.    LIDOCAINE (LIDODERM) 5 %    Place 1 patch onto the skin once daily. Remove & Discard patch within 12 hours or as directed by MD    MELATONIN (MELATIN) 3 MG TABLET    Take 3 tablets (9 mg total) by mouth  nightly.    MEMANTINE (NAMENDA) 5 MG TAB    Take 1 tablet (5 mg total) by mouth 2 (two) times daily.    MULTIVIT,CALC,MINS/IRON/FOLIC (ONE-A-DAY WOMENS FORMULA ORAL)    Take 1 tablet by mouth once daily.    ONDANSETRON (ZOFRAN-ODT) 8 MG TBDL    Take 1 tablet (8 mg total) by mouth every 8 (eight) hours as needed (Nausea).    SPIRONOLACTONE (ALDACTONE) 50 MG TABLET    Take 1 tablet (50 mg total) by mouth once daily.    VIT A,C & E-LUTEIN-MINERALS 1,000-60-2 UNIT (I-BLAKE) TAB    TAKE 1 TABLET BY MOUTH ONCE DAILY.    VITAMIN D 1000 UNITS TAB    Take 2,000 mg by mouth once daily.    ZINC SULFATE (ZINCATE) 50 MG ZINC (220 MG) CAPSULE    Take 1 capsule (220 mg total) by mouth once daily.   Modified Medications    No medications on file   Discontinued Medications    AMLODIPINE (NORVASC) 10 MG TABLET    Take 10 mg by mouth once daily.     Assessment & Plan      Closed displaced intertrochanteric fracture of left femur   -with routine healing s/p IM nail on 4/28/2024  - Assessed patient's hip pain following previous fracture and surgery.  - Reviewed x-ray from August, confirming proper placement of hardware and expected healing.  - Determined soreness around screw site is normal post-operative finding.  - Evaluated patient's gait without walker, noting satisfactory mobility.  - Considered recent fall (approximately 1 month ago) as potential cause of increased soreness, but ruled out significant new injury due to lack of progressive symptoms.  - Explained that soreness around the hip screw site is expected during the healing process.    2. Worsening memory loss  -likely age related dementia, mild form  -MMSE 28/30  -family counseled , she needs redirection, reassurance for now   -c/w namenda  -Avoid any kind of benzo , antipsychotics      3. Essential hypertension  Overview:  - now @ goal  -followed by cardiology ( Dr Last)     4.  Chronic atrial fibrillation  Overview:  - followed by Dr. Last  - OAC Eliquis without signs of  bleeding  - rate controlled     4. Chronic diastolic heart failure  Overview:  - no signs of acute decompensation  - followed by cardiology     5. Long term (current) use of anticoagulants  Overview:  - followed by cardiology  - no signs of bleeding     6. H/o MDD  -well controlled on current regimen     7. H/o aortic atherosclerosis  -c/w ASA  -c/t monitor     9. MDD/KLARISSA  -c/w  lexapro 10mg daily    10 CKD stage 3a  - Will recheck kidney function due to previous decline during hip fracture episode.  - Ordered fasting labs to check kidney function.  - Follow up for labs tomorrow at 10:00 AM at [facility] lab.  - Results of labs to be reviewed, with adjustments made if necessary.    RSV VACCINATION:  - Discussed RSV vaccine, describing it as protection against a common respiratory virus that can cause airway infections and breathing difficulties.  - Mary Ellen to get RSV vaccine administered at the pharmacy.    BMI 21  -WEIGHT MANAGEMENT:  - Mary Ellen to continue eating efforts to gain weight.       42 minutes of total time spent on the encounter, which includes face to face time and non-face to face time preparing to see the patient (eg, review of tests), Obtaining and/or reviewing separately obtained history, Documenting clinical information in the electronic or other health record, Independently interpreting results (not separately reported) and communicating results to the patient/family/caregiver, or Care coordination (not separately reported).      Dionne Jeter MD  Internal Medicine, [facility]

## 2024-10-25 RX ORDER — ACETAMINOPHEN 500 MG
500 TABLET ORAL EVERY 8 HOURS PRN
Qty: 60 TABLET | Refills: 0 | Status: SHIPPED | OUTPATIENT
Start: 2024-10-25

## 2024-11-04 RX ORDER — ASCORBIC ACID 500 MG
TABLET ORAL 2 TIMES DAILY
Qty: 60 TABLET | Refills: 11 | Status: SHIPPED | OUTPATIENT
Start: 2024-11-04

## 2024-11-25 RX ORDER — APIXABAN 2.5 MG/1
2.5 TABLET, FILM COATED ORAL 2 TIMES DAILY
Qty: 60 TABLET | Refills: 11 | Status: SHIPPED | OUTPATIENT
Start: 2024-11-25

## 2024-11-29 NOTE — PLAN OF CARE
Problem: Infection  Goal: Absence of Infection Signs and Symptoms  Outcome: Progressing     Problem: Adult Inpatient Plan of Care  Goal: Plan of Care Review  Outcome: Progressing  Goal: Patient-Specific Goal (Individualized)  Outcome: Progressing  Goal: Absence of Hospital-Acquired Illness or Injury  Outcome: Progressing  Goal: Optimal Comfort and Wellbeing  Outcome: Progressing  Goal: Readiness for Transition of Care  Outcome: Progressing      Linear/Impaired reasoning

## 2024-12-14 ENCOUNTER — TELEPHONE (OUTPATIENT)
Dept: ADMINISTRATIVE | Facility: CLINIC | Age: 89
End: 2024-12-14
Payer: MEDICARE

## 2024-12-26 DIAGNOSIS — I10 ESSENTIAL HYPERTENSION: Primary | ICD-10-CM

## 2024-12-26 RX ORDER — HYDRALAZINE HYDROCHLORIDE 25 MG/1
TABLET, FILM COATED ORAL
Qty: 90 TABLET | Refills: 11 | Status: SHIPPED | OUTPATIENT
Start: 2024-12-26

## 2024-12-26 NOTE — TELEPHONE ENCOUNTER
No care due was identified.  [facility] Embedded Care Due Messages. Reference number: [id number].   12/26/2024 3:13:12 PM CST

## 2024-12-26 NOTE — TELEPHONE ENCOUNTER
Pharmacy is requesting refill, last one on previous order was picked up last week.     Last office visit 10/17/24.

## 2025-01-13 DIAGNOSIS — Z00.00 ENCOUNTER FOR MEDICARE ANNUAL WELLNESS EXAM: ICD-10-CM

## 2025-02-03 ENCOUNTER — TELEPHONE (OUTPATIENT)
Dept: HEMATOLOGY/ONCOLOGY | Facility: CLINIC | Age: OVER 89
End: 2025-02-03
Payer: MEDICARE

## 2025-02-03 DIAGNOSIS — D50.8 IRON DEFICIENCY ANEMIA SECONDARY TO INADEQUATE DIETARY IRON INTAKE: Primary | ICD-10-CM

## 2025-02-06 RX ORDER — SPIRONOLACTONE 50 MG/1
50 TABLET, FILM COATED ORAL
Qty: 90 TABLET | Refills: 2 | Status: SHIPPED | OUTPATIENT
Start: 2025-02-06

## 2025-02-06 NOTE — TELEPHONE ENCOUNTER
Refill Routing Note   Medication(s) are not appropriate for processing by [facility] for the following reason(s):        No active prescription written by provider    [facility] action(s):  Defer               Appointments  past 12m or future 3m with PCP    Date Provider   Last Visit   10/17/2024 Dionne Jeter MD   Next Visit   4/10/2025 Dionne Jeter MD   ED visits in past 90 days: 0        Note composed:3:03 PM 02/06/2025

## 2025-02-06 NOTE — TELEPHONE ENCOUNTER
No care due was identified.  [facility] Embedded Care Due Messages. Reference number: [id number].   2/06/2025 1:33:45 PM CST

## 2025-03-04 DIAGNOSIS — G30.1 MILD LATE ONSET ALZHEIMER'S DEMENTIA WITH OTHER BEHAVIORAL DISTURBANCE: ICD-10-CM

## 2025-03-04 DIAGNOSIS — F02.A18 MILD LATE ONSET ALZHEIMER'S DEMENTIA WITH OTHER BEHAVIORAL DISTURBANCE: ICD-10-CM

## 2025-03-04 NOTE — TELEPHONE ENCOUNTER
No care due was identified.  [facility] Embedded Care Due Messages. Reference number: [id number].   3/04/2025 1:46:11 PM CST

## 2025-03-05 RX ORDER — AMLODIPINE BESYLATE 10 MG/1
10 TABLET ORAL
Qty: 90 TABLET | Refills: 2 | Status: SHIPPED | OUTPATIENT
Start: 2025-03-05

## 2025-03-05 RX ORDER — MEMANTINE HYDROCHLORIDE 5 MG/1
5 TABLET ORAL 2 TIMES DAILY
Qty: 60 TABLET | Refills: 11 | Status: SHIPPED | OUTPATIENT
Start: 2025-03-05

## 2025-03-05 NOTE — TELEPHONE ENCOUNTER
Refill Routing Note   Medication(s) are not appropriate for processing by [facility] for the following reason(s):        Outside of protocol  No active prescription written by provider    [facility] action(s):  Defer  Route      Medication Therapy Plan: MEMANTINE-OOP;  ON THE MED LIST 24.      Appointments  past 12m or future 3m with PCP    Date Provider   Last Visit   10/17/2024 Dionne Jeter MD   Next Visit   4/10/2025 Dionne Jeter MD   ED visits in past 90 days: 0        Note composed:8:25 AM 2025

## 2025-04-10 ENCOUNTER — OFFICE VISIT (OUTPATIENT)
Dept: FAMILY MEDICINE | Facility: CLINIC | Age: OVER 89
End: 2025-04-10
Payer: MEDICARE

## 2025-04-10 VITALS
TEMPERATURE: 98 F | SYSTOLIC BLOOD PRESSURE: 110 MMHG | DIASTOLIC BLOOD PRESSURE: 62 MMHG | HEIGHT: 62 IN | HEART RATE: 73 BPM | RESPIRATION RATE: 20 BRPM | WEIGHT: 115.63 LBS | BODY MASS INDEX: 21.28 KG/M2 | OXYGEN SATURATION: 92 %

## 2025-04-10 DIAGNOSIS — I50.32 CHRONIC DIASTOLIC (CONGESTIVE) HEART FAILURE: ICD-10-CM

## 2025-04-10 DIAGNOSIS — N18.32 STAGE 3B CHRONIC KIDNEY DISEASE: ICD-10-CM

## 2025-04-10 DIAGNOSIS — R79.9 ABNORMAL BLOOD CHEMISTRY: ICD-10-CM

## 2025-04-10 DIAGNOSIS — I50.32 CHRONIC DIASTOLIC HEART FAILURE: ICD-10-CM

## 2025-04-10 DIAGNOSIS — Z79.01 LONG TERM (CURRENT) USE OF ANTICOAGULANTS: ICD-10-CM

## 2025-04-10 DIAGNOSIS — F02.A18 MILD LATE ONSET ALZHEIMER'S DEMENTIA WITH OTHER BEHAVIORAL DISTURBANCE: ICD-10-CM

## 2025-04-10 DIAGNOSIS — F41.1 GENERALIZED ANXIETY DISORDER: ICD-10-CM

## 2025-04-10 DIAGNOSIS — F33.42 RECURRENT MAJOR DEPRESSIVE DISORDER, IN FULL REMISSION: ICD-10-CM

## 2025-04-10 DIAGNOSIS — G30.1 MILD LATE ONSET ALZHEIMER'S DEMENTIA WITH OTHER BEHAVIORAL DISTURBANCE: ICD-10-CM

## 2025-04-10 DIAGNOSIS — I70.0 AORTIC ATHEROSCLEROSIS: ICD-10-CM

## 2025-04-10 DIAGNOSIS — I10 ESSENTIAL HYPERTENSION: ICD-10-CM

## 2025-04-10 DIAGNOSIS — I48.20 CHRONIC ATRIAL FIBRILLATION: ICD-10-CM

## 2025-04-10 PROBLEM — E43 SEVERE PROTEIN-CALORIE MALNUTRITION: Status: RESOLVED | Noted: 2024-05-03 | Resolved: 2025-04-10

## 2025-04-10 PROCEDURE — 99214 OFFICE O/P EST MOD 30 MIN: CPT | Mod: PBBFAC,PN | Performed by: STUDENT IN AN ORGANIZED HEALTH CARE EDUCATION/TRAINING PROGRAM

## 2025-04-10 PROCEDURE — G2211 COMPLEX E/M VISIT ADD ON: HCPCS | Mod: S$PBB,,, | Performed by: STUDENT IN AN ORGANIZED HEALTH CARE EDUCATION/TRAINING PROGRAM

## 2025-04-10 PROCEDURE — 99999 PR PBB SHADOW E&M-EST. PATIENT-LVL IV: CPT | Mod: PBBFAC,,, | Performed by: STUDENT IN AN ORGANIZED HEALTH CARE EDUCATION/TRAINING PROGRAM

## 2025-04-10 PROCEDURE — 99215 OFFICE O/P EST HI 40 MIN: CPT | Mod: S$PBB,,, | Performed by: STUDENT IN AN ORGANIZED HEALTH CARE EDUCATION/TRAINING PROGRAM

## 2025-04-10 RX ORDER — EMPAGLIFLOZIN 10 MG/1
10 TABLET, FILM COATED ORAL DAILY
COMMUNITY
Start: 2025-03-19

## 2025-04-10 NOTE — PROGRESS NOTES
[facility] Note    Chief Complaint      F/u on chronic conditions     History of Present Illness      Mary Ellen Miller is a 89 y.o. female with sHTN, chronic Afib on DOAC, HFpEF, KLARISSA, OA ,  mild dementia and osteopenia, and  h/o mildly displaced Left intertrochanteric femur # and comminuted Left Superior pubic ramus fracture /fracture of the Left inferior pubic ramus s/p surgical intervention( IM nail on 04/28/204) , now on PT and walker to aid mobility, resident of [facility] who presents for f/u on chronic conditions. She was accompanied in office today by her daughter in Law. She is concerned she may have anxiety with panic attacks every now and then. Her finances managed by her son now, does not cook or drive around anymore ), but she grooms herself, eats unassisted, toilet unassisted also. She enjoys yard work and gardening. She denies any fever, chills, cough, CP, orthopnea, PND.  She described her appetite, BM and mood to be great.    Problem List Addressed This Visit:    As listed below    Health Maintenance   Topic Date Due    RSV Vaccine (Age 60+ and Pregnant patients) (1 - 1-dose 75+ series) Never done    TETANUS VACCINE  07/25/2026    Lipid Panel  10/18/2029    Shingles Vaccine  Completed    Influenza Vaccine  Completed    Pneumococcal Vaccines (Age 50+)  Completed    COVID-19 Vaccine  Discontinued       Past Medical History:   Diagnosis Date    Anxiety     Atrial fibrillation     Colon polyps 05/01/2012    Hypertension     Leg ulcer, right, limited to breakdown of skin 11/20/2019    Unilateral femoral hernia with obstruction, without gangrene 10/26/2015       Past Surgical History:   Procedure Laterality Date    HYSTERECTOMY      INTRAMEDULLARY RODDING OF FEMUR Left 4/28/2024    Procedure: INSERTION, INTRAMEDULLARY LENO, FEMUR: Left;  Surgeon: Kurt Melendez MD;  Location: [facility] OR [address] [place] [address];  Service: Orthopedics;  Laterality: Left;    LUNG SURGERY  1988     OOPHORECTOMY      RADICAL HYSTERECTOMY  1990       family history includes No Known Problems in her father and mother.    Social History     Tobacco Use    Smoking status: Never     Passive exposure: Never    Smokeless tobacco: Never   Substance Use Topics    Alcohol use: No    Drug use: No       Review of Systems   Constitutional:  Negative for fatigue and fever.   Respiratory:  Negative for cough and chest tightness.    Gastrointestinal:  Negative for abdominal pain, diarrhea and vomiting.   Endocrine: Negative for polydipsia and polyphagia.   Genitourinary:  Negative for difficulty urinating, dysuria and frequency.   Musculoskeletal:  Negative for arthralgias, back pain, gait problem and joint swelling.   Skin:  Negative for rash.   Neurological:  Negative for seizures, weakness, numbness and headaches.   Psychiatric/Behavioral:  Negative for sleep disturbance. The patient is not nervous/anxious.        Outpatient Encounter Medications as of 4/10/2025   Medication Sig Dispense Refill    acetaminophen (TYLENOL) 500 MG tablet Take 1 tablet (500 mg total) by mouth every 8 (eight) hours as needed for Pain. 60 tablet 0    atenoloL (TENORMIN) 25 MG tablet Take 1 tablet (25 mg total) by mouth once daily.      ELIQUIS 2.5 mg Tab TAKE 1 TABLET BY MOUTH TWICE A DAY. 60 tablet 11    EScitalopram oxalate (LEXAPRO) 10 MG tablet Take 1 tablet (10 mg total) by mouth once daily. 90 tablet 3    furosemide (LASIX) 20 MG tablet TAKE 1 TABLET BY MOUTH ONCE DAILY. (Patient taking differently: Take 20 mg by mouth 2 (two) times a day.) 90 tablet 3    JARDIANCE 10 mg tablet Take 10 mg by mouth once daily.      LAXATIVE, BISACODYL, 5 mg EC tablet Take by mouth.      melatonin (MELATIN) 3 mg tablet Take 3 tablets (9 mg total) by mouth nightly. 30 tablet 0    memantine (NAMENDA) 5 MG Tab TAKE 1 TABLET BY MOUTH TWICE A DAY. 60 tablet 11    spironolactone (ALDACTONE) 50 MG tablet TAKE 1 TABLET BY MOUTH ONCE DAILY. 90 tablet 2     [DISCONTINUED] amLODIPine (NORVASC) 10 MG tablet TAKE 1 TABLET BY MOUTH ONCE DAILY. (Patient not taking: Reported on 4/10/2025) 90 tablet 2    [DISCONTINUED] ascorbic acid, vitamin C, (VITAMIN C) 500 MG tablet TAKE 1 TABLET BY MOUTH TWICE A DAY. (Patient not taking: Reported on 4/10/2025) 60 tablet 11    [DISCONTINUED] biotin 1 mg tablet TAKE 1/2 TABLET BY MOUTH ONCE DAILY. (Patient not taking: Reported on 4/10/2025) 90 tablet 3    [DISCONTINUED] calcium carbonate (OS-ZOILA) 600 mg calcium (1,500 mg) Tab Take 1 tablet (600 mg total) by mouth once daily. (Patient not taking: Reported on 4/10/2025) 90 tablet 3    [DISCONTINUED] cyanocobalamin, vitamin B-12, 500 mcg Chew Take 500 mcg by mouth once daily. (Patient not taking: Reported on 4/10/2025) 90 tablet 3    [DISCONTINUED] ferrous sulfate (FEOSOL) 325 mg (65 mg iron) Tab tablet Take 1 tablet (325 mg total) by mouth every other day. (Patient not taking: Reported on 4/10/2025) 45 tablet 3    [DISCONTINUED] hydrALAZINE (APRESOLINE) 25 MG tablet TAKE 1 TABLET BY MOUTH EVERY EIGHT HOURS. (Patient not taking: Reported on 4/10/2025) 90 tablet 11    [DISCONTINUED] LIDOcaine (LIDODERM) 5 % Place 1 patch onto the skin once daily. Remove & Discard patch within 12 hours or as directed by MD (Patient not taking: Reported on 4/10/2025) 7 patch 0    [DISCONTINUED] multivit,calc,mins/iron/folic (ONE-A-DAY WOMENS FORMULA ORAL) Take 1 tablet by mouth once daily. (Patient not taking: Reported on 4/10/2025)      [DISCONTINUED] ondansetron (ZOFRAN-ODT) 8 MG TbDL Take 1 tablet (8 mg total) by mouth every 8 (eight) hours as needed (Nausea). (Patient not taking: Reported on 4/10/2025)      [DISCONTINUED] vit A,C & E-lutein-minerals 1,000-60-2 unit (I-BLAKE) Tab TAKE 1 TABLET BY MOUTH ONCE DAILY. (Patient not taking: Reported on 4/10/2025) 90 tablet 3    [DISCONTINUED] vitamin D 1000 units Tab Take 2,000 mg by mouth once daily. (Patient not taking: Reported on 4/10/2025)      [DISCONTINUED]  "zinc sulfate (ZINCATE) 50 mg zinc (220 mg) capsule Take 1 capsule (220 mg total) by mouth once daily. (Patient not taking: Reported on 4/10/2025) 90 capsule 3     No facility-administered encounter medications on file as of 4/10/2025.        Review of patient's allergies indicates:   Allergen Reactions    Percodan [oxycodone hcl-oxycodone-asa] Other (See Comments)     halucinations    Penicillins Itching    Sulfa (sulfonamide antibiotics) Diarrhea and Nausea Only    Amoxicillin        Physical Exam      Vital Signs  Temp: 97.5 °F (36.4 °C)  Temp Source: Temporal  Pulse: 73  Resp: 20  SpO2: (!) 92 %  BP: 110/62  BP Location: Right arm  Patient Position: Sitting  Pain Score: 0-No pain  Height and Weight  Height: 5' 2" (157.5 cm)  Weight: 52.4 kg (115 lb 10.1 oz)  BSA (Calculated - sq m): 1.51 sq meters  BMI (Calculated): 21.1  Weight in (lb) to have BMI = 25: 136.4]    Physical Exam  Vitals reviewed.   Constitutional:       Appearance: Normal appearance.   HENT:      Head: Normocephalic and atraumatic.      Right Ear: Tympanic membrane normal.      Left Ear: Tympanic membrane normal.      Mouth/Throat:      Mouth: Mucous membranes are moist.      Pharynx: Oropharynx is clear.   Eyes:      Extraocular Movements: Extraocular movements intact.      Conjunctiva/sclera: Conjunctivae normal.      Pupils: Pupils are equal, round, and reactive to light.   Cardiovascular:      Rate and Rhythm: Normal rate and regular rhythm.      Pulses: Normal pulses.      Heart sounds: Normal heart sounds.   Pulmonary:      Effort: Pulmonary effort is normal.      Breath sounds: Normal breath sounds.   Abdominal:      General: Abdomen is flat. Bowel sounds are normal.      Palpations: Abdomen is soft.   Musculoskeletal:         General: Deformity: Varicose veins bilaterally.      Cervical back: Normal range of motion.      Right lower leg: Edema (Trace) present.      Left lower leg: Edema (trace) present.   Skin:     General: Skin is warm " "and dry.   Neurological:      General: No focal deficit present.      Mental Status: She is alert and oriented to person, place, and time.      Sensory: No sensory deficit.      Gait: Gait abnormal (antalgic gait).   Psychiatric:         Mood and Affect: Mood normal.         Behavior: Behavior normal.          Laboratory:  CBC:  No results for input(s): "WBC", "RBC", "HGB", "HCT", "PLT", "MCV", "MCH", "MCHC" in the last 2160 hours.    CMP:  No results for input(s): "GLU", "CALCIUM", "ALBUMIN", "PROT", "NA", "K", "CO2", "CL", "BUN", "ALKPHOS", "ALT", "AST", "BILITOT" in the last 2160 hours.    Invalid input(s): "CREATININ"    URINALYSIS:  No results for input(s): "COLORU", "CLARITYU", "SPECGRAV", "PHUR", "PROTEINUA", "GLUCOSEU", "BILIRUBINCON", "BLOODU", "WBCU", "RBCU", "BACTERIA", "MUCUS", "NITRITE", "LEUKOCYTESUR", "UROBILINOGEN", "HYALINECASTS" in the last 2160 hours.     LIPIDS:  No results for input(s): "TSH", "HDL", "CHOL", "TRIG", "LDLCALC", "CHOLHDL", "NONHDLCHOL", "TOTALCHOLEST" in the last 2160 hours.  TSH:  No results for input(s): "TSH" in the last 2160 hours.  A1C:  No results for input(s): "HGBA1C" in the last 2160 hours.      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 Essential hypertension  Overview:  - now @ goal  -followed by cardiology ( Dr Last)    3.  Chronic atrial fibrillation  Overview:  - followed by Dr. Last  - OAC Eliquis without signs of bleeding  - rate controlled    4. Chronic diastolic heart failure  Overview:  - no signs of acute decompensation  - followed by cardiology    5. Long term (current) use of anticoagulants  Overview:  - followed by cardiology  - no signs of bleeding    6. H/o MDD  -well controlled on current regimen    7. H/o aortic atherosclerosis  -c/w " ASA  -c/t monitor    9. MDD/KLARISSA  -c/w  lexapro 10mg daily    9. Normocytic amenia  -H&H improved    10. CKD stage 3b  -stable renal function  -avoid NSAIDS    -Continue current medications and maintain follow up with specialists.      Patient verbalizes understanding and agrees with current treatment plan.    44 minutes of total time spent on the encounter, which includes face to face time and non-face to face time preparing to see the patient (eg, review of tests), Obtaining and/or reviewing separately obtained history, Documenting clinical information in the electronic or other health record, Independently interpreting results (not separately reported) and communicating results to the patient/family/caregiver, or Care coordination (not separately reported).      Dr Dionne Jeter MD  Internal Medicine   [facility]

## 2025-04-11 ENCOUNTER — RESULTS FOLLOW-UP (OUTPATIENT)
Dept: FAMILY MEDICINE | Facility: CLINIC | Age: OVER 89
End: 2025-04-11

## 2025-05-01 NOTE — TELEPHONE ENCOUNTER
No care due was identified.  [facility] Embedded Care Due Messages. Reference number: [id number].   5/01/2025 5:39:10 PM CDT

## 2025-05-02 RX ORDER — CHOLECALCIFEROL (VITAMIN D3) 25 MCG
TABLET ORAL
Qty: 60 TABLET | Refills: 11 | Status: SHIPPED | OUTPATIENT
Start: 2025-05-02

## 2025-05-02 RX ORDER — PSYLLIUM HUSK 0.4 G
1 CAPSULE ORAL
Qty: 30 TABLET | Refills: 11 | Status: SHIPPED | OUTPATIENT
Start: 2025-05-02

## 2025-05-02 RX ORDER — I-VITE, TAB 1000-60-2MG (60/BT) 300MCG-200
1 TAB ORAL
Qty: 30 TABLET | Refills: 11 | Status: SHIPPED | OUTPATIENT
Start: 2025-05-02

## 2025-05-13 ENCOUNTER — TELEPHONE (OUTPATIENT)
Dept: FAMILY MEDICINE | Facility: CLINIC | Age: OVER 89
End: 2025-05-13
Payer: MEDICARE

## 2025-05-13 NOTE — TELEPHONE ENCOUNTER
Spoke to [facility] [employer] who states pt has been taking Biotin 1000mg  (chart shows 1mg discontinued) and Women's one a day vitamin ( chart shows Hair, Skin, and Nails, FA-BIOTIN, 66.7-1,666.7MCG TAB discontinued). They would like to clarify if they should discontinue this or if they can receive another prescription of each medication.     Please let me know how I can be of any assistance.       Eileen mendoza,    Virgie Malagon

## 2025-05-13 NOTE — TELEPHONE ENCOUNTER
----- Message from Deniz sent at 5/13/2025  3:56 PM CDT -----  Type:  Pharmacy Calling to Clarify an RXName of Caller:Cheryl Name:[facility] - [place], [place] - [address] [place] [address] Phone: [phone]: [phone] Name: Biotin [id number] women's mutli vitimanWhat do they need to clarify?:Need new scriptsBest Call Back Number:Additional Information: Living facility needs orders to administer

## 2025-05-14 NOTE — TELEPHONE ENCOUNTER
Neptali with [facility] Informed that Dr. Lehman will not be sending in prescriptions for these medications and family can purchase OTC if they want pt on them.

## 2025-05-29 DIAGNOSIS — F33.42 RECURRENT MAJOR DEPRESSIVE DISORDER, IN FULL REMISSION: ICD-10-CM

## 2025-05-29 RX ORDER — ESCITALOPRAM OXALATE 10 MG/1
10 TABLET ORAL
Qty: 90 TABLET | Refills: 3 | Status: SHIPPED | OUTPATIENT
Start: 2025-05-29

## 2025-05-29 NOTE — TELEPHONE ENCOUNTER
Care Due:                  Date            Visit Type   Department     Provider  --------------------------------------------------------------------------------                                EP -                              PRIMARY      DESC FAMILY  Last Visit: 04-      [facility]   [facility]                              EP -                              PRIMARY      DESC FAMILY  Next Visit: 08-      [facility] ([facility])   [facility]                                                            Last  Test          Frequency    Reason                     Performed    Due Date  --------------------------------------------------------------------------------    Vitamin D...  12 months..  vitamin..................  04- 04-    [facility] Embedded Care Due Messages. Reference number: [id number].   5/29/2025 4:15:01 PM CDT

## 2025-06-05 DIAGNOSIS — D63.8 ANEMIA IN OTHER CHRONIC DISEASES CLASSIFIED ELSEWHERE: Primary | ICD-10-CM

## 2025-06-06 RX ORDER — FERROUS SULFATE 325(65) MG
TABLET ORAL EVERY OTHER DAY
Qty: 90 TABLET | Refills: 3 | Status: SHIPPED | OUTPATIENT
Start: 2025-06-06

## 2025-07-10 ENCOUNTER — TELEPHONE (OUTPATIENT)
Dept: FAMILY MEDICINE | Facility: CLINIC | Age: OVER 89
End: 2025-07-10
Payer: MEDICARE

## 2025-07-10 RX ORDER — CARTILAGE/COLLAGEN/BOR/HYALUR 40-10-5 MG
TABLET ORAL
COMMUNITY

## 2025-07-10 NOTE — TELEPHONE ENCOUNTER
Copied from CRM #[id number]. Topic: Medications - Pharmacy  >> Jul 10, 2025 11:59 AM Bridgett wrote:  Type:  Pharmacy Calling to Clarify an RX      Pharmacy Name:[facility]   Prescription Name:prevagen not on list  Vitamin b12 not on list  What do they need to clarify?:refill  Best Call Back Number:   Additional Information:

## 2025-07-11 NOTE — TELEPHONE ENCOUNTER
Spoke to Neptali (pharmacy tech) at [facility], informed him per Dr. Lehman pt is already taking appropriate multivitamin, that is enough, if they need refill on that they should let us know. The Prevagen and Vitamin b12 are not needed. Neptali verbalized understanding.

## 2025-07-25 RX ORDER — ZINC SULFATE 50(220)MG
220 CAPSULE ORAL
Qty: 30 CAPSULE | Refills: 11 | Status: SHIPPED | OUTPATIENT
Start: 2025-07-25

## 2025-08-01 ENCOUNTER — OFFICE VISIT (OUTPATIENT)
Dept: FAMILY MEDICINE | Facility: CLINIC | Age: OVER 89
End: 2025-08-01
Payer: MEDICARE

## 2025-08-01 VITALS
WEIGHT: 119.94 LBS | HEART RATE: 98 BPM | HEIGHT: 62 IN | SYSTOLIC BLOOD PRESSURE: 100 MMHG | DIASTOLIC BLOOD PRESSURE: 50 MMHG | OXYGEN SATURATION: 98 % | BODY MASS INDEX: 22.07 KG/M2 | TEMPERATURE: 98 F | RESPIRATION RATE: 20 BRPM

## 2025-08-01 DIAGNOSIS — N18.32 STAGE 3B CHRONIC KIDNEY DISEASE: ICD-10-CM

## 2025-08-01 DIAGNOSIS — F02.A18 MILD LATE ONSET ALZHEIMER'S DEMENTIA WITH OTHER BEHAVIORAL DISTURBANCE: ICD-10-CM

## 2025-08-01 DIAGNOSIS — I48.20 CHRONIC ATRIAL FIBRILLATION: ICD-10-CM

## 2025-08-01 DIAGNOSIS — Z78.0 ENCOUNTER FOR OSTEOPOROSIS SCREENING IN ASYMPTOMATIC POSTMENOPAUSAL PATIENT: ICD-10-CM

## 2025-08-01 DIAGNOSIS — I10 ESSENTIAL HYPERTENSION: ICD-10-CM

## 2025-08-01 DIAGNOSIS — F33.42 RECURRENT MAJOR DEPRESSIVE DISORDER, IN FULL REMISSION: ICD-10-CM

## 2025-08-01 DIAGNOSIS — F41.1 GENERALIZED ANXIETY DISORDER: Primary | ICD-10-CM

## 2025-08-01 DIAGNOSIS — I50.32 CHRONIC DIASTOLIC (CONGESTIVE) HEART FAILURE: ICD-10-CM

## 2025-08-01 DIAGNOSIS — Z13.820 ENCOUNTER FOR OSTEOPOROSIS SCREENING IN ASYMPTOMATIC POSTMENOPAUSAL PATIENT: ICD-10-CM

## 2025-08-01 DIAGNOSIS — Z29.11 NEED FOR RSV IMMUNIZATION: ICD-10-CM

## 2025-08-01 DIAGNOSIS — G30.1 MILD LATE ONSET ALZHEIMER'S DEMENTIA WITH OTHER BEHAVIORAL DISTURBANCE: ICD-10-CM

## 2025-08-01 PROCEDURE — 99214 OFFICE O/P EST MOD 30 MIN: CPT | Mod: PBBFAC,PN | Performed by: STUDENT IN AN ORGANIZED HEALTH CARE EDUCATION/TRAINING PROGRAM

## 2025-08-01 PROCEDURE — 99999 PR PBB SHADOW E&M-EST. PATIENT-LVL IV: CPT | Mod: PBBFAC,,, | Performed by: STUDENT IN AN ORGANIZED HEALTH CARE EDUCATION/TRAINING PROGRAM

## 2025-08-01 RX ORDER — HYDRALAZINE HYDROCHLORIDE 25 MG/1
25 TABLET, FILM COATED ORAL 3 TIMES DAILY
COMMUNITY
Start: 2025-07-08

## 2025-08-01 RX ORDER — AMLODIPINE BESYLATE 5 MG/1
5 TABLET ORAL DAILY
Qty: 90 TABLET | Refills: 3 | Status: SHIPPED | OUTPATIENT
Start: 2025-08-01 | End: 2026-08-01

## 2025-08-01 RX ORDER — AMLODIPINE BESYLATE 10 MG/1
10 TABLET ORAL DAILY
COMMUNITY
Start: 2025-07-08 | End: 2025-08-01

## 2025-08-01 RX ORDER — AMLODIPINE BESYLATE 5 MG/1
5 TABLET ORAL DAILY
Qty: 90 TABLET | Refills: 3 | Status: SHIPPED | OUTPATIENT
Start: 2025-08-01 | End: 2025-08-01

## 2025-08-01 NOTE — PROGRESS NOTES
[facility] Note    Chief Complaint      F/u on chronic conditions   Leg swelling     History of Present Illness      Mary Ellen Miller is a 89 y.o. female with sHTN, chronic Afib on DOAC, HFpEF, KLARISSA, OA ,  mild dementia and osteopenia, and  h/o mildly displaced Left intertrochanteric femur # and comminuted Left Superior pubic ramus fracture /fracture of the Left inferior pubic ramus s/p surgical intervention( IM nail on 04/28/204) , now on PT and walker to aid mobility, resident of [facility] who presents for f/u on chronic conditions. She was accompanied in office today by her daughter in Law. She is concerned about her progressive leg swelling in the past few days since switching her lasix to daily from twice a day by her cardiologist during recent clinic visit and her complaining of feeling winded while walking, no other complaints today . Her finances managed by her son now, does not cook or drive around anymore ), but she grooms herself, eats unassisted, toilet unassisted also. She enjoys yard work and gardening. She denies any fever, chills, cough, CP.  She described her appetite, BM and mood to be great.    Problem List Addressed This Visit:    As listed below    Health Maintenance   Topic Date Due    RSV Vaccine (Age 60+ and Pregnant patients) (1 - 1-dose 75+ series) Never done    DEXA Scan  07/24/2022    Influenza Vaccine (1) 09/01/2025    TETANUS VACCINE  07/25/2026    Lipid Panel  10/18/2029    Shingles Vaccine  Completed    Pneumococcal Vaccines (Age 50+)  Completed    COVID-19 Vaccine  Discontinued       Past Medical History:   Diagnosis Date    Anxiety     Atrial fibrillation     Colon polyps 05/01/2012    Hypertension     Leg ulcer, right, limited to breakdown of skin 11/20/2019    Unilateral femoral hernia with obstruction, without gangrene 10/26/2015       Past Surgical History:   Procedure Laterality Date    HYSTERECTOMY      INTRAMEDULLARY RODDING OF FEMUR Left  4/28/2024    Procedure: INSERTION, INTRAMEDULLARY LENO, FEMUR: Left;  Surgeon: Kurt Melendez MD;  Location: [facility] OR [address] [place] [address];  Service: Orthopedics;  Laterality: Left;    LUNG SURGERY  1988    OOPHORECTOMY      RADICAL HYSTERECTOMY  1990       family history includes No Known Problems in her father and mother.    Social History     Tobacco Use    Smoking status: Never     Passive exposure: Never    Smokeless tobacco: Never   Substance Use Topics    Alcohol use: No    Drug use: No       Review of Systems   Constitutional:  Negative for fatigue and fever.   Respiratory:  Negative for cough and chest tightness.    Gastrointestinal:  Negative for abdominal pain, diarrhea and vomiting.   Endocrine: Negative for polydipsia and polyphagia.   Genitourinary:  Negative for difficulty urinating, dysuria and frequency.   Musculoskeletal:  Negative for arthralgias, back pain, gait problem and joint swelling.   Skin:  Negative for rash.   Neurological:  Negative for seizures, weakness, numbness and headaches.   Psychiatric/Behavioral:  Negative for sleep disturbance. The patient is not nervous/anxious.        Outpatient Encounter Medications as of 8/1/2025   Medication Sig Dispense Refill    acetaminophen (TYLENOL) 500 MG tablet Take 1 tablet (500 mg total) by mouth every 8 (eight) hours as needed for Pain. 60 tablet 0    amLODIPine (NORVASC) 10 MG tablet Take 10 mg by mouth once daily.      atenoloL (TENORMIN) 25 MG tablet Take 1 tablet (25 mg total) by mouth once daily.      ELIQUIS 2.5 mg Tab TAKE 1 TABLET BY MOUTH TWICE A DAY. 60 tablet 11    EScitalopram oxalate (LEXAPRO) 10 MG tablet TAKE 1 TABLET BY MOUTH ONCE DAILY. 90 tablet 3    furosemide (LASIX) 20 MG tablet TAKE 1 TABLET BY MOUTH ONCE DAILY. (Patient taking differently: Take 20 mg by mouth 2 (two) times a day.) 90 tablet 3    hydrALAZINE (APRESOLINE) 25 MG tablet Take 25 mg by mouth 3 (three) times daily.      I-BLAKE Tab TAKE 1 TABLET BY MOUTH ONCE DAILY. 30  "tablet 11    JARDIANCE 10 mg tablet Take 10 mg by mouth once daily.      LAXATIVE, BISACODYL, 5 mg EC tablet Take by mouth.      melatonin (MELATIN) 3 mg tablet Take 3 tablets (9 mg total) by mouth nightly. 30 tablet 0    memantine (NAMENDA) 5 MG Tab TAKE 1 TABLET BY MOUTH TWICE A DAY. 60 tablet 11    spironolactone (ALDACTONE) 50 MG tablet TAKE 1 TABLET BY MOUTH ONCE DAILY. 90 tablet 2    UNABLE TO FIND medication name: PREVAGEN      [DISCONTINUED] calcium carbonate (OS-ZOILA) 600 mg calcium (1,500 mg) Tab TAKE 1 TABLET BY MOUTH ONCE DAILY. 30 tablet 11    [DISCONTINUED] cyanocobalamin, vitamin B-12, (VITAMIN B-12) 500 mcg Lozg Take by mouth.      [DISCONTINUED] ferrous sulfate (FEROSUL) 325 mg (65 mg iron) Tab tablet TAKE 1 TABLET BY MOUTH EVERY OTHER DAY. 90 tablet 3    [DISCONTINUED] vitamin D (VITAMIN D3) 1000 units Tab TAKE TWO (2) TABLETS BY MOUTH ONCE A DAY. 60 tablet 11    [DISCONTINUED] zinc sulfate (ZINCATE) 50 mg zinc (220 mg) capsule TAKE 1 CAPSULE BY MOUTH ONCE DAILY. 30 capsule 11    RSV, preF A and preF B,PF, (ABRYSVO) 120 mcg/0.5 mL SolR vaccine Inject 0.5 mLs (120 mcg total) into the muscle once. for 1 dose 0.5 mL 0     No facility-administered encounter medications on file as of 8/1/2025.        Review of patient's allergies indicates:   Allergen Reactions    Percodan [oxycodone hcl-oxycodone-asa] Other (See Comments)     halucinations    Penicillins Itching    Sulfa (sulfonamide antibiotics) Diarrhea and Nausea Only    Amoxicillin        Physical Exam      Vital Signs  Temp: 98.1 °F (36.7 °C)  Temp Source: Temporal  Pulse: 98  Resp: 20  SpO2: 98 %  BP: (!) 100/50  BP Location: Right arm  Patient Position: Sitting  Pain Score: 0-No pain  Height and Weight  Height: 5' 2" (157.5 cm)  Weight: 54.4 kg (119 lb 14.9 oz)  BSA (Calculated - sq m): 1.54 sq meters  BMI (Calculated): 21.9  Weight in (lb) to have BMI = 25: 136.4]    Physical Exam  Vitals reviewed.   Constitutional:       Appearance: Normal " "appearance.   HENT:      Head: Normocephalic and atraumatic.      Right Ear: Tympanic membrane normal.      Left Ear: Tympanic membrane normal.      Mouth/Throat:      Mouth: Mucous membranes are moist.      Pharynx: Oropharynx is clear.   Eyes:      Extraocular Movements: Extraocular movements intact.      Conjunctiva/sclera: Conjunctivae normal.      Pupils: Pupils are equal, round, and reactive to light.   Cardiovascular:      Rate and Rhythm: Normal rate and regular rhythm.      Pulses: Normal pulses.      Heart sounds: Normal heart sounds.   Pulmonary:      Effort: Pulmonary effort is normal.      Breath sounds: Normal breath sounds.   Abdominal:      General: Abdomen is flat. Bowel sounds are normal.      Palpations: Abdomen is soft.   Musculoskeletal:         General: Deformity: Varicose veins bilaterally.      Cervical back: Normal range of motion.      Right lower leg: Edema ((2++ bilaterally)) present.      Left lower leg: Edema present.   Skin:     General: Skin is warm and dry.   Neurological:      General: No focal deficit present.      Mental Status: She is alert and oriented to person, place, and time.      Sensory: No sensory deficit.      Gait: Gait abnormal (antalgic gait).   Psychiatric:         Mood and Affect: Mood normal.         Behavior: Behavior normal.          Laboratory:  CBC:  No results for input(s): "WBC", "RBC", "HGB", "HCT", "PLT", "MCV", "MCH", "MCHC" in the last 2160 hours.    CMP:  No results for input(s): "GLU", "CALCIUM", "ALBUMIN", "PROT", "NA", "K", "CO2", "CL", "BUN", "ALKPHOS", "ALT", "AST", "BILITOT" in the last 2160 hours.    Invalid input(s): "CREATININ"    URINALYSIS:  No results for input(s): "COLORU", "CLARITYU", "SPECGRAV", "PHUR", "PROTEINUA", "GLUCOSEU", "BILIRUBINCON", "BLOODU", "WBCU", "RBCU", "BACTERIA", "MUCUS", "NITRITE", "LEUKOCYTESUR", "UROBILINOGEN", "HYALINECASTS" in the last 2160 hours.     LIPIDS:  No results for input(s): "TSH", "HDL", "CHOL", "TRIG", " ""LDLCALC", "CHOLHDL", "NONHDLCHOL", "TOTALCHOLEST" in the last 2160 hours.  TSH:  No results for input(s): "TSH" in the last 2160 hours.  A1C:  No results for input(s): "HGBA1C" in the last 2160 hours.      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 Essential hypertension  Overview:  - BP too controlled given her age  -given low BP , amlodipine reduced to 5mg from 10mg, c/w other regimen as prescribed   -followed by cardiology ( Dr Last)    3.  Chronic atrial fibrillation  Overview:  - followed by Dr. Last  - OAC Eliquis without signs of bleeding  - rate controlled    4. Chronic diastolic heart failure  Overview:  - hypervolemic on examination  -restart lasix at 20mg BID   -will review recent BMP result once results retrieved from cardiology dept  - followed by cardiology  -c/w other regimen as prescribed    5. Long term (current) use of anticoagulants  Overview:  - followed by cardiology  - no signs of bleeding    6. H/o MDD  -well controlled on current regimen    7. H/o aortic atherosclerosis  -c/w ASA  -c/t monitor    9. MDD/KLARISSA  -c/w  lexapro 10mg daily    9. Normocytic amenia  -H&H improved    10. CKD stage 3b  -stable renal function  -avoid NSAIDS    -Continue current medications and maintain follow up with specialists.      Patient verbalizes understanding and agrees with current treatment plan.    47 minutes of total time spent on the encounter, which includes face to face time and non-face to face time preparing to see the patient (eg, review of tests), Obtaining and/or reviewing separately obtained history, Documenting clinical information in the electronic or other health record, Independently interpreting results (not separately reported) and communicating results to the " patient/family/caregiver, or Care coordination (not separately reported).      Dr Dionne Jeter MD  Internal Medicine   [facility] [place]

## (undated) DEVICE — SPONGE LAP 18X18 PREWASHED

## (undated) DEVICE — ELECTRODE REM PLYHSV RETURN 9

## (undated) DEVICE — TRAY MINOR ORTHO OMC

## (undated) DEVICE — PIN PRECISION 3.2-3.9X450MM

## (undated) DEVICE — DRESSING GAUZE XEROFORM 5X9

## (undated) DEVICE — STAPLER SKIN PROXIMATE WIDE

## (undated) DEVICE — BLADE SURG CARBON STEEL #10

## (undated) DEVICE — DRAPE INCISE IOBAN 2 23X17IN

## (undated) DEVICE — BLADE SURG #15 CARBON STEEL

## (undated) DEVICE — PAD ABDOMINAL STERILE 8X10IN

## (undated) DEVICE — DRAPE SURGICAL STERI IRRG PCH

## (undated) DEVICE — SUT CTD VICRYL UND BR CP-1

## (undated) DEVICE — DRAPE THREE-QTR REINF 53X77IN

## (undated) DEVICE — DRAPE STERI

## (undated) DEVICE — TAPE SURG MEDIPORE 6X72IN

## (undated) DEVICE — DRILL T2 ALPHA LOK 4.2X360MM

## (undated) DEVICE — SUT CTD VICRYL 0 UND BR CPX

## (undated) DEVICE — APPLICATOR CHLORAPREP ORN 26ML

## (undated) DEVICE — WIRE KIRSCHNER T2 3X285MM SS
Type: IMPLANTABLE DEVICE | Site: FEMUR | Status: NON-FUNCTIONAL
Removed: 2024-04-28